# Patient Record
Sex: MALE | Race: BLACK OR AFRICAN AMERICAN | NOT HISPANIC OR LATINO | URBAN - METROPOLITAN AREA
[De-identification: names, ages, dates, MRNs, and addresses within clinical notes are randomized per-mention and may not be internally consistent; named-entity substitution may affect disease eponyms.]

---

## 2017-11-02 ENCOUNTER — OUTPATIENT (OUTPATIENT)
Dept: OUTPATIENT SERVICES | Facility: HOSPITAL | Age: 32
LOS: 1 days | End: 2017-11-02
Payer: MEDICARE

## 2017-11-02 DIAGNOSIS — G47.33 OBSTRUCTIVE SLEEP APNEA (ADULT) (PEDIATRIC): ICD-10-CM

## 2017-11-02 PROCEDURE — 95811 POLYSOM 6/>YRS CPAP 4/> PARM: CPT | Mod: 26

## 2017-11-02 PROCEDURE — 95811 POLYSOM 6/>YRS CPAP 4/> PARM: CPT

## 2017-11-03 ENCOUNTER — APPOINTMENT (OUTPATIENT)
Dept: NEUROLOGY | Facility: CLINIC | Age: 32
End: 2017-11-03
Payer: MEDICARE

## 2017-11-03 VITALS
WEIGHT: 270 LBS | BODY MASS INDEX: 44.98 KG/M2 | DIASTOLIC BLOOD PRESSURE: 85 MMHG | SYSTOLIC BLOOD PRESSURE: 140 MMHG | HEIGHT: 65 IN

## 2017-11-03 DIAGNOSIS — Z78.9 OTHER SPECIFIED HEALTH STATUS: ICD-10-CM

## 2017-11-03 DIAGNOSIS — Z82.3 FAMILY HISTORY OF STROKE: ICD-10-CM

## 2017-11-03 DIAGNOSIS — Z87.891 PERSONAL HISTORY OF NICOTINE DEPENDENCE: ICD-10-CM

## 2017-11-03 PROCEDURE — 99204 OFFICE O/P NEW MOD 45 MIN: CPT

## 2017-12-27 ENCOUNTER — EMERGENCY (EMERGENCY)
Facility: HOSPITAL | Age: 32
LOS: 1 days | End: 2017-12-27
Attending: EMERGENCY MEDICINE
Payer: MEDICARE

## 2017-12-27 ENCOUNTER — TRANSCRIPTION ENCOUNTER (OUTPATIENT)
Age: 32
End: 2017-12-27

## 2017-12-27 VITALS
RESPIRATION RATE: 20 BRPM | DIASTOLIC BLOOD PRESSURE: 126 MMHG | WEIGHT: 268.96 LBS | TEMPERATURE: 98 F | SYSTOLIC BLOOD PRESSURE: 212 MMHG | HEART RATE: 66 BPM | OXYGEN SATURATION: 99 % | HEIGHT: 65 IN

## 2017-12-27 LAB
ALBUMIN SERPL ELPH-MCNC: 3.7 G/DL — SIGNIFICANT CHANGE UP (ref 3.3–5.2)
ALP SERPL-CCNC: 95 U/L — SIGNIFICANT CHANGE UP (ref 40–120)
ALT FLD-CCNC: 36 U/L — SIGNIFICANT CHANGE UP
AMPHET UR-MCNC: NEGATIVE — SIGNIFICANT CHANGE UP
ANION GAP SERPL CALC-SCNC: 15 MMOL/L — SIGNIFICANT CHANGE UP (ref 5–17)
ANISOCYTOSIS BLD QL: SLIGHT — SIGNIFICANT CHANGE UP
APPEARANCE UR: CLEAR — SIGNIFICANT CHANGE UP
APTT BLD: 30.4 SEC — SIGNIFICANT CHANGE UP (ref 27.5–37.4)
AST SERPL-CCNC: 39 U/L — SIGNIFICANT CHANGE UP
BARBITURATES UR SCN-MCNC: NEGATIVE — SIGNIFICANT CHANGE UP
BASOPHILS # BLD AUTO: 0 K/UL — SIGNIFICANT CHANGE UP (ref 0–0.2)
BASOPHILS NFR BLD AUTO: 0.3 % — SIGNIFICANT CHANGE UP (ref 0–2)
BENZODIAZ UR-MCNC: NEGATIVE — SIGNIFICANT CHANGE UP
BILIRUB SERPL-MCNC: 0.2 MG/DL — LOW (ref 0.4–2)
BILIRUB UR-MCNC: NEGATIVE — SIGNIFICANT CHANGE UP
BUN SERPL-MCNC: 32 MG/DL — HIGH (ref 8–20)
CALCIUM SERPL-MCNC: 9.5 MG/DL — SIGNIFICANT CHANGE UP (ref 8.6–10.2)
CHLORIDE SERPL-SCNC: 100 MMOL/L — SIGNIFICANT CHANGE UP (ref 98–107)
CO2 SERPL-SCNC: 26 MMOL/L — SIGNIFICANT CHANGE UP (ref 22–29)
COCAINE METAB.OTHER UR-MCNC: NEGATIVE — SIGNIFICANT CHANGE UP
COLOR SPEC: YELLOW — SIGNIFICANT CHANGE UP
CREAT SERPL-MCNC: 2.55 MG/DL — HIGH (ref 0.5–1.3)
DIFF PNL FLD: ABNORMAL
EOSINOPHIL # BLD AUTO: 0.5 K/UL — SIGNIFICANT CHANGE UP (ref 0–0.5)
EOSINOPHIL NFR BLD AUTO: 3.2 % — SIGNIFICANT CHANGE UP (ref 0–5)
EPI CELLS # UR: SIGNIFICANT CHANGE UP
GLUCOSE SERPL-MCNC: 80 MG/DL — SIGNIFICANT CHANGE UP (ref 70–115)
GLUCOSE UR QL: NEGATIVE MG/DL — SIGNIFICANT CHANGE UP
HCT VFR BLD CALC: 34.8 % — LOW (ref 42–52)
HGB BLD-MCNC: 11.7 G/DL — LOW (ref 14–18)
INR BLD: 1.11 RATIO — SIGNIFICANT CHANGE UP (ref 0.88–1.16)
KETONES UR-MCNC: NEGATIVE — SIGNIFICANT CHANGE UP
LEUKOCYTE ESTERASE UR-ACNC: NEGATIVE — SIGNIFICANT CHANGE UP
LYMPHOCYTES # BLD AUTO: 23.6 % — SIGNIFICANT CHANGE UP (ref 20–55)
LYMPHOCYTES # BLD AUTO: 3.7 K/UL — SIGNIFICANT CHANGE UP (ref 1–4.8)
MACROCYTES BLD QL: SLIGHT — SIGNIFICANT CHANGE UP
MAGNESIUM SERPL-MCNC: 2.2 MG/DL — SIGNIFICANT CHANGE UP (ref 1.6–2.6)
MCHC RBC-ENTMCNC: 23.8 PG — LOW (ref 27–31)
MCHC RBC-ENTMCNC: 33.6 G/DL — SIGNIFICANT CHANGE UP (ref 32–36)
MCV RBC AUTO: 70.9 FL — LOW (ref 80–94)
METHADONE UR-MCNC: NEGATIVE — SIGNIFICANT CHANGE UP
MICROCYTES BLD QL: SLIGHT — SIGNIFICANT CHANGE UP
MONOCYTES # BLD AUTO: 1.1 K/UL — HIGH (ref 0–0.8)
MONOCYTES NFR BLD AUTO: 6.9 % — SIGNIFICANT CHANGE UP (ref 3–10)
NEUTROPHILS # BLD AUTO: 10.4 K/UL — HIGH (ref 1.8–8)
NEUTROPHILS NFR BLD AUTO: 65.8 % — SIGNIFICANT CHANGE UP (ref 37–73)
NITRITE UR-MCNC: NEGATIVE — SIGNIFICANT CHANGE UP
NT-PROBNP SERPL-SCNC: 1097 PG/ML — HIGH (ref 0–300)
OPIATES UR-MCNC: NEGATIVE — SIGNIFICANT CHANGE UP
OVALOCYTES BLD QL SMEAR: SLIGHT — SIGNIFICANT CHANGE UP
PCP SPEC-MCNC: SIGNIFICANT CHANGE UP
PCP UR-MCNC: NEGATIVE — SIGNIFICANT CHANGE UP
PH UR: 6 — SIGNIFICANT CHANGE UP (ref 5–8)
PLAT MORPH BLD: NORMAL — SIGNIFICANT CHANGE UP
PLATELET # BLD AUTO: 308 K/UL — SIGNIFICANT CHANGE UP (ref 150–400)
POIKILOCYTOSIS BLD QL AUTO: SLIGHT — SIGNIFICANT CHANGE UP
POTASSIUM SERPL-MCNC: 3.5 MMOL/L — SIGNIFICANT CHANGE UP (ref 3.5–5.3)
POTASSIUM SERPL-SCNC: 3.5 MMOL/L — SIGNIFICANT CHANGE UP (ref 3.5–5.3)
PROT SERPL-MCNC: 7.4 G/DL — SIGNIFICANT CHANGE UP (ref 6.6–8.7)
PROT UR-MCNC: 100 MG/DL
PROTHROM AB SERPL-ACNC: 12.2 SEC — SIGNIFICANT CHANGE UP (ref 9.8–12.7)
RBC # BLD: 4.91 M/UL — SIGNIFICANT CHANGE UP (ref 4.6–6.2)
RBC # FLD: 18 % — HIGH (ref 11–15.6)
RBC BLD AUTO: ABNORMAL
RBC CASTS # UR COMP ASSIST: SIGNIFICANT CHANGE UP /HPF (ref 0–4)
SODIUM SERPL-SCNC: 141 MMOL/L — SIGNIFICANT CHANGE UP (ref 135–145)
SP GR SPEC: 1.01 — SIGNIFICANT CHANGE UP (ref 1.01–1.02)
TARGETS BLD QL SMEAR: SLIGHT — SIGNIFICANT CHANGE UP
THC UR QL: POSITIVE
TROPONIN T SERPL-MCNC: 0.01 NG/ML — SIGNIFICANT CHANGE UP (ref 0–0.06)
UROBILINOGEN FLD QL: NEGATIVE MG/DL — SIGNIFICANT CHANGE UP
WBC # BLD: 15.7 K/UL — HIGH (ref 4.8–10.8)
WBC # FLD AUTO: 15.7 K/UL — HIGH (ref 4.8–10.8)
WBC UR QL: NEGATIVE — SIGNIFICANT CHANGE UP

## 2017-12-27 PROCEDURE — 99285 EMERGENCY DEPT VISIT HI MDM: CPT

## 2017-12-27 PROCEDURE — 71020: CPT | Mod: 26

## 2017-12-27 PROCEDURE — 93010 ELECTROCARDIOGRAM REPORT: CPT

## 2017-12-27 NOTE — ED PROVIDER NOTE - OBJECTIVE STATEMENT
33 y/o male with PMHx sleep apnea, DM, and HTN presents to the ED with c/o ear pain, onset 1 week. Upon triage, pt was noted to have elevated blood pressure. Pt states he was evaluated at Urgent Care for ear pain, pt was sent home. Pt is noncompliant with medication. Pt reports generalized chest pain, pt describes pain as tightness. Pt denies SOB, n/v, LE swelling, and any other acute symptoms and complaints at this time.

## 2017-12-27 NOTE — ED PROVIDER NOTE - MEDICAL DECISION MAKING DETAILS
31 y/o male with chronic HTN, noncompliant with medication, presents for atypical cp and elevated BP, plan to do CT angio, labs, and reevaluate.

## 2017-12-27 NOTE — ED PROVIDER NOTE - PHYSICAL EXAMINATION
Constitutional : Appears comfortably, talking in full sentences  Head :NC AT , no swelling  Eyes : exophthalmos, eomi, no swelling. Pupils reactive 4mm bilaterally.  Mouth :mm moist,  Neck : supple, trachea in midline  Chest : decrease breath sounds. no distress.   Heart :S1 S2 distant. no LE edema  Abdomen :abd soft, obese, non tender  Musc/Skel :ext no swelling, no deformity, no spine tenderness, distal pulses present  Neuro  :AAO 3 no focal deficits

## 2017-12-27 NOTE — ED ADULT TRIAGE NOTE - CHIEF COMPLAINT QUOTE
both my ear feel pain and swelling                            I went to urgent care today  told to go to ear dr    I am also having problems with my b/p

## 2017-12-27 NOTE — ED PROVIDER NOTE - NS ED ROS FT
no weight change, no fever or chills  +ear pain   no rash, no bruises  no visual changes no eye discharge  no cough cold or congestion,   no sob, + chest pain  no orthopnea, no pnd  no abd pain, no n/v/d  no hematuria, no change in urinary habits  no joint pain, no deformity  no headache, no paresthesia

## 2017-12-27 NOTE — ED PROVIDER NOTE - CARE PLAN
Principal Discharge DX:	Hypertension  Secondary Diagnosis:	Non compliance w medication regimen  Secondary Diagnosis:	Atypical chest pain

## 2017-12-28 VITALS
SYSTOLIC BLOOD PRESSURE: 181 MMHG | TEMPERATURE: 98 F | RESPIRATION RATE: 18 BRPM | DIASTOLIC BLOOD PRESSURE: 90 MMHG | HEART RATE: 58 BPM | OXYGEN SATURATION: 99 %

## 2017-12-28 PROCEDURE — 71250 CT THORAX DX C-: CPT

## 2017-12-28 PROCEDURE — 99284 EMERGENCY DEPT VISIT MOD MDM: CPT | Mod: 25

## 2017-12-28 PROCEDURE — 93005 ELECTROCARDIOGRAM TRACING: CPT

## 2017-12-28 PROCEDURE — 85730 THROMBOPLASTIN TIME PARTIAL: CPT

## 2017-12-28 PROCEDURE — 80307 DRUG TEST PRSMV CHEM ANLYZR: CPT

## 2017-12-28 PROCEDURE — 71250 CT THORAX DX C-: CPT | Mod: 26

## 2017-12-28 PROCEDURE — 85610 PROTHROMBIN TIME: CPT

## 2017-12-28 PROCEDURE — 83880 ASSAY OF NATRIURETIC PEPTIDE: CPT

## 2017-12-28 PROCEDURE — 85027 COMPLETE CBC AUTOMATED: CPT

## 2017-12-28 PROCEDURE — 80053 COMPREHEN METABOLIC PANEL: CPT

## 2017-12-28 PROCEDURE — 96374 THER/PROPH/DIAG INJ IV PUSH: CPT | Mod: XU

## 2017-12-28 PROCEDURE — 36415 COLL VENOUS BLD VENIPUNCTURE: CPT

## 2017-12-28 PROCEDURE — 81001 URINALYSIS AUTO W/SCOPE: CPT

## 2017-12-28 PROCEDURE — 83735 ASSAY OF MAGNESIUM: CPT

## 2017-12-28 PROCEDURE — 84484 ASSAY OF TROPONIN QUANT: CPT

## 2017-12-28 PROCEDURE — 71046 X-RAY EXAM CHEST 2 VIEWS: CPT

## 2017-12-28 RX ORDER — CIPROFLOXACIN AND DEXAMETHASONE 3; 1 MG/ML; MG/ML
4 SUSPENSION/ DROPS AURICULAR (OTIC) ONCE
Qty: 0 | Refills: 0 | Status: COMPLETED | OUTPATIENT
Start: 2017-12-28 | End: 2017-12-28

## 2017-12-28 RX ORDER — FUROSEMIDE 40 MG
40 TABLET ORAL ONCE
Qty: 0 | Refills: 0 | Status: COMPLETED | OUTPATIENT
Start: 2017-12-28 | End: 2017-12-28

## 2017-12-28 RX ORDER — CIPROFLOXACIN 6 MG/ML
4 SUSPENSION INTRATYMPANIC
Qty: 1 | Refills: 0 | OUTPATIENT
Start: 2017-12-28 | End: 2018-01-03

## 2017-12-28 RX ADMIN — CIPROFLOXACIN AND DEXAMETHASONE 4 DROP(S): 3; 1 SUSPENSION/ DROPS AURICULAR (OTIC) at 03:09

## 2017-12-28 RX ADMIN — Medication 40 MILLIGRAM(S): at 03:09

## 2017-12-28 NOTE — ED ADULT NURSE REASSESSMENT NOTE - NS ED NURSE REASSESS COMMENT FT1
PT noted with HTN @0118 and asymptomatic. PT did take evening medications @ about 2230 and informed MD Alfred. Md. notified about still elevated B/P, orders pending

## 2018-03-01 ENCOUNTER — OUTPATIENT (OUTPATIENT)
Dept: OUTPATIENT SERVICES | Facility: HOSPITAL | Age: 33
LOS: 1 days | End: 2018-03-01
Payer: MEDICAID

## 2018-03-01 PROCEDURE — G9001: CPT

## 2018-03-19 DIAGNOSIS — R69 ILLNESS, UNSPECIFIED: ICD-10-CM

## 2018-04-01 ENCOUNTER — OUTPATIENT (OUTPATIENT)
Dept: OUTPATIENT SERVICES | Facility: HOSPITAL | Age: 33
LOS: 1 days | End: 2018-04-01
Payer: MEDICAID

## 2018-04-01 PROCEDURE — G9001: CPT

## 2018-04-26 ENCOUNTER — INPATIENT (INPATIENT)
Facility: HOSPITAL | Age: 33
LOS: 2 days | Discharge: LEFT BEFORE DISCHARGE | DRG: 305 | End: 2018-04-29
Attending: INTERNAL MEDICINE | Admitting: HOSPITALIST
Payer: MEDICARE

## 2018-04-26 VITALS
SYSTOLIC BLOOD PRESSURE: 214 MMHG | DIASTOLIC BLOOD PRESSURE: 151 MMHG | WEIGHT: 250 LBS | OXYGEN SATURATION: 96 % | HEART RATE: 97 BPM | TEMPERATURE: 100 F | RESPIRATION RATE: 19 BRPM | HEIGHT: 65 IN

## 2018-04-26 LAB
ALBUMIN SERPL ELPH-MCNC: 4.1 G/DL — SIGNIFICANT CHANGE UP (ref 3.3–5.2)
ALP SERPL-CCNC: 85 U/L — SIGNIFICANT CHANGE UP (ref 40–120)
ALT FLD-CCNC: 206 U/L — HIGH
AMPHET UR-MCNC: NEGATIVE — SIGNIFICANT CHANGE UP
ANION GAP SERPL CALC-SCNC: 13 MMOL/L — SIGNIFICANT CHANGE UP (ref 5–17)
APPEARANCE UR: CLEAR — SIGNIFICANT CHANGE UP
AST SERPL-CCNC: 158 U/L — HIGH
BACTERIA # UR AUTO: ABNORMAL
BARBITURATES UR SCN-MCNC: NEGATIVE — SIGNIFICANT CHANGE UP
BASOPHILS # BLD AUTO: 0 K/UL — SIGNIFICANT CHANGE UP (ref 0–0.2)
BASOPHILS NFR BLD AUTO: 0.3 % — SIGNIFICANT CHANGE UP (ref 0–2)
BENZODIAZ UR-MCNC: NEGATIVE — SIGNIFICANT CHANGE UP
BILIRUB SERPL-MCNC: <0.2 MG/DL — LOW (ref 0.4–2)
BILIRUB UR-MCNC: NEGATIVE — SIGNIFICANT CHANGE UP
BUN SERPL-MCNC: 28 MG/DL — HIGH (ref 8–20)
CALCIUM SERPL-MCNC: 9.6 MG/DL — SIGNIFICANT CHANGE UP (ref 8.6–10.2)
CHLORIDE SERPL-SCNC: 92 MMOL/L — LOW (ref 98–107)
CO2 SERPL-SCNC: 30 MMOL/L — HIGH (ref 22–29)
COCAINE METAB.OTHER UR-MCNC: NEGATIVE — SIGNIFICANT CHANGE UP
COLOR SPEC: YELLOW — SIGNIFICANT CHANGE UP
COMMENT - URINE: SIGNIFICANT CHANGE UP
CREAT SERPL-MCNC: 2.83 MG/DL — HIGH (ref 0.5–1.3)
DIFF PNL FLD: ABNORMAL
EOSINOPHIL # BLD AUTO: 0.3 K/UL — SIGNIFICANT CHANGE UP (ref 0–0.5)
EOSINOPHIL NFR BLD AUTO: 2.4 % — SIGNIFICANT CHANGE UP (ref 0–5)
EPI CELLS # UR: SIGNIFICANT CHANGE UP
GLUCOSE SERPL-MCNC: 155 MG/DL — HIGH (ref 70–115)
GLUCOSE UR QL: NEGATIVE MG/DL — SIGNIFICANT CHANGE UP
HCT VFR BLD CALC: 35.7 % — LOW (ref 42–52)
HGB BLD-MCNC: 11.8 G/DL — LOW (ref 14–18)
KETONES UR-MCNC: NEGATIVE — SIGNIFICANT CHANGE UP
LEUKOCYTE ESTERASE UR-ACNC: NEGATIVE — SIGNIFICANT CHANGE UP
LYMPHOCYTES # BLD AUTO: 26.1 % — SIGNIFICANT CHANGE UP (ref 20–55)
LYMPHOCYTES # BLD AUTO: 3.7 K/UL — SIGNIFICANT CHANGE UP (ref 1–4.8)
MCHC RBC-ENTMCNC: 25.2 PG — LOW (ref 27–31)
MCHC RBC-ENTMCNC: 33.1 G/DL — SIGNIFICANT CHANGE UP (ref 32–36)
MCV RBC AUTO: 76.1 FL — LOW (ref 80–94)
METHADONE UR-MCNC: NEGATIVE — SIGNIFICANT CHANGE UP
MONOCYTES # BLD AUTO: 1.1 K/UL — HIGH (ref 0–0.8)
MONOCYTES NFR BLD AUTO: 7.5 % — SIGNIFICANT CHANGE UP (ref 3–10)
NEUTROPHILS # BLD AUTO: 9 K/UL — HIGH (ref 1.8–8)
NEUTROPHILS NFR BLD AUTO: 63.6 % — SIGNIFICANT CHANGE UP (ref 37–73)
NITRITE UR-MCNC: NEGATIVE — SIGNIFICANT CHANGE UP
OPIATES UR-MCNC: NEGATIVE — SIGNIFICANT CHANGE UP
PCP SPEC-MCNC: SIGNIFICANT CHANGE UP
PCP UR-MCNC: NEGATIVE — SIGNIFICANT CHANGE UP
PH UR: 6 — SIGNIFICANT CHANGE UP (ref 5–8)
PLATELET # BLD AUTO: 365 K/UL — SIGNIFICANT CHANGE UP (ref 150–400)
POTASSIUM SERPL-MCNC: 3.3 MMOL/L — LOW (ref 3.5–5.3)
POTASSIUM SERPL-SCNC: 3.3 MMOL/L — LOW (ref 3.5–5.3)
PROT SERPL-MCNC: 7.6 G/DL — SIGNIFICANT CHANGE UP (ref 6.6–8.7)
PROT UR-MCNC: 500 MG/DL
RBC # BLD: 4.69 M/UL — SIGNIFICANT CHANGE UP (ref 4.6–6.2)
RBC # FLD: 17.3 % — HIGH (ref 11–15.6)
RBC CASTS # UR COMP ASSIST: SIGNIFICANT CHANGE UP /HPF (ref 0–4)
SODIUM SERPL-SCNC: 135 MMOL/L — SIGNIFICANT CHANGE UP (ref 135–145)
SP GR SPEC: 1.01 — SIGNIFICANT CHANGE UP (ref 1.01–1.02)
THC UR QL: POSITIVE
UROBILINOGEN FLD QL: NEGATIVE MG/DL — SIGNIFICANT CHANGE UP
WBC # BLD: 14.2 K/UL — HIGH (ref 4.8–10.8)
WBC # FLD AUTO: 14.2 K/UL — HIGH (ref 4.8–10.8)
WBC UR QL: SIGNIFICANT CHANGE UP

## 2018-04-26 PROCEDURE — 71045 X-RAY EXAM CHEST 1 VIEW: CPT | Mod: 26

## 2018-04-26 PROCEDURE — 93010 ELECTROCARDIOGRAM REPORT: CPT

## 2018-04-26 PROCEDURE — 99285 EMERGENCY DEPT VISIT HI MDM: CPT

## 2018-04-26 PROCEDURE — 70450 CT HEAD/BRAIN W/O DYE: CPT | Mod: 26

## 2018-04-26 RX ORDER — LABETALOL HCL 100 MG
10 TABLET ORAL ONCE
Qty: 0 | Refills: 0 | Status: COMPLETED | OUTPATIENT
Start: 2018-04-26 | End: 2018-04-26

## 2018-04-26 RX ORDER — LABETALOL HCL 100 MG
10 TABLET ORAL ONCE
Qty: 0 | Refills: 0 | Status: DISCONTINUED | OUTPATIENT
Start: 2018-04-26 | End: 2018-04-27

## 2018-04-26 RX ORDER — ASPIRIN/CALCIUM CARB/MAGNESIUM 324 MG
162 TABLET ORAL DAILY
Qty: 0 | Refills: 0 | Status: DISCONTINUED | OUTPATIENT
Start: 2018-04-26 | End: 2018-04-29

## 2018-04-26 RX ADMIN — Medication 1 MILLIGRAM(S): at 20:32

## 2018-04-26 RX ADMIN — Medication 10 MILLIGRAM(S): at 20:32

## 2018-04-26 NOTE — ED ADULT NURSE REASSESSMENT NOTE - NS ED NURSE REASSESS COMMENT FT1
Pt speaking with MD edwards, pt will be moved to main ER for further evaluation of hypertension and depression. Pt placed in yellow gown, denies SI/HI. cooperative with staff

## 2018-04-26 NOTE — ED PROVIDER NOTE - MEDICAL DECISION MAKING DETAILS
pt with depression and anxiety. Pt also hypertensive. Will check labs, head CT for medical clearance and then psych evaluation

## 2018-04-26 NOTE — ED ADULT NURSE NOTE - OBJECTIVE STATEMENT
Pt. received in B5-L in yellow gown. Pt. states he has been feeling anxious and depressive, worsening today. Pt. answering questions but limited in response, denies SI/HI but states he does not wish to go into detail about condition with RN, wishes to talk with psych/BH instead. environment safe. pt. not at risk to self. medicated for HTN. all other vital signs stable

## 2018-04-26 NOTE — ED STATDOCS - MEDICAL DECISION MAKING DETAILS
I, Dorys Gilman, performed the initial face to face bedside interview with this patient regarding history of present illness and determined that the patient should be seen in the main ED.

## 2018-04-26 NOTE — ED PROVIDER NOTE - OBJECTIVE STATEMENT
34 y/o M pt with hx of HTN (takes medication), DM, bipolar disorder presents to ED c/o severe depression and anxiety. pt states he is under a lot of stress. Denies current SI, HI. he has had past psychiatric admission. Pt took blood pressure medication today, metoprolol, Hydrochlorthiazide but noncompliant with medication. denies chest pain, SOB, abdominal pain. Pt is poor historian.

## 2018-04-26 NOTE — ED PROVIDER NOTE - PROGRESS NOTE DETAILS
Pt. will be admitted for uncontrolled hypertension. Pt. also noted with worsening renal functions. Case discussed with senior resident for admission. Pt. will be need to have psych evaluation.

## 2018-04-26 NOTE — ED STATDOCS - PROGRESS NOTE DETAILS
33yoM with h/o HTN, CKD, depression/ anxiety, here for evaluation. Pt notes high stress/ anxiety levels due to still living with his 89 y o grandmother. Has not seen PMD in a long time.  Questionable compliance with BP meds ( hydralazine 100mg / toprol  100mg). States meds were changed at [facility] and cannot remember if he took them today.  Pt very tangential on exam.  Notes some chest pressure. Denies HI/ SI.  Will send to [facility] for telemetry/ further workup.    Psychiatrist Dr. JONES: . 33yoM with h/o HTN, CKD, depression/ anxiety, here for evaluation. Pt notes high stress/ anxiety levels due to still living with his 100 y o grandmother. Has not seen PMD in a long time.  Questionable compliance with BP meds ( hydralazine 100mg / toprol  100mg). States meds were changed at [facility] and cannot remember if he took them today.  Pt very tangential on exam.  Notes some chest pressure but staets he has pressure "all over" and unclear if he means emotional pressure or physical pressure. Denies HI/ SI.  Will send to [facility] for telemetry/ further workup.    Psychiatrist Dr. JONES: .

## 2018-04-26 NOTE — ED PROVIDER NOTE - CONSTITUTIONAL, MLM
normal... Well appearing, well nourished, awake, alert, oriented to person, place, time and in no apparent distress.

## 2018-04-27 DIAGNOSIS — I10 ESSENTIAL (PRIMARY) HYPERTENSION: ICD-10-CM

## 2018-04-27 DIAGNOSIS — F05 DELIRIUM DUE TO KNOWN PHYSIOLOGICAL CONDITION: ICD-10-CM

## 2018-04-27 DIAGNOSIS — E11.628 TYPE 2 DIABETES MELLITUS WITH OTHER SKIN COMPLICATIONS: ICD-10-CM

## 2018-04-27 DIAGNOSIS — I16.0 HYPERTENSIVE URGENCY: ICD-10-CM

## 2018-04-27 LAB
ETHANOL SERPL-MCNC: <10 MG/DL — SIGNIFICANT CHANGE UP
FERRITIN SERPL-MCNC: 129 NG/ML — SIGNIFICANT CHANGE UP (ref 30–400)
GLUCOSE BLDC GLUCOMTR-MCNC: 116 MG/DL — HIGH (ref 70–99)
GLUCOSE BLDC GLUCOMTR-MCNC: 126 MG/DL — HIGH (ref 70–99)
GLUCOSE BLDC GLUCOMTR-MCNC: 161 MG/DL — HIGH (ref 70–99)
RAPID RVP RESULT: SIGNIFICANT CHANGE UP
TROPONIN T SERPL-MCNC: 0.08 NG/ML — HIGH (ref 0–0.06)

## 2018-04-27 PROCEDURE — 12345: CPT | Mod: GC,NC

## 2018-04-27 PROCEDURE — 76700 US EXAM ABDOM COMPLETE: CPT | Mod: 26

## 2018-04-27 PROCEDURE — 99223 1ST HOSP IP/OBS HIGH 75: CPT

## 2018-04-27 PROCEDURE — 93010 ELECTROCARDIOGRAM REPORT: CPT

## 2018-04-27 PROCEDURE — 99222 1ST HOSP IP/OBS MODERATE 55: CPT

## 2018-04-27 PROCEDURE — 93306 TTE W/DOPPLER COMPLETE: CPT | Mod: 26

## 2018-04-27 PROCEDURE — 99223 1ST HOSP IP/OBS HIGH 75: CPT | Mod: GC

## 2018-04-27 RX ORDER — LAMOTRIGINE 25 MG/1
50 TABLET, ORALLY DISINTEGRATING ORAL
Qty: 0 | Refills: 0 | Status: DISCONTINUED | OUTPATIENT
Start: 2018-04-27 | End: 2018-04-29

## 2018-04-27 RX ORDER — INSULIN LISPRO 100/ML
VIAL (ML) SUBCUTANEOUS
Qty: 0 | Refills: 0 | Status: DISCONTINUED | OUTPATIENT
Start: 2018-04-27 | End: 2018-04-29

## 2018-04-27 RX ORDER — DEXTROSE 50 % IN WATER 50 %
25 SYRINGE (ML) INTRAVENOUS ONCE
Qty: 0 | Refills: 0 | Status: DISCONTINUED | OUTPATIENT
Start: 2018-04-27 | End: 2018-04-29

## 2018-04-27 RX ORDER — DEXTROSE 50 % IN WATER 50 %
1 SYRINGE (ML) INTRAVENOUS ONCE
Qty: 0 | Refills: 0 | Status: DISCONTINUED | OUTPATIENT
Start: 2018-04-27 | End: 2018-04-29

## 2018-04-27 RX ORDER — HYDRALAZINE HCL 50 MG
1 TABLET ORAL
Qty: 0 | Refills: 0 | COMMUNITY

## 2018-04-27 RX ORDER — BENZTROPINE MESYLATE 1 MG
1 TABLET ORAL
Qty: 0 | Refills: 0 | COMMUNITY

## 2018-04-27 RX ORDER — DOCUSATE SODIUM 100 MG
100 CAPSULE ORAL
Qty: 0 | Refills: 0 | Status: DISCONTINUED | OUTPATIENT
Start: 2018-04-27 | End: 2018-04-27

## 2018-04-27 RX ORDER — HYDRALAZINE HCL 50 MG
50 TABLET ORAL EVERY 12 HOURS
Qty: 0 | Refills: 0 | Status: DISCONTINUED | OUTPATIENT
Start: 2018-04-27 | End: 2018-04-27

## 2018-04-27 RX ORDER — LAMOTRIGINE 25 MG/1
1 TABLET, ORALLY DISINTEGRATING ORAL
Qty: 0 | Refills: 0 | COMMUNITY

## 2018-04-27 RX ORDER — TRAZODONE HCL 50 MG
100 TABLET ORAL AT BEDTIME
Qty: 0 | Refills: 0 | Status: DISCONTINUED | OUTPATIENT
Start: 2018-04-27 | End: 2018-04-27

## 2018-04-27 RX ORDER — GABAPENTIN 400 MG/1
1 CAPSULE ORAL
Qty: 0 | Refills: 0 | COMMUNITY

## 2018-04-27 RX ORDER — METOPROLOL TARTRATE 50 MG
100 TABLET ORAL
Qty: 0 | Refills: 0 | Status: DISCONTINUED | OUTPATIENT
Start: 2018-04-27 | End: 2018-04-27

## 2018-04-27 RX ORDER — POTASSIUM CHLORIDE 20 MEQ
40 PACKET (EA) ORAL ONCE
Qty: 0 | Refills: 0 | Status: COMPLETED | OUTPATIENT
Start: 2018-04-27 | End: 2018-04-27

## 2018-04-27 RX ORDER — PALIPERIDONE 1.5 MG/1
9 TABLET, EXTENDED RELEASE ORAL DAILY
Qty: 0 | Refills: 0 | Status: DISCONTINUED | OUTPATIENT
Start: 2018-04-27 | End: 2018-04-29

## 2018-04-27 RX ORDER — TRAZODONE HCL 50 MG
100 TABLET ORAL AT BEDTIME
Qty: 0 | Refills: 0 | Status: DISCONTINUED | OUTPATIENT
Start: 2018-04-27 | End: 2018-04-29

## 2018-04-27 RX ORDER — LAMOTRIGINE 25 MG/1
25 TABLET, ORALLY DISINTEGRATING ORAL DAILY
Qty: 0 | Refills: 0 | Status: DISCONTINUED | OUTPATIENT
Start: 2018-04-27 | End: 2018-04-27

## 2018-04-27 RX ORDER — DEXTROSE 50 % IN WATER 50 %
12.5 SYRINGE (ML) INTRAVENOUS ONCE
Qty: 0 | Refills: 0 | Status: DISCONTINUED | OUTPATIENT
Start: 2018-04-27 | End: 2018-04-29

## 2018-04-27 RX ORDER — AMLODIPINE BESYLATE 2.5 MG/1
10 TABLET ORAL DAILY
Qty: 0 | Refills: 0 | Status: DISCONTINUED | OUTPATIENT
Start: 2018-04-27 | End: 2018-04-29

## 2018-04-27 RX ORDER — METOPROLOL TARTRATE 50 MG
100 TABLET ORAL EVERY 12 HOURS
Qty: 0 | Refills: 0 | Status: DISCONTINUED | OUTPATIENT
Start: 2018-04-27 | End: 2018-04-28

## 2018-04-27 RX ORDER — HYDRALAZINE HCL 50 MG
10 TABLET ORAL ONCE
Qty: 0 | Refills: 0 | Status: COMPLETED | OUTPATIENT
Start: 2018-04-27 | End: 2018-04-27

## 2018-04-27 RX ORDER — HYDRALAZINE HCL 50 MG
10 TABLET ORAL EVERY 6 HOURS
Qty: 0 | Refills: 0 | Status: DISCONTINUED | OUTPATIENT
Start: 2018-04-27 | End: 2018-04-29

## 2018-04-27 RX ORDER — METOPROLOL TARTRATE 50 MG
50 TABLET ORAL
Qty: 0 | Refills: 0 | Status: DISCONTINUED | OUTPATIENT
Start: 2018-04-27 | End: 2018-04-27

## 2018-04-27 RX ORDER — CLONAZEPAM 1 MG
0.5 TABLET ORAL
Qty: 0 | Refills: 0 | Status: DISCONTINUED | OUTPATIENT
Start: 2018-04-27 | End: 2018-04-29

## 2018-04-27 RX ORDER — INSULIN LISPRO 100/ML
VIAL (ML) SUBCUTANEOUS
Qty: 0 | Refills: 0 | Status: DISCONTINUED | OUTPATIENT
Start: 2018-04-27 | End: 2018-04-27

## 2018-04-27 RX ORDER — HYDRALAZINE HCL 50 MG
10 TABLET ORAL EVERY 6 HOURS
Qty: 0 | Refills: 0 | Status: DISCONTINUED | OUTPATIENT
Start: 2018-04-27 | End: 2018-04-27

## 2018-04-27 RX ORDER — CLONAZEPAM 1 MG
1 TABLET ORAL
Qty: 0 | Refills: 0 | COMMUNITY

## 2018-04-27 RX ORDER — PALIPERIDONE 1.5 MG/1
1 TABLET, EXTENDED RELEASE ORAL
Qty: 0 | Refills: 0 | COMMUNITY

## 2018-04-27 RX ORDER — HYDRALAZINE HCL 50 MG
100 TABLET ORAL EVERY 8 HOURS
Qty: 0 | Refills: 0 | Status: DISCONTINUED | OUTPATIENT
Start: 2018-04-27 | End: 2018-04-29

## 2018-04-27 RX ORDER — BENZTROPINE MESYLATE 1 MG
0.5 TABLET ORAL AT BEDTIME
Qty: 0 | Refills: 0 | Status: DISCONTINUED | OUTPATIENT
Start: 2018-04-27 | End: 2018-04-29

## 2018-04-27 RX ORDER — GLUCAGON INJECTION, SOLUTION 0.5 MG/.1ML
1 INJECTION, SOLUTION SUBCUTANEOUS ONCE
Qty: 0 | Refills: 0 | Status: DISCONTINUED | OUTPATIENT
Start: 2018-04-27 | End: 2018-04-29

## 2018-04-27 RX ORDER — SODIUM CHLORIDE 9 MG/ML
1000 INJECTION, SOLUTION INTRAVENOUS
Qty: 0 | Refills: 0 | Status: DISCONTINUED | OUTPATIENT
Start: 2018-04-27 | End: 2018-04-29

## 2018-04-27 RX ORDER — LITHIUM CARBONATE 300 MG/1
600 TABLET, EXTENDED RELEASE ORAL
Qty: 0 | Refills: 0 | COMMUNITY

## 2018-04-27 RX ORDER — AMLODIPINE BESYLATE 2.5 MG/1
1 TABLET ORAL
Qty: 0 | Refills: 0 | COMMUNITY

## 2018-04-27 RX ORDER — GABAPENTIN 400 MG/1
300 CAPSULE ORAL
Qty: 0 | Refills: 0 | Status: DISCONTINUED | OUTPATIENT
Start: 2018-04-27 | End: 2018-04-27

## 2018-04-27 RX ADMIN — Medication 162 MILLIGRAM(S): at 10:14

## 2018-04-27 RX ADMIN — PALIPERIDONE 9 MILLIGRAM(S): 1.5 TABLET, EXTENDED RELEASE ORAL at 12:47

## 2018-04-27 RX ADMIN — LAMOTRIGINE 50 MILLIGRAM(S): 25 TABLET, ORALLY DISINTEGRATING ORAL at 17:14

## 2018-04-27 RX ADMIN — Medication 0.2 MILLIGRAM(S): at 06:28

## 2018-04-27 RX ADMIN — GABAPENTIN 300 MILLIGRAM(S): 400 CAPSULE ORAL at 06:28

## 2018-04-27 RX ADMIN — Medication 100 MILLIGRAM(S): at 21:31

## 2018-04-27 RX ADMIN — Medication 162 MILLIGRAM(S): at 01:14

## 2018-04-27 RX ADMIN — Medication 0.5 MILLIGRAM(S): at 17:13

## 2018-04-27 RX ADMIN — Medication 0.5 MILLIGRAM(S): at 06:28

## 2018-04-27 RX ADMIN — Medication 1: at 10:18

## 2018-04-27 RX ADMIN — Medication 40 MILLIEQUIVALENT(S): at 01:14

## 2018-04-27 RX ADMIN — Medication 100 MILLIGRAM(S): at 12:47

## 2018-04-27 RX ADMIN — LAMOTRIGINE 25 MILLIGRAM(S): 25 TABLET, ORALLY DISINTEGRATING ORAL at 10:14

## 2018-04-27 RX ADMIN — AMLODIPINE BESYLATE 10 MILLIGRAM(S): 2.5 TABLET ORAL at 06:28

## 2018-04-27 RX ADMIN — Medication 10 MILLIGRAM(S): at 08:41

## 2018-04-27 RX ADMIN — Medication 0.5 MILLIGRAM(S): at 21:31

## 2018-04-27 RX ADMIN — Medication 0.2 MILLIGRAM(S): at 01:54

## 2018-04-27 RX ADMIN — Medication 100 MILLIGRAM(S): at 17:18

## 2018-04-27 NOTE — H&P ADULT - NSHPLABSRESULTS_GEN_ALL_CORE
Labs  CBC Full  -  ( 2018 20:26 )  WBC Count : 14.2 K/uL  Hemoglobin : 11.8 g/dL  Hematocrit : 35.7 %  Platelet Count - Automated : 365 K/uL  Mean Cell Volume : 76.1 fl  Mean Cell Hemoglobin : 25.2 pg  Mean Cell Hemoglobin Concentration : 33.1 g/dL  Auto Neutrophil # : 9.0 K/uL  Auto Lymphocyte # : 3.7 K/uL  Auto Monocyte # : 1.1 K/uL  Auto Eosinophil # : 0.3 K/uL  Auto Basophil # : 0.0 K/uL  Auto Neutrophil % : 63.6 %  Auto Lymphocyte % : 26.1 %  Auto Monocyte % : 7.5 %  Auto Eosinophil % : 2.4 %  Auto Basophil % : 0.3 %        135  |  92<L>  |  28.0<H>  ----------------------------<  155<H>  3.3<L>   |  30.0<H>  |  2.83<H>    Ca    9.6      2018 20:26    TPro  7.6  /  Alb  4.1  /  TBili  <0.2<L>  /  DBili  x   /  AST  158<H>  /  ALT  206<H>  /  AlkPhos  85      Urinalysis Basic - ( 2018 22:39 )    Color: Yellow / Appearance: Clear / S.015 / pH: x  Gluc: x / Ketone: Negative  / Bili: Negative / Urobili: Negative mg/dL   Blood: x / Protein: 500 mg/dL / Nitrite: Negative   Leuk Esterase: Negative / RBC: 0-2 /HPF / WBC 0-2   Sq Epi: x / Non Sq Epi: Few / Bacteria: Occasional    Toxicology: positive for THC  Blood alcohol level negative.    Imaging  < from: CT Head No Cont (18 @ 22:12) >    COMPARISON: CT head 9/3/2004    TECHNIQUE: Axial noncontrast CT images from the skull base to the vertex   were obtained and submitted for interpretation. Coronal and sagittal   reformatted images were performed. Bone and soft tissue windows were   evaluated.    FINDINGS:         There is no acute intracranial mass-effect, hemorrhage, midline shift, or   abnormal extra-axialfluid collection.     Ventricles, sulci, and cisterns are normal in size for the patient's age   without hydrocephalus. Basal cisterns are patent.     Paranasal sinuses and mastoid air cells are clear. Calvarium is intact.     IMPRESSION:     No acute intracranial bleeding, mass effect, or shift.

## 2018-04-27 NOTE — CHART NOTE - NSCHARTNOTEFT_GEN_A_CORE
Spoke with patient's ACT Team director (Sim Ricardo. [phone] ext [phone]). As per d/w director, patient has lately been exhibiting manic symptoms and they were planning on starting him on IM medications. His last episode of haile with uncontrolled htn was 1-2 months ago at [facility], and patient was confused, illogical and disoriented during that hospital admission as well.

## 2018-04-27 NOTE — BEHAVIORAL HEALTH ASSESSMENT NOTE - HPI (INCLUDE ILLNESS QUALITY, SEVERITY, DURATION, TIMING, CONTEXT, MODIFYING FACTORS, ASSOCIATED SIGNS AND SYMPTOMS)
presented to ED in hypertensive crisis Admitted to medical team Grandmother reports over last week confusion not making sense He is visited by [facility] team [phone] Grandmother unaware of his specific medications as such cannot comment on compliance issues She denies he drinks, smokes or uses drugs Drug screen positive for THC  Patient illogical mumbles appears disoriented

## 2018-04-27 NOTE — H&P ADULT - NSHPSOCIALHISTORY_GEN_ALL_CORE
Patient smokes tobacco and marijuana, unsure of the amount and frequency. He denies use of alcohol.  He is currently unemployed.

## 2018-04-27 NOTE — CONSULT NOTE ADULT - SUBJECTIVE AND OBJECTIVE BOX
[facility] DIVISION OF KIDNEY DISEASES AND HYPERTENSION -- INITIAL CONSULT NOTE  --------------------------------------------------------------------------------  HPI:  33 year old male with HTN, DM2, bipolar, schizophrenia, schizoaffective disorder, presenting to ER with anxiety, depression, found to have SBP > 200; pt is poor historian; mumbling when speaking. Pt has not been taking his BP medication; difficult to ascertain his history based on interview; CT head negative. He denies any fever, chills, vision changes, tinnitus H/A, dizziness, CP, SOB, Abd pain, N/V/D/C, dysuria or calf pain.    PAST HISTORY  --------------------------------------------------------------------------------  PAST MEDICAL & SURGICAL HISTORY:  Sleep apnea  Diabetes  Bipolar 1 disorder  Hypertension  No significant past surgical history    FAMILY HISTORY:  No pertinent family history in first degree relatives    PAST SOCIAL HISTORY:    ALLERGIES & MEDICATIONS  --------------------------------------------------------------------------------  Allergies    No Known Allergies    Intolerances      Standing Inpatient Medications  amLODIPine   Tablet 10 milliGRAM(s) Oral daily  aspirin  chewable 162 milliGRAM(s) Oral daily  benztropine 0.5 milliGRAM(s) Oral at bedtime  clonazePAM Tablet 0.5 milliGRAM(s) Oral two times a day  dextrose 5%. 1000 milliLiter(s) IV Continuous <Continuous>  dextrose 50% Injectable 12.5 Gram(s) IV Push once  dextrose 50% Injectable 25 Gram(s) IV Push once  dextrose 50% Injectable 25 Gram(s) IV Push once  hydrALAZINE 100 milliGRAM(s) Oral every 8 hours  insulin lispro (HumaLOG) corrective regimen sliding scale   SubCutaneous three times a day before meals  lamoTRIgine 50 milliGRAM(s) Oral two times a day  metoprolol tartrate 100 milliGRAM(s) Oral every 12 hours  paliperidone ER. 9 milliGRAM(s) Oral daily    PRN Inpatient Medications  dextrose Gel 1 Dose(s) Oral once PRN  glucagon  Injectable 1 milliGRAM(s) IntraMuscular once PRN  LORazepam   Injectable 1 milliGRAM(s) IV Push once PRN  traZODone 100 milliGRAM(s) Oral at bedtime PRN      REVIEW OF SYSTEMS  --------------------------------------------------------------------------------  Gen: No weight changes, fatigue, fevers/chills, weakness  Skin: No rashes  Head/Eyes/Ears/Mouth: No headache; Normal hearing; Normal vision w/o blurriness; No sinus pain/discomfort, sore throat  Respiratory: No dyspnea, cough, wheezing, hemoptysis  CV: No chest pain, PND, orthopnea  GI: No abdominal pain, diarrhea, constipation, nausea, vomiting, melena, hematochezia  : No increased frequency, dysuria, hematuria, nocturia  MSK: No joint pain/swelling; no back pain; no edema  Neuro: No dizziness/lightheadedness, weakness, seizures, numbness, tingling  Heme: No easy bruising or bleeding  Endo: No heat/cold intolerance  Psych: No significant nervousness, anxiety, stress, depression    All other systems were reviewed and are negative, except as noted.    VITALS/PHYSICAL EXAM  --------------------------------------------------------------------------------  T(C): 36.4 (04-27-18 @ 10:59), Max: 37.6 (04-26-18 @ 19:21)  HR: 71 (04-27-18 @ 10:59) (52 - 97)  BP: 160/92 (04-27-18 @ 10:59) (154/83 - 214/151)  RR: 22 (04-27-18 @ 10:59) (16 - 22)  SpO2: 100% (04-27-18 @ 10:59) (96% - 100%)  Wt(kg): --  Height (cm): 165.1 (04-26-18 @ 19:21)  Weight (kg): 113.4 (04-26-18 @ 19:21)  BMI (kg/m2): 41.6 (04-26-18 @ 19:21)  BSA (m2): 2.17 (04-26-18 @ 19:21)      Physical Exam:  	Gen: NAD, well-appearing  	HEENT: PERRL, supple neck, clear oropharynx  	Pulm: CTA B/L  	CV: RRR, S1S2; no rub  	Back: No spinal or CVA tenderness; no sacral edema  	Abd: +BS, soft, nontender/nondistended  	: No suprapubic tenderness  	UE: Warm, FROM, no clubbing, intact strength; no edema; no asterixis  	LE: Warm, FROM, no clubbing, intact strength; no edema  	Neuro: No focal deficits, intact gait  	Psych: Normal affect and mood  	Skin: Warm, without rashes  	Vascular access:    LABS/STUDIES  --------------------------------------------------------------------------------              11.8   14.2  >-----------<  365      [04-26-18 @ 20:26]              35.7     135  |  92  |  28.0  ----------------------------<  155      [04-26-18 @ 20:26]  3.3   |  30.0  |  2.83        Ca     9.6     [04-26-18 @ 20:26]    TPro  7.6  /  Alb  4.1  /  TBili  <0.2  /  DBili  x   /  AST  158  /  ALT  206  /  AlkPhos  85  [04-26-18 @ 20:26]        Troponin 0.08      [04-27-18 @ 07:19]    Creatinine Trend:  SCr 2.83 [04-26 @ 20:26]    Urinalysis - [04-26-18 @ 22:39]      Color Yellow / Appearance Clear / SG 1.015 / pH 6.0      Gluc Negative / Ketone Negative  / Bili Negative / Urobili Negative       Blood Large / Protein 500 / Leuk Est Negative / Nitrite Negative      RBC 0-2 / WBC 0-2 / Hyaline  / Gran  / Sq Epi  / Non Sq Epi Few / Bacteria Occasional      Ferritin 129      [04-27-18 @ 07:19]  HbA1c 8.6      [08-11-15 @ 20:13]

## 2018-04-27 NOTE — BEHAVIORAL HEALTH ASSESSMENT NOTE - NSBHCHARTREVIEWIMAGING_PSY_A_CORE FT
< from: CT Head No Cont (04.26.18 @ 22:12) >    No acute intracranial bleeding, mass effect, or shift.     < end of copied text >

## 2018-04-27 NOTE — PROGRESS NOTE ADULT - SUBJECTIVE AND OBJECTIVE BOX
Nephrology Consult Note    Pt with HTN emergency, MELISSA  Restarted on home meds; with labile BP ;   Check UA, Ruben, UCl, UOsm  Check renal sonogram with arterial dopplers r/o LOUIS  Check renin/alexandria levels  Check urinary metanephrines, VMA  Full consult to follow

## 2018-04-27 NOTE — CONSULT NOTE ADULT - SUBJECTIVE AND OBJECTIVE BOX
[place] [facility]                                                        Office: [address] [address]                                                       Telephone: [phone]. Fax:[phone]                                                              CARDIOLOGY CONSULTATION NOTE                                                                                                  Chief complaint:  severe depression and anxiety and high blood pressure      HPI: 32 yo Male poor historian with PMHx of noncompliance, HTN possibly resistant, DMT2 not on long term insulin, bipolar disorder vs. schizophrenia/schizoaffective disorder presented to the ED due to subjective severe anxiety and depression and was found to incidentally have elevated BP in the urgency range.  Patient was very sleepy at time of interview.  Patient is poor historian and mumbles a lot when he speaks.  He states he was doing something for his grandma when he developed the severe anxiety and decided to come to the hospital to get checked out and get his head straight. He is not happy with his situation at his home, he lives with his family and says he does not have enough space and is always being judged.  Patient is unsure of the medications he took today but states he tries to stay compliant.  He is also unable to recite which medications he takes regularly.  He denies any fever, chills, vision changes, tinnitus H/A, dizziness, CP, SOB, Abd pain, N/V/D/C, dysuria or calf pain.  Labs & chart reviewed extensively.  Patient had a prior admission in December during which he also had SBP of 212/126. (2018 01:07)        REVIEW OF SYMPTOMS: Cardiovascular:  See HPI. No chest pain,  No dyspnea,  No syncope,  No palpitations, No dizziness, No Orthopnea,      No Paroxsymal nocturnal dyspnea;  Respiratory:  No Dyspnea, No cough,     Genitourinary:  No dysuria, no hematuria; Gastrointestinal:  No nausea, no vomiting. No diarrhea.  No abdominal pain. No dark color stool, no melena ; Neurological: No headache, no dizziness, no slurred speech;  Psychiatric: No agitation, no anxiety.  ALL OTHER REVIEW OF SYSTEMS ARE NEGATIVE.    ALLERGIES: No Known Allergies    CURRENT MEDICATIONS:  amLODIPine   Tablet 10 milliGRAM(s) Oral daily  cloNIDine 0.2 milliGRAM(s) Oral three times a day  hydrALAZINE 50 milliGRAM(s) Oral every 12 hours  metoprolol tartrate 100 milliGRAM(s) Oral two times a day  clonazePAM Tablet  traZODone  aspirin  chewable    PAST MEDICAL HISTORY  Sleep apnea  Diabetes  Bipolar 1 disorder  Hypertension    PAST SURGICAL HISTORY  No significant past surgical history    FAMILY HISTORY: No pertinent family history in first degree relatives    SOCIAL HISTORY:  Denies smoking/alcohol/drugs    Vital Signs Last 24 Hrs  T(C): 37.6 (2018 19:21), Max: 37.6 (2018 19:21)  T(F): 99.7 (2018 19:21), Max: 99.7 (2018 19:21)  HR: 67 (2018 01:45) (67 - 97)  BP: 206/120 (2018 01:45) (155/90 - 214/151)  BP(mean): 117 (2018 00:47) (117 - 117)  RR: 18 (2018 23:19) (18 - 19)  SpO2: 96% (2018 23:19) (96% - 96%)      PHYSICAL EXAM:  Constitutional: Comfortable . No acute distress.   HEENT: Atraumatic and normcephalic , neck is supple . no JVD. No carotid bruit. PEERL   CNS: A&Ox3. No focal deficits. EOMI. Cranial nerves II-IX are intact.   Lymph Nodes: Cervical : Not palpable.  Respiratory: CTAB  Cardiovascular: S1S2 RRR. No murmur/rubs or gallop.  Gastrointestinal: Soft non-tender and non distended . +Bowel sounds. negative Bueno's sign.  Extremities: No edema.   Psychiatric: Calm . no agitation.  Skin: No skin rash/ulcers visualized to face, hands or feet.    Intake and output:     LABS:                        11.8   14.2  )-----------( 365      ( 2018 20:26 )             35.7     135  |  92<L>  |  28.0<H>  ----------------------------<  155<H>  3.3<L>   |  30.0<H>  |  2.83<H>    Ca    9.6      2018 20:26    TPro  7.6  /  Alb  4.1  /  TBili  <0.2<L>  /  DBili  x   /  AST  158<H>  /  ALT  206<H>  /  AlkPhos  85  04-26    CARDIAC MARKERS ( 2018 00:11 ) x     / 0.06 ng/mL /    Urinalysis Basic - ( 2018 22:39 )    Color: Yellow / Appearance: Clear / S.015 / pH: x  Gluc: x / Ketone: Negative  / Bili: Negative / Urobili: Negative mg/dL   Blood: x / Protein: 500 mg/dL / Nitrite: Negative   Leuk Esterase: Negative / RBC: 0-2 /HPF / WBC 0-2   Sq Epi: x / Non Sq Epi: Few / Bacteria: Occasional    ECG:      X-ray:  reviewed by me.   CT scan:   MRI:   ECHO FINDINGS: Date:                : LVEF=          ; RV function:       ; Valvular abnormalities: No significant valvular abnormality.  Mitral valve:           ;  Aortic valve:              ;Tricuspid valve:         ; Pulmonary pressures:        mm Hg. Pericardium:      STRESS  FINDINGS: Date:            ;      CATHETERIZATION FINDINGS:  Date:              :  LAD:                       ;  LCx:                        ; RCA:                ; [place] [facility]                                                        Office: [address] [address]                                                       Telephone: [phone]. Fax:[phone]                                                              CARDIOLOGY CONSULTATION NOTE                                                                                                  Chief complaint:  severe depression and anxiety and high blood pressure      HPI: 32 yo Male in bed noncompliance with his HTN meds, presented to [facility] ED due to severe anxiety, depression and found to have elevated BPs up to 206/110 in the ER.  Patient resting and somnolent during the interview and poor historian.  States that he was at his grandmother when he developed anxiety and decided to come in the hospital to get checked out.  Lives with family and says he does not have enough space and is always being judged.  Patient is unsure of the medications he took today but states he tries to stay compliant as much as he can.  Unable to recall his medications that he's on regularly.  Just returned from renal  but denies fever, chills, HA, vision changes, dizziness, CP, SOB, palpitations or abd pain.  Recent admission in December with similar Hypertensive urgency episode (SBP of 212/126).        REVIEW OF SYMPTOMS:   General: ++ sleepiness, + anxiety    Cardiovascular:  No chest pain, dyspnea, syncope, palpitations, dizziness, Orthopnea, Paroxsymal nocturnal dyspnea  Respiratory:  No Dyspnea, No cough,     Genitourinary:  No dysuria, hematuria  Gastrointestinal:  No nausea, vomiting, diarrhea, abdominal pain, dark color stool, melena  Neurological: No headache, dizziness, slurred speech;    Psychiatric: No agitation, ++ anxiety.  ALL OTHER REVIEW OF SYSTEMS ARE NEGATIVE.    ALLERGIES: No Known Allergies    CURRENT MEDICATIONS:  amLODIPine   Tablet 10 milliGRAM(s) Oral daily  cloNIDine 0.2 milliGRAM(s) Oral three times a day  hydrALAZINE 50 milliGRAM(s) Oral every 12 hours  metoprolol tartrate 100 milliGRAM(s) Oral two times a day  clonazePAM Tablet  traZODone  aspirin  chewable    PAST MEDICAL HISTORY  Sleep apnea  Diabetes  Bipolar 1 disorder  Hypertension    PAST SURGICAL HISTORY  No significant past surgical history    FAMILY HISTORY: No pertinent family history in first degree relatives    SOCIAL HISTORY:  Denies smoking/alcohol/drugs    Vital Signs Last 24 Hrs  T(C): 37.6 (2018 19:21), Max: 37.6 (2018 19:21)  T(F): 99.7 (2018 19:21), Max: 99.7 (2018 19:21)  HR: 67 (2018 01:45) (67 - 97)  BP: 206/120 (2018 01:45) (155/90 - 214/151)  BP(mean): 117 (2018 00:47) (117 - 117)  RR: 18 (2018 23:19) (18 - 19)  SpO2: 96% (2018 23:19) (96% - 96%)      PHYSICAL EXAM:  Constitutional: Comfortable and somnolent during interview    HEENT: Atraumatic and normocephalic, neck is supple, no JVD   CNS: A&Ox3, No focal deficits. EOMI. Cranial nerves II-IX grossly intact   Respiratory: CTA b/l no wheezing  Cardiovascular: S1S2 RRR. No murmur/rubs or gallop.  Gastrointestinal: Soft non-tender and non distended . +Bowel sounds  Extremities: No edema.   Psychiatric: Calm, cooperative no agitation  Skin: No skin rash/ulcers visualized to face, hands or feet.    LABS:                        11.8   14.2  )-----------( 365      ( 2018 20:26 )             35.7     135  |  92<L>  |  28.0<H>  ----------------------------<  155<H>  3.3<L>   |  30.0<H>  |  2.83<H>    Ca    9.6      2018 20:26    TPro  7.6  /  Alb  4.1  /  TBili  <0.2<L>  /  DBili  x   /  AST  158<H>  /  ALT  206<H>  /  AlkPhos  85  04-26    CARDIAC MARKERS ( 2018 00:11 ) x     / 0.06 ng/mL /    Urinalysis Basic - ( 2018 22:39 )    Color: Yellow / Appearance: Clear / S.015 / pH: x  Gluc: x / Ketone: Negative  / Bili: Negative / Urobili: Negative mg/dL   Blood: x / Protein: 500 mg/dL / Nitrite: Negative   Leuk Esterase: Negative / RBC: 0-2 /HPF / WBC 0-2   Sq Epi: x / Non Sq Epi: Few / Bacteria: Occasional    ECG: NSR @ 80 bpm with no acute T or ST changes        X-ray: Pen  CT scan of Head: No acute intracranial bleed      ECHO FINDINGS: Date:                : LVEF=          ; RV function:       ; Valvular abnormalities: No significant valvular abnormality.  Mitral valve:           ;  Aortic valve:              ;Tricuspid valve:         ; Pulmonary pressures:        mm Hg. Pericardium:      STRESS  FINDINGS: Date:            ;      CATHETERIZATION FINDINGS:  Date:              :  LAD:                       ;  LCx:                        ; RCA:                ;

## 2018-04-27 NOTE — ED ADULT NURSE REASSESSMENT NOTE - NS ED NURSE REASSESS COMMENT FT1
Pt. noted to become bradycardic in 50s, denies any chest pain. Md Beltre at bedside to assess. second EKG ordered

## 2018-04-27 NOTE — CONSULT NOTE ADULT - PROBLEM SELECTOR RECOMMENDATION 2
Insulin Sliding Scale, FS every 6 hours, Low Carb diet  Increased BUN/Cr and reduced GFR likely due to uncontrolled HTN vs. DM T2,  Check A1C in AM

## 2018-04-27 NOTE — H&P ADULT - ASSESSMENT
32 yo Male with PMH of HTN, DM, bipolar disorder presented to the ED due to severe anxiety and depression and was found to be in hypertensive emergency.    Hypertensive Emergency  Initial BP: 214/151 (MAP: 172)   Labetalol 10mg IV, BP lowered but labile  Suspicion for medication noncompliance  Positive Trop x1, q6h x2  EKG: possible anterior infarct with L anterior fascicular block  Hepatic injury- Elevated transaminases  Abdominal/Hepatic sono pending  MELISSA?- Elevated BUN/Cr, reduced GFR, similar to values in Dec 2017  Urine metanephrines pending  Renal sono pending  Continue home meds (Clonidine 0.2mg TID, Metoprolol 100mg BID)  Verify remaining home meds in AM  Renal consult  Cardio consult    Anxiety  Severe anxiety depression  s/p Ativan 1mg x1  Continue home medications  Psychiatry consult    Preventative Measure  VCD Boots 32 yo Male with PMH of HTN, DM, bipolar disorder presented to the ED due to severe anxiety and depression and was found to be in hypertensive emergency.    Hypertensive Emergency w/ abnormal ekg, elevated troponin, and MELISSA  Initial BP: 214/151 (MAP: 172)   Labetalol 10mg IV, BP lowered but labile  Suspicion for medication noncompliance  Positive Trop x1, q6h x2  EKG: possible anterior infarct with L anterior fascicular block, Qw noted in lateral leads but present in prior EKG on comparison  Hepatic injury- Elevated transaminases, ALT>AST  Abdominal/Hepatic sono pending + hep panel in AM  MELISSA?- Elevated BUN/Cr, reduced GFR, similar to values in Dec 2017, but unknown etiology - U Na/Osm and US Renal  Consider Urine metanephrines  Continue home meds (Clonidine 0.2mg TID, Metoprolol 100mg BID)  Verify remaining home meds in AM w/ pharmacy  Renal consult  Cardio consult    Anxiety  Severe anxiety depression  s/p Ativan 1mg x1  Continue home medications  Psychiatry consult    Preventative Measure  VCD Boots 32 yo Male with PMH of HTN, DM, bipolar disorder presented to the ED due to severe anxiety and depression and was found to be in hypertensive emergency.    Hypertensive Emergency w/ abnormal ekg, elevated troponin, and MELISSA  Initial BP: 214/151 (MAP: 172)   Labetalol 10mg IV, BP lowered but labile  Suspicion for medication noncompliance  Positive Trop x1, q6h x2  EKG: possible anterior infarct with L anterior fascicular block, Qw noted in lateral leads but present in prior EKG on comparison  Hepatic injury- Elevated transaminases, ALT>AST  Abdominal/Hepatic sono pending + hep panel in AM  MELISSA?- Elevated BUN/Cr, reduced GFR, similar to values in Dec 2017, but unknown etiology - U Na/Osm and US Renal  Consider Urine metanephrines  Continue home meds (Clonidine 0.2mg TID, Metoprolol 100mg BID)  Verify remaining home meds in AM w/ pharmacy  Renal consult  Cardio consult    Anxiety  Severe anxiety depression  s/p Ativan 1mg x1  Continue home medications  Psychiatry consult    Major Depression Disorder  Continue home meds (Trazodone 100mg QHS)  Verify meds in AM    Diabetes Mellitus  Hold home Metformin  Accuchecks premeal + QHS  ISS    Bipolar disorder  Continue home med (Lamotrigine 25mg QD)  Verify meds in AM    Preventative Measure  VCD Boots 34 yo Male with PMH of HTN, DM, bipolar disorder presented to the ED due to severe anxiety and depression and was found to be in hypertensive emergency.    Hypertensive Emergency w/ abnormal ekg, elevated troponin, and MELISSA  Initial BP: 214/151 (MAP: 172)   Labetalol 10mg IV, BP lowered but labile  Suspicion for medication noncompliance  Positive Trop x1, q6h x2  EKG: possible anterior infarct with L anterior fascicular block, Qw noted in lateral leads but present in prior EKG on comparison  Hepatic injury- Elevated transaminases, ALT>AST  Abdominal/Hepatic sono pending + hep panel in AM  MELISSA?- Elevated BUN/Cr, reduced GFR, similar to values in Dec 2017, but unknown etiology - U Na/Osm and US Renal pending - BUN/Cr ratio suggestive of pre renal so will encourage PO hydration  Consider Urine metanephrines  Continue home meds (Clonidine 0.2mg TID, Metoprolol 100mg BID)  Verify remaining home meds in AM w/ pharmacy  Renal consult  Cardio consult    Hypokalemia - unknown etiology  Possibly med AE vs decreased intake - replenish as needed  EKG unremarkable    Anxiety  Severe anxiety depression  s/p Ativan 1mg x1  Continue home medications judiciously and prn benzos for anxiety  Psychiatry consult for AM    Major Depression Disorder  Continue home meds (Trazodone 100mg QHS)  Verify meds in AM  No e/o suicidal/homicidal ideation and no prior or family hx of such    Bipolar disorder/Schizoprenia/Schizoaffective disorder  Continue Lamotrigine 25mg QD for now and Verify meds w/ pharmacy in AM  Concern for polypharmacy, so Psych consult for AM    Leukocytosis -  low grade w/ no left shift, possibly reactive  CXR unremarkable, RVP & UA pending  If WBC trends up will consider cultures    Microcytic Anemia - elevated RDW  FOBT, iron panel previous noted  Will consider supplementation    Diabetes Mellitus  Hold home Metformin  Accuchecks premeal + QHS  ISS    Preventative Measure  VCD Boots

## 2018-04-27 NOTE — CHART NOTE - NSCHARTNOTEFT_GEN_A_CORE
Post midnight admission brief note. Patient was seen and examined by overnight hospitalist this am. HPI reviewed. Labs, vitals noted. I have personally seen and examined this patient today     Vs noted   Exam:   Gen: middle aged appearing, unkempt confused male in nad   HEENT: eomi, perrla, MM dry  CVS: S1S2nl, no m/r/g RRR   lungs: clear   Abd: obese, soft, nt, bs +  Ext; no c/c/e     Labs noted    A/P  34 yo Male with PMH of HTN, DM, bipolar disorder presented to the ED due to severe anxiety and depression and was found to be in hypertensive emergency. He was admitted for htn emergency with improved bp slowly throughout the day. Seen by both nephro and cardio. Seen by psych and multiple conversations had with family and psych league OP - apparently non compliant with meds and was planned to get IM medications. This is behavior is known to him when he does not take his medications.   Plan for echo, resume psych meds     HPI reviewed  WIll follow

## 2018-04-27 NOTE — CONSULT NOTE ADULT - PROBLEM SELECTOR RECOMMENDATION 9
Monitor on Tele, CBC, BMP, TFTs, ECG, s/p Labetalol 10mg IV,   BP lowered to 160s but labile HTN likely due to medication non-compliance,   Trend Trops, resume all home HTN meds,   Elevated transaminases, ALT>AST, might be THC vs ETOH related  Abdominal/Hepatic US pending and Hepatitis panel in AM  Check ECHO for structural status   Cardiology to follow in AM   Case d/w Dr. Yoder

## 2018-04-27 NOTE — BEHAVIORAL HEALTH ASSESSMENT NOTE - NSBHMEDSOTHERFT_PSY_A_CORE
pharmacist called [facility] in [place] [phone] and [place] [phone] to reconcile medications

## 2018-04-27 NOTE — BEHAVIORAL HEALTH ASSESSMENT NOTE - NSBHCHARTREVIEWVS_PSY_A_CORE FT
Vital Signs Last 24 Hrs  T(C): 36.2 (27 Apr 2018 07:05), Max: 37.6 (26 Apr 2018 19:21)  T(F): 97.2 (27 Apr 2018 07:05), Max: 99.7 (26 Apr 2018 19:21)  HR: 66 (27 Apr 2018 09:29) (52 - 97)  BP: 154/83 (27 Apr 2018 09:29) (154/83 - 214/151)  BP(mean): 117 (27 Apr 2018 00:47) (117 - 117)  RR: 20 (27 Apr 2018 07:05) (16 - 20)  SpO2: 98% (27 Apr 2018 07:05) (96% - 98%)

## 2018-04-27 NOTE — BEHAVIORAL HEALTH ASSESSMENT NOTE - OTHER PAST PSYCHIATRIC HISTORY (INCLUDE DETAILS REGARDING ONSET, COURSE OF ILLNESS, INPATIENT/OUTPATIENT TREATMENT)
various diagnoses in chart including Bipolar depression schizophrenia psychosis  Was a client of [facility] in [facility] [phone] until 2015  Now seen by ACT Team from [facility] [phone] Message left for therapist Leobardo Burden @ [phone]

## 2018-04-27 NOTE — CHART NOTE - NSCHARTNOTEFT_GEN_A_CORE
--Spoke with pharmacist at [facility] ([place], [phone]). The following medications were picked up by patient:    Metoprolol tartrate 100mg BID (Jan 2018)  Glipizide 10mg qd (Jan 2018)  Hydralazine 100mg q8h (Feb 25, 2018)  Norvasc 10mg qd (Feb 25, 2018)    Lopressor and Glipizide were last prescribed on April 13, but patient has not filled that script yet.    --Spoke with nurse Benoit at [facility] ([phone]). The patient is currently on the following psych med regimen:    Lamictal 25mg 2 tabs bid  Invega 9mg qd (in the morning)  Trazadone 100mg qhs  Cogentin 0.5mg bid  Klonopin 0.5mg bid, 1 tab qhs prn anxiety --Spoke with pharmacist at [facility] ([place], [phone]). The following medications were picked up by patient:    Metoprolol tartrate 100mg BID (Jan 2018)  Glipizide 10mg qd (Jan 2018)  Hydralazine 100mg q8h (Feb 25, 2018)  Norvasc 10mg qd (Feb 25, 2018)    Lopressor and Glipizide were last prescribed on April 13, but patient has not filled that script yet.    --Spoke with nurse Benoit at patient's [facility] ACT team ([phone]). The patient is currently on the following psych med regimen:    Lamictal 25mg 2 tabs bid  Invega 9mg qd (in the morning)  Trazadone 100mg qhs  Cogentin 0.5mg bid  Klonopin 0.5mg bid, 1 tab qhs prn anxiety

## 2018-04-27 NOTE — H&P ADULT - NSHPPHYSICALEXAM_GEN_ALL_CORE
Vitals  T(C): 37.6 (04-26-18 @ 19:21), Max: 37.6 (04-26-18 @ 19:21)  HR: 67 (04-27-18 @ 01:45) (67 - 97)  BP: 206/120 (04-27-18 @ 01:45) (155/90 - 214/151)  RR: 18 (04-26-18 @ 23:19) (18 - 19)  SpO2: 96% (04-26-18 @ 23:19) (96% - 96%)    Physical Exam:   GENERAL: agitated, well-groomed, well-developed  HEAD:  Atraumatic, Normocephalic  EYES: Exophthalmos noted, EOMI, PERRLA, conjunctiva and sclera clear  ENMT: No tonsillar erythema, exudates, or enlargement; Moist mucous membranes, No lesions  NECK: Supple, No JVD, Normal thyroid  NERVOUS SYSTEM:  Alert & Oriented X3, Good concentration; Motor Strength 5/5 B/L upper and lower extremities;   RESP: Clear to auscultation bilaterally; No rales, rhonchi, wheezing, or rubs  CVS: Regular rate and rhythm; No murmurs, rubs, or gallops  GI: Soft, Nontender, Nondistended; Bowel sounds present  MSK:  2+ Peripheral Pulses, No clubbing, cyanosis, or edema  INTEGUMENT: No rashes or lesions

## 2018-04-27 NOTE — BEHAVIORAL HEALTH ASSESSMENT NOTE - NSBHCHARTREVIEWLAB_PSY_A_CORE FT
11.8   14.2  )-----------( 365      ( 2018 20:26 )             35.7         135  |  92<L>  |  28.0<H>  ----------------------------<  155<H>  3.3<L>   |  30.0<H>  |  2.83<H>    Ca    9.6      2018 20:26    TPro  7.6  /  Alb  4.1  /  TBili  <0.2<L>  /  DBili  x   /  AST  158<H>  /  ALT  206<H>  /  AlkPhos  85      LIVER FUNCTIONS - ( 2018 20:26 )  Alb: 4.1 g/dL / Pro: 7.6 g/dL / ALK PHOS: 85 U/L / ALT: 206 U/L / AST: 158 U/L / GGT: x             Urinalysis Basic - ( 2018 22:39 )    Color: Yellow / Appearance: Clear / S.015 / pH: x  Gluc: x / Ketone: Negative  / Bili: Negative / Urobili: Negative mg/dL   Blood: x / Protein: 500 mg/dL / Nitrite: Negative   Leuk Esterase: Negative / RBC: 0-2 /HPF / WBC 0-2   Sq Epi: x / Non Sq Epi: Few / Bacteria: Occasional  THC, Urine Qualitative: Positive (18 @ 20:49)

## 2018-04-27 NOTE — H&P ADULT - HISTORY OF PRESENT ILLNESS
32 yo Male with PMH of HTN, DM, bipolar disorder presented to the ED due to severe anxiety and depression and was found to have elevated BP. Patient was very sleepy at time of interview. Patient is poor historian and uncooperative. He states he was doing something for his grandma when he developed the severe anxiety and decided to come to the hospital. Patient states he is noncompliant with his medications at home. 34 yo Male with PMH of HTN, DM, bipolar disorder presented to the ED due to severe anxiety and depression and was found to have elevated BP. Patient was very sleepy at time of interview. Patient is poor historian and mumbles a lot when he speaks. He states he was doing something for his grandma when he developed the severe anxiety and decided to come to the hospital to get checked out and get his head straight. He is not happy with his situation at his home, he lives with his family and says he does not have enough space and is always being judged. Patient is unsure of the medications he took today but states he tries to stay compliant. He is also unable to recite which medications he takes regularly.   He denies any fever, chills, vision changes, tinnitus H/A, dizziness, CP, SOB, Abd pain, N/V/D/C, dysuria or calf pain.  Patient had a prior admission in December during which he also had SBP of 212/126. 32 yo Male poor historian with PMHx of noncompliance, HTN possibly resistant, DM2 not on long term insulin, bipolar disorder vs. schizophrenia/schizoaffective disorder presented to the ED due to subjective severe anxiety and depression and was found to incidentally have elevated BP in the urgency range.  Patient was very sleepy at time of interview.  Patient is poor historian and mumbles a lot when he speaks.  He states he was doing something for his grandma when he developed the severe anxiety and decided to come to the hospital to get checked out and get his head straight. He is not happy with his situation at his home, he lives with his family and says he does not have enough space and is always being judged.  Patient is unsure of the medications he took today but states he tries to stay compliant.  He is also unable to recite which medications he takes regularly.  He denies any fever, chills, vision changes, tinnitus H/A, dizziness, CP, SOB, Abd pain, N/V/D/C, dysuria or calf pain.  Labs & chart reviewed extensively.  Patient had a prior admission in December during which he also had SBP of 212/126.

## 2018-04-28 DIAGNOSIS — F31.9 BIPOLAR DISORDER, UNSPECIFIED: ICD-10-CM

## 2018-04-28 LAB
ANION GAP SERPL CALC-SCNC: 14 MMOL/L — SIGNIFICANT CHANGE UP (ref 5–17)
BASOPHILS # BLD AUTO: 0 K/UL — SIGNIFICANT CHANGE UP (ref 0–0.2)
BASOPHILS NFR BLD AUTO: 0.3 % — SIGNIFICANT CHANGE UP (ref 0–2)
BUN SERPL-MCNC: 29 MG/DL — HIGH (ref 8–20)
CALCIUM SERPL-MCNC: 9.5 MG/DL — SIGNIFICANT CHANGE UP (ref 8.6–10.2)
CHLORIDE SERPL-SCNC: 97 MMOL/L — LOW (ref 98–107)
CO2 SERPL-SCNC: 28 MMOL/L — SIGNIFICANT CHANGE UP (ref 22–29)
CREAT SERPL-MCNC: 2.56 MG/DL — HIGH (ref 0.5–1.3)
EOSINOPHIL # BLD AUTO: 0.4 K/UL — SIGNIFICANT CHANGE UP (ref 0–0.5)
EOSINOPHIL NFR BLD AUTO: 3.2 % — SIGNIFICANT CHANGE UP (ref 0–5)
GLUCOSE BLDC GLUCOMTR-MCNC: 101 MG/DL — HIGH (ref 70–99)
GLUCOSE BLDC GLUCOMTR-MCNC: 117 MG/DL — HIGH (ref 70–99)
GLUCOSE BLDC GLUCOMTR-MCNC: 130 MG/DL — HIGH (ref 70–99)
GLUCOSE BLDC GLUCOMTR-MCNC: 144 MG/DL — HIGH (ref 70–99)
GLUCOSE BLDC GLUCOMTR-MCNC: 98 MG/DL — SIGNIFICANT CHANGE UP (ref 70–99)
GLUCOSE SERPL-MCNC: 124 MG/DL — HIGH (ref 70–115)
HAV IGM SER-ACNC: SIGNIFICANT CHANGE UP
HBA1C BLD-MCNC: 5.3 % — SIGNIFICANT CHANGE UP (ref 4–5.6)
HBV CORE IGM SER-ACNC: SIGNIFICANT CHANGE UP
HBV SURFACE AG SER-ACNC: SIGNIFICANT CHANGE UP
HCT VFR BLD CALC: 36.3 % — LOW (ref 42–52)
HCV AB S/CO SERPL IA: 0.16 S/CO — SIGNIFICANT CHANGE UP
HCV AB SERPL-IMP: SIGNIFICANT CHANGE UP
HGB BLD-MCNC: 12.2 G/DL — LOW (ref 14–18)
LYMPHOCYTES # BLD AUTO: 2.7 K/UL — SIGNIFICANT CHANGE UP (ref 1–4.8)
LYMPHOCYTES # BLD AUTO: 24.5 % — SIGNIFICANT CHANGE UP (ref 20–55)
MCHC RBC-ENTMCNC: 25.4 PG — LOW (ref 27–31)
MCHC RBC-ENTMCNC: 33.6 G/DL — SIGNIFICANT CHANGE UP (ref 32–36)
MCV RBC AUTO: 75.5 FL — LOW (ref 80–94)
MONOCYTES # BLD AUTO: 0.6 K/UL — SIGNIFICANT CHANGE UP (ref 0–0.8)
MONOCYTES NFR BLD AUTO: 5.1 % — SIGNIFICANT CHANGE UP (ref 3–10)
NEUTROPHILS # BLD AUTO: 7.4 K/UL — SIGNIFICANT CHANGE UP (ref 1.8–8)
NEUTROPHILS NFR BLD AUTO: 66.8 % — SIGNIFICANT CHANGE UP (ref 37–73)
PLATELET # BLD AUTO: 385 K/UL — SIGNIFICANT CHANGE UP (ref 150–400)
POTASSIUM SERPL-MCNC: 3.3 MMOL/L — LOW (ref 3.5–5.3)
POTASSIUM SERPL-SCNC: 3.3 MMOL/L — LOW (ref 3.5–5.3)
RBC # BLD: 4.81 M/UL — SIGNIFICANT CHANGE UP (ref 4.6–6.2)
RBC # FLD: 17.3 % — HIGH (ref 11–15.6)
SODIUM SERPL-SCNC: 139 MMOL/L — SIGNIFICANT CHANGE UP (ref 135–145)
TROPONIN T SERPL-MCNC: 0.08 NG/ML — HIGH (ref 0–0.06)
WBC # BLD: 11.1 K/UL — HIGH (ref 4.8–10.8)
WBC # FLD AUTO: 11.1 K/UL — HIGH (ref 4.8–10.8)

## 2018-04-28 PROCEDURE — 99233 SBSQ HOSP IP/OBS HIGH 50: CPT

## 2018-04-28 PROCEDURE — 99233 SBSQ HOSP IP/OBS HIGH 50: CPT | Mod: GC

## 2018-04-28 RX ORDER — LABETALOL HCL 100 MG
200 TABLET ORAL EVERY 12 HOURS
Qty: 0 | Refills: 0 | Status: DISCONTINUED | OUTPATIENT
Start: 2018-04-28 | End: 2018-04-29

## 2018-04-28 RX ORDER — LABETALOL HCL 100 MG
20 TABLET ORAL ONCE
Qty: 0 | Refills: 0 | Status: COMPLETED | OUTPATIENT
Start: 2018-04-28 | End: 2018-04-28

## 2018-04-28 RX ORDER — POTASSIUM CHLORIDE 20 MEQ
40 PACKET (EA) ORAL ONCE
Qty: 0 | Refills: 0 | Status: COMPLETED | OUTPATIENT
Start: 2018-04-28 | End: 2018-04-28

## 2018-04-28 RX ORDER — LABETALOL HCL 100 MG
20 TABLET ORAL ONCE
Qty: 0 | Refills: 0 | Status: DISCONTINUED | OUTPATIENT
Start: 2018-04-28 | End: 2018-04-28

## 2018-04-28 RX ADMIN — Medication 10 MILLIGRAM(S): at 23:48

## 2018-04-28 RX ADMIN — PALIPERIDONE 9 MILLIGRAM(S): 1.5 TABLET, EXTENDED RELEASE ORAL at 12:21

## 2018-04-28 RX ADMIN — Medication 0.5 MILLIGRAM(S): at 21:42

## 2018-04-28 RX ADMIN — Medication 10 MILLIGRAM(S): at 06:23

## 2018-04-28 RX ADMIN — LAMOTRIGINE 50 MILLIGRAM(S): 25 TABLET, ORALLY DISINTEGRATING ORAL at 17:18

## 2018-04-28 RX ADMIN — Medication 40 MILLIEQUIVALENT(S): at 17:18

## 2018-04-28 RX ADMIN — Medication 100 MILLIGRAM(S): at 06:11

## 2018-04-28 RX ADMIN — AMLODIPINE BESYLATE 10 MILLIGRAM(S): 2.5 TABLET ORAL at 06:11

## 2018-04-28 RX ADMIN — Medication 100 MILLIGRAM(S): at 21:42

## 2018-04-28 RX ADMIN — Medication 200 MILLIGRAM(S): at 17:18

## 2018-04-28 RX ADMIN — LAMOTRIGINE 50 MILLIGRAM(S): 25 TABLET, ORALLY DISINTEGRATING ORAL at 06:12

## 2018-04-28 RX ADMIN — Medication 10 MILLIGRAM(S): at 17:19

## 2018-04-28 RX ADMIN — Medication 0.5 MILLIGRAM(S): at 06:11

## 2018-04-28 RX ADMIN — Medication 20 MILLIGRAM(S): at 12:50

## 2018-04-28 RX ADMIN — Medication 0.5 MILLIGRAM(S): at 17:18

## 2018-04-28 RX ADMIN — Medication 100 MILLIGRAM(S): at 23:48

## 2018-04-28 RX ADMIN — Medication 162 MILLIGRAM(S): at 12:21

## 2018-04-28 RX ADMIN — Medication 100 MILLIGRAM(S): at 12:22

## 2018-04-28 NOTE — PROGRESS NOTE ADULT - ASSESSMENT
32 yo Male with bipolar disorder presented to [facility] with severe anxiety, depression and found to have hypertensive emergency with /110 now better controlled with IV medications    # hypertensive urgency  # acute renal insufficiency  # hypokalemia    - echo without structural heart abnormalities  - tighter BP control - d/c metoprolol, start labetalol, continue amlodipine  - needs workup for hyperaldosteronism - PRA levels, does not need to be done as an inpatient  - avoid nephrotoxic agents for now    Thank you for allowing me to participate in care of your patient.   Please call with questions  D/W Dr. Nunes

## 2018-04-28 NOTE — DISCHARGE NOTE ADULT - PATIENT PORTAL LINK FT
You can access the [facility] Patient Portal, offered by [facility], by registering with the following website: [email or web address]

## 2018-04-28 NOTE — PROGRESS NOTE ADULT - SUBJECTIVE AND OBJECTIVE BOX
[facility]                                                        Office: [address] [address]                                                       Telephone: [phone]. Fax:[phone]      CARDIOLOGY PROGRESS NOTE   ([facility])    Subjective: Patient seen and examined at bedside.  Chart reviewed. Denies chest pain or shortness of breath.      CURRENT MEDICATIONS  amLODIPine   Tablet 10 milliGRAM(s) Oral daily  hydrALAZINE 100 milliGRAM(s) Oral every 8 hours  hydrALAZINE Injectable 10 milliGRAM(s) IV Push every 6 hours PRN  labetalol 200 milliGRAM(s) Oral every 12 hours  labetalol Injectable 20 milliGRAM(s) IV Push once  benztropine 0.5 milliGRAM(s) Oral at bedtime  clonazePAM Tablet 0.5 milliGRAM(s) Oral two times a day  lamoTRIgine 50 milliGRAM(s) Oral two times a day  LORazepam   Injectable 1 milliGRAM(s) IV Push once PRN  paliperidone ER. 9 milliGRAM(s) Oral daily  traZODone 100 milliGRAM(s) Oral at bedtime PRN  dextrose 50% Injectable 12.5 Gram(s) IV Push once  dextrose 50% Injectable 25 Gram(s) IV Push once  dextrose 50% Injectable 25 Gram(s) IV Push once  dextrose Gel 1 Dose(s) Oral once PRN  glucagon  Injectable 1 milliGRAM(s) IntraMuscular once PRN  insulin lispro (HumaLOG) corrective regimen sliding scale   SubCutaneous three times a day before meals  aspirin  chewable 162 milliGRAM(s) Oral daily  dextrose 5%. 1000 milliLiter(s) IV Continuous <Continuous>    	  TELEMETRY:   Vitals:  T(C): 37.3 (04-28-18 @ 07:31), Max: 37.3 (04-28-18 @ 07:31)  HR: 96 (04-28-18 @ 07:31) (65 - 96)  BP: 161/81 (04-28-18 @ 07:31) (131/69 - 177/112)  RR: 20 (04-27-18 @ 19:17) (18 - 22)  SpO2: 97% (04-28-18 @ 07:31) (97% - 100%)    PHYSICAL EXAM:  Appearance: NAD, comfortable	  HEENT:   Normal oral mucosa, PERRL, EOMI	  Lymphatic: No lymphadenopathy  Cardiovascular: Normal S1 S2, No JVD, No murmurs, No edema  Respiratory: Lungs clear to auscultation	  Psychiatry: calm, non-verbal  Gastrointestinal:  Soft, Non-tender, + BS	  Skin: No rashes, No ecchymoses, No cyanosis  Neurologic: Non-focal  Extremities: Normal range of motion, No clubbing, cyanosis or edema  Vascular: Peripheral pulses palpable 2+ bilaterally, warm    DIAGNOSTIC TESTING:  Echocardiogram (images reviewed by me): 4/27/18 TTE  Summary:   1. Technically difficult study.   2. Normal wall motion. Left ventricular ejection fraction, by visual   estimation, is 70 to 75%. Grade I diastolic dysfunction.   3. Normal right ventricular size and function.   4. No significant valvular abnormality.   5. There is no evidence of pericardial effusion.                        12.2   11.1  )-----------( 385      ( 28 Apr 2018 08:04 )             36.3     04-28    139  |  97<L>  |  29.0<H>  ----------------------------<  124<H>  3.3<L>   |  28.0  |  2.56<H>    Ca    9.5      28 Apr 2018 08:01    TPro  7.6  /  Alb  4.1  /  TBili  <0.2<L>  /  DBili  x   /  AST  158<H>  /  ALT  206<H>  /  AlkPhos  85  04-26    HgA1c: Hemoglobin A1C, Whole Blood: 5.3 % (04-28 @ 08:03)

## 2018-04-28 NOTE — PROGRESS NOTE ADULT - ASSESSMENT
32 yo Male with PMH of HTN, DM, bipolar disorder presented to the ED for severe anxiety and depression due to situation at home, per pt family is not supportive and he is unhappy at home. Incidental finding of elevated SBP >200 in the ED. Admitted for hypertensive emergency with acute kidney injury noted, likely due to medication non-compliance     Hypertensive Emergency w/ abnormal ekg, elevated troponin, and MELISSA  -likely due to medication non-compliance  -Initial /151 (MAP: 172) at presentation  -overnight sbp was in the 150s, but elevated this morning due to pt not taking his oral meds  -EKG with no acute st changes; troponin trended up from 0.06 to 0.08 but now stable  -echo without structural abnormalities  -renal sono to eval for LOUIS pending  -urine metanephrines, renin, alexandria studies pending  -cardio Dixon consulted  -nephro consulted  -continue home meds: norvasc 10mg, hydralazine 100mg q8h, lopressor switched to labetalol 200mg bid    Acute kidney injury, stage 3b  -unsure if baseline chronic kidney disease  -Cr 2.83, gfr 33 at presentation, similar to values in Dec 2017, but unknown etiology  -Cr now slightly improved  -nephro consulted  -Renal sono pending  -continue to monitor    Hypokalemia - unknown etiology  -Possibly med adverse effect vs decreased intake - replenish as needed  -continue to monitor  -EKG unremarkable    Bipolar disorder/Schizoprenia/Schizoaffective disorder  -Continue home meds  -Psychiatry consulted - need better optimization of meds    Anxiety, Major Depression Disorder  -s/p Ativan 1mg x1 in the ED  -Continue home medications  -Psychiatry consulted - need better optimization of meds    Transaminitis  -unclear etiology, likely secondary to diet/obesity  -mild hepatomegaly and hepatic steatosis on abd sono  -hep panel negative    Leukocytosis   - low grade w/ no left shift, possibly reactive  - CXR unremarkable, RVP & UA pending  - no evidence of infxn  - wbc trended down today    Microcytic Anemia   -h/h stable, no evidence of acute bleeding  -no need to trend  -pt will likely need iron studies done as outpt    Diabetes Mellitus  -stable  -Hold home Metformin  -Accuchecks, insulin sliding scale  -A1c 5.3    Preventative Measure  -VCD Boots 32 yo Male with PMH of HTN, DM, bipolar disorder presented to the ED for severe anxiety and depression due to situation at home, per pt family is not supportive and he is unhappy at home. Incidental finding of elevated SBP >200 in the ED. Admitted for hypertensive emergency with acute kidney injury noted, likely due to medication non-compliance     Hypertensive Emergency w/ abnormal ekg, elevated troponin, and MELISSA  -likely due to medication non-compliance  -Initial /151 at presentation  -CT head negative for acute findings  -will not perform MRI at this time as pt's intermittent unresponsive states are secondary to his psych conditions, and patient is currently alert and oriented with normal neuro exam  -will f/u psych rec's - pt needs further optimization of his psych conditions as they are contributing to his non-compliance with medications and intermittently causing significantly elevated bps  -EKG with no acute st changes; troponin trended up from 0.06 to 0.08 but now stable  -echo without structural abnormalities  -renal sono to eval for LOUIS pending  -urine metanephrines, renin, alexandria studies pending  -cardio Helm consulted  -nephro consulted  -continue home meds: norvasc 10mg, hydralazine 100mg q8h, lopressor switched to labetalol 200mg bid    Acute kidney injury, stage 3b  -unsure if baseline chronic kidney disease  -Cr 2.83, gfr 33 at presentation, similar to values in Dec 2017, but unknown etiology  -Cr now slightly improved  -nephro consulted  -Renal sono pending  -continue to monitor    Hypokalemia - unknown etiology  -Possibly med adverse effect vs decreased intake - replenish as needed  -continue to monitor  -EKG unremarkable    Bipolar disorder/Schizoprenia/Schizoaffective disorder  -Continue home meds  -Psychiatry consulted - need better optimization of meds    Anxiety, Major Depression Disorder  -s/p Ativan 1mg x1 in the ED  -Continue home medications  -Psychiatry consulted - need better optimization of meds    Transaminitis  -unclear etiology, likely secondary to diet/obesity  -mild hepatomegaly and hepatic steatosis on abd sono  -hep panel negative    Leukocytosis   - low grade w/ no left shift, possibly reactive  - CXR unremarkable, RVP & UA pending  - no evidence of infxn  - wbc trended down today    Microcytic Anemia   -h/h stable, no evidence of acute bleeding  -no need to trend  -pt will likely need iron studies done as outpt    Diabetes Mellitus  -stable  -Hold home Metformin  -Accuchecks, insulin sliding scale  -A1c 5.3    Preventative Measure  -VCD Boots

## 2018-04-28 NOTE — PROGRESS NOTE BEHAVIORAL HEALTH - NSBHCHARTREVIEWVS_PSY_A_CORE FT
Vital Signs Last 24 Hrs  T(C): 37.3 (28 Apr 2018 07:31), Max: 37.3 (28 Apr 2018 07:31)  T(F): 99.1 (28 Apr 2018 07:31), Max: 99.1 (28 Apr 2018 07:31)  HR: 96 (28 Apr 2018 07:31) (65 - 96)  BP: 161/81 (28 Apr 2018 07:31) (131/69 - 177/112)  RR: 20 (27 Apr 2018 19:17) (18 - 22)  SpO2: 97% (28 Apr 2018 07:31) (97% - 100%)

## 2018-04-28 NOTE — PROGRESS NOTE BEHAVIORAL HEALTH - NSBHFUPINTERVALHXFT_PSY_A_CORE
Generally more verbal and interactive However sensorium has fluctuated Thoughts are illogical and speech garbled He admits to smoking what he thought was "weed"  He also reports compliance with his psych meds  compliant with meds here

## 2018-04-28 NOTE — PROGRESS NOTE BEHAVIORAL HEALTH - NSBHCHARTREVIEWINVESTIGATE_PSY_A_CORE FT
< from: 12 Lead ECG (04.27.18 @ 00:54) >    Diagnosis Line Sinus bradycardia with sinus arrhythmia  Left anterior fascicular block    < end of copied text >

## 2018-04-28 NOTE — DISCHARGE NOTE ADULT - HOSPITAL COURSE
34 yo Male with PMH of HTN, DM, bipolar disorder presented to the ED due to severe anxiety and depression and was found to be in hypertensive emergency. He was admitted for htn emergency with improved bp slowly throughout the day. Seen by both nephro and cardio. Seen by psych and multiple conversations had with family and psych league OP - apparently non compliant with meds and was planned to get IM medications. This is behavior is known to him when he does not take his medications.

## 2018-04-28 NOTE — PROGRESS NOTE ADULT - SUBJECTIVE AND OBJECTIVE BOX
Patient is a 33y old  Male who presents with a chief complaint of Severe depression and anxiety (27 Apr 2018 01:07)    Patient seen and examined at bedside. No acute overnight events. Patient was walking, talking, and interacting with the nurse overnight. However, this morning patient is alert but unresponsive, only making grunting noises intermittently. Per nurse, patient was given his oral medications this morning around 6am, but patient did not swallow his meds and spat them out 20mins later. Blood pressure was elevated due to missing oral meds, so patient was given IV hydralazine 10mg and labetalol 20mg.     ROS: unable to obtain    Vital Signs Last 24 Hrs  T(C): 36.8 (28 Apr 2018 10:58), Max: 37.3 (28 Apr 2018 07:31)  T(F): 98.3 (28 Apr 2018 10:58), Max: 99.1 (28 Apr 2018 07:31)  HR: 94 (28 Apr 2018 14:21) (65 - 104)  BP: 155/72 (28 Apr 2018 14:21) (131/69 - 195/105)  RR: 22 (28 Apr 2018 10:58) (18 - 22)  SpO2: 97% (28 Apr 2018 10:58) (97% - 100%)    Physical Exam:  General: NAD, obese male, resting comfortably in bed  HEENT: NC/AT, PERRLA 2-3mm b/l, EOMI  Lungs: CTA bilaterally, no rhonchi, no wheezes  Cardio: S1S2+, RRR, no murmurs  Abdominal: soft, obese, BS+ normoactive, non-tender, non-distended  Vascular: no edema, no calf tenderness, DP pulses 2+ b/l  Neuro: (limited exam) alert, but unresponsive, making grunting noises intermittently, responds slowly to commands, able to lift all extremities    Labs:                        12.2   11.1  )-----------( 385      ( 28 Apr 2018 08:04 )             36.3   04-28    139  |  97<L>  |  29.0<H>  ----------------------------<  124<H>  3.3<L>   |  28.0  |  2.56<H>    Ca    9.5      28 Apr 2018 08:01    TPro  7.6  /  Alb  4.1  /  TBili  <0.2<L>  /  DBili  x   /  AST  158<H>  /  ALT  206<H>  /  AlkPhos  85  04-26      MEDICATIONS  (STANDING):  amLODIPine   Tablet 10 milliGRAM(s) Oral daily  aspirin  chewable 162 milliGRAM(s) Oral daily  benztropine 0.5 milliGRAM(s) Oral at bedtime  clonazePAM Tablet 0.5 milliGRAM(s) Oral two times a day  dextrose 5%. 1000 milliLiter(s) (50 mL/Hr) IV Continuous <Continuous>  dextrose 50% Injectable 12.5 Gram(s) IV Push once  dextrose 50% Injectable 25 Gram(s) IV Push once  dextrose 50% Injectable 25 Gram(s) IV Push once  hydrALAZINE 100 milliGRAM(s) Oral every 8 hours  insulin lispro (HumaLOG) corrective regimen sliding scale   SubCutaneous three times a day before meals  labetalol 200 milliGRAM(s) Oral every 12 hours  lamoTRIgine 50 milliGRAM(s) Oral two times a day  paliperidone ER. 9 milliGRAM(s) Oral daily    MEDICATIONS  (PRN):  dextrose Gel 1 Dose(s) Oral once PRN Blood Glucose LESS THAN 70 milliGRAM(s)/deciliter  glucagon  Injectable 1 milliGRAM(s) IntraMuscular once PRN Glucose LESS THAN 70 milligrams/deciliter  hydrALAZINE Injectable 10 milliGRAM(s) IV Push every 6 hours PRN sbp >150  LORazepam   Injectable 1 milliGRAM(s) IV Push once PRN Agitation  traZODone 100 milliGRAM(s) Oral at bedtime PRN insomnia Patient is a 33y old  Male who presents with a chief complaint of Severe depression and anxiety (27 Apr 2018 01:07)    Patient seen and examined at bedside. No acute overnight events. Patient was walking, talking, and interacting with the nurse overnight. However, this morning patient is alert but unresponsive, only making grunting noises intermittently. Per nurse, patient was given his oral medications this morning around 6am, but patient did not swallow his meds and spat them out 20mins later. Blood pressure was elevated due to missing oral meds, so patient was given IV hydralazine 10mg and labetalol 20mg, with good response noted. Pt was seen again at bedside later this afternoon and was alert and oriented with normal neuro findings on repeat neuro exam. He does not remember meeting the team during rounds, but states that these episodes are normal for him due to his psychiatric conditions. d/w patient the need to further optimize his psych meds and his blood pressure.     ROS: unable to obtain    Vital Signs Last 24 Hrs  T(C): 36.8 (28 Apr 2018 10:58), Max: 37.3 (28 Apr 2018 07:31)  T(F): 98.3 (28 Apr 2018 10:58), Max: 99.1 (28 Apr 2018 07:31)  HR: 94 (28 Apr 2018 14:21) (65 - 104)  BP: 155/72 (28 Apr 2018 14:21) (131/69 - 195/105)  RR: 22 (28 Apr 2018 10:58) (18 - 22)  SpO2: 97% (28 Apr 2018 10:58) (97% - 100%)    Physical Exam:  General: NAD, obese male, resting comfortably in bed  HEENT: NC/AT, PERRLA 2-3mm b/l, EOMI  Lungs: CTA bilaterally, no rhonchi, no wheezes  Cardio: S1S2+, RRR, no murmurs  Abdominal: soft, obese, BS+ normoactive, non-tender, non-distended  Vascular: no edema, no calf tenderness, DP pulses 2+ b/l  Neuro: (limited exam) alert, but unresponsive, making grunting noises intermittently, responds slowly to commands, able to lift all extremities    Labs:                        12.2   11.1  )-----------( 385      ( 28 Apr 2018 08:04 )             36.3   04-28    139  |  97<L>  |  29.0<H>  ----------------------------<  124<H>  3.3<L>   |  28.0  |  2.56<H>    Ca    9.5      28 Apr 2018 08:01    TPro  7.6  /  Alb  4.1  /  TBili  <0.2<L>  /  DBili  x   /  AST  158<H>  /  ALT  206<H>  /  AlkPhos  85  04-26      MEDICATIONS  (STANDING):  amLODIPine   Tablet 10 milliGRAM(s) Oral daily  aspirin  chewable 162 milliGRAM(s) Oral daily  benztropine 0.5 milliGRAM(s) Oral at bedtime  clonazePAM Tablet 0.5 milliGRAM(s) Oral two times a day  dextrose 5%. 1000 milliLiter(s) (50 mL/Hr) IV Continuous <Continuous>  dextrose 50% Injectable 12.5 Gram(s) IV Push once  dextrose 50% Injectable 25 Gram(s) IV Push once  dextrose 50% Injectable 25 Gram(s) IV Push once  hydrALAZINE 100 milliGRAM(s) Oral every 8 hours  insulin lispro (HumaLOG) corrective regimen sliding scale   SubCutaneous three times a day before meals  labetalol 200 milliGRAM(s) Oral every 12 hours  lamoTRIgine 50 milliGRAM(s) Oral two times a day  paliperidone ER. 9 milliGRAM(s) Oral daily    MEDICATIONS  (PRN):  dextrose Gel 1 Dose(s) Oral once PRN Blood Glucose LESS THAN 70 milliGRAM(s)/deciliter  glucagon  Injectable 1 milliGRAM(s) IntraMuscular once PRN Glucose LESS THAN 70 milligrams/deciliter  hydrALAZINE Injectable 10 milliGRAM(s) IV Push every 6 hours PRN sbp >150  LORazepam   Injectable 1 milliGRAM(s) IV Push once PRN Agitation  traZODone 100 milliGRAM(s) Oral at bedtime PRN insomnia

## 2018-04-28 NOTE — PROGRESS NOTE ADULT - ASSESSMENT
1) HTN emergency  2) MELISSA on ?CKD  3) Hypokalemia  4) Proteinuria    Pt with HTN emergency, MELISSA  Restarted on home meds; with labile BP ; spit out morning BP meds  Check UA, Ruben, UCl, UOsm  Check renal sonogram with arterial dopplers r/o LOUIS  Check renin/alexandria levels  Check urinary metanephrines, VMA    dw resident team This AM

## 2018-04-28 NOTE — PROGRESS NOTE BEHAVIORAL HEALTH - NSBHCHARTREVIEWLAB_PSY_A_CORE FT
11.8   14.2  )-----------( 365      ( 2018 20:26 )             35.7     04-    139  |  97<L>  |  29.0<H>  ----------------------------<  124<H>  3.3<L>   |  28.0  |  2.56<H>    Ca    9.5      2018 08:01    TPro  7.6  /  Alb  4.1  /  TBili  <0.2<L>  /  DBili  x   /  AST  158<H>  /  ALT  206<H>  /  AlkPhos  85      LIVER FUNCTIONS - ( 2018 20:26 )  Alb: 4.1 g/dL / Pro: 7.6 g/dL / ALK PHOS: 85 U/L / ALT: 206 U/L / AST: 158 U/L / GGT: x             Urinalysis Basic - ( 2018 22:39 )    Color: Yellow / Appearance: Clear / S.015 / pH: x  Gluc: x / Ketone: Negative  / Bili: Negative / Urobili: Negative mg/dL   Blood: x / Protein: 500 mg/dL / Nitrite: Negative   Leuk Esterase: Negative / RBC: 0-2 /HPF / WBC 0-2   Sq Epi: x / Non Sq Epi: Few / Bacteria: Occasional

## 2018-04-29 VITALS
RESPIRATION RATE: 17 BRPM | DIASTOLIC BLOOD PRESSURE: 77 MMHG | OXYGEN SATURATION: 96 % | TEMPERATURE: 99 F | SYSTOLIC BLOOD PRESSURE: 163 MMHG | HEART RATE: 88 BPM

## 2018-04-29 LAB
ANION GAP SERPL CALC-SCNC: 16 MMOL/L — SIGNIFICANT CHANGE UP (ref 5–17)
BASOPHILS # BLD AUTO: 0 K/UL — SIGNIFICANT CHANGE UP (ref 0–0.2)
BASOPHILS NFR BLD AUTO: 0.2 % — SIGNIFICANT CHANGE UP (ref 0–2)
BUN SERPL-MCNC: 30 MG/DL — HIGH (ref 8–20)
CALCIUM SERPL-MCNC: 9.2 MG/DL — SIGNIFICANT CHANGE UP (ref 8.6–10.2)
CHLORIDE SERPL-SCNC: 95 MMOL/L — LOW (ref 98–107)
CO2 SERPL-SCNC: 27 MMOL/L — SIGNIFICANT CHANGE UP (ref 22–29)
CREAT ?TM UR-MCNC: 75 MG/DL — SIGNIFICANT CHANGE UP
CREAT SERPL-MCNC: 2.57 MG/DL — HIGH (ref 0.5–1.3)
EOSINOPHIL # BLD AUTO: 0.4 K/UL — SIGNIFICANT CHANGE UP (ref 0–0.5)
EOSINOPHIL NFR BLD AUTO: 3.7 % — SIGNIFICANT CHANGE UP (ref 0–5)
GLUCOSE BLDC GLUCOMTR-MCNC: 104 MG/DL — HIGH (ref 70–99)
GLUCOSE BLDC GLUCOMTR-MCNC: 124 MG/DL — HIGH (ref 70–99)
GLUCOSE SERPL-MCNC: 109 MG/DL — SIGNIFICANT CHANGE UP (ref 70–115)
HCT VFR BLD CALC: 35.9 % — LOW (ref 42–52)
HGB BLD-MCNC: 11.9 G/DL — LOW (ref 14–18)
LYMPHOCYTES # BLD AUTO: 26.1 % — SIGNIFICANT CHANGE UP (ref 20–55)
LYMPHOCYTES # BLD AUTO: 3.2 K/UL — SIGNIFICANT CHANGE UP (ref 1–4.8)
MCHC RBC-ENTMCNC: 25.1 PG — LOW (ref 27–31)
MCHC RBC-ENTMCNC: 33.1 G/DL — SIGNIFICANT CHANGE UP (ref 32–36)
MCV RBC AUTO: 75.6 FL — LOW (ref 80–94)
MONOCYTES # BLD AUTO: 0.6 K/UL — SIGNIFICANT CHANGE UP (ref 0–0.8)
MONOCYTES NFR BLD AUTO: 4.5 % — SIGNIFICANT CHANGE UP (ref 3–10)
NEUTROPHILS # BLD AUTO: 7.9 K/UL — SIGNIFICANT CHANGE UP (ref 1.8–8)
NEUTROPHILS NFR BLD AUTO: 65.4 % — SIGNIFICANT CHANGE UP (ref 37–73)
PLATELET # BLD AUTO: 379 K/UL — SIGNIFICANT CHANGE UP (ref 150–400)
POTASSIUM SERPL-MCNC: 3.4 MMOL/L — LOW (ref 3.5–5.3)
POTASSIUM SERPL-SCNC: 3.4 MMOL/L — LOW (ref 3.5–5.3)
PROT ?TM UR-MCNC: 81 MG/DL — HIGH (ref 0–12)
PROT/CREAT UR-RTO: 1.1 RATIO — HIGH
RBC # BLD: 4.75 M/UL — SIGNIFICANT CHANGE UP (ref 4.6–6.2)
RBC # FLD: 17.1 % — HIGH (ref 11–15.6)
SODIUM SERPL-SCNC: 138 MMOL/L — SIGNIFICANT CHANGE UP (ref 135–145)
WBC # BLD: 12.1 K/UL — HIGH (ref 4.8–10.8)
WBC # FLD AUTO: 12.1 K/UL — HIGH (ref 4.8–10.8)

## 2018-04-29 PROCEDURE — 96374 THER/PROPH/DIAG INJ IV PUSH: CPT

## 2018-04-29 PROCEDURE — 87581 M.PNEUMON DNA AMP PROBE: CPT

## 2018-04-29 PROCEDURE — 70450 CT HEAD/BRAIN W/O DYE: CPT

## 2018-04-29 PROCEDURE — 80048 BASIC METABOLIC PNL TOTAL CA: CPT

## 2018-04-29 PROCEDURE — 81001 URINALYSIS AUTO W/SCOPE: CPT

## 2018-04-29 PROCEDURE — 93975 VASCULAR STUDY: CPT

## 2018-04-29 PROCEDURE — 82962 GLUCOSE BLOOD TEST: CPT

## 2018-04-29 PROCEDURE — 85027 COMPLETE CBC AUTOMATED: CPT

## 2018-04-29 PROCEDURE — 87633 RESP VIRUS 12-25 TARGETS: CPT

## 2018-04-29 PROCEDURE — 36415 COLL VENOUS BLD VENIPUNCTURE: CPT

## 2018-04-29 PROCEDURE — 99284 EMERGENCY DEPT VISIT MOD MDM: CPT | Mod: 25

## 2018-04-29 PROCEDURE — 80053 COMPREHEN METABOLIC PANEL: CPT

## 2018-04-29 PROCEDURE — 84244 ASSAY OF RENIN: CPT

## 2018-04-29 PROCEDURE — 93010 ELECTROCARDIOGRAM REPORT: CPT

## 2018-04-29 PROCEDURE — 87798 DETECT AGENT NOS DNA AMP: CPT

## 2018-04-29 PROCEDURE — 80074 ACUTE HEPATITIS PANEL: CPT

## 2018-04-29 PROCEDURE — 80307 DRUG TEST PRSMV CHEM ANLYZR: CPT

## 2018-04-29 PROCEDURE — 82728 ASSAY OF FERRITIN: CPT

## 2018-04-29 PROCEDURE — 71045 X-RAY EXAM CHEST 1 VIEW: CPT

## 2018-04-29 PROCEDURE — 87486 CHLMYD PNEUM DNA AMP PROBE: CPT

## 2018-04-29 PROCEDURE — 93975 VASCULAR STUDY: CPT | Mod: 26

## 2018-04-29 PROCEDURE — 84156 ASSAY OF PROTEIN URINE: CPT

## 2018-04-29 PROCEDURE — 84484 ASSAY OF TROPONIN QUANT: CPT

## 2018-04-29 PROCEDURE — 82088 ASSAY OF ALDOSTERONE: CPT

## 2018-04-29 PROCEDURE — 93306 TTE W/DOPPLER COMPLETE: CPT

## 2018-04-29 PROCEDURE — 99233 SBSQ HOSP IP/OBS HIGH 50: CPT

## 2018-04-29 PROCEDURE — 93005 ELECTROCARDIOGRAM TRACING: CPT

## 2018-04-29 PROCEDURE — 83036 HEMOGLOBIN GLYCOSYLATED A1C: CPT

## 2018-04-29 PROCEDURE — 96375 TX/PRO/DX INJ NEW DRUG ADDON: CPT

## 2018-04-29 PROCEDURE — 99232 SBSQ HOSP IP/OBS MODERATE 35: CPT | Mod: GC

## 2018-04-29 PROCEDURE — 76700 US EXAM ABDOM COMPLETE: CPT

## 2018-04-29 RX ORDER — HYDRALAZINE HCL 50 MG
10 TABLET ORAL ONCE
Qty: 0 | Refills: 0 | Status: COMPLETED | OUTPATIENT
Start: 2018-04-29 | End: 2018-04-29

## 2018-04-29 RX ADMIN — Medication 200 MILLIGRAM(S): at 05:37

## 2018-04-29 RX ADMIN — Medication 10 MILLIGRAM(S): at 04:19

## 2018-04-29 RX ADMIN — Medication 0.5 MILLIGRAM(S): at 05:37

## 2018-04-29 RX ADMIN — Medication 100 MILLIGRAM(S): at 05:37

## 2018-04-29 RX ADMIN — AMLODIPINE BESYLATE 10 MILLIGRAM(S): 2.5 TABLET ORAL at 05:37

## 2018-04-29 RX ADMIN — LAMOTRIGINE 50 MILLIGRAM(S): 25 TABLET, ORALLY DISINTEGRATING ORAL at 05:37

## 2018-04-29 RX ADMIN — Medication 100 MILLIGRAM(S): at 13:18

## 2018-04-29 RX ADMIN — Medication 162 MILLIGRAM(S): at 13:18

## 2018-04-29 NOTE — PROGRESS NOTE ADULT - ASSESSMENT
32 yo Male with PMH of HTN, DM, bipolar disorder presented to the ED for severe anxiety and depression due to situation at home, per pt family is not supportive and he is unhappy at home. Incidental finding of elevated SBP >200 in the ED. Admitted for hypertensive emergency with acute kidney injury noted, likely due to medication non-compliance     Hypertensive Emergency w/ abnormal ekg, elevated troponin, and MELISSA  -likely due to medication non-compliance  -Initial /151 at presentation  -CT head negative for acute findings  -will not perform MRI at this time as pt's intermittent unresponsive states are secondary to his psych conditions, and patient is currently alert and oriented with normal neuro exam  -will f/u psych rec's - pt needs further optimization of his psych conditions as they are contributing to his non-compliance with medications and intermittently causing significantly elevated bps  -EKG with no acute st changes; troponin trended up from 0.06 to 0.08 but now stable  -echo without structural abnormalities  -renal sono to eval for LOUIS pending  -urine metanephrines, renin, alexandria studies pending  -cardio [place] consulted  -nephro consulted  -continue home meds: norvasc 10mg, hydralazine 100mg q8h, lopressor switched to labetalol 200mg bid    Acute kidney injury, stage 3b  -unsure if baseline chronic kidney disease  -Cr 2.83, gfr 33 at presentation, similar to values in Dec 2017, but unknown etiology  -Cr now slightly improved  -nephro consulted  -Renal sono pending  -continue to monitor    Hypokalemia - unknown etiology  -Possibly med adverse effect vs decreased intake - replenish as needed  -continue to monitor  -EKG unremarkable    Bipolar disorder/Schizoprenia/Schizoaffective disorder  -Continue home meds  -Psychiatry consulted - need better optimization of meds    Anxiety, Major Depression Disorder  -s/p Ativan 1mg x1 in the ED  -Continue home medications  -Psychiatry consulted - need better optimization of meds    Transaminitis  -unclear etiology, likely secondary to diet/obesity  -mild hepatomegaly and hepatic steatosis on abd sono  -hep panel negative    Leukocytosis   - low grade w/ no left shift, possibly reactive  - CXR unremarkable, RVP & UA pending  - no evidence of infxn  - wbc trended down today    Microcytic Anemia   -h/h stable, no evidence of acute bleeding  -no need to trend  -pt will likely need iron studies done as outpt    Diabetes Mellitus  -stable  -Hold home Metformin  -Accuchecks, insulin sliding scale  -A1c 5.3    Preventative Measure  -VCD Boots

## 2018-04-29 NOTE — PROGRESS NOTE ADULT - SUBJECTIVE AND OBJECTIVE BOX
[facility] DIVISION OF KIDNEY DISEASES AND HYPERTENSION -- FOLLOW UP NOTE  --------------------------------------------------------------------------------  Chief Complaint:: HTN    24 hour events/subjective:  Pt seen and examined   No complaints  Better BP control once pt on is home meds    PAST HISTORY  --------------------------------------------------------------------------------  No significant changes to PMH, PSH, FHx, SHx, unless otherwise noted    ALLERGIES & MEDICATIONS  --------------------------------------------------------------------------------  Allergies    No Known Allergies    Intolerances      Standing Inpatient Medications  amLODIPine   Tablet 10 milliGRAM(s) Oral daily  aspirin  chewable 162 milliGRAM(s) Oral daily  benztropine 0.5 milliGRAM(s) Oral at bedtime  clonazePAM Tablet 0.5 milliGRAM(s) Oral two times a day  dextrose 5%. 1000 milliLiter(s) IV Continuous <Continuous>  dextrose 50% Injectable 12.5 Gram(s) IV Push once  dextrose 50% Injectable 25 Gram(s) IV Push once  dextrose 50% Injectable 25 Gram(s) IV Push once  hydrALAZINE 100 milliGRAM(s) Oral every 8 hours  insulin lispro (HumaLOG) corrective regimen sliding scale   SubCutaneous three times a day before meals  labetalol 200 milliGRAM(s) Oral every 12 hours  lamoTRIgine 50 milliGRAM(s) Oral two times a day  paliperidone ER. 9 milliGRAM(s) Oral daily    PRN Inpatient Medications  dextrose Gel 1 Dose(s) Oral once PRN  glucagon  Injectable 1 milliGRAM(s) IntraMuscular once PRN  hydrALAZINE Injectable 10 milliGRAM(s) IV Push every 6 hours PRN  LORazepam   Injectable 1 milliGRAM(s) IV Push once PRN  traZODone 100 milliGRAM(s) Oral at bedtime PRN      REVIEW OF SYSTEMS  --------------------------------------------------------------------------------  Gen: No weight changes, fatigue, fevers/chills, weakness  Skin: No rashes  Head/Eyes/Ears/Mouth: No headache; Normal hearing; Normal vision w/o blurriness; No sinus pain/discomfort, sore throat  Respiratory: No dyspnea, cough, wheezing, hemoptysis  CV: No chest pain, PND, orthopnea  GI: No abdominal pain, diarrhea, constipation, nausea, vomiting, melena, hematochezia  : No increased frequency, dysuria, hematuria, nocturia  MSK: No joint pain/swelling; no back pain; no edema  Neuro: No dizziness/lightheadedness, weakness, seizures, numbness, tingling  Heme: No easy bruising or bleeding  Endo: No heat/cold intolerance  Psych: No significant nervousness, anxiety, stress, depression    All other systems were reviewed and are negative, except as noted.    VITALS/PHYSICAL EXAM  --------------------------------------------------------------------------------  T(C): 37.2 (04-29-18 @ 08:24), Max: 37.2 (04-29-18 @ 08:24)  HR: 86 (04-29-18 @ 08:24) (74 - 120)  BP: 160/79 (04-29-18 @ 13:19) (123/77 - 197/110)  RR: 18 (04-29-18 @ 08:24) (18 - 18)  SpO2: 96% (04-29-18 @ 08:24) (95% - 100%)  Wt(kg): --        04-28-18 @ 07:01  -  04-29-18 @ 07:00  --------------------------------------------------------  IN: 0 mL / OUT: 900 mL / NET: -900 mL      Physical Exam:  	Gen: NAD, well-appearing  	HEENT: PERRL, supple neck, clear oropharynx  	Pulm: CTA B/L  	CV: RRR, S1S2; no rub  	Back: No spinal or CVA tenderness; no sacral edema  	Abd: +BS, soft, nontender/nondistended  	: No suprapubic tenderness  	UE: Warm, FROM, no clubbing, intact strength; no edema; no asterixis  	LE: Warm, FROM, no clubbing, intact strength; no edema  	Neuro: No focal deficits, intact gait  	Psych: Normal affect and mood  	Skin: Warm, without rashes  	Vascular access:    LABS/STUDIES  --------------------------------------------------------------------------------              11.9   12.1  >-----------<  379      [04-29-18 @ 06:12]              35.9     138  |  95  |  30.0  ----------------------------<  109      [04-29-18 @ 06:12]  3.4   |  27.0  |  2.57        Ca     9.2     [04-29-18 @ 06:12]          Troponin 0.08      [04-28-18 @ 08:01]    Creatinine Trend:  SCr 2.57 [04-29 @ 06:12]  SCr 2.56 [04-28 @ 08:01]  SCr 2.83 [04-26 @ 20:26]    Urinalysis - [04-26-18 @ 22:39]      Color Yellow / Appearance Clear / SG 1.015 / pH 6.0      Gluc Negative / Ketone Negative  / Bili Negative / Urobili Negative       Blood Large / Protein 500 / Leuk Est Negative / Nitrite Negative      RBC 0-2 / WBC 0-2 / Hyaline  / Gran  / Sq Epi  / Non Sq Epi Few / Bacteria Occasional    Urine Creatinine 75      [04-29-18 @ 00:25]  Urine Protein 81.0      [04-29-18 @ 00:25]    Ferritin 129      [04-27-18 @ 07:19]  HbA1c 5.3      [04-28-18 @ 08:03]    HBsAg Nonreact      [04-27-18 @ 18:18]  HCV 0.16, Nonreact      [04-27-18 @ 18:18]

## 2018-04-29 NOTE — CHART NOTE - NSCHARTNOTEFT_GEN_A_CORE
I was notified by nursing staff that patient's blood pressure was 170/80 and that HR was 120.   Patient found resting comfortably in bed, denies any chest pain, palpitations, shortness of breath, nausea, vomiting or other symptoms. At the moment his only complain is that he hasn't been able to sleep well.    Vital Signs Last 24 Hrs  T(C): 36.2 (28 Apr 2018 23:33), Max: 37.3 (28 Apr 2018 07:31)  T(F): 97.2 (28 Apr 2018 23:33), Max: 99.1 (28 Apr 2018 07:31)  HR: 120 (29 Apr 2018 03:16) (69 - 120)  BP: 170/80 (29 Apr 2018 03:16) (148/68 - 197/110)  BP(mean): --  RR: 18 (28 Apr 2018 23:33) (18 - 22)  SpO2: 100% (28 Apr 2018 23:33) (95% - 100%)    General: Patient in no acute distress.  Respiratory: Normal respiratory rate and effort, lungs are clear to auscultation bilaterally.  Heart: Regular rate and rhythm, no murmurs, rubs or gallops.  Extremities: no edema.    Patient spitted out his morning blood pressure medications yesterday morning, labile BP likely due to noncompliance.  EKG performed shows no changes from prior EKG and without tachycardia.    Plan:  Will give Hydralazine 10 mg IV push now and reevaluate in 10 minutes. I was notified by nursing staff that patient's blood pressure was 170/80 and that HR was 120.   Patient found resting comfortably in bed, denies any chest pain, palpitations, shortness of breath, nausea, vomiting or other symptoms. At the moment his only complain is that he hasn't been able to sleep well.    Vital Signs Last 24 Hrs  T(C): 36.2 (28 Apr 2018 23:33), Max: 37.3 (28 Apr 2018 07:31)  T(F): 97.2 (28 Apr 2018 23:33), Max: 99.1 (28 Apr 2018 07:31)  HR: 120 (29 Apr 2018 03:16) (69 - 120)  BP: 170/80 (29 Apr 2018 03:16) (148/68 - 197/110)  BP(mean): --  RR: 18 (28 Apr 2018 23:33) (18 - 22)  SpO2: 100% (28 Apr 2018 23:33) (95% - 100%)    General: Patient in no acute distress.  Respiratory: Normal respiratory rate and effort, lungs are clear to auscultation bilaterally.  Heart: Regular rate and rhythm, no murmurs, rubs or gallops.  Extremities: no edema.    Assessment:  33 years old male admitted for hypertensive emergency.  Patient spitted out his morning blood pressure medications yesterday morning, labile BP likely due to noncompliance.  EKG performed shows no changes from prior EKG and without tachycardia.    Plan:  Will give Hydralazine 10 mg IV push now and reevaluate in 10 minutes.

## 2018-04-29 NOTE — CHART NOTE - NSCHARTNOTEFT_GEN_A_CORE
Nurse paged blue team, to inform me that Tahir is considering leaving AMA. I( Dr. Farley) went to see and speak with the patient. He informed me that he wants to leave because he feels very anxious and stressed out being inside the hospital, that he feels its a waste of time.  I informed the patient the risks involved leaving AMA, and he understood. I informed the patient, that he is schedule to be d/c tomorrow. The patient insisted that he has to leave today. The patient states he will wait until psych, evaluates him and than he will decided if he is going to leave.

## 2018-04-29 NOTE — PROGRESS NOTE ADULT - SUBJECTIVE AND OBJECTIVE BOX
Patient is a 33y old  Male who presents with a chief complaint of Severe depression and anxiety (27 Apr 2018 01:07)    Patient seen and examined at bedside. Overnight Pt blood pressure was not well controlled. As per patient states he is not unable to sleep well.       ROS: unable to obtain    Vital Signs Last 24 Hrs  T(C): 36.2 (28 Apr 2018 23:33), Max: 37.3 (28 Apr 2018 07:31)  T(F): 97.2 (28 Apr 2018 23:33), Max: 99.1 (28 Apr 2018 07:31)  HR: 108 (29 Apr 2018 05:25) (74 - 120)  BP: 180/84 (29 Apr 2018 05:25) (148/68 - 197/110)  RR: 18 (28 Apr 2018 23:33) (18 - 22)  SpO2: 100% (28 Apr 2018 23:33) (95% - 100%)      Physical Exam:  General: NAD, obese male, resting comfortably in bed  HEENT: NC/AT, PERRLA 2-3mm b/l, EOMI  Lungs: CTA bilaterally, no rhonchi, no wheezes  Cardio: S1S2+, RRR, no murmurs  Abdominal: soft, obese, BS+ normoactive, non-tender, non-distended  Vascular: no edema, no calf tenderness, DP pulses 2+ b/l  Neuro: (limited exam) alert, but unresponsive, making grunting noises intermittently, responds slowly to commands, able to lift all extremities    Labs: No new labs      MEDICATIONS  (STANDING):  amLODIPine   Tablet 10 milliGRAM(s) Oral daily  aspirin  chewable 162 milliGRAM(s) Oral daily  benztropine 0.5 milliGRAM(s) Oral at bedtime  clonazePAM Tablet 0.5 milliGRAM(s) Oral two times a day  dextrose 5%. 1000 milliLiter(s) (50 mL/Hr) IV Continuous <Continuous>  dextrose 50% Injectable 12.5 Gram(s) IV Push once  dextrose 50% Injectable 25 Gram(s) IV Push once  dextrose 50% Injectable 25 Gram(s) IV Push once  hydrALAZINE 100 milliGRAM(s) Oral every 8 hours  insulin lispro (HumaLOG) corrective regimen sliding scale   SubCutaneous three times a day before meals  labetalol 200 milliGRAM(s) Oral every 12 hours  lamoTRIgine 50 milliGRAM(s) Oral two times a day  paliperidone ER. 9 milliGRAM(s) Oral daily    MEDICATIONS  (PRN):  dextrose Gel 1 Dose(s) Oral once PRN Blood Glucose LESS THAN 70 milliGRAM(s)/deciliter  glucagon  Injectable 1 milliGRAM(s) IntraMuscular once PRN Glucose LESS THAN 70 milligrams/deciliter  hydrALAZINE Injectable 10 milliGRAM(s) IV Push every 6 hours PRN sbp >150  LORazepam   Injectable 1 milliGRAM(s) IV Push once PRN Agitation  traZODone 100 milliGRAM(s) Oral at bedtime PRN insomnia Patient is a 33y old  Male who presents with a chief complaint of Severe depression and anxiety (27 Apr 2018 01:07)    Patient seen and examined at bedside. Overnight Pt blood pressure was not well controlled. As per patient states he is not unable to sleep well.       ROS: unable to obtain    Vital Signs Last 24 Hrs  T(C): 36.2 (28 Apr 2018 23:33), Max: 37.3 (28 Apr 2018 07:31)  T(F): 97.2 (28 Apr 2018 23:33), Max: 99.1 (28 Apr 2018 07:31)  HR: 108 (29 Apr 2018 05:25) (74 - 120)  BP: 180/84 (29 Apr 2018 05:25) (148/68 - 197/110)  RR: 18 (28 Apr 2018 23:33) (18 - 22)  SpO2: 100% (28 Apr 2018 23:33) (95% - 100%)      Physical Exam:  General: NAD, obese male, resting comfortably in bed  HEENT: NC/AT, PERRLA 2-3mm b/l, EOMI  Lungs: CTA bilaterally, no rhonchi, no wheezes  Cardio: S1S2+, RRR, no murmurs  Abdominal: soft, obese, BS+ normoactive, non-tender, non-distended  Vascular: no edema, no calf tenderness, DP pulses 2+ b/l  Neuro: (limited exam) alert, but unresponsive, making grunting noises intermittently, responds slowly to commands, able to lift all extremities                 11.9   12.1   )----------(  379       ( 29 Apr 2018 06:12 )               35.9      138    |  95     |  30.0   ----------------------------<  109        ( 29 Apr 2018 06:12 )  3.4     |  27.0   |  2.57     Ca    9.2        ( 29 Apr 2018 06:12 )            CAPILLARY BLOOD GLUCOSE        Labs: No new labs      MEDICATIONS  (STANDING):  amLODIPine   Tablet 10 milliGRAM(s) Oral daily  aspirin  chewable 162 milliGRAM(s) Oral daily  benztropine 0.5 milliGRAM(s) Oral at bedtime  clonazePAM Tablet 0.5 milliGRAM(s) Oral two times a day  dextrose 5%. 1000 milliLiter(s) (50 mL/Hr) IV Continuous <Continuous>  dextrose 50% Injectable 12.5 Gram(s) IV Push once  dextrose 50% Injectable 25 Gram(s) IV Push once  dextrose 50% Injectable 25 Gram(s) IV Push once  hydrALAZINE 100 milliGRAM(s) Oral every 8 hours  insulin lispro (HumaLOG) corrective regimen sliding scale   SubCutaneous three times a day before meals  labetalol 200 milliGRAM(s) Oral every 12 hours  lamoTRIgine 50 milliGRAM(s) Oral two times a day  paliperidone ER. 9 milliGRAM(s) Oral daily    MEDICATIONS  (PRN):  dextrose Gel 1 Dose(s) Oral once PRN Blood Glucose LESS THAN 70 milliGRAM(s)/deciliter  glucagon  Injectable 1 milliGRAM(s) IntraMuscular once PRN Glucose LESS THAN 70 milligrams/deciliter  hydrALAZINE Injectable 10 milliGRAM(s) IV Push every 6 hours PRN sbp >150  LORazepam   Injectable 1 milliGRAM(s) IV Push once PRN Agitation  traZODone 100 milliGRAM(s) Oral at bedtime PRN insomnia

## 2018-04-29 NOTE — PROGRESS NOTE ADULT - ASSESSMENT
1) HTN emergency  2) MELISSA on ?CKD  3) Hypokalemia  4) Proteinuria    Pt with HTN emergency, MELISSA  Restarted on home meds;   Renal sonogram reviewed; no LOUIS  Workup in progress    dw resident team This AM

## 2018-04-29 NOTE — PROGRESS NOTE ADULT - ATTENDING COMMENTS
I have seen and examined the patient, reviewed the chart, labs and diagnostics and I agree with assessment and plan as above with family medicine resident Miguelito Osullivan    BP's better on Labetalol and Norvasc  Renal functions better  will discuss with Psychiatry and if stable, will plan on discharging in am    spoke with pt's brother and father at bed side
I have seen and examined the patient, reviewed the chart, labs and diagnostics and I agree with assessment and plan as above with family medicine resident  Lucina Silva (MD    BP's still Un controlled, as Pt refused to take his BP medications this am at 6  S/P IV labetalol 20 mg Once  Appreciate cardiology and Nephrology and Psych recommendations   Work up for Hyper aldosteronism in process  Renal USG in am to evaluate for renal artery stenosis    MELISSA on CKD-4, improving. continue to Monitor closely and avoid nephrotoxins

## 2018-04-30 ENCOUNTER — INPATIENT (INPATIENT)
Facility: HOSPITAL | Age: 33
LOS: 0 days | Discharge: LEFT BEFORE DISCHARGE | DRG: 305 | End: 2018-04-30
Attending: HOSPITALIST | Admitting: HOSPITALIST
Payer: MEDICARE

## 2018-04-30 VITALS
DIASTOLIC BLOOD PRESSURE: 114 MMHG | RESPIRATION RATE: 18 BRPM | HEIGHT: 63 IN | OXYGEN SATURATION: 98 % | TEMPERATURE: 99 F | WEIGHT: 179.9 LBS | HEART RATE: 70 BPM | SYSTOLIC BLOOD PRESSURE: 210 MMHG

## 2018-04-30 VITALS
SYSTOLIC BLOOD PRESSURE: 191 MMHG | OXYGEN SATURATION: 100 % | HEART RATE: 72 BPM | DIASTOLIC BLOOD PRESSURE: 118 MMHG | RESPIRATION RATE: 18 BRPM

## 2018-04-30 DIAGNOSIS — R69 ILLNESS, UNSPECIFIED: ICD-10-CM

## 2018-04-30 DIAGNOSIS — F31.9 BIPOLAR DISORDER, UNSPECIFIED: ICD-10-CM

## 2018-04-30 DIAGNOSIS — E11.9 TYPE 2 DIABETES MELLITUS WITHOUT COMPLICATIONS: ICD-10-CM

## 2018-04-30 DIAGNOSIS — I10 ESSENTIAL (PRIMARY) HYPERTENSION: ICD-10-CM

## 2018-04-30 DIAGNOSIS — Z29.9 ENCOUNTER FOR PROPHYLACTIC MEASURES, UNSPECIFIED: ICD-10-CM

## 2018-04-30 DIAGNOSIS — N19 UNSPECIFIED KIDNEY FAILURE: ICD-10-CM

## 2018-04-30 DIAGNOSIS — F12.10 CANNABIS ABUSE, UNCOMPLICATED: ICD-10-CM

## 2018-04-30 DIAGNOSIS — F25.0 SCHIZOAFFECTIVE DISORDER, BIPOLAR TYPE: ICD-10-CM

## 2018-04-30 LAB
ALDOST SERPL-MCNC: 43.7 NG/DL — HIGH
APAP SERPL-MCNC: <7.5 UG/ML — LOW (ref 10–26)
APPEARANCE UR: CLEAR — SIGNIFICANT CHANGE UP
BASOPHILS # BLD AUTO: 0 K/UL — SIGNIFICANT CHANGE UP (ref 0–0.2)
BASOPHILS NFR BLD AUTO: 0.3 % — SIGNIFICANT CHANGE UP (ref 0–2)
BILIRUB UR-MCNC: NEGATIVE — SIGNIFICANT CHANGE UP
COLOR SPEC: YELLOW — SIGNIFICANT CHANGE UP
DIFF PNL FLD: ABNORMAL
EOSINOPHIL # BLD AUTO: 0.4 K/UL — SIGNIFICANT CHANGE UP (ref 0–0.5)
EOSINOPHIL NFR BLD AUTO: 3.4 % — SIGNIFICANT CHANGE UP (ref 0–5)
ETHANOL SERPL-MCNC: <10 MG/DL — SIGNIFICANT CHANGE UP
GLUCOSE UR QL: NEGATIVE MG/DL — SIGNIFICANT CHANGE UP
HCT VFR BLD CALC: 36.4 % — LOW (ref 42–52)
HGB BLD-MCNC: 12 G/DL — LOW (ref 14–18)
KETONES UR-MCNC: NEGATIVE — SIGNIFICANT CHANGE UP
LEUKOCYTE ESTERASE UR-ACNC: NEGATIVE — SIGNIFICANT CHANGE UP
LYMPHOCYTES # BLD AUTO: 2.6 K/UL — SIGNIFICANT CHANGE UP (ref 1–4.8)
LYMPHOCYTES # BLD AUTO: 21.7 % — SIGNIFICANT CHANGE UP (ref 20–55)
MCHC RBC-ENTMCNC: 25.2 PG — LOW (ref 27–31)
MCHC RBC-ENTMCNC: 33 G/DL — SIGNIFICANT CHANGE UP (ref 32–36)
MCV RBC AUTO: 76.3 FL — LOW (ref 80–94)
MONOCYTES # BLD AUTO: 0.6 K/UL — SIGNIFICANT CHANGE UP (ref 0–0.8)
MONOCYTES NFR BLD AUTO: 5.2 % — SIGNIFICANT CHANGE UP (ref 3–10)
NEUTROPHILS # BLD AUTO: 8.4 K/UL — HIGH (ref 1.8–8)
NEUTROPHILS NFR BLD AUTO: 69.2 % — SIGNIFICANT CHANGE UP (ref 37–73)
NITRITE UR-MCNC: NEGATIVE — SIGNIFICANT CHANGE UP
PCP SPEC-MCNC: SIGNIFICANT CHANGE UP
PH UR: 6.5 — SIGNIFICANT CHANGE UP (ref 5–8)
PLATELET # BLD AUTO: 380 K/UL — SIGNIFICANT CHANGE UP (ref 150–400)
PROT UR-MCNC: 100 MG/DL
RBC # BLD: 4.77 M/UL — SIGNIFICANT CHANGE UP (ref 4.6–6.2)
RBC # FLD: 17.1 % — HIGH (ref 11–15.6)
RENIN DIRECT, PLASMA: 3.7 PG/ML — SIGNIFICANT CHANGE UP
SALICYLATES SERPL-MCNC: <0.6 MG/DL — LOW (ref 10–20)
SP GR SPEC: 1.01 — SIGNIFICANT CHANGE UP (ref 1.01–1.02)
UROBILINOGEN FLD QL: NEGATIVE MG/DL — SIGNIFICANT CHANGE UP
WBC # BLD: 12.1 K/UL — HIGH (ref 4.8–10.8)
WBC # FLD AUTO: 12.1 K/UL — HIGH (ref 4.8–10.8)

## 2018-04-30 PROCEDURE — 36415 COLL VENOUS BLD VENIPUNCTURE: CPT

## 2018-04-30 PROCEDURE — 99285 EMERGENCY DEPT VISIT HI MDM: CPT

## 2018-04-30 PROCEDURE — 80307 DRUG TEST PRSMV CHEM ANLYZR: CPT

## 2018-04-30 PROCEDURE — 80053 COMPREHEN METABOLIC PANEL: CPT

## 2018-04-30 PROCEDURE — 85027 COMPLETE CBC AUTOMATED: CPT

## 2018-04-30 PROCEDURE — 99223 1ST HOSP IP/OBS HIGH 75: CPT

## 2018-04-30 PROCEDURE — 81001 URINALYSIS AUTO W/SCOPE: CPT

## 2018-04-30 RX ORDER — AMLODIPINE BESYLATE 2.5 MG/1
10 TABLET ORAL ONCE
Qty: 0 | Refills: 0 | Status: COMPLETED | OUTPATIENT
Start: 2018-04-30 | End: 2018-04-30

## 2018-04-30 RX ORDER — BENZTROPINE MESYLATE 1 MG
0.5 TABLET ORAL AT BEDTIME
Qty: 0 | Refills: 0 | Status: DISCONTINUED | OUTPATIENT
Start: 2018-04-30 | End: 2018-04-30

## 2018-04-30 RX ORDER — TRAZODONE HCL 50 MG
100 TABLET ORAL AT BEDTIME
Qty: 0 | Refills: 0 | Status: DISCONTINUED | OUTPATIENT
Start: 2018-04-30 | End: 2018-04-30

## 2018-04-30 RX ORDER — HYDRALAZINE HCL 50 MG
100 TABLET ORAL EVERY 8 HOURS
Qty: 0 | Refills: 0 | Status: DISCONTINUED | OUTPATIENT
Start: 2018-04-30 | End: 2018-04-30

## 2018-04-30 RX ORDER — HYDRALAZINE HCL 50 MG
100 TABLET ORAL ONCE
Qty: 0 | Refills: 0 | Status: DISCONTINUED | OUTPATIENT
Start: 2018-04-30 | End: 2018-04-30

## 2018-04-30 RX ORDER — METOPROLOL TARTRATE 50 MG
100 TABLET ORAL
Qty: 0 | Refills: 0 | Status: DISCONTINUED | OUTPATIENT
Start: 2018-04-30 | End: 2018-04-30

## 2018-04-30 RX ORDER — LABETALOL HCL 100 MG
200 TABLET ORAL
Qty: 0 | Refills: 0 | Status: DISCONTINUED | OUTPATIENT
Start: 2018-04-30 | End: 2018-04-30

## 2018-04-30 RX ORDER — PALIPERIDONE 1.5 MG/1
9 TABLET, EXTENDED RELEASE ORAL DAILY
Qty: 0 | Refills: 0 | Status: DISCONTINUED | OUTPATIENT
Start: 2018-04-30 | End: 2018-04-30

## 2018-04-30 RX ORDER — CLONAZEPAM 1 MG
0.5 TABLET ORAL EVERY 12 HOURS
Qty: 0 | Refills: 0 | Status: DISCONTINUED | OUTPATIENT
Start: 2018-04-30 | End: 2018-04-30

## 2018-04-30 RX ORDER — LAMOTRIGINE 25 MG/1
25 TABLET, ORALLY DISINTEGRATING ORAL EVERY 12 HOURS
Qty: 0 | Refills: 0 | Status: DISCONTINUED | OUTPATIENT
Start: 2018-04-30 | End: 2018-04-30

## 2018-04-30 RX ORDER — SODIUM CHLORIDE 9 MG/ML
1000 INJECTION INTRAMUSCULAR; INTRAVENOUS; SUBCUTANEOUS ONCE
Qty: 0 | Refills: 0 | Status: COMPLETED | OUTPATIENT
Start: 2018-04-30 | End: 2018-04-30

## 2018-04-30 RX ADMIN — AMLODIPINE BESYLATE 10 MILLIGRAM(S): 2.5 TABLET ORAL at 18:05

## 2018-04-30 RX ADMIN — LAMOTRIGINE 25 MILLIGRAM(S): 25 TABLET, ORALLY DISINTEGRATING ORAL at 16:21

## 2018-04-30 RX ADMIN — PALIPERIDONE 9 MILLIGRAM(S): 1.5 TABLET, EXTENDED RELEASE ORAL at 16:21

## 2018-04-30 RX ADMIN — Medication 0.5 MILLIGRAM(S): at 16:22

## 2018-04-30 RX ADMIN — Medication 200 MILLIGRAM(S): at 18:19

## 2018-04-30 RX ADMIN — Medication 100 MILLIGRAM(S): at 21:14

## 2018-04-30 RX ADMIN — Medication 0.5 MILLIGRAM(S): at 16:20

## 2018-04-30 RX ADMIN — SODIUM CHLORIDE 1000 MILLILITER(S): 9 INJECTION INTRAMUSCULAR; INTRAVENOUS; SUBCUTANEOUS at 18:13

## 2018-04-30 NOTE — ED ADULT NURSE NOTE - OBJECTIVE STATEMENT
Assumed pt care at 1545.  Pt refusing to speak, will not give any information, pt will only speak with Psych Md.  Pt stating "im fine", no other information is attainable at this time.  No acute s/s of respiratory distress noted or reported, will continue to monitor

## 2018-04-30 NOTE — ED ADULT NURSE REASSESSMENT NOTE - NS ED NURSE REASSESS COMMENT FT1
Dr. Olguin at bedside obtained AMA form discharge after admission.  Pt gave phone number [phone] to have his aunt Gail come pick him up.  Aunt to pick him up in 15 minutes.

## 2018-04-30 NOTE — ED ADULT TRIAGE NOTE - CHIEF COMPLAINT QUOTE
Patient arrived via EMS, awake alert, non-verbal, not answering questions. breathing unlabored.  Patient was sent from [facility] due to HTN, and psych clearance.  Patient was d/c from [facility] ED yesterday.  patient was given metoprolol at 10:15 by [place].  Non compliant with medication.  History of HTN Patient arrived via EMS, awake alert, non-verbal, not answering questions. breathing unlabored.  Patient was sent from [facility] due to HTN, and psych clearance.  Patient was d/c from [facility] ED yesterday.  patient was given metoprolol at 10:15 by [place].  Non compliant with medication.  History of HTN.  Patient undressed, placed in yellow gown and belongings placed in bag.  cooperative at this time

## 2018-04-30 NOTE — H&P ADULT - HISTORY OF PRESENT ILLNESS
34 yo M with h/o HTN, DM, bipolar d/o represented to [facility] ED for worsening blood pressure. Pt had signed out AMA and walked to [facility]. On prior admission he was seen by cardiology and nephrology with optimization of his blood pressure. In the ED, he was found to have hypertensive crisis that improved after hydralazine.

## 2018-04-30 NOTE — ED BEHAVIORAL HEALTH ASSESSMENT NOTE - OTHER PAST PSYCHIATRIC HISTORY (INCLUDE DETAILS REGARDING ONSET, COURSE OF ILLNESS, INPATIENT/OUTPATIENT TREATMENT)
various diagnoses in chart including Bipolar depression schizophrenia psychosis  Was a client of [email or web address] in [place] [phone] until 2015  Now seen by ACT Team from [facility] [phone] Message left for therapist Leobardo Burden @ [phone]

## 2018-04-30 NOTE — ED BEHAVIORAL HEALTH ASSESSMENT NOTE - CURRENT MEDICATION
Amlodipine 10mg once a day, Clonazepam 0.5mg BID with one PRN, Cogentin 0.5mg once a day, Glipizide 10mg once a day, Hydralazine 100mg TID, Lamotrigine 50mg BID, metoprolol tartrate 100mg BID, Haloperidol 9mg once a day , Trazadone 100mg  once a day

## 2018-04-30 NOTE — ED ADULT NURSE NOTE - CHIEF COMPLAINT QUOTE
Patient arrived via EMS, awake alert, non-verbal, not answering questions. breathing unlabored.  Patient was sent from [facility] due to HTN, and psych clearance.  Patient was d/c from [facility] ED yesterday.  patient was given metoprolol at 10:15 by [place].  Non compliant with medication.  History of HTN.  Patient undressed, placed in yellow gown and belongings placed in bag.  cooperative at this time

## 2018-04-30 NOTE — ED BEHAVIORAL HEALTH ASSESSMENT NOTE - DESCRIPTION
Returns today for hypertensive crisis.  Vital Signs Last 24 Hrs  T(C): 37.1 (30 Apr 2018 12:40), Max: 37.1 (30 Apr 2018 12:40)  T(F): 98.7 (30 Apr 2018 12:40), Max: 98.7 (30 Apr 2018 12:40)  HR: 70 (30 Apr 2018 12:40) (70 - 70)  RR: 18 (30 Apr 2018 12:40) (18 - 18)  SpO2: 98% (30 Apr 2018 12:40) (98% - 98%)  BP= 210/114 obese, HTN, Diabetes Mellitus, Sleep apnea past use of BIPAP, Hyperlipidemia, Lives at home with grandmother, father and brother, on disability

## 2018-04-30 NOTE — CHART NOTE - NSCHARTNOTEFT_GEN_A_CORE
Called by patient Nurse regarding his wish to leave the Hospital. Went to patients bedside and explained that he is not medically stable due to uncontrolled BP. Patients BP check and was 190/110. Patient still insisting he can leave and f/u with his medical doctor. He was questioned and on my exam has Capacity to make his own medical decisions. Patient has insight to his condition but has poor judgement. He was advised to return if he has any signs of end organ damage ie HA, CP, SOB, abdominal pain. His Nurse witnessed conversation and patient signed AMA form and his family was called to pick him up. All questions/concerns addressed. No barriers to communication.

## 2018-04-30 NOTE — ED PROVIDER NOTE - MEDICAL DECISION MAKING DETAILS
Patient with AMS hx of schizoaffective disorder.  HTN recently discharged from [facility] yesterday.  Will re-check BP and give medications to control BP if necessary, check labs, and consult psych.

## 2018-04-30 NOTE — ED BEHAVIORAL HEALTH ASSESSMENT NOTE - HPI (INCLUDE ILLNESS QUALITY, SEVERITY, DURATION, TIMING, CONTEXT, MODIFYING FACTORS, ASSOCIATED SIGNS AND SYMPTOMS)
Patient left AMA yesterday. Returns today in hypertensive crisis. Reports that he is complaint with his medication and denies any other drug use. Wants to be admitted for psychiatric treatment. He feels he is mentally unstable but cannot elaborate why.

## 2018-04-30 NOTE — ED PROVIDER NOTE - OBJECTIVE STATEMENT
This patient is a 33 year old man with hx of HTN and DM recently discharged from [facility] yesterday for HTN, sent from [facility] for medical and psychiatric evaluation.  As per documentation for [facility].  Patient has hx of schizoaffective disorder and walked into the facility from home without an appointment.  He is not currently followed at [facility].  He was with his father on arrival to the facility, was minimally verbal which father reported to staff as being different from baseline.  He was found to be hypertensive 184/109.  He was given metoprolol 50 mg of 10:50 prior to arrival. This patient is a 33 year old man with hx of HTN and DM recently discharged from [facility] yesterday for HTN, sent from [facility] for medical and psychiatric evaluation.  As per documentation for [facility].  Patient has hx of schizoaffective disorder and walked into the facility from home without an appointment.  He is not currently followed at [facility].  He was with his father on arrival to the facility, was minimally verbal which father reported to staff as being different from baseline.  He was found to be hypertensive 184/109.  He was given metoprolol 50 mg of 10:50 prior to arrival.  CT head done 4 days ago showed no acute pathology.

## 2018-05-01 LAB — RENIN PLAS-CCNC: 0.41 NG/ML/HR — SIGNIFICANT CHANGE UP (ref 0.17–5.38)

## 2018-05-10 NOTE — BEHAVIORAL HEALTH ASSESSMENT NOTE - NS ED BHA MED ROS MUSCULOSKELETAL
Modesto Farrar), Vascular Surgery  [address]  [address]  [place] [address]  Phone: [phone]  Fax: [phone]    Omid Adams), Family Medicine  [address] [place] [address]  [address]  [place] [address]  Phone: [phone]  Fax: [phone] Unable to assess

## 2018-06-07 ENCOUNTER — EMERGENCY (EMERGENCY)
Facility: HOSPITAL | Age: 33
LOS: 1 days | Discharge: HOME | End: 2018-06-07
Attending: EMERGENCY MEDICINE | Admitting: EMERGENCY MEDICINE
Payer: MEDICARE

## 2018-06-07 VITALS
DIASTOLIC BLOOD PRESSURE: 130 MMHG | SYSTOLIC BLOOD PRESSURE: 180 MMHG | RESPIRATION RATE: 16 BRPM | OXYGEN SATURATION: 100 % | TEMPERATURE: 98 F | HEART RATE: 80 BPM

## 2018-06-07 DIAGNOSIS — F32.89 OTHER SPECIFIED DEPRESSIVE EPISODES: ICD-10-CM

## 2018-06-07 LAB
ALBUMIN SERPL ELPH-MCNC: 3.5 G/DL — SIGNIFICANT CHANGE UP (ref 3.3–5)
ALP SERPL-CCNC: 105 U/L — SIGNIFICANT CHANGE UP (ref 40–120)
ALT FLD-CCNC: 16 U/L — SIGNIFICANT CHANGE UP (ref 4–41)
APAP SERPL-MCNC: < 15 UG/ML — LOW (ref 15–25)
AST SERPL-CCNC: 20 U/L — SIGNIFICANT CHANGE UP (ref 4–40)
BASOPHILS # BLD AUTO: 0.06 K/UL — SIGNIFICANT CHANGE UP (ref 0–0.2)
BASOPHILS NFR BLD AUTO: 0.3 % — SIGNIFICANT CHANGE UP (ref 0–2)
BILIRUB SERPL-MCNC: 0.4 MG/DL — SIGNIFICANT CHANGE UP (ref 0.2–1.2)
BUN SERPL-MCNC: 20 MG/DL — SIGNIFICANT CHANGE UP (ref 7–23)
BUN SERPL-MCNC: 24 MG/DL — HIGH (ref 7–23)
CALCIUM SERPL-MCNC: 8.8 MG/DL — SIGNIFICANT CHANGE UP (ref 8.4–10.5)
CALCIUM SERPL-MCNC: 9.1 MG/DL — SIGNIFICANT CHANGE UP (ref 8.4–10.5)
CHLORIDE SERPL-SCNC: 96 MMOL/L — LOW (ref 98–107)
CHLORIDE SERPL-SCNC: 99 MMOL/L — SIGNIFICANT CHANGE UP (ref 98–107)
CO2 SERPL-SCNC: 27 MMOL/L — SIGNIFICANT CHANGE UP (ref 22–31)
CO2 SERPL-SCNC: 29 MMOL/L — SIGNIFICANT CHANGE UP (ref 22–31)
CREAT SERPL-MCNC: 2.1 MG/DL — HIGH (ref 0.5–1.3)
CREAT SERPL-MCNC: 2.39 MG/DL — HIGH (ref 0.5–1.3)
EOSINOPHIL # BLD AUTO: 0.05 K/UL — SIGNIFICANT CHANGE UP (ref 0–0.5)
EOSINOPHIL NFR BLD AUTO: 0.3 % — SIGNIFICANT CHANGE UP (ref 0–6)
ETHANOL BLD-MCNC: < 10 MG/DL — SIGNIFICANT CHANGE UP
GLUCOSE SERPL-MCNC: 106 MG/DL — HIGH (ref 70–99)
GLUCOSE SERPL-MCNC: 87 MG/DL — SIGNIFICANT CHANGE UP (ref 70–99)
HCT VFR BLD CALC: 36.6 % — LOW (ref 39–50)
HGB BLD-MCNC: 12.1 G/DL — LOW (ref 13–17)
IMM GRANULOCYTES # BLD AUTO: 0.09 # — SIGNIFICANT CHANGE UP
IMM GRANULOCYTES NFR BLD AUTO: 0.5 % — SIGNIFICANT CHANGE UP (ref 0–1.5)
LYMPHOCYTES # BLD AUTO: 15.7 % — SIGNIFICANT CHANGE UP (ref 13–44)
LYMPHOCYTES # BLD AUTO: 2.9 K/UL — SIGNIFICANT CHANGE UP (ref 1–3.3)
MCHC RBC-ENTMCNC: 24.8 PG — LOW (ref 27–34)
MCHC RBC-ENTMCNC: 33.1 % — SIGNIFICANT CHANGE UP (ref 32–36)
MCV RBC AUTO: 75 FL — LOW (ref 80–100)
MONOCYTES # BLD AUTO: 1.11 K/UL — HIGH (ref 0–0.9)
MONOCYTES NFR BLD AUTO: 6 % — SIGNIFICANT CHANGE UP (ref 2–14)
NEUTROPHILS # BLD AUTO: 14.21 K/UL — HIGH (ref 1.8–7.4)
NEUTROPHILS NFR BLD AUTO: 77.2 % — HIGH (ref 43–77)
NRBC # FLD: 0 — SIGNIFICANT CHANGE UP
PLATELET # BLD AUTO: 367 K/UL — SIGNIFICANT CHANGE UP (ref 150–400)
PMV BLD: 8.7 FL — SIGNIFICANT CHANGE UP (ref 7–13)
POTASSIUM SERPL-MCNC: 3 MMOL/L — LOW (ref 3.5–5.3)
POTASSIUM SERPL-MCNC: 3.4 MMOL/L — LOW (ref 3.5–5.3)
POTASSIUM SERPL-SCNC: 3 MMOL/L — LOW (ref 3.5–5.3)
POTASSIUM SERPL-SCNC: 3.4 MMOL/L — LOW (ref 3.5–5.3)
PROT SERPL-MCNC: 7.5 G/DL — SIGNIFICANT CHANGE UP (ref 6–8.3)
RBC # BLD: 4.88 M/UL — SIGNIFICANT CHANGE UP (ref 4.2–5.8)
RBC # FLD: 16.9 % — HIGH (ref 10.3–14.5)
SALICYLATES SERPL-MCNC: < 5 MG/DL — LOW (ref 15–30)
SODIUM SERPL-SCNC: 139 MMOL/L — SIGNIFICANT CHANGE UP (ref 135–145)
SODIUM SERPL-SCNC: 139 MMOL/L — SIGNIFICANT CHANGE UP (ref 135–145)
TROPONIN T SERPL-MCNC: < 0.06 NG/ML — SIGNIFICANT CHANGE UP (ref 0–0.06)
WBC # BLD: 18.42 K/UL — HIGH (ref 3.8–10.5)
WBC # FLD AUTO: 18.42 K/UL — HIGH (ref 3.8–10.5)

## 2018-06-07 PROCEDURE — 71046 X-RAY EXAM CHEST 2 VIEWS: CPT | Mod: 26

## 2018-06-07 PROCEDURE — 90792 PSYCH DIAG EVAL W/MED SRVCS: CPT

## 2018-06-07 PROCEDURE — 99285 EMERGENCY DEPT VISIT HI MDM: CPT | Mod: 25,GC

## 2018-06-07 PROCEDURE — 93010 ELECTROCARDIOGRAM REPORT: CPT

## 2018-06-07 RX ORDER — POTASSIUM CHLORIDE 20 MEQ
40 PACKET (EA) ORAL ONCE
Qty: 0 | Refills: 0 | Status: COMPLETED | OUTPATIENT
Start: 2018-06-07 | End: 2018-06-07

## 2018-06-07 RX ORDER — HYDRALAZINE HCL 50 MG
50 TABLET ORAL ONCE
Qty: 0 | Refills: 0 | Status: COMPLETED | OUTPATIENT
Start: 2018-06-07 | End: 2018-06-07

## 2018-06-07 RX ORDER — HALOPERIDOL DECANOATE 100 MG/ML
5 INJECTION INTRAMUSCULAR ONCE
Qty: 0 | Refills: 0 | Status: COMPLETED | OUTPATIENT
Start: 2018-06-07 | End: 2018-06-07

## 2018-06-07 RX ORDER — METOPROLOL TARTRATE 50 MG
100 TABLET ORAL ONCE
Qty: 0 | Refills: 0 | Status: COMPLETED | OUTPATIENT
Start: 2018-06-07 | End: 2018-06-07

## 2018-06-07 RX ADMIN — Medication 40 MILLIEQUIVALENT(S): at 19:51

## 2018-06-07 RX ADMIN — Medication 50 MILLIGRAM(S): at 19:51

## 2018-06-07 RX ADMIN — HALOPERIDOL DECANOATE 5 MILLIGRAM(S): 100 INJECTION INTRAMUSCULAR at 17:22

## 2018-06-07 RX ADMIN — Medication 100 MILLIGRAM(S): at 19:51

## 2018-06-07 RX ADMIN — Medication 2 MILLIGRAM(S): at 17:22

## 2018-06-07 RX ADMIN — Medication 50 MILLIGRAM(S): at 16:01

## 2018-06-07 NOTE — ED PROVIDER NOTE - OBJECTIVE STATEMENT
Gina Mittal, DO: 33 Gina Mittal, DO: 33M with hx of HTN sent from [facility] for chest tightness. Patient presented at [facility] for a psych evaluation. Hx limited because he is being evasive, doesn't want to answer questions and just wants to see a Psychiatrist. Patient says he has CP since he got to [facility] and questioning makes him upset. Despite triage note, he is AAO x 3. Hx of Marijuana use but denies recreational drug use. Lives at home with parents and says he doesn't' want to talk to either ED doctor about his "situation", just wants to see a Psychiatrist.

## 2018-06-07 NOTE — ED BEHAVIORAL HEALTH ASSESSMENT NOTE - SUMMARY
HTN and psychiatry was consulted. Upon approach, Pt is extremely guarded and does not want to answer most of the questions. He keeps repeating "I need help," but when the writer asks some questions he says "I don't like being asked questions." When the writer told him "I would like to get to know you a little so I can help you," he mumbled and said "do what I say."    Pt was then approached by SW, followed by NP, but did not answer most of my questions and kept saying "I need help." Pt became increasingly irritable and interview was terminated. Pt was given Haldol 5mg and Ativan 2mg PO x1.     Pt was reapproached after Haldol and Ativan were given. He was less guarded and was able to engage in the basic assessment. Pt stated he wants help with "depression and anger." He specifically and adamantly denies suicidal and homicidal ideations, intent or plan at this time. He also denies auditory and visual hallucinations. He is being followed by "[facility] [place]" and saw them yesterday. He normally sees ACT Team member several times a week. He takes "injections" but was unable to specify the name. He lives with his grandmother, brother and sister, but wants help with getting his own place. I called [facility] Team at [phone] but was unable to reach anyone.

## 2018-06-07 NOTE — ED ADULT NURSE NOTE - OBJECTIVE STATEMENT
Break coverage RN received pt to spot 19 A&Ox4 however uncooperative with staff repeatedly stating "I'm not answering any more questions I just want to see psych". Denies any medical complaints, denies SI/HI. Reports given to  HERBIE Rea

## 2018-06-07 NOTE — ED PROVIDER NOTE - MEDICAL DECISION MAKING DETAILS
Gina Mittal, DO: 33M who is evasive on questioning as to reasons he requires psychiatric eval. Denies SI/HI/AH/VH but given evasiveness, poor eye contact and withdrawn behavior & refusal to answer questions with regards to his "situation", concern for harm requiring psych evaluation. Will medically clear with regard to chest pain. BP better without intervention, will not treat at this time. Will medically clear, dispo pending psych eval.

## 2018-06-07 NOTE — ED PROVIDER NOTE - ATTENDING CONTRIBUTION TO CARE
Dr. Krishnamurthy:  I have personally performed a face to face bedside history and physical examination of this patient. I have discussed the history, examination, review of systems, assessment and plan of management with the resident. I have reviewed the electronic medical record and amended it to reflect my history, review of systems, physical exam, assessment and plan.    33M h/o HTN sent from [facility] for medical evaluation.  Pt presented requesting to speak to [employer] but refusing to answer why.  Noted to be hypertensive at [facility], non-compliant with meds, denies fever/chills, cp, sob, n/v/d.  Will not answer if SI/HI or having hallucinations.    Exam:  - nad  - perrl  - rrr  - ctab   -abd soft ntnd    A/P  - asymptomatic htn  - ekg with signs of LVH but no acute ischemia  - basic labs, trop, tox screen  - psych eval

## 2018-06-07 NOTE — ED PROVIDER NOTE - PROGRESS NOTE DETAILS
Gina Mittal DO: Old labs reviewed through HIE tab, Cr improved from prior. Received call from  psychiatrist. Pt is refusing to speak to her as well, suspects possible psychosis. She recommends giving pt haldol and ativan and she will reattempt to interview pt a little later.   -Dr. Giraldo Ever PGY1: Pt signed out to me, psych cleared, pending repeat BMP and home BP medications, social work for d/c home if labs normal

## 2018-06-07 NOTE — ED PROVIDER NOTE - PHYSICAL EXAMINATION
Gina Mittal, DO:   Gen: Well appearing, NAD, obese  Head: NCAT  HEENT: PERRL 3 mm b/l, MMM, normal conjunctiva, anicteric, neck supple  Lung: CTAB, no adventitious sounds  CV: RRR, no murmurs, rubs or gallops  Abd: soft, NTND, no rebound or guarding, no CVAT  MSK: No edema, no visible deformities  Neuro: AAO x 3   Skin: Warm and dry, no evidence of rash  Psych: withdrawn & poor eye contact

## 2018-06-07 NOTE — ED BEHAVIORAL HEALTH NOTE - BEHAVIORAL HEALTH NOTE
Worker provided metro card [phone] upon discharge. Worker met with patient to discuss potential discharge and provided referrals to walk in crisis center at [facility], [facility], [facility], and Clinics in [facility]. Worker encouraged patient to follow up with one of these options for continued treatment and support in the community.

## 2018-06-07 NOTE — ED BEHAVIORAL HEALTH ASSESSMENT NOTE - HPI (INCLUDE ILLNESS QUALITY, SEVERITY, DURATION, TIMING, CONTEXT, MODIFYING FACTORS, ASSOCIATED SIGNS AND SYMPTOMS)
Pt is a 33-year-old male with unknown past psychiatric and medical history who had initially presented to [facility] earlier for psychiatric evaluation but was then sent to our ER for management of HTN and psychiatry was consulted. Upon approach, Pt is extremely guarded and does not want to answer most of the questions. He keeps repeating "I need help," but when the writer asks some questions he says "I don't like being asked questions." When the writer told him "I would like to get to know you a little so I can help you," he mumbled and said "do what I say." Pt is a 33-year-old male with unknown past psychiatric and medical history who had initially presented to [facility] earlier for psychiatric evaluation but was then sent to our ER for management of HTN and psychiatry was consulted. Upon approach, Pt is extremely guarded and does not want to answer most of the questions. He keeps repeating "I need help," but when the writer asks some questions he says "I don't like being asked questions." When the writer told him "I would like to get to know you a little so I can help you," he mumbled and said "do what I say."    Pt was then approached by SW, followed by NP, but did not answer most of my questions and kept saying "I need help." Pt became increasingly irritable and interview was terminated. Pt was given Haldol 5mg and Ativan 2mg PO x1.     Pt was reapproached after Haldol and Ativan were given. He was less guarded and was able to engage in the basic assessment. Pt stated he wants help with "depression and anger." He specifically and adamantly denies suicidal and homicidal ideations, intent or plan at this time. He also denies auditory and visual hallucinations. He is being followed by "[facility] [place]" and saw them yesterday. He normally sees ACT Team member several times a week. He takes "injections" but was unable to specify the name. He lives with his grandmother, brother and sister, but wants help with getting his own place. I called [facility] Team at [phone] but was unable to reach anyone.

## 2018-06-07 NOTE — ED BEHAVIORAL HEALTH ASSESSMENT NOTE - RISK ASSESSMENT
Pt denies suicidal or homicidal ideations, intent or plan. He is not a danger to self or others and does not require acute inpatient psychiatric admission.

## 2018-06-07 NOTE — ED ADULT NURSE NOTE - CHIEF COMPLAINT QUOTE
pt miriam from [facility], pt walked in to facility for psych eval, pt is A&Ox1, looks away when asked questions or does not respond. pt known to the clinic, as per ems and clinic this is pts baseline and is part of his psychiatric condition, pt found to be hypertensive and was c/o chest pain. pmhx: htn, fs in field 104, pt hypertensive in triage 180/130

## 2018-06-07 NOTE — ED BEHAVIORAL HEALTH NOTE - BEHAVIORAL HEALTH NOTE
Worker spoke to patient on the unit. Patient states repeatedly that he needs help. Worker encouraged patient to speak about what brought him into the emergency room. Patient presented guarded and did not respond to questions asked. Worker asked about contacting collateral for him and patient refused to answer. Patient kept replying that he needs help and that he lives with his grandmother. Worker also asked about shelter and patient stated he needed help with that. Worker told patient she can help him and provide shelter referral to him. Patient was unable to answer questions about SI/HI/AH/VH. Patient stated he came from another hospital (unable to provide name). Patient told worker that he was getting upset by the questioning and he needed her to leave the room.

## 2018-06-07 NOTE — ED BEHAVIORAL HEALTH ASSESSMENT NOTE - DESCRIPTION
Pt is extremely guarded and does not provide any meaningful information. Haldol 5mg and Ativan 2mg PO given.    Vital Signs Last 24 Hrs  T(C): 36.7 (07 Jun 2018 13:59), Max: 36.7 (07 Jun 2018 13:59)  T(F): 98.1 (07 Jun 2018 13:59), Max: 98.1 (07 Jun 2018 13:59)  HR: 80 (07 Jun 2018 13:59) (80 - 80)  BP: 180/130 (07 Jun 2018 13:59) (180/130 - 180/130)  BP(mean): --  RR: 16 (07 Jun 2018 13:59) (16 - 16)  SpO2: 100% (07 Jun 2018 13:59) (100% - 100%) lives with grandmother. HTN. single, domiciled, and unemployed.

## 2018-06-07 NOTE — ED ADULT TRIAGE NOTE - CHIEF COMPLAINT QUOTE
pt miriam from [facility], pt walked in to facility for psych eval, pt is A&Ox1, looks away when asked questions or does not respond. pt known to the clinic, as per ems and clinic this is pts baseline and is part of his psychiatric condition, pt found to be hypertensive and was c/o chest pain. pmhx: htn, fs in field 104, pt hypertensive in triage 180/130

## 2018-06-08 VITALS — DIASTOLIC BLOOD PRESSURE: 98 MMHG | SYSTOLIC BLOOD PRESSURE: 145 MMHG | HEART RATE: 77 BPM | RESPIRATION RATE: 16 BRPM

## 2018-07-01 ENCOUNTER — OUTPATIENT (OUTPATIENT)
Dept: OUTPATIENT SERVICES | Facility: HOSPITAL | Age: 33
LOS: 1 days | End: 2018-07-01

## 2018-07-25 PROBLEM — Z78.9 ALCOHOL USE: Status: INACTIVE | Noted: 2017-11-03

## 2018-07-27 ENCOUNTER — INPATIENT (INPATIENT)
Facility: HOSPITAL | Age: 33
LOS: 1 days | Discharge: LEFT BEFORE DISCHARGE | DRG: 78 | End: 2018-07-29
Attending: HOSPITALIST | Admitting: INTERNAL MEDICINE
Payer: MEDICARE

## 2018-07-27 VITALS
HEART RATE: 90 BPM | TEMPERATURE: 98 F | RESPIRATION RATE: 18 BRPM | SYSTOLIC BLOOD PRESSURE: 204 MMHG | DIASTOLIC BLOOD PRESSURE: 149 MMHG | OXYGEN SATURATION: 98 %

## 2018-07-27 DIAGNOSIS — I67.4 HYPERTENSIVE ENCEPHALOPATHY: ICD-10-CM

## 2018-07-27 DIAGNOSIS — R47.01 APHASIA: ICD-10-CM

## 2018-07-27 DIAGNOSIS — I16.1 HYPERTENSIVE EMERGENCY: ICD-10-CM

## 2018-07-27 DIAGNOSIS — G45.9 TRANSIENT CEREBRAL ISCHEMIC ATTACK, UNSPECIFIED: ICD-10-CM

## 2018-07-27 PROBLEM — I10 ESSENTIAL (PRIMARY) HYPERTENSION: Chronic | Status: ACTIVE | Noted: 2018-06-07

## 2018-07-27 LAB
ALBUMIN SERPL ELPH-MCNC: 3.8 G/DL — SIGNIFICANT CHANGE UP (ref 3.3–5.2)
ALP SERPL-CCNC: 107 U/L — SIGNIFICANT CHANGE UP (ref 40–120)
ALT FLD-CCNC: 18 U/L — SIGNIFICANT CHANGE UP
ANION GAP SERPL CALC-SCNC: 13 MMOL/L — SIGNIFICANT CHANGE UP (ref 5–17)
ANISOCYTOSIS BLD QL: SLIGHT — SIGNIFICANT CHANGE UP
APTT BLD: 28.9 SEC — SIGNIFICANT CHANGE UP (ref 27.5–37.4)
AST SERPL-CCNC: 20 U/L — SIGNIFICANT CHANGE UP
BASOPHILS # BLD AUTO: 0 K/UL — SIGNIFICANT CHANGE UP (ref 0–0.2)
BASOPHILS NFR BLD AUTO: 0.2 % — SIGNIFICANT CHANGE UP (ref 0–2)
BILIRUB SERPL-MCNC: 0.5 MG/DL — SIGNIFICANT CHANGE UP (ref 0.4–2)
BUN SERPL-MCNC: 21 MG/DL — HIGH (ref 8–20)
CALCIUM SERPL-MCNC: 9.6 MG/DL — SIGNIFICANT CHANGE UP (ref 8.6–10.2)
CHLORIDE SERPL-SCNC: 99 MMOL/L — SIGNIFICANT CHANGE UP (ref 98–107)
CO2 SERPL-SCNC: 26 MMOL/L — SIGNIFICANT CHANGE UP (ref 22–29)
CREAT SERPL-MCNC: 2.41 MG/DL — HIGH (ref 0.5–1.3)
EOSINOPHIL # BLD AUTO: 0.3 K/UL — SIGNIFICANT CHANGE UP (ref 0–0.5)
EOSINOPHIL NFR BLD AUTO: 2.3 % — SIGNIFICANT CHANGE UP (ref 0–5)
GLUCOSE SERPL-MCNC: 66 MG/DL — LOW (ref 70–115)
HCT VFR BLD CALC: 40.8 % — LOW (ref 42–52)
HGB BLD-MCNC: 13.7 G/DL — LOW (ref 14–18)
INR BLD: 1.11 RATIO — SIGNIFICANT CHANGE UP (ref 0.88–1.16)
LYMPHOCYTES # BLD AUTO: 3.9 K/UL — SIGNIFICANT CHANGE UP (ref 1–4.8)
LYMPHOCYTES # BLD AUTO: 30.9 % — SIGNIFICANT CHANGE UP (ref 20–55)
MAGNESIUM SERPL-MCNC: 2.2 MG/DL — SIGNIFICANT CHANGE UP (ref 1.6–2.6)
MCHC RBC-ENTMCNC: 25.1 PG — LOW (ref 27–31)
MCHC RBC-ENTMCNC: 33.6 G/DL — SIGNIFICANT CHANGE UP (ref 32–36)
MCV RBC AUTO: 74.7 FL — LOW (ref 80–94)
MICROCYTES BLD QL: SLIGHT — SIGNIFICANT CHANGE UP
MONOCYTES # BLD AUTO: 1.1 K/UL — HIGH (ref 0–0.8)
MONOCYTES NFR BLD AUTO: 8.8 % — SIGNIFICANT CHANGE UP (ref 3–10)
NEUTROPHILS # BLD AUTO: 7.4 K/UL — SIGNIFICANT CHANGE UP (ref 1.8–8)
NEUTROPHILS NFR BLD AUTO: 57.6 % — SIGNIFICANT CHANGE UP (ref 37–73)
PLAT MORPH BLD: NORMAL — SIGNIFICANT CHANGE UP
PLATELET # BLD AUTO: 357 K/UL — SIGNIFICANT CHANGE UP (ref 150–400)
POTASSIUM SERPL-MCNC: 3.2 MMOL/L — LOW (ref 3.5–5.3)
POTASSIUM SERPL-SCNC: 3.2 MMOL/L — LOW (ref 3.5–5.3)
PROT SERPL-MCNC: 8 G/DL — SIGNIFICANT CHANGE UP (ref 6.6–8.7)
PROTHROM AB SERPL-ACNC: 12.2 SEC — SIGNIFICANT CHANGE UP (ref 9.8–12.7)
RBC # BLD: 5.46 M/UL — SIGNIFICANT CHANGE UP (ref 4.6–6.2)
RBC # FLD: 17.7 % — HIGH (ref 11–15.6)
RBC BLD AUTO: PRESENT — SIGNIFICANT CHANGE UP
SODIUM SERPL-SCNC: 138 MMOL/L — SIGNIFICANT CHANGE UP (ref 135–145)
TROPONIN T SERPL-MCNC: 0.02 NG/ML — SIGNIFICANT CHANGE UP (ref 0–0.06)
WBC # BLD: 12.8 K/UL — HIGH (ref 4.8–10.8)
WBC # FLD AUTO: 12.8 K/UL — HIGH (ref 4.8–10.8)

## 2018-07-27 PROCEDURE — 99291 CRITICAL CARE FIRST HOUR: CPT

## 2018-07-27 PROCEDURE — 70450 CT HEAD/BRAIN W/O DYE: CPT | Mod: 26

## 2018-07-27 PROCEDURE — 93010 ELECTROCARDIOGRAM REPORT: CPT

## 2018-07-27 RX ORDER — LABETALOL HCL 100 MG
10 TABLET ORAL ONCE
Qty: 0 | Refills: 0 | Status: COMPLETED | OUTPATIENT
Start: 2018-07-27 | End: 2018-07-27

## 2018-07-27 RX ORDER — PALIPERIDONE 1.5 MG/1
1 TABLET, EXTENDED RELEASE ORAL
Qty: 0 | Refills: 0 | COMMUNITY

## 2018-07-27 RX ORDER — TRAZODONE HCL 50 MG
1 TABLET ORAL
Qty: 0 | Refills: 0 | COMMUNITY

## 2018-07-27 RX ORDER — PANTOPRAZOLE SODIUM 20 MG/1
40 TABLET, DELAYED RELEASE ORAL
Qty: 0 | Refills: 0 | Status: DISCONTINUED | OUTPATIENT
Start: 2018-07-27 | End: 2018-07-29

## 2018-07-27 RX ORDER — ATORVASTATIN CALCIUM 80 MG/1
80 TABLET, FILM COATED ORAL AT BEDTIME
Qty: 0 | Refills: 0 | Status: DISCONTINUED | OUTPATIENT
Start: 2018-07-27 | End: 2018-07-27

## 2018-07-27 RX ORDER — ASPIRIN/CALCIUM CARB/MAGNESIUM 324 MG
81 TABLET ORAL DAILY
Qty: 0 | Refills: 0 | Status: DISCONTINUED | OUTPATIENT
Start: 2018-07-27 | End: 2018-07-27

## 2018-07-27 RX ORDER — ATORVASTATIN CALCIUM 80 MG/1
40 TABLET, FILM COATED ORAL AT BEDTIME
Qty: 0 | Refills: 0 | Status: DISCONTINUED | OUTPATIENT
Start: 2018-07-27 | End: 2018-07-29

## 2018-07-27 RX ORDER — CLONAZEPAM 1 MG
1 TABLET ORAL
Qty: 0 | Refills: 0 | COMMUNITY

## 2018-07-27 RX ORDER — HYDRALAZINE HCL 50 MG
1 TABLET ORAL
Qty: 0 | Refills: 0 | COMMUNITY

## 2018-07-27 RX ORDER — HYDRALAZINE HCL 50 MG
100 TABLET ORAL ONCE
Qty: 0 | Refills: 0 | Status: COMPLETED | OUTPATIENT
Start: 2018-07-27 | End: 2018-07-27

## 2018-07-27 RX ORDER — LABETALOL HCL 100 MG
1 TABLET ORAL
Qty: 0 | Refills: 0 | COMMUNITY

## 2018-07-27 RX ORDER — BENZTROPINE MESYLATE 1 MG
1 TABLET ORAL
Qty: 0 | Refills: 0 | COMMUNITY

## 2018-07-27 RX ORDER — ASPIRIN/CALCIUM CARB/MAGNESIUM 324 MG
325 TABLET ORAL DAILY
Qty: 0 | Refills: 0 | Status: DISCONTINUED | OUTPATIENT
Start: 2018-07-27 | End: 2018-07-29

## 2018-07-27 RX ORDER — POTASSIUM CHLORIDE 20 MEQ
20 PACKET (EA) ORAL ONCE
Qty: 0 | Refills: 0 | Status: COMPLETED | OUTPATIENT
Start: 2018-07-27 | End: 2018-07-27

## 2018-07-27 RX ORDER — LABETALOL HCL 100 MG
20 TABLET ORAL ONCE
Qty: 0 | Refills: 0 | Status: COMPLETED | OUTPATIENT
Start: 2018-07-27 | End: 2018-07-27

## 2018-07-27 RX ORDER — METOPROLOL TARTRATE 50 MG
100 TABLET ORAL ONCE
Qty: 0 | Refills: 0 | Status: COMPLETED | OUTPATIENT
Start: 2018-07-27 | End: 2018-07-27

## 2018-07-27 RX ORDER — HYDRALAZINE HCL 50 MG
10 TABLET ORAL ONCE
Qty: 0 | Refills: 0 | Status: COMPLETED | OUTPATIENT
Start: 2018-07-27 | End: 2018-07-27

## 2018-07-27 RX ADMIN — Medication 100 MILLIGRAM(S): at 21:14

## 2018-07-27 RX ADMIN — Medication 10 MILLIGRAM(S): at 16:30

## 2018-07-27 RX ADMIN — Medication 10 MILLIGRAM(S): at 16:07

## 2018-07-27 RX ADMIN — ATORVASTATIN CALCIUM 80 MILLIGRAM(S): 80 TABLET, FILM COATED ORAL at 19:39

## 2018-07-27 RX ADMIN — Medication 10 MILLIGRAM(S): at 18:32

## 2018-07-27 RX ADMIN — Medication 20 MILLIEQUIVALENT(S): at 19:39

## 2018-07-27 RX ADMIN — Medication 20 MILLIGRAM(S): at 20:56

## 2018-07-27 RX ADMIN — Medication 81 MILLIGRAM(S): at 19:41

## 2018-07-27 NOTE — ED PROVIDER NOTE - CARE PLAN
Principal Discharge DX:	Aphasia  Secondary Diagnosis:	Hypertension Principal Discharge DX:	TIA (transient ischemic attack)  Secondary Diagnosis:	Hypertension  Secondary Diagnosis:	Aphasia Principal Discharge DX:	Hypertensive encephalopathy  Secondary Diagnosis:	Aphasia  Secondary Diagnosis:	TIA (transient ischemic attack)

## 2018-07-27 NOTE — H&P ADULT - HISTORY OF PRESENT ILLNESS
34 y/o male with h/o HTN, CRI, Bipolar disorder, DM II, was brought in to the ER by ambulance for a new onset aphasia and AMS. patient was found to have sever HTN. CT brain is negative for hemorrhagic stroke. CT angiogram of the neck showed no significant stenosis. patient was seen in the ER, unable to explain himself or makes clear statement. no family members or others around the patient. no further history could be otained 32 y/o male with h/o HTN, CRI, Bipolar disorder, DM II, was brought in to the ER by ambulance for a new onset aphasia and AMS. patient was found to have sever HTN. CT brain is negative for hemorrhagic stroke. CT angiogram of the neck showed no significant stenosis. patient was seen in the ER, unable to explain himself or makes clear statement. no family members or others around the patient. no further history could be obtained

## 2018-07-27 NOTE — CONSULT NOTE ADULT - SUBJECTIVE AND OBJECTIVE BOX
Patient is a 33y old  Male who presents with a chief complaint of     BRIEF HOSPITAL COURSE: 32 yo male, PMHx bipolar, DM, HTN, CKD, BIBA from home unable to speak. Last known normal unknown. CODE STROKE was initiated, initial CT head was negative for acute hemorrhage or infarct. Patient found to have /149, given Labetalol 10mg IVP x 2 doses with improvement in aphasia. BP later worsened, and was given Hydralazine 10mg IVP again with improvement in aphasia as BP improved.    ICU consult called for hypertensive encephalopathy. On evaluation, patient was lethargic, arousable to verbal stimuli and attempting to verbally communicate. Patient unable to verbalize when the last time was that he took his antihypertensive medications. Denies headache, appears to indicate through gestures that he has blurred vision. Given Labetalol 20mg IVP x 1. Patient states he takes Hydralazine 100mg PO and Lopressor 100mg PO for BP, which were given as well. Patient re-assessed on 3 occasions BP improved gradually to 162/99, and mental status improved. Patient was sitting up in bed awake and alert, able to communicate verbally though with some delay in speech initiation, watching TV.         PAST MEDICAL & SURGICAL HISTORY:  HTN (hypertension)  Sleep apnea  Diabetes  Bipolar 1 disorder  Hypertension  No significant past surgical history      SOCIAL HISTORY: denies smoking, EtOH, or illicit drug use      Review of Systems:  CONSTITUTIONAL: No fever, chills, or fatigue  EYES: +blurred vision  NECK: No pain or stiffness  RESPIRATORY: No cough, wheezing, chills or hemoptysis; No shortness of breath  CARDIOVASCULAR: No chest pain, palpitations, dizziness, or leg swelling  GASTROINTESTINAL: No abdominal or epigastric pain. No nausea, vomiting, or hematemesis; No diarrhea or constipation. No melena or hematochezia.  NEUROLOGICAL: No headaches, memory loss, loss of strength, numbness, or tremors      Due to altered mental status, a full review of subjective information was not able to be obtained from the patient. History was obtained, to the extent possible, from review of the chart and collateral sources of information.      Medications:  aspirin  chewable 81 milliGRAM(s) Oral daily  atorvastatin 80 milliGRAM(s) Oral at bedtime      ICU Vital Signs Last 24 Hrs  T(C): 36.7 (27 Jul 2018 14:16), Max: 36.7 (27 Jul 2018 14:16)  T(F): 98.1 (27 Jul 2018 14:16), Max: 98.1 (27 Jul 2018 14:16)  HR: 81 (27 Jul 2018 22:14) (69 - 90)  BP: 162/99 (27 Jul 2018 22:14) (162/99 - 211/123)  BP(mean): --  ABP: --  ABP(mean): --  RR: 18 (27 Jul 2018 22:14) (18 - 28)  SpO2: 97% (27 Jul 2018 22:14) (97% - 100%)      I&O's Detail      LABS:                        13.7   12.8  )-----------( 357      ( 27 Jul 2018 15:46 )             40.8     07-27    138  |  99  |  21.0<H>  ----------------------------<  66<L>  3.2<L>   |  26.0  |  2.41<H>    Ca    9.6      27 Jul 2018 15:46  Mg     2.2     07-27    TPro  8.0  /  Alb  3.8  /  TBili  0.5  /  DBili  x   /  AST  20  /  ALT  18  /  AlkPhos  107  07-27      CARDIAC MARKERS ( 27 Jul 2018 15:46 )  x     / 0.02 ng/mL / x     / x     / x          CAPILLARY BLOOD GLUCOSE  94 (27 Jul 2018 14:30)      POCT Blood Glucose.: 89 mg/dL (27 Jul 2018 14:31)    PT/INR - ( 27 Jul 2018 15:46 )   PT: 12.2 sec;   INR: 1.11 ratio         PTT - ( 27 Jul 2018 15:46 )  PTT:28.9 sec    CULTURES:      Physical Examination:    General: No acute distress.      HEENT: Pupils equal, reactive to light.  Symmetric. EOMI.     PULM: Clear to auscultation bilaterally, no significant sputum production    CVS: Regular rate and rhythm, no murmurs, rubs, or gallops    ABD: Soft, nondistended, nontender, normoactive bowel sounds, no masses    EXT: No edema, nontender. 2+ radial / DP pulses bilaterally.    SKIN: Warm and well perfused, no rashes noted.    NEURO: Alert, oriented, delayed speech initiation        RADIOLOGY: < from: CT Head No Cont (07.27.18 @ 14:25) >     EXAM:  CT BRAIN                          PROCEDURE DATE:  07/27/2018      INTERPRETATION:  Head CT without contrast   COMPARISON: 4/26/2018.  CLINICAL INFORMATION: Aphasia altered mental status. CVA  TECHNIQUE: Contiguous axial 2.5 mm slice thickness images of the head   were obtained without the use of intravenous contrast media.  FINDINGS:    The ventricles and sulci are normal for the patient's age.    The posterior fossa structures and basal cisterns appear normal.    There is no intracranial hemorrhage.     There is no intracranial mass or midline shift.    There is no sulcal effacement to suggest acute stroke.    The basal ganglia and thalami appear normal in morphology and attenuation.    The visualized portions of the optic globes and paranasal sinuses are   normal.    There are no skull fractures.    IMPRESSION:    No acute intracranial findings.      If acute stroke is of clinical concern, MRI with diffusion-weighted   images would be helpful for further characterization.    Critical value  discussed with , on 7/27/2018 at 2:25 PM with   read back.  Hospital policies for critical values including read back   policy were followed.  The verbal communication of the critical value   supplements this written report.     IAM LNIDSAY M.D., ATTENDING RADIOLOGIST  This document has been electronically signed. Jul 27 2018  2:31PM        CRITICAL CARE TIME SPENT: 50 minutes assessing presenting problems of acute illness, which pose high probability of life threatening deterioration or end organ damage/dysfunction, as well as medical decision making including initiating plan of care, reviewing data, reviewing radiologic exams, discussing with multidisciplinary team, non-inclusive of procedures performed.

## 2018-07-27 NOTE — ED PROVIDER NOTE - PHYSICAL EXAMINATION
VITALS: reviewed  GEN: Nonverbal  HEAD/EYES: EOMI  ENT: mucus membranes moist  RESP: lungs CTA with equal breath sounds bilaterally, chest wall nontender and atraumatic  CV: heart with reg rhythm S1, S2, no murmur; distal pulses intact and symmetric bilaterally.  ABDOMEN: normoactive bowel sounds, soft, nondistended, nontender, no palpable masses  SKIN: warm, dry, no rash, no bruising, no cyanosis. color appropriate for ethnicity  NEURO: intermittently follows commands, aphasic, with drift in left leg  PSYCH: Unresponsive VITALS: reviewed  GEN: Nonverbal  HEAD/EYES: EOMI  ENT: mucus membranes moist  RESP: lungs CTA with equal breath sounds bilaterally, chest wall nontender and atraumatic  CV: heart with reg rhythm S1, S2, no murmur; distal pulses intact and symmetric bilaterally.  ABDOMEN: normoactive bowel sounds, soft, nondistended, nontender, no palpable masses  SKIN: warm, dry, no rash, no bruising, no cyanosis. color appropriate for ethnicity  NEURO: intermittently follows commands, aphasic, with drift in left leg  PSYCH: aphasic VITALS: reviewed  GEN: Nonverbal  HEAD/EYES: EOMI  ENT: mucus membranes moist  RESP: lungs CTA with equal breath sounds bilaterally, chest wall nontender and atraumatic  CV: heart with reg rhythm S1, S2, no murmur; distal pulses intact and symmetric bilaterally.  ABDOMEN: normoactive bowel sounds, soft, nondistended, nontender, no palpable masses  SKIN: warm, dry, no rash, no bruising, no cyanosis. color appropriate for ethnicity  NEURO: intermittently follows commands, aphasic, with drift in left leg, see code stroke sheet for code stroke scale  PSYCH: aphasic

## 2018-07-27 NOTE — ED PROVIDER NOTE - PR
Medicare Wellness Visit, Male    The best way to live healthy is to have a lifestyle where you eat a well-balanced diet, exercise regularly, limit alcohol use, and quit all forms of tobacco/nicotine, if applicable. Regular preventive services are another way to keep healthy. Preventive services (vaccines, screening tests, monitoring & exams) can help personalize your care plan, which helps you manage your own care. Screening tests can find health problems at the earliest stages, when they are easiest to treat. [facility] follows the current, evidence-based guidelines published by the [facility] ([facility]) when recommending preventive services for our patients. Because we follow these guidelines, sometimes recommendations change over time as research supports it. (For example, a prostate screening blood test is no longer routinely recommended for men with no symptoms.)    Of course, you and your provider may decide to screen more often for some diseases, based on your risk and co-morbidities (chronic disease you are already diagnosed with). Preventive services for you include:    - Medicare offers their members a free annual wellness visit, which is time for you and your primary care provider to discuss and plan for your preventive service needs. Take advantage of this benefit every year!    -All people over age 72 should receive the recommended pneumonia vaccines. Current USPSTF guidelines recommend a series of two vaccines for the best pneumonia protection.     -All adults should have a yearly flu vaccine and a tetanus vaccine every 10 years.  All adults age 61 years should receive a shingles vaccine once in their lifetime.      -All adults age 38-68 years who are overweight should have a diabetes screening test once every three years.     -Other screening tests & preventive services for persons with diabetes include: an eye exam to screen for diabetic retinopathy, a kidney function test, a foot exam, and stricter control over your cholesterol.     -Cardiovascular screening for adults with routine risk involves an electrocardiogram (ECG) at intervals determined by the provider.     -Colorectal cancer screenings should be done for adults age 54-65 years with normal risk. There are a number of acceptable methods of screening for this type of cancer. Each test has its own benefits and drawbacks. Discuss with your provider what is most appropriate for you during your annual wellness visit. The different tests include: colonoscopy (considered the best screening method), a fecal occult blood test, a fecal DNA test, and sigmoidoscopy.    -All adults born between [facility] should be screened once for Hepatitis C.    -An Abdominal Aortic Aneurysm (AAA) Screening is recommended for men age 73-68 who has ever smoked in their lifetime.      Here is a list of your current Health Maintenance items (your personalized list of preventive services) with a due date:  Health Maintenance Due   Topic Date Due    Glaucoma Screening   05/20/2017 164 nonspecific depressions simlar to tia

## 2018-07-27 NOTE — CONSULT NOTE ADULT - ASSESSMENT
34 yo male, PMHx bipolar, DM, HTN, CKD, BIBA with aphasia likely hypertensive encephalopathy and hypertensive emergency likely related to medication non-compliance.    - CT head negative, neuro exam improving with treatment of BP   - Goal to lower BP slowly <25%, allowing for permissive hypertension during the first 24 hours.  - Continue home antihypertensive regimen, can add back Norvasc as tolerated as well without overcorrecting BP as above  - Serial neuro checks. STAT CT head for new acute changes in neuro exam  - Patient does not require ICU level of care at this time. Please re-consult should clinical condition change. Case was discussed with eICU Attending Dr. Esparza.

## 2018-07-27 NOTE — ED PROVIDER NOTE - UNABLE TO OBTAIN
HPI is limited secondary to aphasia. Severe Illness/Injury Unresponsive ROS unable to be obtained secondary to nonverbal status and aphasia.

## 2018-07-27 NOTE — ED ADULT TRIAGE NOTE - CHIEF COMPLAINT QUOTE
pt BIBA with unable to speak, as per ems, SW called 911 for pt, pt usually alert and awake x4, able to speak, states BP is high and aphasic, last known normal unknown, Dr Romero at bedside at 1415, code stroke called at 1416.

## 2018-07-27 NOTE — STROKE CODE NOTE - ABSOLUTE EXCLUSION OTHER CRITERIA
D/w Neurology with unclear last known normal TPA contraindicated D/w Stroke Neurology with unclear last known normal TPA contraindicated. Plan as of 1445 was to get CTA and transfer to [place] if intervenable lesion on CTA, otherwise remain here no TPA

## 2018-07-27 NOTE — ED PROVIDER NOTE - MEDICAL DECISION MAKING DETAILS
Pt presents hypertensive and aphasia. DDx includes stroke, hypertensive bleed, hypertensive encephalopathy. unclear last normal. Code stroke called. CTA initially deferred 2/2 CKD. Will discuss with telestroke whether to get empiric CTA for high grade stroke scale vs. transfer to [place].

## 2018-07-27 NOTE — CHART NOTE - NSCHARTNOTEFT_GEN_A_CORE
Informed by ER MD pt being evaluated for MICU.   If pt requires evaluation and admission by hospitalist service, please called [phone].   Will not actively see patient. Discussed with Dr. Romero.

## 2018-07-27 NOTE — H&P ADULT - PROBLEM SELECTOR PLAN 2
metoprolol and amlodipine. gradual control of blood pressure. serum cotisol. ASO , urinalysis. metanephrine. VMA

## 2018-07-27 NOTE — ED PROVIDER NOTE - PROGRESS NOTE DETAILS
14:45 tele neuro consulted;  authorized CTA prior to transfer despite patient's underlying kidney disease. 14:45 tele neuro consulted;  recommending CTA STAT despite patient's underlying kidney disease and not before t ama. They recommend that if he has no deficits.    This CTA is emergently necessary and should not wait for labs as they will not change disposition. ATTENDING MD Heather: Communicated to radiology importance of stat read for CTA head to determine disposition. Pt on way to CT NO read yet. Discussed with Dr. Weaver who will read CT now. Per nursing pt's aphasia improving though not resolved. Pt now says RLE weakness is chronic and baseline. Still no read but pt now at baseline mental status no longer an endovascular candidate, code stroke cancelled. Spoke with Dr. Tovar who recommended admission to tele. Call from [facility] who care for him as outpatient recommended psych evaluation as inpatient. Pt does not appear to have psychiatric emergency but will speak to psychiatry. Will admit to tele. Aphasia wrsend but htem improved after hydralazine. Suspect hypertensive enecphalopathy. ICU consult placed for BP control. Hospitalist asking to remove admission until ICU evaluated. Seen by ICU, PA currently staffing. Seeing if getting home antihypertensives will help us stabilize for floor, otherwise will go to ICU. PA will d/w hospitalist. Improvedi with oral + IV meds. accepted by hospitalist team.

## 2018-07-27 NOTE — ED ADULT NURSE NOTE - OBJECTIVE STATEMENT
32 y/o BIBA from outpatient services presenting with aphasia. Pt. is non verbal at this time and has intermittent ability to follow commands. Pt. is alert and responds to verbal stimuli. Pt. has no non verbal indicators of pain. Difficult to obtain NIH as pt. unable to follow all commands at times. EICU called and pt. is not a candidate for TPA. Awaiting CTA results for possible vascular surgery.

## 2018-07-27 NOTE — ED PROVIDER NOTE - OBJECTIVE STATEMENT
34 y/o M pt with hx of bi-polar DM and HTN BIBA c/o unable to speak. Last known well is unclear. Per EMS made his way to outpatient and was aphasic on arrival. He denies HA. Code stroke initiated. HPI is limited secondary to patient's aphasia and unresponsive to questions. 32 y/o M pt with hx of bi-polar disorder, DM and HTN BIBA c/o unable to speak. Last known well is unclear; Per EMS, the patient made his way to outpatient services earlier today and was aphasic on arrival. He denies HA. Code stroke initiated. HPI is limited secondary to patient's aphasia and unresponsive to questions. 32 y/o M pt with hx of bi-polar disorder, DM and HTN BIBA c/o unable to speak. Last known well is unclear; Per EMS, the patient made his way to outpatient services earlier today and was aphasic on arrival. He denies HA. Code stroke initiated. HPI is limited secondary to patient's aphasia and unresponsive to questions. Pt indicates that he was able to speak when he woke up this AM but cannot say what time. Says he felt normal yesterday. 32 y/o M pt with hx of bi-polar disorder, DM and HTN BIBA c/o unable to speak. Last known well is unclear; Per EMS, the patient made his way to outpatient services earlier today and was aphasic on arrival. He denies HA. Code stroke initiated. HPI is limited secondary to patient's aphasia and unresponsive to questions. Pt indicates that he was able to speak when he woke up this AM but cannot say what time. Inidcates he felt normal yesterday.

## 2018-07-28 VITALS — HEART RATE: 52 BPM | SYSTOLIC BLOOD PRESSURE: 162 MMHG | DIASTOLIC BLOOD PRESSURE: 107 MMHG

## 2018-07-28 DIAGNOSIS — F31.9 BIPOLAR DISORDER, UNSPECIFIED: ICD-10-CM

## 2018-07-28 DIAGNOSIS — E11.8 TYPE 2 DIABETES MELLITUS WITH UNSPECIFIED COMPLICATIONS: ICD-10-CM

## 2018-07-28 DIAGNOSIS — N18.3 CHRONIC KIDNEY DISEASE, STAGE 3 (MODERATE): ICD-10-CM

## 2018-07-28 DIAGNOSIS — G45.9 TRANSIENT CEREBRAL ISCHEMIC ATTACK, UNSPECIFIED: ICD-10-CM

## 2018-07-28 LAB
APPEARANCE UR: CLEAR — SIGNIFICANT CHANGE UP
ASO AB SER QL: 206 IU/ML — HIGH (ref 0–199)
BILIRUB UR-MCNC: NEGATIVE — SIGNIFICANT CHANGE UP
CHOLEST SERPL-MCNC: 204 MG/DL — HIGH (ref 110–199)
COLOR SPEC: YELLOW — SIGNIFICANT CHANGE UP
DIFF PNL FLD: NEGATIVE — SIGNIFICANT CHANGE UP
ERYTHROCYTE [SEDIMENTATION RATE] IN BLOOD: 30 MM/HR — HIGH (ref 0–20)
GLUCOSE BLDC GLUCOMTR-MCNC: 137 MG/DL — HIGH (ref 70–99)
GLUCOSE BLDC GLUCOMTR-MCNC: 98 MG/DL — SIGNIFICANT CHANGE UP (ref 70–99)
GLUCOSE UR QL: NEGATIVE MG/DL — SIGNIFICANT CHANGE UP
HBA1C BLD-MCNC: 5.7 % — HIGH (ref 4–5.6)
HDLC SERPL-MCNC: 53 MG/DL — LOW
KETONES UR-MCNC: NEGATIVE — SIGNIFICANT CHANGE UP
LEUKOCYTE ESTERASE UR-ACNC: NEGATIVE — SIGNIFICANT CHANGE UP
LIPID PNL WITH DIRECT LDL SERPL: 133 MG/DL — SIGNIFICANT CHANGE UP
NITRITE UR-MCNC: NEGATIVE — SIGNIFICANT CHANGE UP
PH UR: 7 — SIGNIFICANT CHANGE UP (ref 5–8)
PROT UR-MCNC: 100 MG/DL
SP GR SPEC: 1.01 — SIGNIFICANT CHANGE UP (ref 1.01–1.02)
TOTAL CHOLESTEROL/HDL RATIO MEASUREMENT: 4 RATIO — SIGNIFICANT CHANGE UP (ref 3.4–9.6)
TRIGL SERPL-MCNC: 91 MG/DL — SIGNIFICANT CHANGE UP (ref 10–200)
URATE SERPL-MCNC: 7.7 MG/DL — HIGH (ref 3.4–7)
UROBILINOGEN FLD QL: NEGATIVE MG/DL — SIGNIFICANT CHANGE UP
WBC UR QL: SIGNIFICANT CHANGE UP

## 2018-07-28 PROCEDURE — 99233 SBSQ HOSP IP/OBS HIGH 50: CPT

## 2018-07-28 RX ORDER — DEXTROSE 50 % IN WATER 50 %
25 SYRINGE (ML) INTRAVENOUS ONCE
Qty: 0 | Refills: 0 | Status: DISCONTINUED | OUTPATIENT
Start: 2018-07-28 | End: 2018-07-29

## 2018-07-28 RX ORDER — GLUCAGON INJECTION, SOLUTION 0.5 MG/.1ML
1 INJECTION, SOLUTION SUBCUTANEOUS ONCE
Qty: 0 | Refills: 0 | Status: DISCONTINUED | OUTPATIENT
Start: 2018-07-28 | End: 2018-07-29

## 2018-07-28 RX ORDER — DEXTROSE 50 % IN WATER 50 %
15 SYRINGE (ML) INTRAVENOUS ONCE
Qty: 0 | Refills: 0 | Status: DISCONTINUED | OUTPATIENT
Start: 2018-07-28 | End: 2018-07-29

## 2018-07-28 RX ORDER — QUETIAPINE FUMARATE 200 MG/1
100 TABLET, FILM COATED ORAL AT BEDTIME
Qty: 0 | Refills: 0 | Status: DISCONTINUED | OUTPATIENT
Start: 2018-07-28 | End: 2018-07-29

## 2018-07-28 RX ORDER — BENZTROPINE MESYLATE 1 MG
0.5 TABLET ORAL
Qty: 0 | Refills: 0 | Status: DISCONTINUED | OUTPATIENT
Start: 2018-07-28 | End: 2018-07-29

## 2018-07-28 RX ORDER — METOPROLOL TARTRATE 50 MG
100 TABLET ORAL
Qty: 0 | Refills: 0 | Status: DISCONTINUED | OUTPATIENT
Start: 2018-07-28 | End: 2018-07-29

## 2018-07-28 RX ORDER — LAMOTRIGINE 25 MG/1
25 TABLET, ORALLY DISINTEGRATING ORAL
Qty: 0 | Refills: 0 | Status: DISCONTINUED | OUTPATIENT
Start: 2018-07-28 | End: 2018-07-29

## 2018-07-28 RX ORDER — SODIUM CHLORIDE 9 MG/ML
1000 INJECTION, SOLUTION INTRAVENOUS
Qty: 0 | Refills: 0 | Status: DISCONTINUED | OUTPATIENT
Start: 2018-07-28 | End: 2018-07-29

## 2018-07-28 RX ORDER — DEXTROSE 50 % IN WATER 50 %
12.5 SYRINGE (ML) INTRAVENOUS ONCE
Qty: 0 | Refills: 0 | Status: DISCONTINUED | OUTPATIENT
Start: 2018-07-28 | End: 2018-07-29

## 2018-07-28 RX ORDER — AMLODIPINE BESYLATE 2.5 MG/1
10 TABLET ORAL DAILY
Qty: 0 | Refills: 0 | Status: DISCONTINUED | OUTPATIENT
Start: 2018-07-28 | End: 2018-07-29

## 2018-07-28 RX ORDER — INSULIN LISPRO 100/ML
VIAL (ML) SUBCUTANEOUS
Qty: 0 | Refills: 0 | Status: DISCONTINUED | OUTPATIENT
Start: 2018-07-28 | End: 2018-07-29

## 2018-07-28 RX ADMIN — Medication 0.2 MILLIGRAM(S): at 22:17

## 2018-07-28 RX ADMIN — Medication 0.5 MILLIGRAM(S): at 05:57

## 2018-07-28 RX ADMIN — Medication 0.5 MILLIGRAM(S): at 18:58

## 2018-07-28 RX ADMIN — QUETIAPINE FUMARATE 100 MILLIGRAM(S): 200 TABLET, FILM COATED ORAL at 22:18

## 2018-07-28 RX ADMIN — LAMOTRIGINE 25 MILLIGRAM(S): 25 TABLET, ORALLY DISINTEGRATING ORAL at 18:58

## 2018-07-28 RX ADMIN — ATORVASTATIN CALCIUM 40 MILLIGRAM(S): 80 TABLET, FILM COATED ORAL at 22:18

## 2018-07-28 RX ADMIN — Medication 100 MILLIGRAM(S): at 05:57

## 2018-07-28 RX ADMIN — Medication 100 MILLIGRAM(S): at 18:58

## 2018-07-28 RX ADMIN — LAMOTRIGINE 25 MILLIGRAM(S): 25 TABLET, ORALLY DISINTEGRATING ORAL at 05:57

## 2018-07-28 RX ADMIN — Medication 325 MILLIGRAM(S): at 18:59

## 2018-07-28 RX ADMIN — AMLODIPINE BESYLATE 10 MILLIGRAM(S): 2.5 TABLET ORAL at 05:57

## 2018-07-28 NOTE — PROGRESS NOTE ADULT - SUBJECTIVE AND OBJECTIVE BOX
CC: TIA, aphasia resolved.   HPI:  34 y/o male with h/o HTN, CRI, Bipolar disorder, DM II, was brought in to the ER by ambulance for a new onset aphasia and AMS. patient was found to have sever HTN. CT brain is negative for hemorrhagic stroke. CT angiogram of the neck showed no significant stenosis. patient was seen in the ER, unable to explain himself or makes clear statement. no family members or others around the patient. no further history could be obtained (2018 23:59)    REVIEW OF SYSTEMS:    Patient denied fever, chills, abdominal pain, nausea, vomiting, cough, shortness of breath, chest pain or palpitations    Vital Signs Last 24 Hrs  T(C): 37 (2018 19:02), Max: 37.2 (2018 05:55)  T(F): 98.6 (2018 19:02), Max: 99 (2018 05:55)  HR: 52 (2018 22:42) (52 - 79)  BP: 162/107 (2018 22:42) (153/80 - 188/147)  BP(mean): --  RR: 18 (2018 19:02) (18 - 18)  SpO2: 96% (2018 19:02) (95% - 100%)I&O's Summary    PHYSICAL EXAM:  GENERAL: NAD,   HEENT: PERRL, +EOMI, anicteric, no [place]  NECK: Supple, No JVD   CHEST/LUNG: CTA bilaterally; Normal effort  HEART: S1S2 Normal intensity, no murmurs, gallops or rubs noted  ABDOMEN: Soft, BS Normoactive, NT, ND, no HSM noted  EXTREMITIES:  2+ radial and DP pulses noted, no clubbing, cyanosis, or edema noted, FROM x 4  SKIN: No rashes or lesions noted  NEURO: A&Ox3, no focal deficits noted, CN II-XII intact  PSYCH: normal mood and affect; insight/judgement appropriate  LABS:                        13.7   12.8  )-----------( 357      ( 2018 15:46 )             40.8     07-    138  |  99  |  21.0<H>  ----------------------------<  66<L>  3.2<L>   |  26.0  |  2.41<H>    Ca    9.6      2018 15:46  Mg     2.2         TPro  8.0  /  Alb  3.8  /  TBili  0.5  /  DBili  x   /  AST  20  /  ALT  18  /  AlkPhos  107      PT/INR - ( 2018 15:46 )   PT: 12.2 sec;   INR: 1.11 ratio         PTT - ( 2018 15:46 )  PTT:28.9 sec  Urinalysis Basic - ( 2018 14:59 )    Color: Yellow / Appearance: Clear / S.010 / pH: x  Gluc: x / Ketone: Negative  / Bili: Negative / Urobili: Negative mg/dL   Blood: x / Protein: 100 mg/dL / Nitrite: Negative   Leuk Esterase: Negative / RBC: x / WBC 0-2   Sq Epi: x / Non Sq Epi: x / Bacteria: x      RADIOLOGY & ADDITIONAL TESTS:    MEDICATIONS:  MEDICATIONS  (STANDING):  amLODIPine   Tablet 10 milliGRAM(s) Oral daily  aspirin enteric coated 325 milliGRAM(s) Oral daily  atorvastatin 40 milliGRAM(s) Oral at bedtime  benztropine 0.5 milliGRAM(s) Oral two times a day  dextrose 5%. 1000 milliLiter(s) (50 mL/Hr) IV Continuous <Continuous>  dextrose 50% Injectable 12.5 Gram(s) IV Push once  dextrose 50% Injectable 25 Gram(s) IV Push once  dextrose 50% Injectable 25 Gram(s) IV Push once  insulin lispro (HumaLOG) corrective regimen sliding scale   SubCutaneous three times a day before meals  lamoTRIgine 25 milliGRAM(s) Oral two times a day  metoprolol tartrate 100 milliGRAM(s) Oral two times a day  pantoprazole    Tablet 40 milliGRAM(s) Oral before breakfast  QUEtiapine 100 milliGRAM(s) Oral at bedtime    MEDICATIONS  (PRN):  dextrose 40% Gel 15 Gram(s) Oral once PRN Blood Glucose LESS THAN 70 milliGRAM(s)/deciliter  glucagon  Injectable 1 milliGRAM(s) IntraMuscular once PRN Glucose LESS THAN 70 milligrams/deciliter

## 2018-07-28 NOTE — CONSULT NOTE ADULT - PROBLEM SELECTOR RECOMMENDATION 9
BP control  would check risk factor control (lipid profile, HgbA1c, homocysteine)  Tele- Echo  if echo neg then SAHIL  MRI brain - MRA brain/carotid  ASA  Mobilize.

## 2018-07-28 NOTE — ED ADULT NURSE REASSESSMENT NOTE - NS ED NURSE REASSESS COMMENT FT1
pt status unchanged, refer to flowsheet and chart, pt safety maintained, pt hemodynamically stable
Assuming care from previous RN, pt nonverbal at this time, able to follow commands, no s/s of SOB, resp even and unlabored, skin warm and dry, color good, nonverbal indicators of pain/n/v not present, patent 20G IV in left AC, showing NSR on monitor, pt remains hypertensive, will medicate as per MD orders, pt aware of plan of care, will continue to monitor.
ICU PA at bedside for evaluation.
Pt communicating well at this time, slight stuttering noticed, tolerated PO meds and fluids well, will continue to monitor.
pt status unchanged, refer to flowsheet and chart, pt safety maintained, pt hemodynamically stable
pt status unchanged, refer to flowsheet and chart, pt safety maintained, pt hemodynamically stable
Pt. shows improvement in neurological symptoms and has deficits due to chronic right knee pain. MD Romero aware. Informed of elevated BP @ 211/123, pt. to be given additional 10mg Labetalol 10mg IVP if BP does not lower and HR is within normal limits.

## 2018-07-28 NOTE — CONSULT NOTE ADULT - ASSESSMENT
Transient Ischemic attack in the face of multiple risk factors. Aphasia without hemiparesis suggests embolic event - TIA from ? cardiac embolus - possibly due to HTN crisis as well.    would check risk factor control.   Tele- Echo  if echo neg then SAHIL  MRI brain - MRA brain/carotid  ASA

## 2018-07-28 NOTE — PROGRESS NOTE ADULT - ASSESSMENT
32 y/o male with possible CVA, hypertensive emergency, DM II, Bipolar disorder, CRI       Problem/Plan - 1:  ·  Problem: R/O Cerebrovascular accident (CVA), unspecified mechanism.  Plan: Aspirin. statin, stroke protocol. MRI brain. Echo, carotid dopplers , TSH AIc lipid profile. ASA, PT     Problem/Plan - 2:  ·  Problem: Hypertensive emergency.  Plan: metoprolol and amlodipine. gradual control of blood pressure. serum cotisol. ASO , urinalysis. metanephrine. VMA.      Problem/Plan - 3:  ·  Problem: Type 2 diabetes mellitus with complication, without long-term current use of insulin.  Plan: insulin therapy protocol.      Problem/Plan - 4:  ·  Problem: Bipolar affective disorder, remission status unspecified.  Plan: Seroquel and Lamotrigine.      Problem/Plan - 5:  ·  Problem: CRI (chronic renal insufficiency), stage 3 (moderate).  Plan: patient had renal ultrasound and renal artery doppler.        Discharge planning.

## 2018-07-28 NOTE — CHART NOTE - NSCHARTNOTEFT_GEN_A_CORE
Pt admitted with R/O CVA vs TIA. MRI pending. HTN emergency. B/P now 180/120. asymptomatic.  Discussed with Dr Hunter. Clonidine 0.2mg po now X 1

## 2018-07-28 NOTE — CONSULT NOTE ADULT - SUBJECTIVE AND OBJECTIVE BOX
HPI:  32 y/o male with h/o HTN, CRI, Bipolar disorder, DM II, was brought in to the ER by ambulance for a new onset aphasia and AMS. patient was found to have sever HTN. CT brain is negative for hemorrhagic stroke. CT angiogram of the neck showed no significant stenosis. patient was seen in the ER, unable to explain himself or makes clear statement. no family members or others around the patient. no further history could be obtained (27 Jul 2018 23:59) - symptoms improved.     PAST MEDICAL & SURGICAL HISTORY:  HTN (hypertension)  Sleep apnea  Diabetes  Bipolar 1 disorder  Hypertension  No significant past surgical history      REVIEW OF SYSTEMS:  CONSTITUTIONAL: No fever  EYES: No eye pain,   ENMT:  No sinus or throat pain  NECK: No pain or stiffness  RESPIRATORY: No cough, No hemoptysis; No shortness of breath  CARDIOVASCULAR: No acute chest pain, palpitations,  or leg swelling  GASTROINTESTINAL: No abdominal pain. No nausea, vomiting, or hematemesis;  No melena or hematochezia.  GENITOURINARY: No  hematuria, or incontinence  NEUROLOGICAL: No headaches, memory loss,   SKIN: No itching, rashes, or lesions   LYMPH NODES: No enlarged glands  ENDOCRINE: No heat or cold intolerance;   MUSCULOSKELETAL: No joint swelling; No extremity pain  PSYCHIATRIC: No depression, anxiety, mood swings, or difficulty sleeping  HEME/LYMPH: No easy bruising, or bleeding gums    MEDICATIONS  (STANDING):  amLODIPine   Tablet 10 milliGRAM(s) Oral daily  aspirin enteric coated 325 milliGRAM(s) Oral daily  atorvastatin 40 milliGRAM(s) Oral at bedtime  benztropine 0.5 milliGRAM(s) Oral two times a day  dextrose 5%. 1000 milliLiter(s) (50 mL/Hr) IV Continuous <Continuous>  dextrose 50% Injectable 12.5 Gram(s) IV Push once  dextrose 50% Injectable 25 Gram(s) IV Push once  dextrose 50% Injectable 25 Gram(s) IV Push once  insulin lispro (HumaLOG) corrective regimen sliding scale   SubCutaneous three times a day before meals  lamoTRIgine 25 milliGRAM(s) Oral two times a day  metoprolol tartrate 100 milliGRAM(s) Oral two times a day  pantoprazole    Tablet 40 milliGRAM(s) Oral before breakfast  QUEtiapine 100 milliGRAM(s) Oral at bedtime    MEDICATIONS  (PRN):  dextrose 40% Gel 15 Gram(s) Oral once PRN Blood Glucose LESS THAN 70 milliGRAM(s)/deciliter  glucagon  Injectable 1 milliGRAM(s) IntraMuscular once PRN Glucose LESS THAN 70 milligrams/deciliter      Allergies    No Known Allergies    Intolerances      SOCIAL HISTORY:    FAMILY HISTORY:  No pertinent family history in first degree relatives      PHYSICAL EXAM:  Vital Signs Last 24 Hrs  T(F): 98.1 (07-28-18 @ 08:01)  HR: 70 (07-28-18 @ 08:01)  BP: 162/90 (07-28-18 @ 08:01)  RR: 18 (07-28-18 @ 08:01)    GENERAL: NAD, well-groomed, well-developed  HEAD:  Atraumatic, Normocephalic  EYES: EOMI, PERRLA, conjunctiva and sclera clear  NECK: Supple, No JVD, thyroid non-palpable  NERVOUS SYSTEM:   awaken alert interactive. Oriented X3, speech and language normal, no aphasia, pupils equal and reactive. visual fields full to movement. fundi no papilledema. EOMI. no nystagmus. facial strength symmetric. upper airway size small. no meningismus.  no pronator drift. no finger to nose ataxia. rapid hand movements symmetric. no rest tremor.  Motor Strength 5/5 bilateral upper and lower extremities; DTRs Knee jerks trace symmetric, plantar responses flexor bilaterally, motor tone normal.  sensory exam normal to light touch, HEART: Regular rate and rhythm; No murmurs audible  Vascular - no carotid bruit  Peripheral - no edema in the legs.        LABS:                        13.7   12.8  )-----------( 357      ( 27 Jul 2018 15:46 )             40.8     07-27    138  |  99  |  21.0<H>  ----------------------------<  66<L>  3.2<L>   |  26.0  |  2.41<H>    Ca    9.6      27 Jul 2018 15:46  Mg     2.2     07-27  TPro  8.0  /  Alb  3.8  /  TBili  0.5  /  DBili  x   /  AST  20  /  ALT  18  /  AlkPhos  107  07-27    PT/INR - ( 27 Jul 2018 15:46 )   PT: 12.2 sec;   INR: 1.11 ratio    PTT - ( 27 Jul 2018 15:46 )  PTT:28.9 sec    RADIOLOGY & ADDITIONAL STUDIES:  CT brain - reported negative  CTA - reported negative

## 2018-07-29 LAB — CORTIS AM PEAK SERPL-MCNC: 16.1 UG/DL — SIGNIFICANT CHANGE UP (ref 6–18.4)

## 2018-07-30 DIAGNOSIS — Z71.89 OTHER SPECIFIED COUNSELING: ICD-10-CM

## 2018-07-30 PROCEDURE — 96375 TX/PRO/DX INJ NEW DRUG ADDON: CPT | Mod: XU

## 2018-07-30 PROCEDURE — 81001 URINALYSIS AUTO W/SCOPE: CPT

## 2018-07-30 PROCEDURE — 96374 THER/PROPH/DIAG INJ IV PUSH: CPT | Mod: XU

## 2018-07-30 PROCEDURE — 36415 COLL VENOUS BLD VENIPUNCTURE: CPT

## 2018-07-30 PROCEDURE — 70498 CT ANGIOGRAPHY NECK: CPT

## 2018-07-30 PROCEDURE — 85652 RBC SED RATE AUTOMATED: CPT

## 2018-07-30 PROCEDURE — 85730 THROMBOPLASTIN TIME PARTIAL: CPT

## 2018-07-30 PROCEDURE — 80053 COMPREHEN METABOLIC PANEL: CPT

## 2018-07-30 PROCEDURE — 99291 CRITICAL CARE FIRST HOUR: CPT | Mod: 25

## 2018-07-30 PROCEDURE — 85610 PROTHROMBIN TIME: CPT

## 2018-07-30 PROCEDURE — 80061 LIPID PANEL: CPT

## 2018-07-30 PROCEDURE — 82962 GLUCOSE BLOOD TEST: CPT

## 2018-07-30 PROCEDURE — 84585 ASSAY OF URINE VMA: CPT

## 2018-07-30 PROCEDURE — 84550 ASSAY OF BLOOD/URIC ACID: CPT

## 2018-07-30 PROCEDURE — 83835 ASSAY OF METANEPHRINES: CPT

## 2018-07-30 PROCEDURE — 83036 HEMOGLOBIN GLYCOSYLATED A1C: CPT

## 2018-07-30 PROCEDURE — 70496 CT ANGIOGRAPHY HEAD: CPT

## 2018-07-30 PROCEDURE — 84484 ASSAY OF TROPONIN QUANT: CPT

## 2018-07-30 PROCEDURE — 93005 ELECTROCARDIOGRAM TRACING: CPT

## 2018-07-30 PROCEDURE — 85027 COMPLETE CBC AUTOMATED: CPT

## 2018-07-30 PROCEDURE — 70450 CT HEAD/BRAIN W/O DYE: CPT

## 2018-07-30 PROCEDURE — 86060 ANTISTREPTOLYSIN O TITER: CPT

## 2018-07-30 PROCEDURE — 82533 TOTAL CORTISOL: CPT

## 2018-07-30 PROCEDURE — 83735 ASSAY OF MAGNESIUM: CPT

## 2018-08-02 ENCOUNTER — INPATIENT (INPATIENT)
Facility: HOSPITAL | Age: 33
LOS: 4 days | Discharge: HOME HEALTH | DRG: 77 | End: 2018-08-07
Attending: INTERNAL MEDICINE | Admitting: FAMILY MEDICINE
Payer: MEDICARE

## 2018-08-02 VITALS
RESPIRATION RATE: 18 BRPM | TEMPERATURE: 99 F | SYSTOLIC BLOOD PRESSURE: 192 MMHG | OXYGEN SATURATION: 100 % | HEART RATE: 80 BPM | DIASTOLIC BLOOD PRESSURE: 126 MMHG

## 2018-08-02 DIAGNOSIS — I16.1 HYPERTENSIVE EMERGENCY: ICD-10-CM

## 2018-08-02 DIAGNOSIS — I16.0 HYPERTENSIVE URGENCY: ICD-10-CM

## 2018-08-02 DIAGNOSIS — E11.9 TYPE 2 DIABETES MELLITUS WITHOUT COMPLICATIONS: ICD-10-CM

## 2018-08-02 DIAGNOSIS — E87.6 HYPOKALEMIA: ICD-10-CM

## 2018-08-02 DIAGNOSIS — R41.0 DISORIENTATION, UNSPECIFIED: ICD-10-CM

## 2018-08-02 DIAGNOSIS — N28.9 DISORDER OF KIDNEY AND URETER, UNSPECIFIED: ICD-10-CM

## 2018-08-02 LAB
ALBUMIN SERPL ELPH-MCNC: 3.2 G/DL — LOW (ref 3.3–5.2)
ALP SERPL-CCNC: 104 U/L — SIGNIFICANT CHANGE UP (ref 40–120)
ALT FLD-CCNC: 20 U/L — SIGNIFICANT CHANGE UP
AMPHET UR-MCNC: NEGATIVE — SIGNIFICANT CHANGE UP
ANION GAP SERPL CALC-SCNC: 13 MMOL/L — SIGNIFICANT CHANGE UP (ref 5–17)
ANISOCYTOSIS BLD QL: SLIGHT — SIGNIFICANT CHANGE UP
APAP SERPL-MCNC: <7.5 UG/ML — LOW (ref 10–26)
APPEARANCE UR: CLEAR — SIGNIFICANT CHANGE UP
AST SERPL-CCNC: 25 U/L — SIGNIFICANT CHANGE UP
BACTERIA # UR AUTO: ABNORMAL
BARBITURATES UR SCN-MCNC: NEGATIVE — SIGNIFICANT CHANGE UP
BASOPHILS NFR BLD AUTO: 1 % — SIGNIFICANT CHANGE UP (ref 0–2)
BENZODIAZ UR-MCNC: NEGATIVE — SIGNIFICANT CHANGE UP
BILIRUB SERPL-MCNC: 0.4 MG/DL — SIGNIFICANT CHANGE UP (ref 0.4–2)
BILIRUB UR-MCNC: NEGATIVE — SIGNIFICANT CHANGE UP
BLOOD GAS SOURCE: SIGNIFICANT CHANGE UP
BUN SERPL-MCNC: 26 MG/DL — HIGH (ref 8–20)
CALCIUM SERPL-MCNC: 8.8 MG/DL — SIGNIFICANT CHANGE UP (ref 8.6–10.2)
CHLORIDE SERPL-SCNC: 96 MMOL/L — LOW (ref 98–107)
CK MB CFR SERPL CALC: 7.3 NG/ML — HIGH (ref 0–6.7)
CK SERPL-CCNC: 570 U/L — HIGH (ref 30–200)
CO2 SERPL-SCNC: 29 MMOL/L — SIGNIFICANT CHANGE UP (ref 22–29)
COCAINE METAB.OTHER UR-MCNC: NEGATIVE — SIGNIFICANT CHANGE UP
COHGB MFR BLDV: 3.1 % — HIGH (ref 0–2)
COLOR SPEC: YELLOW — SIGNIFICANT CHANGE UP
CREAT ?TM UR-MCNC: 64.3 MG/DL — SIGNIFICANT CHANGE UP (ref 20–370)
CREAT SERPL-MCNC: 2.54 MG/DL — HIGH (ref 0.5–1.3)
DIFF PNL FLD: ABNORMAL
EOSINOPHIL NFR BLD AUTO: 2 % — SIGNIFICANT CHANGE UP (ref 0–6)
EPI CELLS # UR: SIGNIFICANT CHANGE UP
GLUCOSE SERPL-MCNC: 90 MG/DL — SIGNIFICANT CHANGE UP (ref 70–115)
GLUCOSE UR QL: NEGATIVE MG/DL — SIGNIFICANT CHANGE UP
HCT VFR BLD CALC: 36.6 % — LOW (ref 42–52)
HGB BLD CALC-MCNC: 13 G/DL — SIGNIFICANT CHANGE UP (ref 13–17)
HGB BLD-MCNC: 12.3 G/DL — LOW (ref 14–18)
HYPOCHROMIA BLD QL: SLIGHT — SIGNIFICANT CHANGE UP
KETONES UR-MCNC: NEGATIVE — SIGNIFICANT CHANGE UP
LEUKOCYTE ESTERASE UR-ACNC: NEGATIVE — SIGNIFICANT CHANGE UP
LYMPHOCYTES # BLD AUTO: 17 % — LOW (ref 20–55)
MACROCYTES BLD QL: SLIGHT — SIGNIFICANT CHANGE UP
MCHC RBC-ENTMCNC: 25.2 PG — LOW (ref 27–31)
MCHC RBC-ENTMCNC: 33.6 G/DL — SIGNIFICANT CHANGE UP (ref 32–36)
MCV RBC AUTO: 75 FL — LOW (ref 80–94)
METANEPH UR-MCNC: SIGNIFICANT CHANGE UP
METHADONE UR-MCNC: NEGATIVE — SIGNIFICANT CHANGE UP
MICROCYTES BLD QL: SLIGHT — SIGNIFICANT CHANGE UP
MONOCYTES NFR BLD AUTO: 2 % — LOW (ref 3–10)
NEUTROPHILS NFR BLD AUTO: 75 % — HIGH (ref 37–73)
NITRITE UR-MCNC: NEGATIVE — SIGNIFICANT CHANGE UP
NT-PROBNP SERPL-SCNC: 1603 PG/ML — HIGH (ref 0–300)
OPIATES UR-MCNC: NEGATIVE — SIGNIFICANT CHANGE UP
OVALOCYTES BLD QL SMEAR: SLIGHT — SIGNIFICANT CHANGE UP
PCP SPEC-MCNC: SIGNIFICANT CHANGE UP
PCP UR-MCNC: NEGATIVE — SIGNIFICANT CHANGE UP
PH UR: 6.5 — SIGNIFICANT CHANGE UP (ref 5–8)
PLAT MORPH BLD: NORMAL — SIGNIFICANT CHANGE UP
PLATELET # BLD AUTO: 291 K/UL — SIGNIFICANT CHANGE UP (ref 150–400)
POIKILOCYTOSIS BLD QL AUTO: SLIGHT — SIGNIFICANT CHANGE UP
POTASSIUM SERPL-MCNC: 2.9 MMOL/L — CRITICAL LOW (ref 3.5–5.3)
POTASSIUM SERPL-SCNC: 2.9 MMOL/L — CRITICAL LOW (ref 3.5–5.3)
PROT SERPL-MCNC: 6.7 G/DL — SIGNIFICANT CHANGE UP (ref 6.6–8.7)
PROT UR-MCNC: 100 MG/DL
RBC # BLD: 4.88 M/UL — SIGNIFICANT CHANGE UP (ref 4.6–6.2)
RBC # FLD: 17.5 % — HIGH (ref 11–15.6)
RBC BLD AUTO: ABNORMAL
RBC CASTS # UR COMP ASSIST: SIGNIFICANT CHANGE UP /HPF (ref 0–4)
SALICYLATES SERPL-MCNC: <0.6 MG/DL — LOW (ref 10–20)
SODIUM SERPL-SCNC: 138 MMOL/L — SIGNIFICANT CHANGE UP (ref 135–145)
SP GR SPEC: 1 — LOW (ref 1.01–1.02)
THC UR QL: NEGATIVE — SIGNIFICANT CHANGE UP
TROPONIN T SERPL-MCNC: 0.02 NG/ML — SIGNIFICANT CHANGE UP (ref 0–0.06)
TSH SERPL-MCNC: 0.76 UIU/ML — SIGNIFICANT CHANGE UP (ref 0.27–4.2)
UROBILINOGEN FLD QL: NEGATIVE MG/DL — SIGNIFICANT CHANGE UP
VARIANT LYMPHS # BLD: 3 % — SIGNIFICANT CHANGE UP (ref 0–6)
VMA /GCREATININE: 1.6 MG/G CREAT — SIGNIFICANT CHANGE UP (ref 1.1–4.1)
WBC # BLD: 14.8 K/UL — HIGH (ref 4.8–10.8)
WBC # FLD AUTO: 14.8 K/UL — HIGH (ref 4.8–10.8)
WBC UR QL: SIGNIFICANT CHANGE UP

## 2018-08-02 PROCEDURE — 99291 CRITICAL CARE FIRST HOUR: CPT

## 2018-08-02 PROCEDURE — 71045 X-RAY EXAM CHEST 1 VIEW: CPT | Mod: 26

## 2018-08-02 PROCEDURE — 70450 CT HEAD/BRAIN W/O DYE: CPT | Mod: 26

## 2018-08-02 PROCEDURE — 93306 TTE W/DOPPLER COMPLETE: CPT | Mod: 26

## 2018-08-02 PROCEDURE — 93010 ELECTROCARDIOGRAM REPORT: CPT

## 2018-08-02 PROCEDURE — 70551 MRI BRAIN STEM W/O DYE: CPT | Mod: 26

## 2018-08-02 RX ORDER — LABETALOL HCL 100 MG
10 TABLET ORAL ONCE
Qty: 0 | Refills: 0 | Status: COMPLETED | OUTPATIENT
Start: 2018-08-02 | End: 2018-08-02

## 2018-08-02 RX ORDER — AMLODIPINE BESYLATE 2.5 MG/1
10 TABLET ORAL DAILY
Qty: 0 | Refills: 0 | Status: DISCONTINUED | OUTPATIENT
Start: 2018-08-03 | End: 2018-08-07

## 2018-08-02 RX ORDER — POTASSIUM CHLORIDE 20 MEQ
20 PACKET (EA) ORAL ONCE
Qty: 0 | Refills: 0 | Status: COMPLETED | OUTPATIENT
Start: 2018-08-02 | End: 2018-08-02

## 2018-08-02 RX ORDER — GLUCAGON INJECTION, SOLUTION 0.5 MG/.1ML
1 INJECTION, SOLUTION SUBCUTANEOUS ONCE
Qty: 0 | Refills: 0 | Status: DISCONTINUED | OUTPATIENT
Start: 2018-08-02 | End: 2018-08-07

## 2018-08-02 RX ORDER — SODIUM CHLORIDE 9 MG/ML
1000 INJECTION INTRAMUSCULAR; INTRAVENOUS; SUBCUTANEOUS ONCE
Qty: 0 | Refills: 0 | Status: COMPLETED | OUTPATIENT
Start: 2018-08-02 | End: 2018-08-02

## 2018-08-02 RX ORDER — HYDRALAZINE HCL 50 MG
10 TABLET ORAL ONCE
Qty: 0 | Refills: 0 | Status: COMPLETED | OUTPATIENT
Start: 2018-08-02 | End: 2018-08-02

## 2018-08-02 RX ORDER — DEXTROSE 50 % IN WATER 50 %
15 SYRINGE (ML) INTRAVENOUS ONCE
Qty: 0 | Refills: 0 | Status: DISCONTINUED | OUTPATIENT
Start: 2018-08-02 | End: 2018-08-07

## 2018-08-02 RX ORDER — POTASSIUM CHLORIDE 20 MEQ
10 PACKET (EA) ORAL
Qty: 0 | Refills: 0 | Status: COMPLETED | OUTPATIENT
Start: 2018-08-02 | End: 2018-08-02

## 2018-08-02 RX ORDER — INSULIN LISPRO 100/ML
VIAL (ML) SUBCUTANEOUS
Qty: 0 | Refills: 0 | Status: DISCONTINUED | OUTPATIENT
Start: 2018-08-02 | End: 2018-08-07

## 2018-08-02 RX ORDER — SODIUM CHLORIDE 9 MG/ML
3 INJECTION INTRAMUSCULAR; INTRAVENOUS; SUBCUTANEOUS ONCE
Qty: 0 | Refills: 0 | Status: COMPLETED | OUTPATIENT
Start: 2018-08-02 | End: 2018-08-02

## 2018-08-02 RX ORDER — ASPIRIN/CALCIUM CARB/MAGNESIUM 324 MG
81 TABLET ORAL DAILY
Qty: 0 | Refills: 0 | Status: DISCONTINUED | OUTPATIENT
Start: 2018-08-02 | End: 2018-08-07

## 2018-08-02 RX ORDER — DEXTROSE 50 % IN WATER 50 %
25 SYRINGE (ML) INTRAVENOUS ONCE
Qty: 0 | Refills: 0 | Status: DISCONTINUED | OUTPATIENT
Start: 2018-08-02 | End: 2018-08-07

## 2018-08-02 RX ORDER — LABETALOL HCL 100 MG
300 TABLET ORAL EVERY 12 HOURS
Qty: 0 | Refills: 0 | Status: DISCONTINUED | OUTPATIENT
Start: 2018-08-02 | End: 2018-08-07

## 2018-08-02 RX ORDER — AMLODIPINE BESYLATE 2.5 MG/1
5 TABLET ORAL ONCE
Qty: 0 | Refills: 0 | Status: COMPLETED | OUTPATIENT
Start: 2018-08-02 | End: 2018-08-02

## 2018-08-02 RX ORDER — INSULIN LISPRO 100/ML
VIAL (ML) SUBCUTANEOUS AT BEDTIME
Qty: 0 | Refills: 0 | Status: DISCONTINUED | OUTPATIENT
Start: 2018-08-02 | End: 2018-08-07

## 2018-08-02 RX ORDER — SODIUM CHLORIDE 9 MG/ML
1000 INJECTION, SOLUTION INTRAVENOUS
Qty: 0 | Refills: 0 | Status: DISCONTINUED | OUTPATIENT
Start: 2018-08-02 | End: 2018-08-07

## 2018-08-02 RX ORDER — HEPARIN SODIUM 5000 [USP'U]/ML
5000 INJECTION INTRAVENOUS; SUBCUTANEOUS EVERY 12 HOURS
Qty: 0 | Refills: 0 | Status: DISCONTINUED | OUTPATIENT
Start: 2018-08-02 | End: 2018-08-07

## 2018-08-02 RX ORDER — DEXTROSE 50 % IN WATER 50 %
12.5 SYRINGE (ML) INTRAVENOUS ONCE
Qty: 0 | Refills: 0 | Status: DISCONTINUED | OUTPATIENT
Start: 2018-08-02 | End: 2018-08-07

## 2018-08-02 RX ADMIN — Medication 0.2 MILLIGRAM(S): at 14:08

## 2018-08-02 RX ADMIN — Medication 100 MILLIEQUIVALENT(S): at 19:47

## 2018-08-02 RX ADMIN — Medication 10 MILLIGRAM(S): at 14:07

## 2018-08-02 RX ADMIN — SODIUM CHLORIDE 2000 MILLILITER(S): 9 INJECTION INTRAMUSCULAR; INTRAVENOUS; SUBCUTANEOUS at 14:08

## 2018-08-02 RX ADMIN — Medication 100 MILLIEQUIVALENT(S): at 23:38

## 2018-08-02 RX ADMIN — Medication 10 MILLIGRAM(S): at 18:38

## 2018-08-02 RX ADMIN — SODIUM CHLORIDE 3 MILLILITER(S): 9 INJECTION INTRAMUSCULAR; INTRAVENOUS; SUBCUTANEOUS at 22:04

## 2018-08-02 RX ADMIN — Medication 20 MILLIEQUIVALENT(S): at 23:38

## 2018-08-02 NOTE — ED ADULT TRIAGE NOTE - CHIEF COMPLAINT QUOTE
Patient BIBA, as per EMS patient found at home by visiting nurse, altered mental status, patient very slow to respond however oriented x3, generalized weakness throughout all 4 extremities, Dr Paredes at [facility] requesting priority CT

## 2018-08-02 NOTE — CONSULT NOTE ADULT - SUBJECTIVE AND OBJECTIVE BOX
[facility]                                                        Office: [address] [address]                                                       Telephone: [phone]. Fax:[phone]                                                              CARDIOLOGY CONSULTATION NOTE                                                                                             Consult requested by:  Maddie Angel (ER physician)    Reason for Consultation: altered mental status and Hypertension    History obtained by: Patient and medical record; Poor historian. NO history could be relied upon. Patient confused and slow to speak,.      obtained: No    Chief complaint:    Patient is a 33y old  Male who presents with a chief complaint of " high blood pressre"     HPI:   34 y/o M with past hx of HTN, chronic renal disease, and DM found by visiting nurse in his non air-conditioned attic apartment which was hot. Pt has AMS, is aphasic, and cannot participate in hx. Last seen well yesterday by visiting nurse.    	Review of old chart shows pt was seen and admitted to this ED on 07/27 as a code stroke for new onset aphasia and AMS. Pt was admitted to tele-ICU, was consulted for elevated bp. His aphasia improved as bp went down. Neuro recommend an MRI carotid dopplers; however, pt left AMA the following day and never went through with the study. It is unclear if pt is complaint with his meds.    above history reviewed and agreed.   33 M with altered mental status. he could not tell me why is he here. He appears aconfused. word finding diffculty.       REVIEW OF SYMPTOMS:  LIMITED history. Unreliable. Denies all symptoms.  Cardiovascular:  See HPI. No chest pain,  No dyspnea,  No syncope,  No palpitations, No dizziness, No Orthopnea,      No Paroxsymal nocturnal dyspnea;  Respiratory:  No Dyspnea, No cough,     Genitourinary:  No dysuria, no hematuria; Gastrointestinal:  No nausea, no vomiting. No diarrhea.  No abdominal pain. No dark color stool, no melena ; Neurological: No headache, no dizziness, no slurred speech;  Psychiatric: No agitation, no anxiety.  ALL OTHER REVIEW OF SYSTEMS ARE NEGATIVE.    ALLERGIES: Allergies    No Known Allergies    Intolerances          CURRENT MEDICATIONS:     potassium chloride  10 mEq/100 mL IVPB      HOME MEDICATIONS:    PAST MEDICAL HISTORY  HTN (hypertension)  Sleep apnea  Diabetes  Bipolar 1 disorder  Hypertension      PAST SURGICAL HISTORY  No significant past surgical history      FAMILY HISTORY:  No pertinent family history in first degree relatives      SOCIAL HISTORY:  Denies smoking/alcohol/drugs      Vital Signs Last 24 Hrs  T(C): 37 (02 Aug 2018 12:19), Max: 37 (02 Aug 2018 12:19)  T(F): 98.6 (02 Aug 2018 12:19), Max: 98.6 (02 Aug 2018 12:19)  HR: 63 (02 Aug 2018 18:40) (63 - 80)  BP: 210/99 (02 Aug 2018 18:40) (177/108 - 210/99)  BP(mean): --  RR: 18 (02 Aug 2018 18:40) (16 - 18)  SpO2: 96% (02 Aug 2018 18:40) (96% - 100%)      PHYSICAL EXAM:  Constitutional: Comfortable . No acute distress.   HEENT: Atraumatic and normcephalic , neck is supple . no JVD. No carotid bruit. PEERL   CNS: A&Ox3. No focal deficits.   Lymph Nodes: Cervical : Not palpable.  Respiratory: CTAB  Cardiovascular: S1S2 RRR. No murmur/rubs or gallop.  Gastrointestinal: Soft non-tender and non distended . +Bowel sounds. negative Bueno's sign.  Extremities: No edema.   Psychiatric: Calm . no agitation. flat affect. Slow to speak. word finding difficulties.   Skin: No skin rash/ulcers visualized to face, hands or feet.    Intake and output:     LABS:                        12.3   14.8  )-----------( 291      ( 02 Aug 2018 14:07 )             36.6     08-02    138  |  96<L>  |  26.0<H>  ----------------------------<  90  2.9<LL>   |  29.0  |  2.54<H>    Ca    8.8      02 Aug 2018 14:07    TPro  6.7  /  Alb  3.2<L>  /  TBili  0.4  /  DBili  x   /  AST  25  /  ALT  20  /  AlkPhos  104  08-02    CARDIAC MARKERS ( 02 Aug 2018 14:07 )  x     / 0.02 ng/mL / 570 U/L / x     / 7.3 ng/mL    ;p-BNP=Serum Pro-Brain Natriuretic Peptide: 1603 pg/mL (08-02 @ 14:07)          INTERPRETATION OF TELEMETRY: Reviewed by me.   ECG: Reviewed by me. Sinus rhyhtm. LVH. Non-specific ST- T changes     RADIOLOGY & ADDITIONAL STUDIES:    X-ray:  reviewed by me. unremarkable     MRI: Head: unremarkable   ECHO FINDINGS: Date:   PENDING RESULT.

## 2018-08-02 NOTE — ED PROVIDER NOTE - PHYSICAL EXAMINATION
Const: A&Ox0, follows some commands  HEENT: NC/AT. Moist mucous membranes.  Eyes: Pupils 3 mm constricted to 2 mm b/l. EOMI.  Neck:. Soft and supple. Full ROM without pain.  Cardiac: Regular rate and regular rhythm. +S1/S2. No murmurs. Peripheral pulses 2+ and symmetric. No LE edema.  Resp: No evidence of respiratory distress. No wheezes, rales or rhonchi.  Abd: Soft, non-tender, non-distended. Normal bowel sounds in all 4 quadrants. No guarding or rebound.  Back: Spine midline and non-tender. No CVAT.  Skin: No rashes, abrasions or lacerations. No signs of trauma  Lymph: No cervical lymphadenopathy.  Neuro: A&Ox0. 1/5 strength of b/l LE. 2/5 strength of b/l UE. Reflexes intact.

## 2018-08-02 NOTE — ED PROVIDER NOTE - OBJECTIVE STATEMENT
34 y/o M with past hx of HTN, chronic renal disease, and DM found by visiting nurse in his non air-conditioned attic apartment which was hot. Pt has AMS, is aphasic, and cannot participate in hx. Last seen well yesterday by visiting nurse.    Review of old chart shows pt was seen and admitted to this ED on 07/27 as a code stroke for new onset aphasia and AMS. Pt was admitted to tele-ICU, was consulted for elevated bp. His aphasia improved as bp went down. Neuro recommend an MRI carotid dopplers; however, pt left AMA the following day and never went through with the study. It is unclear if pt is complaint with his meds.

## 2018-08-02 NOTE — ED ADULT NURSE REASSESSMENT NOTE - NSIMPLEMENTINTERV_GEN_ALL_ED
Implemented All Fall Risk Interventions:  [place] to call system. Call bell, personal items and telephone within reach. Instruct patient to call for assistance. Room bathroom lighting operational. Non-slip footwear when patient is off stretcher. Physically safe environment: no spills, clutter or unnecessary equipment. Stretcher in lowest position, wheels locked, appropriate side rails in place. Provide visual cue, wrist band, yellow gown, etc. Monitor gait and stability. Monitor for mental status changes and reorient to person, place, and time. Review medications for side effects contributing to fall risk. Reinforce activity limits and safety measures with patient and family.
Implemented All Fall with Harm Risk Interventions:  [place] to call system. Call bell, personal items and telephone within reach. Instruct patient to call for assistance. Room bathroom lighting operational. Non-slip footwear when patient is off stretcher. Physically safe environment: no spills, clutter or unnecessary equipment. Stretcher in lowest position, wheels locked, appropriate side rails in place. Provide visual cue, wrist band, yellow gown, etc. Monitor gait and stability. Monitor for mental status changes and reorient to person, place, and time. Review medications for side effects contributing to fall risk. Reinforce activity limits and safety measures with patient and family. Provide visual clues: red socks.

## 2018-08-02 NOTE — H&P ADULT - PROBLEM SELECTOR PLAN 1
pt's elevated bp might be related to his non compliance, at the time of admission bp is better controlled and pt. has no sympt. pt. needs a good out-pt follow with his pcp. will request for SW consult.   will reconsult neurolog. neurologist has recommended SAHIL if echo negative. will request day team to co-ordinate that. will get carotid doppler as well.    will keep him on clonidine 0.2 mg po q 12 hrs. labetalol 300 mg po q 12 hrs. norvac 10 mg po daily.

## 2018-08-02 NOTE — ED ADULT NURSE NOTE - CHIEF COMPLAINT QUOTE
Patient BIBA, as per EMS patient found at home by visiting nurse, altered mental status, patient very slow to respond however oriented x3, generalized weakness throughout all 4 extremities, Dr Paredes at [facility] requesting priority CT

## 2018-08-02 NOTE — H&P ADULT - HISTORY OF PRESENT ILLNESS
34 y/o male was seen by his visiting nurse and as per recored pt. was sitting in his non AC room and it was hot and humid. Pt's bp was elevated and he appeared confused. Pt. left AMA few days ago from [facility] after he was admitted for TIA r/o cva. Neurologist recommended SAHIL if echo is negative. Pt. did not stay for full work up. Today pt's ct head and MRI is negative. pt. appears to be somewhat slow in answering questions but is AAOX3 and has no focal deficits. Pt. reports feeling ok and does not report to be feeling different. follows commands. As per record pt. has h/o bipolar disorder ? pt. stated that he has not used his psych meds for long time. Mild MR ? Baseline mental status and speech pattern is not known. no family in house. pt. reports no cp. no HA. no sob. no abd. pain. no n/v. pt's compliance to medicines s questionable. 34 y/o male was seen by his visiting nurse and as per recored pt. was sitting in his non AC room and it was hot and humid. Pt's bp was elevated and he appeared confused. Pt. left AMA few days ago from [facility] after he was admitted for TIA r/o cva. Neurologist recommended SAHIL if echo is negative. Pt. did not stay for full work up. Today pt's ct head and MRI is negative. pt. appears to be somewhat slow in answering questions but is AAOX3 and has no focal deficits. Pt. reports feeling ok and does not report to be feeling different. follows commands. As per record pt. has h/o bipolar disorder ? pt. stated that he has not used his psych meds for long time. Mild MR ? Baseline mental status and speech pattern is not known. no family in house. pt. reports no cp. no HA. no sob. no abd. pain. no n/v/d. last BM yesterday as per pt. pt's compliance to medicines is questionable. pt. also reports not using any breathing machines. 34 y/o male was seen by his visiting nurse and as per recored pt. was sitting in his non AC room and it was hot and humid. Pt's bp was elevated and he appeared confused. Pt. left AMA few days ago from [facility] after he was admitted for TIA r/o cva. Neurologist recommended SAHIL if echo is negative. Pt. did not stay for full work up. Today pt's ct head and MRI is negative. pt. appears to be somewhat slow in answering questions but is AAOX3 and has no focal deficits. Pt. reports feeling ok and does not report to be feeling different. follows commands. As per record pt. has h/o bipolar disorder ? pt. stated that he has not used his psych meds for long time. Mild MR ? pt. does not appear to be a good historian.  Baseline mental status and speech pattern is not known. no family in house. pt. reports no cp. no HA. no sob. no abd. pain. no n/v/d. last BM yesterday as per pt. pt's compliance to medicines is questionable. pt. also reports not using any breathing machines.

## 2018-08-02 NOTE — ED PROVIDER NOTE - NS ED ROS FT
Const: Denies fever, chills  HEENT: Denies blurry vision, sore throat  Neck: Denies neck pain/stiffness  Resp: Denies coughing, SOB  Cardiovascular: Denies CP, palpitations, LE edema  GI: Denies nausea, vomiting, abdominal pain, diarrhea, constipation, blood in stool  : Denies urinary frequency/urgency/dysuria, hematuria  MSK: Denies back pain  Neuro: Denies HA, dizziness, numbness, weakness. +AMS, +aphasia  Skin: Denies rashes.

## 2018-08-02 NOTE — H&P ADULT - PROBLEM SELECTOR PLAN 2
pt's confusion might be related to elevated bp, pt. has no focal neurologic deficits and is AAOX3. baseline ? pt. has flat affect and is not on his psych meds which might be contributing to his slow response to questions. will request for psych consult. As per record pt. was confused . pt's confusion might be related to elevated bp, pt. has no focal neurologic deficits and is AAOX3 at the time of admission. baseline ? pt. has flat affect and is not on his psych meds which might be contributing to his slow response to questions. will request for psych consult. As per record pt. was confused . pt's confusion might be related to elevated bp, pt. has no focal neurologic deficits and is AAOX3 at the time of admission. baseline ?   co 3.1 , pt. reports no smoking. pt. not a good historian , may be smokes ? will give oxygen over night and repeat co level in am. pt. has flat affect and is not on his psych meds which might be contributing to his slow response to questions. will request for psych consult.

## 2018-08-02 NOTE — H&P ADULT - PROBLEM SELECTOR PLAN 4
pt's k is replaced, will repeat and check mag level as well. wbc 14.8, has no fever, non toxic , no bandemia possible reactive.  follow repeat cbc.

## 2018-08-02 NOTE — ED PROVIDER NOTE - CRITICAL CARE PROVIDED
direct patient care (not related to procedure)/documentation/interpretation of diagnostic studies/additional history taking/consultation with other physicians

## 2018-08-02 NOTE — H&P ADULT - PROBLEM/PLAN-3
Cholesterol is bit high, ask her to work on regular exercise and proper diet. Please inform patient.  We will see her on June 14th no problem about the delayed visit.  Thanks   DISPLAY PLAN FREE TEXT

## 2018-08-02 NOTE — ED ADULT NURSE REASSESSMENT NOTE - COMFORT CARE
side rails up/plan of care explained/repositioned/warm blanket provided/wait time explained/darkened lights
repositioned/warm blanket provided/assisted to bedpan/side rails up/wait time explained/plan of care explained/darkened lights

## 2018-08-02 NOTE — ED ADULT NURSE REASSESSMENT NOTE - NS ED NURSE REASSESS COMMENT FT1
pt is poor historian unable to ascertain the full reason for his visit. pt states he was a little shaky today. pt remains hypertensive and hypokalemic. first unit of potassium initiated.
Report received from day RN EW, charting as noted. Pt A&Ox3 offers no complaint at this time. Pt resting comfortably, VSS, no signs of distress at this time, CM in place, awaiting admission, safety maintained, call bell in reach.
Pt A&Ox4 offers no complaint at this time. Pt resting comfortably, VSS, no signs of distress at this time, CM in place, awaiting bed, report given to HR RN SV, moved to CDU 12R, safety maintained, call bell in reach.

## 2018-08-02 NOTE — ED PROVIDER NOTE - MEDICAL DECISION MAKING DETAILS
34 y/o M with uncontrolled HTN presents with aphasia and AMS - recent admission for similar symptoms. Pt never had an MRI or cardiac workup and is severely hypertensive at this time. He is not a code stroke as pt's last well was over 24 hours ago. Will give Labetalol, Clonidine for blood pressure, priority CT, obtain MRI, echo, and discuss importance of admission as well as medication compliance.

## 2018-08-02 NOTE — H&P ADULT - PROBLEM SELECTOR PLAN 3
Appears chronic. pt.'s old blood work from june 2018 also shows RI, Cr trending up will get nephrology dr. Ware group consult. possible related to his htn and h/o DM. Appears chronic. pt.'s old blood work from june 2018 also shows RI, Cr trending up will get nephrology dr. Ware group consult. possible related to his htn and h/o DM. will get renal sonogram.

## 2018-08-02 NOTE — CONSULT NOTE ADULT - PROBLEM SELECTOR RECOMMENDATION 9
uncontrolled Hypertension with altered mental status. MRI negative.  Findings may be due to his uncontrolled bipolar disorder.  Psych consult.   2D echo results pending.   patient need  and need helop at home to give him meds  Will adjust oral meds  clonidine 0.2 mg Q12. labetolol 300 mg Q12. Amlodipine 10 mg daily.

## 2018-08-02 NOTE — ED ADULT NURSE NOTE - OBJECTIVE STATEMENT
Pt A&Ox3 presents to ED with AMS and HTN at this time. Pt resting comfortably, VSS, no signs of distress at this time, CM in place, safety maintained, call bell in reach.

## 2018-08-02 NOTE — H&P ADULT - NSHPPHYSICALEXAM_GEN_ALL_CORE
General: Well developed AA male lying in bed not in distress.   HEENT: AT, NC. PERRL. intact EOM. no throat erythema or exudate.   Neck: supple. no JVD.  Chest: CTA bilaterally  Heart: normal S1,S2. RRR. no heart murmur.  Abdomen: soft. non-tender. obese. + BS.   Ext: no C/C/E. no calf tenderness. ROM of joints intact.  Vascular : + DP b/L.   Neuro: AAO x3. no focal weakness. somewhat slow in responding to questions but follows commands, co-operative.  motor /sensory intact.   Skin: no rash noted. no diaphoresis. no warmth.  Psychiatric : pt. has somewhat flat affect, no acute distress. somewhat slow in responding to questions. no SI/HI. General: Well developed AA male lying in bed not in distress.   HEENT: AT, NC. PERRL. intact EOM. no throat erythema or exudate.   Neck: supple. no JVD.  Chest: CTA bilaterally  Heart: normal S1,S2. RRR. no heart murmur.  Abdomen: soft. non-tender. obese. + BS.   Ext: no C/C/E. no calf tenderness. ROM of joints intact.  Vascular : + DP b/L.   Neuro: AAO x3. no focal weakness. somewhat slow in responding to questions but follows commands, co-operative.  motor /sensory intact.   Skin: no rash noted. no diaphoresis. no warmth.  Psychiatric : pt. has flat affect, no acute distress. somewhat slow in responding to questions. no SI/HI.

## 2018-08-03 DIAGNOSIS — F31.9 BIPOLAR DISORDER, UNSPECIFIED: ICD-10-CM

## 2018-08-03 DIAGNOSIS — F05 DELIRIUM DUE TO KNOWN PHYSIOLOGICAL CONDITION: ICD-10-CM

## 2018-08-03 LAB
ALBUMIN SERPL ELPH-MCNC: 3.3 G/DL — SIGNIFICANT CHANGE UP (ref 3.3–5.2)
ALP SERPL-CCNC: 94 U/L — SIGNIFICANT CHANGE UP (ref 40–120)
ALT FLD-CCNC: 18 U/L — SIGNIFICANT CHANGE UP
ANION GAP SERPL CALC-SCNC: 13 MMOL/L — SIGNIFICANT CHANGE UP (ref 5–17)
ANION GAP SERPL CALC-SCNC: 13 MMOL/L — SIGNIFICANT CHANGE UP (ref 5–17)
APTT BLD: 26.2 SEC — LOW (ref 27.5–37.4)
AST SERPL-CCNC: 16 U/L — SIGNIFICANT CHANGE UP
BASOPHILS # BLD AUTO: 0 K/UL — SIGNIFICANT CHANGE UP (ref 0–0.2)
BASOPHILS NFR BLD AUTO: 0.3 % — SIGNIFICANT CHANGE UP (ref 0–2)
BILIRUB SERPL-MCNC: 0.5 MG/DL — SIGNIFICANT CHANGE UP (ref 0.4–2)
BUN SERPL-MCNC: 22 MG/DL — HIGH (ref 8–20)
BUN SERPL-MCNC: 24 MG/DL — HIGH (ref 8–20)
CALCIUM SERPL-MCNC: 8.9 MG/DL — SIGNIFICANT CHANGE UP (ref 8.6–10.2)
CALCIUM SERPL-MCNC: 9.2 MG/DL — SIGNIFICANT CHANGE UP (ref 8.6–10.2)
CHLORIDE SERPL-SCNC: 97 MMOL/L — LOW (ref 98–107)
CHLORIDE SERPL-SCNC: 98 MMOL/L — SIGNIFICANT CHANGE UP (ref 98–107)
CO2 SERPL-SCNC: 29 MMOL/L — SIGNIFICANT CHANGE UP (ref 22–29)
CO2 SERPL-SCNC: 30 MMOL/L — HIGH (ref 22–29)
COHGB MFR BLDV: 2.4 % — HIGH (ref 0–2)
CREAT SERPL-MCNC: 2.18 MG/DL — HIGH (ref 0.5–1.3)
CREAT SERPL-MCNC: 2.19 MG/DL — HIGH (ref 0.5–1.3)
EOSINOPHIL # BLD AUTO: 0.4 K/UL — SIGNIFICANT CHANGE UP (ref 0–0.5)
EOSINOPHIL NFR BLD AUTO: 3.8 % — SIGNIFICANT CHANGE UP (ref 0–5)
GLUCOSE BLDC GLUCOMTR-MCNC: 144 MG/DL — HIGH (ref 70–99)
GLUCOSE BLDC GLUCOMTR-MCNC: 158 MG/DL — HIGH (ref 70–99)
GLUCOSE BLDC GLUCOMTR-MCNC: 190 MG/DL — HIGH (ref 70–99)
GLUCOSE SERPL-MCNC: 163 MG/DL — HIGH (ref 70–115)
GLUCOSE SERPL-MCNC: 185 MG/DL — HIGH (ref 70–115)
HCT VFR BLD CALC: 34.8 % — LOW (ref 42–52)
HGB BLD CALC-MCNC: 12.1 G/DL — LOW (ref 13–17)
HGB BLD-MCNC: 11.4 G/DL — LOW (ref 14–18)
INR BLD: 1.13 RATIO — SIGNIFICANT CHANGE UP (ref 0.88–1.16)
LYMPHOCYTES # BLD AUTO: 2.3 K/UL — SIGNIFICANT CHANGE UP (ref 1–4.8)
LYMPHOCYTES # BLD AUTO: 22.9 % — SIGNIFICANT CHANGE UP (ref 20–55)
MAGNESIUM SERPL-MCNC: 2 MG/DL — SIGNIFICANT CHANGE UP (ref 1.6–2.6)
MCHC RBC-ENTMCNC: 24.8 PG — LOW (ref 27–31)
MCHC RBC-ENTMCNC: 32.8 G/DL — SIGNIFICANT CHANGE UP (ref 32–36)
MCV RBC AUTO: 75.7 FL — LOW (ref 80–94)
MONOCYTES # BLD AUTO: 0.6 K/UL — SIGNIFICANT CHANGE UP (ref 0–0.8)
MONOCYTES NFR BLD AUTO: 5.6 % — SIGNIFICANT CHANGE UP (ref 3–10)
NEUTROPHILS # BLD AUTO: 6.8 K/UL — SIGNIFICANT CHANGE UP (ref 1.8–8)
NEUTROPHILS NFR BLD AUTO: 67.2 % — SIGNIFICANT CHANGE UP (ref 37–73)
PLATELET # BLD AUTO: 272 K/UL — SIGNIFICANT CHANGE UP (ref 150–400)
POTASSIUM SERPL-MCNC: 2.9 MMOL/L — CRITICAL LOW (ref 3.5–5.3)
POTASSIUM SERPL-MCNC: 3.2 MMOL/L — LOW (ref 3.5–5.3)
POTASSIUM SERPL-SCNC: 2.9 MMOL/L — CRITICAL LOW (ref 3.5–5.3)
POTASSIUM SERPL-SCNC: 3.2 MMOL/L — LOW (ref 3.5–5.3)
PROT SERPL-MCNC: 6.6 G/DL — SIGNIFICANT CHANGE UP (ref 6.6–8.7)
PROTHROM AB SERPL-ACNC: 12.5 SEC — SIGNIFICANT CHANGE UP (ref 9.8–12.7)
RBC # BLD: 4.6 M/UL — SIGNIFICANT CHANGE UP (ref 4.6–6.2)
RBC # FLD: 17.6 % — HIGH (ref 11–15.6)
SODIUM SERPL-SCNC: 139 MMOL/L — SIGNIFICANT CHANGE UP (ref 135–145)
SODIUM SERPL-SCNC: 141 MMOL/L — SIGNIFICANT CHANGE UP (ref 135–145)
TSH SERPL-MCNC: 0.96 UIU/ML — SIGNIFICANT CHANGE UP (ref 0.27–4.2)
VIT B12 SERPL-MCNC: 798 PG/ML — SIGNIFICANT CHANGE UP (ref 232–1245)
WBC # BLD: 9.9 K/UL — SIGNIFICANT CHANGE UP (ref 4.8–10.8)
WBC # FLD AUTO: 9.9 K/UL — SIGNIFICANT CHANGE UP (ref 4.8–10.8)

## 2018-08-03 PROCEDURE — 95819 EEG AWAKE AND ASLEEP: CPT | Mod: 26

## 2018-08-03 PROCEDURE — 99233 SBSQ HOSP IP/OBS HIGH 50: CPT

## 2018-08-03 PROCEDURE — 99222 1ST HOSP IP/OBS MODERATE 55: CPT

## 2018-08-03 PROCEDURE — 76775 US EXAM ABDO BACK WALL LIM: CPT | Mod: 26

## 2018-08-03 PROCEDURE — 93880 EXTRACRANIAL BILAT STUDY: CPT | Mod: 26

## 2018-08-03 RX ORDER — ACETAMINOPHEN 500 MG
650 TABLET ORAL EVERY 6 HOURS
Qty: 0 | Refills: 0 | Status: DISCONTINUED | OUTPATIENT
Start: 2018-08-03 | End: 2018-08-07

## 2018-08-03 RX ORDER — DEXTROSE MONOHYDRATE, SODIUM CHLORIDE, AND POTASSIUM CHLORIDE 50; .745; 4.5 G/1000ML; G/1000ML; G/1000ML
1000 INJECTION, SOLUTION INTRAVENOUS
Qty: 0 | Refills: 0 | Status: DISCONTINUED | OUTPATIENT
Start: 2018-08-03 | End: 2018-08-04

## 2018-08-03 RX ORDER — HYDRALAZINE HCL 50 MG
10 TABLET ORAL ONCE
Qty: 0 | Refills: 0 | Status: COMPLETED | OUTPATIENT
Start: 2018-08-03 | End: 2018-08-03

## 2018-08-03 RX ORDER — POTASSIUM CHLORIDE 20 MEQ
10 PACKET (EA) ORAL
Qty: 0 | Refills: 0 | Status: COMPLETED | OUTPATIENT
Start: 2018-08-03 | End: 2018-08-03

## 2018-08-03 RX ADMIN — Medication 0.2 MILLIGRAM(S): at 05:16

## 2018-08-03 RX ADMIN — Medication 81 MILLIGRAM(S): at 15:37

## 2018-08-03 RX ADMIN — Medication 650 MILLIGRAM(S): at 11:11

## 2018-08-03 RX ADMIN — HEPARIN SODIUM 5000 UNIT(S): 5000 INJECTION INTRAVENOUS; SUBCUTANEOUS at 05:16

## 2018-08-03 RX ADMIN — HEPARIN SODIUM 5000 UNIT(S): 5000 INJECTION INTRAVENOUS; SUBCUTANEOUS at 18:25

## 2018-08-03 RX ADMIN — Medication 100 MILLIEQUIVALENT(S): at 16:21

## 2018-08-03 RX ADMIN — DEXTROSE MONOHYDRATE, SODIUM CHLORIDE, AND POTASSIUM CHLORIDE 100 MILLILITER(S): 50; .745; 4.5 INJECTION, SOLUTION INTRAVENOUS at 18:25

## 2018-08-03 RX ADMIN — Medication 100 MILLIEQUIVALENT(S): at 15:26

## 2018-08-03 RX ADMIN — Medication 650 MILLIGRAM(S): at 09:22

## 2018-08-03 RX ADMIN — Medication 300 MILLIGRAM(S): at 05:16

## 2018-08-03 RX ADMIN — Medication 0.2 MILLIGRAM(S): at 20:26

## 2018-08-03 RX ADMIN — AMLODIPINE BESYLATE 10 MILLIGRAM(S): 2.5 TABLET ORAL at 05:16

## 2018-08-03 RX ADMIN — DEXTROSE MONOHYDRATE, SODIUM CHLORIDE, AND POTASSIUM CHLORIDE 100 MILLILITER(S): 50; .745; 4.5 INJECTION, SOLUTION INTRAVENOUS at 20:26

## 2018-08-03 RX ADMIN — Medication 300 MILLIGRAM(S): at 20:25

## 2018-08-03 NOTE — PROGRESS NOTE ADULT - ASSESSMENT
The patient is a 32 y/o male was seen by his visiting nurse and was found sitting in his non AC room and it was hot and humid. In the ED, noted to have hypertensive urgency with confusion. CT head and MRI of the brain was negative. Patient left AMA  af ew days prior after presenting with AMS. Work up was negative and neurology recommended SAHIL at the time but he left prior to the test.      Assesment/Plan:    1. Hypertensive urgency: secondary to non compliance? Improved now. Continue clonidine, labetalol and norvasc.     2. AMS likley secondary to hypertensive urgency vs underlying psychiatric disorder: Psychiatry consulted for follow up  CT head and MRI of the brain were negative.   EEG ordered  Neurology following     3. MELISSA with underlying CKD: Renal ultrasound ordered. Renal consulted for recommendations  Creatinine in 12/17- 2.55     4. Hypokalemia: Replace KCL>MOnitor lytes     VTE_ heparin subcut    Social work consulted. Left message for the [employer] for a list of home medications for patient. Awaiting call back. The patient is a 32 y/o male was seen by his visiting nurse and was found sitting in his non AC room and it was hot and humid. In the ED, noted to have hypertensive urgency with confusion. CT head and MRI of the brain was negative. Patient left AMA  af ew days prior after presenting with AMS. Work up was negative and neurology recommended SAHIL at the time but he left prior to the test.      Assesment/Plan:    1. Hypertensive urgency: secondary to non compliance? Improved now. Continue clonidine, labetalol and norvasc.     2. AMS likley secondary to hypertensive urgency vs underlying psychiatric disorder: Psychiatry consulted for follow up  CT head and MRI of the brain were negative.   EEG ordered  Neurology following     I spoke with patient's physician and worker at [facility]/ act- patient was previously on Neurontin and lithium. He was well controlled and able to care for himself and appropriately conversive. His medications were discontinued due to worsening renal failure a few months ago. Since then patient was started on Invega but they have been unable to appropriately control his mood. Suspect patient is not caring for himself at home and that he has not been taking his medications. He lives at home with his father who is an alcoholic, elderly grandmother and 2 sisters who are mentally disabled as well.     3. MELISSA with underlying CKD: Renal ultrasound ordered. Renal consulted for recommendations  Creatinine in 12/17- 2.55     4. Hypokalemia: Replace KCL>MOnitor lytes     VTE_ heparin subcut    Social work consulted. Left message for the [employer] for a list of home medications for patient. Awaiting call back.

## 2018-08-03 NOTE — BEHAVIORAL HEALTH ASSESSMENT NOTE - SUMMARY
Pt is a 32 y/o male with PMHx of HTN, DM, Sleep Apnea, Bipolar disorder admitted to [facility] for Hypertensive Urgency, hypokalemia, renal insuffiencey. being consulted for confusion and hx of bipolar disorder. Doubtful pt has been compliant with medications. SW spoke with ACT team, sent over medication list. Patient refusing to participant during encounter.  recovering from hypoactive delirious state unable to make acute psychiatric diagnoses for reason for confusion. Hypertensive urgency vs bipolar exacerbation

## 2018-08-03 NOTE — CONSULT NOTE ADULT - ASSESSMENT
MELISSA on CKD suspect 2/2 hypoperfusion from dehydration  was found sitting in non AC room - confused  HypoKalemia supplementation ongoing  Will adjust IVF add K+  HTN BP on high side resume home meds  doubtful that pt was compliant w meds    Will follow

## 2018-08-03 NOTE — BEHAVIORAL HEALTH ASSESSMENT NOTE - OTHER PAST PSYCHIATRIC HISTORY (INCLUDE DETAILS REGARDING ONSET, COURSE OF ILLNESS, INPATIENT/OUTPATIENT TREATMENT)
was a client of [facility] in [place] until 2015. Now seen by ACT team from [facility]. unable to obtain any other information

## 2018-08-03 NOTE — BEHAVIORAL HEALTH ASSESSMENT NOTE - PROBLEM SELECTOR PLAN 1
recovering from hypoactive delirious state unable to make acute psychiatric diagnoses for reason for confusion. continue care per primary team. no further recommendations. restart medication.

## 2018-08-03 NOTE — BEHAVIORAL HEALTH ASSESSMENT NOTE - NSBHCHARTREVIEWIMAGING_PSY_A_CORE FT
< from: MR Head No Cont (08.02.18 @ 15:42) >    IMPRESSION: Unremarkable MRI of the brain.    < end of copied text >    < from: CT Head No Cont (08.02.18 @ 12:30) >        < end of copied text >

## 2018-08-03 NOTE — BEHAVIORAL HEALTH ASSESSMENT NOTE - NSBHMEDSOTHERFT_PSY_A_CORE
Amlodipine 10mg QD, benztropine 0.5mg BID, hydralazine 100mg four times a day, Invega 6mg extended release QD, Invega sustenna 234mg/1.5ml IM [id number] last dose 6/5/18, Lamictal 100mg take 1/2 tab PO BID metoprolol tartrate 100mg-hydrochlorothiazide 50 mg tab PO BID, Seroquel 100mg PO QD

## 2018-08-03 NOTE — BEHAVIORAL HEALTH ASSESSMENT NOTE - NSBHCHARTREVIEWLAB_PSY_A_CORE FT
11.4   9.9   )-----------( 272      ( 03 Aug 2018 10:36 )             34.8     08-03    139  |  97<L>  |  22.0<H>  ----------------------------<  185<H>  2.9<LL>   |  29.0  |  2.19<H>    Ca    9.2      03 Aug 2018 10:36  Mg     2.0     08-03    TPro  6.6  /  Alb  3.3  /  TBili  0.5  /  DBili  x   /  AST  16  /  ALT  18  /  AlkPhos  94  08-03    LIVER FUNCTIONS - ( 03 Aug 2018 10:36 )  Alb: 3.3 g/dL / Pro: 6.6 g/dL / ALK PHOS: 94 U/L / ALT: 18 U/L / AST: 16 U/L / GGT: x             Urinalysis Basic - ( 02 Aug 2018 21:19 )    Color: Yellow / Appearance: Clear / S.005 / pH: x  Gluc: x / Ketone: Negative  / Bili: Negative / Urobili: Negative mg/dL   Blood: x / Protein: 100 mg/dL / Nitrite: Negative   Leuk Esterase: Negative / RBC: 0-2 /HPF / WBC 0-2   Sq Epi: x / Non Sq Epi: Occasional / Bacteria: Occasional

## 2018-08-03 NOTE — CONSULT NOTE ADULT - SUBJECTIVE AND OBJECTIVE BOX
HPI:  34 y/o male was seen by his visiting nurse and as per recored pt. was sitting in his non AC room and it was hot and humid. Pt's bp was elevated and he appeared confused. Pt. left AMA few days ago from [facility] after he was admitted for TIA r/o cva. Neurologist recommended SAHIL if echo is negative. Pt. did not stay for full work up. Today pt's ct head and MRI is negative. pt. appears to be somewhat slow in answering questions but is AAOX3 and has no focal deficits. Pt. reports feeling ok and does not report to be feeling different. follows commands. As per record pt. has h/o bipolar disorder ? pt. stated that he has not used his psych meds for long time. Mild MR ? pt. does not appear to be a good historian.  Baseline mental status and speech pattern is not known. no family in house. pt. reports no cp. no HA. no sob. no abd. pain. no n/v/d. last BM yesterday as per pt. pt's compliance to medicines is questionable. found to have cr 2.1 and K 2.9 cr 2.5 yest.   ROS per HPI      PAST MEDICAL & SURGICAL HISTORY:  HTN (hypertension)  Sleep apnea  Diabetes  Bipolar 1 disorder  Hypertension    FAMILY HISTORY:  Family history of diabetes mellitus (Grandparent)  [place]    Social History:Non smoker    MEDICATIONS  (STANDING):  amLODIPine   Tablet 10 milliGRAM(s) Oral daily  aspirin enteric coated 81 milliGRAM(s) Oral daily  cloNIDine 0.2 milliGRAM(s) Oral every 12 hours  dextrose 5% + sodium chloride 0.45% with potassium chloride 20 mEq/L 1000 milliLiter(s) (100 mL/Hr) IV Continuous <Continuous>  dextrose 5%. 1000 milliLiter(s) (50 mL/Hr) IV Continuous <Continuous>  dextrose 50% Injectable 12.5 Gram(s) IV Push once  dextrose 50% Injectable 25 Gram(s) IV Push once  dextrose 50% Injectable 25 Gram(s) IV Push once  heparin  Injectable 5000 Unit(s) SubCutaneous every 12 hours  insulin lispro (HumaLOG) corrective regimen sliding scale   SubCutaneous three times a day before meals  insulin lispro (HumaLOG) corrective regimen sliding scale   SubCutaneous at bedtime  labetalol 300 milliGRAM(s) Oral every 12 hours  potassium chloride  10 mEq/100 mL IVPB 10 milliEquivalent(s) IV Intermittent every 1 hour    MEDICATIONS  (PRN):  acetaminophen   Tablet. 650 milliGRAM(s) Oral every 6 hours PRN Moderate Pain (4 - 6)  dextrose 40% Gel 15 Gram(s) Oral once PRN Blood Glucose LESS THAN 70 milliGRAM(s)/deciliter  glucagon  Injectable 1 milliGRAM(s) IntraMuscular once PRN Glucose LESS THAN 70 milligrams/deciliter   Meds reviewed    Vital Signs Last 24 Hrs  T(C): 36.8 (03 Aug 2018 07:52), Max: 37.1 (02 Aug 2018 19:26)  T(F): 98.2 (03 Aug 2018 07:52), Max: 98.8 (03 Aug 2018 00:24)  HR: 71 (03 Aug 2018 07:52) (63 - 78)  BP: 154/105 (03 Aug 2018 07:52) (154/105 - 210/99)  BP(mean): --  RR: 19 (03 Aug 2018 07:52) (16 - 22)  SpO2: 99% (03 Aug 2018 07:52) (96% - 100%)  Daily         PHYSICAL EXAM:    GENERAL: appears chronically ill  HEAD:  Atraumatic, Normocephalic  EYES: EOMI  NECK: Supple, neck  veins full  NERVOUS SYSTEM:  Alert & Oriented X1, confused  CHEST/LUNG: Clear to percussion bilaterally; No rales  HEART: Regular rate and rhythm; No murmurs  ABDOMEN: Soft, Nontender, Nondistended; Bowel sounds present  EXTREMITIES:  No edema      LABS:                        11.4   9.9   )-----------( 272      ( 03 Aug 2018 10:36 )             34.8     08-03    139  |  97<L>  |  22.0<H>  ----------------------------<  185<H>  2.9<LL>   |  29.0  |  2.19<H>    Ca    9.2      03 Aug 2018 10:36  Mg     2.0     08-03    TPro  6.6  /  Alb  3.3  /  TBili  0.5  /  DBili  x   /  AST  16  /  ALT  18  /  AlkPhos  94  08-03      Urinalysis Basic - ( 02 Aug 2018 21:19 )    Color: Yellow / Appearance: Clear / S.005 / pH: x  Gluc: x / Ketone: Negative  / Bili: Negative / Urobili: Negative mg/dL   Blood: x / Protein: 100 mg/dL / Nitrite: Negative   Leuk Esterase: Negative / RBC: 0-2 /HPF / WBC 0-2   Sq Epi: x / Non Sq Epi: Occasional / Bacteria: Occasional      Magnesium, Serum: 2.0 mg/dL (08-03 @ 10:36)          RADIOLOGY & ADDITIONAL TESTS:

## 2018-08-03 NOTE — BEHAVIORAL HEALTH ASSESSMENT NOTE - HPI (INCLUDE ILLNESS QUALITY, SEVERITY, DURATION, TIMING, CONTEXT, MODIFYING FACTORS, ASSOCIATED SIGNS AND SYMPTOMS)
pt is 34 y/o male with PMHx HTN, DM, Sleep apnea and bipolar disorder, presenting to ED after visiting nurse found patient sitting in non-AC room, confused with elevated BP. Admitted to hospital for hypertensive urgency, hypokalemia, renal insuffiencey and confusion, consulted for confusion and hx of bipolar disorder. Multiple psychiatric admissions in past, multiple ED visits. Chart reviewed and patient seen laying in bed. Pt unable to explain details of why he is here, but oriented to place, person, and time. When questioning patient regarding background information, PMHx, psychiatric history, patient states " I don't know people keep asking me all these questions, why does all this matter." When questioned patient on cigarettes use, alcohol use, drug use patient states " I don't understand what this has to do with what's going on." patient then refused to answer further questions.  Pt denies SI, HI, AH, VH, delusions, vegetative symptoms of depression. Doubtful patient is being compliant with medication, hx of non-compliance. SW contact ACT team for medication list.

## 2018-08-03 NOTE — BEHAVIORAL HEALTH ASSESSMENT NOTE - NSBHCHARTREVIEWVS_PSY_A_CORE FT
T(C): 36.7 (03 Aug 2018 14:32), Max: 37.1 (02 Aug 2018 19:26)  T(F): 98 (03 Aug 2018 14:32), Max: 98.8 (03 Aug 2018 00:24)  HR: 57 (03 Aug 2018 14:32) (57 - 78)  BP: 128/83 (03 Aug 2018 14:32) (128/83 - 210/99)  RR: 20 (03 Aug 2018 14:32) (17 - 22)  SpO2: 99% (03 Aug 2018 14:32) (96% - 99%)

## 2018-08-04 DIAGNOSIS — G21.11 NEUROLEPTIC INDUCED PARKINSONISM: ICD-10-CM

## 2018-08-04 LAB
ANION GAP SERPL CALC-SCNC: 11 MMOL/L — SIGNIFICANT CHANGE UP (ref 5–17)
ANION GAP SERPL CALC-SCNC: 13 MMOL/L — SIGNIFICANT CHANGE UP (ref 5–17)
BUN SERPL-MCNC: 23 MG/DL — HIGH (ref 8–20)
BUN SERPL-MCNC: 23 MG/DL — HIGH (ref 8–20)
CALCIUM SERPL-MCNC: 8.9 MG/DL — SIGNIFICANT CHANGE UP (ref 8.6–10.2)
CALCIUM SERPL-MCNC: 9.1 MG/DL — SIGNIFICANT CHANGE UP (ref 8.6–10.2)
CHLORIDE SERPL-SCNC: 100 MMOL/L — SIGNIFICANT CHANGE UP (ref 98–107)
CHLORIDE SERPL-SCNC: 101 MMOL/L — SIGNIFICANT CHANGE UP (ref 98–107)
CK SERPL-CCNC: 125 U/L — SIGNIFICANT CHANGE UP (ref 30–200)
CO2 SERPL-SCNC: 27 MMOL/L — SIGNIFICANT CHANGE UP (ref 22–29)
CO2 SERPL-SCNC: 29 MMOL/L — SIGNIFICANT CHANGE UP (ref 22–29)
CREAT SERPL-MCNC: 2.04 MG/DL — HIGH (ref 0.5–1.3)
CREAT SERPL-MCNC: 2.22 MG/DL — HIGH (ref 0.5–1.3)
GLUCOSE BLDC GLUCOMTR-MCNC: 121 MG/DL — HIGH (ref 70–99)
GLUCOSE BLDC GLUCOMTR-MCNC: 122 MG/DL — HIGH (ref 70–99)
GLUCOSE BLDC GLUCOMTR-MCNC: 133 MG/DL — HIGH (ref 70–99)
GLUCOSE BLDC GLUCOMTR-MCNC: 136 MG/DL — HIGH (ref 70–99)
GLUCOSE SERPL-MCNC: 127 MG/DL — HIGH (ref 70–115)
GLUCOSE SERPL-MCNC: 137 MG/DL — HIGH (ref 70–115)
HCT VFR BLD CALC: 34.5 % — LOW (ref 42–52)
HGB BLD-MCNC: 11.7 G/DL — LOW (ref 14–18)
MAGNESIUM SERPL-MCNC: 2.1 MG/DL — SIGNIFICANT CHANGE UP (ref 1.6–2.6)
MCHC RBC-ENTMCNC: 25.3 PG — LOW (ref 27–31)
MCHC RBC-ENTMCNC: 33.9 G/DL — SIGNIFICANT CHANGE UP (ref 32–36)
MCV RBC AUTO: 74.7 FL — LOW (ref 80–94)
PLATELET # BLD AUTO: 288 K/UL — SIGNIFICANT CHANGE UP (ref 150–400)
POTASSIUM SERPL-MCNC: 3 MMOL/L — LOW (ref 3.5–5.3)
POTASSIUM SERPL-MCNC: 3.3 MMOL/L — LOW (ref 3.5–5.3)
POTASSIUM SERPL-SCNC: 3 MMOL/L — LOW (ref 3.5–5.3)
POTASSIUM SERPL-SCNC: 3.3 MMOL/L — LOW (ref 3.5–5.3)
RBC # BLD: 4.62 M/UL — SIGNIFICANT CHANGE UP (ref 4.6–6.2)
RBC # FLD: 17.7 % — HIGH (ref 11–15.6)
SODIUM SERPL-SCNC: 140 MMOL/L — SIGNIFICANT CHANGE UP (ref 135–145)
SODIUM SERPL-SCNC: 141 MMOL/L — SIGNIFICANT CHANGE UP (ref 135–145)
WBC # BLD: 11.7 K/UL — HIGH (ref 4.8–10.8)
WBC # FLD AUTO: 11.7 K/UL — HIGH (ref 4.8–10.8)

## 2018-08-04 PROCEDURE — 99233 SBSQ HOSP IP/OBS HIGH 50: CPT

## 2018-08-04 RX ORDER — BENZTROPINE MESYLATE 1 MG
0.5 TABLET ORAL
Qty: 0 | Refills: 0 | Status: DISCONTINUED | OUTPATIENT
Start: 2018-08-04 | End: 2018-08-07

## 2018-08-04 RX ORDER — POTASSIUM CHLORIDE 20 MEQ
40 PACKET (EA) ORAL EVERY 4 HOURS
Qty: 0 | Refills: 0 | Status: DISCONTINUED | OUTPATIENT
Start: 2018-08-04 | End: 2018-08-04

## 2018-08-04 RX ORDER — DEXTROSE MONOHYDRATE, SODIUM CHLORIDE, AND POTASSIUM CHLORIDE 50; .745; 4.5 G/1000ML; G/1000ML; G/1000ML
1000 INJECTION, SOLUTION INTRAVENOUS
Qty: 0 | Refills: 0 | Status: DISCONTINUED | OUTPATIENT
Start: 2018-08-04 | End: 2018-08-05

## 2018-08-04 RX ORDER — POTASSIUM CHLORIDE 20 MEQ
10 PACKET (EA) ORAL
Qty: 0 | Refills: 0 | Status: COMPLETED | OUTPATIENT
Start: 2018-08-04 | End: 2018-08-04

## 2018-08-04 RX ORDER — POTASSIUM CHLORIDE 20 MEQ
40 PACKET (EA) ORAL DAILY
Qty: 0 | Refills: 0 | Status: DISCONTINUED | OUTPATIENT
Start: 2018-08-04 | End: 2018-08-07

## 2018-08-04 RX ORDER — HYDRALAZINE HCL 50 MG
50 TABLET ORAL EVERY 8 HOURS
Qty: 0 | Refills: 0 | Status: DISCONTINUED | OUTPATIENT
Start: 2018-08-04 | End: 2018-08-06

## 2018-08-04 RX ORDER — LAMOTRIGINE 25 MG/1
50 TABLET, ORALLY DISINTEGRATING ORAL
Qty: 0 | Refills: 0 | Status: DISCONTINUED | OUTPATIENT
Start: 2018-08-04 | End: 2018-08-07

## 2018-08-04 RX ADMIN — Medication 50 MILLIGRAM(S): at 15:08

## 2018-08-04 RX ADMIN — Medication 0.2 MILLIGRAM(S): at 06:06

## 2018-08-04 RX ADMIN — Medication 40 MILLIEQUIVALENT(S): at 06:36

## 2018-08-04 RX ADMIN — DEXTROSE MONOHYDRATE, SODIUM CHLORIDE, AND POTASSIUM CHLORIDE 75 MILLILITER(S): 50; .745; 4.5 INJECTION, SOLUTION INTRAVENOUS at 15:08

## 2018-08-04 RX ADMIN — HEPARIN SODIUM 5000 UNIT(S): 5000 INJECTION INTRAVENOUS; SUBCUTANEOUS at 06:06

## 2018-08-04 RX ADMIN — Medication 0.2 MILLIGRAM(S): at 17:53

## 2018-08-04 RX ADMIN — AMLODIPINE BESYLATE 10 MILLIGRAM(S): 2.5 TABLET ORAL at 06:06

## 2018-08-04 RX ADMIN — Medication 300 MILLIGRAM(S): at 06:07

## 2018-08-04 RX ADMIN — HEPARIN SODIUM 5000 UNIT(S): 5000 INJECTION INTRAVENOUS; SUBCUTANEOUS at 17:54

## 2018-08-04 RX ADMIN — Medication 100 MILLIEQUIVALENT(S): at 08:00

## 2018-08-04 RX ADMIN — Medication 300 MILLIGRAM(S): at 17:53

## 2018-08-04 RX ADMIN — Medication 0.5 MILLIGRAM(S): at 17:53

## 2018-08-04 RX ADMIN — LAMOTRIGINE 50 MILLIGRAM(S): 25 TABLET, ORALLY DISINTEGRATING ORAL at 17:54

## 2018-08-04 RX ADMIN — Medication 100 MILLIEQUIVALENT(S): at 10:08

## 2018-08-04 RX ADMIN — Medication 10 MILLIGRAM(S): at 00:02

## 2018-08-04 RX ADMIN — Medication 100 MILLIEQUIVALENT(S): at 06:52

## 2018-08-04 RX ADMIN — Medication 50 MILLIGRAM(S): at 21:41

## 2018-08-04 RX ADMIN — Medication 81 MILLIGRAM(S): at 12:21

## 2018-08-04 NOTE — PROGRESS NOTE ADULT - ASSESSMENT
The patient is a 32 y/o male was seen by his visiting nurse and was found sitting in his non AC room and it was hot and humid. In the ED, noted to have hypertensive urgency with confusion. CT head and MRI of the brain was negative. Patient left AMA  af ew days prior after presenting with AMS. Work up was negative and neurology recommended SAHIL at the time but he left prior to the test.      Assesment/Plan:    1. Hypertensive urgency: secondary to non compliance? Improved now. Continue clonidine, labetalol and norvasc. Add hydralazine. Monitor bp     2. AMS likley secondary to hypertensive urgency vs underlying psychiatric disorder: Psychiatry consulted for follow up. Spoke with Dr esposito, likely secondary to non compliance with medications.  Mild neuroleptic malignant syndrome? with elevated ck on admission. Seroquel and invega were held and CK since normalized.   CT head and MRI of the brain were negative.   EEG negative.   Neurology following     I spoke with patient's physician and worker at [facility]/ [facility]- patient was previously on Neurontin and lithium. He was well controlled and able to care for himself and appropriately conversive. His medications were discontinued due to worsening renal failure a few months ago. Since then patient was started on Invega but they have been unable to appropriately control his mood. Suspect patient is not caring for himself at home and that he has not been taking his medications. He lives at home with his father who is an alcoholic, elderly grandmother and 2 sisters who are mentally disabled as well.     3. MELISSA with underlying CKD: Renal ultrasound reviewed.   Continue iv fluids with potassiu,  Creatinine in 12/17- 2.55     4. Hypokalemia: Replace KCL>MOnitor lytes     5. NSVT: Continue labetalol, telemetry monitoring. Replace lytes     VTE_ heparin subcut

## 2018-08-04 NOTE — PROGRESS NOTE ADULT - ASSESSMENT
MELISSA on CKD suspect 2/2 hypoperfusion from dehydration  also has mild rhabdo  was found sitting in non AC room - confused  HypoKalemia supplementation ongoing  Will add daily po KCL  cont IVF w added K+  HTN BP on high side watch on home meds  doubtful that pt was compliant w meds    Will follow

## 2018-08-04 NOTE — PROGRESS NOTE ADULT - PROBLEM SELECTOR PLAN 3
please repeat CPK, and monitor blood work aldolase and urine for myoglobin  BP control  no need to consider dantrolene or parlodel at present  monitor and mobilize  consider Psych consult re: medications

## 2018-08-05 LAB
ANION GAP SERPL CALC-SCNC: 11 MMOL/L — SIGNIFICANT CHANGE UP (ref 5–17)
BUN SERPL-MCNC: 27 MG/DL — HIGH (ref 8–20)
CALCIUM SERPL-MCNC: 9 MG/DL — SIGNIFICANT CHANGE UP (ref 8.6–10.2)
CHLORIDE SERPL-SCNC: 106 MMOL/L — SIGNIFICANT CHANGE UP (ref 98–107)
CO2 SERPL-SCNC: 27 MMOL/L — SIGNIFICANT CHANGE UP (ref 22–29)
CREAT SERPL-MCNC: 2.42 MG/DL — HIGH (ref 0.5–1.3)
GLUCOSE BLDC GLUCOMTR-MCNC: 103 MG/DL — HIGH (ref 70–99)
GLUCOSE BLDC GLUCOMTR-MCNC: 130 MG/DL — HIGH (ref 70–99)
GLUCOSE BLDC GLUCOMTR-MCNC: 131 MG/DL — HIGH (ref 70–99)
GLUCOSE BLDC GLUCOMTR-MCNC: 178 MG/DL — HIGH (ref 70–99)
GLUCOSE SERPL-MCNC: 112 MG/DL — SIGNIFICANT CHANGE UP (ref 70–115)
HCT VFR BLD CALC: 34.7 % — LOW (ref 42–52)
HGB BLD-MCNC: 11.5 G/DL — LOW (ref 14–18)
MCHC RBC-ENTMCNC: 25 PG — LOW (ref 27–31)
MCHC RBC-ENTMCNC: 33.1 G/DL — SIGNIFICANT CHANGE UP (ref 32–36)
MCV RBC AUTO: 75.4 FL — LOW (ref 80–94)
PLATELET # BLD AUTO: 316 K/UL — SIGNIFICANT CHANGE UP (ref 150–400)
POTASSIUM SERPL-MCNC: 3.6 MMOL/L — SIGNIFICANT CHANGE UP (ref 3.5–5.3)
POTASSIUM SERPL-SCNC: 3.6 MMOL/L — SIGNIFICANT CHANGE UP (ref 3.5–5.3)
RBC # BLD: 4.6 M/UL — SIGNIFICANT CHANGE UP (ref 4.6–6.2)
RBC # FLD: 18 % — HIGH (ref 11–15.6)
SODIUM SERPL-SCNC: 144 MMOL/L — SIGNIFICANT CHANGE UP (ref 135–145)
WBC # BLD: 11.5 K/UL — HIGH (ref 4.8–10.8)
WBC # FLD AUTO: 11.5 K/UL — HIGH (ref 4.8–10.8)

## 2018-08-05 PROCEDURE — 99233 SBSQ HOSP IP/OBS HIGH 50: CPT

## 2018-08-05 RX ORDER — DEXTROSE MONOHYDRATE, SODIUM CHLORIDE, AND POTASSIUM CHLORIDE 50; .745; 4.5 G/1000ML; G/1000ML; G/1000ML
1000 INJECTION, SOLUTION INTRAVENOUS
Qty: 0 | Refills: 0 | Status: DISCONTINUED | OUTPATIENT
Start: 2018-08-05 | End: 2018-08-07

## 2018-08-05 RX ORDER — QUETIAPINE FUMARATE 200 MG/1
100 TABLET, FILM COATED ORAL AT BEDTIME
Qty: 0 | Refills: 0 | Status: DISCONTINUED | OUTPATIENT
Start: 2018-08-05 | End: 2018-08-07

## 2018-08-05 RX ADMIN — LAMOTRIGINE 50 MILLIGRAM(S): 25 TABLET, ORALLY DISINTEGRATING ORAL at 05:21

## 2018-08-05 RX ADMIN — Medication 50 MILLIGRAM(S): at 13:10

## 2018-08-05 RX ADMIN — DEXTROSE MONOHYDRATE, SODIUM CHLORIDE, AND POTASSIUM CHLORIDE 100 MILLILITER(S): 50; .745; 4.5 INJECTION, SOLUTION INTRAVENOUS at 17:48

## 2018-08-05 RX ADMIN — AMLODIPINE BESYLATE 10 MILLIGRAM(S): 2.5 TABLET ORAL at 05:21

## 2018-08-05 RX ADMIN — QUETIAPINE FUMARATE 100 MILLIGRAM(S): 200 TABLET, FILM COATED ORAL at 21:05

## 2018-08-05 RX ADMIN — Medication 50 MILLIGRAM(S): at 05:20

## 2018-08-05 RX ADMIN — Medication 300 MILLIGRAM(S): at 17:39

## 2018-08-05 RX ADMIN — HEPARIN SODIUM 5000 UNIT(S): 5000 INJECTION INTRAVENOUS; SUBCUTANEOUS at 21:06

## 2018-08-05 RX ADMIN — Medication 50 MILLIGRAM(S): at 21:05

## 2018-08-05 RX ADMIN — Medication 0.5 MILLIGRAM(S): at 17:39

## 2018-08-05 RX ADMIN — Medication 0.2 MILLIGRAM(S): at 05:20

## 2018-08-05 RX ADMIN — Medication 0.2 MILLIGRAM(S): at 17:39

## 2018-08-05 RX ADMIN — Medication 300 MILLIGRAM(S): at 05:21

## 2018-08-05 RX ADMIN — LAMOTRIGINE 50 MILLIGRAM(S): 25 TABLET, ORALLY DISINTEGRATING ORAL at 17:39

## 2018-08-05 RX ADMIN — HEPARIN SODIUM 5000 UNIT(S): 5000 INJECTION INTRAVENOUS; SUBCUTANEOUS at 11:23

## 2018-08-05 RX ADMIN — Medication 40 MILLIEQUIVALENT(S): at 11:23

## 2018-08-05 RX ADMIN — Medication 0.5 MILLIGRAM(S): at 05:21

## 2018-08-05 RX ADMIN — Medication 81 MILLIGRAM(S): at 11:23

## 2018-08-05 NOTE — PROGRESS NOTE ADULT - ASSESSMENT
MELISSA on CKD suspect 2/2 hypoperfusion from dehydration poor po intake  also has mild rhabdo  was found sitting in non AC room - confused recent changes to [facility] meds to to rise in cr  HypoKalemia supplementation ongoing   cont IVF w added K+  HTN BP better watch on home meds   doubtful that pt was compliant w meds    Will follow

## 2018-08-05 NOTE — PROGRESS NOTE ADULT - ASSESSMENT
The patient is a 34 y/o male was seen by his visiting nurse and was found sitting in his non AC room and it was hot and humid. In the ED, noted to have hypertensive urgency with confusion. CT head and MRI of the brain was negative. Patient left AMA  af ew days prior after presenting with AMS. Work up was negative and neurology recommended SAHIL at the time but he left prior to the test.      Assesment/Plan:    1. Hypertensive urgency: secondary to non complianced. Improved now. Continue clonidine, labetalol and norvasc. Add hydralazine. Monitor bp     2. AMS likley secondary to hypertensive urgency vs underlying psychiatric disorder: Psychiatry consulted for follow up. Spoke with Dr esposito, likely secondary to non compliance with medications.  Mild neuroleptic malignant syndrome? with elevated ck on admission. CK now normalized. Resume seroquel and invega as outpatient.   CT head and MRI of the brain were negative.   EEG negative.   Neurology following     I spoke with patient's physician and worker at [facility]/ [facility]- patient was previously on Neurontin and lithium. He was well controlled and able to care for himself and appropriately conversive. His medications were discontinued due to worsening renal failure a few months ago. Since then patient was started on Invega but they have been unable to appropriately control his mood. Suspect patient is not caring for himself at home and that he has not been taking his medications. He lives at home with his father who is an alcoholic, elderly grandmother and 2 sisters who are mentally disabled as well.     3. MELISSA with underlying CKD: Renal ultrasound reviewed.   hold iv fluids today.   Creatinine in 12/17- 2.55     4. Hypokalemia: repleted. continue daily supplementation. monitor bmp     5. NSVT: Continue labetalol, telemetry monitoring.    VTE_ heparin subcut

## 2018-08-06 ENCOUNTER — TRANSCRIPTION ENCOUNTER (OUTPATIENT)
Age: 33
End: 2018-08-06

## 2018-08-06 LAB
ALDOLASE SERPL-CCNC: 5.7 U/L — SIGNIFICANT CHANGE UP (ref 3.3–10.3)
ANION GAP SERPL CALC-SCNC: 12 MMOL/L — SIGNIFICANT CHANGE UP (ref 5–17)
BUN SERPL-MCNC: 30 MG/DL — HIGH (ref 8–20)
CALCIUM SERPL-MCNC: 9.3 MG/DL — SIGNIFICANT CHANGE UP (ref 8.6–10.2)
CHLORIDE SERPL-SCNC: 107 MMOL/L — SIGNIFICANT CHANGE UP (ref 98–107)
CO2 SERPL-SCNC: 26 MMOL/L — SIGNIFICANT CHANGE UP (ref 22–29)
CREAT SERPL-MCNC: 2.06 MG/DL — HIGH (ref 0.5–1.3)
GLUCOSE BLDC GLUCOMTR-MCNC: 100 MG/DL — HIGH (ref 70–99)
GLUCOSE BLDC GLUCOMTR-MCNC: 109 MG/DL — HIGH (ref 70–99)
GLUCOSE BLDC GLUCOMTR-MCNC: 113 MG/DL — HIGH (ref 70–99)
GLUCOSE BLDC GLUCOMTR-MCNC: 115 MG/DL — HIGH (ref 70–99)
GLUCOSE SERPL-MCNC: 115 MG/DL — SIGNIFICANT CHANGE UP (ref 70–115)
POTASSIUM SERPL-MCNC: 3.9 MMOL/L — SIGNIFICANT CHANGE UP (ref 3.5–5.3)
POTASSIUM SERPL-SCNC: 3.9 MMOL/L — SIGNIFICANT CHANGE UP (ref 3.5–5.3)
SODIUM SERPL-SCNC: 145 MMOL/L — SIGNIFICANT CHANGE UP (ref 135–145)

## 2018-08-06 PROCEDURE — 99233 SBSQ HOSP IP/OBS HIGH 50: CPT

## 2018-08-06 RX ORDER — HYDRALAZINE HCL 50 MG
75 TABLET ORAL EVERY 8 HOURS
Qty: 0 | Refills: 0 | Status: DISCONTINUED | OUTPATIENT
Start: 2018-08-06 | End: 2018-08-07

## 2018-08-06 RX ADMIN — Medication 0.5 MILLIGRAM(S): at 05:53

## 2018-08-06 RX ADMIN — Medication 40 MILLIEQUIVALENT(S): at 12:49

## 2018-08-06 RX ADMIN — Medication 0.2 MILLIGRAM(S): at 05:53

## 2018-08-06 RX ADMIN — HEPARIN SODIUM 5000 UNIT(S): 5000 INJECTION INTRAVENOUS; SUBCUTANEOUS at 22:01

## 2018-08-06 RX ADMIN — HEPARIN SODIUM 5000 UNIT(S): 5000 INJECTION INTRAVENOUS; SUBCUTANEOUS at 12:49

## 2018-08-06 RX ADMIN — Medication 0.2 MILLIGRAM(S): at 18:05

## 2018-08-06 RX ADMIN — Medication 0.5 MILLIGRAM(S): at 18:04

## 2018-08-06 RX ADMIN — Medication 300 MILLIGRAM(S): at 05:53

## 2018-08-06 RX ADMIN — Medication 75 MILLIGRAM(S): at 22:02

## 2018-08-06 RX ADMIN — Medication 81 MILLIGRAM(S): at 12:49

## 2018-08-06 RX ADMIN — AMLODIPINE BESYLATE 10 MILLIGRAM(S): 2.5 TABLET ORAL at 05:53

## 2018-08-06 RX ADMIN — Medication 75 MILLIGRAM(S): at 15:56

## 2018-08-06 RX ADMIN — QUETIAPINE FUMARATE 100 MILLIGRAM(S): 200 TABLET, FILM COATED ORAL at 22:03

## 2018-08-06 RX ADMIN — LAMOTRIGINE 50 MILLIGRAM(S): 25 TABLET, ORALLY DISINTEGRATING ORAL at 05:53

## 2018-08-06 RX ADMIN — LAMOTRIGINE 50 MILLIGRAM(S): 25 TABLET, ORALLY DISINTEGRATING ORAL at 18:05

## 2018-08-06 RX ADMIN — Medication 50 MILLIGRAM(S): at 05:53

## 2018-08-06 RX ADMIN — Medication 300 MILLIGRAM(S): at 18:05

## 2018-08-06 NOTE — DISCHARGE NOTE ADULT - PLAN OF CARE
Monitor for recurrent symptoms Secondary to hypertensive urgency  now resolved Improved  Continue clonidine, norvasc, labetalol and hydralazine as prescribed.   It is very  important to take your blood pressure medications daily as prescribed COntinue outpatient medications  Counselled on compliance Monitor renal function  Follow up with a kidney doctor in 2 weeks for repeat lab work resolved

## 2018-08-06 NOTE — DISCHARGE NOTE ADULT - MEDICATION SUMMARY - MEDICATIONS TO CHANGE
I will SWITCH the dose or number of times a day I take the medications listed below when I get home from the hospital:    hydrALAZINE 100 mg oral tablet  -- 1 tab(s) by mouth 4 times a day

## 2018-08-06 NOTE — DISCHARGE NOTE ADULT - SECONDARY DIAGNOSIS.
Hypertensive urgency Bipolar 1 disorder CKD (chronic kidney disease) stage 3, GFR 30-59 ml/min MELISSA (acute kidney injury) Hypokalemia

## 2018-08-06 NOTE — DISCHARGE NOTE ADULT - CARE PLAN
Principal Discharge DX:	Delirium due to another medical condition, acute, hypoactive  Secondary Diagnosis:	Hypertensive urgency  Secondary Diagnosis:	Bipolar 1 disorder  Secondary Diagnosis:	CKD (chronic kidney disease) stage 3, GFR 30-59 ml/min  Secondary Diagnosis:	MELISSA (acute kidney injury)  Secondary Diagnosis:	Hypokalemia Principal Discharge DX:	Delirium due to another medical condition, acute, hypoactive  Goal:	Monitor for recurrent symptoms  Assessment and plan of treatment:	Secondary to hypertensive urgency  now resolved  Secondary Diagnosis:	Hypertensive urgency  Assessment and plan of treatment:	Improved  Continue clonidine, norvasc, labetalol and hydralazine as prescribed.   It is very  important to take your blood pressure medications daily as prescribed  Secondary Diagnosis:	Bipolar 1 disorder  Assessment and plan of treatment:	COntinue outpatient medications  Counselled on compliance  Secondary Diagnosis:	CKD (chronic kidney disease) stage 3, GFR 30-59 ml/min  Assessment and plan of treatment:	Monitor renal function  Follow up with a kidney doctor in 2 weeks for repeat lab work  Secondary Diagnosis:	MELISSA (acute kidney injury)  Assessment and plan of treatment:	resolved  Secondary Diagnosis:	Hypokalemia  Assessment and plan of treatment:	resolved

## 2018-08-06 NOTE — PHYSICAL THERAPY INITIAL EVALUATION ADULT - CRITERIA FOR SKILLED THERAPEUTIC INTERVENTIONS
predicted duration of therapy intervention/risk reduction/prevention/therapy frequency/impairments found/anticipated discharge recommendation/anticipated equipment needs at discharge/rehab potential/functional limitations in following categories

## 2018-08-06 NOTE — DISCHARGE NOTE ADULT - MEDICATION SUMMARY - MEDICATIONS TO STOP TAKING
I will STOP taking the medications listed below when I get home from the hospital:    metoprolol tartrate 100 mg oral tablet  -- 1 tab(s) by mouth 2 times a day

## 2018-08-06 NOTE — PROGRESS NOTE ADULT - ASSESSMENT
CKD - Creat better  Severe Htn ; recent htn emergency; K was low; VMA Metaneph OK  Check Renin/Amilcar  Check 24 H urines  Dm - watch BS

## 2018-08-06 NOTE — PHYSICAL THERAPY INITIAL EVALUATION ADULT - ADDITIONAL COMMENTS
Pt. guarded and unwilling to answer some questions re: social and functional hx. pt. lives in a house with his family with stairs, however, pt. refusing to elaborate.

## 2018-08-06 NOTE — DISCHARGE NOTE ADULT - HOSPITAL COURSE
The patient is a 32 y/o male was seen by his visiting nurse and was found sitting in his non AC room and it was hot and humid. In the ED, noted to have hypertensive urgency with confusion. CT head and MRI of the brain was negative. Patient left AMA  af ew days prior after presenting with AMS. Work up was negative and neurology recommended SAHIL at the time but he left prior to the test. Blood pressure improved after medications were re-started. Evaluated by psychiatry and neurology. Mild NMS with elevated CK on admission resolved. EEG was negative. Spoke with psychiatry, no further recommendations at this time. Patient has not been taking his medications at home.   I spoke with patient's physician and worker at [facility]/ [facility]- patient was previously on Neurontin and lithium. He was well controlled and able to care for himself and appropriately conversive. His medications were discontinued due to worsening renal failure a few months ago. Since then patient was started on Invega but they have been unable to appropriately control his mood. Suspect patient is not caring for himself at home and that he has not been taking his medications. He lives at home with his father who is an alcoholic, elderly grandmother and 2 sisters who are mentally disabled as well. No further psych recommendations. To be followed by the ACT team as an outaptient. Noted to have MELISSA with CKD stage 3 and hypokalemia improved with iv fluids. The patient is a 34 y/o male was seen by his visiting nurse and was found sitting in his non AC room and it was hot and humid. In the ED, noted to have hypertensive urgency with confusion. CT head and MRI of the brain was negative. Patient left AMA  af ew days prior after presenting with AMS. Work up was negative and neurology recommended SAHIL at the time but he left prior to the test. Blood pressure improved after medications were re-started. Evaluated by psychiatry and neurology. Mild NMS with elevated CK on admission resolved. EEG was negative. Spoke with psychiatry, no further recommendations at this time. Patient has not been taking his medications at home.   I spoke with patient's physician and worker at [facility]/ [facility]- patient was previously on Neurontin and lithium. He was well controlled and able to care for himself and appropriately conversive. His medications were discontinued due to worsening renal failure a few months ago. Since then patient was started on Invega but they have been unable to appropriately control his mood. Suspect patient is not caring for himself at home and that he has not been taking his medications. He lives at home with his father who is an alcoholic, elderly grandmother and 2 sisters who are mentally disabled as well. No further psych recommendations. To be followed by the ACT team as an outaptient. Noted to have MELISSA with CKD stage 3 and hypokalemia improved with iv fluids. BP improved with resumption of medications. TO be discharged home in stable condition.     50 minutes spent coordinating care and discharge

## 2018-08-06 NOTE — DISCHARGE NOTE ADULT - MEDICATION SUMMARY - MEDICATIONS TO TAKE
I will START or STAY ON the medications listed below when I get home from the hospital:    cloNIDine 0.2 mg oral tablet  -- 1 tab(s) by mouth every 12 hours  -- Indication: For Hypertensive emergency    lamoTRIgine 25 mg oral tablet  -- 2 tab(s) by mouth 2 times a day  -- Indication: For Bipolar 1 disorder    benztropine 0.5 mg oral tablet  -- 1 tab(s) by mouth 2 times a day  -- Indication: For Bipolar 1 disorder    Invega Sustenna 156 mg/mL intramuscular suspension, extended release  -- 156 milligram(s) intramuscular every 4 weeks (LAST DOSE 07/05/2018)  -- Indication: For Bipolar 1 disorder    QUEtiapine 100 mg oral tablet  -- 1 tab(s) by mouth once a day (at bedtime)  -- Indication: For Bipolar 1 disorder    paliperidone 6 mg oral tablet, extended release  -- 1 tab(s) by mouth once a day (in the morning)  -- Indication: For Bipolar 1 disorder    labetalol 300 mg oral tablet  -- 1 tab(s) by mouth every 12 hours  -- Indication: For Hypertensive emergency    amLODIPine 10 mg oral tablet  -- 1 tab(s) by mouth once a day  -- Indication: For Hypertensive emergency    hydrALAZINE 100 mg oral tablet  -- 1 tab(s) by mouth every 8 hours  -- Indication: For Hypertensive emergency I will START or STAY ON the medications listed below when I get home from the hospital:    cloNIDine 0.2 mg oral tablet  -- 1 tab(s) by mouth every 12 hours  -- Indication: For Hypertensive emergency    lamoTRIgine 25 mg oral tablet  -- 2 tab(s) by mouth 2 times a day  -- Indication: For Bipolar 1 disorder    benztropine 0.5 mg oral tablet  -- 1 tab(s) by mouth 2 times a day  -- Indication: For Bipolar 1 disorder    QUEtiapine 100 mg oral tablet  -- 1 tab(s) by mouth once a day (at bedtime)  -- Indication: For Bipolar 1 disorder    Invega Sustenna 156 mg/mL intramuscular suspension, extended release  -- 156 milligram(s) intramuscular every 4 weeks (LAST DOSE 07/05/2018)  -- Indication: For Bipolar 1 disorder    paliperidone 6 mg oral tablet, extended release  -- 1 tab(s) by mouth once a day (in the morning)  -- Indication: For Bipolar 1 disorder    labetalol 300 mg oral tablet  -- 1 tab(s) by mouth every 12 hours  -- Indication: For Hypertensive emergency    amLODIPine 10 mg oral tablet  -- 1 tab(s) by mouth once a day  -- Indication: For Hypertensive emergency    hydrALAZINE 100 mg oral tablet  -- 1 tab(s) by mouth every 8 hours  -- Indication: For Hypertensive emergency

## 2018-08-06 NOTE — DISCHARGE NOTE ADULT - CARE PROVIDER_API CALL
Ama Kirkpatrick), Cardiology; Internal Medicine  [address] [id number]  Phone: [phone]  Fax: [phone]

## 2018-08-06 NOTE — DISCHARGE NOTE ADULT - PATIENT PORTAL LINK FT
You can access the [facility] Patient Portal, offered by [facility], by registering with the following website: [email or web address]

## 2018-08-07 VITALS
RESPIRATION RATE: 15 BRPM | SYSTOLIC BLOOD PRESSURE: 118 MMHG | OXYGEN SATURATION: 98 % | TEMPERATURE: 100 F | DIASTOLIC BLOOD PRESSURE: 80 MMHG | HEART RATE: 68 BPM

## 2018-08-07 LAB
ANION GAP SERPL CALC-SCNC: 15 MMOL/L — SIGNIFICANT CHANGE UP (ref 5–17)
BUN SERPL-MCNC: 35 MG/DL — HIGH (ref 8–20)
CALCIUM SERPL-MCNC: 9.5 MG/DL — SIGNIFICANT CHANGE UP (ref 8.6–10.2)
CHLORIDE SERPL-SCNC: 104 MMOL/L — SIGNIFICANT CHANGE UP (ref 98–107)
CO2 SERPL-SCNC: 24 MMOL/L — SIGNIFICANT CHANGE UP (ref 22–29)
CREAT SERPL-MCNC: 2.21 MG/DL — HIGH (ref 0.5–1.3)
GLUCOSE BLDC GLUCOMTR-MCNC: 106 MG/DL — HIGH (ref 70–99)
GLUCOSE BLDC GLUCOMTR-MCNC: 94 MG/DL — SIGNIFICANT CHANGE UP (ref 70–99)
GLUCOSE SERPL-MCNC: 88 MG/DL — SIGNIFICANT CHANGE UP (ref 70–115)
HCT VFR BLD CALC: 36.2 % — LOW (ref 42–52)
HGB BLD-MCNC: 12 G/DL — LOW (ref 14–18)
MCHC RBC-ENTMCNC: 24.9 PG — LOW (ref 27–31)
MCHC RBC-ENTMCNC: 33.1 G/DL — SIGNIFICANT CHANGE UP (ref 32–36)
MCV RBC AUTO: 75.3 FL — LOW (ref 80–94)
MYOGLOBIN UR-MCNC: 137 MCG/L — HIGH
PLATELET # BLD AUTO: 301 K/UL — SIGNIFICANT CHANGE UP (ref 150–400)
POTASSIUM SERPL-MCNC: 4.1 MMOL/L — SIGNIFICANT CHANGE UP (ref 3.5–5.3)
POTASSIUM SERPL-SCNC: 4.1 MMOL/L — SIGNIFICANT CHANGE UP (ref 3.5–5.3)
RBC # BLD: 4.81 M/UL — SIGNIFICANT CHANGE UP (ref 4.6–6.2)
RBC # FLD: 18.5 % — HIGH (ref 11–15.6)
SODIUM SERPL-SCNC: 143 MMOL/L — SIGNIFICANT CHANGE UP (ref 135–145)
WBC # BLD: 12.1 K/UL — HIGH (ref 4.8–10.8)
WBC # FLD AUTO: 12.1 K/UL — HIGH (ref 4.8–10.8)

## 2018-08-07 PROCEDURE — 84443 ASSAY THYROID STIM HORMONE: CPT

## 2018-08-07 PROCEDURE — 93306 TTE W/DOPPLER COMPLETE: CPT

## 2018-08-07 PROCEDURE — 85730 THROMBOPLASTIN TIME PARTIAL: CPT

## 2018-08-07 PROCEDURE — 85027 COMPLETE CBC AUTOMATED: CPT

## 2018-08-07 PROCEDURE — 99291 CRITICAL CARE FIRST HOUR: CPT | Mod: 25

## 2018-08-07 PROCEDURE — 95819 EEG AWAKE AND ASLEEP: CPT

## 2018-08-07 PROCEDURE — 81001 URINALYSIS AUTO W/SCOPE: CPT

## 2018-08-07 PROCEDURE — 83874 ASSAY OF MYOGLOBIN: CPT

## 2018-08-07 PROCEDURE — 76775 US EXAM ABDO BACK WALL LIM: CPT

## 2018-08-07 PROCEDURE — 82085 ASSAY OF ALDOLASE: CPT

## 2018-08-07 PROCEDURE — 85610 PROTHROMBIN TIME: CPT

## 2018-08-07 PROCEDURE — 82962 GLUCOSE BLOOD TEST: CPT

## 2018-08-07 PROCEDURE — 82375 ASSAY CARBOXYHB QUANT: CPT

## 2018-08-07 PROCEDURE — 82088 ASSAY OF ALDOSTERONE: CPT

## 2018-08-07 PROCEDURE — 84484 ASSAY OF TROPONIN QUANT: CPT

## 2018-08-07 PROCEDURE — 80048 BASIC METABOLIC PNL TOTAL CA: CPT

## 2018-08-07 PROCEDURE — 97163 PT EVAL HIGH COMPLEX 45 MIN: CPT

## 2018-08-07 PROCEDURE — 70450 CT HEAD/BRAIN W/O DYE: CPT

## 2018-08-07 PROCEDURE — 93880 EXTRACRANIAL BILAT STUDY: CPT

## 2018-08-07 PROCEDURE — 97116 GAIT TRAINING THERAPY: CPT

## 2018-08-07 PROCEDURE — 96374 THER/PROPH/DIAG INJ IV PUSH: CPT

## 2018-08-07 PROCEDURE — 80307 DRUG TEST PRSMV CHEM ANLYZR: CPT

## 2018-08-07 PROCEDURE — 36415 COLL VENOUS BLD VENIPUNCTURE: CPT

## 2018-08-07 PROCEDURE — 93005 ELECTROCARDIOGRAM TRACING: CPT

## 2018-08-07 PROCEDURE — 83880 ASSAY OF NATRIURETIC PEPTIDE: CPT

## 2018-08-07 PROCEDURE — 80053 COMPREHEN METABOLIC PANEL: CPT

## 2018-08-07 PROCEDURE — 82550 ASSAY OF CK (CPK): CPT

## 2018-08-07 PROCEDURE — 96375 TX/PRO/DX INJ NEW DRUG ADDON: CPT

## 2018-08-07 PROCEDURE — 84244 ASSAY OF RENIN: CPT

## 2018-08-07 PROCEDURE — 83735 ASSAY OF MAGNESIUM: CPT

## 2018-08-07 PROCEDURE — 82607 VITAMIN B-12: CPT

## 2018-08-07 PROCEDURE — 82553 CREATINE MB FRACTION: CPT

## 2018-08-07 PROCEDURE — 71045 X-RAY EXAM CHEST 1 VIEW: CPT

## 2018-08-07 PROCEDURE — 99239 HOSP IP/OBS DSCHRG MGMT >30: CPT

## 2018-08-07 PROCEDURE — 70551 MRI BRAIN STEM W/O DYE: CPT

## 2018-08-07 RX ORDER — BENZTROPINE MESYLATE 1 MG
1 TABLET ORAL
Qty: 30 | Refills: 0
Start: 2018-08-07 | End: 2018-08-21

## 2018-08-07 RX ORDER — LAMOTRIGINE 25 MG/1
2 TABLET, ORALLY DISINTEGRATING ORAL
Qty: 0 | Refills: 0 | COMMUNITY

## 2018-08-07 RX ORDER — LAMOTRIGINE 25 MG/1
2 TABLET, ORALLY DISINTEGRATING ORAL
Qty: 60 | Refills: 0
Start: 2018-08-07 | End: 2018-08-21

## 2018-08-07 RX ORDER — HYDRALAZINE HCL 50 MG
1 TABLET ORAL
Qty: 90 | Refills: 0 | OUTPATIENT
Start: 2018-08-07 | End: 2018-09-05

## 2018-08-07 RX ORDER — BENZTROPINE MESYLATE 1 MG
1 TABLET ORAL
Qty: 0 | Refills: 0 | COMMUNITY

## 2018-08-07 RX ORDER — QUETIAPINE FUMARATE 200 MG/1
1 TABLET, FILM COATED ORAL
Qty: 15 | Refills: 0
Start: 2018-08-07 | End: 2018-08-21

## 2018-08-07 RX ORDER — METOPROLOL TARTRATE 50 MG
1 TABLET ORAL
Qty: 0 | Refills: 0 | COMMUNITY

## 2018-08-07 RX ORDER — QUETIAPINE FUMARATE 200 MG/1
1 TABLET, FILM COATED ORAL
Qty: 0 | Refills: 0 | COMMUNITY

## 2018-08-07 RX ORDER — LABETALOL HCL 100 MG
1 TABLET ORAL
Qty: 60 | Refills: 0 | OUTPATIENT
Start: 2018-08-07 | End: 2018-09-05

## 2018-08-07 RX ORDER — AMLODIPINE BESYLATE 2.5 MG/1
1 TABLET ORAL
Qty: 30 | Refills: 0 | OUTPATIENT
Start: 2018-08-07 | End: 2018-09-05

## 2018-08-07 RX ORDER — HYDRALAZINE HCL 50 MG
100 TABLET ORAL EVERY 8 HOURS
Qty: 0 | Refills: 0 | Status: DISCONTINUED | OUTPATIENT
Start: 2018-08-07 | End: 2018-08-07

## 2018-08-07 RX ORDER — HYDRALAZINE HCL 50 MG
1 TABLET ORAL
Qty: 0 | Refills: 0 | COMMUNITY

## 2018-08-07 RX ADMIN — AMLODIPINE BESYLATE 10 MILLIGRAM(S): 2.5 TABLET ORAL at 05:25

## 2018-08-07 RX ADMIN — Medication 75 MILLIGRAM(S): at 05:25

## 2018-08-07 RX ADMIN — Medication 40 MILLIEQUIVALENT(S): at 11:58

## 2018-08-07 RX ADMIN — Medication 100 MILLIGRAM(S): at 14:56

## 2018-08-07 RX ADMIN — Medication 0.2 MILLIGRAM(S): at 05:25

## 2018-08-07 RX ADMIN — HEPARIN SODIUM 5000 UNIT(S): 5000 INJECTION INTRAVENOUS; SUBCUTANEOUS at 10:57

## 2018-08-07 RX ADMIN — LAMOTRIGINE 50 MILLIGRAM(S): 25 TABLET, ORALLY DISINTEGRATING ORAL at 05:25

## 2018-08-07 RX ADMIN — Medication 300 MILLIGRAM(S): at 05:25

## 2018-08-07 RX ADMIN — Medication 0.5 MILLIGRAM(S): at 05:25

## 2018-08-07 RX ADMIN — Medication 81 MILLIGRAM(S): at 11:58

## 2018-08-07 NOTE — PROGRESS NOTE ADULT - SUBJECTIVE AND OBJECTIVE BOX
CC: Confusion    INTERVAL HPI/OVERNIGHT EVENTS:  Patient seen and examined, knows where he is but does not respond when asked questions. Episodes of NSVT last night.     Vital Signs Last 24 Hrs  T(C): 36.9 (04 Aug 2018 06:04), Max: 36.9 (03 Aug 2018 20:00)  T(F): 98.5 (04 Aug 2018 06:04), Max: 98.5 (04 Aug 2018 06:04)  HR: 76 (04 Aug 2018 08:49) (57 - 76)  BP: 157/93 (04 Aug 2018 08:49) (128/83 - 178/93)  BP(mean): --  RR: 18 (04 Aug 2018 08:49) (17 - 20)  SpO2: 100% (04 Aug 2018 08:49) (97% - 100%)    PHYSICAL EXAM:    GENERAL: NAD,AOX2  HEAD:  Atraumatic, Normocephalic  EYES: EOMI, PERRLA, conjunctiva and sclera clear  ENMT: Moist mucous membranes  NECK: Supple, No JVD  NERVOUS SYSTEM:  Alert & Oriented X2 Muscle strength 5/5 bilateral upper and lower extremities with some rigidity  CHEST/LUNG: Clear to auscultation bilaterally; No rales, rhonchi, wheezing, or rubs  HEART: Regular rate and rhythm; No murmurs, rubs, or gallops  ABDOMEN: Soft, Nontender, Nondistended; Bowel sounds present  EXTREMITIES:  2+ Peripheral Pulses, No clubbing, cyanosis, or edema        MEDICATIONS  (STANDING):  amLODIPine   Tablet 10 milliGRAM(s) Oral daily  aspirin enteric coated 81 milliGRAM(s) Oral daily  benztropine 0.5 milliGRAM(s) Oral two times a day  cloNIDine 0.2 milliGRAM(s) Oral every 12 hours  dextrose 5% + sodium chloride 0.45% with potassium chloride 20 mEq/L 1000 milliLiter(s) (100 mL/Hr) IV Continuous <Continuous>  dextrose 5%. 1000 milliLiter(s) (50 mL/Hr) IV Continuous <Continuous>  dextrose 50% Injectable 12.5 Gram(s) IV Push once  dextrose 50% Injectable 25 Gram(s) IV Push once  dextrose 50% Injectable 25 Gram(s) IV Push once  heparin  Injectable 5000 Unit(s) SubCutaneous every 12 hours  hydrALAZINE 50 milliGRAM(s) Oral every 8 hours  insulin lispro (HumaLOG) corrective regimen sliding scale   SubCutaneous three times a day before meals  insulin lispro (HumaLOG) corrective regimen sliding scale   SubCutaneous at bedtime  labetalol 300 milliGRAM(s) Oral every 12 hours  lamoTRIgine 50 milliGRAM(s) Oral two times a day  potassium chloride   Powder 40 milliEquivalent(s) Oral daily    MEDICATIONS  (PRN):  acetaminophen   Tablet. 650 milliGRAM(s) Oral every 6 hours PRN Moderate Pain (4 - 6)  dextrose 40% Gel 15 Gram(s) Oral once PRN Blood Glucose LESS THAN 70 milliGRAM(s)/deciliter  glucagon  Injectable 1 milliGRAM(s) IntraMuscular once PRN Glucose LESS THAN 70 milligrams/deciliter      Allergies    No Known Allergies    Intolerances          LABS:                          11.7   11.7  )-----------( 288      ( 04 Aug 2018 05:33 )             34.5     08-04    141  |  101  |  23.0<H>  ----------------------------<  127<H>  3.0<L>   |  29.0  |  2.04<H>    Ca    8.9      04 Aug 2018 05:33  Mg     2.1     08-04    TPro  6.6  /  Alb  3.3  /  TBili  0.5  /  DBili  x   /  AST  16  /  ALT  18  /  AlkPhos  94  08-03    PT/INR - ( 03 Aug 2018 17:07 )   PT: 12.5 sec;   INR: 1.13 ratio         PTT - ( 03 Aug 2018 17:07 )  PTT:26.2 sec  Urinalysis Basic - ( 02 Aug 2018 21:19 )    Color: Yellow / Appearance: Clear / S.005 / pH: x  Gluc: x / Ketone: Negative  / Bili: Negative / Urobili: Negative mg/dL   Blood: x / Protein: 100 mg/dL / Nitrite: Negative   Leuk Esterase: Negative / RBC: 0-2 /HPF / WBC 0-2   Sq Epi: x / Non Sq Epi: Occasional / Bacteria: Occasional        RADIOLOGY & ADDITIONAL TESTS:
CC: Confusion    INTERVAL HPI/OVERNIGHT EVENTS: Patient seen and examined, is more awake today. Says he just does not feel like talking much. He admits that he knows he needs to take his medications but sometimes just feels stressed and doesnt. He did not want to elaborate further. Denies headache, chest pain sob or palpitations. denies urinary or bowel complaints.       Vital Signs Last 24 Hrs  T(C): 36.7 (05 Aug 2018 11:20), Max: 37.1 (04 Aug 2018 12:16)  T(F): 98.1 (05 Aug 2018 11:20), Max: 98.8 (05 Aug 2018 05:20)  HR: 72 (05 Aug 2018 11:20) (62 - 72)  BP: 124/72 (05 Aug 2018 11:20) (124/72 - 158/86)  BP(mean): --  RR: 18 (05 Aug 2018 11:20) (18 - 18)  SpO2: 96% (05 Aug 2018 11:20) (96% - 97%)    PHYSICAL EXAM:    GENERAL: NAD, AOX3; flat affect  HEAD:  Atraumatic, Normocephalic  EYES: EOMI, PERRLA, conjunctiva and sclera clear  ENMT: Moist mucous membranes  CHEST/LUNG: Clear to auscultation bilaterally; No rales, rhonchi, wheezing, or rubs  HEART: Regular rate and rhythm; No murmurs, rubs, or gallops  ABDOMEN: Soft, Nontender, Nondistended; Bowel sounds present  EXTREMITIES:  2+ Peripheral Pulses, No clubbing, cyanosis, or edema        MEDICATIONS  (STANDING):  amLODIPine   Tablet 10 milliGRAM(s) Oral daily  aspirin enteric coated 81 milliGRAM(s) Oral daily  benztropine 0.5 milliGRAM(s) Oral two times a day  cloNIDine 0.2 milliGRAM(s) Oral every 12 hours  dextrose 5%. 1000 milliLiter(s) (50 mL/Hr) IV Continuous <Continuous>  dextrose 50% Injectable 12.5 Gram(s) IV Push once  dextrose 50% Injectable 25 Gram(s) IV Push once  dextrose 50% Injectable 25 Gram(s) IV Push once  heparin  Injectable 5000 Unit(s) SubCutaneous every 12 hours  hydrALAZINE 50 milliGRAM(s) Oral every 8 hours  insulin lispro (HumaLOG) corrective regimen sliding scale   SubCutaneous three times a day before meals  insulin lispro (HumaLOG) corrective regimen sliding scale   SubCutaneous at bedtime  labetalol 300 milliGRAM(s) Oral every 12 hours  lamoTRIgine 50 milliGRAM(s) Oral two times a day  potassium chloride   Powder 40 milliEquivalent(s) Oral daily    MEDICATIONS  (PRN):  acetaminophen   Tablet. 650 milliGRAM(s) Oral every 6 hours PRN Moderate Pain (4 - 6)  dextrose 40% Gel 15 Gram(s) Oral once PRN Blood Glucose LESS THAN 70 milliGRAM(s)/deciliter  glucagon  Injectable 1 milliGRAM(s) IntraMuscular once PRN Glucose LESS THAN 70 milligrams/deciliter      Allergies    No Known Allergies    Intolerances          LABS:                          11.5   11.5  )-----------( 316      ( 05 Aug 2018 05:51 )             34.7     08-05    144  |  106  |  27.0<H>  ----------------------------<  112  3.6   |  27.0  |  2.42<H>    Ca    9.0      05 Aug 2018 05:51  Mg     2.1     08-04      PT/INR - ( 03 Aug 2018 17:07 )   PT: 12.5 sec;   INR: 1.13 ratio         PTT - ( 03 Aug 2018 17:07 )  PTT:26.2 sec      RADIOLOGY & ADDITIONAL TESTS:
CC: Hypertensive urgency    INTERVAL HPI/OVERNIGHT EVENTS: Patient seen and examined, bp still elevated this morning. Withdrawn and quiet denies any compaints. Does not want to engage in conversation.       Vital Signs Last 24 Hrs  T(C): 37.1 (07 Aug 2018 05:33), Max: 37.2 (06 Aug 2018 11:50)  T(F): 98.8 (07 Aug 2018 05:33), Max: 99 (06 Aug 2018 11:50)  HR: 65 (07 Aug 2018 05:33) (57 - 72)  BP: 160/98 (07 Aug 2018 05:33) (132/78 - 164/100)  BP(mean): --  RR: 18 (07 Aug 2018 05:33) (16 - 18)  SpO2: 100% (07 Aug 2018 05:33) (95% - 100%)    PHYSICAL EXAM:    GENERAL: NAD, AOX3  HEAD:  Atraumatic, Normocephalic  EYES: EOMI, PERRLA, conjunctiva and sclera clear  ENMT: Moist mucous membranes  CHEST/LUNG: Clear to auscultation bilaterally; No rales, rhonchi, wheezing, or rubs  HEART: Regular rate and rhythm; No murmurs, rubs, or gallops  ABDOMEN: Soft, Nontender, Nondistended; Bowel sounds present  EXTREMITIES:  2+ Peripheral Pulses, No clubbing, cyanosis, or edema        MEDICATIONS  (STANDING):  amLODIPine   Tablet 10 milliGRAM(s) Oral daily  aspirin enteric coated 81 milliGRAM(s) Oral daily  benztropine 0.5 milliGRAM(s) Oral two times a day  cloNIDine 0.2 milliGRAM(s) Oral every 12 hours  dextrose 5%. 1000 milliLiter(s) (50 mL/Hr) IV Continuous <Continuous>  dextrose 50% Injectable 12.5 Gram(s) IV Push once  dextrose 50% Injectable 25 Gram(s) IV Push once  dextrose 50% Injectable 25 Gram(s) IV Push once  heparin  Injectable 5000 Unit(s) SubCutaneous every 12 hours  hydrALAZINE 100 milliGRAM(s) Oral every 8 hours  insulin lispro (HumaLOG) corrective regimen sliding scale   SubCutaneous three times a day before meals  insulin lispro (HumaLOG) corrective regimen sliding scale   SubCutaneous at bedtime  labetalol 300 milliGRAM(s) Oral every 12 hours  lamoTRIgine 50 milliGRAM(s) Oral two times a day  potassium chloride   Powder 40 milliEquivalent(s) Oral daily  QUEtiapine 100 milliGRAM(s) Oral at bedtime  sodium chloride 0.45% with potassium chloride 20 mEq/L 1000 milliLiter(s) (100 mL/Hr) IV Continuous <Continuous>    MEDICATIONS  (PRN):  acetaminophen   Tablet. 650 milliGRAM(s) Oral every 6 hours PRN Moderate Pain (4 - 6)  dextrose 40% Gel 15 Gram(s) Oral once PRN Blood Glucose LESS THAN 70 milliGRAM(s)/deciliter  glucagon  Injectable 1 milliGRAM(s) IntraMuscular once PRN Glucose LESS THAN 70 milligrams/deciliter      Allergies    No Known Allergies    Intolerances          LABS:                          12.0   12.1  )-----------( 301      ( 07 Aug 2018 06:18 )             36.2     08-07    143  |  104  |  35.0<H>  ----------------------------<  88  4.1   |  24.0  |  2.21<H>    Ca    9.5      07 Aug 2018 06:18            RADIOLOGY & ADDITIONAL TESTS:
CC: Weakness    INTERVAL HPI/OVERNIGHT EVENTS:  Patient seen and examined, says he feels weak. Is unable to tell me the names of his medications. He had a headache this morning which has resolved. Denies chest pain, sob or palpitations. Does not know his medical history.     Vital Signs Last 24 Hrs  T(C): 36.7 (03 Aug 2018 14:32), Max: 37.1 (02 Aug 2018 19:26)  T(F): 98 (03 Aug 2018 14:32), Max: 98.8 (03 Aug 2018 00:24)  HR: 57 (03 Aug 2018 14:32) (57 - 78)  BP: 128/83 (03 Aug 2018 14:32) (128/83 - 210/99)  BP(mean): --  RR: 20 (03 Aug 2018 14:32) (17 - 22)  SpO2: 99% (03 Aug 2018 14:32) (96% - 99%)    PHYSICAL EXAM:    GENERAL: NAD, AOX3, Withdrawn   HEAD:  Atraumatic, Normocephalic  EYES: EOMI, PERRLA, conjunctiva and sclera clear  ENMT: Moist mucous membranes  CHEST/LUNG: Clear to auscultation bilaterally; No rales, rhonchi, wheezing, or rubs  HEART: Regular rate and rhythm; No murmurs, rubs, or gallops  ABDOMEN: Soft, Nontender, Nondistended; Bowel sounds present  EXTREMITIES:  2+ Peripheral Pulses, No clubbing, cyanosis, or edema        MEDICATIONS  (STANDING):  amLODIPine   Tablet 10 milliGRAM(s) Oral daily  aspirin enteric coated 81 milliGRAM(s) Oral daily  cloNIDine 0.2 milliGRAM(s) Oral every 12 hours  dextrose 5% + sodium chloride 0.45% with potassium chloride 20 mEq/L 1000 milliLiter(s) (100 mL/Hr) IV Continuous <Continuous>  dextrose 5%. 1000 milliLiter(s) (50 mL/Hr) IV Continuous <Continuous>  dextrose 50% Injectable 12.5 Gram(s) IV Push once  dextrose 50% Injectable 25 Gram(s) IV Push once  dextrose 50% Injectable 25 Gram(s) IV Push once  heparin  Injectable 5000 Unit(s) SubCutaneous every 12 hours  insulin lispro (HumaLOG) corrective regimen sliding scale   SubCutaneous three times a day before meals  insulin lispro (HumaLOG) corrective regimen sliding scale   SubCutaneous at bedtime  labetalol 300 milliGRAM(s) Oral every 12 hours  potassium chloride  10 mEq/100 mL IVPB 10 milliEquivalent(s) IV Intermittent every 1 hour  potassium chloride  10 mEq/100 mL IVPB 10 milliEquivalent(s) IV Intermittent every 1 hour    MEDICATIONS  (PRN):  acetaminophen   Tablet. 650 milliGRAM(s) Oral every 6 hours PRN Moderate Pain (4 - 6)  dextrose 40% Gel 15 Gram(s) Oral once PRN Blood Glucose LESS THAN 70 milliGRAM(s)/deciliter  glucagon  Injectable 1 milliGRAM(s) IntraMuscular once PRN Glucose LESS THAN 70 milligrams/deciliter      Allergies    No Known Allergies    Intolerances          LABS:                          11.4   9.9   )-----------( 272      ( 03 Aug 2018 10:36 )             34.8     08-03    139  |  97<L>  |  22.0<H>  ----------------------------<  185<H>  2.9<LL>   |  29.0  |  2.19<H>    Ca    9.2      03 Aug 2018 10:36  Mg     2.0     08-03    TPro  6.6  /  Alb  3.3  /  TBili  0.5  /  DBili  x   /  AST  16  /  ALT  18  /  AlkPhos  94  08-03      Urinalysis Basic - ( 02 Aug 2018 21:19 )    Color: Yellow / Appearance: Clear / S.005 / pH: x  Gluc: x / Ketone: Negative  / Bili: Negative / Urobili: Negative mg/dL   Blood: x / Protein: 100 mg/dL / Nitrite: Negative   Leuk Esterase: Negative / RBC: 0-2 /HPF / WBC 0-2   Sq Epi: x / Non Sq Epi: Occasional / Bacteria: Occasional        RADIOLOGY & ADDITIONAL TESTS:
CC: confusion    INTERVAL HPI/OVERNIGHT EVENTS: Patient seen and examined, BP improved. Is aware of where he is and why he was brought to the hospital. Very withdrawn and does not want to answer questions.       Vital Signs Last 24 Hrs  T(C): 36.8 (06 Aug 2018 05:51), Max: 36.8 (05 Aug 2018 21:04)  T(F): 98.2 (06 Aug 2018 05:51), Max: 98.3 (05 Aug 2018 21:04)  HR: 72 (06 Aug 2018 09:59) (54 - 72)  BP: 132/78 (06 Aug 2018 09:59) (124/72 - 168/80)  BP(mean): --  RR: 18 (06 Aug 2018 09:59) (16 - 18)  SpO2: 97% (06 Aug 2018 09:59) (95% - 97%)    PHYSICAL EXAM:    GENERAL: NAD, AOx3  HEAD:  Atraumatic, Normocephalic  EYES: EOMI, PERRLA, conjunctiva and sclera clear  ENMT: Moist mucous membranes  CHEST/LUNG: Clear to auscultation bilaterally; No rales, rhonchi, wheezing, or rubs  HEART: Regular rate and rhythm; No murmurs, rubs, or gallops  ABDOMEN: Soft, Nontender, Nondistended; Bowel sounds present  EXTREMITIES:  2+ Peripheral Pulses, No clubbing, cyanosis, or edema        MEDICATIONS  (STANDING):  amLODIPine   Tablet 10 milliGRAM(s) Oral daily  aspirin enteric coated 81 milliGRAM(s) Oral daily  benztropine 0.5 milliGRAM(s) Oral two times a day  cloNIDine 0.2 milliGRAM(s) Oral every 12 hours  dextrose 5%. 1000 milliLiter(s) (50 mL/Hr) IV Continuous <Continuous>  dextrose 50% Injectable 12.5 Gram(s) IV Push once  dextrose 50% Injectable 25 Gram(s) IV Push once  dextrose 50% Injectable 25 Gram(s) IV Push once  heparin  Injectable 5000 Unit(s) SubCutaneous every 12 hours  hydrALAZINE 50 milliGRAM(s) Oral every 8 hours  insulin lispro (HumaLOG) corrective regimen sliding scale   SubCutaneous three times a day before meals  insulin lispro (HumaLOG) corrective regimen sliding scale   SubCutaneous at bedtime  labetalol 300 milliGRAM(s) Oral every 12 hours  lamoTRIgine 50 milliGRAM(s) Oral two times a day  potassium chloride   Powder 40 milliEquivalent(s) Oral daily  QUEtiapine 100 milliGRAM(s) Oral at bedtime  sodium chloride 0.45% with potassium chloride 20 mEq/L 1000 milliLiter(s) (100 mL/Hr) IV Continuous <Continuous>    MEDICATIONS  (PRN):  acetaminophen   Tablet. 650 milliGRAM(s) Oral every 6 hours PRN Moderate Pain (4 - 6)  dextrose 40% Gel 15 Gram(s) Oral once PRN Blood Glucose LESS THAN 70 milliGRAM(s)/deciliter  glucagon  Injectable 1 milliGRAM(s) IntraMuscular once PRN Glucose LESS THAN 70 milligrams/deciliter      Allergies    No Known Allergies    Intolerances          LABS:                          11.5   11.5  )-----------( 316      ( 05 Aug 2018 05:51 )             34.7     08-06    145  |  107  |  30.0<H>  ----------------------------<  115  3.9   |  26.0  |  2.06<H>    Ca    9.3      06 Aug 2018 07:22            RADIOLOGY & ADDITIONAL TESTS:
INTERVAL HISTORY:  improved      VITAL SIGNS:  Vital Signs Last 24 Hrs  T(C): 36.8 (06 Aug 2018 05:51), Max: 36.8 (05 Aug 2018 21:04)  T(F): 98.2 (06 Aug 2018 05:51), Max: 98.3 (05 Aug 2018 21:04)  HR: 71 (06 Aug 2018 05:51) (54 - 72)  BP: 168/80 (06 Aug 2018 05:51) (124/72 - 168/80)  BP(mean): --  RR: 18 (06 Aug 2018 05:51) (16 - 18)  SpO2: 95% (06 Aug 2018 05:51) (95% - 97%)    PHYSICAL EXAMINATION:    Mentation:  awake and cooperaitve  Language/Speech: nl  CN: nl  Visual Fields:  Motor: nl  Sensory:  DTR:  Babinski:      MEDS:  MEDICATIONS  (STANDING):  amLODIPine   Tablet 10 milliGRAM(s) Oral daily  aspirin enteric coated 81 milliGRAM(s) Oral daily  benztropine 0.5 milliGRAM(s) Oral two times a day  cloNIDine 0.2 milliGRAM(s) Oral every 12 hours  dextrose 5%. 1000 milliLiter(s) (50 mL/Hr) IV Continuous <Continuous>  dextrose 50% Injectable 12.5 Gram(s) IV Push once  dextrose 50% Injectable 25 Gram(s) IV Push once  dextrose 50% Injectable 25 Gram(s) IV Push once  heparin  Injectable 5000 Unit(s) SubCutaneous every 12 hours  hydrALAZINE 50 milliGRAM(s) Oral every 8 hours  insulin lispro (HumaLOG) corrective regimen sliding scale   SubCutaneous three times a day before meals  insulin lispro (HumaLOG) corrective regimen sliding scale   SubCutaneous at bedtime  labetalol 300 milliGRAM(s) Oral every 12 hours  lamoTRIgine 50 milliGRAM(s) Oral two times a day  potassium chloride   Powder 40 milliEquivalent(s) Oral daily  QUEtiapine 100 milliGRAM(s) Oral at bedtime  sodium chloride 0.45% with potassium chloride 20 mEq/L 1000 milliLiter(s) (100 mL/Hr) IV Continuous <Continuous>    MEDICATIONS  (PRN):  acetaminophen   Tablet. 650 milliGRAM(s) Oral every 6 hours PRN Moderate Pain (4 - 6)  dextrose 40% Gel 15 Gram(s) Oral once PRN Blood Glucose LESS THAN 70 milliGRAM(s)/deciliter  glucagon  Injectable 1 milliGRAM(s) IntraMuscular once PRN Glucose LESS THAN 70 milligrams/deciliter      LABS:                          11.5   11.5  )-----------( 316      ( 05 Aug 2018 05:51 )             34.7     08-06    145  |  107  |  30.0<H>  ----------------------------<  115  3.9   |  26.0  |  2.06<H>    Ca    9.3      06 Aug 2018 07:22            RADIOLOGY & ADDITIONAL STUDIES:    EEG Nl  CPK, TSH, B12- nl    IMPRESSION & PLAN:    NMS resolved  Hypertensive urgency resolved  Psych disorder - Further management as per psychiatry    Will not actively follow.   Neurologically cleared for discharge/disposition.  Please recontact as needed.
MILENA PUGA    [id number]    33y      Male    INTERVAL HPI/OVERNIGHT EVENTS:   no new symptoms reported by patient - although information obtained directly from patient is of uncertain reliability.    Vital Signs Last 24 Hrs  T(C): 36.9 (04 Aug 2018 06:04), Max: 36.9 (03 Aug 2018 20:00)  T(F): 98.5 (04 Aug 2018 06:04), Max: 98.5 (04 Aug 2018 06:04)  HR: 68 (04 Aug 2018 06:04) (57 - 68)  BP: 155/95 (04 Aug 2018 06:04) (128/83 - 178/93)  BP(mean): --  RR: 18 (04 Aug 2018 06:04) (17 - 20)  SpO2: 99% (04 Aug 2018 06:04) (97% - 99%)    PHYSICAL EXAM:    GENERAL: no acute distress. dressed appropriate for hospitalization.  HEENT: normocephalic  NECK: no meningismus   NEURO: awake, alert, cooperative, speech slow with low volume.  follows simple commands with psychomotor delay. PRRL. EOMI. VF full to movement. Facial strength normal. No pronator drift. DAMON very slow, mild increased motor tone. Motor strength symmetric - varied effort 5/5. plantar response flexor bilateral  PSYCH: spontaneity subdued, cooperative.       LABS:                        11.7   11.7  )-----------( 288      ( 04 Aug 2018 05:33 )             34.5     08-04    141  |  101  |  23.0<H>  ----------------------------<  127<H>  3.0<L>   |  29.0  |  2.04<H>    Ca    8.9      04 Aug 2018 05:33  Mg     2.0     08-03  TPro  6.6  /  Alb  3.3  /  TBili  0.5  /  DBili  x   /  AST  16  /  ALT  18  /  AlkPhos  94  08-03    PT/INR - ( 03 Aug 2018 17:07 )   PT: 12.5 sec;   INR: 1.13 ratio    PTT - ( 03 Aug 2018 17:07 )  PTT:26.2 sec    Urinalysis Basic - ( 02 Aug 2018 21:19 )  Color: Yellow / Appearance: Clear / S.005 / pH: x  Gluc: x / Ketone: Negative  / Bili: Negative / Urobili: Negative mg/dL   Blood: x / Protein: 100 mg/dL / Nitrite: Negative   Leuk Esterase: Negative / RBC: 0-2 /HPF / WBC 0-2   Sq Epi: x / Non Sq Epi: Occasional / Bacteria: Occasional      MEDICATIONS  (STANDING):  amLODIPine   Tablet 10 milliGRAM(s) Oral daily  aspirin enteric coated 81 milliGRAM(s) Oral daily  cloNIDine 0.2 milliGRAM(s) Oral every 12 hours  dextrose 5% + sodium chloride 0.45% with potassium chloride 20 mEq/L 1000 milliLiter(s) (100 mL/Hr) IV Continuous <Continuous>  dextrose 5%. 1000 milliLiter(s) (50 mL/Hr) IV Continuous <Continuous>  dextrose 50% Injectable 12.5 Gram(s) IV Push once  dextrose 50% Injectable 25 Gram(s) IV Push once  dextrose 50% Injectable 25 Gram(s) IV Push once  heparin  Injectable 5000 Unit(s) SubCutaneous every 12 hours  insulin lispro (HumaLOG) corrective regimen sliding scale   SubCutaneous three times a day before meals  insulin lispro (HumaLOG) corrective regimen sliding scale   SubCutaneous at bedtime  labetalol 300 milliGRAM(s) Oral every 12 hours  potassium chloride    Tablet ER 40 milliEquivalent(s) Oral every 4 hours  potassium chloride  10 mEq/100 mL IVPB 10 milliEquivalent(s) IV Intermittent every 1 hour    MEDICATIONS  (PRN):  acetaminophen   Tablet. 650 milliGRAM(s) Oral every 6 hours PRN Moderate Pain (4 - 6)  dextrose 40% Gel 15 Gram(s) Oral once PRN Blood Glucose LESS THAN 70 milliGRAM(s)/deciliter  glucagon  Injectable 1 milliGRAM(s) IntraMuscular once PRN Glucose LESS THAN 70 milligrams/deciliter      RADIOLOGY & ADDITIONAL TESTS:  MRI Brain - normal
NEPHROLOGY INTERVAL HPI/OVERNIGHT EVENTS:    Examined earlier    MEDICATIONS  (STANDING):  amLODIPine   Tablet 10 milliGRAM(s) Oral daily  aspirin enteric coated 81 milliGRAM(s) Oral daily  benztropine 0.5 milliGRAM(s) Oral two times a day  cloNIDine 0.2 milliGRAM(s) Oral every 12 hours  dextrose 5% + sodium chloride 0.45% with potassium chloride 20 mEq/L 1000 milliLiter(s) (100 mL/Hr) IV Continuous <Continuous>  dextrose 5%. 1000 milliLiter(s) (50 mL/Hr) IV Continuous <Continuous>  dextrose 50% Injectable 12.5 Gram(s) IV Push once  dextrose 50% Injectable 25 Gram(s) IV Push once  dextrose 50% Injectable 25 Gram(s) IV Push once  heparin  Injectable 5000 Unit(s) SubCutaneous every 12 hours  hydrALAZINE 50 milliGRAM(s) Oral every 8 hours  insulin lispro (HumaLOG) corrective regimen sliding scale   SubCutaneous three times a day before meals  insulin lispro (HumaLOG) corrective regimen sliding scale   SubCutaneous at bedtime  labetalol 300 milliGRAM(s) Oral every 12 hours  lamoTRIgine 50 milliGRAM(s) Oral two times a day  potassium chloride   Powder 40 milliEquivalent(s) Oral daily    MEDICATIONS  (PRN):  acetaminophen   Tablet. 650 milliGRAM(s) Oral every 6 hours PRN Moderate Pain (4 - 6)  dextrose 40% Gel 15 Gram(s) Oral once PRN Blood Glucose LESS THAN 70 milliGRAM(s)/deciliter  glucagon  Injectable 1 milliGRAM(s) IntraMuscular once PRN Glucose LESS THAN 70 milligrams/deciliter      Allergies    No Known Allergies    Intolerances        Vital Signs Last 24 Hrs  T(C): 36.9 (04 Aug 2018 06:04), Max: 36.9 (03 Aug 2018 20:00)  T(F): 98.5 (04 Aug 2018 06:04), Max: 98.5 (04 Aug 2018 06:04)  HR: 76 (04 Aug 2018 08:49) (57 - 76)  BP: 157/93 (04 Aug 2018 08:49) (128/83 - 178/93)  BP(mean): --  RR: 18 (04 Aug 2018 08:49) (17 - 20)  SpO2: 100% (04 Aug 2018 08:49) (97% - 100%)  Daily     Daily Weight in k (04 Aug 2018 05:15)    PHYSICAL EXAM:  GENERAL: appears chronically ill  HEAD:  Atraumatic, Normocephalic  EYES: EOMI  NECK: Supple, neck  veins full  NERVOUS SYSTEM:  Alert & Oriented X1, confused  CHEST/LUNG: Clear to percussion bilaterally; No rales  HEART: Regular rate and rhythm; No murmurs  ABDOMEN: Soft, Nontender, Nondistended; Bowel sounds present  EXTREMITIES:  No edema    LABS:                        11.7   11.7  )-----------( 288      ( 04 Aug 2018 05:33 )             34.5     08-04    141  |  101  |  23.0<H>  ----------------------------<  127<H>  3.0<L>   |  29.0  |  2.04<H>    Ca    8.9      04 Aug 2018 05:33  Mg     2.1     08-04    TPro  6.6  /  Alb  3.3  /  TBili  0.5  /  DBili  x   /  AST  16  /  ALT  18  /  AlkPhos  94  08-03    PT/INR - ( 03 Aug 2018 17:07 )   PT: 12.5 sec;   INR: 1.13 ratio         PTT - ( 03 Aug 2018 17:07 )  PTT:26.2 sec  Urinalysis Basic - ( 02 Aug 2018 21:19 )    Color: Yellow / Appearance: Clear / S.005 / pH: x  Gluc: x / Ketone: Negative  / Bili: Negative / Urobili: Negative mg/dL   Blood: x / Protein: 100 mg/dL / Nitrite: Negative   Leuk Esterase: Negative / RBC: 0-2 /HPF / WBC 0-2   Sq Epi: x / Non Sq Epi: Occasional / Bacteria: Occasional      Magnesium, Serum: 2.1 mg/dL ( @ 06:10)          RADIOLOGY & ADDITIONAL TESTS:
Patient seen and examined    I&O's Summary    05 Aug 2018 07:01  -  06 Aug 2018 07:00  --------------------------------------------------------  IN: 2340 mL / OUT: 5300 mL / NET: -2960 mL    06 Aug 2018 07:01  -  06 Aug 2018 18:58  --------------------------------------------------------  IN: 0 mL / OUT: 800 mL / NET: -800 mL    Comfortable  Lying quietly    REVIEW OF SYSTEMS:    CONSTITUTIONAL: No F/C  RESPIRATORY: No cough,  No SOB  CARDIOVASCULAR: No CP/palpitations,    GASTROINTESTINAL: No abdominal pain  or NVD   GENITOURINARY: No UTI sx  NEUROLOGICAL: No headaches, NO wk/numbness  MUSCULOSKELETAL: no aches/pains  EXTREMITIES : no swelling,    Vital Signs Last 24 Hrs  T(C): 37.2 (06 Aug 2018 18:03), Max: 37.2 (06 Aug 2018 11:50)  T(F): 98.9 (06 Aug 2018 18:03), Max: 99 (06 Aug 2018 11:50)  HR: 71 (06 Aug 2018 18:03) (60 - 72)  BP: 132/82 (06 Aug 2018 18:03) (132/78 - 168/80)  BP(mean): --  RR: 16 (06 Aug 2018 18:03) (16 - 18)  SpO2: 95% (06 Aug 2018 18:03) (95% - 98%)    PHYSICAL EXAM:    GENERAL: NAD,  Obese; pleasant  EYES:  conjunctiva and sclera clear  NECK: Supple, No JVD/Bruit  NERVOUS SYSTEM:  A/O x3,   CHEST:  No rales or rhonchi  HEART:  RRR, No murmur  ABDOMEN: Soft, NT/ND BS+  EXTREMITIES:  + Edema;  SKIN: No rashes    LABS:                          11.5   11.5  )-----------( 316      ( 05 Aug 2018 05:51 )             34.7     08-06    145  |  107  |  30.0<H>  ----------------------------<  115  3.9   |  26.0  |  2.06<H>    Ca    9.3      06 Aug 2018 07:22        MEDICATIONS  (STANDING):  acetaminophen   Tablet. PRN  amLODIPine   Tablet  aspirin enteric coated  benztropine  cloNIDine  dextrose 40% Gel PRN  dextrose 5%.  dextrose 50% Injectable  dextrose 50% Injectable  dextrose 50% Injectable  glucagon  Injectable PRN  heparin  Injectable  hydrALAZINE  insulin lispro (HumaLOG) corrective regimen sliding scale  insulin lispro (HumaLOG) corrective regimen sliding scale  labetalol  lamoTRIgine  potassium chloride   Powder  QUEtiapine  sodium chloride 0.45% with potassium chloride 20 mEq/L
NEPHROLOGY INTERVAL HPI/OVERNIGHT EVENTS:    Examined earlier    MEDICATIONS  (STANDING):  amLODIPine   Tablet 10 milliGRAM(s) Oral daily  aspirin enteric coated 81 milliGRAM(s) Oral daily  benztropine 0.5 milliGRAM(s) Oral two times a day  cloNIDine 0.2 milliGRAM(s) Oral every 12 hours  dextrose 5%. 1000 milliLiter(s) (50 mL/Hr) IV Continuous <Continuous>  dextrose 50% Injectable 12.5 Gram(s) IV Push once  dextrose 50% Injectable 25 Gram(s) IV Push once  dextrose 50% Injectable 25 Gram(s) IV Push once  heparin  Injectable 5000 Unit(s) SubCutaneous every 12 hours  hydrALAZINE 50 milliGRAM(s) Oral every 8 hours  insulin lispro (HumaLOG) corrective regimen sliding scale   SubCutaneous three times a day before meals  insulin lispro (HumaLOG) corrective regimen sliding scale   SubCutaneous at bedtime  labetalol 300 milliGRAM(s) Oral every 12 hours  lamoTRIgine 50 milliGRAM(s) Oral two times a day  potassium chloride   Powder 40 milliEquivalent(s) Oral daily  QUEtiapine 100 milliGRAM(s) Oral at bedtime  sodium chloride 0.45% with potassium chloride 20 mEq/L 1000 milliLiter(s) (100 mL/Hr) IV Continuous <Continuous>    MEDICATIONS  (PRN):  acetaminophen   Tablet. 650 milliGRAM(s) Oral every 6 hours PRN Moderate Pain (4 - 6)  dextrose 40% Gel 15 Gram(s) Oral once PRN Blood Glucose LESS THAN 70 milliGRAM(s)/deciliter  glucagon  Injectable 1 milliGRAM(s) IntraMuscular once PRN Glucose LESS THAN 70 milligrams/deciliter      Allergies    No Known Allergies    Intolerances        Vital Signs Last 24 Hrs  T(C): 36.7 (05 Aug 2018 11:20), Max: 37.1 (04 Aug 2018 21:36)  T(F): 98.1 (05 Aug 2018 11:20), Max: 98.8 (05 Aug 2018 05:20)  HR: 71 (05 Aug 2018 13:08) (62 - 72)  BP: 132/78 (05 Aug 2018 13:08) (124/72 - 158/86)  BP(mean): --  RR: 16 (05 Aug 2018 13:08) (16 - 18)  SpO2: 97% (05 Aug 2018 13:08) (96% - 97%)  Daily     Daily Weight in k.1 (05 Aug 2018 00:39)    PHYSICAL EXAM:  GENERAL: appears chronically ill  HEAD:  Atraumatic, Normocephalic  EYES: EOMI  NECK: Supple, neck  veins full  NERVOUS SYSTEM:  Alert & Oriented X1, confused  CHEST/LUNG: Clear to percussion bilaterally; No rales  HEART: Regular rate and rhythm; No murmurs  ABDOMEN: Soft, Nontender, Nondistended; Bowel sounds present  EXTREMITIES:  No edema    LABS:                        11.5   11.5  )-----------( 316      ( 05 Aug 2018 05:51 )             34.7     08-05    144  |  106  |  27.0<H>  ----------------------------<  112  3.6   |  27.0  |  2.42<H>    Ca    9.0      05 Aug 2018 05:51  Mg     2.1     08-04      PT/INR - ( 03 Aug 2018 17:07 )   PT: 12.5 sec;   INR: 1.13 ratio         PTT - ( 03 Aug 2018 17:07 )  PTT:26.2 sec            RADIOLOGY & ADDITIONAL TESTS:
[facility]                                                        Office: [address] [address]                                                       Telephone: [phone]. Fax:[phone]                                                                             PROGRESS NOTE   Reason for follow up: Hypertension                            Overnight: No new events.   Update: no new updates.     Subjective: " i am ok_"   Complains of:  no new symptoms.   Review of symptoms: Cardiac:  No chest pain. No dyspnea. No palpitations.  Respiratory:no cough. No dyspnea  Gastrointestinal: No diarrhea. No abdominal pain. No bleeding.     Past medical history: No updates.   Chronic conditions:  Hypertension: controlled.  	  Vitals:  T(C): 36.4 (08-03-18 @ 15:48), Max: 37.1 (08-03-18 @ 00:24)  HR: 59 (08-03-18 @ 17:33) (57 - 78)  BP: 159/95 (08-03-18 @ 17:33) (128/83 - 189/96)  RR: 17 (08-03-18 @ 15:48) (17 - 22)  SpO2: 99% (08-03-18 @ 15:48) (98% - 99%)  Wt(kg): --  I&O's Summary    03 Aug 2018 07:01  -  03 Aug 2018 19:47  --------------------------------------------------------  IN: 0 mL / OUT: 700 mL / NET: -700 mL      Weight (kg): 106.6 (08-02 @ 12:22)    PHYSICAL EXAM:  Appearance: Comfortable. No acute distress  HEENT:  Head and neck: Atraumatic. Normocephalic.  Normal oral mucosa, PERRL, Neck is supple. No JVD, No carotid bruit.   Neurologic: A & O x 3,  expressive aphasia.   Lymphatic: No cervical lymphadenopathy  Cardiovascular: Normal S1 S2, No murmur, rubs/gallops. No JVD, No edema  Respiratory: Lungs clear to auscultation  Gastrointestinal:  Soft, Non-tender, + BS  Lower Extremities: No edema  Psychiatry: Patient is calm. No agitation. Mood & affect appropriate  Skin: No rashes/ ecchymoses/cyanosis/ulcers visualized on the face, hands or feet.    CURRENT MEDICATIONS:  amLODIPine   Tablet 10 milliGRAM(s) Oral daily  cloNIDine 0.2 milliGRAM(s) Oral every 12 hours  labetalol 300 milliGRAM(s) Oral every 12 hours    dextrose 50% Injectable  dextrose 50% Injectable  dextrose 50% Injectable  insulin lispro (HumaLOG) corrective regimen sliding scale  insulin lispro (HumaLOG) corrective regimen sliding scale  aspirin enteric coated  dextrose 5% + sodium chloride 0.45% with potassium chloride 20 mEq/L  dextrose 5%.  heparin  Injectable  potassium chloride  10 mEq/100 mL IVPB      LABS:	 	  CARDIAC MARKERS ( 02 Aug 2018 14:07 )  x     / 0.02 ng/mL / 570 U/L / x     / 7.3 ng/mL  p-BNP 02 Aug 2018 14:07: 1603 pg/mL                            11.4   9.9   )-----------( 272      ( 03 Aug 2018 10:36 )             34.8     08-03    141  |  98  |  24.0<H>  ----------------------------<  163<H>  3.2<L>   |  30.0<H>  |  2.18<H>    Ca    8.9      03 Aug 2018 19:11  Mg     2.0     08-03    TPro  6.6  /  Alb  3.3  /  TBili  0.5  /  DBili  x   /  AST  16  /  ALT  18  /  AlkPhos  94  08-03    proBNP: Serum Pro-Brain Natriuretic Peptide: 1603 pg/mL (08-02 @ 14:07)    Lipid Profile: Date: 07-28 @ 10:30  Total cholesterol 204; Direct LDL: 133; HDL: 53; Triglycerides:91    HgA1c: Hemoglobin A1C, Whole Blood: 5.7 %  TSH: Thyroid Stimulating Hormone, Serum: 0.96 uIU/mL  Thyroid Stimulating Hormone, Serum: 0.76 uIU/mL      TELEMETRY: Reviewed    ECG:  Reviewed by me. 	Sinus rhyht, ANteroloateral infarct and inferior infarct. Prong QTc.     DIAGNOSTIC TESTING:  [ ] Echocardiogram: Normal LV and RV function. No significant valvular abnormality. . no effusion.

## 2018-08-07 NOTE — PROGRESS NOTE ADULT - ASSESSMENT
The patient is a 34 y/o male was seen by his visiting nurse and was found sitting in his non AC room and it was hot and humid. In the ED, noted to have hypertensive urgency with confusion. CT head and MRI of the brain was negative. Patient left AMA  af ew days prior after presenting with AMS. Work up was negative and neurology recommended SAHIL at the time but he left prior to the test. Blood pressure improved after medications were re-started. Evaluated by psychiatry and neurology. Mild NMS with elevated CK on admission resolved. EEG was negative. Spoke with psychiatry, no further recommendations at this time. Patient has not been taking his medications at home.   I spoke with patient's physician and worker at [facility]- patient was previously on Neurontin and lithium. He was well controlled and able to care for himself and appropriately conversive. His medications were discontinued due to worsening renal failure a few months ago. Since then patient was started on Invega but they have been unable to appropriately control his mood. Suspect patient is not caring for himself at home and that he has not been taking his medications. He lives at home with his father who is an alcoholic, elderly grandmother and 2 sisters who are mentally disabled as well. No further psych recommendations. To be followed by the ACT team as an outaptient. Noted to have MELISSA with CKD stage 3 and hypokalemia improved with iv fluids.     Assesment/Plan:    1. Hypertensive urgency: secondary to non complianced. Bp still elevated, increase hydralazine to 100mg Q8 hours.  Continue clonidine, labetalol and norvasc.onitor bp     2. AMS likley secondary to hypertensive urgency vs underlying psychiatric disorder: likely secondary to non compliance with medications. Now resolved  Now at baseline  Mild neuroleptic malignant syndrome? with elevated ck on admission. CK now normalized. Resume seroquel and invega as outpatient.   CT head and MRI of the brain were negative.   EEG negative.     3. MELISSA with underlying CKD: Renal ultrasound reviewed.   Improved with iv fluids.   Creatinine in 12/17- 2.55   24 hour urine studies ordered    4. Hypokalemia: repleted. continue daily supplementation. monitor bmp     5. Leukocytosis: No s/s to suggest infection, Hemoconcentration? Check chest x ray      VTE_ heparin subcut  PT- Home    D.c telemetry. transfer to any bed. Discharge planning. Social work on consult.

## 2018-08-08 LAB — ALDOST SERPL-MCNC: 34.3 NG/DL — HIGH

## 2018-08-11 LAB — RENIN PLAS-CCNC: 0.21 NG/ML/HR — SIGNIFICANT CHANGE UP (ref 0.17–5.38)

## 2018-12-30 ENCOUNTER — INPATIENT (INPATIENT)
Facility: HOSPITAL | Age: 33
LOS: 5 days | Discharge: HOME | DRG: 100 | End: 2019-01-05
Attending: HOSPITALIST | Admitting: INTERNAL MEDICINE
Payer: MEDICARE

## 2018-12-30 VITALS
DIASTOLIC BLOOD PRESSURE: 157 MMHG | OXYGEN SATURATION: 96 % | SYSTOLIC BLOOD PRESSURE: 184 MMHG | HEART RATE: 140 BPM | WEIGHT: 315 LBS | RESPIRATION RATE: 20 BRPM | TEMPERATURE: 99 F

## 2018-12-30 LAB
GAS PNL BLDV: SIGNIFICANT CHANGE UP
HCO3 BLDV-SCNC: 26 MMOL/L — SIGNIFICANT CHANGE UP (ref 21–29)
PCO2 BLDV: 50 MMHG — SIGNIFICANT CHANGE UP (ref 35–50)
PH BLDV: 7.35 — SIGNIFICANT CHANGE UP (ref 7.32–7.43)
PO2 BLDV: 95 MMHG — HIGH (ref 25–45)
SAO2 % BLDV: 97 % — SIGNIFICANT CHANGE UP

## 2018-12-30 PROCEDURE — 99053 MED SERV 10PM-8AM 24 HR FAC: CPT

## 2018-12-30 PROCEDURE — 72125 CT NECK SPINE W/O DYE: CPT | Mod: 26

## 2018-12-30 PROCEDURE — 99291 CRITICAL CARE FIRST HOUR: CPT

## 2018-12-30 PROCEDURE — 93010 ELECTROCARDIOGRAM REPORT: CPT

## 2018-12-30 PROCEDURE — 70450 CT HEAD/BRAIN W/O DYE: CPT | Mod: 26

## 2018-12-30 RX ORDER — SODIUM CHLORIDE 9 MG/ML
1000 INJECTION INTRAMUSCULAR; INTRAVENOUS; SUBCUTANEOUS ONCE
Qty: 0 | Refills: 0 | Status: COMPLETED | OUTPATIENT
Start: 2018-12-30 | End: 2018-12-30

## 2018-12-30 RX ADMIN — SODIUM CHLORIDE 2000 MILLILITER(S): 9 INJECTION INTRAMUSCULAR; INTRAVENOUS; SUBCUTANEOUS at 23:06

## 2018-12-30 RX ADMIN — Medication 1 MILLIGRAM(S): at 23:06

## 2018-12-30 NOTE — ED ADULT TRIAGE NOTE - ARRIVAL INFO ADDITIONAL COMMENTS
pt arrives lethargic ao dry blood by mouth lt side of tongue pt bite pt arrives lethargic ao dry blood by mouth lt side of tongue pt bite. robles equal strenght no deficit noted dr lee called for alissa wade ct called pt transported to ct

## 2018-12-30 NOTE — ED PROVIDER NOTE - NEUROLOGICAL, MLM
Alert and oriented, no focal deficits, no motor or sensory deficits. Post-ictal, confused, no lateralized deficits

## 2018-12-30 NOTE — ED PROVIDER NOTE - OBJECTIVE STATEMENT
34 y/o M pt BIBA with hx of bipolar disorder, DM and HTN presents to ED s/p seizure that occurred today. Per EMS, pt was watching TV when he flopped onto the floor shaking. Pt was noted to be hypertensive and hyperglycemic in the field. Pt states he is not in any pain at this time.

## 2018-12-30 NOTE — ED PROVIDER NOTE - PROGRESS NOTE DETAILS
Trying to review labs over last hour, labs walked down per RN, however labs have still not resulted. spoke with icu pa coming to [place]la[place] called ICU - spoke with ti blakely concerning dispo plan - he says will call back in 10 min aware awaiting dispo plan per ICU PA - not taking pt - will admit medically neuro consult - recalled again from overnight page- keppra ordered, nephrology called as well for consult

## 2018-12-30 NOTE — ED PROVIDER NOTE - CARE PLAN
Principal Discharge DX:	Hypertensive emergency  Secondary Diagnosis:	NSTEMI (non-ST elevated myocardial infarction)  Secondary Diagnosis:	Rhabdomyolysis  Secondary Diagnosis:	Seizure

## 2018-12-30 NOTE — ED PROVIDER NOTE - UNABLE TO OBTAIN
HPI limited due to patient's condition Severe Illness/Injury ROS unable to be obtained due to patient's condition

## 2018-12-30 NOTE — ED ADULT TRIAGE NOTE - CHIEF COMPLAINT QUOTE
as per ems pt was watching tv family reports seizure ems reports post itical. no hx of seizures as per ems pt was watching tv family reports seizure ems reports post itical. no hx of seizures  hx dm htn

## 2018-12-30 NOTE — ED ADULT NURSE NOTE - OBJECTIVE STATEMENT
Patient A&Ox4 complaining of sore throat. Reported that patient had witnessed seizure at home. Patient in negative distress.

## 2018-12-30 NOTE — ED PROVIDER NOTE - MEDICAL DECISION MAKING DETAILS
icuc onsult placeed end organ damage seizure and nstemi will grossly elevated bp , will obtain iuc recs regarding cardene gtt , conrtniuing to closely follow

## 2018-12-30 NOTE — ED ADULT NURSE NOTE - CHIEF COMPLAINT QUOTE
as per ems pt was watching tv family reports seizure ems reports post itical. no hx of seizures  hx dm htn

## 2018-12-31 DIAGNOSIS — R56.9 UNSPECIFIED CONVULSIONS: ICD-10-CM

## 2018-12-31 DIAGNOSIS — I16.1 HYPERTENSIVE EMERGENCY: ICD-10-CM

## 2018-12-31 DIAGNOSIS — G93.40 ENCEPHALOPATHY, UNSPECIFIED: ICD-10-CM

## 2018-12-31 LAB
ACETONE SERPL-MCNC: NEGATIVE — SIGNIFICANT CHANGE UP
ALBUMIN SERPL ELPH-MCNC: 3 G/DL — LOW (ref 3.3–5.2)
ALP SERPL-CCNC: 148 U/L — HIGH (ref 40–120)
ALT FLD-CCNC: 21 U/L — SIGNIFICANT CHANGE UP
ANION GAP SERPL CALC-SCNC: 12 MMOL/L — SIGNIFICANT CHANGE UP (ref 5–17)
ANION GAP SERPL CALC-SCNC: 13 MMOL/L — SIGNIFICANT CHANGE UP (ref 5–17)
ANION GAP SERPL CALC-SCNC: 18 MMOL/L — HIGH (ref 5–17)
ANISOCYTOSIS BLD QL: SLIGHT — SIGNIFICANT CHANGE UP
APAP SERPL-MCNC: <7.5 UG/ML — LOW (ref 10–26)
APPEARANCE UR: CLEAR — SIGNIFICANT CHANGE UP
APTT BLD: 24.9 SEC — LOW (ref 27.5–36.3)
AST SERPL-CCNC: 18 U/L — SIGNIFICANT CHANGE UP
BILIRUB SERPL-MCNC: <0.2 MG/DL — LOW (ref 0.4–2)
BILIRUB UR-MCNC: NEGATIVE — SIGNIFICANT CHANGE UP
BUN SERPL-MCNC: 31 MG/DL — HIGH (ref 8–20)
BUN SERPL-MCNC: 36 MG/DL — HIGH (ref 8–20)
BUN SERPL-MCNC: 37 MG/DL — HIGH (ref 8–20)
CALCIUM SERPL-MCNC: 8.5 MG/DL — LOW (ref 8.6–10.2)
CALCIUM SERPL-MCNC: 8.9 MG/DL — SIGNIFICANT CHANGE UP (ref 8.6–10.2)
CALCIUM SERPL-MCNC: 9.2 MG/DL — SIGNIFICANT CHANGE UP (ref 8.6–10.2)
CHLORIDE SERPL-SCNC: 100 MMOL/L — SIGNIFICANT CHANGE UP (ref 98–107)
CHLORIDE SERPL-SCNC: 84 MMOL/L — LOW (ref 98–107)
CHLORIDE SERPL-SCNC: 97 MMOL/L — LOW (ref 98–107)
CK MB CFR SERPL CALC: 10.5 NG/ML — HIGH (ref 0–6.7)
CK SERPL-CCNC: 905 U/L — HIGH (ref 30–200)
CO2 SERPL-SCNC: 26 MMOL/L — SIGNIFICANT CHANGE UP (ref 22–29)
CO2 SERPL-SCNC: 27 MMOL/L — SIGNIFICANT CHANGE UP (ref 22–29)
CO2 SERPL-SCNC: 29 MMOL/L — SIGNIFICANT CHANGE UP (ref 22–29)
COLOR SPEC: YELLOW — SIGNIFICANT CHANGE UP
CREAT SERPL-MCNC: 2.9 MG/DL — HIGH (ref 0.5–1.3)
CREAT SERPL-MCNC: 3.07 MG/DL — HIGH (ref 0.5–1.3)
CREAT SERPL-MCNC: 3.18 MG/DL — HIGH (ref 0.5–1.3)
DIFF PNL FLD: ABNORMAL
EOSINOPHIL NFR BLD AUTO: 2 % — SIGNIFICANT CHANGE UP (ref 0–6)
EPI CELLS # UR: SIGNIFICANT CHANGE UP
ETHANOL SERPL-MCNC: <10 MG/DL — SIGNIFICANT CHANGE UP
GLUCOSE BLDC GLUCOMTR-MCNC: 266 MG/DL — HIGH (ref 70–99)
GLUCOSE BLDC GLUCOMTR-MCNC: 336 MG/DL — HIGH (ref 70–99)
GLUCOSE BLDC GLUCOMTR-MCNC: 366 MG/DL — HIGH (ref 70–99)
GLUCOSE SERPL-MCNC: 333 MG/DL — HIGH (ref 70–115)
GLUCOSE SERPL-MCNC: 366 MG/DL — HIGH (ref 70–115)
GLUCOSE SERPL-MCNC: 785 MG/DL — CRITICAL HIGH (ref 70–115)
GLUCOSE UR QL: 1000 MG/DL
HCT VFR BLD CALC: 37 % — LOW (ref 42–52)
HGB BLD-MCNC: 12.7 G/DL — LOW (ref 14–18)
INR BLD: 0.94 RATIO — SIGNIFICANT CHANGE UP (ref 0.88–1.16)
KETONES UR-MCNC: NEGATIVE — SIGNIFICANT CHANGE UP
LEUKOCYTE ESTERASE UR-ACNC: NEGATIVE — SIGNIFICANT CHANGE UP
LIDOCAIN IGE QN: 101 U/L — HIGH (ref 22–51)
LYMPHOCYTES # BLD AUTO: 21 % — SIGNIFICANT CHANGE UP (ref 20–55)
MAGNESIUM SERPL-MCNC: 2.6 MG/DL — SIGNIFICANT CHANGE UP (ref 1.6–2.6)
MAGNESIUM SERPL-MCNC: 2.8 MG/DL — HIGH (ref 1.6–2.6)
MCHC RBC-ENTMCNC: 25.8 PG — LOW (ref 27–31)
MCHC RBC-ENTMCNC: 34.3 G/DL — SIGNIFICANT CHANGE UP (ref 32–36)
MCV RBC AUTO: 75.2 FL — LOW (ref 80–94)
MONOCYTES NFR BLD AUTO: 4 % — SIGNIFICANT CHANGE UP (ref 3–10)
NEUTROPHILS NFR BLD AUTO: 73 % — SIGNIFICANT CHANGE UP (ref 37–73)
NITRITE UR-MCNC: NEGATIVE — SIGNIFICANT CHANGE UP
PH UR: 6 — SIGNIFICANT CHANGE UP (ref 5–8)
PLAT MORPH BLD: NORMAL — SIGNIFICANT CHANGE UP
PLATELET # BLD AUTO: 364 K/UL — SIGNIFICANT CHANGE UP (ref 150–400)
POTASSIUM SERPL-MCNC: 2.8 MMOL/L — CRITICAL LOW (ref 3.5–5.3)
POTASSIUM SERPL-MCNC: 3 MMOL/L — LOW (ref 3.5–5.3)
POTASSIUM SERPL-MCNC: 3.1 MMOL/L — LOW (ref 3.5–5.3)
POTASSIUM SERPL-SCNC: 2.8 MMOL/L — CRITICAL LOW (ref 3.5–5.3)
POTASSIUM SERPL-SCNC: 3 MMOL/L — LOW (ref 3.5–5.3)
POTASSIUM SERPL-SCNC: 3.1 MMOL/L — LOW (ref 3.5–5.3)
PROT SERPL-MCNC: 7.1 G/DL — SIGNIFICANT CHANGE UP (ref 6.6–8.7)
PROT UR-MCNC: 100 MG/DL
PROTHROM AB SERPL-ACNC: 10.8 SEC — SIGNIFICANT CHANGE UP (ref 10–12.9)
RBC # BLD: 4.92 M/UL — SIGNIFICANT CHANGE UP (ref 4.6–6.2)
RBC # FLD: 16.8 % — HIGH (ref 11–15.6)
RBC BLD AUTO: ABNORMAL
RBC CASTS # UR COMP ASSIST: SIGNIFICANT CHANGE UP /HPF (ref 0–4)
SALICYLATES SERPL-MCNC: <0.6 MG/DL — LOW (ref 10–20)
SODIUM SERPL-SCNC: 128 MMOL/L — LOW (ref 135–145)
SODIUM SERPL-SCNC: 137 MMOL/L — SIGNIFICANT CHANGE UP (ref 135–145)
SODIUM SERPL-SCNC: 141 MMOL/L — SIGNIFICANT CHANGE UP (ref 135–145)
SP GR SPEC: 1.01 — SIGNIFICANT CHANGE UP (ref 1.01–1.02)
TROPONIN T SERPL-MCNC: 0.09 NG/ML — HIGH (ref 0–0.06)
TROPONIN T SERPL-MCNC: 0.09 NG/ML — HIGH (ref 0–0.06)
TSH SERPL-MCNC: 0.7 UIU/ML — SIGNIFICANT CHANGE UP (ref 0.27–4.2)
UROBILINOGEN FLD QL: NEGATIVE MG/DL — SIGNIFICANT CHANGE UP
WBC # BLD: 20.6 K/UL — HIGH (ref 4.8–10.8)
WBC # FLD AUTO: 20.6 K/UL — HIGH (ref 4.8–10.8)
WBC UR QL: NEGATIVE — SIGNIFICANT CHANGE UP

## 2018-12-31 PROCEDURE — 71045 X-RAY EXAM CHEST 1 VIEW: CPT | Mod: 26

## 2018-12-31 PROCEDURE — 99223 1ST HOSP IP/OBS HIGH 75: CPT

## 2018-12-31 RX ORDER — HYDRALAZINE HCL 50 MG
100 TABLET ORAL EVERY 8 HOURS
Qty: 0 | Refills: 0 | Status: DISCONTINUED | OUTPATIENT
Start: 2018-12-31 | End: 2019-01-05

## 2018-12-31 RX ORDER — LABETALOL HCL 100 MG
10 TABLET ORAL EVERY 6 HOURS
Qty: 0 | Refills: 0 | Status: DISCONTINUED | OUTPATIENT
Start: 2018-12-31 | End: 2019-01-05

## 2018-12-31 RX ORDER — BENZTROPINE MESYLATE 1 MG
0.5 TABLET ORAL
Qty: 0 | Refills: 0 | Status: DISCONTINUED | OUTPATIENT
Start: 2018-12-31 | End: 2019-01-01

## 2018-12-31 RX ORDER — AMLODIPINE BESYLATE 2.5 MG/1
10 TABLET ORAL DAILY
Qty: 0 | Refills: 0 | Status: DISCONTINUED | OUTPATIENT
Start: 2018-12-31 | End: 2019-01-05

## 2018-12-31 RX ORDER — DEXTROSE 50 % IN WATER 50 %
12.5 SYRINGE (ML) INTRAVENOUS ONCE
Qty: 0 | Refills: 0 | Status: DISCONTINUED | OUTPATIENT
Start: 2018-12-31 | End: 2019-01-05

## 2018-12-31 RX ORDER — DEXTROSE 50 % IN WATER 50 %
25 SYRINGE (ML) INTRAVENOUS ONCE
Qty: 0 | Refills: 0 | Status: DISCONTINUED | OUTPATIENT
Start: 2018-12-31 | End: 2019-01-05

## 2018-12-31 RX ORDER — LABETALOL HCL 100 MG
20 TABLET ORAL ONCE
Qty: 0 | Refills: 0 | Status: COMPLETED | OUTPATIENT
Start: 2018-12-31 | End: 2018-12-31

## 2018-12-31 RX ORDER — HEPARIN SODIUM 5000 [USP'U]/ML
5000 INJECTION INTRAVENOUS; SUBCUTANEOUS EVERY 12 HOURS
Qty: 0 | Refills: 0 | Status: DISCONTINUED | OUTPATIENT
Start: 2018-12-31 | End: 2019-01-05

## 2018-12-31 RX ORDER — POTASSIUM CHLORIDE 20 MEQ
10 PACKET (EA) ORAL ONCE
Qty: 0 | Refills: 0 | Status: COMPLETED | OUTPATIENT
Start: 2018-12-31 | End: 2018-12-31

## 2018-12-31 RX ORDER — SODIUM CHLORIDE 9 MG/ML
1000 INJECTION INTRAMUSCULAR; INTRAVENOUS; SUBCUTANEOUS ONCE
Qty: 0 | Refills: 0 | Status: COMPLETED | OUTPATIENT
Start: 2018-12-31 | End: 2018-12-31

## 2018-12-31 RX ORDER — LABETALOL HCL 100 MG
40 TABLET ORAL ONCE
Qty: 0 | Refills: 0 | Status: COMPLETED | OUTPATIENT
Start: 2018-12-31 | End: 2018-12-31

## 2018-12-31 RX ORDER — AMLODIPINE BESYLATE 2.5 MG/1
10 TABLET ORAL ONCE
Qty: 0 | Refills: 0 | Status: COMPLETED | OUTPATIENT
Start: 2018-12-31 | End: 2018-12-31

## 2018-12-31 RX ORDER — POTASSIUM CHLORIDE 20 MEQ
10 PACKET (EA) ORAL
Qty: 0 | Refills: 0 | Status: COMPLETED | OUTPATIENT
Start: 2018-12-31 | End: 2018-12-31

## 2018-12-31 RX ORDER — LEVETIRACETAM 250 MG/1
500 TABLET, FILM COATED ORAL EVERY 12 HOURS
Qty: 0 | Refills: 0 | Status: DISCONTINUED | OUTPATIENT
Start: 2018-12-31 | End: 2019-01-05

## 2018-12-31 RX ORDER — INSULIN LISPRO 100/ML
VIAL (ML) SUBCUTANEOUS AT BEDTIME
Qty: 0 | Refills: 0 | Status: DISCONTINUED | OUTPATIENT
Start: 2018-12-31 | End: 2019-01-05

## 2018-12-31 RX ORDER — DEXTROSE MONOHYDRATE, SODIUM CHLORIDE, AND POTASSIUM CHLORIDE 50; .745; 4.5 G/1000ML; G/1000ML; G/1000ML
1000 INJECTION, SOLUTION INTRAVENOUS
Qty: 0 | Refills: 0 | Status: DISCONTINUED | OUTPATIENT
Start: 2018-12-31 | End: 2019-01-04

## 2018-12-31 RX ORDER — ONDANSETRON 8 MG/1
4 TABLET, FILM COATED ORAL EVERY 6 HOURS
Qty: 0 | Refills: 0 | Status: DISCONTINUED | OUTPATIENT
Start: 2018-12-31 | End: 2019-01-05

## 2018-12-31 RX ORDER — SODIUM CHLORIDE 9 MG/ML
1000 INJECTION, SOLUTION INTRAVENOUS
Qty: 0 | Refills: 0 | Status: DISCONTINUED | OUTPATIENT
Start: 2018-12-31 | End: 2019-01-05

## 2018-12-31 RX ORDER — INSULIN LISPRO 100/ML
30 VIAL (ML) SUBCUTANEOUS ONCE
Qty: 0 | Refills: 0 | Status: COMPLETED | OUTPATIENT
Start: 2018-12-31 | End: 2018-12-31

## 2018-12-31 RX ORDER — DEXTROSE 50 % IN WATER 50 %
15 SYRINGE (ML) INTRAVENOUS ONCE
Qty: 0 | Refills: 0 | Status: DISCONTINUED | OUTPATIENT
Start: 2018-12-31 | End: 2019-01-05

## 2018-12-31 RX ORDER — LEVETIRACETAM 250 MG/1
1000 TABLET, FILM COATED ORAL EVERY 12 HOURS
Qty: 0 | Refills: 0 | Status: DISCONTINUED | OUTPATIENT
Start: 2018-12-31 | End: 2018-12-31

## 2018-12-31 RX ORDER — ASPIRIN/CALCIUM CARB/MAGNESIUM 324 MG
325 TABLET ORAL ONCE
Qty: 0 | Refills: 0 | Status: COMPLETED | OUTPATIENT
Start: 2018-12-31 | End: 2018-12-31

## 2018-12-31 RX ORDER — INSULIN GLARGINE 100 [IU]/ML
10 INJECTION, SOLUTION SUBCUTANEOUS AT BEDTIME
Qty: 0 | Refills: 0 | Status: DISCONTINUED | OUTPATIENT
Start: 2018-12-31 | End: 2019-01-01

## 2018-12-31 RX ORDER — INSULIN LISPRO 100/ML
VIAL (ML) SUBCUTANEOUS
Qty: 0 | Refills: 0 | Status: DISCONTINUED | OUTPATIENT
Start: 2018-12-31 | End: 2019-01-05

## 2018-12-31 RX ORDER — HYDRALAZINE HCL 50 MG
100 TABLET ORAL ONCE
Qty: 0 | Refills: 0 | Status: COMPLETED | OUTPATIENT
Start: 2018-12-31 | End: 2018-12-31

## 2018-12-31 RX ORDER — LAMOTRIGINE 25 MG/1
50 TABLET, ORALLY DISINTEGRATING ORAL
Qty: 0 | Refills: 0 | Status: DISCONTINUED | OUTPATIENT
Start: 2018-12-31 | End: 2019-01-05

## 2018-12-31 RX ORDER — GLUCAGON INJECTION, SOLUTION 0.5 MG/.1ML
1 INJECTION, SOLUTION SUBCUTANEOUS ONCE
Qty: 0 | Refills: 0 | Status: DISCONTINUED | OUTPATIENT
Start: 2018-12-31 | End: 2019-01-05

## 2018-12-31 RX ORDER — INSULIN HUMAN 100 [IU]/ML
10 INJECTION, SOLUTION SUBCUTANEOUS ONCE
Qty: 0 | Refills: 0 | Status: COMPLETED | OUTPATIENT
Start: 2018-12-31 | End: 2018-12-31

## 2018-12-31 RX ADMIN — SODIUM CHLORIDE 1000 MILLILITER(S): 9 INJECTION INTRAMUSCULAR; INTRAVENOUS; SUBCUTANEOUS at 02:25

## 2018-12-31 RX ADMIN — Medication 100 MILLIEQUIVALENT(S): at 04:02

## 2018-12-31 RX ADMIN — LEVETIRACETAM 400 MILLIGRAM(S): 250 TABLET, FILM COATED ORAL at 11:16

## 2018-12-31 RX ADMIN — Medication 30 UNIT(S): at 02:20

## 2018-12-31 RX ADMIN — DEXTROSE MONOHYDRATE, SODIUM CHLORIDE, AND POTASSIUM CHLORIDE 80 MILLILITER(S): 50; .745; 4.5 INJECTION, SOLUTION INTRAVENOUS at 16:00

## 2018-12-31 RX ADMIN — Medication 100 MILLIGRAM(S): at 05:23

## 2018-12-31 RX ADMIN — Medication 10: at 17:04

## 2018-12-31 RX ADMIN — Medication 100 MILLIEQUIVALENT(S): at 02:16

## 2018-12-31 RX ADMIN — INSULIN HUMAN 10 UNIT(S): 100 INJECTION, SOLUTION SUBCUTANEOUS at 02:16

## 2018-12-31 RX ADMIN — AMLODIPINE BESYLATE 10 MILLIGRAM(S): 2.5 TABLET ORAL at 04:40

## 2018-12-31 RX ADMIN — Medication 100 MILLIEQUIVALENT(S): at 11:42

## 2018-12-31 RX ADMIN — AMLODIPINE BESYLATE 10 MILLIGRAM(S): 2.5 TABLET ORAL at 19:45

## 2018-12-31 RX ADMIN — Medication 10 MILLIEQUIVALENT(S): at 03:16

## 2018-12-31 RX ADMIN — Medication 100 MILLIEQUIVALENT(S): at 12:47

## 2018-12-31 RX ADMIN — SODIUM CHLORIDE 2000 MILLILITER(S): 9 INJECTION INTRAMUSCULAR; INTRAVENOUS; SUBCUTANEOUS at 02:16

## 2018-12-31 RX ADMIN — Medication 100 MILLIEQUIVALENT(S): at 10:31

## 2018-12-31 RX ADMIN — Medication 20 MILLIGRAM(S): at 01:29

## 2018-12-31 RX ADMIN — Medication 325 MILLIGRAM(S): at 02:22

## 2018-12-31 RX ADMIN — Medication 40 MILLIGRAM(S): at 04:02

## 2018-12-31 RX ADMIN — Medication 0.2 MILLIGRAM(S): at 04:40

## 2018-12-31 NOTE — H&P ADULT - NSHPPHYSICALEXAM_GEN_ALL_CORE
ICU Vital Signs Last 24 Hrs  T(C): 36.9 (31 Dec 2018 16:15), Max: 37.3 (30 Dec 2018 22:44)  T(F): 98.4 (31 Dec 2018 16:15), Max: 99.2 (30 Dec 2018 22:44)  HR: 90 (31 Dec 2018 16:15) (80 - 140)  BP: 180/84 (31 Dec 2018 16:15) (150/65 - 208/117)  BP(mean): --  ABP: --  ABP(mean): --  RR: 16 (31 Dec 2018 16:15) (16 - 20)  SpO2: 100% (31 Dec 2018 16:15) (95% - 100%)

## 2018-12-31 NOTE — PROGRESS NOTE ADULT - SUBJECTIVE AND OBJECTIVE BOX
Vital Signs Last 24 Hrs  T(C): 36.6 (31 Dec 2018 03:58), Max: 37.3 (30 Dec 2018 22:44)  T(F): 97.9 (31 Dec 2018 03:58), Max: 99.2 (30 Dec 2018 22:44)  HR: 80 (31 Dec 2018 07:50) (80 - 140)  BP: 159/93 (31 Dec 2018 07:50) (150/65 - 208/117)  BP(mean): --  RR: 18 (31 Dec 2018 07:50) (18 - 20)  SpO2: 100% (31 Dec 2018 07:50) (95% - 100%)    137    |  97<L>  |  36.0<H>  ----------------------------<  366<H>  Ca:8.5<L> (31 Dec 2018 08:17)  2.8<LL>   |  27.0   |  3.07<H>    eGFR if Non : 26 <L>  eGFR if : 30 <L>    TPro  7.1    /  Alb  3.0<L>  /  TBili  <0.2<L>  /  DBili  x      /  AST  18     /  ALT  21     /  AlkPhos  148<H>  31 Dec 2018 00:25                            12.7<L>  20.6<H> )-----------( 364      ( 31 Dec 2018 00:25 )                37.0<L>    Phos:-- M.8 mg/dL<H> PTH:-- Uric acid:-- Serum Osm:--  Ferritin:-- Iron:-- TIBC:-- Tsat:--  B12:0.70 uIU/mL TSH:-- ( @ 00:25)    Urinalysis Basic - ( 31 Dec 2018 02:33 )  Color: Yellow / Appearance: Clear / S.010 / pH: x  Gluc: x / Ketone: Negative  / Bili: Negative / Urobili: Negative mg/dL   Blood: x / Protein: 100 mg/dL<!> / Nitrite: Negative   Leuk Esterase: Negative / RBC: 0-2 /HPF / WBC Negative   Sq Epi: x / Non Sq Epi: Occasional / Bacteria: x      Patient seen & examined,    Full note to follow,

## 2018-12-31 NOTE — ED ADULT NURSE REASSESSMENT NOTE - NS ED NURSE REASSESS COMMENT FT1
Assumed pt care at this time. Pt sleeping, easy to wake,  pt waiting for repeat BMP then disposition. Pt aware of plan of care.

## 2018-12-31 NOTE — ED ADULT NURSE REASSESSMENT NOTE - NS ED NURSE REASSESS COMMENT FT1
Pt remains awake and alert with bi-pap in place.  Pt Nitro drip increased per admission orders.  Will continue to monitor. Pt repeat BMP sent

## 2018-12-31 NOTE — ED ADULT NURSE REASSESSMENT NOTE - NS ED NURSE REASSESS COMMENT FT1
Patient A&Ox4, lethargic, denies any pain or discomfort. Cardiac monitor in place. Respirations even & unlabored, denies any numbness or tingling. Denies any chest pain, shortness of breath, nausea or dizziness. Saline lock in place, patent, negative s/s phlebitis or infiltration. Medications administered as ordered, will continue to monitor.

## 2018-12-31 NOTE — H&P ADULT - HISTORY OF PRESENT ILLNESS
This is 32YO man with PMH of bipolar disorder, DM, HTN, CKD, sleep apnea brought to ED for Seizure. Pt is lethargic, postictal, unable to provide history. Discussed with his father - he lives with father no history of seziure, noted to have seizure at home and brought to ED. Per father he is non compliant with medication and diet. On arrival he is noted to have , /117, received labetalol and ativan. At the time of exam he is still lethargic, respond to verbal stimuli, oriented to person and place, report generalized pain.

## 2018-12-31 NOTE — H&P ADULT - ASSESSMENT
This is 32YO man with PMH of bipolar disorder, DM, HTN, CKD, sleep apnea brought to ED for Seizure. Pt is lethargic, postictal, unable to provide history. Discussed with his father - he lives with father no history of seziure, noted to have seizure at home and brought to ED. Per father he is non compliant with medication and diet. On arrival he is noted to have , /117, received labetalol and ativan. At the time of exam he is still lethargic, respond to verbal stimuli, oriented to person and place, report generalized pain.     A/P    >New onset seizure in the setting of uncontrolled BP and high FS  admit to step down   Neurology consult appreciated - start Keppra   MRI, EEG  CT head - no acute finding  seizure precaution  ativan prn     >HTN emergency - ?compliance   c/w Labetalol prn - awaiting dysphagia screen - once okay to swallow will resume home medication  Monitor BP  TTE    >DM - uncontrolled with complication - CKD  FS and sliding scale   pt is NPO will give Lantus 10 units qhs - once start PO  will adjust   A1c in am     >Acute on CKD stage 3 - baseline Cr around 2.3  renal consult appreciated - c/w IVF, f/u BMP  renal u/s    >Bipolar disorder - resume home medication once okay to swallow     >Hypokalemia - IV potassium supplemented - repeat BMP pending   normal mag     >Leucocytosis - likely reactive   f/u cbc     >elevated troponin - 0.09 - CKD  serial trroponin  TTE    >DVT PPX - Heparin This is 34YO man with PMH of bipolar disorder, DM, HTN, CKD, sleep apnea brought to ED for Seizure. Pt is lethargic, postictal, unable to provide history. Discussed with his father - he lives with father no history of seziure, noted to have seizure at home and brought to ED. Per father he is non compliant with medication and diet. On arrival he is noted to have , /117, received labetalol and ativan. At the time of exam he is still lethargic, respond to verbal stimuli, oriented to person and place, report generalized pain.     A/P    >New onset seizure in the setting of uncontrolled BP and high FS  admit to step down   Pt was seen by ICU - not accepted  Neurology consult appreciated - start Keppra   MRI, EEG  CT head - no acute finding  seizure precaution  ativan prn     >HTN emergency - ?compliance   c/w Labetalol prn - awaiting dysphagia screen - once okay to swallow will resume home medication  Monitor BP  TTE    >DM - uncontrolled with complication - CKD  FS and sliding scale   pt is NPO will give Lantus 10 units qhs - once start PO  will adjust   A1c in am     >Acute on CKD stage 3 - baseline Cr around 2.3  renal consult appreciated - c/w IVF, f/u BMP  renal u/s    >Bipolar disorder - resume home medication once okay to swallow     >Hypokalemia - IV potassium supplemented - repeat BMP pending   normal mag     >Leucocytosis - likely reactive   f/u cbc     >elevated troponin - 0.09 - CKD  serial trroponin  TTE    >DVT PPX - Heparin

## 2018-12-31 NOTE — ED ADULT NURSE REASSESSMENT NOTE - NS ED NURSE REASSESS COMMENT FT1
Pt sleeping in stretcher, respirations even & unlabored, pt easy to rouse when spoken to.  Pt waiting for admit orders and room assignment.

## 2018-12-31 NOTE — CONSULT NOTE ADULT - ASSESSMENT
Impression:  Encephalopathy metabolic.  Concerns for seizure event in setting of HTN and hyperglycemia.    Plan:    MRI Brain without lucrecia Seizure protocol.  EEG to assess for seizure.  Medical management  Pt has already been loaded on Keppra and to continue keppra 500mg q12 as maintenance for now.  Maintain seizure precautions.  D/w Dr Elliott in detail.  Recommend ID/ENT eval for mastoiditis.  Recommend Nephro eval.  DVT prophylaxis recommended.  Will follow.

## 2018-12-31 NOTE — CONSULT NOTE ADULT - SUBJECTIVE AND OBJECTIVE BOX
HPI: 33yMale RH BIBA with h/o Bipolar dx, DM, HTN after seizure related activity.  Pt was noted to have GTC activity and in ER started on Keppra.  Also found to have hyperglycemia and elevated BP.  No further information provided.  Pt was also seen here by Dr Soni 08/2018 for htn encephalopathy/NMS.  Now called for neurological evaluation.    PAST MEDICAL & SURGICAL HISTORY:  HTN (hypertension)  Sleep apnea  Diabetes  Bipolar 1 disorder  Hypertension  No significant past surgical history    MEDICATIONS  (STANDING):  levETIRAcetam  IVPB 1000 milliGRAM(s) IV Intermittent every 12 hours  potassium chloride  10 mEq/100 mL IVPB 10 milliEquivalent(s) IV Intermittent every 1 hour    MEDICATIONS  (PRN):    Allergies    No Known Allergies    Intolerances        FAMILY HISTORY:  Family history of diabetes mellitus (Grandparent)          SOCIAL HISTORY:  Denies toxic habits;     REVIEW OF SYSTEMS:    As noted in the HPI.    VITAL SIGNS:  Vital Signs Last 24 Hrs  T(C): 36.6 (31 Dec 2018 03:58), Max: 37.3 (30 Dec 2018 22:44)  T(F): 97.9 (31 Dec 2018 03:58), Max: 99.2 (30 Dec 2018 22:44)  HR: 80 (31 Dec 2018 07:50) (80 - 140)  BP: 159/93 (31 Dec 2018 07:50) (150/65 - 208/117)  BP(mean): --  RR: 18 (31 Dec 2018 07:50) (18 - 20)  SpO2: 100% (31 Dec 2018 07:50) (95% - 100%)    PHYSICAL EXAMINATION:  General: Well-developed, well nourished, in no acute distress.  Eyes: Conjunctiva and sclera clear. Fundoscopic examination was deferred.  Neck: Supple.  Cardiac: +S1 & S2; Regular.  Chest: CTA b/l.    Musculoskeletal: No tenderness on palpation of spine.  No Brudzinski/Kernig's sign.    Neurologic:  - Mental Status:  Arousable; lethargic, oriented to person, place; Speech is fair; affect anxious  Cranial Nerves II-XII:    II:  Visual acuity is normal for age ; Visual fields are full to confrontation; Pupils are equal, round, and reactive to light.  III, IV, VI:  Extraocular movements are intact without nystagmus.  V:  Facial sensation is intact in the V1-V3 distribution bilaterally.  VII:  Face is symmetric with normal eye closure and smile  VIII:  Hearing is intact and symmetric for age  IX, X:  Uvula is midline and soft palate rises symmetrically  XI:  Head turning and shoulder shrug are intact.  XII:  Tongue protrudes in the midline.  - Motor:  Strength is 5/5 throughout.  There is no pronator drift.  Normal muscle bulk and tone throughout.  - Reflexes:  2+ and symmetric throughout.  Plantars downwards b/l.  - Sensory:  Intact and symmetric to light touch, and joint-position sense.  - Coordination:  Pt not cooperative.  - Gait: Deferred.      LABS:                          12.7   20.6  )-----------( 364      ( 31 Dec 2018 00:25 )             37.0     31 Dec 2018 08:17    137    |  97     |  36.0   ----------------------------<  366    2.8     |  27.0   |  3.07     Ca    8.5        31 Dec 2018 08:17  Mg     2.8       31 Dec 2018 00:25    TPro  7.1    /  Alb  3.0    /  TBili  <0.2   /  DBili  x      /  AST  18     /  ALT  21     /  AlkPhos  148    31 Dec 2018 00:25    LIVER FUNCTIONS - ( 31 Dec 2018 00:25 )  Alb: 3.0 g/dL / Pro: 7.1 g/dL / ALK PHOS: 148 U/L / ALT: 21 U/L / AST: 18 U/L / GGT: x           PT/INR - ( 31 Dec 2018 00:25 )   PT: 10.8 sec;   INR: 0.94 ratio         PTT - ( 31 Dec 2018 00:25 )  PTT:24.9 sec      RADIOLOGY & ADDITIONAL STUDIES:      CT Head/Cervical Spine No Cont (12.30.18 @ 23:03) >  Head CT: No acute intracranial hemorrhage or displaced skull fracture. If   clinically indicated, short-term follow-up or MRI may be obtained for   further evaluation.  Nonspecific opacification of the left mastoid air cells and middle ear   cavity, new since 8/2/2018. Recommend clinical correlation to assess   acute mastoiditis.    Cervical spine CT: No obvious fracture or traumatic malalignment in the   cervical spine. If there is clinical suspicion for acute fracture or   ligamentous/cord injury, MRI may be obtained for further evaluation.  Multilevel degenerative changes of the cervical spine.    EEG Awake and Asleep (08.03.18 @ 13:29) >  impression: This is a normal record.There was no seizure activity during   this recording.    MR Head No Cont (08.02.18 @ 15:42) >  Unremarkable MRI of the brain.

## 2018-12-31 NOTE — ED ADULT NURSE REASSESSMENT NOTE - NS ED NURSE REASSESS COMMENT FT1
Nitro drip continues to infuse Pt tolerating well, MAP still remains elevated at 108 Nitro drip increased to 225mcg/kg Will continue to monitor Pt waiting for repeat BMP results respirations even & unlabored.

## 2019-01-01 LAB
24R-OH-CALCIDIOL SERPL-MCNC: 8.7 NG/ML — LOW (ref 30–80)
ANION GAP SERPL CALC-SCNC: 10 MMOL/L — SIGNIFICANT CHANGE UP (ref 5–17)
ANION GAP SERPL CALC-SCNC: 12 MMOL/L — SIGNIFICANT CHANGE UP (ref 5–17)
BUN SERPL-MCNC: 25 MG/DL — HIGH (ref 8–20)
BUN SERPL-MCNC: 26 MG/DL — HIGH (ref 8–20)
CALCIUM SERPL-MCNC: 8.5 MG/DL — LOW (ref 8.6–10.2)
CALCIUM SERPL-MCNC: 8.6 MG/DL — SIGNIFICANT CHANGE UP (ref 8.6–10.2)
CHLORIDE SERPL-SCNC: 102 MMOL/L — SIGNIFICANT CHANGE UP (ref 98–107)
CHLORIDE SERPL-SCNC: 99 MMOL/L — SIGNIFICANT CHANGE UP (ref 98–107)
CO2 SERPL-SCNC: 28 MMOL/L — SIGNIFICANT CHANGE UP (ref 22–29)
CO2 SERPL-SCNC: 29 MMOL/L — SIGNIFICANT CHANGE UP (ref 22–29)
CREAT SERPL-MCNC: 2.68 MG/DL — HIGH (ref 0.5–1.3)
CREAT SERPL-MCNC: 2.84 MG/DL — HIGH (ref 0.5–1.3)
CULTURE RESULTS: SIGNIFICANT CHANGE UP
FERRITIN SERPL-MCNC: 164 NG/ML — SIGNIFICANT CHANGE UP (ref 30–400)
FOLATE SERPL-MCNC: 14.5 NG/ML — SIGNIFICANT CHANGE UP
GLUCOSE BLDC GLUCOMTR-MCNC: 214 MG/DL — HIGH (ref 70–99)
GLUCOSE BLDC GLUCOMTR-MCNC: 232 MG/DL — HIGH (ref 70–99)
GLUCOSE BLDC GLUCOMTR-MCNC: 262 MG/DL — HIGH (ref 70–99)
GLUCOSE BLDC GLUCOMTR-MCNC: 369 MG/DL — HIGH (ref 70–99)
GLUCOSE SERPL-MCNC: 240 MG/DL — HIGH (ref 70–115)
GLUCOSE SERPL-MCNC: 246 MG/DL — HIGH (ref 70–115)
HBA1C BLD-MCNC: 10.3 % — HIGH (ref 4–5.6)
HCT VFR BLD CALC: 34.5 % — LOW (ref 42–52)
HGB BLD-MCNC: 11.6 G/DL — LOW (ref 14–18)
IRON SATN MFR SERPL: 10 % — LOW (ref 16–55)
IRON SATN MFR SERPL: 23 UG/DL — LOW (ref 59–158)
MCHC RBC-ENTMCNC: 25.2 PG — LOW (ref 27–31)
MCHC RBC-ENTMCNC: 33.6 G/DL — SIGNIFICANT CHANGE UP (ref 32–36)
MCV RBC AUTO: 74.8 FL — LOW (ref 80–94)
PLATELET # BLD AUTO: 305 K/UL — SIGNIFICANT CHANGE UP (ref 150–400)
POTASSIUM SERPL-MCNC: 2.8 MMOL/L — CRITICAL LOW (ref 3.5–5.3)
POTASSIUM SERPL-MCNC: 3.3 MMOL/L — LOW (ref 3.5–5.3)
POTASSIUM SERPL-SCNC: 2.8 MMOL/L — CRITICAL LOW (ref 3.5–5.3)
POTASSIUM SERPL-SCNC: 3.3 MMOL/L — LOW (ref 3.5–5.3)
PTH-INTACT FLD-MCNC: 154 PG/ML — HIGH (ref 15–65)
RBC # BLD: 4.61 M/UL — SIGNIFICANT CHANGE UP (ref 4.6–6.2)
RBC # FLD: 17.4 % — HIGH (ref 11–15.6)
SODIUM SERPL-SCNC: 140 MMOL/L — SIGNIFICANT CHANGE UP (ref 135–145)
SODIUM SERPL-SCNC: 140 MMOL/L — SIGNIFICANT CHANGE UP (ref 135–145)
SPECIMEN SOURCE: SIGNIFICANT CHANGE UP
TIBC SERPL-MCNC: 237 UG/DL — SIGNIFICANT CHANGE UP (ref 220–430)
TRANSFERRIN SERPL-MCNC: 166 MG/DL — LOW (ref 180–329)
TROPONIN T SERPL-MCNC: 0.1 NG/ML — HIGH (ref 0–0.06)
VIT B12 SERPL-MCNC: 760 PG/ML — SIGNIFICANT CHANGE UP (ref 232–1245)
WBC # BLD: 16.8 K/UL — HIGH (ref 4.8–10.8)
WBC # FLD AUTO: 16.8 K/UL — HIGH (ref 4.8–10.8)

## 2019-01-01 PROCEDURE — 76775 US EXAM ABDO BACK WALL LIM: CPT | Mod: 26

## 2019-01-01 PROCEDURE — 99233 SBSQ HOSP IP/OBS HIGH 50: CPT

## 2019-01-01 RX ORDER — POTASSIUM CHLORIDE 20 MEQ
10 PACKET (EA) ORAL
Qty: 0 | Refills: 0 | Status: COMPLETED | OUTPATIENT
Start: 2019-01-01 | End: 2019-01-01

## 2019-01-01 RX ORDER — INSULIN GLARGINE 100 [IU]/ML
15 INJECTION, SOLUTION SUBCUTANEOUS AT BEDTIME
Qty: 0 | Refills: 0 | Status: DISCONTINUED | OUTPATIENT
Start: 2019-01-01 | End: 2019-01-03

## 2019-01-01 RX ORDER — POTASSIUM CHLORIDE 20 MEQ
40 PACKET (EA) ORAL ONCE
Qty: 0 | Refills: 0 | Status: COMPLETED | OUTPATIENT
Start: 2019-01-01 | End: 2019-01-01

## 2019-01-01 RX ADMIN — Medication 0.5 MILLIGRAM(S): at 05:47

## 2019-01-01 RX ADMIN — Medication 0.2 MILLIGRAM(S): at 14:56

## 2019-01-01 RX ADMIN — Medication 4: at 18:39

## 2019-01-01 RX ADMIN — LAMOTRIGINE 50 MILLIGRAM(S): 25 TABLET, ORALLY DISINTEGRATING ORAL at 05:48

## 2019-01-01 RX ADMIN — Medication 10: at 12:32

## 2019-01-01 RX ADMIN — DEXTROSE MONOHYDRATE, SODIUM CHLORIDE, AND POTASSIUM CHLORIDE 80 MILLILITER(S): 50; .745; 4.5 INJECTION, SOLUTION INTRAVENOUS at 20:48

## 2019-01-01 RX ADMIN — Medication 0.2 MILLIGRAM(S): at 22:29

## 2019-01-01 RX ADMIN — INSULIN GLARGINE 15 UNIT(S): 100 INJECTION, SOLUTION SUBCUTANEOUS at 22:29

## 2019-01-01 RX ADMIN — LEVETIRACETAM 420 MILLIGRAM(S): 250 TABLET, FILM COATED ORAL at 14:49

## 2019-01-01 RX ADMIN — HEPARIN SODIUM 5000 UNIT(S): 5000 INJECTION INTRAVENOUS; SUBCUTANEOUS at 05:48

## 2019-01-01 RX ADMIN — Medication 100 MILLIGRAM(S): at 00:19

## 2019-01-01 RX ADMIN — Medication 100 MILLIEQUIVALENT(S): at 12:50

## 2019-01-01 RX ADMIN — LAMOTRIGINE 50 MILLIGRAM(S): 25 TABLET, ORALLY DISINTEGRATING ORAL at 18:40

## 2019-01-01 RX ADMIN — INSULIN GLARGINE 10 UNIT(S): 100 INJECTION, SOLUTION SUBCUTANEOUS at 00:20

## 2019-01-01 RX ADMIN — Medication 100 MILLIEQUIVALENT(S): at 14:41

## 2019-01-01 RX ADMIN — Medication 100 MILLIGRAM(S): at 22:30

## 2019-01-01 RX ADMIN — LEVETIRACETAM 420 MILLIGRAM(S): 250 TABLET, FILM COATED ORAL at 00:19

## 2019-01-01 RX ADMIN — Medication 40 MILLIEQUIVALENT(S): at 12:32

## 2019-01-01 RX ADMIN — DEXTROSE MONOHYDRATE, SODIUM CHLORIDE, AND POTASSIUM CHLORIDE 80 MILLILITER(S): 50; .745; 4.5 INJECTION, SOLUTION INTRAVENOUS at 05:47

## 2019-01-01 RX ADMIN — Medication 100 MILLIGRAM(S): at 05:48

## 2019-01-01 RX ADMIN — Medication 2: at 00:20

## 2019-01-01 RX ADMIN — Medication 0.2 MILLIGRAM(S): at 05:47

## 2019-01-01 RX ADMIN — Medication 100 MILLIGRAM(S): at 14:56

## 2019-01-01 NOTE — CONSULT NOTE ADULT - ASSESSMENT
CT Head No Cont:                           EXAM:  CT BRAIN                          PROCEDURE DATE:  12/30/2018          INTERPRETATION:  Clinical indication: Headache, trauma.    Technique: CT axial images of the head and cervical spine were obtained   without intravenous contrast. Computer-reconstructed coronal and sagittal   images were obtained.    Comparison:  Brain MR and head CT 8/2/2018. CTA head and neck 7/27/2018.    Findings:     Head CT: There is no acute intracranial hemorrhage, large cortical   infarct, mass effect or midline shift. There is mild prominence of the   cortical sulci and ventricles.    There is no depressed skull fracture. There is minimal mucosal thickening   of the sinuses. The right tympanomastoid region is clear. There is   nonspecific opacification of the left mastoid air cells and middle ear   cavity, new since 8/2/2018.     Cervical spine CT: Artifact degrades images. There is a reversal of the   cervical lordosis, which may be related to patient positioning and/or   muscle spasm. There is obvious no fracture or traumatic malalignment in   the cervical spine, given the extent of artifact. The vertebral body   heights are preserved in the cervical spine without a compression   deformity. The craniocervical junction, predentate and atlantodental   space are intact.    There are multilevel degenerative changes characterized by uncovertebral   degenerative change, disc osteophyte complex and facet arthropathy in the   cervical spine.     The prevertebral soft tissue is within normal limits. There is no   hematoma in the visualized superficial soft tissue. Retropharyngeal   course of bilateral common carotid and internal carotid arteries.    Visualized lung apices: No pneumothorax.    Impression:     Head CT: No acute intracranial hemorrhage or displaced skull fracture. If   clinically indicated, short-term follow-up or MRI may be obtained for   further evaluation.    Nonspecific opacification of the left mastoid air cells and middle ear   cavity, new since 8/2/2018. Recommend clinical correlation to assess   acute mastoiditis.    Cervical spine CT: No obvious fracture or traumatic malalignment in the   cervical spine. If there is clinical suspicion for acute fracture or   ligamentous/cord injury, MRI may be obtained for further evaluation.    Multilevel degenerative changes of the cervical spine.     	    30-Dec-2018 23:03, CT Cervical Spine No Cont	          INTERPRETATION:  Clinical indication: Headache, trauma.    Technique: CT axial images of the head and cervical spine were obtained   without intravenous contrast. Computer-reconstructed coronal and sagittal   images were obtained.    Comparison:  Brain MR and head CT 8/2/2018. CTA head and neck 7/27/2018.    Findings:     Head CT: There is no acute intracranial hemorrhage, large cortical   infarct, mass effect or midline shift. There is mild prominence of the   cortical sulci and ventricles.    There is no depressed skull fracture. There is minimal mucosal thickening   of the sinuses. The right tympanomastoid region is clear. There is   nonspecific opacification of the left mastoid air cells and middle ear   cavity, new since 8/2/2018.     Cervical spine CT: Artifact degrades images. There is a reversal of the   cervical lordosis, which may be related to patient positioning and/or   muscle spasm. There is obvious no fracture or traumatic malalignment in   the cervical spine, given the extent of artifact. The vertebral body   heights are preserved in the cervical spine without a compression   deformity. The craniocervical junction, pre dentate and atlantodental   space are intact.    There are multilevel degenerative changes characterized by uncovertebral   degenerative change, disc osteophyte complex and facet arthropathy in the   cervical spine.     Thepre vertebral soft tissue is within normal limits. There is no   hematoma in the visualized superficial soft tissue. Retropharyngeal   course of bilateral common carotid and internal carotid arteries.    Visualized lung apices: No pneumothorax.   Impression:     Head CT: No acute intracranial hemorrhage or displaced skull fracture. If   clinically indicated, short-term follow-up or MRI may be obtained for   further evaluation.    Nonspecific opacification of the left mastoid air cells and middle ear   cavity, new since 8/2/2018. Recommend clinical correlation to assess   acute mastoiditis.    Cervical spine CT: No obvious fracture or traumatic malalignment in the   cervical spine. If there is clinical suspicion for acute fracture or   ligamentous/cord injury, MRI may be obtained for further evaluation.    Multilevel degenerative changes of the cervical spine.                	                        This is 32YO man with PMH of bipolar disorder, DM, HTN, CKD, sleep apnea brought to ED for Seizure. Pt is lethargic, postictal, unable to provide history. Discussed with his father - he lives with father no history of seizure , noted to have seizure at home and brought to ED. Per father he is non compliant with medication and diet. On arrival he is noted to have , /117, received labetalol and ativan. At the time of exam he is still lethargic, respond to verbal stimuli, oriented to person and place, report generalized pain.     A/P    >New onset seizure in the setting of uncontrolled BP and high FS  On K[facility]   MRI, EEG-P;  CT head - no acute finding  seizure precaution     >HTN emergency - ?compliance   c/w Labetalol prn -   Monitor BP  TTE    >DM - uncontrolled with complication - CKD, DMN,   FS and sliding scale   pt is NPO will give Lantus 10 units qhs - once start PO  will adjust   A1c in am     >Acute on CKD stage 3 - baseline SCr., ~  2.3 mg.,  Renal u/s,    >Hypokalemia - IV potassium supplemented - repeat BMP pending   normal mg++,

## 2019-01-01 NOTE — PROGRESS NOTE ADULT - ASSESSMENT
This is 34YO man with PMH of bipolar disorder, DM, HTN, CKD, sleep apnea brought to ED for Seizure. Pt is lethargic, postictal, unable to provide history. Discussed with his father - he lives with father no history of seziure, noted to have seizure at home and brought to ED. Per father he is non compliant with medication and diet. On arrival he is noted to have , /117, received labetalol and ativan. At the time of exam he is still lethargic, respond to verbal stimuli, oriented to person and place, report generalized pain.     A/P    >New onset seizure in the setting of uncontrolled BP and high FS  Neurology consult appreciated - c/w Keppra   MRI, EEG pending   CT head - no acute finding  seizure precaution  ativan prn   U tox    >HTN emergency -  non compliance   c/w Hydralazine 100mg q8h, amlodipine 10, increase Clonidine 0.2 q8h  c/w Labetalol prn -  Monitor BP  TTE    >DM - uncontrolled with complication - CKD - non compliant  A1c 10.3  c/w Lantus 15 units plus sliding scale    >Acute on CKD stage 3 - baseline Cr around 2.3  renal consult appreciated - c/w IVF, f/u BMP  renal u/s    >Bipolar disorder - resume home medication    >Hypokalemia - IV and PO potassium supplemented - repeat BMP pending   normal mag     >Leucocytosis - likely reactive - improving  f/u cbc     >elevated troponin - flat - CKD  TTE    >Obesity - low fat diet    >DVT PPX - Heparin

## 2019-01-01 NOTE — PROGRESS NOTE ADULT - SUBJECTIVE AND OBJECTIVE BOX
Patient seen and examined    REVIEW OF SYSTEMS:    CONSTITUTIONAL: No F/C  RESPIRATORY: No cough or SOB  CARDIOVASCULAR: No CP/palpitations,    GASTROINTESTINAL: No abdominal pain , NVD   GENITOURINARY: No UTI sx  NEUROLOGICAL: No headaches/wk/numbness  MUSCULOSKELETAL:  No joint pain/swelling; No LBP  EXTREMITIES : no swelling,    Vital Signs Last 24 Hrs  T(C): 36.8 (01 Jan 2019 11:08), Max: 37.1 (01 Jan 2019 02:55)  T(F): 98.2 (01 Jan 2019 11:08), Max: 98.8 (01 Jan 2019 02:55)  HR: 90 (01 Jan 2019 11:08) (75 - 94)  BP: 188/89 (01 Jan 2019 11:08) (142/71 - 188/89)  BP(mean): --  RR: 22 (01 Jan 2019 11:08) (16 - 22)  SpO2: 96% (01 Jan 2019 11:08) (96% - 100%)    PHYSICAL EXAM:    GENERAL: NAD, In Chair, Pale, Obese,  EYES:  conjunctiva and sclera clear  NECK: Supple, No JVD/Bruit  NERVOUS SYSTEM:  A/O x 3,   CHEST:  CTA ,No rales or rhonchi  HEART:  RRR, No murmurs  ABDOMEN: Soft, NT/ND BS+  EXTREMITIES:  + Edema;  SKIN: No rashes    LABS:                        11.6   16.8  )-----------( 305      ( 01 Jan 2019 09:01 )             34.5     01-01    140  |  99  |  26.0<H>  ----------------------------<  246<H>  2.8<LL>   |  29.0  |  2.84<H>    Ca    8.6      01 Jan 2019 07:59  Mg     2.6     12-31    TPro  7.1  /  Alb  3.0<L>  /  TBili  <0.2<L>  /  DBili  x   /  AST  18  /  ALT  21  /  AlkPhos  148<H>  12-31    Ventricular Rate 136 BPM    Atrial Rate 136 BPM    P-R Interval 124 ms    QRS Duration 98 ms    Q-T Interval 310 ms    QTC Calculation(Bezet) 466 ms    P Axis 80 degrees    R Axis -57 degrees    T Axis 44 degrees    Diagnosis Line Sinus tachycardia  Left anterior fascicular block  Inferior infarct , age undetermined  Anterior infarct , age undetermined  Abnormal ECG    Confirmed by KIM SIU ([id number]) on 12/31/2018 11:40:06 AM    EXAM:  US KIDNEY(S)                          PROCEDURE DATE:  08/03/2018      INTERPRETATION:  CLINICAL INFORMATION: Renal insufficiency    COMPARISON: None available.    TECHNIQUE: Sonography of the kidneys and bladder.     FINDINGS: Increased echogenicity in both kidneys.    Right kidney:  10.8 cm. No renal mass, hydronephrosis or calculi.    Left kidney:  11.3 cm. No renal mass, hydronephrosis or calculi.    Urinary bladder: Urinary bladder is sonolucent and unremarkable in wall   thickness and contour.    IMPRESSION: Increased echogenicity in both kidneys compatible with   medical renal disease;   no obstructive uropathy    CEASAR DOMIINQUE M.D., ATTENDING RADIOLOGIST  This document has been electronically signed. Aug 3 2018  2:57PM    MEDICATIONS  (STANDING):  amLODIPine   Tablet  cloNIDine  heparin  Injectable  hydrALAZINE  insulin glargine Injectable (LANTUS)  insulin lispro (HumaLOG) corrective regimen sliding scale  insulin lispro (HumaLOG) corrective regimen sliding scale  labetalol Injectable PRN  lamoTRIgine  levETIRAcetam  IVPB  LORazepam   Injectable PRN  ondansetron Injectable PRN  potassium chloride    Tablet ER  potassium chloride  10 mEq/100 mL IVPB  potassium chloride  10 mEq/100 mL IVPB  sodium chloride 0.45% with potassium chloride 20 mEq/L

## 2019-01-01 NOTE — PROGRESS NOTE ADULT - ASSESSMENT
s/p seizure epiosde possibly related to severe hyperglycemia, was also having sodium of 128, ok to c/w keppra 500 mg bid, also fu on mri an deeg when done, continue medical management.

## 2019-01-01 NOTE — PROGRESS NOTE ADULT - ASSESSMENT
CKD - 3 , Poorly controlled HTN , DM,    Fe - Deficiency Anemia ( Needs Further W.U )    Hypokalemia      Continue current management,    D/W Dr. Elliott,

## 2019-01-01 NOTE — CONSULT NOTE ADULT - SUBJECTIVE AND OBJECTIVE BOX
Patient is a 33y old  Male who presents with a chief complaint of Seizure (31 Dec 2018 17:02)      HPI:  This is 34YO man with PMH of bipolar disorder, DM, HTN, CKD, sleep apnea brought to ED for Seizure. Pt is lethargic, postictal, unable to provide history. Discussed with his father - he lives with father no history of seziure, noted to have seizure at home and brought to ED. Per father he is non compliant with medication and diet. On arrival he is noted to have , /117, received labetalol and ativan. At the time of exam he is still lethargic, respond to verbal stimuli, oriented to person and place, report generalized pain. (31 Dec 2018 17:02)      PAST MEDICAL & SURGICAL HISTORY:  HTN (hypertension)  Sleep apnea  Diabetes  Bipolar 1 disorder  Hypertension,    No significant past surgical history    FAMILY HISTORY:  Family history of diabetes mellitus (Grandparent)    Social History: NA,    MEDICATIONS  (STANDING):  amLODIPine   Tablet 10 milliGRAM(s) Oral daily  benztropine 0.5 milliGRAM(s) Oral two times a day  cloNIDine 0.2 milliGRAM(s) Oral every 12 hours  dextrose 5%. 1000 milliLiter(s) (50 mL/Hr) IV Continuous <Continuous>  dextrose 50% Injectable 12.5 Gram(s) IV Push once  dextrose 50% Injectable 25 Gram(s) IV Push once  dextrose 50% Injectable 25 Gram(s) IV Push once  heparin  Injectable 5000 Unit(s) SubCutaneous every 12 hours  hydrALAZINE 100 milliGRAM(s) Oral every 8 hours  insulin glargine Injectable (LANTUS) 10 Unit(s) SubCutaneous at bedtime  insulin lispro (HumaLOG) corrective regimen sliding scale   SubCutaneous three times a day before meals  insulin lispro (HumaLOG) corrective regimen sliding scale   SubCutaneous at bedtime  lamoTRIgine 50 milliGRAM(s) Oral two times a day  levETIRAcetam  IVPB 500 milliGRAM(s) IV Intermittent every 12 hours  sodium chloride 0.45% with potassium chloride 20 mEq/L 1000 milliLiter(s) (80 mL/Hr) IV Continuous <Continuous>    MEDICATIONS  (PRN):  dextrose 40% Gel 15 Gram(s) Oral once PRN Blood Glucose LESS THAN 70 milliGRAM(s)/deciliter  glucagon  Injectable 1 milliGRAM(s) IntraMuscular once PRN Glucose LESS THAN 70 milligrams/deciliter  labetalol Injectable 10 milliGRAM(s) IV Push every 6 hours PRN Systolic blood pressure >180  LORazepam   Injectable 2 milliGRAM(s) IV Push every 4 hours PRN seizure  ondansetron Injectable 4 milliGRAM(s) IV Push every 6 hours PRN Nausea and/or Vomiting    Allergies    No Known Allergies    REVIEW OF SYSTEMS: ( Per Family ) Patient is Post ictal & Lethargic,    CONSTITUTIONAL: No fever, weight loss, + fatigue  EYES: No eye pain, visual disturbances, or discharge  NECK: No pain or stiffness  RESPIRATORY: No cough, wheezing, chills or hemoptysis; No shortness of breath  CARDIOVASCULAR: No chest pain, palpitations, dizziness, or leg swelling  GASTROINTESTINAL: No abdominal or epigastric pain. No nausea, vomiting, or hematemesis; No diarrhea or constipation. No melena or hematochezia.  GENITOURINARY: No dysuria, frequency, hematuria, or incontinence  NEUROLOGICAL: Sz.,  SKIN: No itching, burning, rashes, or lesions   LYMPH NODES: No enlarged glands  ENDOCRINE: No heat or cold intolerance; No hair loss  MUSCULOSKELETAL: No joint pain or swelling; No muscle, back, or extremity pain  PSYCHIATRIC: Bi Polar Disorder,    Vital Signs Last 24 Hrs  T(C): 37.1 (2019 02:55), Max: 37.1 (2019 02:55)  T(F): 98.8 (2019 02:55), Max: 98.8 (2019 02:55)  HR: 94 (2019 02:55) (75 - 94)  BP: 174/94 (2019 02:55) (142/71 - 180/84)  BP(mean): --  RR: 17 (2019 02:55) (16 - 18)  SpO2: 98% (2019 02:55) (98% - 100%)    PHYSICAL EXAM:    GENERAL: NAD, Lethargic, well-developed  HEAD:  Atraumatic, Normocephalic  EYES: EOMI, PERRLA, conjunctiva and sclera clear  ENMT: Dry mucous membranes,   NECK: Supple, No JVD,  NERVOUS SYSTEM:  Lethargic, post Ictal,  CHEST/LUNG: Clear to percussion bilaterally;   HEART: Regular rate and rhythm; No murmurs, rubs, or gallops  ABDOMEN: Soft, Nontender, Nondistended; Bowel sounds present  EXTREMITIES:  2+ Peripheral Pulses, No clubbing, cyanosis, or edema  LYMPH: No lymphadenopathy noted  SKIN: No rashes or lesions      LABS:                        12.7   20.6  )-----------( 364      ( 31 Dec 2018 00:25 )             37.0         141  |  100  |  31.0<H>  ----------------------------<  333<H>  3.0<L>   |  29.0  |  2.90<H>    Ca    8.9      31 Dec 2018 18:05  Mg     2.6         TPro  7.1  /  Alb  3.0<L>  /  TBili  <0.2<L>  /  DBili  x   /  AST  18  /  ALT  21  /  AlkPhos  148<H>      PT/INR - ( 31 Dec 2018 00:25 )   PT: 10.8 sec;   INR: 0.94 ratio         PTT - ( 31 Dec 2018 00:25 )  PTT:24.9 sec  Urinalysis Basic - ( 31 Dec 2018 02:33 )    Color: Yellow / Appearance: Clear / S.010 / pH: x  Gluc: x / Ketone: Negative  / Bili: Negative / Urobili: Negative mg/dL   Blood: x / Protein: 100 mg/dL / Nitrite: Negative   Leuk Esterase: Negative / RBC: 0-2 /HPF / WBC Negative   Sq Epi: x / Non Sq Epi: Occasional / Bacteria: x    Magnesium, Serum: 2.6 mg/dL ( @ 14:06)    RADIOLOGY & ADDITIONAL TESTS: See Below,

## 2019-01-01 NOTE — ED ADULT NURSE REASSESSMENT NOTE - NS ED NURSE REASSESS COMMENT FT1
pt remains sitting up in chair awake and alert o2 in use denies pain states that he feels better when he sits up. pt iv fluids infusing well no redness or swelling present. pt voids well in urinal yellow urine. Dr Hurst at the bedside speaking with patient about plan of care.

## 2019-01-01 NOTE — PROGRESS NOTE ADULT - SUBJECTIVE AND OBJECTIVE BOX
Interval History:  patient stable. no seizure recurrence  MEDICATIONS  (STANDING):  amLODIPine   Tablet 10 milliGRAM(s) Oral daily  cloNIDine 0.2 milliGRAM(s) Oral every 12 hours  dextrose 5%. 1000 milliLiter(s) (50 mL/Hr) IV Continuous <Continuous>  dextrose 50% Injectable 12.5 Gram(s) IV Push once  dextrose 50% Injectable 25 Gram(s) IV Push once  dextrose 50% Injectable 25 Gram(s) IV Push once  heparin  Injectable 5000 Unit(s) SubCutaneous every 12 hours  hydrALAZINE 100 milliGRAM(s) Oral every 8 hours  insulin glargine Injectable (LANTUS) 10 Unit(s) SubCutaneous at bedtime  insulin lispro (HumaLOG) corrective regimen sliding scale   SubCutaneous three times a day before meals  insulin lispro (HumaLOG) corrective regimen sliding scale   SubCutaneous at bedtime  lamoTRIgine 50 milliGRAM(s) Oral two times a day  levETIRAcetam  IVPB 500 milliGRAM(s) IV Intermittent every 12 hours  potassium chloride  10 mEq/100 mL IVPB 10 milliEquivalent(s) IV Intermittent every 1 hour  sodium chloride 0.45% with potassium chloride 20 mEq/L 1000 milliLiter(s) (80 mL/Hr) IV Continuous <Continuous>    MEDICATIONS  (PRN):  dextrose 40% Gel 15 Gram(s) Oral once PRN Blood Glucose LESS THAN 70 milliGRAM(s)/deciliter  glucagon  Injectable 1 milliGRAM(s) IntraMuscular once PRN Glucose LESS THAN 70 milligrams/deciliter  labetalol Injectable 10 milliGRAM(s) IV Push every 6 hours PRN Systolic blood pressure >180  LORazepam   Injectable 2 milliGRAM(s) IV Push every 4 hours PRN seizure  ondansetron Injectable 4 milliGRAM(s) IV Push every 6 hours PRN Nausea and/or Vomiting      Allergies    No Known Allergies    Intolerances        PHYSICAL EXAM:  Vital Signs Last 24 Hrs  T(F): 98.8 (19 @ 02:55)  HR: 84 (19 @ 05:48)  BP: 179/94 (19 @ 05:48)  RR: 20 (19 @ 05:48)    NERVOUS SYSTEM:  Sleeping, woke up eith voice, orieted to month, year, date , place,  speech and language normal, cranial nerves II-XII normal,   Good concentration; Motor Strength 5/5 B/L upper and lower extremities; LABS:                        12.7   20.6  )-----------( 364      ( 31 Dec 2018 00:25 )             37.0         141  |  100  |  31.0<H>  ----------------------------<  333<H>  3.0<L>   |  29.0  |  2.90<H>    Ca    8.9      31 Dec 2018 18:05  Mg     2.6         TPro  7.1  /  Alb  3.0<L>  /  TBili  <0.2<L>  /  DBili  x   /  AST  18  /  ALT  21  /  AlkPhos  148<H>      PT/INR - ( 31 Dec 2018 00:25 )   PT: 10.8 sec;   INR: 0.94 ratio         PTT - ( 31 Dec 2018 00:25 )  PTT:24.9 sec  Urinalysis Basic - ( 31 Dec 2018 02:33 )    Color: Yellow / Appearance: Clear / S.010 / pH: x  Gluc: x / Ketone: Negative  / Bili: Negative / Urobili: Negative mg/dL   Blood: x / Protein: 100 mg/dL / Nitrite: Negative   Leuk Esterase: Negative / RBC: 0-2 /HPF / WBC Negative   Sq Epi: x / Non Sq Epi: Occasional / Bacteria: x        RADIOLOGY & ADDITIONAL STUDIES:

## 2019-01-01 NOTE — ED ADULT NURSE REASSESSMENT NOTE - COMFORT CARE
plan of care explained/wait time explained/assisted to bedside commode/meal provided/po fluids offered/repositioned

## 2019-01-01 NOTE — ED ADULT NURSE REASSESSMENT NOTE - NS ED NURSE REASSESS COMMENT FT1
pt received awake and alert assisted oob to the chair pt did self am care voiding well in urinal lorenzo urine. pt remains on cardiac monitor denies pain. pt explained plan of care iv infusing with kcl as ordered via pump no redness or swelling present. pt remains awaiting bed assignment.

## 2019-01-01 NOTE — PROGRESS NOTE ADULT - SUBJECTIVE AND OBJECTIVE BOX
Internal Medicine Hospitalist - Dr. Lexi PUGA    [id number]    33y      Male    Patient is a 33y old  Male who presents with a chief complaint of Seizure (2019 11:43)    INTERVAL HPI/ OVERNIGHT EVENTS: Patient is seen and examined, more awake today, report mild pain over tongue - bite during seizure, denied fever, chills, chest pain, SOB    REVIEW OF SYSTEMS:    Denied fever, chills, abd. pain, nausea, vomiting, chest pain, SOB, headache, dizziness    PHYSICAL EXAM:    Vital Signs Last 24 Hrs  T(C): 36.8 (2019 11:08), Max: 37.1 (2019 02:55)  T(F): 98.2 (2019 11:08), Max: 98.8 (2019 02:55)  HR: 90 (2019 11:08) (75 - 94)  BP: 188/89 (2019 11:08) (142/71 - 188/89)  BP(mean): --  RR: 22 (2019 11:08) (16 - 22)  SpO2: 96% (2019 11:08) (96% - 100%)    GENERAL: obese  HEENT: EOMI  Neck: supple  CHEST/LUNG: CTA b/l   HEART: S1S2+ audible  ABDOMEN: Soft, Nontender, Nondistended; Bowel sounds present  EXTREMITIES:  no edema  CNS: awake, oriented x 3  Psychiatry: normal mood    LABS:                        11.6   16.8  )-----------( 305      ( 2019 09:01 )             34.5     01-    140  |  99  |  26.0<H>  ----------------------------<  246<H>  2.8<LL>   |  29.0  |  2.84<H>    Ca    8.6      2019 07:59  Mg     2.6     12-31    TPro  7.1  /  Alb  3.0<L>  /  TBili  <0.2<L>  /  DBili  x   /  AST  18  /  ALT  21  /  AlkPhos  148<H>  12-31    PT/INR - ( 31 Dec 2018 00:25 )   PT: 10.8 sec;   INR: 0.94 ratio         PTT - ( 31 Dec 2018 00:25 )  PTT:24.9 sec  Urinalysis Basic - ( 31 Dec 2018 02:33 )    Color: Yellow / Appearance: Clear / S.010 / pH: x  Gluc: x / Ketone: Negative  / Bili: Negative / Urobili: Negative mg/dL   Blood: x / Protein: 100 mg/dL / Nitrite: Negative   Leuk Esterase: Negative / RBC: 0-2 /HPF / WBC Negative   Sq Epi: x / Non Sq Epi: Occasional / Bacteria: x          MEDICATIONS  (STANDING):  amLODIPine   Tablet 10 milliGRAM(s) Oral daily  cloNIDine 0.2 milliGRAM(s) Oral three times a day  dextrose 5%. 1000 milliLiter(s) (50 mL/Hr) IV Continuous <Continuous>  dextrose 50% Injectable 12.5 Gram(s) IV Push once  dextrose 50% Injectable 25 Gram(s) IV Push once  dextrose 50% Injectable 25 Gram(s) IV Push once  heparin  Injectable 5000 Unit(s) SubCutaneous every 12 hours  hydrALAZINE 100 milliGRAM(s) Oral every 8 hours  insulin glargine Injectable (LANTUS) 10 Unit(s) SubCutaneous at bedtime  insulin lispro (HumaLOG) corrective regimen sliding scale   SubCutaneous three times a day before meals  insulin lispro (HumaLOG) corrective regimen sliding scale   SubCutaneous at bedtime  lamoTRIgine 50 milliGRAM(s) Oral two times a day  levETIRAcetam  IVPB 500 milliGRAM(s) IV Intermittent every 12 hours  potassium chloride    Tablet ER 40 milliEquivalent(s) Oral once  potassium chloride  10 mEq/100 mL IVPB 10 milliEquivalent(s) IV Intermittent every 1 hour  potassium chloride  10 mEq/100 mL IVPB 10 milliEquivalent(s) IV Intermittent every 1 hour  sodium chloride 0.45% with potassium chloride 20 mEq/L 1000 milliLiter(s) (80 mL/Hr) IV Continuous <Continuous>    MEDICATIONS  (PRN):  dextrose 40% Gel 15 Gram(s) Oral once PRN Blood Glucose LESS THAN 70 milliGRAM(s)/deciliter  glucagon  Injectable 1 milliGRAM(s) IntraMuscular once PRN Glucose LESS THAN 70 milligrams/deciliter  labetalol Injectable 10 milliGRAM(s) IV Push every 6 hours PRN Systolic blood pressure >180  LORazepam   Injectable 2 milliGRAM(s) IV Push every 4 hours PRN seizure  ondansetron Injectable 4 milliGRAM(s) IV Push every 6 hours PRN Nausea and/or Vomiting      RADIOLOGY & ADDITIONAL TEST

## 2019-01-02 LAB
ALBUMIN SERPL ELPH-MCNC: 2.8 G/DL — LOW (ref 3.3–5.2)
AMPHET UR-MCNC: NEGATIVE — SIGNIFICANT CHANGE UP
ANION GAP SERPL CALC-SCNC: 10 MMOL/L — SIGNIFICANT CHANGE UP (ref 5–17)
ANION GAP SERPL CALC-SCNC: 10 MMOL/L — SIGNIFICANT CHANGE UP (ref 5–17)
APPEARANCE UR: CLEAR — SIGNIFICANT CHANGE UP
BARBITURATES UR SCN-MCNC: NEGATIVE — SIGNIFICANT CHANGE UP
BENZODIAZ UR-MCNC: NEGATIVE — SIGNIFICANT CHANGE UP
BILIRUB UR-MCNC: NEGATIVE — SIGNIFICANT CHANGE UP
BUN SERPL-MCNC: 23 MG/DL — HIGH (ref 8–20)
BUN SERPL-MCNC: 26 MG/DL — HIGH (ref 8–20)
CALCIUM SERPL-MCNC: 8.4 MG/DL — LOW (ref 8.6–10.2)
CALCIUM SERPL-MCNC: 8.5 MG/DL — LOW (ref 8.6–10.2)
CALCIUM SERPL-MCNC: 8.6 MG/DL — SIGNIFICANT CHANGE UP (ref 8.4–10.5)
CHLORIDE SERPL-SCNC: 101 MMOL/L — SIGNIFICANT CHANGE UP (ref 98–107)
CHLORIDE SERPL-SCNC: 103 MMOL/L — SIGNIFICANT CHANGE UP (ref 98–107)
CO2 SERPL-SCNC: 26 MMOL/L — SIGNIFICANT CHANGE UP (ref 22–29)
CO2 SERPL-SCNC: 27 MMOL/L — SIGNIFICANT CHANGE UP (ref 22–29)
COCAINE METAB.OTHER UR-MCNC: NEGATIVE — SIGNIFICANT CHANGE UP
COLOR SPEC: YELLOW — SIGNIFICANT CHANGE UP
CREAT ?TM UR-MCNC: 53 MG/DL — SIGNIFICANT CHANGE UP
CREAT SERPL-MCNC: 2.58 MG/DL — HIGH (ref 0.5–1.3)
CREAT SERPL-MCNC: 2.82 MG/DL — HIGH (ref 0.5–1.3)
DIFF PNL FLD: ABNORMAL
EPI CELLS # UR: SIGNIFICANT CHANGE UP
GLUCOSE BLDC GLUCOMTR-MCNC: 179 MG/DL — HIGH (ref 70–99)
GLUCOSE BLDC GLUCOMTR-MCNC: 237 MG/DL — HIGH (ref 70–99)
GLUCOSE BLDC GLUCOMTR-MCNC: 252 MG/DL — HIGH (ref 70–99)
GLUCOSE BLDC GLUCOMTR-MCNC: 302 MG/DL — HIGH (ref 70–99)
GLUCOSE SERPL-MCNC: 171 MG/DL — HIGH (ref 70–115)
GLUCOSE SERPL-MCNC: 231 MG/DL — HIGH (ref 70–115)
GLUCOSE UR QL: 250 MG/DL
HCT VFR BLD CALC: 32.8 % — LOW (ref 42–52)
HGB BLD-MCNC: 10.7 G/DL — LOW (ref 14–18)
KETONES UR-MCNC: NEGATIVE — SIGNIFICANT CHANGE UP
LEUKOCYTE ESTERASE UR-ACNC: NEGATIVE — SIGNIFICANT CHANGE UP
MAGNESIUM SERPL-MCNC: 2 MG/DL — SIGNIFICANT CHANGE UP (ref 1.8–2.6)
MCHC RBC-ENTMCNC: 25 PG — LOW (ref 27–31)
MCHC RBC-ENTMCNC: 32.6 G/DL — SIGNIFICANT CHANGE UP (ref 32–36)
MCV RBC AUTO: 76.6 FL — LOW (ref 80–94)
METHADONE UR-MCNC: NEGATIVE — SIGNIFICANT CHANGE UP
NITRITE UR-MCNC: NEGATIVE — SIGNIFICANT CHANGE UP
OPIATES UR-MCNC: NEGATIVE — SIGNIFICANT CHANGE UP
OSMOLALITY UR: 255 MOSM/KG — LOW (ref 300–1000)
PCP SPEC-MCNC: SIGNIFICANT CHANGE UP
PCP UR-MCNC: NEGATIVE — SIGNIFICANT CHANGE UP
PH UR: 6.5 — SIGNIFICANT CHANGE UP (ref 5–8)
PHOSPHATE SERPL-MCNC: 2.4 MG/DL — SIGNIFICANT CHANGE UP (ref 2.4–4.7)
PLATELET # BLD AUTO: 306 K/UL — SIGNIFICANT CHANGE UP (ref 150–400)
POTASSIUM SERPL-MCNC: 2.8 MMOL/L — CRITICAL LOW (ref 3.5–5.3)
POTASSIUM SERPL-MCNC: 3.6 MMOL/L — SIGNIFICANT CHANGE UP (ref 3.5–5.3)
POTASSIUM SERPL-MCNC: 3.6 MMOL/L — SIGNIFICANT CHANGE UP (ref 3.5–5.3)
POTASSIUM SERPL-SCNC: 2.8 MMOL/L — CRITICAL LOW (ref 3.5–5.3)
POTASSIUM SERPL-SCNC: 3.6 MMOL/L — SIGNIFICANT CHANGE UP (ref 3.5–5.3)
POTASSIUM SERPL-SCNC: 3.6 MMOL/L — SIGNIFICANT CHANGE UP (ref 3.5–5.3)
POTASSIUM UR-SCNC: 14 MMOL/L — SIGNIFICANT CHANGE UP
PROT ?TM UR-MCNC: 152 MG/DL — HIGH (ref 0–12)
PROT UR-MCNC: 100 MG/DL
PROT/CREAT UR-RTO: 2.9 RATIO — HIGH
RBC # BLD: 4.28 M/UL — LOW (ref 4.6–6.2)
RBC # FLD: 17.6 % — HIGH (ref 11–15.6)
RBC CASTS # UR COMP ASSIST: SIGNIFICANT CHANGE UP /HPF (ref 0–4)
SODIUM SERPL-SCNC: 137 MMOL/L — SIGNIFICANT CHANGE UP (ref 135–145)
SODIUM SERPL-SCNC: 140 MMOL/L — SIGNIFICANT CHANGE UP (ref 135–145)
SODIUM UR-SCNC: 37 MMOL/L — SIGNIFICANT CHANGE UP
SP GR SPEC: 1.01 — SIGNIFICANT CHANGE UP (ref 1.01–1.02)
THC UR QL: NEGATIVE — SIGNIFICANT CHANGE UP
UROBILINOGEN FLD QL: NEGATIVE MG/DL — SIGNIFICANT CHANGE UP
WBC # BLD: 14.4 K/UL — HIGH (ref 4.8–10.8)
WBC # FLD AUTO: 14.4 K/UL — HIGH (ref 4.8–10.8)
WBC UR QL: NEGATIVE — SIGNIFICANT CHANGE UP

## 2019-01-02 PROCEDURE — 99233 SBSQ HOSP IP/OBS HIGH 50: CPT

## 2019-01-02 PROCEDURE — 93306 TTE W/DOPPLER COMPLETE: CPT | Mod: 26

## 2019-01-02 PROCEDURE — 70551 MRI BRAIN STEM W/O DYE: CPT | Mod: 26

## 2019-01-02 PROCEDURE — 95819 EEG AWAKE AND ASLEEP: CPT | Mod: 26

## 2019-01-02 PROCEDURE — 99232 SBSQ HOSP IP/OBS MODERATE 35: CPT

## 2019-01-02 RX ORDER — NYSTATIN 500MM UNIT
500000 POWDER (EA) MISCELLANEOUS
Qty: 0 | Refills: 0 | Status: DISCONTINUED | OUTPATIENT
Start: 2019-01-02 | End: 2019-01-03

## 2019-01-02 RX ORDER — LABETALOL HCL 100 MG
200 TABLET ORAL
Qty: 0 | Refills: 0 | Status: DISCONTINUED | OUTPATIENT
Start: 2019-01-02 | End: 2019-01-03

## 2019-01-02 RX ORDER — CALCITRIOL 0.5 UG/1
0.25 CAPSULE ORAL DAILY
Qty: 0 | Refills: 0 | Status: DISCONTINUED | OUTPATIENT
Start: 2019-01-02 | End: 2019-01-05

## 2019-01-02 RX ORDER — ERGOCALCIFEROL 1.25 MG/1
50000 CAPSULE ORAL
Qty: 0 | Refills: 0 | Status: DISCONTINUED | OUTPATIENT
Start: 2019-01-02 | End: 2019-01-05

## 2019-01-02 RX ORDER — POTASSIUM CHLORIDE 20 MEQ
40 PACKET (EA) ORAL EVERY 4 HOURS
Qty: 0 | Refills: 0 | Status: COMPLETED | OUTPATIENT
Start: 2019-01-02 | End: 2019-01-02

## 2019-01-02 RX ORDER — ACETAMINOPHEN 500 MG
650 TABLET ORAL EVERY 6 HOURS
Qty: 0 | Refills: 0 | Status: DISCONTINUED | OUTPATIENT
Start: 2019-01-02 | End: 2019-01-05

## 2019-01-02 RX ADMIN — DEXTROSE MONOHYDRATE, SODIUM CHLORIDE, AND POTASSIUM CHLORIDE 80 MILLILITER(S): 50; .745; 4.5 INJECTION, SOLUTION INTRAVENOUS at 22:06

## 2019-01-02 RX ADMIN — HEPARIN SODIUM 5000 UNIT(S): 5000 INJECTION INTRAVENOUS; SUBCUTANEOUS at 06:22

## 2019-01-02 RX ADMIN — Medication 2: at 22:06

## 2019-01-02 RX ADMIN — Medication 40 MILLIEQUIVALENT(S): at 12:43

## 2019-01-02 RX ADMIN — CALCITRIOL 0.25 MICROGRAM(S): 0.5 CAPSULE ORAL at 13:05

## 2019-01-02 RX ADMIN — Medication 200 MILLIGRAM(S): at 16:36

## 2019-01-02 RX ADMIN — Medication 40 MILLIEQUIVALENT(S): at 09:10

## 2019-01-02 RX ADMIN — Medication 500000 UNIT(S): at 16:36

## 2019-01-02 RX ADMIN — Medication 0.2 MILLIGRAM(S): at 12:42

## 2019-01-02 RX ADMIN — Medication 0.2 MILLIGRAM(S): at 06:21

## 2019-01-02 RX ADMIN — Medication 100 MILLIGRAM(S): at 22:06

## 2019-01-02 RX ADMIN — Medication 100 MILLIGRAM(S): at 06:22

## 2019-01-02 RX ADMIN — LEVETIRACETAM 420 MILLIGRAM(S): 250 TABLET, FILM COATED ORAL at 02:23

## 2019-01-02 RX ADMIN — Medication 100 MILLIGRAM(S): at 12:43

## 2019-01-02 RX ADMIN — LAMOTRIGINE 50 MILLIGRAM(S): 25 TABLET, ORALLY DISINTEGRATING ORAL at 06:22

## 2019-01-02 RX ADMIN — LEVETIRACETAM 420 MILLIGRAM(S): 250 TABLET, FILM COATED ORAL at 13:58

## 2019-01-02 RX ADMIN — Medication 0.2 MILLIGRAM(S): at 22:06

## 2019-01-02 RX ADMIN — Medication 200 MILLIGRAM(S): at 09:10

## 2019-01-02 RX ADMIN — Medication 2: at 08:34

## 2019-01-02 RX ADMIN — Medication 4: at 16:37

## 2019-01-02 RX ADMIN — ERGOCALCIFEROL 50000 UNIT(S): 1.25 CAPSULE ORAL at 13:05

## 2019-01-02 RX ADMIN — LAMOTRIGINE 50 MILLIGRAM(S): 25 TABLET, ORALLY DISINTEGRATING ORAL at 16:36

## 2019-01-02 RX ADMIN — Medication 8: at 12:42

## 2019-01-02 RX ADMIN — DEXTROSE MONOHYDRATE, SODIUM CHLORIDE, AND POTASSIUM CHLORIDE 80 MILLILITER(S): 50; .745; 4.5 INJECTION, SOLUTION INTRAVENOUS at 08:36

## 2019-01-02 RX ADMIN — AMLODIPINE BESYLATE 10 MILLIGRAM(S): 2.5 TABLET ORAL at 06:21

## 2019-01-02 RX ADMIN — Medication 500000 UNIT(S): at 13:05

## 2019-01-02 RX ADMIN — HEPARIN SODIUM 5000 UNIT(S): 5000 INJECTION INTRAVENOUS; SUBCUTANEOUS at 16:37

## 2019-01-02 RX ADMIN — INSULIN GLARGINE 15 UNIT(S): 100 INJECTION, SOLUTION SUBCUTANEOUS at 22:05

## 2019-01-02 NOTE — PROGRESS NOTE ADULT - ASSESSMENT
Impression:  Encephalopathy metabolic.  Concerns for seizure event in setting of HTN and hyperglycemia.    Plan:    MRI Brain without lucrecia Seizure protocol.  EEG to assess for seizure.  Medical management  Continue keppra 500mg q12 as maintenance for now.  Maintain seizure precautions.  D/w Dr Elliott in detail.  Recommend ID/ENT eval for mastoiditis.  Follow per Nephro eval.  DVT prophylaxis recommended.  DM and metabolic management as per medicine.  Will follow.

## 2019-01-02 NOTE — PROGRESS NOTE ADULT - SUBJECTIVE AND OBJECTIVE BOX
INTERVAL HISTORY:  Seen at bedside and no new changes overnight.    No Known Allergies      VITAL SIGNS:  Vital Signs Last 24 Hrs  T(C): 36.8 (02 Jan 2019 06:19), Max: 36.8 (01 Jan 2019 11:08)  T(F): 98.3 (02 Jan 2019 06:19), Max: 98.3 (01 Jan 2019 14:49)  HR: 88 (02 Jan 2019 09:35) (80 - 90)  BP: 150/98 (02 Jan 2019 09:35) (148/90 - 188/89)  BP(mean): --  RR: 17 (02 Jan 2019 09:35) (17 - 22)  SpO2: 93% (02 Jan 2019 09:35) (93% - 100%)    PHYSICAL EXAMINATION:  General: Well-developed, well nourished, in no acute distress.  Eyes: Conjunctiva and sclera clear.  Neurologic:  - Mental Status:  Asleep and arousable, oriented to person, place; Speech is normal; Affect is normal.  - Cranial Nerves: II-XI intact.  - Motor:  Strength is 5/5 throughout.  There is no pronator drift.  Normal muscle bulk and tone throughout.  - Reflexes:  1+ throughout; Plantars downwards b/l.  - Sensory:  Intact to light touch, pin prick, vibration, and joint-position sense throughout.  - Coordination:  No dysmetria/dysdiadochokinesis.    MEDS:  MEDICATIONS  (STANDING):  amLODIPine   Tablet 10 milliGRAM(s) Oral daily  cloNIDine 0.2 milliGRAM(s) Oral three times a day  dextrose 5%. 1000 milliLiter(s) (50 mL/Hr) IV Continuous <Continuous>  dextrose 50% Injectable 12.5 Gram(s) IV Push once  dextrose 50% Injectable 25 Gram(s) IV Push once  dextrose 50% Injectable 25 Gram(s) IV Push once  heparin  Injectable 5000 Unit(s) SubCutaneous every 12 hours  hydrALAZINE 100 milliGRAM(s) Oral every 8 hours  insulin glargine Injectable (LANTUS) 15 Unit(s) SubCutaneous at bedtime  insulin lispro (HumaLOG) corrective regimen sliding scale   SubCutaneous three times a day before meals  insulin lispro (HumaLOG) corrective regimen sliding scale   SubCutaneous at bedtime  labetalol 200 milliGRAM(s) Oral two times a day  lamoTRIgine 50 milliGRAM(s) Oral two times a day  levETIRAcetam  IVPB 500 milliGRAM(s) IV Intermittent every 12 hours  potassium chloride    Tablet ER 40 milliEquivalent(s) Oral every 4 hours  sodium chloride 0.45% with potassium chloride 20 mEq/L 1000 milliLiter(s) (80 mL/Hr) IV Continuous <Continuous>    MEDICATIONS  (PRN):  acetaminophen   Tablet .. 650 milliGRAM(s) Oral every 6 hours PRN Mild Pain (1 - 3)  dextrose 40% Gel 15 Gram(s) Oral once PRN Blood Glucose LESS THAN 70 milliGRAM(s)/deciliter  glucagon  Injectable 1 milliGRAM(s) IntraMuscular once PRN Glucose LESS THAN 70 milligrams/deciliter  labetalol Injectable 10 milliGRAM(s) IV Push every 6 hours PRN Systolic blood pressure >180  LORazepam   Injectable 2 milliGRAM(s) IV Push every 4 hours PRN seizure  ondansetron Injectable 4 milliGRAM(s) IV Push every 6 hours PRN Nausea and/or Vomiting      LABS:                          10.7   14.4  )-----------( 306      ( 02 Jan 2019 06:43 )             32.8     01-02    140  |  103  |  23.0<H>  ----------------------------<  171<H>  2.8<LL>   |  27.0  |  2.58<H>    Ca    8.4<L>      02 Jan 2019 06:43  Mg     2.0     01-02            RADIOLOGY & ADDITIONAL STUDIES:      CT Head/Cervical Spine No Cont (12.30.18 @ 23:03) >  Head CT: No acute intracranial hemorrhage or displaced skull fracture. If   clinically indicated, short-term follow-up or MRI may be obtained for   further evaluation.  Nonspecific opacification of the left mastoid air cells and middle ear   cavity, new since 8/2/2018. Recommend clinical correlation to assess   acute mastoiditis.    Cervical spine CT: No obvious fracture or traumatic malalignment in the   cervical spine. If there is clinical suspicion for acute fracture or   ligamentous/cord injury, MRI may be obtained for further evaluation.  Multilevel degenerative changes of the cervical spine.    EEG Awake and Asleep (08.03.18 @ 13:29) >  impression: This is a normal record.There was no seizure activity during   this recording.    MR Head No Cont (08.02.18 @ 15:42) >  Unremarkable MRI of the brain.

## 2019-01-02 NOTE — PROGRESS NOTE ADULT - SUBJECTIVE AND OBJECTIVE BOX
[facility] DIVISION OF KIDNEY DISEASES AND HYPERTENSION -- FOLLOW UP NOTE  --------------------------------------------------------------------------------  Chief Complaint:  MELISSA on ? CKD    24 hour events/subjective:  Pt seen today  NAD  Hypokalemic  MELISSA slowly resolving        PAST HISTORY  --------------------------------------------------------------------------------  No significant changes to PMH, PSH, FHx, SHx, unless otherwise noted    ALLERGIES & MEDICATIONS  --------------------------------------------------------------------------------  Allergies    No Known Allergies    Intolerances      Standing Inpatient Medications  amLODIPine   Tablet 10 milliGRAM(s) Oral daily  cloNIDine 0.2 milliGRAM(s) Oral three times a day  dextrose 5%. 1000 milliLiter(s) IV Continuous <Continuous>  dextrose 50% Injectable 12.5 Gram(s) IV Push once  dextrose 50% Injectable 25 Gram(s) IV Push once  dextrose 50% Injectable 25 Gram(s) IV Push once  heparin  Injectable 5000 Unit(s) SubCutaneous every 12 hours  hydrALAZINE 100 milliGRAM(s) Oral every 8 hours  insulin glargine Injectable (LANTUS) 15 Unit(s) SubCutaneous at bedtime  insulin lispro (HumaLOG) corrective regimen sliding scale   SubCutaneous three times a day before meals  insulin lispro (HumaLOG) corrective regimen sliding scale   SubCutaneous at bedtime  labetalol 200 milliGRAM(s) Oral two times a day  lamoTRIgine 50 milliGRAM(s) Oral two times a day  levETIRAcetam  IVPB 500 milliGRAM(s) IV Intermittent every 12 hours  nystatin    Suspension 578007 Unit(s) Oral four times a day  potassium chloride    Tablet ER 40 milliEquivalent(s) Oral every 4 hours  sodium chloride 0.45% with potassium chloride 20 mEq/L 1000 milliLiter(s) IV Continuous <Continuous>    PRN Inpatient Medications  acetaminophen   Tablet .. 650 milliGRAM(s) Oral every 6 hours PRN  dextrose 40% Gel 15 Gram(s) Oral once PRN  glucagon  Injectable 1 milliGRAM(s) IntraMuscular once PRN  labetalol Injectable 10 milliGRAM(s) IV Push every 6 hours PRN  LORazepam   Injectable 2 milliGRAM(s) IV Push every 4 hours PRN  ondansetron Injectable 4 milliGRAM(s) IV Push every 6 hours PRN      REVIEW OF SYSTEMS  --------------------------------------------------------------------------------  Gen: No weight changes, fatigue, fevers/chills, weakness  Skin: No rashes  Head/Eyes/Ears/Mouth: No headache; Normal hearing; Normal vision w/o blurriness; No sinus pain/discomfort, sore throat  Respiratory: No dyspnea, cough, wheezing, hemoptysis  CV: No chest pain, PND, orthopnea  GI: No abdominal pain, diarrhea, constipation, nausea, vomiting, melena, hematochezia  : No increased frequency, dysuria, hematuria, nocturia  MSK: No joint pain/swelling; no back pain; no edema  Neuro: No dizziness/lightheadedness, weakness, seizures, numbness, tingling  Heme: No easy bruising or bleeding  Endo: No heat/cold intolerance  Psych: No significant nervousness, anxiety, stress, depression    All other systems were reviewed and are negative, except as noted.    VITALS/PHYSICAL EXAM  --------------------------------------------------------------------------------  T(C): 36.8 (01-02-19 @ 06:19), Max: 36.8 (01-01-19 @ 14:49)  HR: 90 (01-02-19 @ 10:42) (80 - 90)  BP: 150/96 (01-02-19 @ 10:42) (148/90 - 168/98)  RR: 17 (01-02-19 @ 10:42) (17 - 22)  SpO2: 94% (01-02-19 @ 10:42) (93% - 100%)  Wt(kg): --    Weight (kg): 129 (01-02-19 @ 06:19)      01-01-19 @ 07:01  -  01-02-19 @ 07:00  --------------------------------------------------------  IN: 800 mL / OUT: 2500 mL / NET: -1700 mL    01-02-19 @ 07:01  -  01-02-19 @ 12:31  --------------------------------------------------------  IN: 560 mL / OUT: 850 mL / NET: -290 mL      Physical Exam:    	Gen: NAD, pale, obese  	HEENT: PERRL, supple neck, clear oropharynx  	Pulm: CTA B/L  	CV: RRR, S1S2; no rub  	Back: No spinal or CVA tenderness; no sacral edema  	Abd: +BS, soft, nontender/nondistended  	: No suprapubic tenderness  	UE: Warm, FROM, no clubbing, intact strength; no edema; no asterixis  	LE: Warm, FROM, no clubbing, intact strength; + edema  	Neuro: No focal deficits, intact gait  	Psych: Normal affect and mood  	Skin: Warm, without rashes  	Vascular access: N/A    LABS/STUDIES  --------------------------------------------------------------------------------              10.7   14.4  >-----------<  306      [01-02-19 @ 06:43]              32.8     140  |  103  |  23.0  ----------------------------<  171      [01-02-19 @ 06:43]  2.8   |  27.0  |  2.58        Ca     8.4     [01-02-19 @ 06:43]      Mg     2.0     [01-02-19 @ 06:43]          Troponin 0.10      [01-01-19 @ 07:59]        [12-31-18 @ 14:06]    Creatinine Trend:  SCr 2.58 [01-02 @ 06:43]  SCr 2.68 [01-01 @ 18:09]  SCr 2.84 [01-01 @ 07:59]  SCr 2.90 [12-31 @ 18:05]  SCr 3.07 [12-31 @ 08:17]    Urinalysis - [01-02-19 @ 01:55]      Color Yellow / Appearance Clear / SG 1.015 / pH 6.5      Gluc 250 / Ketone Negative  / Bili Negative / Urobili Negative       Blood Small / Protein 100 / Leuk Est Negative / Nitrite Negative      RBC 0-2 / WBC Negative / Hyaline  / Gran  / Sq Epi  / Non Sq Epi Occasional / Bacteria     Urine Creatinine 53      [01-02-19 @ 01:56]  Urine Protein 152.0      [01-02-19 @ 01:56]  Urine Sodium 37      [01-02-19 @ 01:56]  Urine Potassium 14      [01-02-19 @ 01:56]  Urine Osmolality 255      [01-02-19 @ 01:55]    Iron 23, TIBC 237, %sat 10      [01-01-19 @ 07:59]  Ferritin 164      [01-01-19 @ 07:59]  PTH -- (Ca 8.6)      [01-01-19 @ 14:24]   154  Vitamin D (25OH) 8.7      [01-01-19 @ 14:24]  HbA1c 10.3      [01-01-19 @ 07:59]  TSH 0.70      [12-31-18 @ 00:25]  Lipid: chol 204, TG 91, HDL 53,       [07-28-18 @ 10:30]

## 2019-01-02 NOTE — PROGRESS NOTE ADULT - SUBJECTIVE AND OBJECTIVE BOX
Internal Medicine Hospitalist - Dr. Lexi PUGA    [id number]    33y      Male    Patient is a 33y old  Male who presents with a chief complaint of Seizure (2019 10:02)    INTERVAL HPI/ OVERNIGHT EVENTS: Patient is seen and examined, pt is awake, oriented, report sore throat, denied chest pain, SOB, abd. pain, nausea and vomiting.     REVIEW OF SYSTEMS:    Denied fever, chills, abd. pain, nausea, vomiting, chest pain, SOB, headache, dizziness    PHYSICAL EXAM:    Vital Signs Last 24 Hrs  T(C): 36.8 (2019 06:19), Max: 36.8 (2019 11:08)  T(F): 98.3 (2019 06:19), Max: 98.3 (2019 14:49)  HR: 90 (2019 10:42) (80 - 90)  BP: 150/96 (2019 10:42) (148/90 - 188/89)  BP(mean): --  RR: 17 (2019 10:42) (17 - 22)  SpO2: 94% (2019 10:42) (93% - 100%)    GENERAL: obese  HEENT: EOMI  Neck: supple  CHEST/LUNG: CTA b/l   HEART: S1S2+ audible  ABDOMEN: Soft, Nontender, Nondistended; Bowel sounds present  EXTREMITIES:  positive edema  CNS: AAO X 3, non focal      LABS:                        10.7   14.4  )-----------( 306      ( 2019 06:43 )             32.8     01-02    140  |  103  |  23.0<H>  ----------------------------<  171<H>  2.8<LL>   |  27.0  |  2.58<H>    Ca    8.4<L>      2019 06:43  Mg     2.0     01-        Urinalysis Basic - ( 2019 01:55 )    Color: Yellow / Appearance: Clear / S.015 / pH: x  Gluc: x / Ketone: Negative  / Bili: Negative / Urobili: Negative mg/dL   Blood: x / Protein: 100 mg/dL / Nitrite: Negative   Leuk Esterase: Negative / RBC: 0-2 /HPF / WBC Negative   Sq Epi: x / Non Sq Epi: Occasional / Bacteria: x          MEDICATIONS  (STANDING):  amLODIPine   Tablet 10 milliGRAM(s) Oral daily  cloNIDine 0.2 milliGRAM(s) Oral three times a day  dextrose 5%. 1000 milliLiter(s) (50 mL/Hr) IV Continuous <Continuous>  dextrose 50% Injectable 12.5 Gram(s) IV Push once  dextrose 50% Injectable 25 Gram(s) IV Push once  dextrose 50% Injectable 25 Gram(s) IV Push once  heparin  Injectable 5000 Unit(s) SubCutaneous every 12 hours  hydrALAZINE 100 milliGRAM(s) Oral every 8 hours  insulin glargine Injectable (LANTUS) 15 Unit(s) SubCutaneous at bedtime  insulin lispro (HumaLOG) corrective regimen sliding scale   SubCutaneous three times a day before meals  insulin lispro (HumaLOG) corrective regimen sliding scale   SubCutaneous at bedtime  labetalol 200 milliGRAM(s) Oral two times a day  lamoTRIgine 50 milliGRAM(s) Oral two times a day  levETIRAcetam  IVPB 500 milliGRAM(s) IV Intermittent every 12 hours  nystatin    Suspension 295303 Unit(s) Oral four times a day  potassium chloride    Tablet ER 40 milliEquivalent(s) Oral every 4 hours  sodium chloride 0.45% with potassium chloride 20 mEq/L 1000 milliLiter(s) (80 mL/Hr) IV Continuous <Continuous>    MEDICATIONS  (PRN):  acetaminophen   Tablet .. 650 milliGRAM(s) Oral every 6 hours PRN Mild Pain (1 - 3)  dextrose 40% Gel 15 Gram(s) Oral once PRN Blood Glucose LESS THAN 70 milliGRAM(s)/deciliter  glucagon  Injectable 1 milliGRAM(s) IntraMuscular once PRN Glucose LESS THAN 70 milligrams/deciliter  labetalol Injectable 10 milliGRAM(s) IV Push every 6 hours PRN Systolic blood pressure >180  LORazepam   Injectable 2 milliGRAM(s) IV Push every 4 hours PRN seizure  ondansetron Injectable 4 milliGRAM(s) IV Push every 6 hours PRN Nausea and/or Vomiting      RADIOLOGY & ADDITIONAL TEST

## 2019-01-02 NOTE — SWALLOW BEDSIDE ASSESSMENT ADULT - SWALLOW EVAL: DIAGNOSIS
Oral and pharyngeal stages of swallow WFL with no overt s/s aspiration at bedside. Oral stage midlly - impacted by lingual soreness (pt bit tongue during seizure), resulting in slow, but functional mastication

## 2019-01-02 NOTE — SWALLOW BEDSIDE ASSESSMENT ADULT - SLP PERTINENT HISTORY OF CURRENT PROBLEM
33 year old male, h/o bipolar disorder, DM, HTN, CKD, sleep apnea. Pt admitted to ED for seizure. CT head is negative

## 2019-01-02 NOTE — PROGRESS NOTE ADULT - ASSESSMENT
This is 34YO man with PMH of bipolar disorder, DM, HTN, CKD, sleep apnea brought to ED for Seizure. Pt is lethargic, postictal, unable to provide history. Discussed with his father - he lives with father no history of seziure, noted to have seizure at home and brought to ED. Per father he is non compliant with medication and diet. On arrival he is noted to have , /117, received labetalol and ativan.     A/P    >New onset seizure in the setting of uncontrolled BP and high FS  Neurology consult appreciated - c/w Keppra   MRI, EEG pending   CT head - no acute finding  seizure precaution  ativan prn   U tox negative    >HTN emergency -  non compliance   c/w Hydralazine 100mg q8h, amlodipine 10, increase Clonidine 0.2 q8h  start labetalol 200mg bid  Monitor BP  TTE    >DM - uncontrolled with complication - CKD - non compliant  A1c 10.3  c/w Lantus 15 units plus sliding scale    >Acute on CKD stage 3 - improving  baseline Cr around 2.3  renal consult appreciated - c/w IVF per renal  renal u/s pending   urine protein 152 - elevated     >Bipolar disorder - resume home medication    >Hypokalemia - IV and PO potassium supplemented - repeat BMP later today  normal mag     >Leucocytosis - likely reactive - improving  afebrile,  u/a negative   chest xray negative   f/u cbc    >elevated troponin - flat - CKD  TTE pending    >Obesity - low fat diet    >DVT PPX - Heparin

## 2019-01-02 NOTE — PROGRESS NOTE ADULT - ASSESSMENT
1. MELISSA on CKD - 3   2. Poorly controlled HTN  3. Poorly DM  4. Fe - Deficiency Anemia (Needs Further W.U)    Hypokalemia - Replete Potassium  MELISSA slowly resolving  FSBS w/ISS  Continue current management

## 2019-01-02 NOTE — SWALLOW BEDSIDE ASSESSMENT ADULT - ORAL PREPARATORY PHASE
Within functional limits slow but functional mastication 2 lingual soreness slow but functional mastication 2* lingual soreness

## 2019-01-02 NOTE — SWALLOW BEDSIDE ASSESSMENT ADULT - ASR SWALLOW ASPIRATION MONITOR
gurgly voice/pneumonia/oral hygiene/fever/position upright (90Y)/change of breathing pattern/cough/throat clearing

## 2019-01-03 LAB
ALBUMIN SERPL ELPH-MCNC: 2.4 G/DL — LOW (ref 3.3–5.2)
ANION GAP SERPL CALC-SCNC: 10 MMOL/L — SIGNIFICANT CHANGE UP (ref 5–17)
ANION GAP SERPL CALC-SCNC: 10 MMOL/L — SIGNIFICANT CHANGE UP (ref 5–17)
ANION GAP SERPL CALC-SCNC: 12 MMOL/L — SIGNIFICANT CHANGE UP (ref 5–17)
BUN SERPL-MCNC: 26 MG/DL — HIGH (ref 8–20)
BUN SERPL-MCNC: 28 MG/DL — HIGH (ref 8–20)
BUN SERPL-MCNC: 28 MG/DL — HIGH (ref 8–20)
CALCIUM SERPL-MCNC: 8.6 MG/DL — SIGNIFICANT CHANGE UP (ref 8.6–10.2)
CALCIUM SERPL-MCNC: 8.6 MG/DL — SIGNIFICANT CHANGE UP (ref 8.6–10.2)
CALCIUM SERPL-MCNC: 8.7 MG/DL — SIGNIFICANT CHANGE UP (ref 8.6–10.2)
CHLORIDE SERPL-SCNC: 103 MMOL/L — SIGNIFICANT CHANGE UP (ref 98–107)
CHLORIDE SERPL-SCNC: 103 MMOL/L — SIGNIFICANT CHANGE UP (ref 98–107)
CHLORIDE SERPL-SCNC: 104 MMOL/L — SIGNIFICANT CHANGE UP (ref 98–107)
CO2 SERPL-SCNC: 25 MMOL/L — SIGNIFICANT CHANGE UP (ref 22–29)
CO2 SERPL-SCNC: 26 MMOL/L — SIGNIFICANT CHANGE UP (ref 22–29)
CO2 SERPL-SCNC: 27 MMOL/L — SIGNIFICANT CHANGE UP (ref 22–29)
CREAT SERPL-MCNC: 2.66 MG/DL — HIGH (ref 0.5–1.3)
CREAT SERPL-MCNC: 2.73 MG/DL — HIGH (ref 0.5–1.3)
CREAT SERPL-MCNC: 2.75 MG/DL — HIGH (ref 0.5–1.3)
GLUCOSE BLDC GLUCOMTR-MCNC: 158 MG/DL — HIGH (ref 70–99)
GLUCOSE BLDC GLUCOMTR-MCNC: 169 MG/DL — HIGH (ref 70–99)
GLUCOSE BLDC GLUCOMTR-MCNC: 220 MG/DL — HIGH (ref 70–99)
GLUCOSE BLDC GLUCOMTR-MCNC: 232 MG/DL — HIGH (ref 70–99)
GLUCOSE SERPL-MCNC: 188 MG/DL — HIGH (ref 70–115)
GLUCOSE SERPL-MCNC: 193 MG/DL — HIGH (ref 70–115)
GLUCOSE SERPL-MCNC: 194 MG/DL — HIGH (ref 70–115)
HCT VFR BLD CALC: 33.9 % — LOW (ref 42–52)
HGB BLD-MCNC: 11.4 G/DL — LOW (ref 14–18)
MAGNESIUM SERPL-MCNC: 2 MG/DL — SIGNIFICANT CHANGE UP (ref 1.6–2.6)
MCHC RBC-ENTMCNC: 25.5 PG — LOW (ref 27–31)
MCHC RBC-ENTMCNC: 33.6 G/DL — SIGNIFICANT CHANGE UP (ref 32–36)
MCV RBC AUTO: 75.8 FL — LOW (ref 80–94)
PHOSPHATE SERPL-MCNC: 3.3 MG/DL — SIGNIFICANT CHANGE UP (ref 2.4–4.7)
PLATELET # BLD AUTO: 314 K/UL — SIGNIFICANT CHANGE UP (ref 150–400)
POTASSIUM SERPL-MCNC: 3 MMOL/L — LOW (ref 3.5–5.3)
POTASSIUM SERPL-MCNC: 3.5 MMOL/L — SIGNIFICANT CHANGE UP (ref 3.5–5.3)
POTASSIUM SERPL-MCNC: 3.5 MMOL/L — SIGNIFICANT CHANGE UP (ref 3.5–5.3)
POTASSIUM SERPL-SCNC: 3 MMOL/L — LOW (ref 3.5–5.3)
POTASSIUM SERPL-SCNC: 3.5 MMOL/L — SIGNIFICANT CHANGE UP (ref 3.5–5.3)
POTASSIUM SERPL-SCNC: 3.5 MMOL/L — SIGNIFICANT CHANGE UP (ref 3.5–5.3)
RBC # BLD: 4.47 M/UL — LOW (ref 4.6–6.2)
RBC # FLD: 17.7 % — HIGH (ref 11–15.6)
SODIUM SERPL-SCNC: 139 MMOL/L — SIGNIFICANT CHANGE UP (ref 135–145)
SODIUM SERPL-SCNC: 140 MMOL/L — SIGNIFICANT CHANGE UP (ref 135–145)
SODIUM SERPL-SCNC: 141 MMOL/L — SIGNIFICANT CHANGE UP (ref 135–145)
WBC # BLD: 13.4 K/UL — HIGH (ref 4.8–10.8)
WBC # FLD AUTO: 13.4 K/UL — HIGH (ref 4.8–10.8)

## 2019-01-03 PROCEDURE — 99233 SBSQ HOSP IP/OBS HIGH 50: CPT

## 2019-01-03 PROCEDURE — 99222 1ST HOSP IP/OBS MODERATE 55: CPT

## 2019-01-03 PROCEDURE — 99232 SBSQ HOSP IP/OBS MODERATE 35: CPT

## 2019-01-03 RX ORDER — QUETIAPINE FUMARATE 200 MG/1
100 TABLET, FILM COATED ORAL AT BEDTIME
Qty: 0 | Refills: 0 | Status: DISCONTINUED | OUTPATIENT
Start: 2019-01-03 | End: 2019-01-05

## 2019-01-03 RX ORDER — INSULIN GLARGINE 100 [IU]/ML
20 INJECTION, SOLUTION SUBCUTANEOUS AT BEDTIME
Qty: 0 | Refills: 0 | Status: DISCONTINUED | OUTPATIENT
Start: 2019-01-03 | End: 2019-01-03

## 2019-01-03 RX ORDER — INSULIN LISPRO 100/ML
3 VIAL (ML) SUBCUTANEOUS
Qty: 0 | Refills: 0 | Status: DISCONTINUED | OUTPATIENT
Start: 2019-01-03 | End: 2019-01-05

## 2019-01-03 RX ORDER — POTASSIUM CHLORIDE 20 MEQ
10 PACKET (EA) ORAL
Qty: 0 | Refills: 0 | Status: COMPLETED | OUTPATIENT
Start: 2019-01-03 | End: 2019-01-03

## 2019-01-03 RX ORDER — INSULIN GLARGINE 100 [IU]/ML
16 INJECTION, SOLUTION SUBCUTANEOUS AT BEDTIME
Qty: 0 | Refills: 0 | Status: DISCONTINUED | OUTPATIENT
Start: 2019-01-03 | End: 2019-01-05

## 2019-01-03 RX ORDER — CARVEDILOL PHOSPHATE 80 MG/1
6.25 CAPSULE, EXTENDED RELEASE ORAL EVERY 12 HOURS
Qty: 0 | Refills: 0 | Status: DISCONTINUED | OUTPATIENT
Start: 2019-01-03 | End: 2019-01-04

## 2019-01-03 RX ORDER — NYSTATIN 500MM UNIT
500000 POWDER (EA) MISCELLANEOUS
Qty: 0 | Refills: 0 | Status: DISCONTINUED | OUTPATIENT
Start: 2019-01-03 | End: 2019-01-05

## 2019-01-03 RX ORDER — CEPHALEXIN 500 MG
500 CAPSULE ORAL EVERY 12 HOURS
Qty: 0 | Refills: 0 | Status: DISCONTINUED | OUTPATIENT
Start: 2019-01-03 | End: 2019-01-05

## 2019-01-03 RX ORDER — IRON SUCROSE 20 MG/ML
200 INJECTION, SOLUTION INTRAVENOUS ONCE
Qty: 0 | Refills: 0 | Status: COMPLETED | OUTPATIENT
Start: 2019-01-03 | End: 2019-01-03

## 2019-01-03 RX ADMIN — Medication 4: at 08:16

## 2019-01-03 RX ADMIN — Medication 100 MILLIGRAM(S): at 15:33

## 2019-01-03 RX ADMIN — Medication 100 MILLIEQUIVALENT(S): at 12:35

## 2019-01-03 RX ADMIN — Medication 100 MILLIGRAM(S): at 21:34

## 2019-01-03 RX ADMIN — IRON SUCROSE 110 MILLIGRAM(S): 20 INJECTION, SOLUTION INTRAVENOUS at 17:49

## 2019-01-03 RX ADMIN — Medication 0.2 MILLIGRAM(S): at 21:34

## 2019-01-03 RX ADMIN — Medication 500000 UNIT(S): at 15:31

## 2019-01-03 RX ADMIN — Medication 200 MILLIGRAM(S): at 05:17

## 2019-01-03 RX ADMIN — Medication 3 UNIT(S): at 17:48

## 2019-01-03 RX ADMIN — LAMOTRIGINE 50 MILLIGRAM(S): 25 TABLET, ORALLY DISINTEGRATING ORAL at 05:18

## 2019-01-03 RX ADMIN — AMLODIPINE BESYLATE 10 MILLIGRAM(S): 2.5 TABLET ORAL at 05:18

## 2019-01-03 RX ADMIN — LEVETIRACETAM 420 MILLIGRAM(S): 250 TABLET, FILM COATED ORAL at 03:06

## 2019-01-03 RX ADMIN — Medication 0.2 MILLIGRAM(S): at 05:18

## 2019-01-03 RX ADMIN — Medication 4: at 12:35

## 2019-01-03 RX ADMIN — Medication 0.2 MILLIGRAM(S): at 15:31

## 2019-01-03 RX ADMIN — Medication 500000 UNIT(S): at 00:05

## 2019-01-03 RX ADMIN — Medication 500 MILLIGRAM(S): at 17:48

## 2019-01-03 RX ADMIN — LEVETIRACETAM 420 MILLIGRAM(S): 250 TABLET, FILM COATED ORAL at 05:18

## 2019-01-03 RX ADMIN — Medication 100 MILLIEQUIVALENT(S): at 14:30

## 2019-01-03 RX ADMIN — DEXTROSE MONOHYDRATE, SODIUM CHLORIDE, AND POTASSIUM CHLORIDE 80 MILLILITER(S): 50; .745; 4.5 INJECTION, SOLUTION INTRAVENOUS at 11:03

## 2019-01-03 RX ADMIN — INSULIN GLARGINE 16 UNIT(S): 100 INJECTION, SOLUTION SUBCUTANEOUS at 21:38

## 2019-01-03 RX ADMIN — Medication 500000 UNIT(S): at 17:48

## 2019-01-03 RX ADMIN — Medication 2: at 17:49

## 2019-01-03 RX ADMIN — QUETIAPINE FUMARATE 100 MILLIGRAM(S): 200 TABLET, FILM COATED ORAL at 21:34

## 2019-01-03 RX ADMIN — CALCITRIOL 0.25 MICROGRAM(S): 0.5 CAPSULE ORAL at 11:03

## 2019-01-03 RX ADMIN — HEPARIN SODIUM 5000 UNIT(S): 5000 INJECTION INTRAVENOUS; SUBCUTANEOUS at 21:34

## 2019-01-03 RX ADMIN — Medication 100 MILLIEQUIVALENT(S): at 11:08

## 2019-01-03 RX ADMIN — Medication 500000 UNIT(S): at 05:18

## 2019-01-03 RX ADMIN — Medication 100 MILLIGRAM(S): at 05:18

## 2019-01-03 RX ADMIN — HEPARIN SODIUM 5000 UNIT(S): 5000 INJECTION INTRAVENOUS; SUBCUTANEOUS at 05:17

## 2019-01-03 RX ADMIN — LAMOTRIGINE 50 MILLIGRAM(S): 25 TABLET, ORALLY DISINTEGRATING ORAL at 17:48

## 2019-01-03 RX ADMIN — LEVETIRACETAM 420 MILLIGRAM(S): 250 TABLET, FILM COATED ORAL at 17:47

## 2019-01-03 RX ADMIN — CARVEDILOL PHOSPHATE 6.25 MILLIGRAM(S): 80 CAPSULE, EXTENDED RELEASE ORAL at 17:48

## 2019-01-03 NOTE — CONSULT NOTE ADULT - ASSESSMENT
T2DM uncontrolled now with renal insufficiecny   Pt on LAntus 15 unit an premeal Humalog added forthe pt today   BS now 158- would recommend 16 untis of LAntus rather than 20 as BS much better this afternoon- 20 may cause him to go  low     renal insufficiency - due to HTN emergency  Uncontrolled DM    Cont supportive care T2DM uncontrolled now with renal insufficiecny   Pt on LAntus 15 unit an premeal Humalog added forthe pt today   BS now 158- would recommend 16 untis of LAntus rather than 20 as BS much better this afternoon- 20 may cause him to go  low     renal insufficiency - due to HTN emergency  Uncontrolled DM  ? goiter- check sonogram thyroid  TSH wnl   Cont supportive care

## 2019-01-03 NOTE — PROGRESS NOTE ADULT - SUBJECTIVE AND OBJECTIVE BOX
INTERVAL HISTORY:  Seen in chair this am and feels better.  No new changes overnight.    No Known Allergies      VITAL SIGNS:  Vital Signs Last 24 Hrs  T(C): 36.9 (03 Jan 2019 05:15), Max: 37.1 (02 Jan 2019 21:59)  T(F): 98.5 (03 Jan 2019 05:15), Max: 98.7 (02 Jan 2019 21:59)  HR: 76 (03 Jan 2019 05:15) (76 - 90)  BP: 160/98 (03 Jan 2019 05:15) (148/89 - 162/98)  BP(mean): --  RR: 20 (03 Jan 2019 05:15) (17 - 20)  SpO2: 98% (03 Jan 2019 05:15) (94% - 99%)    PHYSICAL EXAMINATION:  General: Well-developed, well nourished, in no acute distress.  Eyes: Conjunctiva and sclera clear.  Neurologic:  - Mental Status:  Alert, awake, oriented to person, place, and time; Speech is normal; Affect is normal.  - Cranial Nerves: II-XI intact.  - Motor:  Strength is 5/5 throughout.  There is no pronator drift.  Normal muscle bulk and tone throughout.  - Reflexes:  1+ throughout; Plantars downgoing b/l.  - Sensory:  Intact to light touch, pin prick, vibration, and joint-position sense throughout.  - Coordination:  No dysmetria/dysdiadochokinesis.    MEDS:  MEDICATIONS  (STANDING):  amLODIPine   Tablet 10 milliGRAM(s) Oral daily  calcitriol   Capsule 0.25 MICROGram(s) Oral daily  carvedilol 6.25 milliGRAM(s) Oral every 12 hours  cloNIDine 0.2 milliGRAM(s) Oral three times a day  dextrose 5%. 1000 milliLiter(s) (50 mL/Hr) IV Continuous <Continuous>  dextrose 50% Injectable 12.5 Gram(s) IV Push once  dextrose 50% Injectable 25 Gram(s) IV Push once  dextrose 50% Injectable 25 Gram(s) IV Push once  ergocalciferol 78844 Unit(s) Oral every week  heparin  Injectable 5000 Unit(s) SubCutaneous every 12 hours  hydrALAZINE 100 milliGRAM(s) Oral every 8 hours  insulin glargine Injectable (LANTUS) 15 Unit(s) SubCutaneous at bedtime  insulin lispro (HumaLOG) corrective regimen sliding scale   SubCutaneous three times a day before meals  insulin lispro (HumaLOG) corrective regimen sliding scale   SubCutaneous at bedtime  lamoTRIgine 50 milliGRAM(s) Oral two times a day  levETIRAcetam  IVPB 500 milliGRAM(s) IV Intermittent every 12 hours  nystatin    Suspension 228574 Unit(s) Oral four times a day  potassium chloride  10 mEq/100 mL IVPB 10 milliEquivalent(s) IV Intermittent every 1 hour  sodium chloride 0.45% with potassium chloride 20 mEq/L 1000 milliLiter(s) (80 mL/Hr) IV Continuous <Continuous>    MEDICATIONS  (PRN):  acetaminophen   Tablet .. 650 milliGRAM(s) Oral every 6 hours PRN Mild Pain (1 - 3)  dextrose 40% Gel 15 Gram(s) Oral once PRN Blood Glucose LESS THAN 70 milliGRAM(s)/deciliter  glucagon  Injectable 1 milliGRAM(s) IntraMuscular once PRN Glucose LESS THAN 70 milligrams/deciliter  labetalol Injectable 10 milliGRAM(s) IV Push every 6 hours PRN Systolic blood pressure >180  LORazepam   Injectable 2 milliGRAM(s) IV Push every 4 hours PRN seizure  ondansetron Injectable 4 milliGRAM(s) IV Push every 6 hours PRN Nausea and/or Vomiting      LABS:                          11.4   13.4  )-----------( 314      ( 03 Jan 2019 09:11 )             33.9     01-03    141  |  103  |  26.0<H>  ----------------------------<  193<H>  3.0<L>   |  26.0  |  2.75<H>    Ca    8.7      03 Jan 2019 09:11  Phos  2.4     01-02  Mg     2.0     01-03    TPro  x   /  Alb  2.8<L>  /  TBili  x   /  DBili  x   /  AST  x   /  ALT  x   /  AlkPhos  x   01-02    LIVER FUNCTIONS - ( 02 Jan 2019 17:30 )  Alb: 2.8 g/dL / Pro: x     / ALK PHOS: x     / ALT: x     / AST: x     / GGT: x               RADIOLOGY & ADDITIONAL STUDIES:      CT Head/Cervical Spine No Cont (12.30.18 @ 23:03) >  Head CT: No acute intracranial hemorrhage or displaced skull fracture. If   clinically indicated, short-term follow-up or MRI may be obtained for   further evaluation.  Nonspecific opacification of the left mastoid air cells and middle ear   cavity, new since 8/2/2018. Recommend clinical correlation to assess   acute mastoiditis.    Cervical spine CT: No obvious fracture or traumatic malalignment in the   cervical spine. If there is clinical suspicion for acute fracture or   ligamentous/cord injury, MRI may be obtained for further evaluation.  Multilevel degenerative changes of the cervical spine.    EEG Awake and Asleep (08.03.18 @ 13:29) >  impression: This is a normal record.There was no seizure activity during   this recording.    MR Head No Cont (08.02.18 @ 15:42) >  Unremarkable MRI of the brain.    MR Head No Cont (01.02.19 @ 15:02) >  Unremarkable MRI of the brain. No abnormal signal within the   brain parenchyma.  There are scattered mucosal inflammatory changes in   the paranasal sinuses. There is fluid in the bilateral mastoid air cells,   left greater than right.     EEG Awake and Asleep (01.02.19 @ 08:44) >  impression: This is a normal record.  There was little to no wakefulness   observed and there was no epilepiform activity during sleep.

## 2019-01-03 NOTE — PROGRESS NOTE ADULT - SUBJECTIVE AND OBJECTIVE BOX
Internal Medicine Hospitalist - Dr. Lexi PUGA    [id number]    33y      Male    Patient is a 33y old  Male who presents with a chief complaint of Seizure (2019 11:48)    INTERVAL HPI/ OVERNIGHT EVENTS: Patient is seen and examined, feeling better, denied chest pain, SOB, abd. pain, nausea and vomiting. On tele noted to have 3 beats of NSVT.     REVIEW OF SYSTEMS:    Denied fever, chills, abd. pain, nausea, vomiting, chest pain, SOB, headache, dizziness    PHYSICAL EXAM:    Vital Signs Last 24 Hrs  T(C): 37.1 (2019 11:00), Max: 37.1 (2019 21:59)  T(F): 98.7 (2019 11:00), Max: 98.7 (2019 21:59)  HR: 75 (2019 11:00) (75 - 90)  BP: 140/90 (2019 11:00) (140/90 - 160/98)  BP(mean): --  RR: 19 (2019 11:00) (17 - 20)  SpO2: 97% (2019 11:00) (94% - 99%)    GENERAL: NAD  CHEST/LUNG: CTA b/l   HEART: S1S2+ audible  ABDOMEN: Soft, Nontender, Nondistended; Bowel sounds present  EXTREMITIES:  no edema  CNS: AAO X 3      LABS:                        11.4   13.4  )-----------( 314      ( 2019 09:11 )             33.9     01-03    141  |  103  |  26.0<H>  ----------------------------<  193<H>  3.0<L>   |  26.0  |  2.75<H>    Ca    8.7      2019 09:11  Phos  2.4     01-02  Mg     2.0     01-03    TPro  x   /  Alb  2.8<L>  /  TBili  x   /  DBili  x   /  AST  x   /  ALT  x   /  AlkPhos  x   -      Urinalysis Basic - ( 2019 01:55 )    Color: Yellow / Appearance: Clear / S.015 / pH: x  Gluc: x / Ketone: Negative  / Bili: Negative / Urobili: Negative mg/dL   Blood: x / Protein: 100 mg/dL / Nitrite: Negative   Leuk Esterase: Negative / RBC: 0-2 /HPF / WBC Negative   Sq Epi: x / Non Sq Epi: Occasional / Bacteria: x          MEDICATIONS  (STANDING):  amLODIPine   Tablet 10 milliGRAM(s) Oral daily  calcitriol   Capsule 0.25 MICROGram(s) Oral daily  carvedilol 6.25 milliGRAM(s) Oral every 12 hours  cephalexin 500 milliGRAM(s) Oral every 12 hours  cloNIDine 0.2 milliGRAM(s) Oral three times a day  dextrose 5%. 1000 milliLiter(s) (50 mL/Hr) IV Continuous <Continuous>  dextrose 50% Injectable 12.5 Gram(s) IV Push once  dextrose 50% Injectable 25 Gram(s) IV Push once  dextrose 50% Injectable 25 Gram(s) IV Push once  ergocalciferol 01622 Unit(s) Oral every week  heparin  Injectable 5000 Unit(s) SubCutaneous every 12 hours  hydrALAZINE 100 milliGRAM(s) Oral every 8 hours  insulin glargine Injectable (LANTUS) 20 Unit(s) SubCutaneous at bedtime  insulin lispro (HumaLOG) corrective regimen sliding scale   SubCutaneous three times a day before meals  insulin lispro (HumaLOG) corrective regimen sliding scale   SubCutaneous at bedtime  insulin lispro Injectable (HumaLOG) 3 Unit(s) SubCutaneous three times a day before meals  iron sucrose IVPB 200 milliGRAM(s) IV Intermittent once  lamoTRIgine 50 milliGRAM(s) Oral two times a day  levETIRAcetam  IVPB 500 milliGRAM(s) IV Intermittent every 12 hours  nystatin    Suspension 167281 Unit(s) Oral four times a day  potassium chloride  10 mEq/100 mL IVPB 10 milliEquivalent(s) IV Intermittent every 1 hour  sodium chloride 0.45% with potassium chloride 20 mEq/L 1000 milliLiter(s) (80 mL/Hr) IV Continuous <Continuous>    MEDICATIONS  (PRN):  acetaminophen   Tablet .. 650 milliGRAM(s) Oral every 6 hours PRN Mild Pain (1 - 3)  dextrose 40% Gel 15 Gram(s) Oral once PRN Blood Glucose LESS THAN 70 milliGRAM(s)/deciliter  glucagon  Injectable 1 milliGRAM(s) IntraMuscular once PRN Glucose LESS THAN 70 milligrams/deciliter  labetalol Injectable 10 milliGRAM(s) IV Push every 6 hours PRN Systolic blood pressure >180  LORazepam   Injectable 2 milliGRAM(s) IV Push every 4 hours PRN seizure  ondansetron Injectable 4 milliGRAM(s) IV Push every 6 hours PRN Nausea and/or Vomiting      RADIOLOGY & ADDITIONAL TEST    < from: MR Head No Cont (19 @ 15:02) >    IMPRESSION: Unremarkable MRI of the brain. No abnormal signal within the   brain parenchyma.  There are scattered mucosal inflammatory changes in   the paranasal sinuses. There is fluid in the bilateral mastoid air cells,   left greater than right.    < end of copied text >    < from: EEG Awake and Asleep (19 @ 08:44) >  impression: This is a normal record.  There was little to no wakefulness   observed and there was no epilepiform activity during sleep.    < end of copied text >    < from: TTE Echo Complete w/Doppler (19 @ 10:54) >  Summary:   1. Technically difficult study.   2. Mildly enlarged left atrium.   3. There is moderate concentric left ventricular hypertrophy.   4. Normal wall motion. Left ventricular ejection fraction, by visual   estimation, is 60 to 65%. Grade II diastolic dysfunction.   5. Normal right atrial size.   6. Normal right ventricular size and function.   7. No significant valvular abnormality.   8. There is no evidence of pericardial effusion.    < end of copied text >    < from: US Renal (19 @ 21:29) >    Kidneys demonstrate increased echogenicity. No evidence of hydronephrosis.    < end of copied text >

## 2019-01-03 NOTE — CONSULT NOTE ADULT - SUBJECTIVE AND OBJECTIVE BOX
[facility] - [facility]                                   [address] [place] [address]                                                      PHONE: [phone]                                                         FAX: [phone]  [email or web address]  ---------------------------------------------------------------------------------------------------------------------------------    Reason for Consult: HTN     HPI:  MILENA PUGA is an 33y Male with history of obese ? ARVIND CKD HTN DM none complaint active smoker bipolar disorder who presents with AMS seizure lethargic postictal.  The patient has been not complaint with medications at home found to have  and 's.  The patient currently denies any chest pain or dyspnea.      PAST MEDICAL & SURGICAL HISTORY:  HTN (hypertension)  Sleep apnea  Diabetes  Bipolar 1 disorder  Hypertension  No significant past surgical history      No Known Allergies      MEDICATIONS  (STANDING):  amLODIPine   Tablet 10 milliGRAM(s) Oral daily  calcitriol   Capsule 0.25 MICROGram(s) Oral daily  carvedilol 6.25 milliGRAM(s) Oral every 12 hours  cloNIDine 0.2 milliGRAM(s) Oral three times a day  dextrose 5%. 1000 milliLiter(s) (50 mL/Hr) IV Continuous <Continuous>  dextrose 50% Injectable 12.5 Gram(s) IV Push once  dextrose 50% Injectable 25 Gram(s) IV Push once  dextrose 50% Injectable 25 Gram(s) IV Push once  ergocalciferol 81484 Unit(s) Oral every week  heparin  Injectable 5000 Unit(s) SubCutaneous every 12 hours  hydrALAZINE 100 milliGRAM(s) Oral every 8 hours  insulin glargine Injectable (LANTUS) 15 Unit(s) SubCutaneous at bedtime  insulin lispro (HumaLOG) corrective regimen sliding scale   SubCutaneous three times a day before meals  insulin lispro (HumaLOG) corrective regimen sliding scale   SubCutaneous at bedtime  lamoTRIgine 50 milliGRAM(s) Oral two times a day  levETIRAcetam  IVPB 500 milliGRAM(s) IV Intermittent every 12 hours  nystatin    Suspension [id number] Unit(s) Oral four times a day  sodium chloride 0.45% with potassium chloride 20 mEq/L 1000 milliLiter(s) (80 mL/Hr) IV Continuous <Continuous>    MEDICATIONS  (PRN):  acetaminophen   Tablet .. 650 milliGRAM(s) Oral every 6 hours PRN Mild Pain (1 - 3)  dextrose 40% Gel 15 Gram(s) Oral once PRN Blood Glucose LESS THAN 70 milliGRAM(s)/deciliter  glucagon  Injectable 1 milliGRAM(s) IntraMuscular once PRN Glucose LESS THAN 70 milligrams/deciliter  labetalol Injectable 10 milliGRAM(s) IV Push every 6 hours PRN Systolic blood pressure >180  LORazepam   Injectable 2 milliGRAM(s) IV Push every 4 hours PRN seizure  ondansetron Injectable 4 milliGRAM(s) IV Push every 6 hours PRN Nausea and/or Vomiting      Social History:  Cigarettes:     active smoker                Alchohol:    none              Illicit Drug Abuse:  none    ROS: Negative other than as mentioned in HPI.    Vital Signs Last 24 Hrs  T(C): 36.9 (2019 05:15), Max: 37.1 (2019 21:59)  T(F): 98.5 (2019 05:15), Max: 98.7 (2019 21:59)  HR: 76 (2019 05:15) (76 - 90)  BP: 160/98 (2019 05:15) (148/89 - 162/98)  BP(mean): --  RR: 20 (2019 05:15) (17 - 20)  SpO2: 98% (2019 05:15) (93% - 99%)  ICU Vital Signs Last 24 Hrs  MILENA PUGA  I&O's Detail    2019 07:01  -  2019 07:00  --------------------------------------------------------  IN:    Oral Fluid: 960 mL    sodium chloride 0.45% with potassium chloride 20 mEq/L: 1920 mL    Solution: 100 mL  Total IN: 2980 mL    OUT:    Voided: 5550 mL  Total OUT: 5550 mL    Total NET: -2570 mL        I&O's Summary    2019 07:01  -  2019 07:00  --------------------------------------------------------  IN: 2980 mL / OUT: 5550 mL / NET: -2570 mL      Drug Dosing Weight  MILENA PUGA      PHYSICAL EXAM:  General: Appears well developed, well nourished alert and cooperative.  HEENT: Head; normocephalic, atraumatic.  Eyes: Pupils reactive, cornea wnl.  Neck: Supple, no nodes adenopathy, no NVD or carotid bruit or thyromegaly.  CARDIOVASCULAR: Normal S1 and S2, 1/6 murmur, rub, gallop or lift.   LUNGS: No rales, rhonchi or wheeze. Normal breath sounds bilaterally.  ABDOMEN: Soft, nontender without mass or organomegaly. bowel sounds normoactive.  EXTREMITIES: No clubbing, cyanosis or edema. Distal pulses wnl.   SKIN: warm and dry with normal turgor.  NEURO: Alert/oriented x 3/normal motor exam. No pathologic reflexes.    PSYCH: normal affect.        LABS:                        10.7   14.4  )-----------( 306      ( 2019 06:43 )             32.8         137  |  101  |  26.0<H>  ----------------------------<  231<H>  3.6   |  26.0  |  2.82<H>    Ca    8.5<L>      2019 17:30  Phos  2.4       Mg     2.0         TPro  x   /  Alb  2.8<L>  /  TBili  x   /  DBili  x   /  AST  x   /  ALT  x   /  AlkPhos  x       MILENA PUGA        Urinalysis Basic - ( 2019 01:55 )    Color: Yellow / Appearance: Clear / S.015 / pH: x  Gluc: x / Ketone: Negative  / Bili: Negative / Urobili: Negative mg/dL   Blood: x / Protein: 100 mg/dL / Nitrite: Negative   Leuk Esterase: Negative / RBC: 0-2 /HPF / WBC Negative   Sq Epi: x / Non Sq Epi: Occasional / Bacteria: x        RADIOLOGY & ADDITIONAL STUDIES:    INTERPRETATION OF TELEMETRY (personally reviewed):    ECG:  Diagnosis Line Sinus tachycardia  Left anterior fascicular block  Inferior infarct , age undetermined  Anterior infarct , age undetermined  Abnormal ECG      ECHO:  Summary:   1. Technically difficult study.   2. Mildly enlarged left atrium.   3. There is moderate concentric left ventricular hypertrophy.   4. Normal wall motion. Left ventricular ejection fraction, by visual   estimation, is 60 to 65%. Grade II diastolic dysfunction.   5. Normal right atrial size.   6. Normal right ventricular size and function.   7. No significant valvular abnormality.   8. There is no evidence of pericardial effusion.      STRESS TEST: none     CARDIAC CATHETERIZATION: none    Assessment and Plan:  In summary, MILENA PUGA is an 33y Male with history of obese ? ARVIND CKD HTN DM none complaint active smoker bipolar disorder who presents with AMS seizure MELISSA on CKD hyperglycemia HTN urgency BP improving; NSVT asymptomatic in setting of hypokalemia; TTE normal EF moderate LVH DDD without any sign valvular disease     Plan  1.  Out patient ischemic evaluations   2.  Change labetalol to Coreg for better BP control   3.  Continue other optimal cardiovascular therapy   4.  Monitor on TELE; Keep K > 4 and Mag > 2.0   5.  Renal and Neurology  follow up

## 2019-01-03 NOTE — PROGRESS NOTE ADULT - ASSESSMENT
This is 34YO man with PMH of bipolar disorder, DM, HTN, CKD, sleep apnea brought to ED for Seizure. Pt is lethargic, postictal, unable to provide history. Discussed with his father - he lives with father no history of seziure, noted to have seizure at home and brought to ED. Per father he is non compliant with medication and diet. On arrival he is noted to have , /117. Pt was seen by neurology, started on Keppra, MRI brain  unremarkable, mucosal inflammatory changes in paransal sinuses, EEG normal.     A/P    >New onset seizure in the setting of uncontrolled BP and high FS  Neurology consult appreciated - c/w Keppra   MRI brain  unremarkable, EEG normal.  CT head - no acute finding  seizure precaution  ativan prn   U tox negative    >HTN emergency -  non compliance - improving  tele noted to have 3 beats of NSVT - hypokalemia   cardiology consulted - discussed with Dr. yu recommend to switch from Labetalol to coreg 6.25 bid  c/w Hydralazine 100mg q8h, amlodipine 10,  Clonidine 0.2 q8h  TTE There is moderate concentric left ventricular hypertrophy. Normal wall motion. Left ventricular ejection fraction, by visual  estimation, is 60 to 65%. Grade II diastolic dysfunction.    >DM - uncontrolled with complication - CKD - non compliant  A1c 10.3  endo consult requested   increase Lantus 20 units qhs, humalog premeal    >Acute on CKD stage 3 - improving  baseline Cr around 2.3  renal consult appreciated - c/w IVF per renal  renal u/s Kidneys demonstrate increased echogenicity. No evidence of hydronephrosis.  urine protein 152 - elevated     >Bipolar disorder - resume home medication    >Hypokalemia - K 3.0 - renal ordered K riders   repeat BMP 5 pm     >Leucocytosis - review old record noted leucocytosis in past, MRI brain showed sinusiitis - will start Keflex  afebrile,  u/a negative   chest xray negative   f/u cbc    >elevated troponin - flat - CKD  cardiology consulted  TTE There is moderate concentric left ventricular hypertrophy. Normal wall motion. Left ventricular ejection fraction, by visual  estimation, is 60 to 65%. Grade II diastolic dysfunction.    >Obesity - low fat diet    >DVT PPX - Heparin This is 32YO man with PMH of bipolar disorder, DM, HTN, CKD, sleep apnea brought to ED for Seizure. Pt is lethargic, postictal, unable to provide history. Discussed with his father - he lives with father no history of seziure, noted to have seizure at home and brought to ED. Per father he is non compliant with medication and diet. On arrival he is noted to have , /117. Pt was seen by neurology, started on Keppra, MRI brain  unremarkable, mucosal inflammatory changes in paransal sinuses, EEG normal.     A/P    >New onset seizure in the setting of uncontrolled BP and high FS  Neurology consult appreciated - c/w Keppra   MRI brain  unremarkable, EEG normal.  CT head - no acute finding  seizure precaution  ativan prn   U tox negative    >HTN emergency -  non compliance - improving  tele noted to have 3 beats of NSVT - hypokalemia   cardiology consulted - discussed with Dr. yu recommend to switch from Labetalol to coreg 6.25 bid  c/w Hydralazine 100mg q8h, amlodipine 10,  Clonidine 0.2 q8h  TTE There is moderate concentric left ventricular hypertrophy. Normal wall motion. Left ventricular ejection fraction, by visual  estimation, is 60 to 65%. Grade II diastolic dysfunction.    >DM - uncontrolled with complication - CKD - non compliant  A1c 10.3  endo consult requested   increase Lantus 20 units qhs, humalog premeal    >Acute on CKD stage 3 - improving  baseline Cr around 2.3  renal consult appreciated - c/w IVF per renal  renal u/s Kidneys demonstrate increased echogenicity. No evidence of hydronephrosis.  urine protein 152 - elevated     >Bipolar disorder - resume home medication    >Hypokalemia - K 3.0 - renal ordered K riders   repeat BMP 5 pm     >Leucocytosis - review old record noted leucocytosis in past, MRI brain showed sinusiitis - will start Keflex  afebrile,  u/a negative   chest xray negative   f/u cbc    >Oral thrush - c/w Nystatin    >elevated troponin - flat - CKD  cardiology consulted  TTE There is moderate concentric left ventricular hypertrophy. Normal wall motion. Left ventricular ejection fraction, by visual  estimation, is 60 to 65%. Grade II diastolic dysfunction.    >Obesity - low fat diet    >DVT PPX - Heparin

## 2019-01-03 NOTE — PROGRESS NOTE ADULT - ASSESSMENT
In summary, 34yo  AA  Male with history of obesity ,  ? ARVIND CKD- 4 ,  HTN , DM - non complaint ,active smoker ,bipolar disorder who presents with AMS, seizure ,MELISSA on CKD, hyperglycemia, HTN - urgency,    BP improving; NSVT , asymptomatic in  the setting of hypokalemia;     TTE normal EF ,moderate LVH, DD without any sign valvular disease     Plan  1.  Out patient ischemic evaluation- Per Cardiology,    2.  On Coreg for better BP control    3.  Monitor on TELE; Keep K+  > 4 Meq.,  and Mag > 2.0 mg.,  4.  Neurology  follow up Noted,    Continue current Management,    OP F/U , Thank you,

## 2019-01-03 NOTE — PROGRESS NOTE ADULT - SUBJECTIVE AND OBJECTIVE BOX
[facility] DIVISION OF KIDNEY DISEASES AND HYPERTENSION -- FOLLOW UP NOTE  --------------------------------------------------------------------------------  Chief Complaint: None,    24 hour events/subjective:    PAST HISTORY  --------------------------------------------------------------------------------  No significant changes to PMH, PSH, FHx, SHx, unless otherwise noted    ALLERGIES & MEDICATIONS  --------------------------------------------------------------------------------  Allergies    No Known Allergies    Standing Inpatient Medications  amLODIPine   Tablet 10 milliGRAM(s) Oral daily  calcitriol   Capsule 0.25 MICROGram(s) Oral daily  carvedilol 6.25 milliGRAM(s) Oral every 12 hours  cloNIDine 0.2 milliGRAM(s) Oral three times a day  dextrose 5%. 1000 milliLiter(s) IV Continuous <Continuous>  dextrose 50% Injectable 12.5 Gram(s) IV Push once  dextrose 50% Injectable 25 Gram(s) IV Push once  dextrose 50% Injectable 25 Gram(s) IV Push once  ergocalciferol 15267 Unit(s) Oral every week  heparin  Injectable 5000 Unit(s) SubCutaneous every 12 hours  hydrALAZINE 100 milliGRAM(s) Oral every 8 hours  insulin glargine Injectable (LANTUS) 15 Unit(s) SubCutaneous at bedtime  insulin lispro (HumaLOG) corrective regimen sliding scale   SubCutaneous three times a day before meals  insulin lispro (HumaLOG) corrective regimen sliding scale   SubCutaneous at bedtime  lamoTRIgine 50 milliGRAM(s) Oral two times a day  levETIRAcetam  IVPB 500 milliGRAM(s) IV Intermittent every 12 hours  nystatin    Suspension 190561 Unit(s) Oral four times a day  potassium chloride  10 mEq/100 mL IVPB 10 milliEquivalent(s) IV Intermittent every 1 hour  sodium chloride 0.45% with potassium chloride 20 mEq/L 1000 milliLiter(s) IV Continuous <Continuous>    PRN Inpatient Medications  acetaminophen   Tablet .. 650 milliGRAM(s) Oral every 6 hours PRN  dextrose 40% Gel 15 Gram(s) Oral once PRN  glucagon  Injectable 1 milliGRAM(s) IntraMuscular once PRN  labetalol Injectable 10 milliGRAM(s) IV Push every 6 hours PRN  LORazepam   Injectable 2 milliGRAM(s) IV Push every 4 hours PRN  ondansetron Injectable 4 milliGRAM(s) IV Push every 6 hours PRN    REVIEW OF SYSTEMS  --------------------------------------------------------------------------------  Gen: No weight changes, fatigue, fevers/chills, weakness  Skin: No rashes  Head/Eyes/Ears/Mouth: No headache; Normal hearing; Normal vision w/o blurriness; No sinus pain/discomfort, sore throat  Respiratory: No dyspnea, cough, wheezing, hemoptysis  CV: No chest pain, PND, orthopnea  GI: No abdominal pain, diarrhea, constipation, nausea, vomiting, melena, hematochezia  : No increased frequency, dysuria, hematuria, nocturia  MSK: No joint pain/swelling; no back pain; no edema  Neuro: No dizziness/lightheadedness, weakness, seizures, numbness, tingling  Heme: No easy bruising or bleeding  Endo: No heat/cold intolerance  Psych: No significant nervousness, anxiety, stress, depression    All other systems were reviewed and are negative, except as noted.    VITALS/PHYSICAL EXAM  --------------------------------------------------------------------------------  T(C): 37.1 (01-03-19 @ 11:00), Max: 37.1 (01-02-19 @ 21:59)  HR: 75 (01-03-19 @ 11:00) (75 - 90)  BP: 140/90 (01-03-19 @ 11:00) (140/90 - 162/98)  RR: 19 (01-03-19 @ 11:00) (17 - 20)  SpO2: 97% (01-03-19 @ 11:00) (94% - 99%)  Wt(kg): --    Weight (kg): 129 (01-02-19 @ 06:19)      01-02-19 @ 07:01  -  01-03-19 @ 07:00  --------------------------------------------------------  IN: 2980 mL / OUT: 5550 mL / NET: -2570 mL      Physical Exam:  	Gen: NAD, well-appearing, pale,  	HEENT: PERRL, supple neck,   	Pulm: CTA B/L  	CV: RRR, S1S2; no rub, LV Heave,  	Back: No spinal or CVA tenderness; no sacral edema  	Abd: +BS, soft, nontender/nondistended  	: No suprapubic tenderness  	UE: Warm, FROM, no clubbing, intact strength; no edema; no asterixis  	LE: Warm, FROM, no clubbing, intact strength; no edema  	Neuro: No focal deficits,   	Psych: Normal affect and mood  	Skin: Warm, without rashes  	Vascular access:    LABS/STUDIES  --------------------------------------------------------------------------------              11.4   13.4  >-----------<  314      [01-03-19 @ 09:11]              33.9     141  |  103  |  26.0  ----------------------------<  193      [01-03-19 @ 09:11]  3.0   |  26.0  |  2.75        Ca     8.7     [01-03-19 @ 09:11]      Mg     2.0     [01-03-19 @ 09:11]      Phos  2.4     [01-02-19 @ 17:30]    TPro  x   /  Alb  2.8  /  TBili  x   /  DBili  x   /  AST  x   /  ALT  x   /  AlkPhos  x   [01-02-19 @ 17:30]    Creatinine Trend:  SCr 2.75 [01-03 @ 09:11]  SCr 2.82 [01-02 @ 17:30]  SCr 2.58 [01-02 @ 06:43]  SCr 2.68 [01-01 @ 18:09]  SCr 2.84 [01-01 @ 07:59]    Urinalysis - [01-02-19 @ 01:55]      Color Yellow / Appearance Clear / SG 1.015 / pH 6.5      Gluc 250 / Ketone Negative  / Bili Negative / Urobili Negative       Blood Small / Protein 100 / Leuk Est Negative / Nitrite Negative      RBC 0-2 / WBC Negative / Hyaline  / Gran  / Sq Epi  / Non Sq Epi Occasional / Bacteria     Urine Creatinine 53      [01-02-19 @ 01:56]  Urine Protein 152.0      [01-02-19 @ 01:56]  Urine Sodium 37      [01-02-19 @ 01:56]  Urine Potassium 14      [01-02-19 @ 01:56]  Urine Osmolality 255      [01-02-19 @ 01:55]    Iron 23, TIBC 237, %sat 10      [01-01-19 @ 07:59]  Ferritin 164      [01-01-19 @ 07:59]  PTH -- (Ca 8.6)      [01-01-19 @ 14:24]   154  Vitamin D (25OH) 8.7      [01-01-19 @ 14:24]  HbA1c 10.3      [01-01-19 @ 07:59]  TSH 0.70      [12-31-18 @ 00:25]  Lipid: chol 204, TG 91, HDL 53,       [07-28-18 @ 10:30]

## 2019-01-03 NOTE — PROGRESS NOTE ADULT - ASSESSMENT
Impression:  Encephalopathy metabolic.  Concerns for seizure event in setting of HTN and hyperglycemia.  Sleep Apnea (pt aware of it)    Plan:  Medical management  Continue keppra 500mg q12 as maintenance for now.  Maintain seizure precautions.  Recommend ID/ENT eval for sinusitis/mastoiditis.  Follow per Nephro eval.  DVT prophylaxis recommended.  DM and metabolic management as per medicine.  No driving for now.  Pulmonary for sleep apnea.  Neuro stable.  D/w pt in detail.  Outpatient follow up at [facility] at [phone] or [phone].   Reconsult PRN.

## 2019-01-03 NOTE — CONSULT NOTE ADULT - SUBJECTIVE AND OBJECTIVE BOX
HPI:  This is 32YO man with PMH of bipolar disorder, DM, HTN, CKD, sleep apnea brought to ED for Seizure. Pt is lethargic, postictal, unable to provide history. Discussed with his father - he lives with father no history of seziure, noted to have seizure at home and brought to ED. Per father he is non compliant with medication and diet. PT had High BS and HTN emergency on arrival to ER  Pt found to have increased creatinine as well     PAST MEDICAL & SURGICAL HISTORY:  HTN (hypertension)  Sleep apnea  Diabetesx 10 years  was on oral meds-0 never insulin until admitted a few days a go   HAs not checked BS in a long time bc lancet device does not work   Bipolar 1 disorder  Hypertension  No significant past surgical history      FAMILY HISTORY:  Family history of diabetes mellitus (Grandparent) and cousins       SOCIAL HISTORY:not smoking     REVIEW OF SYSTEMS:    Constitutional: No fever, no chills, no change in weight.    Eyes: No eye swelling,no  blurry vision, no redness, no loss of vision.    Neck: No neck pain, no change in voice.    Lungs: No shortness of breath, no wheezing, no cough    CV: No chest pain, no palpitations, no pain with walking.    GI: No nausea, no vomiting, no constipation, no diarrhea, no abdominal pain    : No urinary frequency, no blood in urine, no urinary burning, no difficulty voiding.    Musculoskeletal: No muscle pain, no joint pain, no swelling.    Skin: No rash, no infections.    Neurologic: No headaches, no weakness, no burning or pain in feet, no tremor.    Endocrine: No heat intolerance, no cold intolerance, no increased sweating, no shakiness between meals.    Psych: No depression, no anxiety, no trouble concentrating    MEDICATIONS  (STANDING):  amLODIPine   Tablet 10 milliGRAM(s) Oral daily  calcitriol   Capsule 0.25 MICROGram(s) Oral daily  carvedilol 6.25 milliGRAM(s) Oral every 12 hours  cephalexin 500 milliGRAM(s) Oral every 12 hours  cloNIDine 0.2 milliGRAM(s) Oral three times a day  dextrose 5%. 1000 milliLiter(s) (50 mL/Hr) IV Continuous <Continuous>  dextrose 50% Injectable 12.5 Gram(s) IV Push once  dextrose 50% Injectable 25 Gram(s) IV Push once  dextrose 50% Injectable 25 Gram(s) IV Push once  ergocalciferol 34480 Unit(s) Oral every week  heparin  Injectable 5000 Unit(s) SubCutaneous every 12 hours  hydrALAZINE 100 milliGRAM(s) Oral every 8 hours  insulin glargine Injectable (LANTUS) 20 Unit(s) SubCutaneous at bedtime  insulin lispro (HumaLOG) corrective regimen sliding scale   SubCutaneous three times a day before meals  insulin lispro (HumaLOG) corrective regimen sliding scale   SubCutaneous at bedtime  insulin lispro Injectable (HumaLOG) 3 Unit(s) SubCutaneous three times a day before meals  lamoTRIgine 50 milliGRAM(s) Oral two times a day  levETIRAcetam  IVPB 500 milliGRAM(s) IV Intermittent every 12 hours  nystatin    Suspension [id number] Unit(s) Swish and Swallow four times a day  QUEtiapine 100 milliGRAM(s) Oral at bedtime  sodium chloride 0.45% with potassium chloride 20 mEq/L 1000 milliLiter(s) (80 mL/Hr) IV Continuous <Continuous>    MEDICATIONS  (PRN):  acetaminophen   Tablet .. 650 milliGRAM(s) Oral every 6 hours PRN Mild Pain (1 - 3)  dextrose 40% Gel 15 Gram(s) Oral once PRN Blood Glucose LESS THAN 70 milliGRAM(s)/deciliter  glucagon  Injectable 1 milliGRAM(s) IntraMuscular once PRN Glucose LESS THAN 70 milligrams/deciliter  labetalol Injectable 10 milliGRAM(s) IV Push every 6 hours PRN Systolic blood pressure >180  LORazepam   Injectable 2 milliGRAM(s) IV Push every 4 hours PRN seizure  ondansetron Injectable 4 milliGRAM(s) IV Push every 6 hours PRN Nausea and/or Vomiting      Allergies    No Known Allergies  shellfish   Intolerances           CAPILLARY BLOOD GLUCOSE  CAPILLARY BLOOD GLUCOSE      POCT Blood Glucose.: 158 mg/dL (2019 17:09)  POCT Blood Glucose.: 232 mg/dL (2019 12:20)  POCT Blood Glucose.: 220 mg/dL (2019 07:55)  POCT Blood Glucose.: 252 mg/dL (2019 22:02)      POCT Blood Glucose.: 158 mg/dL (2019 17:09)      PHYSICAL EXAM:    Vital Signs Last 24 Hrs  T(C): 36.8 (2019 16:23), Max: 37.1 (2019 21:59)  T(F): 98.2 (2019 16:23), Max: 98.7 (2019 21:59)  HR: 96 (2019 16:23) (75 - 96)  BP: 146/91 (2019 16:23) (140/90 - 160/98)  BP(mean): --  RR: 18 (2019 16:23) (18 - 20)  SpO2: 96% (2019 16:23) (96% - 98%)    General appearance: Well developed, well nourished.    Eyes: subconj hemorrhage on right eye   EOM full. No exophthalmos.    Neck: Trachea midline. No thyroid enlargement.    Lungs: Normal respiratory excursion. Lungs clear.    CV: Regular cardiac rhythm. No murmur.   Abdomen:Obese  Soft, non tender, no organomegaly or mass.    .    Skin: Warm and moist. No rash. + acanthosis     Neuro: Cranial nerves intact. Normal motor and sensory function. DTR's normal.            LABS:                        11.4   13.4  )-----------( 314      ( 2019 09:11 )             33.9     01-03    141  |  103  |  26.0<H>  ----------------------------<  193<H>  3.0<L>   |  26.0  |  2.75<H>    Ca    8.7      2019 09:11  Phos  2.4     01-02  Mg     2.0     01-03    TPro  x   /  Alb  2.8<L>  /  TBili  x   /  DBili  x   /  AST  x   /  ALT  x   /  AlkPhos  x   -    Urinalysis Basic - ( 2019 01:55 )    Color: Yellow / Appearance: Clear / S.015 / pH: x  Gluc: x / Ketone: Negative  / Bili: Negative / Urobili: Negative mg/dL   Blood: x / Protein: 100 mg/dL / Nitrite: Negative   Leuk Esterase: Negative / RBC: 0-2 /HPF / WBC Negative   Sq Epi: x / Non Sq Epi: Occasional / Bacteria: x      LIVER FUNCTIONS - ( 2019 17:30 )  Alb: 2.8 g/dL / Pro: x     / ALK PHOS: x     / ALT: x     / AST: x     / GGT: x           Hemoglobin A1C, Whole Blood: 10.3 % ( @ 07:59)        CAPILLARY BLOOD GLUCOSE      RADIOLOGY & ADDITIONAL STUDIES: HPI:  This is 34YO man with PMH of bipolar disorder, DM, HTN, CKD, sleep apnea brought to ED for Seizure. Pt is lethargic, postictal, unable to provide history. Discussed with his father - he lives with father no history of seziure, noted to have seizure at home and brought to ED. Per father he is non compliant with medication and diet. PT had High BS and HTN emergency on arrival to ER  Pt found to have increased creatinine as well     PAST MEDICAL & SURGICAL HISTORY:  HTN (hypertension)  Sleep apnea  Diabetesx 10 years  was on oral meds-0 never insulin until admitted a few days a go   HAs not checked BS in a long time bc lancet device does not work   Bipolar 1 disorder  Hypertension  No significant past surgical history      FAMILY HISTORY:  Family history of diabetes mellitus (Grandparent) and cousins       SOCIAL HISTORY:+smoking     REVIEW OF SYSTEMS:    Constitutional: No fever, no chills, no change in weight.    Eyes: No eye swelling,no  blurry vision, no redness, no loss of vision.    Neck: No neck pain, no change in voice.    Lungs: No shortness of breath, no wheezing, no cough    CV: No chest pain, no palpitations, no pain with walking.    GI: No nausea, no vomiting, no constipation, no diarrhea, no abdominal pain    : No urinary frequency, no blood in urine, no urinary burning, no difficulty voiding.    Musculoskeletal: No muscle pain, no joint pain, no swelling.    Skin: No rash, no infections.    Neurologic: No headaches, no weakness, no burning or pain in feet, no tremor.    Endocrine: No heat intolerance, no cold intolerance, no increased sweating, no shakiness between meals.    Psych: No depression, no anxiety, no trouble concentrating    MEDICATIONS  (STANDING):  amLODIPine   Tablet 10 milliGRAM(s) Oral daily  calcitriol   Capsule 0.25 MICROGram(s) Oral daily  carvedilol 6.25 milliGRAM(s) Oral every 12 hours  cephalexin 500 milliGRAM(s) Oral every 12 hours  cloNIDine 0.2 milliGRAM(s) Oral three times a day  dextrose 5%. 1000 milliLiter(s) (50 mL/Hr) IV Continuous <Continuous>  dextrose 50% Injectable 12.5 Gram(s) IV Push once  dextrose 50% Injectable 25 Gram(s) IV Push once  dextrose 50% Injectable 25 Gram(s) IV Push once  ergocalciferol 32939 Unit(s) Oral every week  heparin  Injectable 5000 Unit(s) SubCutaneous every 12 hours  hydrALAZINE 100 milliGRAM(s) Oral every 8 hours  insulin glargine Injectable (LANTUS) 20 Unit(s) SubCutaneous at bedtime  insulin lispro (HumaLOG) corrective regimen sliding scale   SubCutaneous three times a day before meals  insulin lispro (HumaLOG) corrective regimen sliding scale   SubCutaneous at bedtime  insulin lispro Injectable (HumaLOG) 3 Unit(s) SubCutaneous three times a day before meals  lamoTRIgine 50 milliGRAM(s) Oral two times a day  levETIRAcetam  IVPB 500 milliGRAM(s) IV Intermittent every 12 hours  nystatin    Suspension [id number] Unit(s) Swish and Swallow four times a day  QUEtiapine 100 milliGRAM(s) Oral at bedtime  sodium chloride 0.45% with potassium chloride 20 mEq/L 1000 milliLiter(s) (80 mL/Hr) IV Continuous <Continuous>    MEDICATIONS  (PRN):  acetaminophen   Tablet .. 650 milliGRAM(s) Oral every 6 hours PRN Mild Pain (1 - 3)  dextrose 40% Gel 15 Gram(s) Oral once PRN Blood Glucose LESS THAN 70 milliGRAM(s)/deciliter  glucagon  Injectable 1 milliGRAM(s) IntraMuscular once PRN Glucose LESS THAN 70 milligrams/deciliter  labetalol Injectable 10 milliGRAM(s) IV Push every 6 hours PRN Systolic blood pressure >180  LORazepam   Injectable 2 milliGRAM(s) IV Push every 4 hours PRN seizure  ondansetron Injectable 4 milliGRAM(s) IV Push every 6 hours PRN Nausea and/or Vomiting      Allergies    No Known Allergies  shellfish   Intolerances           CAPILLARY BLOOD GLUCOSE  CAPILLARY BLOOD GLUCOSE      POCT Blood Glucose.: 158 mg/dL (2019 17:09)  POCT Blood Glucose.: 232 mg/dL (2019 12:20)  POCT Blood Glucose.: 220 mg/dL (2019 07:55)  POCT Blood Glucose.: 252 mg/dL (2019 22:02)      POCT Blood Glucose.: 158 mg/dL (2019 17:09)      PHYSICAL EXAM:    Vital Signs Last 24 Hrs  T(C): 36.8 (2019 16:23), Max: 37.1 (2019 21:59)  T(F): 98.2 (2019 16:23), Max: 98.7 (2019 21:59)  HR: 96 (2019 16:23) (75 - 96)  BP: 146/91 (2019 16:23) (140/90 - 160/98)  BP(mean): --  RR: 18 (2019 16:23) (18 - 20)  SpO2: 96% (2019 16:23) (96% - 98%)    General appearance: Well developed, well nourished.    Eyes: subconj hemorrhage on right eye   EOM full. No exophthalmos.    Neck: Trachea midline. No thyroid enlargement.    Lungs: Normal respiratory excursion. Lungs clear.    CV: Regular cardiac rhythm. No murmur.   Abdomen:Obese  Soft, non tender, no organomegaly or mass.    .    Skin: Warm and moist. No rash. + acanthosis     Neuro: Cranial nerves intact. Normal motor and sensory function. DTR's normal.            LABS:                        11.4   13.4  )-----------( 314      ( 2019 09:11 )             33.9     01-03    141  |  103  |  26.0<H>  ----------------------------<  193<H>  3.0<L>   |  26.0  |  2.75<H>    Ca    8.7      2019 09:11  Phos  2.4     01-02  Mg     2.0     01-03    TPro  x   /  Alb  2.8<L>  /  TBili  x   /  DBili  x   /  AST  x   /  ALT  x   /  AlkPhos  x   -    Urinalysis Basic - ( 2019 01:55 )    Color: Yellow / Appearance: Clear / S.015 / pH: x  Gluc: x / Ketone: Negative  / Bili: Negative / Urobili: Negative mg/dL   Blood: x / Protein: 100 mg/dL / Nitrite: Negative   Leuk Esterase: Negative / RBC: 0-2 /HPF / WBC Negative   Sq Epi: x / Non Sq Epi: Occasional / Bacteria: x      LIVER FUNCTIONS - ( 2019 17:30 )  Alb: 2.8 g/dL / Pro: x     / ALK PHOS: x     / ALT: x     / AST: x     / GGT: x           Hemoglobin A1C, Whole Blood: 10.3 % ( @ 07:59)        CAPILLARY BLOOD GLUCOSE      RADIOLOGY & ADDITIONAL STUDIES:

## 2019-01-04 ENCOUNTER — TRANSCRIPTION ENCOUNTER (OUTPATIENT)
Age: 34
End: 2019-01-04

## 2019-01-04 LAB
ANION GAP SERPL CALC-SCNC: 12 MMOL/L — SIGNIFICANT CHANGE UP (ref 5–17)
ANION GAP SERPL CALC-SCNC: 13 MMOL/L — SIGNIFICANT CHANGE UP (ref 5–17)
BUN SERPL-MCNC: 25 MG/DL — HIGH (ref 8–20)
BUN SERPL-MCNC: 27 MG/DL — HIGH (ref 8–20)
CALCIUM SERPL-MCNC: 8.6 MG/DL — SIGNIFICANT CHANGE UP (ref 8.6–10.2)
CALCIUM SERPL-MCNC: 8.7 MG/DL — SIGNIFICANT CHANGE UP (ref 8.6–10.2)
CHLORIDE SERPL-SCNC: 102 MMOL/L — SIGNIFICANT CHANGE UP (ref 98–107)
CHLORIDE SERPL-SCNC: 104 MMOL/L — SIGNIFICANT CHANGE UP (ref 98–107)
CO2 SERPL-SCNC: 26 MMOL/L — SIGNIFICANT CHANGE UP (ref 22–29)
CO2 SERPL-SCNC: 26 MMOL/L — SIGNIFICANT CHANGE UP (ref 22–29)
CREAT SERPL-MCNC: 2.51 MG/DL — HIGH (ref 0.5–1.3)
CREAT SERPL-MCNC: 2.54 MG/DL — HIGH (ref 0.5–1.3)
GLUCOSE BLDC GLUCOMTR-MCNC: 137 MG/DL — HIGH (ref 70–99)
GLUCOSE BLDC GLUCOMTR-MCNC: 152 MG/DL — HIGH (ref 70–99)
GLUCOSE BLDC GLUCOMTR-MCNC: 166 MG/DL — HIGH (ref 70–99)
GLUCOSE BLDC GLUCOMTR-MCNC: 213 MG/DL — HIGH (ref 70–99)
GLUCOSE SERPL-MCNC: 110 MG/DL — SIGNIFICANT CHANGE UP (ref 70–115)
GLUCOSE SERPL-MCNC: 150 MG/DL — HIGH (ref 70–115)
HCT VFR BLD CALC: 33.2 % — LOW (ref 42–52)
HGB BLD-MCNC: 11.1 G/DL — LOW (ref 14–18)
MAGNESIUM SERPL-MCNC: 2.1 MG/DL — SIGNIFICANT CHANGE UP (ref 1.6–2.6)
MCHC RBC-ENTMCNC: 25.3 PG — LOW (ref 27–31)
MCHC RBC-ENTMCNC: 33.4 G/DL — SIGNIFICANT CHANGE UP (ref 32–36)
MCV RBC AUTO: 75.8 FL — LOW (ref 80–94)
PLATELET # BLD AUTO: 321 K/UL — SIGNIFICANT CHANGE UP (ref 150–400)
POTASSIUM SERPL-MCNC: 3.2 MMOL/L — LOW (ref 3.5–5.3)
POTASSIUM SERPL-MCNC: 3.5 MMOL/L — SIGNIFICANT CHANGE UP (ref 3.5–5.3)
POTASSIUM SERPL-SCNC: 3.2 MMOL/L — LOW (ref 3.5–5.3)
POTASSIUM SERPL-SCNC: 3.5 MMOL/L — SIGNIFICANT CHANGE UP (ref 3.5–5.3)
RBC # BLD: 4.38 M/UL — LOW (ref 4.6–6.2)
RBC # FLD: 17.7 % — HIGH (ref 11–15.6)
SODIUM SERPL-SCNC: 140 MMOL/L — SIGNIFICANT CHANGE UP (ref 135–145)
SODIUM SERPL-SCNC: 143 MMOL/L — SIGNIFICANT CHANGE UP (ref 135–145)
WBC # BLD: 12.5 K/UL — HIGH (ref 4.8–10.8)
WBC # FLD AUTO: 12.5 K/UL — HIGH (ref 4.8–10.8)

## 2019-01-04 PROCEDURE — 99232 SBSQ HOSP IP/OBS MODERATE 35: CPT

## 2019-01-04 PROCEDURE — 99233 SBSQ HOSP IP/OBS HIGH 50: CPT

## 2019-01-04 PROCEDURE — 93971 EXTREMITY STUDY: CPT | Mod: 26,RT

## 2019-01-04 RX ORDER — LEVETIRACETAM 250 MG/1
1 TABLET, FILM COATED ORAL
Qty: 60 | Refills: 0
Start: 2019-01-04 | End: 2019-02-02

## 2019-01-04 RX ORDER — POTASSIUM CHLORIDE 20 MEQ
40 PACKET (EA) ORAL ONCE
Qty: 0 | Refills: 0 | Status: COMPLETED | OUTPATIENT
Start: 2019-01-04 | End: 2019-01-04

## 2019-01-04 RX ORDER — ASPIRIN/CALCIUM CARB/MAGNESIUM 324 MG
325 TABLET ORAL DAILY
Qty: 0 | Refills: 0 | Status: DISCONTINUED | OUTPATIENT
Start: 2019-01-04 | End: 2019-01-05

## 2019-01-04 RX ORDER — CEPHALEXIN 500 MG
1 CAPSULE ORAL
Qty: 60 | Refills: 0
Start: 2019-01-04 | End: 2019-02-02

## 2019-01-04 RX ORDER — AMLODIPINE BESYLATE 2.5 MG/1
1 TABLET ORAL
Qty: 30 | Refills: 0
Start: 2019-01-04 | End: 2019-02-02

## 2019-01-04 RX ORDER — CARVEDILOL PHOSPHATE 80 MG/1
12.5 CAPSULE, EXTENDED RELEASE ORAL EVERY 12 HOURS
Qty: 0 | Refills: 0 | Status: DISCONTINUED | OUTPATIENT
Start: 2019-01-04 | End: 2019-01-05

## 2019-01-04 RX ORDER — CALCITRIOL 0.5 UG/1
1 CAPSULE ORAL
Qty: 30 | Refills: 0
Start: 2019-01-04 | End: 2019-02-02

## 2019-01-04 RX ORDER — HYDRALAZINE HCL 50 MG
1 TABLET ORAL
Qty: 90 | Refills: 0
Start: 2019-01-04 | End: 2019-02-02

## 2019-01-04 RX ORDER — ASPIRIN/CALCIUM CARB/MAGNESIUM 324 MG
1 TABLET ORAL
Qty: 30 | Refills: 0
Start: 2019-01-04 | End: 2019-02-02

## 2019-01-04 RX ADMIN — LAMOTRIGINE 50 MILLIGRAM(S): 25 TABLET, ORALLY DISINTEGRATING ORAL at 05:09

## 2019-01-04 RX ADMIN — Medication 0.2 MILLIGRAM(S): at 12:03

## 2019-01-04 RX ADMIN — Medication 500000 UNIT(S): at 23:23

## 2019-01-04 RX ADMIN — Medication 100 MILLIGRAM(S): at 05:09

## 2019-01-04 RX ADMIN — Medication 3 UNIT(S): at 12:03

## 2019-01-04 RX ADMIN — INSULIN GLARGINE 16 UNIT(S): 100 INJECTION, SOLUTION SUBCUTANEOUS at 21:33

## 2019-01-04 RX ADMIN — Medication 325 MILLIGRAM(S): at 16:30

## 2019-01-04 RX ADMIN — Medication 500000 UNIT(S): at 05:09

## 2019-01-04 RX ADMIN — LEVETIRACETAM 420 MILLIGRAM(S): 250 TABLET, FILM COATED ORAL at 16:32

## 2019-01-04 RX ADMIN — HEPARIN SODIUM 5000 UNIT(S): 5000 INJECTION INTRAVENOUS; SUBCUTANEOUS at 21:33

## 2019-01-04 RX ADMIN — LAMOTRIGINE 50 MILLIGRAM(S): 25 TABLET, ORALLY DISINTEGRATING ORAL at 16:30

## 2019-01-04 RX ADMIN — Medication 500000 UNIT(S): at 16:30

## 2019-01-04 RX ADMIN — CARVEDILOL PHOSPHATE 6.25 MILLIGRAM(S): 80 CAPSULE, EXTENDED RELEASE ORAL at 05:10

## 2019-01-04 RX ADMIN — LEVETIRACETAM 420 MILLIGRAM(S): 250 TABLET, FILM COATED ORAL at 05:10

## 2019-01-04 RX ADMIN — Medication 40 MILLIEQUIVALENT(S): at 11:07

## 2019-01-04 RX ADMIN — CALCITRIOL 0.25 MICROGRAM(S): 0.5 CAPSULE ORAL at 08:02

## 2019-01-04 RX ADMIN — Medication 0.2 MILLIGRAM(S): at 21:33

## 2019-01-04 RX ADMIN — Medication 500000 UNIT(S): at 00:12

## 2019-01-04 RX ADMIN — Medication 3 UNIT(S): at 08:03

## 2019-01-04 RX ADMIN — Medication 100 MILLIGRAM(S): at 21:33

## 2019-01-04 RX ADMIN — Medication 500000 UNIT(S): at 12:03

## 2019-01-04 RX ADMIN — Medication 4: at 12:04

## 2019-01-04 RX ADMIN — QUETIAPINE FUMARATE 100 MILLIGRAM(S): 200 TABLET, FILM COATED ORAL at 21:33

## 2019-01-04 RX ADMIN — Medication 100 MILLIGRAM(S): at 12:03

## 2019-01-04 RX ADMIN — Medication 0.2 MILLIGRAM(S): at 05:08

## 2019-01-04 RX ADMIN — HEPARIN SODIUM 5000 UNIT(S): 5000 INJECTION INTRAVENOUS; SUBCUTANEOUS at 08:02

## 2019-01-04 RX ADMIN — CARVEDILOL PHOSPHATE 12.5 MILLIGRAM(S): 80 CAPSULE, EXTENDED RELEASE ORAL at 16:30

## 2019-01-04 RX ADMIN — Medication 500 MILLIGRAM(S): at 05:09

## 2019-01-04 RX ADMIN — Medication 2: at 16:31

## 2019-01-04 RX ADMIN — AMLODIPINE BESYLATE 10 MILLIGRAM(S): 2.5 TABLET ORAL at 05:09

## 2019-01-04 RX ADMIN — Medication 3 UNIT(S): at 16:31

## 2019-01-04 RX ADMIN — Medication 500 MILLIGRAM(S): at 16:30

## 2019-01-04 NOTE — DISCHARGE NOTE ADULT - PLAN OF CARE
. c/w Keppra  f/u neurology improved c/w medication as prescribed, monitor BP  medication compliance c/w Lantus and humalog  Monitor FS f/u BMP c/w home medication c/w Keflex to complete 7 days

## 2019-01-04 NOTE — DISCHARGE NOTE ADULT - CARE PROVIDERS DIRECT ADDRESSES
,DirectAddress_Unknown,[email or web address],[email or web address]

## 2019-01-04 NOTE — PROGRESS NOTE ADULT - SUBJECTIVE AND OBJECTIVE BOX
[facility] DIVISION OF KIDNEY DISEASES AND HYPERTENSION -- FOLLOW UP NOTE  --------------------------------------------------------------------------------  Chief Complaint:  MELISSA on ?CKD    24 hour events/subjective:  Pt seen today  NAD  Hypokalemic - 3.2 today  MELISSA slowly resolving        PAST HISTORY  --------------------------------------------------------------------------------  No significant changes to PMH, PSH, FHx, SHx, unless otherwise noted    ALLERGIES & MEDICATIONS  --------------------------------------------------------------------------------  Allergies    No Known Allergies    Intolerances      Standing Inpatient Medications  amLODIPine   Tablet 10 milliGRAM(s) Oral daily  aspirin 325 milliGRAM(s) Oral daily  calcitriol   Capsule 0.25 MICROGram(s) Oral daily  carvedilol 12.5 milliGRAM(s) Oral every 12 hours  cephalexin 500 milliGRAM(s) Oral every 12 hours  cloNIDine 0.2 milliGRAM(s) Oral three times a day  dextrose 5%. 1000 milliLiter(s) IV Continuous <Continuous>  dextrose 50% Injectable 12.5 Gram(s) IV Push once  dextrose 50% Injectable 25 Gram(s) IV Push once  dextrose 50% Injectable 25 Gram(s) IV Push once  ergocalciferol 23860 Unit(s) Oral every week  heparin  Injectable 5000 Unit(s) SubCutaneous every 12 hours  hydrALAZINE 100 milliGRAM(s) Oral every 8 hours  insulin glargine Injectable (LANTUS) 16 Unit(s) SubCutaneous at bedtime  insulin lispro (HumaLOG) corrective regimen sliding scale   SubCutaneous three times a day before meals  insulin lispro (HumaLOG) corrective regimen sliding scale   SubCutaneous at bedtime  insulin lispro Injectable (HumaLOG) 3 Unit(s) SubCutaneous three times a day before meals  lamoTRIgine 50 milliGRAM(s) Oral two times a day  levETIRAcetam  IVPB 500 milliGRAM(s) IV Intermittent every 12 hours  nystatin    Suspension 411671 Unit(s) Swish and Swallow four times a day  QUEtiapine 100 milliGRAM(s) Oral at bedtime    PRN Inpatient Medications  acetaminophen   Tablet .. 650 milliGRAM(s) Oral every 6 hours PRN  dextrose 40% Gel 15 Gram(s) Oral once PRN  glucagon  Injectable 1 milliGRAM(s) IntraMuscular once PRN  labetalol Injectable 10 milliGRAM(s) IV Push every 6 hours PRN  LORazepam   Injectable 2 milliGRAM(s) IV Push every 4 hours PRN  ondansetron Injectable 4 milliGRAM(s) IV Push every 6 hours PRN      REVIEW OF SYSTEMS  --------------------------------------------------------------------------------  Gen: No weight changes, fatigue, fevers/chills, weakness  Skin: No rashes  Head/Eyes/Ears/Mouth: No headache; Normal hearing; Normal vision w/o blurriness; No sinus pain/discomfort, sore throat  Respiratory: No dyspnea, cough, wheezing, hemoptysis  CV: No chest pain, PND, orthopnea  GI: No abdominal pain, diarrhea, constipation, nausea, vomiting, melena, hematochezia  : No increased frequency, dysuria, hematuria, nocturia  MSK: No joint pain/swelling; no back pain; no edema  Neuro: No dizziness/lightheadedness, weakness, seizures, numbness, tingling  Heme: No easy bruising or bleeding  Endo: No heat/cold intolerance  Psych: No significant nervousness, anxiety, stress, depression    All other systems were reviewed and are negative, except as noted.    VITALS/PHYSICAL EXAM  --------------------------------------------------------------------------------  T(C): 36.7 (01-04-19 @ 08:05), Max: 36.8 (01-03-19 @ 16:23)  HR: 82 (01-04-19 @ 12:06) (82 - 96)  BP: 150/92 (01-04-19 @ 12:06) (140/90 - 152/96)  RR: 17 (01-04-19 @ 12:06) (17 - 18)  SpO2: 93% (01-04-19 @ 12:06) (93% - 98%)  Wt(kg): --        01-03-19 @ 07:01  -  01-04-19 @ 07:00  --------------------------------------------------------  IN: 560 mL / OUT: 3025 mL / NET: -2465 mL    01-04-19 @ 07:01  -  01-04-19 @ 15:08  --------------------------------------------------------  IN: 640 mL / OUT: 1400 mL / NET: -760 mL      Physical Exam:  	Gen: NAD, pale, obese  	HEENT: PERRL, supple neck, clear oropharynx  	Pulm: CTA B/L  	CV: RRR, S1S2; no rub  	Back: No spinal or CVA tenderness; no sacral edema  	Abd: +BS, soft, nontender/nondistended  	: No suprapubic tenderness  	UE: Warm, FROM, no clubbing, intact strength; no edema; no asterixis  	LE: Warm, FROM, no clubbing, intact strength; + edema  	Neuro: No focal deficits, intact gait  	Psych: Normal affect and mood  	Skin: Warm, without rashes  	Vascular access: N/A    LABS/STUDIES  --------------------------------------------------------------------------------              11.1   12.5  >-----------<  321      [01-04-19 @ 05:58]              33.2     143  |  104  |  25.0  ----------------------------<  110      [01-04-19 @ 05:58]  3.2   |  26.0  |  2.51        Ca     8.6     [01-04-19 @ 05:58]      Mg     2.1     [01-04-19 @ 05:58]      Phos  3.3     [01-03-19 @ 21:13]    TPro  x   /  Alb  2.4  /  TBili  x   /  DBili  x   /  AST  x   /  ALT  x   /  AlkPhos  x   [01-03-19 @ 21:13]          Creatinine Trend:  SCr 2.51 [01-04 @ 05:58]  SCr 2.66 [01-03 @ 21:13]  SCr 2.75 [01-03 @ 09:11]  SCr 2.82 [01-02 @ 17:30]  SCr 2.58 [01-02 @ 06:43]    Urinalysis - [01-02-19 @ 01:55]      Color Yellow / Appearance Clear / SG 1.015 / pH 6.5      Gluc 250 / Ketone Negative  / Bili Negative / Urobili Negative       Blood Small / Protein 100 / Leuk Est Negative / Nitrite Negative      RBC 0-2 / WBC Negative / Hyaline  / Gran  / Sq Epi  / Non Sq Epi Occasional / Bacteria     Urine Creatinine 53      [01-02-19 @ 01:56]  Urine Protein 152.0      [01-02-19 @ 01:56]  Urine Sodium 37      [01-02-19 @ 01:56]  Urine Potassium 14      [01-02-19 @ 01:56]  Urine Osmolality 255      [01-02-19 @ 01:55]    Iron 23, TIBC 237, %sat 10      [01-01-19 @ 07:59]  Ferritin 164      [01-01-19 @ 07:59]  PTH -- (Ca 8.6)      [01-01-19 @ 14:24]   154  Vitamin D (25OH) 8.7      [01-01-19 @ 14:24]  HbA1c 10.3      [01-01-19 @ 07:59]  TSH 0.70      [12-31-18 @ 00:25]  Lipid: chol 204, TG 91, HDL 53,       [07-28-18 @ 10:30]

## 2019-01-04 NOTE — PROGRESS NOTE ADULT - ASSESSMENT
1. MELISSA on CKD - 3   2. Poorly controlled HTN  3. Poorly DM  4. Fe - Deficiency Anemia (Needs Further W.U)    Hypokalemia - Replete Potassium  MELISSA slowly resolving  FSBS w/ISS  Continue current management  Stable from a renal standpoint - will sign-off, may recall if the need arises  Will need outpatient F/U once D/C.

## 2019-01-04 NOTE — PROGRESS NOTE ADULT - SUBJECTIVE AND OBJECTIVE BOX
Internal Medicine Hospitalist - Dr. Lexi PUGA    [id number]    33y      Male    Patient is a 33y old  Male who presents with a chief complaint of Seizure (04 Jan 2019 15:08)    INTERVAL HPI/ OVERNIGHT EVENTS: Patient is seen and examined, noted RUE swelling today, denied chest pain, SOB, abd. pain, nausea and vomiting.     REVIEW OF SYSTEMS:    Denied fever, chills, abd. pain, nausea, vomiting, chest pain, SOB, headache, dizziness    PHYSICAL EXAM:    Vital Signs Last 24 Hrs  T(C): 36.5 (04 Jan 2019 15:17), Max: 36.7 (04 Jan 2019 08:05)  T(F): 97.7 (04 Jan 2019 15:17), Max: 98 (04 Jan 2019 08:05)  HR: 72 (04 Jan 2019 15:17) (72 - 89)  BP: 155/99 (04 Jan 2019 15:17) (140/90 - 155/99)  BP(mean): --  RR: 19 (04 Jan 2019 15:17) (17 - 19)  SpO2: 95% (04 Jan 2019 15:17) (93% - 98%)    GENERAL: obesity  CHEST/LUNG: CTA b/l   HEART: S1S2+ audible  ABDOMEN: Soft, Nontender, Nondistended; Bowel sounds present  EXTREMITIES:  no edema  CNS: AAO X 3    LABS:                        11.1   12.5  )-----------( 321      ( 04 Jan 2019 05:58 )             33.2     01-04    143  |  104  |  25.0<H>  ----------------------------<  110  3.2<L>   |  26.0  |  2.51<H>    Ca    8.6      04 Jan 2019 05:58  Phos  3.3     01-03  Mg     2.1     01-04    TPro  x   /  Alb  2.4<L>  /  TBili  x   /  DBili  x   /  AST  x   /  ALT  x   /  AlkPhos  x   01-03            MEDICATIONS  (STANDING):  amLODIPine   Tablet 10 milliGRAM(s) Oral daily  aspirin 325 milliGRAM(s) Oral daily  calcitriol   Capsule 0.25 MICROGram(s) Oral daily  carvedilol 12.5 milliGRAM(s) Oral every 12 hours  cephalexin 500 milliGRAM(s) Oral every 12 hours  cloNIDine 0.2 milliGRAM(s) Oral three times a day  dextrose 5%. 1000 milliLiter(s) (50 mL/Hr) IV Continuous <Continuous>  dextrose 50% Injectable 12.5 Gram(s) IV Push once  dextrose 50% Injectable 25 Gram(s) IV Push once  dextrose 50% Injectable 25 Gram(s) IV Push once  ergocalciferol 95674 Unit(s) Oral every week  heparin  Injectable 5000 Unit(s) SubCutaneous every 12 hours  hydrALAZINE 100 milliGRAM(s) Oral every 8 hours  insulin glargine Injectable (LANTUS) 16 Unit(s) SubCutaneous at bedtime  insulin lispro (HumaLOG) corrective regimen sliding scale   SubCutaneous three times a day before meals  insulin lispro (HumaLOG) corrective regimen sliding scale   SubCutaneous at bedtime  insulin lispro Injectable (HumaLOG) 3 Unit(s) SubCutaneous three times a day before meals  lamoTRIgine 50 milliGRAM(s) Oral two times a day  levETIRAcetam  IVPB 500 milliGRAM(s) IV Intermittent every 12 hours  nystatin    Suspension [id number] Unit(s) Swish and Swallow four times a day  QUEtiapine 100 milliGRAM(s) Oral at bedtime    MEDICATIONS  (PRN):  acetaminophen   Tablet .. 650 milliGRAM(s) Oral every 6 hours PRN Mild Pain (1 - 3)  dextrose 40% Gel 15 Gram(s) Oral once PRN Blood Glucose LESS THAN 70 milliGRAM(s)/deciliter  glucagon  Injectable 1 milliGRAM(s) IntraMuscular once PRN Glucose LESS THAN 70 milligrams/deciliter  labetalol Injectable 10 milliGRAM(s) IV Push every 6 hours PRN Systolic blood pressure >180  LORazepam   Injectable 2 milliGRAM(s) IV Push every 4 hours PRN seizure  ondansetron Injectable 4 milliGRAM(s) IV Push every 6 hours PRN Nausea and/or Vomiting      RADIOLOGY & ADDITIONAL TEST    < from: US Duplex Venous Upper Ext Ltd, Right (01.04.19 @ 10:49) >  IMPRESSION:     Partially occlusive thrombosis of the right cephalic vein. The remainder   of the veins of right upper arm are patent.    < end of copied text >

## 2019-01-04 NOTE — DISCHARGE NOTE ADULT - CARE PLAN
Principal Discharge DX:	Seizure  Goal:	.  Assessment and plan of treatment:	c/w Keppra  f/u neurology  Secondary Diagnosis:	Hypertensive emergency  Goal:	improved  Assessment and plan of treatment:	c/w medication as prescribed, monitor BP  medication compliance  Secondary Diagnosis:	Diabetes  Assessment and plan of treatment:	c/w Lantus and humalog  Monitor FS  Secondary Diagnosis:	CKD (chronic kidney disease)  Assessment and plan of treatment:	f/u BMP  Secondary Diagnosis:	Hypokalemia  Secondary Diagnosis:	Bipolar disorder  Assessment and plan of treatment:	c/w home medication  Secondary Diagnosis:	Mastoiditis  Assessment and plan of treatment:	c/w Keflex to complete 7 days

## 2019-01-04 NOTE — DISCHARGE NOTE ADULT - MEDICATION SUMMARY - MEDICATIONS TO TAKE
I will START or STAY ON the medications listed below when I get home from the hospital:    aspirin 325 mg oral tablet  -- 1 tab(s) by mouth once a day  -- Indication: For Cad    cloNIDine 0.2 mg oral tablet  -- 1 tab(s) by mouth 3 times a day hold if SBP is less than 110  -- Indication: For Htn    lamoTRIgine 25 mg oral tablet  -- 2 tab(s) by mouth 2 times a day  -- Indication: For Seizure    Keppra 500 mg oral tablet  -- 1 tab(s) by mouth 2 times a day   -- Check with your doctor before becoming pregnant.  It is very important that you take or use this exactly as directed.  Do not skip doses or discontinue unless directed by your doctor.  May cause drowsiness or dizziness.  Obtain medical advice before taking any non-prescription drugs as some may affect the action of this medication.  Swallow whole.  Do not crush.  This drug may impair the ability to drive or operate machinery.  Use care until you become familiar with its effects.    -- Indication: For Seizure     insulin glargine 100 units/mL subcutaneous solution  -- 16 unit(s) subcutaneous once a day (at bedtime)     -- Do not drink alcoholic beverages when taking this medication.  It is very important that you take or use this exactly as directed.  Do not skip doses or discontinue unless directed by your doctor.  Keep in refrigerator.  Do not freeze.    -- Indication: For DM    benztropine 0.5 mg oral tablet  -- 1 tab(s) by mouth 2 times a day  -- Indication: For psychoisis    QUEtiapine 100 mg oral tablet  -- 1 tab(s) by mouth once a day (at bedtime)  -- Indication: For psychosis    Invega Sustenna 156 mg/mL intramuscular suspension, extended release  -- 156 milligram(s) intramuscular every 4 weeks (LAST DOSE 07/05/2018)  -- Indication: For Bipolar psychosis     paliperidone 6 mg oral tablet, extended release  -- 1 tab(s) by mouth once a day (in the morning)  -- Indication: For Bipolar disorder    carvedilol 12.5 mg oral tablet  -- 1 tab(s) by mouth every 12 hours  -- Indication: For Htn    amLODIPine 10 mg oral tablet  -- 1 tab(s) by mouth once a day  -- Indication: For Htn    cephalexin 500 mg oral capsule  -- 1 cap(s) by mouth every 12 hours x 5 days  -- Indication: For infections    hydrALAZINE 100 mg oral tablet  -- 1 tab(s) by mouth every 8 hours hold if SBP is less than 110  -- Indication: For Htn    calcitriol 0.25 mcg oral capsule  -- 1 cap(s) by mouth once a day  -- Indication: For Supplement

## 2019-01-04 NOTE — CHART NOTE - NSCHARTNOTEFT_GEN_A_CORE
Diabetes Education Note: Aware pt with hgba1c 10.2%.  Pt reports taking Glipizide twice daily and was not monitoring blood sugar as his machine is broken.  Discussed a1c results and importance for achieving euglycemia at this time.  Discussed importance of SMBG daily and medication compliance.  Reviewed cons cho meal plan using the plate method and importance of consuming 3 meals daily with balanced snacks as needed.  Meal/snack examples provided per pt request.  Pt receptive and verbalized understanding.    Unable to complete education at this time as pt is leaving unit for ultrasound.  As per RN- insulin teaching started.  RD CDE to follow up as time permits for further diabetes education.    Recommendations:  Pt will inject all insulin doses while in the hospital using pre filled insulin syringe and RN supervision  Pt will demonstrate correct technique for finger sticks with RN supervision  Continue with diabetes self management education Diabetes Education Note: Aware pt with hgba1c 10.2%.  Pt reports taking Glipizide twice daily and was not monitoring blood sugar as his machine is broken.  Discussed a1c results and importance for achieving euglycemia at this time.  Discussed importance of SMBG daily and medication compliance.  Reviewed cons cho meal plan using the plate method and importance of consuming 3 meals daily with balanced snacks as needed.  Meal/snack examples provided per pt request.  Pt receptive and verbalized understanding.    Unable to complete education at this time as pt is leaving unit for ultrasound.  As per RN- insulin teaching started.  NATALIE SOTOE to follow up as time permits for further diabetes education.    ADDENDUM: Pt back on the unit.  Discussed possible need for insulin at this time- pt agreeable.  Pt states self injecting insulin years ago via pens when a1c was high and was taken off insulin when glycemic control improved.  Discussed 2 types of insulin and action/timing for each.  Reinforced importance of SMBG prior to insulin injections.  Discussed s/s and treatment for hypoglycemia.  Pt verbalized understanding.  Nutrition/hypoglycemia literature provided.  Discussed with RN.  NATALIE SOTOE to remain available.    Recommendations:  Pt will inject all insulin doses while in the hospital using pre filled insulin syringe and RN supervision  Pt will demonstrate correct technique for finger sticks with RN supervision  Continue with diabetes self management education

## 2019-01-04 NOTE — DISCHARGE NOTE ADULT - HOSPITAL COURSE
This is 32YO man with PMH of bipolar disorder, DM, HTN, CKD, sleep apnea brought to ED for Seizure. Pt is lethargic, postictal, unable to provide history. Discussed with his father - he lives with father no history of seziure, noted to have seizure at home and brought to ED. Per father he is non compliant with medication and diet. On arrival he is noted to have , /117. Pt was seen by neurology, started on Keppra, MRI brain  unremarkable, mucosal inflammatory changes in paransal sinuses, EEG normal. Pt is noted to have RUE swelling, doppler showed partially occlusive thrombosis of R cephalic vein, discussed with vascular surgery, its superfacial vein, no need of full AC, warm compress and aspirin. Pt was noted to have uncontrolled BP and FS, adjusted medication, seen by endo and cardiology.     A/P    >New onset seizure in the setting of uncontrolled BP and high FS  Neurology consult appreciated - c/w Keppra   MRI brain  unremarkable, EEG normal.  CT head - no acute finding  seizure precaution  ativan prn   U tox negative    >HTN emergency -  non compliance - improving  cardiology consult appreciated - increase coreg 12.5 bid - monitor BP  c/w Hydralazine 100mg q8h, amlodipine 10,  Clonidine 0.2 q8h  TTE There is moderate concentric left ventricular hypertrophy. Normal wall motion. Left ventricular ejection fraction, by visual  estimation, is 60 to 65%. Grade II diastolic dysfunction.    >DM - uncontrolled with complication - CKD - non compliant  A1c 10.3  endo consult appreciated - recommend 16 untis of LAntus and humalog 3 units AC    >Acute on CKD stage 3 - back to baseline  baseline Cr around 2.3  renal consult appreciated   renal u/s Kidneys demonstrate increased echogenicity. No evidence of hydronephrosis.  urine protein 152 - elevated     >Bipolar disorder - resume home medication    >Hypokalemia - K 3.2 - supplemented  f/u repeat BMP     >Leucocytosis - review old record noted leucocytosis in past, MRI brain showed sinusiitis - will start Keflex  afebrile,  u/a negative   chest xray negative   f/u cbc    >Oral thrush - c/w Nystatin    >elevated troponin - flat - CKD  cardiology consulted  TTE There is moderate concentric left ventricular hypertrophy. Normal wall motion. Left ventricular ejection fraction, by visual  estimation, is 60 to 65%. Grade II diastolic dysfunction. This is 32YO man with PMH of bipolar disorder, DM, HTN, CKD, sleep apnea brought to ED for Seizure. Pt is lethargic, postictal, unable to provide history. Discussed with his father - he lives with father no history of seziure, noted to have seizure at home and brought to ED. Per father he is non compliant with medication and diet. On arrival he is noted to have , /117. Pt was seen by neurology, started on Keppra, MRI brain  unremarkable, mucosal inflammatory changes in paransal sinuses, EEG normal. Pt is noted to have RUE swelling, doppler showed partially occlusive thrombosis of R cephalic vein, discussed with vascular surgery, its superfacial vein, no need of full AC, warm compress and aspirin. Pt was noted to have uncontrolled BP and FS, adjusted medication, seen by endo and cardiology.     A/P    >New onset seizure in the setting of uncontrolled BP and high FS  Neurology consult appreciated - c/w Keppra   MRI brain  unremarkable, EEG normal.  CT head - no acute finding  seizure precaution  ativan prn   U tox negative    >HTN emergency -  non compliance - improving  cardiology consult appreciated - increase coreg 12.5 bid - monitor BP  c/w Hydralazine 100mg q8h, amlodipine 10,  Clonidine 0.2 q8h  TTE There is moderate concentric left ventricular hypertrophy. Normal wall motion. Left ventricular ejection fraction, by visual  estimation, is 60 to 65%. Grade II diastolic dysfunction.    >DM - uncontrolled with complication - CKD - non compliant  A1c 10.3  endo consult appreciated - recommend 16 untis of LAntus and humalog 3 units AC    >Acute on CKD stage 3 - back to baseline  baseline Cr around 2.3  renal consult appreciated   renal u/s Kidneys demonstrate increased echogenicity. No evidence of hydronephrosis.  urine protein 152 - elevated     >Bipolar disorder - resume home medication    >Hypokalemia - K 3.2 - supplemented  f/u repeat BMP     >Leucocytosis - review old record noted leucocytosis in past, MRI brain showed sinusiitis - will start Keflex  afebrile,  u/a negative   chest xray negative   f/u cbc    >Oral thrush - c/w Nystatin    >elevated troponin - flat - CKD  cardiology consulted  TTE There is moderate concentric left ventricular hypertrophy. Normal wall motion. Left ventricular ejection fraction, by visual  estimation, is 60 to 65%. Grade II diastolic dysfunction.      GENERAL: NAD,   EYES: EOMI, PERRL, conjunctiva and sclera clear  ENMT: No tonsillar erythema, exudates, or enlargement; Moist mucous membranes, Good dentition, No lesions  NERVOUS SYSTEM:  Alert & Oriented X3,  Moves upper and lower extremities;   CHEST/LUNG: Clear to percussion bilaterally; No rales, rhonchi, wheezing, or rubs  HEART: Regular rate and rhythm; No murmurs, rubs, or gallops  ABDOMEN: Soft, Nontender, Nondistended; Bowel sounds present  EXTREMITIES:  2+ Peripheral Pulses, No clubbing, cyanosis, or edema

## 2019-01-04 NOTE — PROGRESS NOTE ADULT - SUBJECTIVE AND OBJECTIVE BOX
[facility] - [facility], [facility]                                   [address] [place] [address]                                                      PHONE: [phone]                                                         FAX: [phone]  [email or web address]  ---------------------------------------------------------------------------------------------------------------------------------    Overnight events/patient complaints: patient feels well this morning.       No Known Allergies    MEDICATIONS  (STANDING):  amLODIPine   Tablet 10 milliGRAM(s) Oral daily  calcitriol   Capsule 0.25 MICROGram(s) Oral daily  carvedilol 6.25 milliGRAM(s) Oral every 12 hours  cephalexin 500 milliGRAM(s) Oral every 12 hours  cloNIDine 0.2 milliGRAM(s) Oral three times a day  dextrose 5%. 1000 milliLiter(s) (50 mL/Hr) IV Continuous <Continuous>  dextrose 50% Injectable 12.5 Gram(s) IV Push once  dextrose 50% Injectable 25 Gram(s) IV Push once  dextrose 50% Injectable 25 Gram(s) IV Push once  ergocalciferol 84686 Unit(s) Oral every week  heparin  Injectable 5000 Unit(s) SubCutaneous every 12 hours  hydrALAZINE 100 milliGRAM(s) Oral every 8 hours  insulin glargine Injectable (LANTUS) 16 Unit(s) SubCutaneous at bedtime  insulin lispro (HumaLOG) corrective regimen sliding scale   SubCutaneous three times a day before meals  insulin lispro (HumaLOG) corrective regimen sliding scale   SubCutaneous at bedtime  insulin lispro Injectable (HumaLOG) 3 Unit(s) SubCutaneous three times a day before meals  lamoTRIgine 50 milliGRAM(s) Oral two times a day  levETIRAcetam  IVPB 500 milliGRAM(s) IV Intermittent every 12 hours  nystatin    Suspension 258617 Unit(s) Swish and Swallow four times a day  potassium chloride    Tablet ER 40 milliEquivalent(s) Oral once  QUEtiapine 100 milliGRAM(s) Oral at bedtime    MEDICATIONS  (PRN):  acetaminophen   Tablet .. 650 milliGRAM(s) Oral every 6 hours PRN Mild Pain (1 - 3)  dextrose 40% Gel 15 Gram(s) Oral once PRN Blood Glucose LESS THAN 70 milliGRAM(s)/deciliter  glucagon  Injectable 1 milliGRAM(s) IntraMuscular once PRN Glucose LESS THAN 70 milligrams/deciliter  labetalol Injectable 10 milliGRAM(s) IV Push every 6 hours PRN Systolic blood pressure >180  LORazepam   Injectable 2 milliGRAM(s) IV Push every 4 hours PRN seizure  ondansetron Injectable 4 milliGRAM(s) IV Push every 6 hours PRN Nausea and/or Vomiting      Vital Signs Last 24 Hrs  T(C): 36.7 (04 Jan 2019 08:05), Max: 37.1 (03 Jan 2019 11:00)  T(F): 98 (04 Jan 2019 08:05), Max: 98.7 (03 Jan 2019 11:00)  HR: 88 (04 Jan 2019 08:05) (75 - 96)  BP: 150/98 (04 Jan 2019 08:05) (140/90 - 150/108)  BP(mean): --  RR: 17 (04 Jan 2019 08:05) (17 - 19)  SpO2: 96% (04 Jan 2019 08:05) (96% - 98%)  ICU Vital Signs Last 24 Hrs  MILENA PUGA  I&O's Detail    03 Jan 2019 07:01  -  04 Jan 2019 07:00  --------------------------------------------------------  IN:    Oral Fluid: 480 mL    sodium chloride 0.45% with potassium chloride 20 mEq/L: 80 mL  Total IN: 560 mL    OUT:    Voided: 3025 mL  Total OUT: 3025 mL    Total NET: -2465 mL      04 Jan 2019 07:01  -  04 Jan 2019 09:25  --------------------------------------------------------  IN:    Oral Fluid: 240 mL    sodium chloride 0.45% with potassium chloride 20 mEq/L: 160 mL  Total IN: 400 mL    OUT:    Voided: 500 mL  Total OUT: 500 mL    Total NET: -100 mL        Drug Dosing Weight  MILENA PUGA      PHYSICAL EXAM:  General: Appears well developed, well nourished alert and cooperative.  HEENT: Head; normocephalic, atraumatic.  Eyes: Pupils reactive, cornea wnl.  Neck: Supple, no nodes adenopathy, no NVD or carotid bruit or thyromegaly.  CARDIOVASCULAR: Normal S1 and S2, No murmur, rub, gallop or lift.   LUNGS: No rales, rhonchi or wheeze. Normal breath sounds bilaterally.  ABDOMEN: Soft, nontender without mass or organomegaly. bowel sounds normoactive.  EXTREMITIES: No clubbing, cyanosis or edema. Distal pulses wnl.   SKIN: warm and dry with normal turgor.  NEURO: Alert/oriented x 3/normal motor exam. No pathologic reflexes.    PSYCH: normal affect.        LABS:                        11.1   12.5  )-----------( 321      ( 04 Jan 2019 05:58 )             33.2     01-04    143  |  104  |  25.0<H>  ----------------------------<  110  3.2<L>   |  26.0  |  2.51<H>    Ca    8.6      04 Jan 2019 05:58  Phos  3.3     01-03  Mg     2.1     01-04    TPro  x   /  Alb  2.4<L>  /  TBili  x   /  DBili  x   /  AST  x   /  ALT  x   /  AlkPhos  x   01-03    MILENA PUGA    RADIOLOGY & ADDITIONAL STUDIES:    INTERPRETATION OF TELEMETRY (personally reviewed): sinus rhythm without arrhythmia    ASSESSMENT AND PLAN:  In summary, MILENA PUGA is an 33y Male with history of obese ? ARVIND CKD HTN DM none complaint active smoker bipolar disorder who presents with AMS seizure MELISSA on CKD hyperglycemia HTN urgency BP improving; NSVT asymptomatic in setting of hypokalemia; TTE normal EF moderate LVH DDD without any significant valvular disease.    HTN -- improving now with medication  - INCREASE Coreg to 12.5 mg BID  - continue Amlodipine 10 mg daily  - continue Clonidine 0.2 mg TID  - patient counseled on low sodium DASH diet    Patient stable for discharge today from cardiac perspective. Will arrange for close outpatient follow up.

## 2019-01-04 NOTE — DISCHARGE NOTE ADULT - SECONDARY DIAGNOSIS.
Hypertensive emergency Diabetes CKD (chronic kidney disease) Hypokalemia Bipolar disorder Mastoiditis

## 2019-01-04 NOTE — DISCHARGE NOTE ADULT - CARE PROVIDER_API CALL
Martin Camacho), Neurology  [address] [place] [address]  Phone: [phone]  Fax: [phone]    Kingsley Walker), Internal Medicine; Nephrology  [address] [place] [address]  Phone: [phone]  Fax: [phone]    Dayday Velazquez), Cardiology; Internal Medicine  [address] [place] [address]  Phone: [phone]  Fax: [phone]    Rosanne Washington), EndocrinologyMetabDiabetes; Internal Medicine  [address] [place] [address]  Phone: [phone]  Fax: [phone]

## 2019-01-04 NOTE — DIETITIAN INITIAL EVALUATION ADULT. - OTHER INFO
Pt reports good po intake at meals.  Pt states significant wt gain since the summer as he was under stress while caring for his mother.  Pt was not specific, but states that he got off track with his meal plan and diabetes management.  Discussed cons cho meal plan and importance of wt loss for improved glycemic control.  Refer to diabetes chart note.

## 2019-01-04 NOTE — PROGRESS NOTE ADULT - SUBJECTIVE AND OBJECTIVE BOX
Progress Note Adult-Endocrinology Attending [Charted Location: [facility] [id number]] [Authored: 05-Jan-2019 00:14]- for Visit: [id number], Complete, Revised, Signed in Full, General    Progress Note:   · Provider Specialty	Endocrinology	    Reason for Admission:    Reason for Admission:  · Reason for Admission	Seizure	      · Subjective and Objective: 	  INTERVAL HPI/OVERNIGHT EVENTS:  follow up uncontrolled t2DM CRI on 1/4/19   pt has been practicing insuoin injeciton   MEDICATIONS  (STANDING):  amLODIPine   Tablet 10 milliGRAM(s) Oral daily  aspirin 325 milliGRAM(s) Oral daily  calcitriol   Capsule 0.25 MICROGram(s) Oral daily  carvedilol 12.5 milliGRAM(s) Oral every 12 hours  cephalexin 500 milliGRAM(s) Oral every 12 hours  cloNIDine 0.2 milliGRAM(s) Oral three times a day  dextrose 5%. 1000 milliLiter(s) (50 mL/Hr) IV Continuous <Continuous>  dextrose 50% Injectable 12.5 Gram(s) IV Push once  dextrose 50% Injectable 25 Gram(s) IV Push once  dextrose 50% Injectable 25 Gram(s) IV Push once  ergocalciferol 53540 Unit(s) Oral every week  heparin  Injectable 5000 Unit(s) SubCutaneous every 12 hours  hydrALAZINE 100 milliGRAM(s) Oral every 8 hours  insulin glargine Injectable (LANTUS) 16 Unit(s) SubCutaneous at bedtime  insulin lispro (HumaLOG) corrective regimen sliding scale   SubCutaneous three times a day before meals  insulin lispro (HumaLOG) corrective regimen sliding scale   SubCutaneous at bedtime  insulin lispro Injectable (HumaLOG) 3 Unit(s) SubCutaneous three times a day before meals  lamoTRIgine 50 milliGRAM(s) Oral two times a day  levETIRAcetam  IVPB 500 milliGRAM(s) IV Intermittent every 12 hours  nystatin    Suspension 417559 Unit(s) Swish and Swallow four times a day  QUEtiapine 100 milliGRAM(s) Oral at bedtime    MEDICATIONS  (PRN):  acetaminophen   Tablet .. 650 milliGRAM(s) Oral every 6 hours PRN Mild Pain (1 - 3)  dextrose 40% Gel 15 Gram(s) Oral once PRN Blood Glucose LESS THAN 70 milliGRAM(s)/deciliter  glucagon  Injectable 1 milliGRAM(s) IntraMuscular once PRN Glucose LESS THAN 70 milligrams/deciliter  labetalol Injectable 10 milliGRAM(s) IV Push every 6 hours PRN Systolic blood pressure >180  LORazepam   Injectable 2 milliGRAM(s) IV Push every 4 hours PRN seizure  ondansetron Injectable 4 milliGRAM(s) IV Push every 6 hours PRN Nausea and/or Vomiting      Allergies    No Known Allergies    Intolerances        Review of systems:  feeling a little better- feels fatigued from meds   Vital Signs Last 24 Hrs  T(C): 36.9 (04 Jan 2019 21:27), Max: 36.9 (04 Jan 2019 21:27)  T(F): 98.5 (04 Jan 2019 21:27), Max: 98.5 (04 Jan 2019 21:27)  HR: 76 (04 Jan 2019 21:27) (72 - 88)  BP: 138/83 (04 Jan 2019 21:27) (138/83 - 155/99)  BP(mean): --  RR: 18 (04 Jan 2019 21:27) (17 - 19)  SpO2: 99% (04 Jan 2019 21:27) (93% - 99%)    PHYSICAL EXAM:      Constitutional: NAD, obese male NAD   HEENT: PERRLA, EOMI, no exophalmos  Neck: No LAD, No JVD, trachea midline, no thyroid enlargement  Back: Normal spine flexure, No CVA tenderness  Respiratory: CTAB  Cardiovascular: S1 and S2, RRR, no M/G/R  Gastrointestinal: BS+, soft, no organomeglag or mass  Extremities: No peripheral edema, no pedal lesions  Vascular: 2+ peripheral pulses  Neurological: A/O x 3, no focal deficits  Psychiatric: Normal mood, normal affect          LABS:                        11.1   12.5  )-----------( 321      ( 04 Jan 2019 05:58 )             33.2     01-04    140  |  102  |  27.0<H>  ----------------------------<  150<H>  3.5   |  26.0  |  2.54<H>    Ca    8.7      04 Jan 2019 17:33  Phos  3.3     01-03  Mg     2.1     01-04    TPro  x   /  Alb  2.4<L>  /  TBili  x   /  DBili  x   /  AST  x   /  ALT  x   /  AlkPhos  x   01-03          CAPILLARY BLOOD GLUCOSE    CAPILLARY BLOOD GLUCOSE      POCT Blood Glucose.: 166 mg/dL (04 Jan 2019 21:30)  POCT Blood Glucose.: 152 mg/dL (04 Jan 2019 16:29)  POCT Blood Glucose.: 213 mg/dL (04 Jan 2019 11:58)  POCT Blood Glucose.: 137 mg/dL (04 Jan 2019 08:01)    RADIOLOGY & ADDITIONAL TESTS:      Assessment and Plan:   · Assessment		  T2DM - cont current Rx with insulin and Diabetes/insulin taching    Seizures -on meds   CRI likely to to DM and HTN uncontrolled       Electronic Signatures:  Rosanne Giles)  (Signed 05-Jan-2019 00:40)  	Authored: Progress Note, Reason for Admission, Subjective and Objective, Assessment and Plan      Last Updated: 05-Jan-2019 00:40 by Rosanne Giles)

## 2019-01-04 NOTE — PROGRESS NOTE ADULT - ASSESSMENT
This is 32YO man with PMH of bipolar disorder, DM, HTN, CKD, sleep apnea brought to ED for Seizure. Pt is lethargic, postictal, unable to provide history. Discussed with his father - he lives with father no history of seziure, noted to have seizure at home and brought to ED. Per father he is non compliant with medication and diet. On arrival he is noted to have , /117. Pt was seen by neurology, started on Keppra, MRI brain  unremarkable, mucosal inflammatory changes in paransal sinuses, EEG normal. Pt is noted to have RUE swelling, doppler showed partially occlusive thrombosis of R cephalic vein, discussed with vascular surgery, its superfacial vein, no need of full AC, warm compress and aspirin.     A/P    >New onset seizure in the setting of uncontrolled BP and high FS  Neurology consult appreciated - c/w Keppra   MRI brain  unremarkable, EEG normal.  CT head - no acute finding  seizure precaution  ativan prn   U tox negative    >HTN emergency -  non compliance - improving  cardiology consult appreciated - increase coreg 12.5 bid  c/w Hydralazine 100mg q8h, amlodipine 10,  Clonidine 0.2 q8h  TTE There is moderate concentric left ventricular hypertrophy. Normal wall motion. Left ventricular ejection fraction, by visual  estimation, is 60 to 65%. Grade II diastolic dysfunction.    >DM - uncontrolled with complication - CKD - non compliant  A1c 10.3  endo consult appreciated - recommend 16 untis of LAntus and humalog 3 units AC    >Acute on CKD stage 3 - back to baseline  baseline Cr around 2.3  renal consult appreciated   renal u/s Kidneys demonstrate increased echogenicity. No evidence of hydronephrosis.  urine protein 152 - elevated     >Bipolar disorder - resume home medication    >Hypokalemia - K 3.2 - supplemented  repeat BMP pending    >Leucocytosis - review old record noted leucocytosis in past, MRI brain showed sinusiitis - will start Keflex  afebrile,  u/a negative   chest xray negative   f/u cbc    >Oral thrush - c/w Nystatin    >elevated troponin - flat - CKD  cardiology consulted  TTE There is moderate concentric left ventricular hypertrophy. Normal wall motion. Left ventricular ejection fraction, by visual  estimation, is 60 to 65%. Grade II diastolic dysfunction.    >Obesity - low fat diet    >DVT PPX - Heparin This is 34YO man with PMH of bipolar disorder, DM, HTN, CKD, sleep apnea brought to ED for Seizure. Pt is lethargic, postictal, unable to provide history. Discussed with his father - he lives with father no history of seziure, noted to have seizure at home and brought to ED. Per father he is non compliant with medication and diet. On arrival he is noted to have , /117. Pt was seen by neurology, started on Keppra, MRI brain  unremarkable, mucosal inflammatory changes in paransal sinuses, EEG normal. Pt is noted to have RUE swelling, doppler showed partially occlusive thrombosis of R cephalic vein, discussed with vascular surgery, its superfacial vein, no need of full AC, warm compress and aspirin. Pt was noted to have uncontrolled BP and FS, adjusted medication, seen by endo and cardiology.     A/P    >New onset seizure in the setting of uncontrolled BP and high FS  Neurology consult appreciated - c/w Keppra   MRI brain  unremarkable, EEG normal.  CT head - no acute finding  seizure precaution  ativan prn   U tox negative    >HTN emergency -  non compliance - improving  cardiology consult appreciated - increase coreg 12.5 bid - monitor BP  c/w Hydralazine 100mg q8h, amlodipine 10,  Clonidine 0.2 q8h  TTE There is moderate concentric left ventricular hypertrophy. Normal wall motion. Left ventricular ejection fraction, by visual  estimation, is 60 to 65%. Grade II diastolic dysfunction.    >DM - uncontrolled with complication - CKD - non compliant  A1c 10.3  endo consult appreciated - recommend 16 untis of LAntus and humalog 3 units AC    >Acute on CKD stage 3 - back to baseline  baseline Cr around 2.3  renal consult appreciated   renal u/s Kidneys demonstrate increased echogenicity. No evidence of hydronephrosis.  urine protein 152 - elevated     >Bipolar disorder - resume home medication    >Hypokalemia - K 3.2 - supplemented  f/u repeat BMP     >Leucocytosis - review old record noted leucocytosis in past, MRI brain showed sinusiitis - will start Keflex  afebrile,  u/a negative   chest xray negative   f/u cbc    >Oral thrush - c/w Nystatin    >elevated troponin - flat - CKD  cardiology consulted  TTE There is moderate concentric left ventricular hypertrophy. Normal wall motion. Left ventricular ejection fraction, by visual  estimation, is 60 to 65%. Grade II diastolic dysfunction.    >Obesity - low fat diet    >DVT PPX - Heparin

## 2019-01-04 NOTE — DISCHARGE NOTE ADULT - PATIENT PORTAL LINK FT
You can access the [facility] Patient Portal, offered by [facility], by registering with the following website: [email or web address]

## 2019-01-05 VITALS — RESPIRATION RATE: 18 BRPM | DIASTOLIC BLOOD PRESSURE: 88 MMHG | HEART RATE: 82 BPM | SYSTOLIC BLOOD PRESSURE: 130 MMHG

## 2019-01-05 LAB
AMYLASE P1 CFR SERPL: 77 U/L — SIGNIFICANT CHANGE UP (ref 36–128)
GLUCOSE BLDC GLUCOMTR-MCNC: 114 MG/DL — HIGH (ref 70–99)
LIDOCAIN IGE QN: 58 U/L — HIGH (ref 22–51)
PTH-INTACT FLD-MCNC: 90 PG/ML — HIGH (ref 15–65)

## 2019-01-05 PROCEDURE — 96361 HYDRATE IV INFUSION ADD-ON: CPT

## 2019-01-05 PROCEDURE — 84466 ASSAY OF TRANSFERRIN: CPT

## 2019-01-05 PROCEDURE — 36415 COLL VENOUS BLD VENIPUNCTURE: CPT

## 2019-01-05 PROCEDURE — 83036 HEMOGLOBIN GLYCOSYLATED A1C: CPT

## 2019-01-05 PROCEDURE — 92610 EVALUATE SWALLOWING FUNCTION: CPT

## 2019-01-05 PROCEDURE — 84484 ASSAY OF TROPONIN QUANT: CPT

## 2019-01-05 PROCEDURE — 93005 ELECTROCARDIOGRAM TRACING: CPT

## 2019-01-05 PROCEDURE — 80053 COMPREHEN METABOLIC PANEL: CPT

## 2019-01-05 PROCEDURE — 81001 URINALYSIS AUTO W/SCOPE: CPT

## 2019-01-05 PROCEDURE — 85610 PROTHROMBIN TIME: CPT

## 2019-01-05 PROCEDURE — 80069 RENAL FUNCTION PANEL: CPT

## 2019-01-05 PROCEDURE — 82310 ASSAY OF CALCIUM: CPT

## 2019-01-05 PROCEDURE — 84443 ASSAY THYROID STIM HORMONE: CPT

## 2019-01-05 PROCEDURE — 99291 CRITICAL CARE FIRST HOUR: CPT | Mod: 25

## 2019-01-05 PROCEDURE — 82009 KETONE BODYS QUAL: CPT

## 2019-01-05 PROCEDURE — 96375 TX/PRO/DX INJ NEW DRUG ADDON: CPT

## 2019-01-05 PROCEDURE — 82607 VITAMIN B-12: CPT

## 2019-01-05 PROCEDURE — 93306 TTE W/DOPPLER COMPLETE: CPT

## 2019-01-05 PROCEDURE — 82746 ASSAY OF FOLIC ACID SERUM: CPT

## 2019-01-05 PROCEDURE — 83735 ASSAY OF MAGNESIUM: CPT

## 2019-01-05 PROCEDURE — 84133 ASSAY OF URINE POTASSIUM: CPT

## 2019-01-05 PROCEDURE — 96376 TX/PRO/DX INJ SAME DRUG ADON: CPT

## 2019-01-05 PROCEDURE — 70450 CT HEAD/BRAIN W/O DYE: CPT

## 2019-01-05 PROCEDURE — 96372 THER/PROPH/DIAG INJ SC/IM: CPT | Mod: XU

## 2019-01-05 PROCEDURE — 83550 IRON BINDING TEST: CPT

## 2019-01-05 PROCEDURE — 70551 MRI BRAIN STEM W/O DYE: CPT

## 2019-01-05 PROCEDURE — 80048 BASIC METABOLIC PNL TOTAL CA: CPT

## 2019-01-05 PROCEDURE — 83935 ASSAY OF URINE OSMOLALITY: CPT

## 2019-01-05 PROCEDURE — 99238 HOSP IP/OBS DSCHRG MGMT 30/<: CPT

## 2019-01-05 PROCEDURE — 83540 ASSAY OF IRON: CPT

## 2019-01-05 PROCEDURE — 82150 ASSAY OF AMYLASE: CPT

## 2019-01-05 PROCEDURE — 71045 X-RAY EXAM CHEST 1 VIEW: CPT

## 2019-01-05 PROCEDURE — 85730 THROMBOPLASTIN TIME PARTIAL: CPT

## 2019-01-05 PROCEDURE — 85027 COMPLETE CBC AUTOMATED: CPT

## 2019-01-05 PROCEDURE — 83690 ASSAY OF LIPASE: CPT

## 2019-01-05 PROCEDURE — 82962 GLUCOSE BLOOD TEST: CPT

## 2019-01-05 PROCEDURE — 72125 CT NECK SPINE W/O DYE: CPT

## 2019-01-05 PROCEDURE — 83970 ASSAY OF PARATHORMONE: CPT

## 2019-01-05 PROCEDURE — 96365 THER/PROPH/DIAG IV INF INIT: CPT

## 2019-01-05 PROCEDURE — 80307 DRUG TEST PRSMV CHEM ANLYZR: CPT

## 2019-01-05 PROCEDURE — 76775 US EXAM ABDO BACK WALL LIM: CPT

## 2019-01-05 PROCEDURE — 84132 ASSAY OF SERUM POTASSIUM: CPT

## 2019-01-05 PROCEDURE — 82803 BLOOD GASES ANY COMBINATION: CPT

## 2019-01-05 PROCEDURE — 95819 EEG AWAKE AND ASLEEP: CPT

## 2019-01-05 PROCEDURE — 84300 ASSAY OF URINE SODIUM: CPT

## 2019-01-05 PROCEDURE — 82550 ASSAY OF CK (CPK): CPT

## 2019-01-05 PROCEDURE — 82553 CREATINE MB FRACTION: CPT

## 2019-01-05 PROCEDURE — 82570 ASSAY OF URINE CREATININE: CPT

## 2019-01-05 PROCEDURE — 84156 ASSAY OF PROTEIN URINE: CPT

## 2019-01-05 PROCEDURE — 93971 EXTREMITY STUDY: CPT

## 2019-01-05 PROCEDURE — 82306 VITAMIN D 25 HYDROXY: CPT

## 2019-01-05 PROCEDURE — 87086 URINE CULTURE/COLONY COUNT: CPT

## 2019-01-05 PROCEDURE — 82728 ASSAY OF FERRITIN: CPT

## 2019-01-05 RX ORDER — INSULIN GLARGINE 100 [IU]/ML
16 INJECTION, SOLUTION SUBCUTANEOUS
Qty: 1 | Refills: 0
Start: 2019-01-05 | End: 2019-02-03

## 2019-01-05 RX ORDER — CARVEDILOL PHOSPHATE 80 MG/1
1 CAPSULE, EXTENDED RELEASE ORAL
Qty: 60 | Refills: 0
Start: 2019-01-05 | End: 2019-02-03

## 2019-01-05 RX ADMIN — CALCITRIOL 0.25 MICROGRAM(S): 0.5 CAPSULE ORAL at 10:41

## 2019-01-05 RX ADMIN — HEPARIN SODIUM 5000 UNIT(S): 5000 INJECTION INTRAVENOUS; SUBCUTANEOUS at 10:41

## 2019-01-05 RX ADMIN — LEVETIRACETAM 420 MILLIGRAM(S): 250 TABLET, FILM COATED ORAL at 05:11

## 2019-01-05 RX ADMIN — Medication 500 MILLIGRAM(S): at 05:11

## 2019-01-05 RX ADMIN — CARVEDILOL PHOSPHATE 12.5 MILLIGRAM(S): 80 CAPSULE, EXTENDED RELEASE ORAL at 05:11

## 2019-01-05 RX ADMIN — Medication 500000 UNIT(S): at 05:11

## 2019-01-05 RX ADMIN — Medication 0.2 MILLIGRAM(S): at 05:11

## 2019-01-05 RX ADMIN — Medication 3 UNIT(S): at 08:38

## 2019-01-05 RX ADMIN — LAMOTRIGINE 50 MILLIGRAM(S): 25 TABLET, ORALLY DISINTEGRATING ORAL at 05:11

## 2019-01-05 RX ADMIN — Medication 100 MILLIGRAM(S): at 05:11

## 2019-01-05 RX ADMIN — AMLODIPINE BESYLATE 10 MILLIGRAM(S): 2.5 TABLET ORAL at 05:11

## 2019-01-05 RX ADMIN — Medication 325 MILLIGRAM(S): at 10:41

## 2019-01-05 NOTE — PROGRESS NOTE ADULT - ASSESSMENT
T2DM - cont current Rx with insulin and Diabetes/insulin taching    Seizures -on meds   CRI likely to to DM and HTN uncontrolled

## 2019-01-05 NOTE — PROGRESS NOTE ADULT - ASSESSMENT
32YO man with PMH of bipolar disorder, DM, HTN, CKD, sleep apnea brought to ED for Seizure. Pt is lethargic, postictal, unable to provide history. Discussed with his father - he lives with father no history of seziure, noted to have seizure at home and brought to ED. Per father he is non compliant with medication and diet. On arrival he is noted to have , /117. Pt was seen by neurology, started on Keppra, MRI brain  unremarkable, mucosal inflammatory changes in paransal sinuses, EEG normal. Pt is noted to have RUE swelling, doppler showed partially occlusive thrombosis of R cephalic vein, discussed with vascular surgery, its superfacial vein, no need of full AC, warm compress and aspirin. Pt was noted to have uncontrolled BP and FS, adjusted medication, seen by endo and cardiology.     A/P    >New onset seizure in the setting of uncontrolled BP and high FS  Neurology consult appreciated - c/w Keppra   MRI brain  unremarkable, EEG normal.  CT head - no acute finding  seizure precaution  ativan prn   U tox negative    >HTN emergency -  non compliance - improving  cardiology consult appreciated - increase coreg 12.5 bid - monitor BP  c/w Hydralazine 100mg q8h, amlodipine 10,  Clonidine 0.2 q8h  TTE There is moderate concentric left ventricular hypertrophy. Normal wall motion. Left ventricular ejection fraction, by visual  estimation, is 60 to 65%. Grade II diastolic dysfunction.    DM - uncontrolled with complication - CKD - non compliant  A1c 10.3  discussed with endocrine on phone plan to DC home on lantus  16 untis    >Acute on CKD stage 3 - back to baseline  baseline Cr around 2.3  renal consult appreciated   renal u/s Kidneys demonstrate increased echogenicity. No evidence of hydronephrosis.  urine protein 152 - elevated     Bipolar disorder - resume home medication  Hypokalemia -  supplemented  f/u repeat BMP   Leucocytosis - review old record noted leucocytosis in past, MRI brain showed sinusitis - will start Keflex  afebrile,  u/a negative   chest xray negative   f/u cbc  Oral thrush - c/w Nystatin  elevated troponin -cardio consulted TTE There is moderate concentric left ventricular hypertrophy. Normal wall motion. Left ventricular ejection fraction, by visual  estimation, is 60 to 65%. Grade II diastolic dysfunction.  Obesity - low fat diet  DVT PPX - Heparin

## 2019-01-05 NOTE — PROGRESS NOTE ADULT - REASON FOR ADMISSION
Hyperglycemia
Seizure

## 2019-01-05 NOTE — PROGRESS NOTE ADULT - SUBJECTIVE AND OBJECTIVE BOX
MILENA PUGA Patient is a 33y old  Male who presents with a chief complaint of Seizure (04 Jan 2019 19:00)     HPI:  This is 32YO man with PMH of bipolar disorder, DM, HTN, CKD, sleep apnea brought to ED for Seizure. Pt is lethargic, postictal, unable to provide history. Discussed with his father - he lives with father no history of seziure, noted to have seizure at home and brought to ED. Per father he is non compliant with medication and diet. On arrival he is noted to have , /117, received labetalol and ativan. At the time of exam he is still lethargic, respond to verbal stimuli, oriented to person and place, report generalized pain. (31 Dec 2018 17:02)    The patient was seen and evaluated   The patient is in no acute distress.  Denied any fever chest pain, palpitations, shortness of breath, abdominal pain, fever, dysuria, cough, edema   no complaints has used insulin in the past at home    I&O's Summary    04 Jan 2019 07:01  -  05 Jan 2019 07:00  --------------------------------------------------------  IN: 2280 mL / OUT: 6850 mL / NET: -4570 mL    05 Jan 2019 07:01  -  05 Jan 2019 12:04  --------------------------------------------------------  IN: 240 mL / OUT: 400 mL / NET: -160 mL      Allergies    No Known Allergies    Intolerances      HEALTH ISSUES - PROBLEM Dx:  Encephalopathy: Encephalopathy  Seizure: Seizure        PAST MEDICAL & SURGICAL HISTORY:  HTN (hypertension)  Sleep apnea  Diabetes  Bipolar 1 disorder  Hypertension  No significant past surgical history          Vital Signs Last 24 Hrs  T(C): 36.6 (05 Jan 2019 05:07), Max: 36.9 (04 Jan 2019 21:27)  T(F): 97.9 (05 Jan 2019 05:07), Max: 98.5 (04 Jan 2019 21:27)  HR: 82 (05 Jan 2019 10:49) (72 - 82)  BP: 130/88 (05 Jan 2019 10:49) (130/88 - 155/99)  BP(mean): --  RR: 18 (05 Jan 2019 10:49) (17 - 19)  SpO2: 99% (05 Jan 2019 05:07) (93% - 99%)T(C): 36.6 (01-05-19 @ 05:07), Max: 36.9 (01-04-19 @ 21:27)  HR: 82 (01-05-19 @ 10:49) (72 - 82)  BP: 130/88 (01-05-19 @ 10:49) (130/88 - 155/99)  RR: 18 (01-05-19 @ 10:49) (17 - 19)  SpO2: 99% (01-05-19 @ 05:07) (93% - 99%)  Wt(kg): --    PHYSICAL EXAM:    GENERAL: NAD,opbese  HEAD:  Atraumatic, Normocephalic  EYES: EOMI, PERRL  NERVOUS SYSTEM:  Alert & Oriented X3,  Moves upper and lower extremities; CNS-II-XII  CHEST/LUNG: Clear to auscultation bilaterally; No rales, rhonchi, wheezing,   HEART: Regular rate and rhythm; No murmurs,   ABDOMEN: Soft, Nontender, Nondistended; Bowel sounds present  EXTREMITIES:  Peripheral Pulses, No  cyanosis, or edema  psychiatry- affect approprite,     acetaminophen   Tablet .. 650 milliGRAM(s) Oral every 6 hours PRN  amLODIPine   Tablet 10 milliGRAM(s) Oral daily  aspirin 325 milliGRAM(s) Oral daily  calcitriol   Capsule 0.25 MICROGram(s) Oral daily  carvedilol 12.5 milliGRAM(s) Oral every 12 hours  cephalexin 500 milliGRAM(s) Oral every 12 hours  cloNIDine 0.2 milliGRAM(s) Oral three times a day  dextrose 40% Gel 15 Gram(s) Oral once PRN  dextrose 5%. 1000 milliLiter(s) IV Continuous <Continuous>  dextrose 50% Injectable 12.5 Gram(s) IV Push once  dextrose 50% Injectable 25 Gram(s) IV Push once  dextrose 50% Injectable 25 Gram(s) IV Push once  ergocalciferol 27305 Unit(s) Oral every week  glucagon  Injectable 1 milliGRAM(s) IntraMuscular once PRN  heparin  Injectable 5000 Unit(s) SubCutaneous every 12 hours  hydrALAZINE 100 milliGRAM(s) Oral every 8 hours  insulin glargine Injectable (LANTUS) 16 Unit(s) SubCutaneous at bedtime  insulin lispro (HumaLOG) corrective regimen sliding scale   SubCutaneous three times a day before meals  insulin lispro (HumaLOG) corrective regimen sliding scale   SubCutaneous at bedtime  insulin lispro Injectable (HumaLOG) 3 Unit(s) SubCutaneous three times a day before meals  labetalol Injectable 10 milliGRAM(s) IV Push every 6 hours PRN  lamoTRIgine 50 milliGRAM(s) Oral two times a day  levETIRAcetam  IVPB 500 milliGRAM(s) IV Intermittent every 12 hours  LORazepam   Injectable 2 milliGRAM(s) IV Push every 4 hours PRN  nystatin    Suspension [id number] Unit(s) Swish and Swallow four times a day  ondansetron Injectable 4 milliGRAM(s) IV Push every 6 hours PRN  QUEtiapine 100 milliGRAM(s) Oral at bedtime      LABS:                          11.1   12.5  )-----------( 321      ( 04 Jan 2019 05:58 )             33.2     01-04    140  |  102  |  27.0<H>  ----------------------------<  150<H>  3.5   |  26.0  |  2.54<H>    Ca    8.7      04 Jan 2019 17:33  Phos  3.3     01-03  Mg     2.1     01-04    TPro  x   /  Alb  2.4<L>  /  TBili  x   /  DBili  x   /  AST  x   /  ALT  x   /  AlkPhos  x   01-03    LIVER FUNCTIONS - ( 03 Jan 2019 21:13 )  Alb: 2.4 g/dL / Pro: x     / ALK PHOS: x     / ALT: x     / AST: x     / GGT: x                   CAPILLARY BLOOD GLUCOSE      POCT Blood Glucose.: 114 mg/dL (05 Jan 2019 07:39)  POCT Blood Glucose.: 166 mg/dL (04 Jan 2019 21:30)  POCT Blood Glucose.: 152 mg/dL (04 Jan 2019 16:29)      RADIOLOGY & ADDITIONAL TESTS:      Consultant notes reviewed    Case discussed with consultant/provider/ family /patient

## 2019-01-05 NOTE — PROGRESS NOTE ADULT - SUBJECTIVE AND OBJECTIVE BOX
INTERVAL HPI/OVERNIGHT EVENTS:  follow up uncontrolled t2DM CRI   pt has been practicing insuoin injeciton   MEDICATIONS  (STANDING):  amLODIPine   Tablet 10 milliGRAM(s) Oral daily  aspirin 325 milliGRAM(s) Oral daily  calcitriol   Capsule 0.25 MICROGram(s) Oral daily  carvedilol 12.5 milliGRAM(s) Oral every 12 hours  cephalexin 500 milliGRAM(s) Oral every 12 hours  cloNIDine 0.2 milliGRAM(s) Oral three times a day  dextrose 5%. 1000 milliLiter(s) (50 mL/Hr) IV Continuous <Continuous>  dextrose 50% Injectable 12.5 Gram(s) IV Push once  dextrose 50% Injectable 25 Gram(s) IV Push once  dextrose 50% Injectable 25 Gram(s) IV Push once  ergocalciferol 41486 Unit(s) Oral every week  heparin  Injectable 5000 Unit(s) SubCutaneous every 12 hours  hydrALAZINE 100 milliGRAM(s) Oral every 8 hours  insulin glargine Injectable (LANTUS) 16 Unit(s) SubCutaneous at bedtime  insulin lispro (HumaLOG) corrective regimen sliding scale   SubCutaneous three times a day before meals  insulin lispro (HumaLOG) corrective regimen sliding scale   SubCutaneous at bedtime  insulin lispro Injectable (HumaLOG) 3 Unit(s) SubCutaneous three times a day before meals  lamoTRIgine 50 milliGRAM(s) Oral two times a day  levETIRAcetam  IVPB 500 milliGRAM(s) IV Intermittent every 12 hours  nystatin    Suspension 027463 Unit(s) Swish and Swallow four times a day  QUEtiapine 100 milliGRAM(s) Oral at bedtime    MEDICATIONS  (PRN):  acetaminophen   Tablet .. 650 milliGRAM(s) Oral every 6 hours PRN Mild Pain (1 - 3)  dextrose 40% Gel 15 Gram(s) Oral once PRN Blood Glucose LESS THAN 70 milliGRAM(s)/deciliter  glucagon  Injectable 1 milliGRAM(s) IntraMuscular once PRN Glucose LESS THAN 70 milligrams/deciliter  labetalol Injectable 10 milliGRAM(s) IV Push every 6 hours PRN Systolic blood pressure >180  LORazepam   Injectable 2 milliGRAM(s) IV Push every 4 hours PRN seizure  ondansetron Injectable 4 milliGRAM(s) IV Push every 6 hours PRN Nausea and/or Vomiting      Allergies    No Known Allergies    Intolerances        Review of systems:  feeling a little better- feels fatigued from meds   Vital Signs Last 24 Hrs  T(C): 36.9 (04 Jan 2019 21:27), Max: 36.9 (04 Jan 2019 21:27)  T(F): 98.5 (04 Jan 2019 21:27), Max: 98.5 (04 Jan 2019 21:27)  HR: 76 (04 Jan 2019 21:27) (72 - 88)  BP: 138/83 (04 Jan 2019 21:27) (138/83 - 155/99)  BP(mean): --  RR: 18 (04 Jan 2019 21:27) (17 - 19)  SpO2: 99% (04 Jan 2019 21:27) (93% - 99%)    PHYSICAL EXAM:      Constitutional: NAD, obese male NAD   HEENT: PERRLA, EOMI, no exophalmos  Neck: No LAD, No JVD, trachea midline, no thyroid enlargement  Back: Normal spine flexure, No CVA tenderness  Respiratory: CTAB  Cardiovascular: S1 and S2, RRR, no M/G/R  Gastrointestinal: BS+, soft, no organomeglag or mass  Extremities: No peripheral edema, no pedal lesions  Vascular: 2+ peripheral pulses  Neurological: A/O x 3, no focal deficits  Psychiatric: Normal mood, normal affect          LABS:                        11.1   12.5  )-----------( 321      ( 04 Jan 2019 05:58 )             33.2     01-04    140  |  102  |  27.0<H>  ----------------------------<  150<H>  3.5   |  26.0  |  2.54<H>    Ca    8.7      04 Jan 2019 17:33  Phos  3.3     01-03  Mg     2.1     01-04    TPro  x   /  Alb  2.4<L>  /  TBili  x   /  DBili  x   /  AST  x   /  ALT  x   /  AlkPhos  x   01-03          CAPILLARY BLOOD GLUCOSE    CAPILLARY BLOOD GLUCOSE      POCT Blood Glucose.: 166 mg/dL (04 Jan 2019 21:30)  POCT Blood Glucose.: 152 mg/dL (04 Jan 2019 16:29)  POCT Blood Glucose.: 213 mg/dL (04 Jan 2019 11:58)  POCT Blood Glucose.: 137 mg/dL (04 Jan 2019 08:01)    RADIOLOGY & ADDITIONAL TESTS: INTERVAL HPI/OVERNIGHT EVENTS:  follow up uncontrolled t2DM CRI on 1/4/19   pt has been practicing insuoin injeciton   MEDICATIONS  (STANDING):  amLODIPine   Tablet 10 milliGRAM(s) Oral daily  aspirin 325 milliGRAM(s) Oral daily  calcitriol   Capsule 0.25 MICROGram(s) Oral daily  carvedilol 12.5 milliGRAM(s) Oral every 12 hours  cephalexin 500 milliGRAM(s) Oral every 12 hours  cloNIDine 0.2 milliGRAM(s) Oral three times a day  dextrose 5%. 1000 milliLiter(s) (50 mL/Hr) IV Continuous <Continuous>  dextrose 50% Injectable 12.5 Gram(s) IV Push once  dextrose 50% Injectable 25 Gram(s) IV Push once  dextrose 50% Injectable 25 Gram(s) IV Push once  ergocalciferol 12134 Unit(s) Oral every week  heparin  Injectable 5000 Unit(s) SubCutaneous every 12 hours  hydrALAZINE 100 milliGRAM(s) Oral every 8 hours  insulin glargine Injectable (LANTUS) 16 Unit(s) SubCutaneous at bedtime  insulin lispro (HumaLOG) corrective regimen sliding scale   SubCutaneous three times a day before meals  insulin lispro (HumaLOG) corrective regimen sliding scale   SubCutaneous at bedtime  insulin lispro Injectable (HumaLOG) 3 Unit(s) SubCutaneous three times a day before meals  lamoTRIgine 50 milliGRAM(s) Oral two times a day  levETIRAcetam  IVPB 500 milliGRAM(s) IV Intermittent every 12 hours  nystatin    Suspension [id number] Unit(s) Swish and Swallow four times a day  QUEtiapine 100 milliGRAM(s) Oral at bedtime    MEDICATIONS  (PRN):  acetaminophen   Tablet .. 650 milliGRAM(s) Oral every 6 hours PRN Mild Pain (1 - 3)  dextrose 40% Gel 15 Gram(s) Oral once PRN Blood Glucose LESS THAN 70 milliGRAM(s)/deciliter  glucagon  Injectable 1 milliGRAM(s) IntraMuscular once PRN Glucose LESS THAN 70 milligrams/deciliter  labetalol Injectable 10 milliGRAM(s) IV Push every 6 hours PRN Systolic blood pressure >180  LORazepam   Injectable 2 milliGRAM(s) IV Push every 4 hours PRN seizure  ondansetron Injectable 4 milliGRAM(s) IV Push every 6 hours PRN Nausea and/or Vomiting      Allergies    No Known Allergies    Intolerances        Review of systems:  feeling a little better- feels fatigued from meds   Vital Signs Last 24 Hrs  T(C): 36.9 (04 Jan 2019 21:27), Max: 36.9 (04 Jan 2019 21:27)  T(F): 98.5 (04 Jan 2019 21:27), Max: 98.5 (04 Jan 2019 21:27)  HR: 76 (04 Jan 2019 21:27) (72 - 88)  BP: 138/83 (04 Jan 2019 21:27) (138/83 - 155/99)  BP(mean): --  RR: 18 (04 Jan 2019 21:27) (17 - 19)  SpO2: 99% (04 Jan 2019 21:27) (93% - 99%)    PHYSICAL EXAM:      Constitutional: NAD, obese male NAD   HEENT: PERRLA, EOMI, no exophalmos  Neck: No LAD, No JVD, trachea midline, no thyroid enlargement  Back: Normal spine flexure, No CVA tenderness  Respiratory: CTAB  Cardiovascular: S1 and S2, RRR, no M/G/R  Gastrointestinal: BS+, soft, no organomeglag or mass  Extremities: No peripheral edema, no pedal lesions  Vascular: 2+ peripheral pulses  Neurological: A/O x 3, no focal deficits  Psychiatric: Normal mood, normal affect          LABS:                        11.1   12.5  )-----------( 321      ( 04 Jan 2019 05:58 )             33.2     01-04    140  |  102  |  27.0<H>  ----------------------------<  150<H>  3.5   |  26.0  |  2.54<H>    Ca    8.7      04 Jan 2019 17:33  Phos  3.3     01-03  Mg     2.1     01-04    TPro  x   /  Alb  2.4<L>  /  TBili  x   /  DBili  x   /  AST  x   /  ALT  x   /  AlkPhos  x   01-03          CAPILLARY BLOOD GLUCOSE    CAPILLARY BLOOD GLUCOSE      POCT Blood Glucose.: 166 mg/dL (04 Jan 2019 21:30)  POCT Blood Glucose.: 152 mg/dL (04 Jan 2019 16:29)  POCT Blood Glucose.: 213 mg/dL (04 Jan 2019 11:58)  POCT Blood Glucose.: 137 mg/dL (04 Jan 2019 08:01)    RADIOLOGY & ADDITIONAL TESTS:

## 2019-01-06 LAB
24R-OH-CALCIDIOL SERPL-MCNC: 6.9 NG/ML — LOW (ref 30–80)
CALCIUM SERPL-MCNC: 8.9 MG/DL — SIGNIFICANT CHANGE UP (ref 8.4–10.5)

## 2019-03-01 ENCOUNTER — APPOINTMENT (OUTPATIENT)
Dept: PULMONOLOGY | Facility: CLINIC | Age: 34
End: 2019-03-01
Payer: MEDICARE

## 2019-03-01 VITALS
HEART RATE: 80 BPM | OXYGEN SATURATION: 96 % | BODY MASS INDEX: 50.65 KG/M2 | RESPIRATION RATE: 16 BRPM | WEIGHT: 304 LBS | SYSTOLIC BLOOD PRESSURE: 122 MMHG | DIASTOLIC BLOOD PRESSURE: 82 MMHG | HEIGHT: 65 IN

## 2019-03-01 PROCEDURE — 99204 OFFICE O/P NEW MOD 45 MIN: CPT

## 2019-03-01 RX ORDER — FENOFIBRATE 145 MG/1
TABLET ORAL
Refills: 0 | Status: DISCONTINUED | COMMUNITY
End: 2019-03-01

## 2019-03-01 RX ORDER — HYDRALAZINE HYDROCHLORIDE 100 MG/1
100 TABLET ORAL
Refills: 0 | Status: DISCONTINUED | COMMUNITY
End: 2019-03-01

## 2019-03-01 RX ORDER — METOPROLOL SUCCINATE 100 MG/1
100 TABLET, EXTENDED RELEASE ORAL
Refills: 0 | Status: DISCONTINUED | COMMUNITY
End: 2019-03-01

## 2019-03-01 NOTE — CONSULT LETTER
[Dear  ___] : Dear  [unfilled], [Consult Letter:] : I had the pleasure of evaluating your patient, [unfilled]. [Please see my note below.] : Please see my note below. [Consult Closing:] : Thank you very much for allowing me to participate in the care of this patient.  If you have any questions, please do not hesitate to contact me. [Sincerely,] : Sincerely, [DrConnie  ___] : Dr. FITZPATRICK

## 2019-04-23 NOTE — PATIENT PROFILE ADULT - TOBACCO CESSATION EDUCATION/COUNSELLING(PROVIDED IF TOBACCO USED IN THE PAST 30 DAYS) NON CORE MEASURE SITES
Notification Instructions: Patient will be notified of biopsy results. However, patient instructed to call the office if not contacted within 2 weeks. Offered and provided

## 2019-05-26 NOTE — H&P ADULT - NECK DETAILS
Unclear why patient not on statin therapy     small left posterior cervical lymph nodes/supple/no JVD

## 2019-06-11 ENCOUNTER — APPOINTMENT (OUTPATIENT)
Dept: ENDOCRINOLOGY | Facility: CLINIC | Age: 34
End: 2019-06-11

## 2019-06-28 ENCOUNTER — APPOINTMENT (OUTPATIENT)
Dept: NEUROLOGY | Facility: CLINIC | Age: 34
End: 2019-06-28
Payer: MEDICARE

## 2019-06-28 VITALS
SYSTOLIC BLOOD PRESSURE: 140 MMHG | WEIGHT: 304 LBS | HEIGHT: 65 IN | BODY MASS INDEX: 50.65 KG/M2 | DIASTOLIC BLOOD PRESSURE: 80 MMHG

## 2019-06-28 PROCEDURE — 99214 OFFICE O/P EST MOD 30 MIN: CPT

## 2019-06-28 NOTE — REASON FOR VISIT
[Follow-Up: _____] : a [unfilled] follow-up visit [Other: _____] : [unfilled] [FreeTextEntry1] : New problem- seizure

## 2019-06-28 NOTE — HISTORY OF PRESENT ILLNESS
[FreeTextEntry1] : 11/3/2017\par This is a 32-year-old woman who is here today for evaluation of tremor. He states that he is 100% sure why he is here but thinks it's because he was having a tremor and response to medications. He was on lithium as a mood stabilizer for presumed bipolar disease. He developed tremor and altered mental status, this resolved when he was taken off of lithium and he was placed on Cogentin as well. Today he comes here with no specific neurologic complaints, stating that he had a sleep apnea study last night and that he needs to be put on CPAP. He follows with psychiatry regarding his bipolar disease.\par \par Followup for new problem June 28, 2019:\par This is a 34-year-old man who presents today for seizure. He was seen about a year and a half ago with tremors. Currently he comes in for seizure disorder. He had a seizure at the beginning of the year. He was seen at [facility] by different neurologist. MRI was done which was normal. EEG was done which didn't show seizure activity. He was started on Keppra 500 mg twice a day. He is here today to establish care with a new neurologist. He is accompanied by a nurse or a family service league

## 2019-06-28 NOTE — PHYSICAL EXAM
[Person] : oriented to person [Time] : oriented to time [Place] : oriented to place [Remote Intact] : remote memory intact [Span Intact] : the attention span was normal [Registration Intact] : recent registration memory intact [Naming Objects] : no difficulty naming common objects [Visual Intact] : visual attention was ~T not ~L decreased [Concentration Intact] : normal concentrating ability [Fluency] : fluency intact [Repeating Phrases] : no difficulty repeating a phrase [Comprehension] : comprehension intact [Cranial Nerves Optic (II)] : visual acuity intact bilaterally,  visual fields full to confrontation, pupils equal round and reactive to light [Current Events] : adequate knowledge of current events [Past History] : adequate knowledge of personal past history [Cranial Nerves Trigeminal (V)] : facial sensation intact symmetrically [Cranial Nerves Oculomotor (III)] : extraocular motion intact [Cranial Nerves Facial (VII)] : face symmetrical [Cranial Nerves Glossopharyngeal (IX)] : tongue and palate midline [Cranial Nerves Vestibulocochlear (VIII)] : hearing was intact bilaterally [Cranial Nerves Accessory (XI - Cranial And Spinal)] : head turning and shoulder shrug symmetric [Motor Strength] : muscle strength was normal in all four extremities [Cranial Nerves Hypoglossal (XII)] : there was no tongue deviation with protrusion [Motor Tone] : muscle tone was normal in all four extremities [Involuntary Movements] : no involuntary movements were seen [No Muscle Atrophy] : normal bulk in all four extremities [Motor Handedness Right-Handed] : the patient is right hand dominant [Paresis Pronator Drift Left-Sided] : no pronator drift on the left [Paresis Pronator Drift Right-Sided] : no pronator drift on the right [Sensation Tactile Decrease] : light touch was intact [Sensation Vibration Decrease] : vibration was intact [Sensation Pain / Temperature Decrease] : pain and temperature was intact [Balance] : balance was intact [Proprioception] : proprioception was intact [Tremor] : no tremor present [Abnormal Walk] : normal gait [Coordination - Dysmetria Impaired Finger-to-Nose Bilateral] : not present [2+] : Patella left 2+ [Extraocular Movements] : extraocular movements were intact [Sclera] : the sclera and conjunctiva were normal [PERRL With Normal Accommodation] : pupils were equal in size, round, reactive to light, with normal accommodation [No APD] : no afferent pupillary defect [Full Visual Field] : full visual field [No ALAN] : no internuclear ophthalmoplegia

## 2019-06-28 NOTE — CONSULT LETTER
[Courtesy Letter:] : I had the pleasure of seeing your patient, [unfilled], in my office today. [Please see my note below.] : Please see my note below. [FreeTextEntry2] : no PCP [Sincerely,] : Sincerely, [Consult Closing:] : Thank you very much for allowing me to participate in the care of this patient.  If you have any questions, please do not hesitate to contact me. [FreeTextEntry3] : Jason Weaver M.D., Ph.D. DPN-N\par [facility]\par Neurology at [place]\par Medical Director of Stroke Services\par [facility]\par

## 2019-06-28 NOTE — ASSESSMENT
[FreeTextEntry1] : This is a 34-year-old man with seizure disorder on Keppra 500 mg twice a day. I will continue this for him and absent in the prescription for him. I will see him back in the office in 6 months, sooner should the need arise. He was told to call he should have any breakthrough seizures.

## 2019-06-28 NOTE — PROGRESS NOTE ADULT - PROBLEM SELECTOR PLAN 2
--suspect medication related although possible ITP.  No significant schistocytes on smear.   --CT head w/o contrast negative for acute intracranial abnormality  --Transfuse for platelet less than 50,000 given active bleeding  --cbc q 8 hours   repletion.

## 2019-07-11 ENCOUNTER — APPOINTMENT (OUTPATIENT)
Dept: ENDOCRINOLOGY | Facility: CLINIC | Age: 34
End: 2019-07-11
Payer: MEDICARE

## 2019-07-11 ENCOUNTER — RESULT CHARGE (OUTPATIENT)
Age: 34
End: 2019-07-11

## 2019-07-11 VITALS
SYSTOLIC BLOOD PRESSURE: 136 MMHG | HEART RATE: 76 BPM | HEIGHT: 65 IN | DIASTOLIC BLOOD PRESSURE: 94 MMHG | BODY MASS INDEX: 48.98 KG/M2 | WEIGHT: 294 LBS

## 2019-07-11 DIAGNOSIS — Z82.49 FAMILY HISTORY OF ISCHEMIC HEART DISEASE AND OTHER DISEASES OF THE CIRCULATORY SYSTEM: ICD-10-CM

## 2019-07-11 DIAGNOSIS — Z83.3 FAMILY HISTORY OF DIABETES MELLITUS: ICD-10-CM

## 2019-07-11 DIAGNOSIS — Z83.49 FAMILY HISTORY OF OTHER ENDOCRINE, NUTRITIONAL AND METABOLIC DISEASES: ICD-10-CM

## 2019-07-11 LAB — GLUCOSE BLDC GLUCOMTR-MCNC: 409

## 2019-07-11 PROCEDURE — 82962 GLUCOSE BLOOD TEST: CPT

## 2019-07-11 PROCEDURE — 99204 OFFICE O/P NEW MOD 45 MIN: CPT | Mod: 25

## 2019-07-11 PROCEDURE — 99214 OFFICE O/P EST MOD 30 MIN: CPT | Mod: 25

## 2019-07-11 RX ORDER — GLIPIZIDE 10 MG/1
10 TABLET ORAL
Refills: 0 | Status: DISCONTINUED | COMMUNITY
End: 2019-07-11

## 2019-07-11 RX ORDER — LAMOTRIGINE 100 MG/1
100 TABLET ORAL
Qty: 30 | Refills: 0 | Status: DISCONTINUED | COMMUNITY
Start: 2019-02-19 | End: 2019-07-11

## 2019-07-11 RX ORDER — QUETIAPINE 300 MG/1
300 TABLET, EXTENDED RELEASE ORAL
Refills: 0 | Status: DISCONTINUED | COMMUNITY
End: 2019-07-11

## 2019-07-11 RX ORDER — LEVETIRACETAM 500 MG/1
500 TABLET, FILM COATED ORAL
Qty: 60 | Refills: 5 | Status: DISCONTINUED | COMMUNITY
Start: 2019-06-28 | End: 2019-07-11

## 2019-07-11 RX ORDER — ASPIRIN 325 MG/1
325 TABLET ORAL
Qty: 30 | Refills: 0 | Status: DISCONTINUED | COMMUNITY
Start: 2019-01-25 | End: 2019-07-11

## 2019-07-11 RX ORDER — GLIPIZIDE 10 MG/1
10 TABLET, FILM COATED, EXTENDED RELEASE ORAL
Qty: 30 | Refills: 0 | Status: DISCONTINUED | COMMUNITY
Start: 2018-12-21 | End: 2019-07-11

## 2019-07-11 NOTE — DATA REVIEWED
[FreeTextEntry1] : FS ketone 0.1\par labs 1/2019 A1c 10.1, TSH 2.33\par \par labs 3/2019 Tg 550, HDL 54\par labs 3/2019 Tg 225m HDL 46, , A1c 12.3\par \par labs 6/20/19 Cr 3.40 GFR 22, gluc 336

## 2019-07-11 NOTE — REVIEW OF SYSTEMS
[Fatigue] : fatigue [Recent Weight Gain (___ Lbs)] : recent [unfilled] ~Ulb weight gain [SOB on Exertion] : shortness of breath during exertion [Polyuria] : polyuria [Nocturia] : nocturia [Negative] : Eyes [Polydipsia] : polydipsia [Blurry Vision] : no blurred vision [Chest Pain] : no chest pain [Shortness Of Breath] : no shortness of breath [Palpitations] : no palpitations [Nausea] : no nausea [Vomiting] : no vomiting was observed [Constipation] : no constipation [Diarrhea] : no diarrhea [Abdominal Pain] : no abdominal pain [Pain/Numbness of Digits] : no pain/numbness of digits [FreeTextEntry6] : sleep apnea

## 2019-07-11 NOTE — ASSESSMENT
[Long Term Vascular Complications] : long term vascular complications of diabetes [Action and use of Insulin] : action and use of short and long-acting insulin [Insulin Self-Administration] : insulin self-administration [Self Monitoring of Blood Glucose] : self monitoring of blood glucose [Injection Technique, Storage, Sharps Disposal] : injection technique, storage, and sharps disposal [Retinopathy Screening] : Patient was referred to ophthalmology for retinopathy screening [FreeTextEntry1] : 34 year old morbidly obese male with uncontrolled T2DM complicated by CKD. Uncontrolled glucoses due to diet, psych meds and lack of proper testing supplies\par - stop Januvia\par - increase Lantus 22 units daily\par - start Humalog scale premeals :  4 units, 151-200 5 units, 201-300 6 units, 301+ 7 units\par - pt asked to start testing sugars and record numbers on logs sheets, rx for glucometer sent\par - we discussed hypoglycemia risks of insulin therapy in setting of CKD\par - he will need close follow up\par - eye exam with ophthalmology\par - rec cardio eval for COOPER with uncontrolled diabetes\par - he will need to lose weight with lifestyle changes\par - updated labs needed

## 2019-07-11 NOTE — HISTORY OF PRESENT ILLNESS
[FreeTextEntry1] : Quality: Type 2\par Severity: severe, uncontrolled\par Duration: 2004\par Onset: hospitalized for psychiatric illness and diagnosed with diabetes at that time\par \par ASSOCIATED SYMPTOMS/COMPLICATIONS: no recent eye exam, denies neuropathy. he has CKD and following with nephrology.  he denies history of heart disease.\par \par MODIFYING FACTORS: staying at father house\par Diet: like to eat out ([employer]eli or rice/beans) or cooks at home. Eats morning and light dinner. He does not always eat lunch.\par Exercise: not that much\par Current DM meds:\par Lantus (pens) 18 units in morning\par Januvia 100 mg daily\par Glipizide stopped 2 months ago. Has been on Metformin in past. \par \par SMBG - he does not have a working glucometer. he has not tested for several months\par

## 2019-07-11 NOTE — PHYSICAL EXAM
[Alert] : alert [No Acute Distress] : no acute distress [Well Nourished] : well nourished [Well Developed] : well developed [Normal Sclera/Conjunctiva] : normal sclera/conjunctiva [EOMI] : extra ocular movement intact [Normal Rate and Effort] : normal respiratory rhythm and effort [Clear to Auscultation] : lungs were clear to auscultation bilaterally [Normal Rate] : heart rate was normal  [Normal S1, S2] : normal S1 and S2 [Normal Bowel Sounds] : normal bowel sounds [Not Tender] : non-tender [Soft] : abdomen soft [Cranial Nerves Intact] : cranial nerves 2-12 were intact [Normal Reflexes] : deep tendon reflexes were 2+ and symmetric [No Tremors] : no tremors [Oriented x3] : oriented to person, place, and time [Normal Insight/Judgement] : insight and judgment were intact [Normal Affect] : the affect was normal [Normal Mood] : the mood was normal [de-identified] : obese

## 2019-07-12 ENCOUNTER — MEDICATION RENEWAL (OUTPATIENT)
Age: 34
End: 2019-07-12

## 2019-07-12 RX ORDER — INSULIN LISPRO 100 [IU]/ML
100 INJECTION, SOLUTION INTRAVENOUS; SUBCUTANEOUS
Qty: 2 | Refills: 0 | Status: DISCONTINUED | COMMUNITY
Start: 2019-07-11 | End: 2019-07-12

## 2019-08-29 ENCOUNTER — OTHER (OUTPATIENT)
Age: 34
End: 2019-08-29

## 2019-08-29 ENCOUNTER — APPOINTMENT (OUTPATIENT)
Dept: FAMILY MEDICINE | Facility: CLINIC | Age: 34
End: 2019-08-29
Payer: MEDICARE

## 2019-08-29 VITALS
BODY MASS INDEX: 47.82 KG/M2 | DIASTOLIC BLOOD PRESSURE: 70 MMHG | OXYGEN SATURATION: 97 % | WEIGHT: 287 LBS | HEART RATE: 77 BPM | SYSTOLIC BLOOD PRESSURE: 128 MMHG | HEIGHT: 65 IN

## 2019-08-29 VITALS — DIASTOLIC BLOOD PRESSURE: 70 MMHG | SYSTOLIC BLOOD PRESSURE: 132 MMHG

## 2019-08-29 DIAGNOSIS — F39 UNSPECIFIED MOOD [AFFECTIVE] DISORDER: ICD-10-CM

## 2019-08-29 DIAGNOSIS — Z00.00 ENCOUNTER FOR GENERAL ADULT MEDICAL EXAMINATION W/OUT ABNORMAL FINDINGS: ICD-10-CM

## 2019-08-29 DIAGNOSIS — E11.22 TYPE 2 DIABETES MELLITUS WITH DIABETIC CHRONIC KIDNEY DISEASE: ICD-10-CM

## 2019-08-29 DIAGNOSIS — N18.3 TYPE 2 DIABETES MELLITUS WITH DIABETIC CHRONIC KIDNEY DISEASE: ICD-10-CM

## 2019-08-29 PROCEDURE — 99204 OFFICE O/P NEW MOD 45 MIN: CPT

## 2019-08-29 NOTE — HISTORY OF PRESENT ILLNESS
[Formal Caregiver] : formal caregiver [FreeTextEntry1] : NP, Establishing care pt was sent by lakisha montaño  [de-identified] : 33 yo male presents to office c nurse (VIVIEN)  "Alejandro" for establish care visit.  Pt referred by Endo  Dr. Mccain who is managing his Type 2 DM.\par \par Denies CP/SOB at time of visit,  pt states use ProAir MDI prn for SOB.  no dizziness today no palpitations \par no N/V/D today +BM qd NL no bloody/black stools  denies dysuria

## 2019-08-29 NOTE — PHYSICAL EXAM
[No Acute Distress] : no acute distress [Well Nourished] : well nourished [Well Developed] : well developed [Well-Appearing] : well-appearing [EOMI] : extraocular movements intact [Normal Outer Ear/Nose] : the outer ears and nose were normal in appearance [No JVD] : no jugular venous distention [No Respiratory Distress] : no respiratory distress  [No Accessory Muscle Use] : no accessory muscle use [Clear to Auscultation] : lungs were clear to auscultation bilaterally [Normal Rate] : normal rate  [Regular Rhythm] : with a regular rhythm [Normal S1, S2] : normal S1 and S2 [No Edema] : there was no peripheral edema [Non-distended] : non-distended [No CVA Tenderness] : no CVA  tenderness [Grossly Normal Strength/Tone] : grossly normal strength/tone [No Rash] : no rash [Coordination Grossly Intact] : coordination grossly intact [No Focal Deficits] : no focal deficits [Normal Gait] : normal gait [Normal Affect] : the affect was normal [Normal Mood] : the mood was normal [Normal Insight/Judgement] : insight and judgment were intact

## 2019-08-29 NOTE — ASSESSMENT
[FreeTextEntry1] : Start KCL 10 mEQ qd secondary to K+ =3.1   repeat SMA-7 2 weeks \par Start Crestor 5mg qod secondary to LDL= 119,  goal < 70 secondary to DM2  \par \par \par \par Psychiatrist: Dr. Park  ([facility])  f/u monthly \par Nephrology:  Dr. Fink ([place]) f/u q 3M, next f/u 9/12/19\par Cardiology= Dr. Gutierrez

## 2019-09-05 ENCOUNTER — MEDICATION RENEWAL (OUTPATIENT)
Age: 34
End: 2019-09-05

## 2019-09-05 LAB
ALBUMIN SERPL ELPH-MCNC: 3.4 G/DL
ALP BLD-CCNC: 113 U/L
ALT SERPL-CCNC: 15 U/L
ANION GAP SERPL CALC-SCNC: 15 MMOL/L
AST SERPL-CCNC: 11 U/L
BASOPHILS # BLD AUTO: 0.06 K/UL
BASOPHILS NFR BLD AUTO: 0.4 %
BILIRUB SERPL-MCNC: 0.3 MG/DL
BUN SERPL-MCNC: 25 MG/DL
CALCIUM SERPL-MCNC: 9.1 MG/DL
CHLORIDE SERPL-SCNC: 92 MMOL/L
CHOLEST SERPL-MCNC: 215 MG/DL
CHOLEST/HDLC SERPL: 5.7 RATIO
CO2 SERPL-SCNC: 28 MMOL/L
CREAT SERPL-MCNC: 3.06 MG/DL
CREAT SPEC-SCNC: 103 MG/DL
EOSINOPHIL # BLD AUTO: 0.14 K/UL
EOSINOPHIL NFR BLD AUTO: 1 %
ESTIMATED AVERAGE GLUCOSE: >398 MG/DL
GLUCOSE SERPL-MCNC: 405 MG/DL
HBA1C MFR BLD HPLC: >15.5 %
HCT VFR BLD CALC: 34.8 %
HDLC SERPL-MCNC: 38 MG/DL
HGB BLD-MCNC: 11.1 G/DL
IMM GRANULOCYTES NFR BLD AUTO: 0.4 %
LDLC SERPL CALC-MCNC: 119 MG/DL
LYMPHOCYTES # BLD AUTO: 2.86 K/UL
LYMPHOCYTES NFR BLD AUTO: 21.1 %
MAN DIFF?: NORMAL
MCHC RBC-ENTMCNC: 24.2 PG
MCHC RBC-ENTMCNC: 31.9 GM/DL
MCV RBC AUTO: 76 FL
MICROALBUMIN 24H UR DL<=1MG/L-MCNC: 177.4 MG/DL
MICROALBUMIN/CREAT 24H UR-RTO: 1721 MG/G
MONOCYTES # BLD AUTO: 0.71 K/UL
MONOCYTES NFR BLD AUTO: 5.2 %
NEUTROPHILS # BLD AUTO: 9.71 K/UL
NEUTROPHILS NFR BLD AUTO: 71.9 %
PLATELET # BLD AUTO: 324 K/UL
POTASSIUM SERPL-SCNC: 3.1 MMOL/L
PROT SERPL-MCNC: 6.9 G/DL
RBC # BLD: 4.58 M/UL
RBC # FLD: 15.9 %
SODIUM SERPL-SCNC: 135 MMOL/L
TRIGL SERPL-MCNC: 289 MG/DL
TSH SERPL-ACNC: 0.6 UIU/ML
WBC # FLD AUTO: 13.54 K/UL

## 2019-09-10 ENCOUNTER — LABORATORY RESULT (OUTPATIENT)
Age: 34
End: 2019-09-10

## 2019-09-13 ENCOUNTER — RESULT CHARGE (OUTPATIENT)
Age: 34
End: 2019-09-13

## 2019-09-13 ENCOUNTER — NON-APPOINTMENT (OUTPATIENT)
Age: 34
End: 2019-09-13

## 2019-09-13 ENCOUNTER — APPOINTMENT (OUTPATIENT)
Dept: FAMILY MEDICINE | Facility: CLINIC | Age: 34
End: 2019-09-13
Payer: MEDICARE

## 2019-09-13 VITALS
TEMPERATURE: 98 F | BODY MASS INDEX: 48.98 KG/M2 | OXYGEN SATURATION: 96 % | WEIGHT: 294 LBS | DIASTOLIC BLOOD PRESSURE: 86 MMHG | SYSTOLIC BLOOD PRESSURE: 122 MMHG | HEIGHT: 65 IN | HEART RATE: 76 BPM

## 2019-09-13 VITALS — DIASTOLIC BLOOD PRESSURE: 82 MMHG | SYSTOLIC BLOOD PRESSURE: 118 MMHG

## 2019-09-13 PROCEDURE — 82962 GLUCOSE BLOOD TEST: CPT

## 2019-09-13 PROCEDURE — 99214 OFFICE O/P EST MOD 30 MIN: CPT | Mod: 25

## 2019-09-13 PROCEDURE — 93000 ELECTROCARDIOGRAM COMPLETE: CPT

## 2019-09-13 RX ORDER — CHOLECALCIFEROL (VITAMIN D3) 25 MCG
25 MCG TABLET,CHEWABLE ORAL
Refills: 0 | Status: DISCONTINUED | COMMUNITY
End: 2019-09-13

## 2019-09-13 NOTE — HISTORY OF PRESENT ILLNESS
[de-identified] : 35 yo male  presents to office for f/u on DM2 insulin dependent.  pt fasting glucose today in office = 443.  pt admits to heavy carb dinner last night   (rice, beans, chicken(baked))     7pm  and went to bed at 10 pm.  \par Today pt admits to fatigue x "few days"  no H/A 's no dizziness no sweats no CP no SOB today in office \par \par \par **s/p Nephrology consult c  Dr. Fink yesterday,  Crestor 5mg qod increased to 10mg daily \par \par pt states has NOT been taking Novolog for meal coverage ONLY Lantus 22U daily  [FreeTextEntry1] : f/u on labs \par glucose 443mg/ml fasting

## 2019-09-13 NOTE — PHYSICAL EXAM
[No Acute Distress] : no acute distress [Well Nourished] : well nourished [Well Developed] : well developed [EOMI] : extraocular movements intact [Well-Appearing] : well-appearing [Normal Outer Ear/Nose] : the outer ears and nose were normal in appearance [No JVD] : no jugular venous distention [No Respiratory Distress] : no respiratory distress  [No Accessory Muscle Use] : no accessory muscle use [Normal Rate] : normal rate  [Clear to Auscultation] : lungs were clear to auscultation bilaterally [Regular Rhythm] : with a regular rhythm [No Edema] : there was no peripheral edema [Normal S1, S2] : normal S1 and S2 [No CVA Tenderness] : no CVA  tenderness [Non-distended] : non-distended [Grossly Normal Strength/Tone] : grossly normal strength/tone [No Rash] : no rash [No Focal Deficits] : no focal deficits [Coordination Grossly Intact] : coordination grossly intact [Normal Affect] : the affect was normal [Normal Gait] : normal gait [Normal Mood] : the mood was normal [Normal Insight/Judgement] : insight and judgment were intact

## 2019-09-13 NOTE — ASSESSMENT
[FreeTextEntry1] : pt given 5 units of Humalog in office.    along c increased PO H20  \par **pt must take Novolog as directed for meal coverage**  3U 151-200 4U 201-300 5U  301+ 5U \par \par ** increase Lantus 22U SQ q am to 24U SQ q am \par \par ***EKG performed: NO Acute changes c/w EKG 12/18  \par \par  Pt should only take Crestor 5mg daily NOT 10mg daily secondary to starting statin therapy 2 weeks ago and secondary to hx of mild alk phos elevation.\par repeat lipid profile end of the month.  \par \par cont KCL 10 mEQ daily  \par \par see referral   \par \par **pt d/c'ed with glucose= 374

## 2019-09-16 NOTE — H&P ADULT - NSHPPOADEEPVENOUSTHROMB_GEN_A_CORE
no Solaraze Pregnancy And Lactation Text: This medication is Pregnancy Category B and is considered safe. There is some data to suggest avoiding during the third trimester. It is unknown if this medication is excreted in breast milk.

## 2019-09-23 ENCOUNTER — MOBILE ON CALL (OUTPATIENT)
Age: 34
End: 2019-09-23

## 2019-09-26 ENCOUNTER — RESULT CHARGE (OUTPATIENT)
Age: 34
End: 2019-09-26

## 2019-09-26 ENCOUNTER — APPOINTMENT (OUTPATIENT)
Dept: FAMILY MEDICINE | Facility: CLINIC | Age: 34
End: 2019-09-26
Payer: MEDICARE

## 2019-09-26 VITALS
HEART RATE: 88 BPM | WEIGHT: 294 LBS | DIASTOLIC BLOOD PRESSURE: 88 MMHG | HEIGHT: 65 IN | SYSTOLIC BLOOD PRESSURE: 136 MMHG | OXYGEN SATURATION: 97 % | BODY MASS INDEX: 48.98 KG/M2

## 2019-09-26 LAB
ALBUMIN SERPL ELPH-MCNC: 3.6 G/DL
ALP BLD-CCNC: 121 U/L
ALT SERPL-CCNC: 16 U/L
ANION GAP SERPL CALC-SCNC: 16 MMOL/L
AST SERPL-CCNC: 13 U/L
BILIRUB DIRECT SERPL-MCNC: 0.1 MG/DL
BILIRUB INDIRECT SERPL-MCNC: 0.1 MG/DL
BILIRUB SERPL-MCNC: 0.2 MG/DL
BUN SERPL-MCNC: 31 MG/DL
CALCIUM SERPL-MCNC: 9.1 MG/DL
CHLORIDE SERPL-SCNC: 93 MMOL/L
CHOLEST SERPL-MCNC: 190 MG/DL
CHOLEST/HDLC SERPL: 4.2 RATIO
CK SERPL-CCNC: 267 U/L
CO2 SERPL-SCNC: 26 MMOL/L
CREAT SERPL-MCNC: 2.88 MG/DL
GLUCOSE BLDC GLUCOMTR-MCNC: 65
GLUCOSE SERPL-MCNC: 472 MG/DL
HDLC SERPL-MCNC: 45 MG/DL
LDLC SERPL CALC-MCNC: 104 MG/DL
POTASSIUM SERPL-SCNC: 3.3 MMOL/L
PROT SERPL-MCNC: 7 G/DL
SODIUM SERPL-SCNC: 135 MMOL/L
TRIGL SERPL-MCNC: 207 MG/DL

## 2019-09-26 PROCEDURE — 99214 OFFICE O/P EST MOD 30 MIN: CPT | Mod: 25

## 2019-09-26 PROCEDURE — 82962 GLUCOSE BLOOD TEST: CPT

## 2019-09-26 NOTE — ASSESSMENT
[FreeTextEntry1] : **pt must take Novolog as directed for meal coverage**  3U 151-200 4U 201-300 5U 301+ 5U \par \par ** cont Lantus  24 U SQ  q am \par \par \par cont Crestor 5mg hs  \par \par increase  KCL 10 mEQ daily to 20 mEQ daily \par \par pt instructed to carry glucose tablets on person at ALL times \par

## 2019-09-26 NOTE — HISTORY OF PRESENT ILLNESS
[FreeTextEntry1] : f/u x dm/hld\par \par Glucose : 65 [de-identified] : 33 yo male for f/u on  DM2.   pt reports compliance c rx'ed insulin regimen.  Shaky and lightheaded at time of office visit.  pt admits to "skipping lunch" today    \par \par \par

## 2019-09-26 NOTE — PHYSICAL EXAM
[No Acute Distress] : no acute distress [Well Nourished] : well nourished [Well Developed] : well developed [Well-Appearing] : well-appearing [EOMI] : extraocular movements intact [Normal Outer Ear/Nose] : the outer ears and nose were normal in appearance [No Respiratory Distress] : no respiratory distress  [No JVD] : no jugular venous distention [Clear to Auscultation] : lungs were clear to auscultation bilaterally [No Accessory Muscle Use] : no accessory muscle use [Normal S1, S2] : normal S1 and S2 [Regular Rhythm] : with a regular rhythm [Normal Rate] : normal rate  [No Edema] : there was no peripheral edema [No Carotid Bruits] : no carotid bruits [No CVA Tenderness] : no CVA  tenderness [Grossly Normal Strength/Tone] : grossly normal strength/tone [No Rash] : no rash [Coordination Grossly Intact] : coordination grossly intact [No Focal Deficits] : no focal deficits [Normal Gait] : normal gait [Normal Affect] : the affect was normal [Normal Mood] : the mood was normal [Normal Insight/Judgement] : insight and judgment were intact [de-identified] : obese abdomen

## 2019-10-10 ENCOUNTER — RESULT CHARGE (OUTPATIENT)
Age: 34
End: 2019-10-10

## 2019-10-10 ENCOUNTER — APPOINTMENT (OUTPATIENT)
Dept: ENDOCRINOLOGY | Facility: CLINIC | Age: 34
End: 2019-10-10
Payer: MEDICARE

## 2019-10-10 VITALS
WEIGHT: 285 LBS | BODY MASS INDEX: 47.48 KG/M2 | DIASTOLIC BLOOD PRESSURE: 86 MMHG | HEART RATE: 91 BPM | SYSTOLIC BLOOD PRESSURE: 126 MMHG | HEIGHT: 65 IN

## 2019-10-10 LAB
GLUCOSE BLDC GLUCOMTR-MCNC: 485
GLUCOSE BLDC GLUCOMTR-MCNC: 510

## 2019-10-10 PROCEDURE — 99215 OFFICE O/P EST HI 40 MIN: CPT | Mod: 25

## 2019-10-10 PROCEDURE — 82962 GLUCOSE BLOOD TEST: CPT

## 2019-10-10 NOTE — REVIEW OF SYSTEMS
[Fatigue] : fatigue [SOB on Exertion] : shortness of breath during exertion [Polyuria] : polyuria [Nocturia] : nocturia [Polydipsia] : polydipsia [Recent Weight Loss (___ Lbs)] : recent [unfilled] ~Ulb weight loss [Blurry Vision] : no blurred vision [Chest Pain] : no chest pain [Palpitations] : no palpitations [Shortness Of Breath] : no shortness of breath [Nausea] : no nausea [Vomiting] : no vomiting was observed [Constipation] : no constipation [Diarrhea] : no diarrhea [Abdominal Pain] : no abdominal pain [Pain/Numbness of Digits] : no pain/numbness of digits [FreeTextEntry6] : sleep apnea

## 2019-10-10 NOTE — ASSESSMENT
[Long Term Vascular Complications] : long term vascular complications of diabetes [Self Monitoring of Blood Glucose] : self monitoring of blood glucose [Action and use of Insulin] : action and use of short and long-acting insulin [Injection Technique, Storage, Sharps Disposal] : injection technique, storage, and sharps disposal [Insulin Self-Administration] : insulin self-administration [Retinopathy Screening] : Patient was referred to ophthalmology for retinopathy screening [FreeTextEntry1] : 34 year old morbidly obese male with uncontrolled T2DM complicated by CKD. Uncontrolled glucoses due to diet, psych meds. Severe hyperglycemia today without symptoms and negative ketones\par - pt remained in office until glucoses improved, given several cups water and Humalog 8 units\par - cont Lantus 22 units daily\par - increase Humalog scale premeals :  8 units, 151-200 9 units, 201-300 10 units, 301+ 11 units\par - pt asked to start testing sugars and record numbers on logs sheets, bring logs to visits\par - we discussed hypoglycemia risks of insulin therapy in setting of CKD\par - he will need close follow up - advised CDE f/u\par - eye exam with ophthalmology\par - rec cardio eval for COOPER with uncontrolled diabetes\par - he will need to lose weight with lifestyle changes\par - counseled pt on stopping juice\par \par HLD - improved, cont statin

## 2019-10-10 NOTE — PHYSICAL EXAM
[Alert] : alert [No Acute Distress] : no acute distress [Well Nourished] : well nourished [Well Developed] : well developed [Normal Sclera/Conjunctiva] : normal sclera/conjunctiva [EOMI] : extra ocular movement intact [Normal Rate and Effort] : normal respiratory rhythm and effort [Clear to Auscultation] : lungs were clear to auscultation bilaterally [Normal Rate] : heart rate was normal  [Normal S1, S2] : normal S1 and S2 [Normal Bowel Sounds] : normal bowel sounds [Not Tender] : non-tender [Soft] : abdomen soft [Cranial Nerves Intact] : cranial nerves 2-12 were intact [Normal Reflexes] : deep tendon reflexes were 2+ and symmetric [No Tremors] : no tremors [Oriented x3] : oriented to person, place, and time [Normal Insight/Judgement] : insight and judgment were intact [Normal Affect] : the affect was normal [Normal Mood] : the mood was normal [de-identified] : obese

## 2019-10-10 NOTE — HISTORY OF PRESENT ILLNESS
[FreeTextEntry1] :  today in office, ketone 0.1 - after breakfast sandwich and juice, given cups of water and 8 units Humalog subcutaneous\par accompanied by nurse\par \par Quality: Type 2\par Severity: severe, uncontrolled\par Duration: 2004\par Onset: hospitalized for psychiatric illness and diagnosed with diabetes at that time\par \par ASSOCIATED SYMPTOMS/COMPLICATIONS: no recent eye exam, denies neuropathy. (+) proteinuria - he has CKD and following with nephrology.  he denies history of heart disease.\par \par MODIFYING FACTORS: staying at father house\par Diet: lnonadherent with diet.  Eats morning and light dinner. He does not always eat lunch.\par Exercise: not that much\par Current DM meds:\par Lantus (pens) 24 units in morning\par Humalog scale premeals :  4 units, 151-200 5 units, 201-300 6 units, 301+ 7 units\par \par SMBG - no logs/meter. testing on some days. per pt 's this am. testing 2x daily - per pt -300's\par

## 2019-11-04 ENCOUNTER — RX RENEWAL (OUTPATIENT)
Age: 34
End: 2019-11-04

## 2019-11-05 NOTE — ED PROVIDER NOTE - PSYCHIATRIC, MLM
[facility] Cardiology   Comprehensive Heart Failure Clinic    Heart Failure Follow Up Visit    Date of visit: 11/5/2019  Patient Name: Antolin Emmanuel  Medical Record Number: [id number]  YOB: 1939   Primary Physician: Quincy Simon MD.    CHIEF COMPLAINT:    Chief Complaint   Patient presents with   • Follow-up     LOV 10/1/19; HFREF; CAD-PCI-NSTEMI; ICMP; PAD; PAF, ICD       SUBJECTIVE     HISTORY OF PRESENT ILLNESS:Antolin Emmanuel is a 80 year old male who presents to the clinic with the following CV (cardiovascular) history:    Patient with a history of coronary artery disease with s/p PCI of dominant circumflex in 2001 with restenosis and repeat balloon angioplasty 4 months later as well as drug-eluting stenting of the ostial circumflex in 2008. History positive for ischemic cardiomyopathy with EF 30-35% s/p biventricular ICD in 2014, last echo shows ejection fraction improved to 50%, followed by EP. He has mild global hypokinesis. He also had moderate MR, TR, and LVH. History positive for peripheral artery disease. The patient had critical limb ischemia of his fifth toe, we performed orbital atherectomy and drug-coated balloon angioplasty of the left SFA, no intervention since then. History also positive for paroxysmal atrial fibrillation, followed by EP, CKD.    Patient was hospitalized 9/8/2019 through 9/16/2019 with NSTEMI with diffuse vessel CAD, acute systolic heart failure.     Echocardiogram showed EF of 37%, with akinesis of the apex, mid to distal septum, and mid to distal anteroseptum. He was in acute heart failure on admission and was diuresed. INR was 1.7. He underwent cardiac catheterization on 9/9 that showed multivessel CAD including left main disease.  CABG was recommended and CT surgery consulted. CABG was scheduled then cancelled it was felt he would be too high risk for surgery.  Options were discussed with him, including transfer to [facility] for consideration of high risk CABG  or medical management. Patient chose medical management.     Since last visit:    He is accompanied by his wife    Today, patient reports that he is doing good. He notes some improvement in his symptoms. He continues with HF symptoms. He has occasional lightheadedness with position changes. He has ARROYO and lower extremity edema. He has a cough. He notes that he does have COPD, continues with occasional wheezing. He states that he is doing well in rehab. He is now at home.   He wife reports that she is happy with how he is doing. He has made great improvements.     He is currently a New [place] Heart Association class [] 1 [] 2 [x] 3 [] 4    HEART FAILURE MEDICATIONS   [] ACEi [x] ARB [x] BB (beta blocker) [] CCB (calcium channel blocker)   [] Corlanor   [] DIG (digoxin) [x] Diuretic  [] Entresto [] Hydralazine [] Aldactone [] Nitrate       DEVICES   [x] ICD (implantable cardioverter-defibrillator)  [] BIV (biventricular) - ICD [] PPM (permanent pacemaker) [] Life Vest  [] CardioMEMS  Device Company: [employer]    COMPLIANCE   Description   [x]  YES    []  NO Medication:   [x]  YES    []  NO Diet:    REVIEW OF SYSTEMS:  10 point review of systems was completed and is negative except as stated above.    MEDICATIONS  Outpatient Medications Marked as Taking for the 11/5/19 encounter (Office Visit) with ELIJAH Johnson   Medication Sig Dispense Refill   • insulin detemir (LEVEMIR FLEXTOUCH) 100 UNIT/ML pen-injector Inject 55 units in AM, and  30 units in the PM. Titrate up to 100 units total daily dose per MD, dx E11.51 15 mL 12   • allopurinol (ZYLOPRIM) 100 MG tablet Take 2 tablets by mouth daily. 180 tablet 1   • zoster vaccine recomb adjuvanted (SHINGRIX) 50 MCG/0.5ML injection Inject 0.5 mLs into the muscle 1 time. Repeat dose in 2 to 6 months (unless 1 dose already given), for a total of 2 doses. 1 each 1   • tamsulosin (FLOMAX) 0.4 MG Cap Take 1 capsule by mouth daily after a meal. For prostate. 90 capsule 3    • finasteride (PROSCAR) 5 MG tablet Take 1 tablet by mouth daily. 90 tablet 3   • budesonide-formoterol (SYMBICORT) 160-4.5 MCG/ACT inhaler Inhale 2 puffs into the lungs two times daily. 10.2 g 12   • albuterol-ipratropium 2.5 mg/0.5 mg (DUONEB) 0.5-2.5 (3) MG/3ML nebulizer solution Take 3 mLs by nebulization 4 times daily. 360 mL 2   • torsemide (DEMADEX) 20 MG tablet Take 2 tablets by mouth 2 times daily. 120 tablet 1   • silver sulfADIAZINE (THERMAZENE) 1 % cream Apply topically daily. Apply to a thickness of 1/16 inch (\"nickel thick\"). 85 g 1   • polyethylene glycol (MIRALAX) powder Take 17 g by mouth daily. 255 g 1   • nitroGLYcerin (NITROSTAT) 0.4 MG sublingual tablet Place 1 tablet under the tongue every 5 minutes as needed for Chest pain. For 3 doses. Notify your doctor if pain does not improve. 20 tablet 5   • losartan (COZAAR) 25 MG tablet Take 1 tablet by mouth daily. (Patient taking differently: Take 25 mg by mouth daily. Indications: High Blood Pressure Disorder ) 90 tablet 1   • loratadine (CLARITIN) 10 MG tablet Take 1 tablet by mouth daily (before breakfast). 90 tablet 1   • Insulin Pen Needle (BD PEN NEEDLE LYUBOV U/F) 32G X 4 MM Misc 2 times daily. USE 1 NEEDLE WITH INSULIN PEN DAILY 100 each 1   • guaiFENesin (MUCINEX) 600 MG 12 hr tablet Take 2 tablets by mouth 2 times daily. 360 tablet 1   • gabapentin (NEURONTIN) 300 MG capsule Take 1 capsule by mouth at bedtime. 90 capsule 1   • fluticasone (FLONASE) 50 MCG/ACT nasal spray Spray 2 sprays in each nostril daily. 16 g 0   • clopidogrel (PLAVIX) 75 MG tablet Take 1 tablet by mouth daily. 90 tablet 1   • carvedilol (COREG) 25 MG tablet Take 2 tablets by mouth 2 times daily (with meals). 360 tablet 1   • calcitRIOL (ROCALTROL) 0.5 MCG capsule Take 1 capsule by mouth 3 days a week. Take on Mon, Wed, Fri 30 capsule 1   • blood glucose (ONE TOUCH ULTRA TEST) test strip Test blood sugar 2 times daily as directed. Diagnosis: E11.42. Meter: one touch 100  each 3   • atorvastatin (LIPITOR) 80 MG tablet Take 1 tablet by mouth nightly. 90 tablet 1   • warfarin (COUMADIN) 2 MG tablet Take 5 mg by mouth on 9/18 and 9/19. INR on 9/20/19. 90 tablet 3   • Respiratory Therapy Supplies (NEBULIZER) Device albuterol-ipratropium 2.5 mg/0.5 mg (DUONEB) 0.5-2.5 (3) MG/3ML nebulizer solution, TAKE 3 MLS BY NEBULIZATION 4 TIMES DAILY. FOR DX: 496-[id number] 1 each 0   • acetaminophen (TYLENOL) 500 MG tablet Take 1,000 mg by mouth every 6 hours as needed for Pain.     • albuterol (VENTOLIN HFA) 108 (90 Base) MCG/ACT inhaler Inhale 2 puffs into the lungs every 6 hours as needed for Shortness of Breath or Wheezing. 1 Inhaler 3   • Ascorbic Acid (VITAMIN C) 500 MG tablet Take 500 mg by mouth every other day.      • Multiple Vitamins-Minerals (CENTRUM ULTRA MENS PO) Take 1 tablet by mouth daily.     • aspirin 81 MG tablet Take 1 tablet by mouth daily.         ALLERGIES:   Allergen Reactions   • Adhesive   (Environmental) RASH     Blisters.    • Latex   (Environmental) RASH and PRURITUS     Blister     • Penicillins RASH     Received 3 doses of ceftriaxone 12/25/18 - 12/27/18.       OBJECTIVE  PAST HISTORY:  I have reviewed the past medical history, family history, social history, medications and allergies listed in the medical record as obtained by my nursing staff and support staff and agree with their documentation.    PHYSICAL EXAMINATION:  Vitals:    Visit Vitals  Ht 5' 7\" (1.702 m)   Wt 107.7 kg   BMI 37.18 kg/m²      BMI (body mass index):Body mass index is 37.18 kg/m².  Constitutional: well nourished 80 year old male in no acute distress.  Skin: Warm, dry, intact, no lesions.  HEENT: Normocephalic, atraumatic. Oral mucous membranes moist   Neck: Supple, trachea midline, negative JVP.  Cardiovascular: Normal S1, S2. regular rhythm. Murmur: none.   Respiratory: Anterior/posterior lung sounds diminished to auscultation bilaterally. Rhonchi, anterior/posterior throughout lung  fields  Abdomen: Soft, nontender.  Musculoskeletal/Extremities:  1+ pitting BLE (bilateral lower extremities).  Neurological: No focal neurological deficits and speech normal. Sensation grossly intact.  Psychiatric: Alert and oriented to person, place and time.    LABORATORY:  Lab Results   Component Value Date    POTASSIUM 3.7 10/24/2019    SODIUM 144 10/24/2019    CO2 31 10/24/2019    CHLORIDE 105 10/24/2019    BUN 42 (H) 10/24/2019    CREATININE 1.51 (H) 10/24/2019    GLUCOSE 60 (L) 10/24/2019    CALCIUM 10.2 10/24/2019    WBC 8.5 10/18/2019    RBC 4.13 (L) 10/18/2019    HCT 41.8 10/18/2019    HGB 13.0 10/18/2019     10/18/2019    TSH 3.046 02/08/2016    CHOLESTEROL 135 10/18/2019    HDL 42 10/18/2019    CHOHDL 3.2 10/18/2019    TRIGLYCERIDE 117 10/18/2019    CALCLDL 70 10/18/2019    INR 3.2 10/30/2019    INR result >3.5 to be confirmed by venipuncture 10/30/2019    PSA 2.53 10/24/2019       DIAGNOSTICS:  The following diagnostics were reviewed.    Cardiac     9/9/2019 left heart cath  · Ost LAD to Prox LAD lesion with 95% stenosis.  · Ost Cx to Prox Cx lesion with 60% stenosis.  · Ost LM to Dist LM lesion with 70% stenosis.  · Ost Ramus to Ramus lesion with 70% stenosis.  · Prox LAD to Mid LAD lesion with 50% stenosis.  · Ost 1st Mrg lesion with 50% stenosis.  · Ost 1st Mrg to 1st Mrg lesion with 70% stenosis.  · Mid LAD lesion with 45% stenosis.  · 1st Mrg lesion with 30% stenosis.     79 year old male with CAD, prior PCI, ischemic cardiomyopathy, severe PAD, CKD who presents with UA/NSTEMI, heart failure, respiratory distress. He was medically stabilized overnight. Catheterization shows multivessel CAD including left main disease. Will discuss options with patient/family including CABG.         9/8/2019 Echo  Limited echo.  Moderately decreased left ventricular systolic function with akinesis of the apex, mid to distal septum and mid to distal  anteroseptum.  Previous echo images for comparison are not  available but based on reports, LV function is worse than echo in 2018 but similar to  echo in 2016.    Results for orders placed or performed during the hospital encounter of 09/08/19   Electrocardiogram 12-Lead   Result Value Ref Range    Ventricular Rate EKG/Min (BPM) 78     Atrial Rate (BPM) 76     QRS-Interval (MSEC) 158     QT-Interval (MSEC) 442     QTc 503     R Axis (Degrees) -68     T Axis (Degrees) 164     REPORT TEXT       Electronic ventricular pacemaker  When compared with ECG of  08-SEP-2019 19:24,  No significant change was found  Confirmed by ANA MERCADO MD ([id number]) on 9/10/2019 9:36:54 AM       Non-Cardiac   EPIC    ASSESSMENT:  #1 Recent non-ST elevation MI  #2 previous coronary artery disease as outlined above  #3 ischemic cardiomyopathy: Ejection fraction slightly worse compared to previous report  #4 ICD in place  #5 underlying atrial fibrillation: Taking warfarin  #6 chronic kidney disease  #7 acute decompensated heart failure, NYHA class III     Plan:  Patient and wife note that he is doing quiet well even though he continues with NYHA class III symptoms. He is now being discharged from outpatient PT from the SNF. Will send message to [facility] rehab to get him back into cardiac rehab.     He does have some continued heart failure symptoms, they are improving slowly with the bumex.     Labs as scheduled.     Use incentive spirometer every hour while awake  Use acapella(flutter device) every hour while awake  Elevate feet while sitting as much as possible  Education done on sodium and fluid restrictions.     Teaching: Patient has been advised to record weights on a daily basis.  Patient to call with a weight gain greater than 3 pounds in one day or 5 pounds in one week. Patient advised to follow a 2 gram sodium (2000mg)/2 liter (64 ounces) fluid restricted diet. Patient counseled to avoid the use of NSAID including but not limited to Ibuprofen, Advil and Aleve. Encouraged to check with community  pharmacist with all OTC (over the counter) medications to ensure product does not contain NSAIDs. We have explained that non-adherence to the theses instruction will result progressive heart failure symptoms, frequent hospitalizations and shortened life span.   Antolin Emmanuel verbalizes understanding of instructions.     Follow-up:  3 months    Clinic:MD     Visit coordinated by: Namita Day RN Heart Failure Coordinator.     Patient understands he/she can return sooner if any issues should arise.     ELIJAH Johnson  [facility] Heart Failure [facility]   Alert and oriented to person, place, time/situation. normal mood and affect. no apparent risk to self or others.

## 2019-11-18 ENCOUNTER — APPOINTMENT (OUTPATIENT)
Dept: ENDOCRINOLOGY | Facility: CLINIC | Age: 34
End: 2019-11-18

## 2019-11-19 ENCOUNTER — APPOINTMENT (OUTPATIENT)
Dept: ENDOCRINOLOGY | Facility: CLINIC | Age: 34
End: 2019-11-19

## 2019-11-22 ENCOUNTER — APPOINTMENT (OUTPATIENT)
Dept: FAMILY MEDICINE | Facility: CLINIC | Age: 34
End: 2019-11-22
Payer: MEDICARE

## 2019-11-22 VITALS
WEIGHT: 288 LBS | DIASTOLIC BLOOD PRESSURE: 80 MMHG | HEART RATE: 78 BPM | HEIGHT: 65 IN | OXYGEN SATURATION: 96 % | BODY MASS INDEX: 47.98 KG/M2 | SYSTOLIC BLOOD PRESSURE: 138 MMHG

## 2019-11-22 VITALS — SYSTOLIC BLOOD PRESSURE: 112 MMHG | DIASTOLIC BLOOD PRESSURE: 80 MMHG

## 2019-11-22 LAB — GLUCOSE BLDC GLUCOMTR-MCNC: 208

## 2019-11-22 PROCEDURE — 99214 OFFICE O/P EST MOD 30 MIN: CPT | Mod: 25

## 2019-11-22 PROCEDURE — 36415 COLL VENOUS BLD VENIPUNCTURE: CPT

## 2019-11-22 NOTE — ASSESSMENT
[FreeTextEntry1] : increase KCL from 20mEQ qd to 20mEQ bid \par \par see med orders\par \par see lab orders \par \par CLOSE MONITORING!!!\par \par \par f/u c Nephro Dr. Fink 1/2020\par WT LOSS A MUST!!!!!!!!! for transplant  eligibility

## 2019-11-22 NOTE — HISTORY OF PRESENT ILLNESS
[FreeTextEntry1] : f/u x DM/HLD\par \par GLU: 208 [de-identified] : 33 yo  male for f/u on DM2/HLD/CKD Stage 4\par \par \par POCT Glucose=20

## 2019-11-25 LAB
ALBUMIN SERPL ELPH-MCNC: 3.6 G/DL
ALP BLD-CCNC: 122 U/L
ALT SERPL-CCNC: 10 U/L
ANION GAP SERPL CALC-SCNC: 16 MMOL/L
AST SERPL-CCNC: 9 U/L
BASOPHILS # BLD AUTO: 0.08 K/UL
BASOPHILS NFR BLD AUTO: 0.5 %
BILIRUB SERPL-MCNC: <0.2 MG/DL
BUN SERPL-MCNC: 26 MG/DL
CALCIUM SERPL-MCNC: 9.8 MG/DL
CHLORIDE SERPL-SCNC: 93 MMOL/L
CO2 SERPL-SCNC: 27 MMOL/L
CREAT SERPL-MCNC: 2.85 MG/DL
EOSINOPHIL # BLD AUTO: 0.11 K/UL
EOSINOPHIL NFR BLD AUTO: 0.8 %
GLUCOSE SERPL-MCNC: 174 MG/DL
HCT VFR BLD CALC: 35.3 %
HGB BLD-MCNC: 11.6 G/DL
IMM GRANULOCYTES NFR BLD AUTO: 0.7 %
LYMPHOCYTES # BLD AUTO: 3.96 K/UL
LYMPHOCYTES NFR BLD AUTO: 27 %
MAN DIFF?: NORMAL
MCHC RBC-ENTMCNC: 24.2 PG
MCHC RBC-ENTMCNC: 32.9 GM/DL
MCV RBC AUTO: 73.5 FL
MONOCYTES # BLD AUTO: 0.78 K/UL
MONOCYTES NFR BLD AUTO: 5.3 %
NEUTROPHILS # BLD AUTO: 9.62 K/UL
NEUTROPHILS NFR BLD AUTO: 65.7 %
PLATELET # BLD AUTO: 400 K/UL
POTASSIUM SERPL-SCNC: 2.9 MMOL/L
PROT SERPL-MCNC: 7.2 G/DL
RBC # BLD: 4.8 M/UL
RBC # FLD: 15.4 %
SODIUM SERPL-SCNC: 136 MMOL/L
WBC # FLD AUTO: 14.65 K/UL

## 2019-12-13 ENCOUNTER — MEDICATION RENEWAL (OUTPATIENT)
Age: 34
End: 2019-12-13

## 2020-01-08 ENCOUNTER — MEDICATION RENEWAL (OUTPATIENT)
Age: 35
End: 2020-01-08

## 2020-01-09 ENCOUNTER — LABORATORY RESULT (OUTPATIENT)
Age: 35
End: 2020-01-09

## 2020-01-09 ENCOUNTER — APPOINTMENT (OUTPATIENT)
Dept: ENDOCRINOLOGY | Facility: CLINIC | Age: 35
End: 2020-01-09
Payer: MEDICARE

## 2020-01-09 ENCOUNTER — RESULT CHARGE (OUTPATIENT)
Age: 35
End: 2020-01-09

## 2020-01-09 VITALS
DIASTOLIC BLOOD PRESSURE: 80 MMHG | BODY MASS INDEX: 47.15 KG/M2 | HEART RATE: 101 BPM | WEIGHT: 283 LBS | SYSTOLIC BLOOD PRESSURE: 140 MMHG | HEIGHT: 65 IN

## 2020-01-09 LAB
GLUCOSE BLDC GLUCOMTR-MCNC: 331
GLUCOSE BLDC GLUCOMTR-MCNC: 595

## 2020-01-09 PROCEDURE — 36415 COLL VENOUS BLD VENIPUNCTURE: CPT

## 2020-01-09 PROCEDURE — 82962 GLUCOSE BLOOD TEST: CPT

## 2020-01-09 PROCEDURE — 99215 OFFICE O/P EST HI 40 MIN: CPT | Mod: 25

## 2020-01-09 RX ORDER — ERGOCALCIFEROL (VITAMIN D2) 1250 MCG
50000 CAPSULE ORAL
Refills: 0 | Status: DISCONTINUED | COMMUNITY
End: 2020-01-09

## 2020-01-09 NOTE — ASSESSMENT
[Long Term Vascular Complications] : long term vascular complications of diabetes [Importance of Diet and Exercise] : importance of diet and exercise to improve glycemic control, achieve weight loss and improve cardiovascular health [FreeTextEntry1] : 34 year old morbidly obese male with :\par 1. uncontrolled T2DM complicated by CKD. Uncontrolled glucoses due to diet, psych meds. Severe hyperglycemia today without symptoms and negative ketones\par - pt remained in office until glucoses improved, given several cups water and Humalog 10 units, repeat \par - increase Lantus to 26 units and take at 1 pm to help with adherence\par - increase Humalog scale premeals :  10 units, 151-200 12 units, 201-300 14 units, 301-400 16 units, 401+ 18 units\par - pt asked to start testing sugars 4x daily and record numbers on logs sheets, bring logs to visits\par - we discussed hypoglycemia risks of insulin therapy in setting of CKD\par - he will need close follow up, f/u monthly\par - eye exam with ophthalmology\par - rec cardio eval for COOPER with uncontrolled diabetes\par - labs today: A1c, CMP\par - f/u renal\par - increase water intake\par \par HLD - improved, cont statin, labs today : lipid panel\par Morbid obesity -counseled pt on lifestyle changes with diet and exercise

## 2020-01-09 NOTE — HISTORY OF PRESENT ILLNESS
[FreeTextEntry1] :  today in office, ketone 0.1 - ate poorly today, fast food. took insulin after meal this am. he did not eat lunch\par \par Quality: Type 2\par Severity: severe, uncontrolled\par Duration: 2004\par Onset: hospitalized for psychiatric illness and diagnosed with diabetes at that time\par \par ASSOCIATED SYMPTOMS/COMPLICATIONS: \par no recent eye exam\par denies neuropathy. \par (+) proteinuria - he has CKD and following with nephrology.  \par he denies history of heart disease.\par \par MODIFYING FACTORS: staying at father house\par Diet: Nonadherent with diet.  Eats morning and light dinner. He does not always eat lunch.\par Exercise: not that much\par Current DM meds:\par Lantus (pens) 24 units in morning, sometimes forgets to take this\par Humalog scale premeals : 11 units\par \par SMBG - no logs/meter. lost glucometer x 1 week. new machine today. not testing, reports hypoglycemic sx on occasion\par

## 2020-01-09 NOTE — PHYSICAL EXAM
[No Acute Distress] : no acute distress [Alert] : alert [Well Developed] : well developed [Normal Sclera/Conjunctiva] : normal sclera/conjunctiva [Well Nourished] : well nourished [EOMI] : extra ocular movement intact [Normal Rate and Effort] : normal respiratory rhythm and effort [Clear to Auscultation] : lungs were clear to auscultation bilaterally [Normal Rate] : heart rate was normal  [Normal S1, S2] : normal S1 and S2 [Normal Bowel Sounds] : normal bowel sounds [Not Tender] : non-tender [Normal Reflexes] : deep tendon reflexes were 2+ and symmetric [Cranial Nerves Intact] : cranial nerves 2-12 were intact [Soft] : abdomen soft [Normal Insight/Judgement] : insight and judgment were intact [Oriented x3] : oriented to person, place, and time [No Tremors] : no tremors [Normal Affect] : the affect was normal [Normal Mood] : the mood was normal [Right Foot Was Examined] : right foot ~C was examined [Left Foot Was Examined] : left foot ~C was examined [2+] : 2+ in the dorsalis pedis [Foot Ulcers] : no foot ulcers [Vibration Dec.] : normal vibratory sensation at the level of the toes [Diminished Throughout Both Feet] : normal tactile sensation with monofilament testing throughout both feet [Position Sense Dec.] : normal position sense at the level of the toes [de-identified] : obese

## 2020-01-09 NOTE — REVIEW OF SYSTEMS
[Fatigue] : fatigue [Recent Weight Loss (___ Lbs)] : recent [unfilled] ~Ulb weight loss [SOB on Exertion] : shortness of breath during exertion [Polyuria] : polyuria [Nocturia] : nocturia [Polydipsia] : polydipsia [Blurry Vision] : no blurred vision [Palpitations] : no palpitations [Chest Pain] : no chest pain [Shortness Of Breath] : no shortness of breath [Vomiting] : no vomiting was observed [Constipation] : no constipation [Nausea] : no nausea [Diarrhea] : no diarrhea [Pain/Numbness of Digits] : no pain/numbness of digits [Abdominal Pain] : no abdominal pain [FreeTextEntry6] : sleep apnea, recent URI

## 2020-01-09 NOTE — DATA REVIEWED
[FreeTextEntry1] : FS ketone 0.1\par \par labs 1/2019 A1c 10.1, TSH 2.33\par \par labs 3/2019 Tg 550, HDL 54\par labs 3/2019 Tg 225m HDL 46, , A1c 12.3\par \par labs 6/20/19 Cr 3.40 GFR 22, gluc 336

## 2020-02-03 ENCOUNTER — APPOINTMENT (OUTPATIENT)
Dept: NEUROLOGY | Facility: CLINIC | Age: 35
End: 2020-02-03
Payer: MEDICARE

## 2020-02-03 VITALS
SYSTOLIC BLOOD PRESSURE: 120 MMHG | HEIGHT: 65 IN | WEIGHT: 283 LBS | BODY MASS INDEX: 47.15 KG/M2 | DIASTOLIC BLOOD PRESSURE: 70 MMHG

## 2020-02-03 PROCEDURE — 99213 OFFICE O/P EST LOW 20 MIN: CPT

## 2020-02-03 RX ORDER — LEVETIRACETAM 500 MG/1
500 TABLET, FILM COATED ORAL
Qty: 60 | Refills: 5 | Status: DISCONTINUED | COMMUNITY
Start: 1900-01-01 | End: 2020-02-03

## 2020-02-03 NOTE — HISTORY OF PRESENT ILLNESS
[FreeTextEntry1] : 11/3/2017\par This is a 32-year-old woman who is here today for evaluation of tremor. He states that he is 100% sure why he is here but thinks it's because he was having a tremor and response to medications. He was on lithium as a mood stabilizer for presumed bipolar disease. He developed tremor and altered mental status, this resolved when he was taken off of lithium and he was placed on Cogentin as well. Today he comes here with no specific neurologic complaints, stating that he had a sleep apnea study last night and that he needs to be put on CPAP. He follows with psychiatry regarding his bipolar disease.\par \par Followup for new problem June 28, 2019:\par This is a 34-year-old man who presents today for seizure. He was seen about a year and a half ago with tremors. Currently he comes in for seizure disorder. He had a seizure at the beginning of the year. He was seen at [facility] by different neurologist. MRI was done which was normal. EEG was done which didn't show seizure activity. He was started on Keppra 500 mg twice a day. He is here today to establish care with a new neurologist. He is accompanied by a nurse or a family service league\par \par Followup February 3, 2020:\par This is a 35-year-old man who presents today for followup of seizure. He probably had a seizure in the setting of hyperglycemia. He never had seizures prior to this. He was put on Keppra hasn't had seizures since. He had a negative workup for epilepsy. He states that he is not feeling well and the Keppra that it makes him feel more aggressive. He is here today for neurologic followup.

## 2020-02-03 NOTE — ASSESSMENT
[FreeTextEntry1] : This is a 35-year-old man who had a seizure episode secondary hyperglycemia. At the time his seizure workup was negative including an MRI and an EEG. He never had seizures prior to this and has not had a seizure since. He is reporting side effects of the Keppra. At this point like to DC the Keppra. I suggested that he go down to 500 mg daily for one week and then stop it. See how he never really had a diagnosis of epilepsy will not restart her medication. I did tell him though if he does have any seizures that he needs to call me in new medicine will be started at that time. I will see him for routine follow up in 6 months, sooner should the need arise.

## 2020-02-03 NOTE — PHYSICAL EXAM
[Person] : oriented to person [Place] : oriented to place [Time] : oriented to time [Remote Intact] : remote memory intact [Registration Intact] : recent registration memory intact [Span Intact] : the attention span was normal [Concentration Intact] : normal concentrating ability [Visual Intact] : visual attention was ~T not ~L decreased [Naming Objects] : no difficulty naming common objects [Repeating Phrases] : no difficulty repeating a phrase [Fluency] : fluency intact [Comprehension] : comprehension intact [Current Events] : adequate knowledge of current events [Past History] : adequate knowledge of personal past history [Cranial Nerves Optic (II)] : visual acuity intact bilaterally,  visual fields full to confrontation, pupils equal round and reactive to light [Cranial Nerves Oculomotor (III)] : extraocular motion intact [Cranial Nerves Trigeminal (V)] : facial sensation intact symmetrically [Cranial Nerves Facial (VII)] : face symmetrical [Cranial Nerves Vestibulocochlear (VIII)] : hearing was intact bilaterally [Cranial Nerves Glossopharyngeal (IX)] : tongue and palate midline [Cranial Nerves Accessory (XI - Cranial And Spinal)] : head turning and shoulder shrug symmetric [Cranial Nerves Hypoglossal (XII)] : there was no tongue deviation with protrusion [Motor Tone] : muscle tone was normal in all four extremities [Motor Strength] : muscle strength was normal in all four extremities [Involuntary Movements] : no involuntary movements were seen [No Muscle Atrophy] : normal bulk in all four extremities [Motor Handedness Right-Handed] : the patient is right hand dominant [Paresis Pronator Drift Right-Sided] : no pronator drift on the right [Paresis Pronator Drift Left-Sided] : no pronator drift on the left [Sensation Tactile Decrease] : light touch was intact [Sensation Pain / Temperature Decrease] : pain and temperature was intact [Sensation Vibration Decrease] : vibration was intact [Proprioception] : proprioception was intact [Abnormal Walk] : normal gait [Balance] : balance was intact [Tremor] : no tremor present [Coordination - Dysmetria Impaired Finger-to-Nose Bilateral] : not present [2+] : Patella left 2+ [Sclera] : the sclera and conjunctiva were normal [PERRL With Normal Accommodation] : pupils were equal in size, round, reactive to light, with normal accommodation [Extraocular Movements] : extraocular movements were intact [No APD] : no afferent pupillary defect [No ALAN] : no internuclear ophthalmoplegia [Full Visual Field] : full visual field

## 2020-02-03 NOTE — CONSULT LETTER
[Dear  ___] : Dear  [unfilled], [Courtesy Letter:] : I had the pleasure of seeing your patient, [unfilled], in my office today. [Please see my note below.] : Please see my note below. [Consult Closing:] : Thank you very much for allowing me to participate in the care of this patient.  If you have any questions, please do not hesitate to contact me. [Sincerely,] : Sincerely, [FreeTextEntry3] : Jason Weaver M.D., Ph.D. DPN-N\par [facility]\par [facility] [place]\par Medical Director of Stroke Services\par [facility]\par

## 2020-02-27 ENCOUNTER — LABORATORY RESULT (OUTPATIENT)
Age: 35
End: 2020-02-27

## 2020-02-28 ENCOUNTER — APPOINTMENT (OUTPATIENT)
Dept: ENDOCRINOLOGY | Facility: CLINIC | Age: 35
End: 2020-02-28
Payer: MEDICARE

## 2020-02-28 PROCEDURE — G0108 DIAB MANAGE TRN  PER INDIV: CPT

## 2020-03-01 ENCOUNTER — OUTPATIENT (OUTPATIENT)
Dept: OUTPATIENT SERVICES | Facility: HOSPITAL | Age: 35
LOS: 1 days | End: 2020-03-01
Payer: MEDICARE

## 2020-03-09 NOTE — PHYSICAL EXAM
[Normal Conjunctiva] : the conjunctiva exhibited no abnormalities [Elongated Uvula] : elongated uvula [Enlarged Base of the Tongue] : enlargement of the base of the tongue [III] : III [Neck Appearance] : the appearance of the neck was normal [Jugular Venous Distention Increased] : there was no jugular-venous distention [Thyroid Diffuse Enlargement] : the thyroid was not enlarged [Neck Circumference: ___] : neck circumference is [unfilled] [Heart Sounds] : normal S1 and S2 [Arterial Pulses Normal] : the arterial pulses were normal [Edema] : no peripheral edema present [Respiration, Rhythm And Depth] : normal respiratory rhythm and effort [Auscultation Breath Sounds / Voice Sounds] : lungs were clear to auscultation bilaterally [Lungs Percussion] : the lungs were normal to percussion [Bowel Sounds] : normal bowel sounds [Abdomen Soft] : soft [Abdomen Tenderness] : non-tender [Abnormal Walk] : normal gait [Nail Clubbing] : no clubbing of the fingernails [Cyanosis, Localized] : no localized cyanosis [Skin Turgor] : normal skin turgor [] : no rash [No Focal Deficits] : no focal deficits [Oriented To Time, Place, And Person] : oriented to person, place, and time [Impaired Insight] : insight and judgment were intact [Affect] : the affect was normal [Memory Recent] : recent memory was not impaired [FreeTextEntry1] : obese

## 2020-03-09 NOTE — HISTORY OF PRESENT ILLNESS
[Snoring] : snoring [Witnessed Apneas] : witnessed sleep apnea [Frequent Nocturnal Awakening] : frequent nocturnal awakening [Daytime Somnolence] : daytime somnolence [ESS: ___] : ESS score [unfilled] [Unintentional Sleep While Inactive] : unintentional sleep while inactive [Awakes Unrefreshed] : awakening unrefreshed [FreeTextEntry1] : Severe ARVIND in 2017\par Bipap at 17/13\par Bipap broken (fell)\par Gained 80 lbs\par In for management of ARVIND

## 2020-03-10 ENCOUNTER — APPOINTMENT (OUTPATIENT)
Dept: PULMONOLOGY | Facility: CLINIC | Age: 35
End: 2020-03-10

## 2020-03-10 ENCOUNTER — INPATIENT (INPATIENT)
Facility: HOSPITAL | Age: 35
LOS: 4 days | Discharge: HOME | DRG: 683 | End: 2020-03-15
Attending: FAMILY MEDICINE | Admitting: HOSPITALIST
Payer: COMMERCIAL

## 2020-03-10 VITALS
SYSTOLIC BLOOD PRESSURE: 169 MMHG | WEIGHT: 289.03 LBS | RESPIRATION RATE: 16 BRPM | HEART RATE: 69 BPM | HEIGHT: 65 IN | DIASTOLIC BLOOD PRESSURE: 106 MMHG | TEMPERATURE: 98 F | OXYGEN SATURATION: 100 %

## 2020-03-10 DIAGNOSIS — N17.9 ACUTE KIDNEY FAILURE, UNSPECIFIED: ICD-10-CM

## 2020-03-10 LAB
ACETONE SERPL-MCNC: NEGATIVE — SIGNIFICANT CHANGE UP
ALBUMIN SERPL ELPH-MCNC: 3.5 G/DL — SIGNIFICANT CHANGE UP (ref 3.3–5.2)
ALP SERPL-CCNC: 124 U/L — HIGH (ref 40–120)
ALT FLD-CCNC: 15 U/L — SIGNIFICANT CHANGE UP
ANION GAP SERPL CALC-SCNC: 13 MMOL/L — SIGNIFICANT CHANGE UP (ref 5–17)
APPEARANCE UR: CLEAR — SIGNIFICANT CHANGE UP
AST SERPL-CCNC: 12 U/L — SIGNIFICANT CHANGE UP
BASOPHILS # BLD AUTO: 0.06 K/UL — SIGNIFICANT CHANGE UP (ref 0–0.2)
BASOPHILS NFR BLD AUTO: 0.4 % — SIGNIFICANT CHANGE UP (ref 0–2)
BILIRUB SERPL-MCNC: <0.2 MG/DL — LOW (ref 0.4–2)
BILIRUB UR-MCNC: NEGATIVE — SIGNIFICANT CHANGE UP
BUN SERPL-MCNC: 32 MG/DL — HIGH (ref 8–20)
CALCIUM SERPL-MCNC: 9.3 MG/DL — SIGNIFICANT CHANGE UP (ref 8.6–10.2)
CHLORIDE SERPL-SCNC: 93 MMOL/L — LOW (ref 98–107)
CO2 SERPL-SCNC: 26 MMOL/L — SIGNIFICANT CHANGE UP (ref 22–29)
COLOR SPEC: YELLOW — SIGNIFICANT CHANGE UP
CREAT SERPL-MCNC: 3.71 MG/DL — HIGH (ref 0.5–1.3)
DIFF PNL FLD: ABNORMAL
EOSINOPHIL # BLD AUTO: 0.06 K/UL — SIGNIFICANT CHANGE UP (ref 0–0.5)
EOSINOPHIL NFR BLD AUTO: 0.4 % — SIGNIFICANT CHANGE UP (ref 0–6)
GAS PNL BLDV: SIGNIFICANT CHANGE UP
GLUCOSE SERPL-MCNC: 482 MG/DL — HIGH (ref 70–99)
GLUCOSE UR QL: 1000 MG/DL
HCO3 BLDV-SCNC: 29 MMOL/L — SIGNIFICANT CHANGE UP (ref 21–29)
HCT VFR BLD CALC: 36.9 % — LOW (ref 39–50)
HGB BLD-MCNC: 12.1 G/DL — LOW (ref 13–17)
IMM GRANULOCYTES NFR BLD AUTO: 0.4 % — SIGNIFICANT CHANGE UP (ref 0–1.5)
KETONES UR-MCNC: NEGATIVE — SIGNIFICANT CHANGE UP
LEUKOCYTE ESTERASE UR-ACNC: NEGATIVE — SIGNIFICANT CHANGE UP
LYMPHOCYTES # BLD AUTO: 21.2 % — SIGNIFICANT CHANGE UP (ref 13–44)
LYMPHOCYTES # BLD AUTO: 3.5 K/UL — HIGH (ref 1–3.3)
MCHC RBC-ENTMCNC: 24.6 PG — LOW (ref 27–34)
MCHC RBC-ENTMCNC: 32.8 GM/DL — SIGNIFICANT CHANGE UP (ref 32–36)
MCV RBC AUTO: 75.2 FL — LOW (ref 80–100)
MONOCYTES # BLD AUTO: 0.86 K/UL — SIGNIFICANT CHANGE UP (ref 0–0.9)
MONOCYTES NFR BLD AUTO: 5.2 % — SIGNIFICANT CHANGE UP (ref 2–14)
NEUTROPHILS # BLD AUTO: 11.94 K/UL — HIGH (ref 1.8–7.4)
NEUTROPHILS NFR BLD AUTO: 72.4 % — SIGNIFICANT CHANGE UP (ref 43–77)
NITRITE UR-MCNC: NEGATIVE — SIGNIFICANT CHANGE UP
PCO2 BLDV: 46 MMHG — SIGNIFICANT CHANGE UP (ref 35–50)
PH BLDV: 7.42 — SIGNIFICANT CHANGE UP (ref 7.32–7.43)
PH UR: 6 — SIGNIFICANT CHANGE UP (ref 5–8)
PLATELET # BLD AUTO: 374 K/UL — SIGNIFICANT CHANGE UP (ref 150–400)
PO2 BLDV: 98 MMHG — HIGH (ref 25–45)
POTASSIUM SERPL-MCNC: 3.1 MMOL/L — LOW (ref 3.5–5.3)
POTASSIUM SERPL-SCNC: 3.1 MMOL/L — LOW (ref 3.5–5.3)
PROT SERPL-MCNC: 7.3 G/DL — SIGNIFICANT CHANGE UP (ref 6.6–8.7)
PROT UR-MCNC: 500 MG/DL
RBC # BLD: 4.91 M/UL — SIGNIFICANT CHANGE UP (ref 4.2–5.8)
RBC # FLD: 15.6 % — HIGH (ref 10.3–14.5)
SAO2 % BLDV: 98 % — SIGNIFICANT CHANGE UP
SODIUM SERPL-SCNC: 132 MMOL/L — LOW (ref 135–145)
SP GR SPEC: 1.01 — SIGNIFICANT CHANGE UP (ref 1.01–1.02)
UROBILINOGEN FLD QL: NEGATIVE MG/DL — SIGNIFICANT CHANGE UP
WBC # BLD: 16.48 K/UL — HIGH (ref 3.8–10.5)
WBC # FLD AUTO: 16.48 K/UL — HIGH (ref 3.8–10.5)

## 2020-03-10 PROCEDURE — 99223 1ST HOSP IP/OBS HIGH 75: CPT

## 2020-03-10 PROCEDURE — 99285 EMERGENCY DEPT VISIT HI MDM: CPT

## 2020-03-10 PROCEDURE — 74176 CT ABD & PELVIS W/O CONTRAST: CPT | Mod: 26

## 2020-03-10 RX ORDER — INSULIN LISPRO 100/ML
VIAL (ML) SUBCUTANEOUS
Refills: 0 | Status: DISCONTINUED | OUTPATIENT
Start: 2020-03-10 | End: 2020-03-15

## 2020-03-10 RX ORDER — SODIUM CHLORIDE 9 MG/ML
1000 INJECTION, SOLUTION INTRAVENOUS
Refills: 0 | Status: DISCONTINUED | OUTPATIENT
Start: 2020-03-10 | End: 2020-03-15

## 2020-03-10 RX ORDER — CEFTRIAXONE 500 MG/1
1000 INJECTION, POWDER, FOR SOLUTION INTRAMUSCULAR; INTRAVENOUS ONCE
Refills: 0 | Status: COMPLETED | OUTPATIENT
Start: 2020-03-10 | End: 2020-03-10

## 2020-03-10 RX ORDER — DEXTROSE 50 % IN WATER 50 %
25 SYRINGE (ML) INTRAVENOUS ONCE
Refills: 0 | Status: DISCONTINUED | OUTPATIENT
Start: 2020-03-10 | End: 2020-03-15

## 2020-03-10 RX ORDER — HEPARIN SODIUM 5000 [USP'U]/ML
5000 INJECTION INTRAVENOUS; SUBCUTANEOUS EVERY 8 HOURS
Refills: 0 | Status: DISCONTINUED | OUTPATIENT
Start: 2020-03-10 | End: 2020-03-15

## 2020-03-10 RX ORDER — POTASSIUM CHLORIDE 20 MEQ
40 PACKET (EA) ORAL ONCE
Refills: 0 | Status: COMPLETED | OUTPATIENT
Start: 2020-03-10 | End: 2020-03-10

## 2020-03-10 RX ORDER — QUETIAPINE FUMARATE 200 MG/1
100 TABLET, FILM COATED ORAL AT BEDTIME
Refills: 0 | Status: DISCONTINUED | OUTPATIENT
Start: 2020-03-10 | End: 2020-03-15

## 2020-03-10 RX ORDER — POTASSIUM CHLORIDE 20 MEQ
10 PACKET (EA) ORAL
Refills: 0 | Status: COMPLETED | OUTPATIENT
Start: 2020-03-10 | End: 2020-03-10

## 2020-03-10 RX ORDER — SODIUM CHLORIDE 9 MG/ML
1000 INJECTION INTRAMUSCULAR; INTRAVENOUS; SUBCUTANEOUS ONCE
Refills: 0 | Status: COMPLETED | OUTPATIENT
Start: 2020-03-10 | End: 2020-03-10

## 2020-03-10 RX ORDER — INSULIN GLARGINE 100 [IU]/ML
30 INJECTION, SOLUTION SUBCUTANEOUS EVERY MORNING
Refills: 0 | Status: DISCONTINUED | OUTPATIENT
Start: 2020-03-11 | End: 2020-03-12

## 2020-03-10 RX ORDER — GLUCAGON INJECTION, SOLUTION 0.5 MG/.1ML
1 INJECTION, SOLUTION SUBCUTANEOUS ONCE
Refills: 0 | Status: DISCONTINUED | OUTPATIENT
Start: 2020-03-10 | End: 2020-03-15

## 2020-03-10 RX ORDER — CARVEDILOL PHOSPHATE 80 MG/1
12.5 CAPSULE, EXTENDED RELEASE ORAL EVERY 12 HOURS
Refills: 0 | Status: DISCONTINUED | OUTPATIENT
Start: 2020-03-10 | End: 2020-03-13

## 2020-03-10 RX ORDER — DEXTROSE 50 % IN WATER 50 %
15 SYRINGE (ML) INTRAVENOUS ONCE
Refills: 0 | Status: DISCONTINUED | OUTPATIENT
Start: 2020-03-10 | End: 2020-03-15

## 2020-03-10 RX ORDER — DEXTROSE 50 % IN WATER 50 %
12.5 SYRINGE (ML) INTRAVENOUS ONCE
Refills: 0 | Status: DISCONTINUED | OUTPATIENT
Start: 2020-03-10 | End: 2020-03-15

## 2020-03-10 RX ORDER — INSULIN LISPRO 100/ML
10 VIAL (ML) SUBCUTANEOUS
Refills: 0 | Status: DISCONTINUED | OUTPATIENT
Start: 2020-03-10 | End: 2020-03-12

## 2020-03-10 RX ORDER — INSULIN HUMAN 100 [IU]/ML
7 INJECTION, SOLUTION SUBCUTANEOUS ONCE
Refills: 0 | Status: COMPLETED | OUTPATIENT
Start: 2020-03-10 | End: 2020-03-10

## 2020-03-10 RX ORDER — LIDOCAINE 4 G/100G
1 CREAM TOPICAL ONCE
Refills: 0 | Status: COMPLETED | OUTPATIENT
Start: 2020-03-10 | End: 2020-03-10

## 2020-03-10 RX ORDER — SODIUM CHLORIDE 9 MG/ML
2000 INJECTION INTRAMUSCULAR; INTRAVENOUS; SUBCUTANEOUS ONCE
Refills: 0 | Status: COMPLETED | OUTPATIENT
Start: 2020-03-10 | End: 2020-03-10

## 2020-03-10 RX ORDER — HYDRALAZINE HCL 50 MG
10 TABLET ORAL EVERY 6 HOURS
Refills: 0 | Status: DISCONTINUED | OUTPATIENT
Start: 2020-03-10 | End: 2020-03-15

## 2020-03-10 RX ADMIN — Medication 10 MILLIGRAM(S): at 23:42

## 2020-03-10 RX ADMIN — SODIUM CHLORIDE 2000 MILLILITER(S): 9 INJECTION INTRAMUSCULAR; INTRAVENOUS; SUBCUTANEOUS at 17:59

## 2020-03-10 RX ADMIN — SODIUM CHLORIDE 1000 MILLILITER(S): 9 INJECTION INTRAMUSCULAR; INTRAVENOUS; SUBCUTANEOUS at 19:56

## 2020-03-10 RX ADMIN — Medication 100 MILLIEQUIVALENT(S): at 21:20

## 2020-03-10 RX ADMIN — LIDOCAINE 1 PATCH: 4 CREAM TOPICAL at 19:15

## 2020-03-10 RX ADMIN — SODIUM CHLORIDE 2000 MILLILITER(S): 9 INJECTION INTRAMUSCULAR; INTRAVENOUS; SUBCUTANEOUS at 22:24

## 2020-03-10 RX ADMIN — INSULIN HUMAN 7 UNIT(S): 100 INJECTION, SOLUTION SUBCUTANEOUS at 19:16

## 2020-03-10 RX ADMIN — Medication 10 MILLIEQUIVALENT(S): at 22:24

## 2020-03-10 RX ADMIN — Medication 100 MILLIEQUIVALENT(S): at 22:24

## 2020-03-10 RX ADMIN — CEFTRIAXONE 100 MILLIGRAM(S): 500 INJECTION, POWDER, FOR SOLUTION INTRAMUSCULAR; INTRAVENOUS at 23:13

## 2020-03-10 RX ADMIN — Medication 100 MILLIEQUIVALENT(S): at 19:15

## 2020-03-10 RX ADMIN — Medication 40 MILLIEQUIVALENT(S): at 19:16

## 2020-03-10 RX ADMIN — SODIUM CHLORIDE 1000 MILLILITER(S): 9 INJECTION INTRAMUSCULAR; INTRAVENOUS; SUBCUTANEOUS at 22:24

## 2020-03-10 NOTE — ED PROVIDER NOTE - CARE PLAN
Principal Discharge DX:	Acute kidney injury  Secondary Diagnosis:	HTN (hypertension)  Secondary Diagnosis:	Diabetes  Secondary Diagnosis:	Flank pain

## 2020-03-10 NOTE — H&P ADULT - HISTORY OF PRESENT ILLNESS
35M presented with a two day history of flank pain. The patient described that pain as dull in quality and worse on the right side. He denied any trauma to the area and denied any frequency, hematuria, or dysuria. He denied any fevers or chills at home. He has not had any similar episodes in the past and is unaware of any aggravating or relieving factors. Upon evaluation in the emergency department, the patient was noted to have poorly controlled hypertension and hyperglycemia. He denied any associated chest pain, palpitations, or dyspnea. The patient reported feelings feeling full when eating but denied any weight loss. He denied any change in his bowel habits or associated abdominal pain, nausea, or diarrhea. He did admit to missing doses of insulin at home.

## 2020-03-10 NOTE — ED PROVIDER NOTE - OBJECTIVE STATEMENT
Keyla Leija MD PGY-2 36 yo M with PMH DM (on lantus and humalog, not always compliant), CKD, HTN, ARVIND, asthma p/w 2 days of b/l flank pain R>L, nonradiating. Last BM 1 days ago, no melena or hematochezia. No new chest pain or shortness of breath. No new abdominal pain, nausea or vomiting but endorses new bloating. Intermittent decreased appetite Keyla Leija MD PGY-2 36 yo M with PMH DM (on lantus and humalog, not always compliant), CKD, HTN, ARVIND, asthma p/w 2 days of b/l flank pain R>L, nonradiating. Last BM 1 days ago, no melena or hematochezia. No new chest pain or shortness of breath. No new abdominal pain, nausea or vomiting but endorses new bloating. Intermittent decreased appetite. No fevers, no abd pain, n/v, no recent trauma

## 2020-03-10 NOTE — ED PROVIDER NOTE - ATTENDING CONTRIBUTION TO CARE
The patient seen and examined    Acute Kidney Injury on CKD  Flank pain, possibly acute papillary necrosis  Hypertensive    I, Douglas Coreas, performed the initial face to face bedside interview with this patient regarding history of present illness, review of symptoms and relevant past medical, social and family history.  I completed an independent physical examination.  I was the initial provider who evaluated this patient. I have signed out the follow up of any pending tests (i.e. labs, radiological studies) to the resident  I have communicated the patient’s plan of care and disposition with the resident.

## 2020-03-10 NOTE — H&P ADULT - NSICDXFAMILYHX_GEN_ALL_CORE_FT
FAMILY HISTORY:  Grandparent  Still living? No  Family history of diabetes mellitus, Age at diagnosis: Age Unknown

## 2020-03-10 NOTE — ED ADULT NURSE NOTE - NS ED NURSE RECORD ANOTHER HT AND WT
Yes Topical Retinoid counseling:  Patient advised to apply a pea-sized amount only at bedtime and wait 30 minutes after washing their face before applying.  If too drying, patient may add a non-comedogenic moisturizer. The patient verbalized understanding of the proper use and possible adverse effects of retinoids.  All of the patient's questions and concerns were addressed.

## 2020-03-10 NOTE — ED PROVIDER NOTE - NS ED ROS FT
REVIEW OF SYSTEMS:  General:  no fever, no chills  Cardiac: no new chest pain  Respiratory: no new shortness of breath  Gastrointestinal: no abdominal pain, no nausea, no vomiting, no melena, no hematochezia, +abd bloating   Genitourinary: no hematuria, no dysuria, no urinary frequency, no urinary hesitancy   Back: +b/l flank pain R>L  -Keyla Leija PGY-2

## 2020-03-10 NOTE — ED PROVIDER NOTE - PMH
Bipolar 1 disorder    Diabetes    HTN (hypertension)    Hypertension    Kidney disease    Sleep apnea

## 2020-03-10 NOTE — ED STATDOCS - PROGRESS NOTE DETAILS
34 y/o M with PMHx of HTN and DM presents to the ED with nurse from living facility c/o right flank pain onset yesterday. Associated with dizziness and nausea. Symptoms unrelieved by Motrin. As per nurse, patient's hemoglobin A1c was above 15. Patient is followed by an endocrinologist and a nephrologist for his CKD stage 4. No CVA tenderness observed on exam. Finger stick above 500. Initial orders placed. Will send to main ER for further evaluation.

## 2020-03-10 NOTE — ED PROVIDER NOTE - PHYSICAL EXAMINATION
PHYSICAL EXAM:   General: well-appearing, appears stated age, in no acute distress  HEENT: NC/AT,  airway patent  Cardiovascular: regular rate and rhythm, + S1/S2, no murmurs, rubs, gallops appreciated  Respiratory: clear to auscultation bilaterally, good aeration bilaterally, nonlabored respirations  Abdominal: soft, nontender, obese abdomen, no rebound, guarding or rigidity,  Back: +mild R CVAT  Psychiatric: appropriate mood and affect.   -Keyla Leija PGY-2 PHYSICAL EXAM:   General: well-appearing, appears stated age, in no acute distress  HEENT: NC/AT,  airway patent  Cardiovascular: regular rate and rhythm, + S1/S2, no murmurs, rubs, gallops appreciated  Respiratory: clear to auscultation bilaterally, good aeration bilaterally, nonlabored respirations  Abdominal: soft, nontender, obese abdomen, no rebound, guarding or rigidity,  Back: +mild R CVAT  Psychiatric: appropriate mood and affect.   Extremities: radial pulses equal and strong  -Keyla Leija PGY-2

## 2020-03-10 NOTE — H&P ADULT - ASSESSMENT
35M with a history of chronic kidney disease, diabetes, obstructive sleep apnea, and bipolar disorder who presented with complaints of bilateral back pain and was found to have poorly controlled hypertension and hyperglycemia.    Flank pain - CT of the abdomen was without acute intraabdominal pathology. Noted a punctate nonobstructive left renal stone without hydronephrosis. The bladder was noted to be distended on CT but the patient was able to urinate freely in the emergency department. Unclear etiology for the pain. Urinalysis was not indicative of an underlying infection and the patient was afebrile.    Leukocytosis - Noted to be present on prior evaluations. The patient reported a history of elevated white blood cell counts in the past which were noted by his primary care physician and nephrologist.    Diabetes - Insulin coverage, close monitoring of blood glucose levels. Poorly controlled, most likely due to non-compliance with medications which had been previously documented. No significant anion gap noted.    Chronic kidney disease - Monitoring renal function. No recent laboratory studies for comparison. The most recent BUN/Cr was 27/2.54 from greater than one year ago.    Hypertension - Close blood pressure monitoring. On carvedilol and clonidine with hydralazine as needed.    Hypokalemia - Potassium supplemented.    Hyponatremia - Mild when corrected for hyperglycemia. Repeat laboratory studies ordered to monitor.    Bipolar disorder - On quetiapine. The patient is unable to provide the list of his home medications.    Obstructive sleep apnea - The patient reported that he has not used nocturnal CPAP for the past two years and is awaiting a repeat sleep study.

## 2020-03-10 NOTE — H&P ADULT - NSHPPHYSICALEXAM_GEN_ALL_CORE
Vital Signs Last 24 Hrs  T(C): 36.7 (10 Mar 2020 14:14), Max: 36.7 (10 Mar 2020 14:14)  T(F): 98.1 (10 Mar 2020 14:14), Max: 98.1 (10 Mar 2020 14:14)  HR: 69 (10 Mar 2020 14:14) (69 - 69)  BP: 169/106 (10 Mar 2020 14:14) (169/106 - 169/106)  BP(mean): --  RR: 16 (10 Mar 2020 14:14) (16 - 16)  SpO2: 100% (10 Mar 2020 14:14) (100% - 100%)    General appearance: No acute distress, Awake, Alert  HEENT: Normocephalic, Atraumatic, Conjunctiva clear, EOMI  Neck: Supple, No JVD, No tenderness  Lungs: Breath sound equal bilaterally, No wheezes, No rales  Cardiovascular: S1S2, Regular rhythm  Abdomen: Soft, Nontender, Nondistended, No guarding/rebound, Positive bowel sounds  Extremities: No clubbing, No cyanosis, No edema, No calf tenderness  Neuro: Strength equal bilaterally, No tremors  Psychiatric: Appropriate mood, Normal affect

## 2020-03-10 NOTE — H&P ADULT - NSICDXPASTMEDICALHX_GEN_ALL_CORE_FT
PAST MEDICAL HISTORY:  Bipolar 1 disorder     Diabetes     HTN (hypertension)     Hypertension     Kidney disease     Sleep apnea

## 2020-03-10 NOTE — ED PROVIDER NOTE - CLINICAL SUMMARY MEDICAL DECISION MAKING FREE TEXT BOX
Keyla Leija MD PGY-2 flank pain x 2 days, intermittent dec appeitite, abd bloating, no urinary symptoms (states he has kidney disease and urinates less when he drinks less). FS 500s. Will get labs, CT stone potter, serum acetone (possibly DKA in setting of concomittant underlying pathology/possible stone). Will get labs, treat pain and give IVF, UA, reeval

## 2020-03-11 LAB
ANION GAP SERPL CALC-SCNC: 13 MMOL/L — SIGNIFICANT CHANGE UP (ref 5–17)
BUN SERPL-MCNC: 27 MG/DL — HIGH (ref 8–20)
CALCIUM SERPL-MCNC: 9.2 MG/DL — SIGNIFICANT CHANGE UP (ref 8.6–10.2)
CHLORIDE SERPL-SCNC: 101 MMOL/L — SIGNIFICANT CHANGE UP (ref 98–107)
CO2 SERPL-SCNC: 25 MMOL/L — SIGNIFICANT CHANGE UP (ref 22–29)
CREAT SERPL-MCNC: 3.19 MG/DL — HIGH (ref 0.5–1.3)
GLUCOSE BLDC GLUCOMTR-MCNC: 308 MG/DL — HIGH (ref 70–99)
GLUCOSE BLDC GLUCOMTR-MCNC: 324 MG/DL — HIGH (ref 70–99)
GLUCOSE SERPL-MCNC: 339 MG/DL — HIGH (ref 70–99)
HBA1C BLD-MCNC: >16.9 % — HIGH (ref 4–5.6)
HCT VFR BLD CALC: 33.6 % — LOW (ref 39–50)
HGB BLD-MCNC: 10.9 G/DL — LOW (ref 13–17)
MCHC RBC-ENTMCNC: 24.4 PG — LOW (ref 27–34)
MCHC RBC-ENTMCNC: 32.4 GM/DL — SIGNIFICANT CHANGE UP (ref 32–36)
MCV RBC AUTO: 75.2 FL — LOW (ref 80–100)
PLATELET # BLD AUTO: 350 K/UL — SIGNIFICANT CHANGE UP (ref 150–400)
POTASSIUM SERPL-MCNC: 3.2 MMOL/L — LOW (ref 3.5–5.3)
POTASSIUM SERPL-SCNC: 3.2 MMOL/L — LOW (ref 3.5–5.3)
RBC # BLD: 4.47 M/UL — SIGNIFICANT CHANGE UP (ref 4.2–5.8)
RBC # FLD: 15.7 % — HIGH (ref 10.3–14.5)
SODIUM SERPL-SCNC: 139 MMOL/L — SIGNIFICANT CHANGE UP (ref 135–145)
WBC # BLD: 15.36 K/UL — HIGH (ref 3.8–10.5)
WBC # FLD AUTO: 15.36 K/UL — HIGH (ref 3.8–10.5)

## 2020-03-11 PROCEDURE — 99232 SBSQ HOSP IP/OBS MODERATE 35: CPT

## 2020-03-11 PROCEDURE — 99222 1ST HOSP IP/OBS MODERATE 55: CPT

## 2020-03-11 PROCEDURE — 72148 MRI LUMBAR SPINE W/O DYE: CPT | Mod: 26

## 2020-03-11 RX ORDER — AMLODIPINE BESYLATE 2.5 MG/1
10 TABLET ORAL DAILY
Refills: 0 | Status: DISCONTINUED | OUTPATIENT
Start: 2020-03-11 | End: 2020-03-15

## 2020-03-11 RX ORDER — ACETAMINOPHEN 500 MG
650 TABLET ORAL EVERY 6 HOURS
Refills: 0 | Status: DISCONTINUED | OUTPATIENT
Start: 2020-03-11 | End: 2020-03-15

## 2020-03-11 RX ORDER — POTASSIUM CHLORIDE 20 MEQ
40 PACKET (EA) ORAL ONCE
Refills: 0 | Status: COMPLETED | OUTPATIENT
Start: 2020-03-11 | End: 2020-03-11

## 2020-03-11 RX ADMIN — QUETIAPINE FUMARATE 100 MILLIGRAM(S): 200 TABLET, FILM COATED ORAL at 21:31

## 2020-03-11 RX ADMIN — CARVEDILOL PHOSPHATE 12.5 MILLIGRAM(S): 80 CAPSULE, EXTENDED RELEASE ORAL at 04:49

## 2020-03-11 RX ADMIN — Medication 10 UNIT(S): at 18:01

## 2020-03-11 RX ADMIN — Medication 40 MILLIEQUIVALENT(S): at 11:35

## 2020-03-11 RX ADMIN — Medication 6: at 18:01

## 2020-03-11 RX ADMIN — Medication 8: at 08:04

## 2020-03-11 RX ADMIN — Medication 10 UNIT(S): at 11:16

## 2020-03-11 RX ADMIN — HEPARIN SODIUM 5000 UNIT(S): 5000 INJECTION INTRAVENOUS; SUBCUTANEOUS at 04:50

## 2020-03-11 RX ADMIN — AMLODIPINE BESYLATE 10 MILLIGRAM(S): 2.5 TABLET ORAL at 01:17

## 2020-03-11 RX ADMIN — INSULIN GLARGINE 30 UNIT(S): 100 INJECTION, SOLUTION SUBCUTANEOUS at 09:28

## 2020-03-11 RX ADMIN — HEPARIN SODIUM 5000 UNIT(S): 5000 INJECTION INTRAVENOUS; SUBCUTANEOUS at 21:31

## 2020-03-11 RX ADMIN — Medication 0.2 MILLIGRAM(S): at 17:57

## 2020-03-11 RX ADMIN — Medication 10 UNIT(S): at 08:03

## 2020-03-11 RX ADMIN — HEPARIN SODIUM 5000 UNIT(S): 5000 INJECTION INTRAVENOUS; SUBCUTANEOUS at 17:57

## 2020-03-11 RX ADMIN — Medication 8: at 11:18

## 2020-03-11 RX ADMIN — Medication 0.2 MILLIGRAM(S): at 04:49

## 2020-03-11 RX ADMIN — CARVEDILOL PHOSPHATE 12.5 MILLIGRAM(S): 80 CAPSULE, EXTENDED RELEASE ORAL at 17:56

## 2020-03-11 RX ADMIN — Medication 0.2 MILLIGRAM(S): at 21:31

## 2020-03-11 NOTE — PROGRESS NOTE ADULT - SUBJECTIVE AND OBJECTIVE BOX
MILENA PUGA    [id number]    35y      Male    INTERVAL HPI/OVERNIGHT EVENTS: patient being seen for non obs stone, dm2 and acute on chronic renal failure. patient seen at bedside and states feeling better with less pain.     REVIEW OF SYSTEMS:    CONSTITUTIONAL: No fever, weight loss, or fatigue  RESPIRATORY: No cough, wheezing, hemoptysis; No shortness of breath  CARDIOVASCULAR: No chest pain, palpitations  GASTROINTESTINAL: No abdominal or epigastric pain. No nausea, vomiting  NEUROLOGICAL: No headaches, memory loss, loss of strength.  MISCELLANEOUS:      Vital Signs Last 24 Hrs  T(C): 37 (11 Mar 2020 11:20), Max: 37.4 (11 Mar 2020 04:24)  T(F): 98.6 (11 Mar 2020 11:20), Max: 99.4 (11 Mar 2020 04:24)  HR: 87 (11 Mar 2020 11:20) (69 - 107)  BP: 118/80 (11 Mar 2020 11:20) (118/80 - 173/111)  BP(mean): --  RR: 18 (11 Mar 2020 11:20) (16 - 20)  SpO2: 96% (11 Mar 2020 11:20) (96% - 100%)    PHYSICAL EXAM:    GENERAL: NAD, poor hygiene   HEENT: PERRL, dry MM  NECK: soft, Supple, No JVD,   CHEST/LUNG: Clear to auscultation bilaterally; No wheezing  HEART: S1S2+, Regular rate and rhythm; No murmurs, rubs, or gallops  ABDOMEN: Soft, Nontender, Nondistended; Bowel sounds present  EXTREMITIES:  2+ Peripheral Pulses, No clubbing, cyanosis, or edema  SKIN: No rashes or lesions  NEURO: AAOX3, no focal deficits,       LABS:                        10.9   15.36 )-----------( 350      ( 11 Mar 2020 07:26 )             33.6     03-11    139  |  101  |  27.0<H>  ----------------------------<  339<H>  3.2<L>   |  25.0  |  3.19<H>    Ca    9.2      11 Mar 2020 07:26    TPro  7.3  /  Alb  3.5  /  TBili  <0.2<L>  /  DBili  x   /  AST  12  /  ALT  15  /  AlkPhos  124<H>  03-10      Urinalysis Basic - ( 10 Mar 2020 19:59 )    Color: Yellow / Appearance: Clear / S.015 / pH: x  Gluc: x / Ketone: Negative  / Bili: Negative / Urobili: Negative mg/dL   Blood: x / Protein: 500 mg/dL / Nitrite: Negative   Leuk Esterase: Negative / RBC: 0-2 /HPF / WBC 0-2   Sq Epi: x / Non Sq Epi: Occasional / Bacteria: Occasional          MEDICATIONS  (STANDING):  amLODIPine   Tablet 10 milliGRAM(s) Oral daily  carvedilol 12.5 milliGRAM(s) Oral every 12 hours  cloNIDine 0.2 milliGRAM(s) Oral three times a day  dextrose 5%. 1000 milliLiter(s) (50 mL/Hr) IV Continuous <Continuous>  dextrose 50% Injectable 12.5 Gram(s) IV Push once  dextrose 50% Injectable 25 Gram(s) IV Push once  dextrose 50% Injectable 25 Gram(s) IV Push once  heparin  Injectable 5000 Unit(s) SubCutaneous every 8 hours  insulin glargine Injectable (LANTUS) 30 Unit(s) SubCutaneous every morning  insulin lispro (HumaLOG) corrective regimen sliding scale   SubCutaneous three times a day before meals  insulin lispro Injectable (HumaLOG) 10 Unit(s) SubCutaneous three times a day before meals  potassium chloride    Tablet ER 40 milliEquivalent(s) Oral once  QUEtiapine 100 milliGRAM(s) Oral at bedtime    MEDICATIONS  (PRN):  acetaminophen   Tablet .. 650 milliGRAM(s) Oral every 6 hours PRN Temp greater or equal to 38C (100.4F), Mild Pain (1 - 3)  dextrose 40% Gel 15 Gram(s) Oral once PRN Blood Glucose LESS THAN 70 milliGRAM(s)/deciliter  glucagon  Injectable 1 milliGRAM(s) IntraMuscular once PRN Glucose LESS THAN 70 milligrams/deciliter  hydrALAZINE Injectable 10 milliGRAM(s) IV Push every 6 hours PRN SBP > 160      RADIOLOGY & ADDITIONAL TESTS:

## 2020-03-11 NOTE — CONSULT NOTE ADULT - ASSESSMENT
35M w/ T2DM, obesity, HTN and bipolar disorder presented with a two day history of right flank dull pain. He denied any trauma to the area and denied any frequency, hematuria, or dysuria. He denied any fevers or chills at home. He has not had any similar episodes in the past and is unaware of any aggravating or relieving factors. Upon evaluation in the emergency department, the patient was noted to have poorly controlled hypertension and hyperglycemia. He denied any associated chest pain, palpitations, or dyspnea. The patient reported feelings feeling full when eating but denied any weight loss. He denied any change in his bowel habits or associated abdominal pain, nausea, or diarrhea. He did admit to missing doses of insulin at home.    Uncontrolled T2DM- hyperglycemic  -A1c 16  -check sugars AC and bedtime  -ensure diabetic diet  -agree with lantus 30 units QHS  -agree with lispro 10 units TID  -continue with insulin sliding scale    Hypokalemia- replete    HTN- BP well controlled. continue BB/norvasc/clonidine 35M w/ T2DM cb CKD4, obesity, HTN and bipolar disorder presented with a two day history of right flank dull pain. He denied any trauma to the area and denied any frequency, hematuria, or dysuria. He denied any fevers or chills at home. He has not had any similar episodes in the past and is unaware of any aggravating or relieving factors. Upon evaluation in the emergency department, the patient was noted to have poorly controlled hypertension and hyperglycemia. He denied any associated chest pain, palpitations, or dyspnea. The patient reported feelings feeling full when eating but denied any weight loss. He denied any change in his bowel habits or associated abdominal pain, nausea, or diarrhea. He did admit to missing doses of insulin at home.    Uncontrolled T2DM- hyperglycemic  -A1c 17  -check sugars AC and bedtime  -ensure diabetic diet  -agree with lantus 30 units QHS  -agree with lispro 10 units TID  -continue with insulin sliding scale  -discussed complications including ESRD    Hypokalemia- replete    HTN- BP well controlled. continue BB/norvasc/clonidine

## 2020-03-11 NOTE — CONSULT NOTE ADULT - SUBJECTIVE AND OBJECTIVE BOX
Patient is a 35y old  Male who presents with a chief complaint of dm2 and back pain (11 Mar 2020 11:32)    HPI:  35M w/ T2DM, obesity, HTN and bipolar disorder presented with a two day history of right flank dull pain. He denied any trauma to the area and denied any frequency, hematuria, or dysuria. He denied any fevers or chills at home. He has not had any similar episodes in the past and is unaware of any aggravating or relieving factors. Upon evaluation in the emergency department, the patient was noted to have poorly controlled hypertension and hyperglycemia. He denied any associated chest pain, palpitations, or dyspnea. The patient reported feelings feeling full when eating but denied any weight loss. He denied any change in his bowel habits or associated abdominal pain, nausea, or diarrhea. He did admit to missing doses of insulin at home.      PAST MEDICAL & SURGICAL HISTORY:  Kidney disease  HTN (hypertension)  Sleep apnea  Diabetes  Bipolar 1 disorder  Hypertension  No significant past surgical history      SH: see above    FAMILY HISTORY:  Family history of diabetes mellitus (Grandparent)        Allergies    No Known Allergies    Intolerances        REVIEW OF SYSTEMS:    CONSTITUTIONAL: No fever, weight loss, or fatigue  EYES: No eye pain, visual disturbances, or discharge  ENMT:  No difficulty hearing, tinnitus, vertigo; No sinus or throat pain  NECK: No pain or stiffness  RESPIRATORY: No cough, wheezing, chills or hemoptysis; No shortness of breath  CARDIOVASCULAR: No chest pain, palpitations, dizziness, or leg swelling  GASTROINTESTINAL: No abdominal or epigastric pain. No nausea, vomiting, or hematemesis; No diarrhea or constipation. No melena or hematochezia.  NEUROLOGICAL: No headaches, memory loss, loss of strength, numbness, or tremors  SKIN: No itching, burning, rashes, or lesions   MUSCULOSKELETAL: No joint pain or swelling; No muscle, back, or extremity pain  PSYCHIATRIC: No depression, anxiety, mood swings, or difficulty sleeping        MEDICATIONS  (STANDING):  amLODIPine   Tablet 10 milliGRAM(s) Oral daily  carvedilol 12.5 milliGRAM(s) Oral every 12 hours  cloNIDine 0.2 milliGRAM(s) Oral three times a day  dextrose 5%. 1000 milliLiter(s) (50 mL/Hr) IV Continuous <Continuous>  dextrose 50% Injectable 12.5 Gram(s) IV Push once  dextrose 50% Injectable 25 Gram(s) IV Push once  dextrose 50% Injectable 25 Gram(s) IV Push once  heparin  Injectable 5000 Unit(s) SubCutaneous every 8 hours  insulin glargine Injectable (LANTUS) 30 Unit(s) SubCutaneous every morning  insulin lispro (HumaLOG) corrective regimen sliding scale   SubCutaneous three times a day before meals  insulin lispro Injectable (HumaLOG) 10 Unit(s) SubCutaneous three times a day before meals  QUEtiapine 100 milliGRAM(s) Oral at bedtime    MEDICATIONS  (PRN):  acetaminophen   Tablet .. 650 milliGRAM(s) Oral every 6 hours PRN Temp greater or equal to 38C (100.4F), Mild Pain (1 - 3)  dextrose 40% Gel 15 Gram(s) Oral once PRN Blood Glucose LESS THAN 70 milliGRAM(s)/deciliter  glucagon  Injectable 1 milliGRAM(s) IntraMuscular once PRN Glucose LESS THAN 70 milligrams/deciliter  hydrALAZINE Injectable 10 milliGRAM(s) IV Push every 6 hours PRN SBP > 160      Vital Signs Last 24 Hrs  T(C): 37 (11 Mar 2020 11:20), Max: 37.4 (11 Mar 2020 04:24)  T(F): 98.6 (11 Mar 2020 11:20), Max: 99.4 (11 Mar 2020 04:24)  HR: 87 (11 Mar 2020 11:20) (69 - 107)  BP: 118/80 (11 Mar 2020 11:20) (118/80 - 173/111)  BP(mean): --  RR: 18 (11 Mar 2020 11:20) (16 - 20)  SpO2: 96% (11 Mar 2020 11:20) (96% - 100%)      PHYSICAL EXAM:    Constitutional: NAD, obese  HEENT:  EOMI, no exophalmos  Neck:  trachea midline, no thyroid enlargement  Respiratory: CTAB  Cardiovascular: S1 and S2, RRR, no M/G/R  Gastrointestinal: BS+, soft, ntnd  Extremities: No peripheral edema  Neurological: A/O x 3, no focal deficits  Psychiatric: Normal mood, normal affect  Skin: No rashes, no acanthosis        LABS  03-11    139  |  101  |  27.0<H>  ----------------------------<  339<H>  3.2<L>   |  25.0  |  3.19<H>    Ca    9.2      11 Mar 2020 07:26    TPro  7.3  /  Alb  3.5  /  TBili  <0.2<L>  /  DBili  x   /  AST  12  /  ALT  15  /  AlkPhos  124<H>  03-10                          10.9   15.36 )-----------( 350      ( 11 Mar 2020 07:26 )             33.6           Hemoglobin A1C, Whole Blood: >16.9 % (03-11 @ 07:26)    Ketone - Urine: Negative (03-10 @ 19:59)    Alkaline Phosphatase, Serum: 124 U/L (03-10-20 @ 17:46)  Alanine Aminotransferase (ALT/SGPT): 15 U/L (03-10-20 @ 17:46)  Albumin, Serum: 3.5 g/dL (03-10-20 @ 17:46)  Aspartate Aminotransferase (AST/SGOT): 12 U/L (03-10-20 @ 17:46)      CAPILLARY BLOOD GLUCOSE      POCT Blood Glucose.: 308 mg/dL (11 Mar 2020 11:15)  POCT Blood Glucose.: 324 mg/dL (11 Mar 2020 07:47)  POCT Blood Glucose.: 353 mg/dL (10 Mar 2020 19:54)  POCT Blood Glucose.: 518 mg/dL (10 Mar 2020 15:31) Patient is a 35y old  Male who presents with a chief complaint of dm2 and back pain (11 Mar 2020 11:32)    HPI:  35M w/ T2DM cb CKD4, obesity, HTN and bipolar disorder presented with a two day history of right flank dull pain. He denied any trauma to the area and denied any frequency, hematuria, or dysuria. He denied any fevers or chills at home. He has not had any similar episodes in the past and is unaware of any aggravating or relieving factors. Upon evaluation in the emergency department, the patient was noted to have poorly controlled hypertension and hyperglycemia. He denied any associated chest pain, palpitations, or dyspnea. The patient reported feelings feeling full when eating but denied any weight loss. He denied any change in his bowel habits or associated abdominal pain, nausea, or diarrhea. He did admit to missing doses of insulin at home.  reports not being compliant with insulin  sees Dr. Mccain as an outpatient  uncontrolled diet with deli food and fast food  FH of diabetes in sister, aunts, uncles  home regimen: lantus 26, humalog 11 units TID  no smoking/etoh  not checking FS    PAST MEDICAL & SURGICAL HISTORY:  Kidney disease  HTN (hypertension)  Sleep apnea  Diabetes  Bipolar 1 disorder  Hypertension  No significant past surgical history      SH: see above    FAMILY HISTORY:  Family history of diabetes mellitus (Grandparent)        Allergies    No Known Allergies    Intolerances        REVIEW OF SYSTEMS:    CONSTITUTIONAL: No fever, weight loss, or fatigue  EYES: No eye pain, visual disturbances, or discharge  ENMT:  No difficulty hearing, tinnitus, vertigo; No sinus or throat pain  NECK: No pain or stiffness  RESPIRATORY: No cough, wheezing, chills or hemoptysis; No shortness of breath  CARDIOVASCULAR: No chest pain, palpitations, dizziness, or leg swelling  GASTROINTESTINAL: No abdominal or epigastric pain. No nausea, vomiting, or hematemesis; No diarrhea or constipation. No melena or hematochezia.  NEUROLOGICAL: No headaches, memory loss, loss of strength, numbness, or tremors  SKIN: No itching, burning, rashes, or lesions   MUSCULOSKELETAL: No joint pain or swelling; No muscle, back, or extremity pain  PSYCHIATRIC: No depression, anxiety, mood swings, or difficulty sleeping        MEDICATIONS  (STANDING):  amLODIPine   Tablet 10 milliGRAM(s) Oral daily  carvedilol 12.5 milliGRAM(s) Oral every 12 hours  cloNIDine 0.2 milliGRAM(s) Oral three times a day  dextrose 5%. 1000 milliLiter(s) (50 mL/Hr) IV Continuous <Continuous>  dextrose 50% Injectable 12.5 Gram(s) IV Push once  dextrose 50% Injectable 25 Gram(s) IV Push once  dextrose 50% Injectable 25 Gram(s) IV Push once  heparin  Injectable 5000 Unit(s) SubCutaneous every 8 hours  insulin glargine Injectable (LANTUS) 30 Unit(s) SubCutaneous every morning  insulin lispro (HumaLOG) corrective regimen sliding scale   SubCutaneous three times a day before meals  insulin lispro Injectable (HumaLOG) 10 Unit(s) SubCutaneous three times a day before meals  QUEtiapine 100 milliGRAM(s) Oral at bedtime    MEDICATIONS  (PRN):  acetaminophen   Tablet .. 650 milliGRAM(s) Oral every 6 hours PRN Temp greater or equal to 38C (100.4F), Mild Pain (1 - 3)  dextrose 40% Gel 15 Gram(s) Oral once PRN Blood Glucose LESS THAN 70 milliGRAM(s)/deciliter  glucagon  Injectable 1 milliGRAM(s) IntraMuscular once PRN Glucose LESS THAN 70 milligrams/deciliter  hydrALAZINE Injectable 10 milliGRAM(s) IV Push every 6 hours PRN SBP > 160      Vital Signs Last 24 Hrs  T(C): 37 (11 Mar 2020 11:20), Max: 37.4 (11 Mar 2020 04:24)  T(F): 98.6 (11 Mar 2020 11:20), Max: 99.4 (11 Mar 2020 04:24)  HR: 87 (11 Mar 2020 11:20) (69 - 107)  BP: 118/80 (11 Mar 2020 11:20) (118/80 - 173/111)  BP(mean): --  RR: 18 (11 Mar 2020 11:20) (16 - 20)  SpO2: 96% (11 Mar 2020 11:20) (96% - 100%)      PHYSICAL EXAM:    Constitutional: NAD, obese  HEENT:  EOMI, no exophalmos  Neck:  trachea midline, no thyroid enlargement  Respiratory: CTAB  Cardiovascular: S1 and S2, RRR, no M/G/R  Gastrointestinal: BS+, soft, ntnd  Extremities: No peripheral edema  Neurological: A/O x 3, no focal deficits  Psychiatric: Normal mood, normal affect  Skin: No rashes, no acanthosis        LABS  03-11    139  |  101  |  27.0<H>  ----------------------------<  339<H>  3.2<L>   |  25.0  |  3.19<H>    Ca    9.2      11 Mar 2020 07:26    TPro  7.3  /  Alb  3.5  /  TBili  <0.2<L>  /  DBili  x   /  AST  12  /  ALT  15  /  AlkPhos  124<H>  03-10                          10.9   15.36 )-----------( 350      ( 11 Mar 2020 07:26 )             33.6           Hemoglobin A1C, Whole Blood: >16.9 % (03-11 @ 07:26)    Ketone - Urine: Negative (03-10 @ 19:59)    Alkaline Phosphatase, Serum: 124 U/L (03-10-20 @ 17:46)  Alanine Aminotransferase (ALT/SGPT): 15 U/L (03-10-20 @ 17:46)  Albumin, Serum: 3.5 g/dL (03-10-20 @ 17:46)  Aspartate Aminotransferase (AST/SGOT): 12 U/L (03-10-20 @ 17:46)      CAPILLARY BLOOD GLUCOSE      POCT Blood Glucose.: 308 mg/dL (11 Mar 2020 11:15)  POCT Blood Glucose.: 324 mg/dL (11 Mar 2020 07:47)  POCT Blood Glucose.: 353 mg/dL (10 Mar 2020 19:54)  POCT Blood Glucose.: 518 mg/dL (10 Mar 2020 15:31)

## 2020-03-11 NOTE — PROGRESS NOTE ADULT - ASSESSMENT
35M with a history of chronic kidney disease, diabetes, obstructive sleep apnea, and bipolar disorder who presented with complaints of bilateral back pain and was found to have poorly controlled hypertension and hyperglycemia.    Flank pain - CT of the abdomen was without acute intraabdominal pathology. Noted a punctate nonobstructive left renal stone without hydronephrosis. The bladder was noted to be distended on CT but the patient was able to urinate freely in the emergency department.  --> will order mri spine to rule out fracture    poorly controlled Diabetes - consulted endocrine  --> lantus, premeal and ssi    acute on Chronic kidney disease - Monitoring renal function.   --> improved  --> likely from dm2     Hypertension - Close blood pressure monitoring. On carvedilol and clonidine with hydralazine as needed.    Hyponatremia - stable     Bipolar disorder - On quetiapine.     Obstructive sleep apnea - bipap

## 2020-03-12 LAB
ALBUMIN SERPL ELPH-MCNC: 2.8 G/DL — LOW (ref 3.3–5.2)
ALP SERPL-CCNC: 100 U/L — SIGNIFICANT CHANGE UP (ref 40–120)
ALT FLD-CCNC: 12 U/L — SIGNIFICANT CHANGE UP
ANION GAP SERPL CALC-SCNC: 12 MMOL/L — SIGNIFICANT CHANGE UP (ref 5–17)
APPEARANCE UR: CLEAR — SIGNIFICANT CHANGE UP
AST SERPL-CCNC: 11 U/L — SIGNIFICANT CHANGE UP
BASOPHILS # BLD AUTO: 0.06 K/UL — SIGNIFICANT CHANGE UP (ref 0–0.2)
BASOPHILS NFR BLD AUTO: 0.4 % — SIGNIFICANT CHANGE UP (ref 0–2)
BILIRUB SERPL-MCNC: 0.3 MG/DL — LOW (ref 0.4–2)
BILIRUB UR-MCNC: NEGATIVE — SIGNIFICANT CHANGE UP
BUN SERPL-MCNC: 28 MG/DL — HIGH (ref 8–20)
CALCIUM SERPL-MCNC: 9.2 MG/DL — SIGNIFICANT CHANGE UP (ref 8.6–10.2)
CHLORIDE SERPL-SCNC: 101 MMOL/L — SIGNIFICANT CHANGE UP (ref 98–107)
CHOLEST SERPL-MCNC: 195 MG/DL — SIGNIFICANT CHANGE UP (ref 110–199)
CO2 SERPL-SCNC: 25 MMOL/L — SIGNIFICANT CHANGE UP (ref 22–29)
COLOR SPEC: YELLOW — SIGNIFICANT CHANGE UP
CREAT SERPL-MCNC: 3.38 MG/DL — HIGH (ref 0.5–1.3)
DIFF PNL FLD: ABNORMAL
EOSINOPHIL # BLD AUTO: 0.38 K/UL — SIGNIFICANT CHANGE UP (ref 0–0.5)
EOSINOPHIL NFR BLD AUTO: 2.6 % — SIGNIFICANT CHANGE UP (ref 0–6)
GLUCOSE BLDC GLUCOMTR-MCNC: 140 MG/DL — HIGH (ref 70–99)
GLUCOSE BLDC GLUCOMTR-MCNC: 226 MG/DL — HIGH (ref 70–99)
GLUCOSE BLDC GLUCOMTR-MCNC: 257 MG/DL — HIGH (ref 70–99)
GLUCOSE BLDC GLUCOMTR-MCNC: 264 MG/DL — HIGH (ref 70–99)
GLUCOSE BLDC GLUCOMTR-MCNC: 294 MG/DL — HIGH (ref 70–99)
GLUCOSE BLDC GLUCOMTR-MCNC: 297 MG/DL — HIGH (ref 70–99)
GLUCOSE SERPL-MCNC: 281 MG/DL — HIGH (ref 70–99)
GLUCOSE UR QL: 1000 MG/DL
HCT VFR BLD CALC: 33.7 % — LOW (ref 39–50)
HDLC SERPL-MCNC: 40 MG/DL — SIGNIFICANT CHANGE UP
HGB BLD-MCNC: 10.9 G/DL — LOW (ref 13–17)
IMM GRANULOCYTES NFR BLD AUTO: 0.6 % — SIGNIFICANT CHANGE UP (ref 0–1.5)
KETONES UR-MCNC: NEGATIVE — SIGNIFICANT CHANGE UP
LEUKOCYTE ESTERASE UR-ACNC: NEGATIVE — SIGNIFICANT CHANGE UP
LIPID PNL WITH DIRECT LDL SERPL: 107 MG/DL — SIGNIFICANT CHANGE UP
LYMPHOCYTES # BLD AUTO: 30.6 % — SIGNIFICANT CHANGE UP (ref 13–44)
LYMPHOCYTES # BLD AUTO: 4.43 K/UL — HIGH (ref 1–3.3)
MAGNESIUM SERPL-MCNC: 2.1 MG/DL — SIGNIFICANT CHANGE UP (ref 1.6–2.6)
MCHC RBC-ENTMCNC: 24.4 PG — LOW (ref 27–34)
MCHC RBC-ENTMCNC: 32.3 GM/DL — SIGNIFICANT CHANGE UP (ref 32–36)
MCV RBC AUTO: 75.6 FL — LOW (ref 80–100)
MONOCYTES # BLD AUTO: 0.82 K/UL — SIGNIFICANT CHANGE UP (ref 0–0.9)
MONOCYTES NFR BLD AUTO: 5.7 % — SIGNIFICANT CHANGE UP (ref 2–14)
NEUTROPHILS # BLD AUTO: 8.73 K/UL — HIGH (ref 1.8–7.4)
NEUTROPHILS NFR BLD AUTO: 60.1 % — SIGNIFICANT CHANGE UP (ref 43–77)
NITRITE UR-MCNC: NEGATIVE — SIGNIFICANT CHANGE UP
PH UR: 6 — SIGNIFICANT CHANGE UP (ref 5–8)
PLATELET # BLD AUTO: 335 K/UL — SIGNIFICANT CHANGE UP (ref 150–400)
POTASSIUM SERPL-MCNC: 3 MMOL/L — LOW (ref 3.5–5.3)
POTASSIUM SERPL-SCNC: 3 MMOL/L — LOW (ref 3.5–5.3)
PROCALCITONIN SERPL-MCNC: 0.19 NG/ML — HIGH (ref 0.02–0.1)
PROT SERPL-MCNC: 6.5 G/DL — LOW (ref 6.6–8.7)
PROT UR-MCNC: 500 MG/DL
RBC # BLD: 4.46 M/UL — SIGNIFICANT CHANGE UP (ref 4.2–5.8)
RBC # FLD: 16 % — HIGH (ref 10.3–14.5)
SODIUM SERPL-SCNC: 138 MMOL/L — SIGNIFICANT CHANGE UP (ref 135–145)
SP GR SPEC: 1.01 — SIGNIFICANT CHANGE UP (ref 1.01–1.02)
TOTAL CHOLESTEROL/HDL RATIO MEASUREMENT: 5 RATIO — SIGNIFICANT CHANGE UP (ref 3.4–9.6)
TRIGL SERPL-MCNC: 242 MG/DL — HIGH (ref 10–200)
TSH SERPL-MCNC: 0.97 UIU/ML — SIGNIFICANT CHANGE UP (ref 0.27–4.2)
UROBILINOGEN FLD QL: NEGATIVE MG/DL — SIGNIFICANT CHANGE UP
WBC # BLD: 14.5 K/UL — HIGH (ref 3.8–10.5)
WBC # FLD AUTO: 14.5 K/UL — HIGH (ref 3.8–10.5)

## 2020-03-12 PROCEDURE — 71045 X-RAY EXAM CHEST 1 VIEW: CPT | Mod: 26

## 2020-03-12 PROCEDURE — 99233 SBSQ HOSP IP/OBS HIGH 50: CPT

## 2020-03-12 PROCEDURE — 76775 US EXAM ABDO BACK WALL LIM: CPT | Mod: 26

## 2020-03-12 PROCEDURE — 99232 SBSQ HOSP IP/OBS MODERATE 35: CPT

## 2020-03-12 PROCEDURE — 93010 ELECTROCARDIOGRAM REPORT: CPT

## 2020-03-12 RX ORDER — INSULIN GLARGINE 100 [IU]/ML
40 INJECTION, SOLUTION SUBCUTANEOUS EVERY MORNING
Refills: 0 | Status: DISCONTINUED | OUTPATIENT
Start: 2020-03-12 | End: 2020-03-13

## 2020-03-12 RX ORDER — SODIUM CHLORIDE 9 MG/ML
1000 INJECTION INTRAMUSCULAR; INTRAVENOUS; SUBCUTANEOUS
Refills: 0 | Status: DISCONTINUED | OUTPATIENT
Start: 2020-03-12 | End: 2020-03-15

## 2020-03-12 RX ORDER — INSULIN LISPRO 100/ML
15 VIAL (ML) SUBCUTANEOUS
Refills: 0 | Status: DISCONTINUED | OUTPATIENT
Start: 2020-03-12 | End: 2020-03-13

## 2020-03-12 RX ORDER — POTASSIUM CHLORIDE 20 MEQ
40 PACKET (EA) ORAL EVERY 4 HOURS
Refills: 0 | Status: COMPLETED | OUTPATIENT
Start: 2020-03-12 | End: 2020-03-12

## 2020-03-12 RX ADMIN — Medication 40 MILLIEQUIVALENT(S): at 17:36

## 2020-03-12 RX ADMIN — Medication 6: at 11:52

## 2020-03-12 RX ADMIN — Medication 15 UNIT(S): at 16:47

## 2020-03-12 RX ADMIN — HEPARIN SODIUM 5000 UNIT(S): 5000 INJECTION INTRAVENOUS; SUBCUTANEOUS at 13:59

## 2020-03-12 RX ADMIN — SODIUM CHLORIDE 100 MILLILITER(S): 9 INJECTION INTRAMUSCULAR; INTRAVENOUS; SUBCUTANEOUS at 17:36

## 2020-03-12 RX ADMIN — SODIUM CHLORIDE 100 MILLILITER(S): 9 INJECTION INTRAMUSCULAR; INTRAVENOUS; SUBCUTANEOUS at 14:00

## 2020-03-12 RX ADMIN — Medication 40 MILLIEQUIVALENT(S): at 13:58

## 2020-03-12 RX ADMIN — CARVEDILOL PHOSPHATE 12.5 MILLIGRAM(S): 80 CAPSULE, EXTENDED RELEASE ORAL at 17:32

## 2020-03-12 RX ADMIN — AMLODIPINE BESYLATE 10 MILLIGRAM(S): 2.5 TABLET ORAL at 05:20

## 2020-03-12 RX ADMIN — Medication 10 UNIT(S): at 11:52

## 2020-03-12 RX ADMIN — Medication 0.2 MILLIGRAM(S): at 21:15

## 2020-03-12 RX ADMIN — CARVEDILOL PHOSPHATE 12.5 MILLIGRAM(S): 80 CAPSULE, EXTENDED RELEASE ORAL at 05:20

## 2020-03-12 RX ADMIN — Medication 6: at 16:47

## 2020-03-12 RX ADMIN — HEPARIN SODIUM 5000 UNIT(S): 5000 INJECTION INTRAVENOUS; SUBCUTANEOUS at 21:15

## 2020-03-12 RX ADMIN — HEPARIN SODIUM 5000 UNIT(S): 5000 INJECTION INTRAVENOUS; SUBCUTANEOUS at 05:20

## 2020-03-12 RX ADMIN — Medication 10 MILLIGRAM(S): at 00:20

## 2020-03-12 RX ADMIN — Medication 10 UNIT(S): at 08:34

## 2020-03-12 RX ADMIN — Medication 6: at 08:33

## 2020-03-12 RX ADMIN — QUETIAPINE FUMARATE 100 MILLIGRAM(S): 200 TABLET, FILM COATED ORAL at 21:16

## 2020-03-12 RX ADMIN — Medication 0.2 MILLIGRAM(S): at 05:20

## 2020-03-12 RX ADMIN — INSULIN GLARGINE 30 UNIT(S): 100 INJECTION, SOLUTION SUBCUTANEOUS at 09:17

## 2020-03-12 RX ADMIN — Medication 0.2 MILLIGRAM(S): at 13:58

## 2020-03-12 NOTE — PROGRESS NOTE ADULT - SUBJECTIVE AND OBJECTIVE BOX
Interval Events:  no overnight events  follow up on type 2 diabetes    patient seen and examined at bedside.  FS still hyperglycemic  no complaints    REVIEW OF SYSTEMS:    CONSTITUTIONAL: No fever, weight loss, or fatigue  EYES: No eye pain, visual disturbances, or discharge  ENMT:  No difficulty hearing, tinnitus, vertigo; No sinus or throat pain  NECK: No pain or stiffness  RESPIRATORY: No cough, wheezing, chills or hemoptysis; No shortness of breath  CARDIOVASCULAR: No chest pain, palpitations, dizziness, or leg swelling  GASTROINTESTINAL: No abdominal or epigastric pain. No nausea, vomiting, or hematemesis; No diarrhea or constipation. No melena or hematochezia.  NEUROLOGICAL: No headaches, memory loss, loss of strength, numbness, or tremors  SKIN: No itching, burning, rashes, or lesions   MUSCULOSKELETAL: No joint pain or swelling; No muscle, back, or extremity pain  PSYCHIATRIC: No depression, anxiety, mood swings, or difficulty sleeping        No Known Allergies      MEDICATIONS  (STANDING):  amLODIPine   Tablet 10 milliGRAM(s) Oral daily  carvedilol 12.5 milliGRAM(s) Oral every 12 hours  cloNIDine 0.2 milliGRAM(s) Oral three times a day  dextrose 5%. 1000 milliLiter(s) (50 mL/Hr) IV Continuous <Continuous>  dextrose 50% Injectable 12.5 Gram(s) IV Push once  dextrose 50% Injectable 25 Gram(s) IV Push once  dextrose 50% Injectable 25 Gram(s) IV Push once  heparin  Injectable 5000 Unit(s) SubCutaneous every 8 hours  insulin glargine Injectable (LANTUS) 40 Unit(s) SubCutaneous every morning  insulin lispro (HumaLOG) corrective regimen sliding scale   SubCutaneous three times a day before meals  insulin lispro Injectable (HumaLOG) 15 Unit(s) SubCutaneous three times a day before meals  potassium chloride   Powder 40 milliEquivalent(s) Oral every 4 hours  QUEtiapine 100 milliGRAM(s) Oral at bedtime  sodium chloride 0.9%. 1000 milliLiter(s) (100 mL/Hr) IV Continuous <Continuous>    MEDICATIONS  (PRN):  acetaminophen   Tablet .. 650 milliGRAM(s) Oral every 6 hours PRN Temp greater or equal to 38C (100.4F), Mild Pain (1 - 3)  dextrose 40% Gel 15 Gram(s) Oral once PRN Blood Glucose LESS THAN 70 milliGRAM(s)/deciliter  glucagon  Injectable 1 milliGRAM(s) IntraMuscular once PRN Glucose LESS THAN 70 milligrams/deciliter  hydrALAZINE Injectable 10 milliGRAM(s) IV Push every 6 hours PRN SBP > 160      Vital Signs Last 24 Hrs  T(C): 36.4 (12 Mar 2020 09:12), Max: 37.3 (11 Mar 2020 22:55)  T(F): 97.6 (12 Mar 2020 09:12), Max: 99.1 (11 Mar 2020 22:55)  HR: 83 (12 Mar 2020 09:12) (77 - 90)  BP: 156/92 (12 Mar 2020 09:12) (116/119 - 168/92)  BP(mean): --  RR: 18 (12 Mar 2020 09:12) (16 - 20)  SpO2: 94% (12 Mar 2020 09:12) (93% - 97%)    PHYSICAL EXAM:    Constitutional: NAD, obese  HEENT:  EOMI, no exophalmos  Neck:  trachea midline, no thyroid enlargement  Respiratory: CTAB  Cardiovascular: S1 and S2, RRR, no M/G/R  Gastrointestinal: BS+, soft, ntnd  Extremities: No peripheral edema  Neurological: A/O x 3, no focal deficits  Psychiatric: Normal mood, normal affect  Skin: No rashes, no acanthosis        LABS  03-12    138  |  101  |  28.0<H>  ----------------------------<  281<H>  3.0<L>   |  25.0  |  3.38<H>    Ca    9.2      12 Mar 2020 09:06  Mg     2.1     03-12    TPro  6.5<L>  /  Alb  2.8<L>  /  TBili  0.3<L>  /  DBili  x   /  AST  11  /  ALT  12  /  AlkPhos  100  03-12                          10.9   14.50 )-----------( 335      ( 12 Mar 2020 09:06 )             33.7     Triglycerides, Serum: 242 mg/dL (03-12-20 @ 09:06)  HDL Cholesterol, Serum: 40 mg/dL (03-12-20 @ 09:06)  Cholesterol, Serum: 195 mg/dL (03-12-20 @ 09:06)    Thyroid Stimulating Hormone, Serum: 0.97 uIU/mL (03-12-20 @ 09:06)  Hemoglobin A1C, Whole Blood: >16.9 % (03-11-20 @ 07:26)    Aspartate Aminotransferase (AST/SGOT): 11 U/L (03-12-20 @ 09:06)  Alkaline Phosphatase, Serum: 100 U/L (03-12-20 @ 09:06)  Alanine Aminotransferase (ALT/SGPT): 12 U/L (03-12-20 @ 09:06)  Albumin, Serum: 2.8 g/dL (03-12-20 @ 09:06)  Alkaline Phosphatase, Serum: 124 U/L (03-10-20 @ 17:46)  Alanine Aminotransferase (ALT/SGPT): 15 U/L (03-10-20 @ 17:46)  Albumin, Serum: 3.5 g/dL (03-10-20 @ 17:46)  Aspartate Aminotransferase (AST/SGOT): 12 U/L (03-10-20 @ 17:46)    CAPILLARY BLOOD GLUCOSE      POCT Blood Glucose.: 297 mg/dL (12 Mar 2020 11:29)  POCT Blood Glucose.: 294 mg/dL (12 Mar 2020 08:10)  POCT Blood Glucose.: 226 mg/dL (11 Mar 2020 21:30)  POCT Blood Glucose.: 264 mg/dL (11 Mar 2020 18:00)

## 2020-03-12 NOTE — PROGRESS NOTE ADULT - ASSESSMENT
5M w/ T2DM cb CKD4, obesity, HTN and bipolar disorder presented with a two day history of right flank dull pain. He denied any trauma to the area and denied any frequency, hematuria, or dysuria. He denied any fevers or chills at home. He has not had any similar episodes in the past and is unaware of any aggravating or relieving factors. Upon evaluation in the emergency department, the patient was noted to have poorly controlled hypertension and hyperglycemia. He denied any associated chest pain, palpitations, or dyspnea. The patient reported feelings feeling full when eating but denied any weight loss. He denied any change in his bowel habits or associated abdominal pain, nausea, or diarrhea. He did admit to missing doses of insulin at home.    Uncontrolled T2DM- hyperglycemic  -A1c 17  -check sugars AC and bedtime  -ensure diabetic diet  -increase to lantus 40 units QHS  -increase to lispro 20 units TID  -continue with insulin sliding scale  -discussed complications including ESRD    Hypokalemia- replete    HTN- BP well controlled. continue BB/norvasc/clonidine 35M w/ T2DM cb CKD4, obesity, HTN and bipolar disorder presented with a two day history of right flank dull pain. He denied any trauma to the area and denied any frequency, hematuria, or dysuria. He denied any fevers or chills at home. He has not had any similar episodes in the past and is unaware of any aggravating or relieving factors. Upon evaluation in the emergency department, the patient was noted to have poorly controlled hypertension and hyperglycemia. He denied any associated chest pain, palpitations, or dyspnea. The patient reported feelings feeling full when eating but denied any weight loss. He denied any change in his bowel habits or associated abdominal pain, nausea, or diarrhea. He did admit to missing doses of insulin at home.    Uncontrolled T2DM- hyperglycemic  -A1c 17  -check sugars AC and bedtime  -ensure diabetic diet  -increase to lantus 40 units QHS  -increase to lispro 15 units TID  -continue with insulin sliding scale  -discussed complications including ESRD    Hypokalemia- replete    HTN- BP well controlled. continue BB/norvasc/clonidine

## 2020-03-12 NOTE — PROGRESS NOTE ADULT - SUBJECTIVE AND OBJECTIVE BOX
CHIEF COMPLAINT/INTERVAL HISTORY:    Patient is a 35y old  Male who presents with a chief complaint of dm2 and back pain (11 Mar 2020 11:32)      HPI:  35M presented with a two day history of flank pain. The patient described that pain as dull in quality and worse on the right side. He denied any trauma to the area and denied any frequency, hematuria, or dysuria. He denied any fevers or chills at home. He has not had any similar episodes in the past and is unaware of any aggravating or relieving factors. Upon evaluation in the emergency department, the patient was noted to have poorly controlled hypertension and hyperglycemia. He denied any associated chest pain, palpitations, or dyspnea. The patient reported feelings feeling full when eating but denied any weight loss. He denied any change in his bowel habits or associated abdominal pain, nausea, or diarrhea. He did admit to missing doses of insulin at home. (10 Mar 2020 23:11)      SUBJECTIVE & OBJECTIVE/ ROS: Pt seen and examined at bedside. Afebrile. Voices no complaints. No chest pain, palpitations, sob, light headedness/dizziness, difficulty breathing/cough, fevers/chills, abdominal pain, n/v, diarrhea/constipation, dysuria or increased urinary frequency.     ICU Vital Signs Last 24 Hrs  T(C): 36.8 (12 Mar 2020 16:14), Max: 37.3 (11 Mar 2020 22:55)  T(F): 98.3 (12 Mar 2020 16:14), Max: 99.1 (11 Mar 2020 22:55)  HR: 80 (12 Mar 2020 17:36) (77 - 90)  BP: 148/85 (12 Mar 2020 17:36) (148/85 - 168/92)  BP(mean): --  ABP: --  ABP(mean): --  RR: 18 (12 Mar 2020 16:14) (16 - 19)  SpO2: 96% (12 Mar 2020 16:14) (93% - 96%)        MEDICATIONS  (STANDING):  amLODIPine   Tablet 10 milliGRAM(s) Oral daily  carvedilol 12.5 milliGRAM(s) Oral every 12 hours  cloNIDine 0.2 milliGRAM(s) Oral three times a day  dextrose 5%. 1000 milliLiter(s) (50 mL/Hr) IV Continuous <Continuous>  dextrose 50% Injectable 12.5 Gram(s) IV Push once  dextrose 50% Injectable 25 Gram(s) IV Push once  dextrose 50% Injectable 25 Gram(s) IV Push once  heparin  Injectable 5000 Unit(s) SubCutaneous every 8 hours  insulin glargine Injectable (LANTUS) 40 Unit(s) SubCutaneous every morning  insulin lispro (HumaLOG) corrective regimen sliding scale   SubCutaneous three times a day before meals  insulin lispro Injectable (HumaLOG) 15 Unit(s) SubCutaneous three times a day before meals  QUEtiapine 100 milliGRAM(s) Oral at bedtime  sodium chloride 0.9%. 1000 milliLiter(s) (100 mL/Hr) IV Continuous <Continuous>    MEDICATIONS  (PRN):  acetaminophen   Tablet .. 650 milliGRAM(s) Oral every 6 hours PRN Temp greater or equal to 38C (100.4F), Mild Pain (1 - 3)  dextrose 40% Gel 15 Gram(s) Oral once PRN Blood Glucose LESS THAN 70 milliGRAM(s)/deciliter  glucagon  Injectable 1 milliGRAM(s) IntraMuscular once PRN Glucose LESS THAN 70 milligrams/deciliter  hydrALAZINE Injectable 10 milliGRAM(s) IV Push every 6 hours PRN SBP > 160      LABS:                        10.9   14.50 )-----------( 335      ( 12 Mar 2020 09:06 )             33.7     03-12    138  |  101  |  28.0<H>  ----------------------------<  281<H>  3.0<L>   |  25.0  |  3.38<H>    Ca    9.2      12 Mar 2020 09:06  Mg     2.1     03-12    TPro  6.5<L>  /  Alb  2.8<L>  /  TBili  0.3<L>  /  DBili  x   /  AST  11  /  ALT  12  /  AlkPhos  100  03-12      Urinalysis Basic - ( 12 Mar 2020 08:41 )    Color: Yellow / Appearance: Clear / S.015 / pH: x  Gluc: x / Ketone: Negative  / Bili: Negative / Urobili: Negative mg/dL   Blood: x / Protein: 500 mg/dL / Nitrite: Negative   Leuk Esterase: Negative / RBC: 0-2 /HPF / WBC 3-5   Sq Epi: x / Non Sq Epi: Negative / Bacteria: Negative        CAPILLARY BLOOD GLUCOSE      POCT Blood Glucose.: 257 mg/dL (12 Mar 2020 16:30)  POCT Blood Glucose.: 297 mg/dL (12 Mar 2020 11:29)  POCT Blood Glucose.: 294 mg/dL (12 Mar 2020 08:10)  POCT Blood Glucose.: 226 mg/dL (11 Mar 2020 21:30)  POCT Blood Glucose.: 264 mg/dL (11 Mar 2020 18:00)      RECENT CULTURES:      RADIOLOGY & ADDITIONAL TESTS:      PHYSICAL EXAM:    GENERAL: NAD, +morbidly obese  HEAD:  Atraumatic, Normocephalic  EYES: EOMI, PERRLA, conjunctiva and sclera clear  ENMT: Moist mucous membranes  NECK: Supple, No JVD  NERVOUS SYSTEM:  Alert & Oriented X3, Motor Strength 5/5 B/L upper and lower extremities; DTRs 2+ intact and symmetric  CHEST/LUNG: Clear to auscultation bilaterally; No rales, rhonchi, wheezing, or rubs  HEART: Regular rate and rhythm; No murmurs, rubs, or gallops  ABDOMEN: Soft, Nontender, Nondistended; Bowel sounds present  EXTREMITIES:  2+ Peripheral Pulses, No clubbing, cyanosis, or edema

## 2020-03-12 NOTE — PROGRESS NOTE ADULT - ASSESSMENT
35M with a history of chronic kidney disease, diabetes, obstructive sleep apnea, and bipolar disorder who presented with complaints of bilateral back pain and was found to have poorly controlled hypertension and hyperglycemia.      Flank pain - CT of the abdomen was without acute intraabdominal pathology. Noted a punctate nonobstructive left renal stone without hydronephrosis. The bladder was noted to be distended on CT but the patient was able to urinate freely in the emergency department.  --> mri spine negative for acute pathology      Uncontrolled T2DM- hyperglycemic-  -A1c 17  -check sugars AC and bedtime  -ensure diabetic diet  -increase to lantus 40 units QHS  -increase to lispro 20 units TID  -continue with insulin sliding scale  Endocrine appreciated  Diabetic teaching    acute on Chronic kidney disease - Monitoring renal function.   --> likely from dm2   Renal US: Soft tissue prominence again seen in the interpolar region of the right kidney. MRI recommended  MRI Abd ordered  Not acidotic yet  Renal Dr. Walker called      Leukocytosis likely secondary to DKA-  No signs of infection  Afebrile  CXR -ve  UA-ve  No diarrhea    Hypertension - Close blood pressure monitoring. On carvedilol and clonidine with hydralazine as needed.    Hyponatremia - stable     Hypokalemia- replaced    Bipolar disorder - On quetiapine.     Obstructive sleep apnea - bipap noct 35M with a history of chronic kidney disease, diabetes, obstructive sleep apnea, and bipolar disorder who presented with complaints of bilateral back pain and was found to have poorly controlled hypertension and hyperglycemia.      Flank pain - CT of the abdomen was without acute intraabdominal pathology. Noted a punctate nonobstructive left renal stone without hydronephrosis. The bladder was noted to be distended on CT but the patient was able to urinate freely in the emergency department.  --> mri spine negative for acute pathology      Uncontrolled T2DM- hyperglycemic-  -A1c 17  -check sugars AC and bedtime  -ensure diabetic diet  -increase to lantus 40 units QHS  -increase to lispro 20 units TID  -continue with insulin sliding scale  Endocrine appreciated  Diabetic teaching    acute on Chronic kidney disease - Monitoring renal function.   --> likely from dm2   Renal US: Soft tissue prominence again seen in the interpolar region of the right kidney. MRI recommended  MRI Abd ordered  Not acidotic yet  Renal Dr. Walker called      Leukocytosis likely reactive-  No signs of infection  Afebrile  CXR -ve  UA-ve  No diarrhea  No abx for now    Hypertension - Close blood pressure monitoring. On carvedilol and clonidine with hydralazine as needed.    Hyponatremia - stable     Hypokalemia- replaced    Bipolar disorder - On quetiapine.     Obstructive sleep apnea - bipap noct

## 2020-03-13 LAB
ANION GAP SERPL CALC-SCNC: 13 MMOL/L — SIGNIFICANT CHANGE UP (ref 5–17)
BASOPHILS # BLD AUTO: 0.06 K/UL — SIGNIFICANT CHANGE UP (ref 0–0.2)
BASOPHILS NFR BLD AUTO: 0.4 % — SIGNIFICANT CHANGE UP (ref 0–2)
BUN SERPL-MCNC: 27 MG/DL — HIGH (ref 8–20)
CALCIUM SERPL-MCNC: 8.8 MG/DL — SIGNIFICANT CHANGE UP (ref 8.6–10.2)
CHLORIDE SERPL-SCNC: 100 MMOL/L — SIGNIFICANT CHANGE UP (ref 98–107)
CO2 SERPL-SCNC: 24 MMOL/L — SIGNIFICANT CHANGE UP (ref 22–29)
CREAT SERPL-MCNC: 3.11 MG/DL — HIGH (ref 0.5–1.3)
EOSINOPHIL # BLD AUTO: 0.07 K/UL — SIGNIFICANT CHANGE UP (ref 0–0.5)
EOSINOPHIL NFR BLD AUTO: 0.5 % — SIGNIFICANT CHANGE UP (ref 0–6)
GLUCOSE BLDC GLUCOMTR-MCNC: 104 MG/DL — HIGH (ref 70–99)
GLUCOSE BLDC GLUCOMTR-MCNC: 126 MG/DL — HIGH (ref 70–99)
GLUCOSE BLDC GLUCOMTR-MCNC: 188 MG/DL — HIGH (ref 70–99)
GLUCOSE BLDC GLUCOMTR-MCNC: 235 MG/DL — HIGH (ref 70–99)
GLUCOSE SERPL-MCNC: 204 MG/DL — HIGH (ref 70–99)
HCT VFR BLD CALC: 35.2 % — LOW (ref 39–50)
HGB BLD-MCNC: 11.4 G/DL — LOW (ref 13–17)
IMM GRANULOCYTES NFR BLD AUTO: 0.3 % — SIGNIFICANT CHANGE UP (ref 0–1.5)
LYMPHOCYTES # BLD AUTO: 25.9 % — SIGNIFICANT CHANGE UP (ref 13–44)
LYMPHOCYTES # BLD AUTO: 3.74 K/UL — HIGH (ref 1–3.3)
MCHC RBC-ENTMCNC: 24.6 PG — LOW (ref 27–34)
MCHC RBC-ENTMCNC: 32.4 GM/DL — SIGNIFICANT CHANGE UP (ref 32–36)
MCV RBC AUTO: 75.9 FL — LOW (ref 80–100)
MONOCYTES # BLD AUTO: 0.77 K/UL — SIGNIFICANT CHANGE UP (ref 0–0.9)
MONOCYTES NFR BLD AUTO: 5.3 % — SIGNIFICANT CHANGE UP (ref 2–14)
NEUTROPHILS # BLD AUTO: 9.73 K/UL — HIGH (ref 1.8–7.4)
NEUTROPHILS NFR BLD AUTO: 67.6 % — SIGNIFICANT CHANGE UP (ref 43–77)
PLATELET # BLD AUTO: 327 K/UL — SIGNIFICANT CHANGE UP (ref 150–400)
POTASSIUM SERPL-MCNC: 3.2 MMOL/L — LOW (ref 3.5–5.3)
POTASSIUM SERPL-SCNC: 3.2 MMOL/L — LOW (ref 3.5–5.3)
RBC # BLD: 4.64 M/UL — SIGNIFICANT CHANGE UP (ref 4.2–5.8)
RBC # FLD: 15.9 % — HIGH (ref 10.3–14.5)
SODIUM SERPL-SCNC: 137 MMOL/L — SIGNIFICANT CHANGE UP (ref 135–145)
WBC # BLD: 14.42 K/UL — HIGH (ref 3.8–10.5)
WBC # FLD AUTO: 14.42 K/UL — HIGH (ref 3.8–10.5)

## 2020-03-13 PROCEDURE — 99233 SBSQ HOSP IP/OBS HIGH 50: CPT

## 2020-03-13 PROCEDURE — 99232 SBSQ HOSP IP/OBS MODERATE 35: CPT

## 2020-03-13 RX ORDER — INSULIN GLARGINE 100 [IU]/ML
48 INJECTION, SOLUTION SUBCUTANEOUS EVERY MORNING
Refills: 0 | Status: DISCONTINUED | OUTPATIENT
Start: 2020-03-13 | End: 2020-03-15

## 2020-03-13 RX ORDER — CARVEDILOL PHOSPHATE 80 MG/1
25 CAPSULE, EXTENDED RELEASE ORAL EVERY 12 HOURS
Refills: 0 | Status: DISCONTINUED | OUTPATIENT
Start: 2020-03-13 | End: 2020-03-15

## 2020-03-13 RX ORDER — POTASSIUM CHLORIDE 20 MEQ
40 PACKET (EA) ORAL ONCE
Refills: 0 | Status: COMPLETED | OUTPATIENT
Start: 2020-03-13 | End: 2020-03-13

## 2020-03-13 RX ORDER — POTASSIUM CHLORIDE 20 MEQ
20 PACKET (EA) ORAL ONCE
Refills: 0 | Status: COMPLETED | OUTPATIENT
Start: 2020-03-13 | End: 2020-03-13

## 2020-03-13 RX ORDER — INSULIN LISPRO 100/ML
18 VIAL (ML) SUBCUTANEOUS
Refills: 0 | Status: DISCONTINUED | OUTPATIENT
Start: 2020-03-13 | End: 2020-03-15

## 2020-03-13 RX ADMIN — SODIUM CHLORIDE 100 MILLILITER(S): 9 INJECTION INTRAMUSCULAR; INTRAVENOUS; SUBCUTANEOUS at 21:44

## 2020-03-13 RX ADMIN — Medication 0.2 MILLIGRAM(S): at 05:06

## 2020-03-13 RX ADMIN — INSULIN GLARGINE 40 UNIT(S): 100 INJECTION, SOLUTION SUBCUTANEOUS at 08:28

## 2020-03-13 RX ADMIN — Medication 18 UNIT(S): at 11:33

## 2020-03-13 RX ADMIN — AMLODIPINE BESYLATE 10 MILLIGRAM(S): 2.5 TABLET ORAL at 05:06

## 2020-03-13 RX ADMIN — Medication 2: at 11:33

## 2020-03-13 RX ADMIN — Medication 4: at 07:40

## 2020-03-13 RX ADMIN — Medication 40 MILLIEQUIVALENT(S): at 17:11

## 2020-03-13 RX ADMIN — Medication 15 UNIT(S): at 07:40

## 2020-03-13 RX ADMIN — HEPARIN SODIUM 5000 UNIT(S): 5000 INJECTION INTRAVENOUS; SUBCUTANEOUS at 13:22

## 2020-03-13 RX ADMIN — Medication 18 UNIT(S): at 16:29

## 2020-03-13 RX ADMIN — CARVEDILOL PHOSPHATE 25 MILLIGRAM(S): 80 CAPSULE, EXTENDED RELEASE ORAL at 16:29

## 2020-03-13 RX ADMIN — SODIUM CHLORIDE 100 MILLILITER(S): 9 INJECTION INTRAMUSCULAR; INTRAVENOUS; SUBCUTANEOUS at 07:40

## 2020-03-13 RX ADMIN — HEPARIN SODIUM 5000 UNIT(S): 5000 INJECTION INTRAVENOUS; SUBCUTANEOUS at 05:05

## 2020-03-13 RX ADMIN — Medication 0.2 MILLIGRAM(S): at 13:22

## 2020-03-13 RX ADMIN — Medication 20 MILLIEQUIVALENT(S): at 21:57

## 2020-03-13 RX ADMIN — CARVEDILOL PHOSPHATE 12.5 MILLIGRAM(S): 80 CAPSULE, EXTENDED RELEASE ORAL at 05:06

## 2020-03-13 RX ADMIN — QUETIAPINE FUMARATE 100 MILLIGRAM(S): 200 TABLET, FILM COATED ORAL at 21:42

## 2020-03-13 RX ADMIN — Medication 0.2 MILLIGRAM(S): at 21:42

## 2020-03-13 RX ADMIN — HEPARIN SODIUM 5000 UNIT(S): 5000 INJECTION INTRAVENOUS; SUBCUTANEOUS at 21:43

## 2020-03-13 RX ADMIN — Medication 10 MILLIGRAM(S): at 16:28

## 2020-03-13 NOTE — CONSULT NOTE ADULT - ASSESSMENT
MELISSA on CKD (CKD 4 - baseline creatinine 2.8)    Renal ultrasound 3/12/20- no evidence of hydronephrosis  Right kidney 8.6 cm, Left kidney 11.9 - Soft tissue prominence again seen in the interpolar region of the right kidney    CT 3/10/20- Punctate non-obstructing left renal calculus. Distended urinary bladder    Creatinine Trend:  SCr 3.11 [03-13 @ 10:33]  SCr 3.38 [03-12 @ 09:06]  SCr 3.19 [03-11 @ 07:26]  SCr 3.71 [03-10 @ 17:46]    serum Creatinine improving  Continue normal saline 100cc/hr  Trend serum creatinine, Is and Os    HTN  continue antihypertensives    MBD  Calcitriol daily    Anemia- no need for KEV MELISSA on CKD (CKD 4 - baseline creatinine 2.8)    Renal ultrasound 3/12/20- no evidence of hydronephrosis  Right kidney 8.6 cm, Left kidney 11.9 - Soft tissue prominence again seen in the interpolar region of the right kidney    CT 3/10/20- Punctate non-obstructing left renal calculus. Distended urinary bladder    Creatinine Trend:  SCr 3.11 [03-13 @ 10:33]  SCr 3.38 [03-12 @ 09:06]  SCr 3.19 [03-11 @ 07:26]  SCr 3.71 [03-10 @ 17:46]    Serum Creatinine improving  Continue normal saline 100cc/hr  Trend serum creatinine, Is and Os    HTN  continue antihypertensives    CKD- MBD  On calcitriol 0.25 mcg daily at home  Obtain iPTH and 25-OH vitamin D  Monitor Ca+ and phos     Anemia- no need for KEV MELISSA on CKD (CKD 4 - baseline creatinine 2.8)  Patient sees Nephrologist in [place]    Patient states that he took NSAID (ibuprofen) for back pain prior to hospitalization -did not know the medication could be harmful to his kidneys    Renal ultrasound 3/12/20- no evidence of hydronephrosis  Right kidney 8.6 cm, Left kidney 11.9 - Soft tissue prominence again seen in the interpolar region of the right kidney    CT 3/10/20- Punctate non-obstructing left renal calculus. Distended urinary bladder    Creatinine Trend:  SCr 3.11 [03-13 @ 10:33]  SCr 3.38 [03-12 @ 09:06]  SCr 3.19 [03-11 @ 07:26]  SCr 3.71 [03-10 @ 17:46]    Serum Creatinine improving  Continue normal saline 100cc/hr  Trend serum creatinine, Is and Os    HTN  continue antihypertensives    Avoid NSAIDS    KDIGO Care Bundle:  Avoidance of nephrotoxins/nephrotoxin medication adjustment  Medication dosage adjustment for kidney function  Continuous IVF administration (guided by CVP and testing of fluid responsiveness for 6 hours in combination with nephrology consultation)  Discontinuation of ACE/ARBs for first 48 postoperative hours  Close monitoring of serum Creatinine and UO  Acid-base, electrolyte and albumin status management  Avoidance of hyperglycemia for first 72 postoperative hours  Consider alternatives to radiocontrast administration  Optimizing hemodynamics      CKD- MBD  On calcitriol 0.25 mcg daily at home  Obtain iPTH and 25-OH vitamin D  Monitor Ca+ and phos     Anemia- no need for KEV MELISSA on CKD (CKD 4 - baseline creatinine 2.8)  Patient sees Nephrologist in [place]    Patient states that he took NSAID (ibuprofen) for back pain prior to hospitalization -did not know the medication could be harmful to his kidneys    Renal ultrasound 3/12/20- no evidence of hydronephrosis  Right kidney 8.6 cm, Left kidney 11.9 - Soft tissue prominence again seen in the interpolar region of the right kidney    CT 3/10/20- Punctate non-obstructing left renal calculus. Distended urinary bladder    Creatinine Trend:  SCr 3.11 [03-13 @ 10:33]  SCr 3.38 [03-12 @ 09:06]  SCr 3.19 [03-11 @ 07:26]  SCr 3.71 [03-10 @ 17:46]    Serum Creatinine improving  Continue normal saline 100cc/hr  Trend serum creatinine, Is and Os    Avoid NSAIDS    KDIGO Care Bundle:  Avoidance of nephrotoxins/nephrotoxin medication adjustment  Medication dosage adjustment for kidney function  Continuous IVF administration (guided by CVP and testing of fluid responsiveness for 6 hours in combination with nephrology consultation)  Discontinuation of ACE/ARBs for first 48 postoperative hours  Close monitoring of serum Creatinine and UO  Acid-base, electrolyte and albumin status management  Avoidance of hyperglycemia for first 72 postoperative hours  Consider alternatives to radiocontrast administration  Optimizing hemodynamics    HTN  continue antihypertensives    CKD- MBD  On calcitriol 0.25 mcg daily at home  Obtain iPTH and 25-OH vitamin D  Monitor Ca+ and phos     Anemia- no need for KEV MELISSA on CKD (CKD 4 - baseline creatinine 2.8)  Patient sees Nephrologist in [place]    Patient states that he took NSAID (ibuprofen) for back pain prior to hospitalization -did not know the medication could be harmful to his kidneys    Renal ultrasound 3/12/20- no evidence of hydronephrosis  Right kidney 8.6 cm, Left kidney 11.9 - Soft tissue prominence again seen in the interpolar region of the right kidney  MRI abdomen pending    CT 3/10/20- Punctate non-obstructing left renal calculus. Distended urinary bladder  Pt reports urinating without difficulty  Confirm with bladder scan    Creatinine Trend:  SCr 3.11 [03-13 @ 10:33]  SCr 3.38 [03-12 @ 09:06]  SCr 3.19 [03-11 @ 07:26]  SCr 3.71 [03-10 @ 17:46]    Serum Creatinine improving  Continue  IV hydration  Trend serum creatinine, Is and Os    Avoid NSAIDS    KDIGO Care Bundle:  Avoidance of nephrotoxins/nephrotoxin medication adjustment  Medication dosage adjustment for kidney function  Discontinuation of ACE/ARBs   Close monitoring of serum Creatinine and UO  Acid-base, electrolyte and albumin status management  Avoidance of hyperglycemia  Consider alternatives to radiocontrast administration  Optimizing hemodynamics    HTN  continue antihypertensives    CKD- MBD  On calcitriol 0.25 mcg daily at home  Obtain iPTH and 25-OH vitamin D  Monitor Ca+ and phos     Anemia- Hb at goal  no need for KEV  Monitor H/H    UA with large protein  Quantify TP/Cr ratio MELISSA on CKD (CKD 4 - baseline creatinine 2.8) likely in setting of uncontrolled DM, HTN  Patient sees Nephrologist in [place]    Renal ultrasound 3/12/20- no evidence of hydronephrosis  Right kidney 8.6 cm, Left kidney 11.9 - Soft tissue prominence again seen in the interpolar region of the right kidney  MRI abdomen pending    CT 3/10/20- Punctate non-obstructing left renal calculus. Distended urinary bladder  Pt reports urinating without difficulty  Confirm with bladder scan    Creatinine Trend:  SCr 3.11 [03-13 @ 10:33]  SCr 3.38 [03-12 @ 09:06]  SCr 3.19 [03-11 @ 07:26]  SCr 3.71 [03-10 @ 17:46]    Serum Creatinine improving  Continue  IV hydration  Trend serum creatinine, Is and Os  Avoid NSAIDS      HTN  continue current  antihypertensive regimen    Hypokalemia  Agree with renin/ [place] levels      CKD- MBD  On calcitriol 0.25 mcg daily at home  Obtain iPTH and 25-OH vitamin D  Monitor Ca+ and phos     Anemia- Hb at goal  no need for KEV  Monitor H/H    UA with large protein  Quantify TP/Cr ratio

## 2020-03-13 NOTE — PROGRESS NOTE ADULT - SUBJECTIVE AND OBJECTIVE BOX
Acute renal failure    HPI:  35M presented with a two day history of flank pain. The patient described that pain as dull in quality and worse on the right side. He denied any trauma to the area and denied any frequency, hematuria, or dysuria. He denied any fevers or chills at home. He has not had any similar episodes in the past and is unaware of any aggravating or relieving factors. Upon evaluation in the emergency department, the patient was noted to have poorly controlled hypertension and hyperglycemia. He denied any associated chest pain, palpitations, or dyspnea. The patient reported feelings feeling full when eating but denied any weight loss. He denied any change in his bowel habits or associated abdominal pain, nausea, or diarrhea. He did admit to missing doses of insulin at home. (10 Mar 2020 23:11)    Interval History:  Patient was seen and examined at bedside. Feeling better.  Denies chest pain, palpitations, shortness of breath, cough, headache, dizziness, visual symptoms, nausea, vomiting, abdominal pain, diarrhea or urinary symptoms.    ROS:  As per interval history otherwise unremarkable.    PHYSICAL EXAM:  Vital Signs   T(C): 36.7 (13 Mar 2020 09:24), Max: 37 (13 Mar 2020 00:16)  T(F): 98.1 (13 Mar 2020 09:24), Max: 98.6 (13 Mar 2020 00:16)  HR: 81 (13 Mar 2020 09:24) (78 - 81)  BP: 140/80 (13 Mar 2020 09:59) (130/82 - 159/95)  RR: 19 (13 Mar 2020 09:24) (18 - 19)  SpO2: 97% (13 Mar 2020 09:24) (95% - 97%)  General: Morbidly obese male sitting in bed comfortably. No acute distress  HEENT: EOMI. Clear conjunctivae. Moist mucus membrane  Neck: Supple.   Chest: CTA bilaterally - no wheezing, rales or rhonchi.   Heart: Normal S1 & S2. RRR.   Abdomen: Obese. Soft. Non-tender. + BS  Ext: No pedal edema. No calf tenderness   Neuro: AAO x 3. No focal deficit. No speech disorder  Skin: Warm and Dry  Psychiatry: Normal mood and affect    I&O's Summary    12 Mar 2020 07:01  -  13 Mar 2020 07:00  --------------------------------------------------------  IN: 1700 mL / OUT: 2150 mL / NET: -450 mL    LABS:  CAPILLARY BLOOD GLUCOSE  POCT Blood Glucose.: 235 mg/dL (13 Mar 2020 07:20)  POCT Blood Glucose.: 140 mg/dL (12 Mar 2020 21:14)  POCT Blood Glucose.: 257 mg/dL (12 Mar 2020 16:30)  POCT Blood Glucose.: 297 mg/dL (12 Mar 2020 11:29)                        10.9   14.50 )-----------( 335      ( 12 Mar 2020 09:06 )             33.7     03-12    138  |  101  |  28.0<H>  ----------------------------<  281<H>  3.0<L>   |  25.0  |  3.38<H>    Ca    9.2      12 Mar 2020 09:06  Mg     2.1     03-12    TPro  6.5<L>  /  Alb  2.8<L>  /  TBili  0.3<L>  /  DBili  x   /  AST  11  /  ALT  12  /  AlkPhos  100  03-12      Urinalysis Basic - ( 12 Mar 2020 08:41 )    Color: Yellow / Appearance: Clear / S.015 / pH: x  Gluc: x / Ketone: Negative  / Bili: Negative / Urobili: Negative mg/dL   Blood: x / Protein: 500 mg/dL / Nitrite: Negative   Leuk Esterase: Negative / RBC: 0-2 /HPF / WBC 3-5   Sq Epi: x / Non Sq Epi: Negative / Bacteria: Negative    RADIOLOGY & ADDITIONAL STUDIES:  Reviewed     MEDICATIONS  (STANDING):  amLODIPine   Tablet 10 milliGRAM(s) Oral daily  carvedilol 12.5 milliGRAM(s) Oral every 12 hours  cloNIDine 0.2 milliGRAM(s) Oral three times a day  dextrose 5%. 1000 milliLiter(s) (50 mL/Hr) IV Continuous <Continuous>  dextrose 50% Injectable 12.5 Gram(s) IV Push once  dextrose 50% Injectable 25 Gram(s) IV Push once  dextrose 50% Injectable 25 Gram(s) IV Push once  heparin  Injectable 5000 Unit(s) SubCutaneous every 8 hours  insulin glargine Injectable (LANTUS) 40 Unit(s) SubCutaneous every morning  insulin lispro (HumaLOG) corrective regimen sliding scale   SubCutaneous three times a day before meals  insulin lispro Injectable (HumaLOG) 15 Unit(s) SubCutaneous three times a day before meals  QUEtiapine 100 milliGRAM(s) Oral at bedtime  sodium chloride 0.9%. 1000 milliLiter(s) (100 mL/Hr) IV Continuous <Continuous>    MEDICATIONS  (PRN):  acetaminophen   Tablet .. 650 milliGRAM(s) Oral every 6 hours PRN Temp greater or equal to 38C (100.4F), Mild Pain (1 - 3)  dextrose 40% Gel 15 Gram(s) Oral once PRN Blood Glucose LESS THAN 70 milliGRAM(s)/deciliter  glucagon  Injectable 1 milliGRAM(s) IntraMuscular once PRN Glucose LESS THAN 70 milligrams/deciliter  hydrALAZINE Injectable 10 milliGRAM(s) IV Push every 6 hours PRN SBP > 160

## 2020-03-13 NOTE — PROGRESS NOTE ADULT - SUBJECTIVE AND OBJECTIVE BOX
Interval Events:  no overnight events  follow up on type 2 diabetes    patient seen and examined at bedside.  still hypokalemic  FS still hyperglycemic  no complaints    REVIEW OF SYSTEMS:    CONSTITUTIONAL: No fever, weight loss, or fatigue  EYES: No eye pain, visual disturbances, or discharge  ENMT:  No difficulty hearing, tinnitus, vertigo; No sinus or throat pain  NECK: No pain or stiffness  RESPIRATORY: No cough, wheezing, chills or hemoptysis; No shortness of breath  CARDIOVASCULAR: No chest pain, palpitations, dizziness, or leg swelling  GASTROINTESTINAL: No abdominal or epigastric pain. No nausea, vomiting, or hematemesis; No diarrhea or constipation. No melena or hematochezia.  NEUROLOGICAL: No headaches, memory loss, loss of strength, numbness, or tremors  SKIN: No itching, burning, rashes, or lesions   MUSCULOSKELETAL: No joint pain or swelling; No muscle, back, or extremity pain  PSYCHIATRIC: No depression, anxiety, mood swings, or difficulty sleeping        No Known Allergies      MEDICATIONS  (STANDING):  amLODIPine   Tablet 10 milliGRAM(s) Oral daily  carvedilol 25 milliGRAM(s) Oral every 12 hours  cloNIDine 0.2 milliGRAM(s) Oral three times a day  dextrose 5%. 1000 milliLiter(s) (50 mL/Hr) IV Continuous <Continuous>  dextrose 50% Injectable 12.5 Gram(s) IV Push once  dextrose 50% Injectable 25 Gram(s) IV Push once  dextrose 50% Injectable 25 Gram(s) IV Push once  heparin  Injectable 5000 Unit(s) SubCutaneous every 8 hours  insulin glargine Injectable (LANTUS) 48 Unit(s) SubCutaneous every morning  insulin lispro (HumaLOG) corrective regimen sliding scale   SubCutaneous three times a day before meals  insulin lispro Injectable (HumaLOG) 18 Unit(s) SubCutaneous three times a day before meals  QUEtiapine 100 milliGRAM(s) Oral at bedtime  sodium chloride 0.9%. 1000 milliLiter(s) (100 mL/Hr) IV Continuous <Continuous>    MEDICATIONS  (PRN):  acetaminophen   Tablet .. 650 milliGRAM(s) Oral every 6 hours PRN Temp greater or equal to 38C (100.4F), Mild Pain (1 - 3)  dextrose 40% Gel 15 Gram(s) Oral once PRN Blood Glucose LESS THAN 70 milliGRAM(s)/deciliter  glucagon  Injectable 1 milliGRAM(s) IntraMuscular once PRN Glucose LESS THAN 70 milligrams/deciliter  hydrALAZINE Injectable 10 milliGRAM(s) IV Push every 6 hours PRN SBP > 160      Vital Signs Last 24 Hrs  T(C): 36.7 (13 Mar 2020 09:24), Max: 37 (13 Mar 2020 00:16)  T(F): 98.1 (13 Mar 2020 09:24), Max: 98.6 (13 Mar 2020 00:16)  HR: 81 (13 Mar 2020 09:24) (78 - 81)  BP: 140/80 (13 Mar 2020 09:59) (130/82 - 159/95)  BP(mean): --  RR: 19 (13 Mar 2020 09:24) (18 - 19)  SpO2: 97% (13 Mar 2020 09:24) (95% - 97%)    PHYSICAL EXAM:    Constitutional: NAD, obese  HEENT:  EOMI, no exophalmos  Neck:  trachea midline, no thyroid enlargement  Respiratory: CTAB  Cardiovascular: S1 and S2, RRR, no M/G/R  Gastrointestinal: BS+, soft, ntnd  Extremities: No peripheral edema  Neurological: A/O x 3, no focal deficits  Psychiatric: Normal mood, normal affect  Skin: No rashes, no acanthosis        LABS  03-13    137  |  100  |  27.0<H>  ----------------------------<  204<H>  3.2<L>   |  24.0  |  3.11<H>    Ca    8.8      13 Mar 2020 10:33  Mg     2.1     03-12    TPro  6.5<L>  /  Alb  2.8<L>  /  TBili  0.3<L>  /  DBili  x   /  AST  11  /  ALT  12  /  AlkPhos  100  03-12                          11.4   14.42 )-----------( 327      ( 13 Mar 2020 10:33 )             35.2     Triglycerides, Serum: 242 mg/dL (03-12-20 @ 09:06)  HDL Cholesterol, Serum: 40 mg/dL (03-12-20 @ 09:06)  Cholesterol, Serum: 195 mg/dL (03-12-20 @ 09:06)    Thyroid Stimulating Hormone, Serum: 0.97 uIU/mL (03-12-20 @ 09:06)  Hemoglobin A1C, Whole Blood: >16.9 % (03-11-20 @ 07:26)    Aspartate Aminotransferase (AST/SGOT): 11 U/L (03-12-20 @ 09:06)  Alkaline Phosphatase, Serum: 100 U/L (03-12-20 @ 09:06)  Alanine Aminotransferase (ALT/SGPT): 12 U/L (03-12-20 @ 09:06)  Albumin, Serum: 2.8 g/dL (03-12-20 @ 09:06)    CAPILLARY BLOOD GLUCOSE      POCT Blood Glucose.: 235 mg/dL (13 Mar 2020 07:20)  POCT Blood Glucose.: 140 mg/dL (12 Mar 2020 21:14)  POCT Blood Glucose.: 257 mg/dL (12 Mar 2020 16:30)  POCT Blood Glucose.: 297 mg/dL (12 Mar 2020 11:29)

## 2020-03-13 NOTE — PROGRESS NOTE ADULT - ASSESSMENT
INCOMPLETE 35M with a history of chronic kidney disease, diabetes, obstructive sleep apnea, and bipolar disorder who presented with complaints of bilateral back pain and was found to have poorly controlled hypertension and hyperglycemia.    1) MELISSA on CKD 3  - Improving  - Continue gentle hydration  - Avoid nephrotoxic medications  - Renal consult appreciated    2) Uncontrolled HTN  - Continue Amlodipine and Clonidine  - Coreg was increased to 25 mg BID  - Hydralazine PRN    3) Uncontrolled DM 2  - HbA1c 16.9  - Accu checks and ISS  - Continue Lantus 48 units  - Continue Humalog 18 units before meals  - Endo following     4) Leukocytosis  - Likely reactive  - No signs of infection  - Procalcitonin 0.19 in setting of CKD  - UA and CXR negative  - RVP pending     5) Hyponatremia  - Resolved    6) Hypokalemia  - Renin / Aldosterone Level as per Endo  - KCl 60 mEq     7) Bipolar Disorder  - Continue Seroquel     8) Flank Pain  - Likely musculoskeletal  - No obvious pathology on scans    DVT Prophylaxis -- Heparin SQ    Dispo: Home in 2-3 days 35M with a history of chronic kidney disease, diabetes, obstructive sleep apnea, and bipolar disorder who presented with complaints of bilateral back pain and was found to have poorly controlled hypertension and hyperglycemia.    1) MELISSA on CKD 3  - Improving  - Continue gentle hydration  - Avoid nephrotoxic medications  - Renal consult appreciated    2) Uncontrolled HTN  - Continue Amlodipine and Clonidine  - Coreg was increased to 25 mg BID  - Hydralazine PRN    3) Uncontrolled DM 2  - HbA1c 16.9  - Accu checks and ISS  - Continue Lantus 48 units  - Continue Humalog 18 units before meals  - Endo following     4) Leukocytosis  - Likely reactive  - No signs of infection  - Procalcitonin 0.19 in setting of CKD  - UA and CXR negative  - RVP pending     5) Hyponatremia  - Resolved    6) Hypokalemia  - Renin / Aldosterone Level as per Endo  - KCl 60 mEq     7) Bipolar Disorder  - Continue Seroquel     8) Flank Pain  - Likely musculoskeletal    DVT Prophylaxis -- Heparin SQ    Dispo: Home in 2-3 days

## 2020-03-13 NOTE — CONSULT NOTE ADULT - SUBJECTIVE AND OBJECTIVE BOX
[facility] DIVISION OF KIDNEY DISEASES AND HYPERTENSION -- INITIAL CONSULT NOTE  --------------------------------------------------------------------------------  HPI:  35M with a history of chronic kidney disease, diabetes, obstructive sleep apnea, and bipolar disorder who presented with complaints of bilateral back pain and was found to have poorly controlled hypertension and hyperglycemia. Nephrology consulted for history of CKD.      PAST HISTORY  --------------------------------------------------------------------------------  PAST MEDICAL & SURGICAL HISTORY:  Kidney disease  HTN (hypertension)  Sleep apnea  Diabetes  Bipolar 1 disorder  Hypertension  No significant past surgical history    FAMILY HISTORY:  Family history of diabetes mellitus (Grandparent)    PAST SOCIAL HISTORY:    ALLERGIES & MEDICATIONS  --------------------------------------------------------------------------------  Allergies    No Known Allergies    Intolerances      Standing Inpatient Medications  amLODIPine   Tablet 10 milliGRAM(s) Oral daily  carvedilol 12.5 milliGRAM(s) Oral every 12 hours  cloNIDine 0.2 milliGRAM(s) Oral three times a day  dextrose 5%. 1000 milliLiter(s) IV Continuous <Continuous>  dextrose 50% Injectable 12.5 Gram(s) IV Push once  dextrose 50% Injectable 25 Gram(s) IV Push once  dextrose 50% Injectable 25 Gram(s) IV Push once  heparin  Injectable 5000 Unit(s) SubCutaneous every 8 hours  insulin glargine Injectable (LANTUS) 40 Unit(s) SubCutaneous every morning  insulin lispro (HumaLOG) corrective regimen sliding scale   SubCutaneous three times a day before meals  insulin lispro Injectable (HumaLOG) 15 Unit(s) SubCutaneous three times a day before meals  QUEtiapine 100 milliGRAM(s) Oral at bedtime  sodium chloride 0.9%. 1000 milliLiter(s) IV Continuous <Continuous>    PRN Inpatient Medications  acetaminophen   Tablet .. 650 milliGRAM(s) Oral every 6 hours PRN  dextrose 40% Gel 15 Gram(s) Oral once PRN  glucagon  Injectable 1 milliGRAM(s) IntraMuscular once PRN  hydrALAZINE Injectable 10 milliGRAM(s) IV Push every 6 hours PRN      REVIEW OF SYSTEMS  --------------------------------------------------------------------------------  Gen: No weight changes, fatigue, fevers/chills, weakness  Skin: No rashes  Head/Eyes/Ears/Mouth: No headache; Normal hearing; Normal vision w/o blurriness; No sinus pain/discomfort, sore throat  Respiratory: No dyspnea, cough, wheezing, hemoptysis  CV: No chest pain, PND, orthopnea  GI: No abdominal pain, diarrhea, constipation, nausea, vomiting, melena, hematochezia  : No increased frequency, dysuria, hematuria, nocturia  MSK: No joint pain/swelling; no back pain; no edema  Neuro: No dizziness/lightheadedness, weakness, seizures, numbness, tingling  Heme: No easy bruising or bleeding  Endo: No heat/cold intolerance  Psych: No significant nervousness, anxiety, stress, depression    All other systems were reviewed and are negative, except as noted.    VITALS/PHYSICAL EXAM  --------------------------------------------------------------------------------  T(C): 37 (03-13-20 @ 05:10), Max: 37 (03-13-20 @ 00:16)  HR: 78 (03-13-20 @ 00:16) (78 - 83)  BP: 130/82 (03-13-20 @ 05:10) (130/82 - 159/95)  RR: 18 (03-13-20 @ 00:16) (18 - 18)  SpO2: 95% (03-13-20 @ 00:16) (94% - 96%)  Wt(kg): --        03-12-20 @ 07:01  -  03-13-20 @ 07:00  --------------------------------------------------------  IN: 1700 mL / OUT: 2150 mL / NET: -450 mL      Physical Exam:  	Gen: NAD, well-appearing  	HEENT: PERRL, supple neck, clear oropharynx  	Pulm: CTA B/L  	CV: RRR, S1S2; no rub  	Back: No spinal or CVA tenderness; no sacral edema  	Abd: +BS, soft, nontender/nondistended  	: No suprapubic tenderness  	UE: Warm, FROM, no clubbing, intact strength; no edema; no asterixis  	LE: Warm, FROM, no clubbing, intact strength; no edema  	Neuro: No focal deficits, intact gait  	Psych: Normal affect and mood  	Skin: Warm, without rashes  	Vascular access:    LABS/STUDIES  --------------------------------------------------------------------------------              10.9   14.50 >-----------<  335      [03-12-20 @ 09:06]              33.7     138  |  101  |  28.0  ----------------------------<  281      [03-12-20 @ 09:06]  3.0   |  25.0  |  3.38        Ca     9.2     [03-12-20 @ 09:06]      Mg     2.1     [03-12-20 @ 09:06]    TPro  6.5  /  Alb  2.8  /  TBili  0.3  /  DBili  x   /  AST  11  /  ALT  12  /  AlkPhos  100  [03-12-20 @ 09:06]          Creatinine Trend:  SCr 3.38 [03-12 @ 09:06]  SCr 3.19 [03-11 @ 07:26]  SCr 3.71 [03-10 @ 17:46]    Urinalysis - [03-12-20 @ 08:41]      Color Yellow / Appearance Clear / SG 1.015 / pH 6.0      Gluc 1000 / Ketone Negative  / Bili Negative / Urobili Negative       Blood Trace / Protein 500 / Leuk Est Negative / Nitrite Negative      RBC 0-2 / WBC 3-5 / Hyaline  / Gran  / Sq Epi  / Non Sq Epi Negative / Bacteria Negative      HbA1c >16.9      [03-11-20 @ 07:26]  TSH 0.97      [03-12-20 @ 09:06]  Lipid: chol 195, , HDL 40,       [03-12-20 @ 09:06]    HBsAg Nonreact      [04-27-18 @ 18:18]  HCV 0.16, Nonreact      [04-27-18 @ 18:18]    ASLO 206      [07-28-18 @ 19:56]

## 2020-03-13 NOTE — PROGRESS NOTE ADULT - ASSESSMENT
35M w/ T2DM cb CKD4, obesity, HTN and bipolar disorder presented with a two day history of right flank dull pain. He denied any trauma to the area and denied any frequency, hematuria, or dysuria. He denied any fevers or chills at home. He has not had any similar episodes in the past and is unaware of any aggravating or relieving factors. Upon evaluation in the emergency department, the patient was noted to have poorly controlled hypertension and hyperglycemia. He denied any associated chest pain, palpitations, or dyspnea. The patient reported feelings feeling full when eating but denied any weight loss. He denied any change in his bowel habits or associated abdominal pain, nausea, or diarrhea. He did admit to missing doses of insulin at home.    Uncontrolled T2DM- hyperglycemic  -A1c 17  -check sugars AC and bedtime  -ensure diabetic diet  -increase to lantus 48 units QHS  -increase to lispro 18 units TID  -continue with insulin sliding scale  -discussed complications including ESRD    Hypokalemia- persistent. check [place]/renin though no adrenal nodule on scans that are present. replete    HTN- BP well controlled. continue BB/norvasc/clonidine 35M w/ T2DM cb CKD4, obesity, HTN and bipolar disorder presented with a two day history of right flank dull pain. He denied any trauma to the area and denied any frequency, hematuria, or dysuria. He denied any fevers or chills at home. He has not had any similar episodes in the past and is unaware of any aggravating or relieving factors. Upon evaluation in the emergency department, the patient was noted to have poorly controlled hypertension and hyperglycemia. He denied any associated chest pain, palpitations, or dyspnea. The patient reported feelings feeling full when eating but denied any weight loss. He denied any change in his bowel habits or associated abdominal pain, nausea, or diarrhea. He did admit to missing doses of insulin at home.    Uncontrolled T2DM- hyperglycemic  -A1c 17  -check sugars AC and bedtime  -ensure diabetic diet  -increase to lantus 48 units QHS  -increase to lispro 18 units TID  -continue with insulin sliding scale  -discussed complications including ESRD  -needs to walk more, discussed with the patient    Hypokalemia- persistent. check [place]/renin though no adrenal nodule on scans that are present. replete    HTN- BP well controlled. continue BB/norvasc/clonidine

## 2020-03-13 NOTE — CONSULT NOTE ADULT - SUBJECTIVE AND OBJECTIVE BOX
[facility] DIVISION OF KIDNEY DISEASES AND HYPERTENSION -- INITIAL CONSULT NOTE  --------------------------------------------------------------------------------  HPI: 35M presented with a two day history of flank pain. The patient described that pain as dull in quality and worse on the right side. He denied any trauma to the area and denied any frequency, hematuria, or dysuria. He denied any fevers or chills at home. He has not had any similar episodes in the past and is unaware of any aggravating or relieving factors. Upon evaluation in the emergency department, the patient was noted to have poorly controlled hypertension and hyperglycemia. He denied any associated chest pain, palpitations, or dyspnea. The patient reported feelings feeling full when eating but denied any weight loss. He denied any change in his bowel habits or associated abdominal pain, nausea, or diarrhea. He did admit to missing doses of insulin at home.    PAST HISTORY  --------------------------------------------------------------------------------  PAST MEDICAL & SURGICAL HISTORY:  Kidney disease  HTN (hypertension)  Sleep apnea  Diabetes  Bipolar 1 disorder  Hypertension  No significant past surgical history    FAMILY HISTORY:  Family history of diabetes mellitus (Grandparent)    PAST SOCIAL HISTORY:  Denied tobacco, alcohol, or illicit drug use  Lives at home with his parents    ALLERGIES & MEDICATIONS  --------------------------------------------------------------------------------  No Known Allergies    Standing Inpatient Medications  amLODIPine   Tablet 10 milliGRAM(s) Oral daily  carvedilol 25 milliGRAM(s) Oral every 12 hours  cloNIDine 0.2 milliGRAM(s) Oral three times a day  dextrose 5%. 1000 milliLiter(s) IV Continuous <Continuous>  dextrose 50% Injectable 12.5 Gram(s) IV Push once  dextrose 50% Injectable 25 Gram(s) IV Push once  dextrose 50% Injectable 25 Gram(s) IV Push once  heparin  Injectable 5000 Unit(s) SubCutaneous every 8 hours  insulin glargine Injectable (LANTUS) 48 Unit(s) SubCutaneous every morning  insulin lispro (HumaLOG) corrective regimen sliding scale   SubCutaneous three times a day before meals  insulin lispro Injectable (HumaLOG) 18 Unit(s) SubCutaneous three times a day before meals  QUEtiapine 100 milliGRAM(s) Oral at bedtime  sodium chloride 0.9%. 1000 milliLiter(s) IV Continuous <Continuous>    PRN Inpatient Medications  acetaminophen   Tablet .. 650 milliGRAM(s) Oral every 6 hours PRN  dextrose 40% Gel 15 Gram(s) Oral once PRN  glucagon  Injectable 1 milliGRAM(s) IntraMuscular once PRN  hydrALAZINE Injectable 10 milliGRAM(s) IV Push every 6 hours PRN      REVIEW OF SYSTEMS  --------------------------------------------------------------------------------  Gen: No weight changes, fatigue, fevers/chills, weakness  Skin: No rashes  Head/Eyes/Ears/Mouth: No headache; Normal hearing; Normal vision w/o blurriness  Respiratory: No dyspnea, cough, wheezing, hemoptysis  CV: No chest pain, PND, orthopnea  GI: No abdominal pain, diarrhea, constipation, nausea, vomiting, melena, hematochezia  : No increased frequency, dysuria, hematuria, nocturia  MSK: No joint pain/swelling; no back pain; no edema  Neuro: No dizziness/lightheadedness, weakness, seizures, numbness, tingling  Heme: No easy bruising or bleeding  Endo: No heat/cold intolerance  Psych: No significant nervousness, anxiety, stress, depression    All other systems were reviewed and are negative, except as noted.    VITALS/PHYSICAL EXAM  --------------------------------------------------------------------------------  T(C): 36.7 (03-13-20 @ 09:24), Max: 37 (03-13-20 @ 00:16)  HR: 81 (03-13-20 @ 09:24) (78 - 81)  BP: 140/80 (03-13-20 @ 09:59) (130/82 - 159/95)  RR: 19 (03-13-20 @ 09:24) (18 - 19)  SpO2: 97% (03-13-20 @ 09:24) (95% - 97%)    03-12-20 @ 07:01  -  03-13-20 @ 07:00  --------------------------------------------------------  IN: 1700 mL / OUT: 2150 mL / NET: -450 mL      Physical Exam:  	Gen: NAD  	HEENT: supple neck  	Pulm: CTA B/L  	CV: RRR, S1S2; no rub  	Back: No spinal or CVA tenderness; no sacral edema  	Abd: +BS, soft, nontender/nondistended  	: No suprapubic tenderness  	UE: Warm, no edema; no asterixis  	LE: Warm, no edema  	Neuro: No focal deficits  	Psych: Normal affect and mood  	Skin: Warm, without rashes    LABS/STUDIES  --------------------------------------------------------------------------------              11.4   14.42 >-----------<  327      [03-13-20 @ 10:33]              35.2     137  |  100  |  27.0  ----------------------------<  204      [03-13-20 @ 10:33]  3.2   |  24.0  |  3.11        Ca     8.8     [03-13-20 @ 10:33]      Mg     2.1     [03-12-20 @ 09:06]    TPro  6.5  /  Alb  2.8  /  TBili  0.3  /  DBili  x   /  AST  11  /  ALT  12  /  AlkPhos  100  [03-12-20 @ 09:06]          Creatinine Trend:  SCr 3.11 [03-13 @ 10:33]  SCr 3.38 [03-12 @ 09:06]  SCr 3.19 [03-11 @ 07:26]  SCr 3.71 [03-10 @ 17:46]    Urinalysis - [03-12-20 @ 08:41]      Color Yellow / Appearance Clear / SG 1.015 / pH 6.0      Gluc 1000 / Ketone Negative  / Bili Negative / Urobili Negative       Blood Trace / Protein 500 / Leuk Est Negative / Nitrite Negative      RBC 0-2 / WBC 3-5 / Hyaline  / Gran  / Sq Epi  / Non Sq Epi Negative / Bacteria Negative      HbA1c >16.9      [03-11-20 @ 07:26]  TSH 0.97      [03-12-20 @ 09:06]  Lipid: chol 195, , HDL 40,       [03-12-20 @ 09:06]    HBsAg Nonreact      [04-27-18 @ 18:18]  HCV 0.16, Nonreact      [04-27-18 @ 18:18]    ASLO 206      [07-28-18 @ 19:56] [facility] DIVISION OF KIDNEY DISEASES AND HYPERTENSION -- INITIAL CONSULT NOTE  --------------------------------------------------------------------------------  HPI: 35M presented with a two day history of flank pain. The patient described that pain as dull in quality and worse on the right side. He denied any trauma to the area and denied any frequency, hematuria, or dysuria. He denied any fevers or chills at home. He has not had any similar episodes in the past and is unaware of any aggravating or relieving factors.  Upon evaluation in the emergency department, the patient was noted to have poorly controlled hypertension and hyperglycemia. He denied any associated chest pain, palpitations, or dyspnea. The patient reported feelings feeling full when eating but denied any weight loss. He denied any change in his bowel habits or associated abdominal pain, nausea, or diarrhea. He did admit to missing doses of insulin at home.    PAST HISTORY  --------------------------------------------------------------------------------  PAST MEDICAL & SURGICAL HISTORY:  Kidney disease  HTN (hypertension)  Sleep apnea  Diabetes  Bipolar 1 disorder  Hypertension  No significant past surgical history    FAMILY HISTORY:  Family history of diabetes mellitus (Grandparent)    PAST SOCIAL HISTORY:  Denied tobacco, alcohol, or illicit drug use  Lives at home with his parents    ALLERGIES & MEDICATIONS  --------------------------------------------------------------------------------  No Known Allergies    Standing Inpatient Medications  amLODIPine   Tablet 10 milliGRAM(s) Oral daily  carvedilol 25 milliGRAM(s) Oral every 12 hours  cloNIDine 0.2 milliGRAM(s) Oral three times a day  dextrose 5%. 1000 milliLiter(s) IV Continuous <Continuous>  dextrose 50% Injectable 12.5 Gram(s) IV Push once  dextrose 50% Injectable 25 Gram(s) IV Push once  dextrose 50% Injectable 25 Gram(s) IV Push once  heparin  Injectable 5000 Unit(s) SubCutaneous every 8 hours  insulin glargine Injectable (LANTUS) 48 Unit(s) SubCutaneous every morning  insulin lispro (HumaLOG) corrective regimen sliding scale   SubCutaneous three times a day before meals  insulin lispro Injectable (HumaLOG) 18 Unit(s) SubCutaneous three times a day before meals  QUEtiapine 100 milliGRAM(s) Oral at bedtime  sodium chloride 0.9%. 1000 milliLiter(s) IV Continuous <Continuous>    PRN Inpatient Medications  acetaminophen   Tablet .. 650 milliGRAM(s) Oral every 6 hours PRN  dextrose 40% Gel 15 Gram(s) Oral once PRN  glucagon  Injectable 1 milliGRAM(s) IntraMuscular once PRN  hydrALAZINE Injectable 10 milliGRAM(s) IV Push every 6 hours PRN      REVIEW OF SYSTEMS  --------------------------------------------------------------------------------  Gen: No weight changes, fatigue, fevers/chills, weakness  Skin: No rashes  Head/Eyes/Ears/Mouth: No headache; Normal hearing; Normal vision w/o blurriness  Respiratory: No dyspnea, cough, wheezing, hemoptysis  CV: No chest pain, PND, orthopnea  GI: No abdominal pain, diarrhea, constipation, nausea, vomiting, melena, hematochezia  : No increased frequency, dysuria, hematuria, nocturia  MSK: No joint pain/swelling; no back pain; no edema  Neuro: No dizziness/lightheadedness, weakness, seizures, numbness, tingling  Heme: No easy bruising or bleeding  Endo: No heat/cold intolerance  Psych: No significant nervousness, anxiety, stress, depression    All other systems were reviewed and are negative, except as noted.    VITALS/PHYSICAL EXAM  --------------------------------------------------------------------------------  T(C): 36.7 (03-13-20 @ 09:24), Max: 37 (03-13-20 @ 00:16)  HR: 81 (03-13-20 @ 09:24) (78 - 81)  BP: 140/80 (03-13-20 @ 09:59) (130/82 - 159/95)  RR: 19 (03-13-20 @ 09:24) (18 - 19)  SpO2: 97% (03-13-20 @ 09:24) (95% - 97%)    03-12-20 @ 07:01  -  03-13-20 @ 07:00  --------------------------------------------------------  IN: 1700 mL / OUT: 2150 mL / NET: -450 mL      Physical Exam:  	Gen: NAD  	HEENT: supple neck  	Pulm: CTA B/L  	CV: RRR, S1S2; no rub  	Back: No spinal or CVA tenderness; no sacral edema  	Abd: +BS, soft, nontender/nondistended  	: No suprapubic tenderness  	UE: Warm, no edema; no asterixis  	LE: Warm, no edema  	Neuro: No focal deficits  	Psych: Normal affect and mood  	Skin: Warm, without rashes    LABS/STUDIES  --------------------------------------------------------------------------------              11.4   14.42 >-----------<  327      [03-13-20 @ 10:33]              35.2     137  |  100  |  27.0  ----------------------------<  204      [03-13-20 @ 10:33]  3.2   |  24.0  |  3.11        Ca     8.8     [03-13-20 @ 10:33]      Mg     2.1     [03-12-20 @ 09:06]    TPro  6.5  /  Alb  2.8  /  TBili  0.3  /  DBili  x   /  AST  11  /  ALT  12  /  AlkPhos  100  [03-12-20 @ 09:06]      Creatinine Trend:  SCr 3.11 [03-13 @ 10:33]  SCr 3.38 [03-12 @ 09:06]  SCr 3.19 [03-11 @ 07:26]  SCr 3.71 [03-10 @ 17:46]    Urinalysis - [03-12-20 @ 08:41]      Color Yellow / Appearance Clear / SG 1.015 / pH 6.0      Gluc 1000 / Ketone Negative  / Bili Negative / Urobili Negative       Blood Trace / Protein 500 / Leuk Est Negative / Nitrite Negative      RBC 0-2 / WBC 3-5 / Hyaline  / Gran  / Sq Epi  / Non Sq Epi Negative / Bacteria Negative      HbA1c >16.9      [03-11-20 @ 07:26]  TSH 0.97      [03-12-20 @ 09:06]  Lipid: chol 195, , HDL 40,       [03-12-20 @ 09:06]    HBsAg Nonreact      [04-27-18 @ 18:18]  HCV 0.16, Nonreact      [04-27-18 @ 18:18]    ASLO 206      [07-28-18 @ 19:56]        < from: US Renal (03.12.20 @ 12:44) >     EXAM:  US KIDNEY(S)                          PROCEDURE DATE:  03/12/2020        INTERPRETATION:  CLINICAL INFORMATION: Acute on chronic renal failure.    COMPARISON: CT abdomen/pelvis 3/10/2020    TECHNIQUE: Sonography of the kidneys. The examination was technically limited secondary to overlying bowel gas.    FINDINGS:    Right kidney:  8.6 cm. No hydronephrosis. Soft tissue prominence in the interpolar region of the right kidney again seen.    Left kidney:  11.9 cm. No hydronephrosis.      IMPRESSION:     Examination limited secondary to overlying bowel gas.    No hydronephrosis.    Soft tissue prominence again seen in the interpolar region of the right kidney; a CT or MRI of the abdomen with intravenous contrast is recommended for further evaluation (a noncontrast MRI could be obtained if intravenous contrast is contraindicated due to renal function).        < from: CT Renal Stone Hunt (03.10.20 @ 17:22) >   EXAM:  CT RENAL STONE HUNT                          PROCEDURE DATE:  03/10/2020        INTERPRETATION:  CLINICAL INFORMATION: Flank pain    COMPARISON: Abdominal ultrasound dated 04/27/2018.    PROCEDURE:   CT of the Abdomen and Pelvis was performed without intravenous contrast.   Intravenous contrast: None.  Oral contrast: None.  Sagittal and coronal reformats were performed.    FINDINGS:    The study is slightly limited by motion.    LOWER CHEST: Within normal limits.    LIVER: Fatty infiltration of the liver.  BILE DUCTS: Normal caliber.  GALLBLADDER: Within normal limits.  SPLEEN: Within normal limits.  PANCREAS: Within normal limits.  ADRENALS: Within normal limits.  KIDNEYS/URETERS: Soft tissue prominence in the interpolar region of the right kidney, although evaluation is limited by motion. Punctate nonobstructing left renal stone. No hydronephrosis.    BLADDER: Markedly distended.  REPRODUCTIVE ORGANS: Prostate within normal limits.    BOWEL: No bowel obstruction. Appendix isnormal.  PERITONEUM: No ascites.  VESSELS: Within normal limits.  RETROPERITONEUM/LYMPH NODES: No lymphadenopathy.    ABDOMINAL WALL: Within normal limits.  BONES: Chronic deformity of the right hip.    IMPRESSION:     Motion limited study.    Soft tissue prominence in the interpolar region of the right kidney, similar in appearance to prior ultrasound. Follow-up renal protocol CT or MRI is recommended to exclude underlying mass.    Punctate nonobstructing left renal calculus.    Distended urinary bladder.    Fatty infiltration of the liver. [facility] DIVISION OF KIDNEY DISEASES AND HYPERTENSION -- INITIAL CONSULT NOTE  --------------------------------------------------------------------------------  HPI: 35M presented with a two day history of flank pain. The patient described that pain as dull in quality and worse on the right side. He denied any trauma to the area and denied any frequency, hematuria, or dysuria. He denied any fevers or chills at home. He has not had any similar episodes in the past and is unaware of any aggravating or relieving factors.  Upon evaluation in the emergency department, the patient was noted to have poorly controlled hypertension and hyperglycemia. He denied any associated chest pain, palpitations, or dyspnea. The patient reported feelings feeling full when eating but denied any weight loss. He denied any change in his bowel habits or associated abdominal pain, nausea, or diarrhea. He did admit to missing doses of insulin at home.    PAST HISTORY  --------------------------------------------------------------------------------  PAST MEDICAL & SURGICAL HISTORY:  Kidney disease  HTN (hypertension)  Sleep apnea  Diabetes  Bipolar 1 disorder  Hypertension  No significant past surgical history    FAMILY HISTORY:  Family history of diabetes mellitus (Grandparent)    PAST SOCIAL HISTORY:  Denied tobacco, alcohol, or illicit drug use  Lives at home with his parents    ALLERGIES & MEDICATIONS  --------------------------------------------------------------------------------  No Known Allergies    Standing Inpatient Medications  amLODIPine   Tablet 10 milliGRAM(s) Oral daily  carvedilol 25 milliGRAM(s) Oral every 12 hours  cloNIDine 0.2 milliGRAM(s) Oral three times a day  dextrose 5%. 1000 milliLiter(s) IV Continuous <Continuous>  dextrose 50% Injectable 12.5 Gram(s) IV Push once  dextrose 50% Injectable 25 Gram(s) IV Push once  dextrose 50% Injectable 25 Gram(s) IV Push once  heparin  Injectable 5000 Unit(s) SubCutaneous every 8 hours  insulin glargine Injectable (LANTUS) 48 Unit(s) SubCutaneous every morning  insulin lispro (HumaLOG) corrective regimen sliding scale   SubCutaneous three times a day before meals  insulin lispro Injectable (HumaLOG) 18 Unit(s) SubCutaneous three times a day before meals  QUEtiapine 100 milliGRAM(s) Oral at bedtime  sodium chloride 0.9%. 1000 milliLiter(s) IV Continuous <Continuous>    PRN Inpatient Medications  acetaminophen   Tablet .. 650 milliGRAM(s) Oral every 6 hours PRN  dextrose 40% Gel 15 Gram(s) Oral once PRN  glucagon  Injectable 1 milliGRAM(s) IntraMuscular once PRN  hydrALAZINE Injectable 10 milliGRAM(s) IV Push every 6 hours PRN      REVIEW OF SYSTEMS  --------------------------------------------------------------------------------  Gen: No weight changes, fatigue, fevers/chills, weakness  Skin: No rashes  Head/Eyes/Ears/Mouth: No headache; Normal hearing; Normal vision w/o blurriness  Respiratory: No dyspnea, cough, wheezing, hemoptysis  CV: No chest pain, PND, orthopnea  GI: No abdominal pain, diarrhea, constipation, nausea, vomiting, melena, hematochezia  : No increased frequency, dysuria, hematuria, nocturia  MSK: No joint pain/swelling; no back pain; no edema  Neuro: No dizziness/lightheadedness, weakness, seizures, numbness, tingling  Heme: No easy bruising or bleeding  Endo: No heat/cold intolerance  Psych: No significant nervousness, anxiety, stress, depression    All other systems were reviewed and are negative, except as noted.    VITALS/PHYSICAL EXAM  --------------------------------------------------------------------------------  T(C): 36.7 (03-13-20 @ 09:24), Max: 37 (03-13-20 @ 00:16)  HR: 81 (03-13-20 @ 09:24) (78 - 81)  BP: 140/80 (03-13-20 @ 09:59) (130/82 - 159/95)  RR: 19 (03-13-20 @ 09:24) (18 - 19)  SpO2: 97% (03-13-20 @ 09:24) (95% - 97%)    03-12-20 @ 07:01  -  03-13-20 @ 07:00  --------------------------------------------------------  IN: 1700 mL / OUT: 2150 mL / NET: -450 mL      Physical Exam:  	Gen: NAD  	HEENT: supple neck  	Pulm: diminished airflow bilateral lung bases  	CV: RRR, S1S2; no rub  	Back: No spinal or CVA tenderness; no sacral edema  	Abd: +BS, soft, nontender/nondistended, obese  	: No suprapubic tenderness  	UE: Warm, no edema; no asterixis  	LE: Warm, no edema  	Neuro: No focal deficits  	Psych: Normal affect and mood  	Skin: Warm, without rashes    LABS/STUDIES  --------------------------------------------------------------------------------              11.4   14.42 >-----------<  327      [03-13-20 @ 10:33]              35.2     137  |  100  |  27.0  ----------------------------<  204      [03-13-20 @ 10:33]  3.2   |  24.0  |  3.11        Ca     8.8     [03-13-20 @ 10:33]      Mg     2.1     [03-12-20 @ 09:06]    TPro  6.5  /  Alb  2.8  /  TBili  0.3  /  DBili  x   /  AST  11  /  ALT  12  /  AlkPhos  100  [03-12-20 @ 09:06]    Creatinine Trend:  SCr 3.11 [03-13 @ 10:33]  SCr 3.38 [03-12 @ 09:06]  SCr 3.19 [03-11 @ 07:26]  SCr 3.71 [03-10 @ 17:46]    Urinalysis - [03-12-20 @ 08:41]      Color Yellow / Appearance Clear / SG 1.015 / pH 6.0      Gluc 1000 / Ketone Negative  / Bili Negative / Urobili Negative       Blood Trace / Protein 500 / Leuk Est Negative / Nitrite Negative      RBC 0-2 / WBC 3-5 / Hyaline  / Gran  / Sq Epi  / Non Sq Epi Negative / Bacteria Negative    HbA1c >16.9      [03-11-20 @ 07:26]  TSH 0.97      [03-12-20 @ 09:06]  Lipid: chol 195, , HDL 40,       [03-12-20 @ 09:06]    HBsAg Nonreact      [04-27-18 @ 18:18]  HCV 0.16, Nonreact      [04-27-18 @ 18:18]    ASLO 206      [07-28-18 @ 19:56]        < from: US Renal (03.12.20 @ 12:44) >     EXAM:  US KIDNEY(S)                          PROCEDURE DATE:  03/12/2020        INTERPRETATION:  CLINICAL INFORMATION: Acute on chronic renal failure.    COMPARISON: CT abdomen/pelvis 3/10/2020    TECHNIQUE: Sonography of the kidneys. The examination was technically limited secondary to overlying bowel gas.    FINDINGS:    Right kidney:  8.6 cm. No hydronephrosis. Soft tissue prominence in the interpolar region of the right kidney again seen.    Left kidney:  11.9 cm. No hydronephrosis.      IMPRESSION:     Examination limited secondary to overlying bowel gas.    No hydronephrosis.    Soft tissue prominence again seen in the interpolar region of the right kidney; a CT or MRI of the abdomen with intravenous contrast is recommended for further evaluation (a noncontrast MRI could be obtained if intravenous contrast is contraindicated due to renal function).        < from: CT Renal Stone Hunt (03.10.20 @ 17:22) >   EXAM:  CT RENAL STONE HUNT                          PROCEDURE DATE:  03/10/2020        INTERPRETATION:  CLINICAL INFORMATION: Flank pain    COMPARISON: Abdominal ultrasound dated 04/27/2018.    PROCEDURE:   CT of the Abdomen and Pelvis was performed without intravenous contrast.   Intravenous contrast: None.  Oral contrast: None.  Sagittal and coronal reformats were performed.    FINDINGS:    The study is slightly limited by motion.    LOWER CHEST: Within normal limits.    LIVER: Fatty infiltration of the liver.  BILE DUCTS: Normal caliber.  GALLBLADDER: Within normal limits.  SPLEEN: Within normal limits.  PANCREAS: Within normal limits.  ADRENALS: Within normal limits.  KIDNEYS/URETERS: Soft tissue prominence in the interpolar region of the right kidney, although evaluation is limited by motion. Punctate nonobstructing left renal stone. No hydronephrosis.    BLADDER: Markedly distended.  REPRODUCTIVE ORGANS: Prostate within normal limits.    BOWEL: No bowel obstruction. Appendix isnormal.  PERITONEUM: No ascites.  VESSELS: Within normal limits.  RETROPERITONEUM/LYMPH NODES: No lymphadenopathy.    ABDOMINAL WALL: Within normal limits.  BONES: Chronic deformity of the right hip.    IMPRESSION:     Motion limited study.    Soft tissue prominence in the interpolar region of the right kidney, similar in appearance to prior ultrasound. Follow-up renal protocol CT or MRI is recommended to exclude underlying mass.    Punctate nonobstructing left renal calculus.    Distended urinary bladder.    Fatty infiltration of the liver. [facility] DIVISION OF KIDNEY DISEASES AND HYPERTENSION -- INITIAL CONSULT NOTE  --------------------------------------------------------------------------------  HPI: 35M presented with a two day history of flank pain. The patient described that pain as dull in quality and worse on the right side. He denied any trauma to the area and denied any frequency, hematuria, or dysuria. He denied any fevers or chills at home. He has not had any similar episodes in the past and is unaware of any aggravating or relieving factors.  Upon evaluation in the emergency department, the patient was noted to have poorly controlled hypertension and hyperglycemia. He denied any associated chest pain, palpitations, or dyspnea. The patient reported feelings feeling full when eating but denied any weight loss. He denied any change in his bowel habits or associated abdominal pain, nausea, or diarrhea. He did admit to missing doses of insulin at home.    PAST HISTORY  --------------------------------------------------------------------------------  PAST MEDICAL & SURGICAL HISTORY:  Kidney disease  HTN (hypertension)  Sleep apnea  Diabetes  Bipolar 1 disorder  Hypertension  No significant past surgical history    FAMILY HISTORY:  Family history of diabetes mellitus (Grandparent)    PAST SOCIAL HISTORY:  Denied tobacco, alcohol, or illicit drug use  Lives at home with his parents    ALLERGIES & MEDICATIONS  --------------------------------------------------------------------------------  No Known Allergies    Standing Inpatient Medications  amLODIPine   Tablet 10 milliGRAM(s) Oral daily  carvedilol 25 milliGRAM(s) Oral every 12 hours  cloNIDine 0.2 milliGRAM(s) Oral three times a day  dextrose 5%. 1000 milliLiter(s) IV Continuous <Continuous>  dextrose 50% Injectable 12.5 Gram(s) IV Push once  dextrose 50% Injectable 25 Gram(s) IV Push once  dextrose 50% Injectable 25 Gram(s) IV Push once  heparin  Injectable 5000 Unit(s) SubCutaneous every 8 hours  insulin glargine Injectable (LANTUS) 48 Unit(s) SubCutaneous every morning  insulin lispro (HumaLOG) corrective regimen sliding scale   SubCutaneous three times a day before meals  insulin lispro Injectable (HumaLOG) 18 Unit(s) SubCutaneous three times a day before meals  QUEtiapine 100 milliGRAM(s) Oral at bedtime  sodium chloride 0.9%. 1000 milliLiter(s) IV Continuous <Continuous>    PRN Inpatient Medications  acetaminophen   Tablet .. 650 milliGRAM(s) Oral every 6 hours PRN  dextrose 40% Gel 15 Gram(s) Oral once PRN  glucagon  Injectable 1 milliGRAM(s) IntraMuscular once PRN  hydrALAZINE Injectable 10 milliGRAM(s) IV Push every 6 hours PRN      REVIEW OF SYSTEMS  --------------------------------------------------------------------------------  Gen: No weight changes, fatigue, fevers/chills, weakness  Skin: No rashes  Head/Eyes/Ears/Mouth: No headache; Normal hearing; Normal vision w/o blurriness  Respiratory: No dyspnea, cough, wheezing, hemoptysis  CV: No chest pain, PND, orthopnea  GI: No abdominal pain, diarrhea, constipation, nausea, vomiting, melena, hematochezia  : No increased frequency, dysuria, hematuria, nocturia  MSK: No joint pain/swelling; no back pain; no edema  Neuro: No dizziness/lightheadedness, weakness, seizures, numbness, tingling  Heme: No easy bruising or bleeding  Endo: No heat/cold intolerance  Psych: No significant nervousness, anxiety, stress, depression    All other systems were reviewed and are negative, except as noted.    VITALS/PHYSICAL EXAM  --------------------------------------------------------------------------------  T(C): 36.7 (03-13-20 @ 09:24), Max: 37 (03-13-20 @ 00:16)  HR: 81 (03-13-20 @ 09:24) (78 - 81)  BP: 140/80 (03-13-20 @ 09:59) (130/82 - 159/95)  RR: 19 (03-13-20 @ 09:24) (18 - 19)  SpO2: 97% (03-13-20 @ 09:24) (95% - 97%)    03-12-20 @ 07:01  -  03-13-20 @ 07:00  --------------------------------------------------------  IN: 1700 mL / OUT: 2150 mL / NET: -450 mL      Physical Exam:  	Gen: NAD, sitting up in chair  	HEENT: supple neck  	Pulm: diminished airflow bilateral lung bases  	CV: RRR, S1S2; no rub  	Back: No spinal or CVA tenderness; no sacral edema  	Abd: +BS, soft, nontender/nondistended, obese  	: No suprapubic tenderness  	UE: Warm, no edema; no asterixis  	LE: Warm, no edema  	Neuro: No focal deficits  	Psych: Normal affect and mood  	Skin: Warm, without rashes    LABS/STUDIES  --------------------------------------------------------------------------------              11.4   14.42 >-----------<  327      [03-13-20 @ 10:33]              35.2     137  |  100  |  27.0  ----------------------------<  204      [03-13-20 @ 10:33]  3.2   |  24.0  |  3.11        Ca     8.8     [03-13-20 @ 10:33]      Mg     2.1     [03-12-20 @ 09:06]    TPro  6.5  /  Alb  2.8  /  TBili  0.3  /  DBili  x   /  AST  11  /  ALT  12  /  AlkPhos  100  [03-12-20 @ 09:06]    Creatinine Trend:  SCr 3.11 [03-13 @ 10:33]  SCr 3.38 [03-12 @ 09:06]  SCr 3.19 [03-11 @ 07:26]  SCr 3.71 [03-10 @ 17:46]    Urinalysis - [03-12-20 @ 08:41]      Color Yellow / Appearance Clear / SG 1.015 / pH 6.0      Gluc 1000 / Ketone Negative  / Bili Negative / Urobili Negative       Blood Trace / Protein 500 / Leuk Est Negative / Nitrite Negative      RBC 0-2 / WBC 3-5 / Hyaline  / Gran  / Sq Epi  / Non Sq Epi Negative / Bacteria Negative    HbA1c >16.9      [03-11-20 @ 07:26]  TSH 0.97      [03-12-20 @ 09:06]  Lipid: chol 195, , HDL 40,       [03-12-20 @ 09:06]    HBsAg Nonreact      [04-27-18 @ 18:18]  HCV 0.16, Nonreact      [04-27-18 @ 18:18]    ASLO 206      [07-28-18 @ 19:56]        < from: US Renal (03.12.20 @ 12:44) >     EXAM:  US KIDNEY(S)                          PROCEDURE DATE:  03/12/2020        INTERPRETATION:  CLINICAL INFORMATION: Acute on chronic renal failure.    COMPARISON: CT abdomen/pelvis 3/10/2020    TECHNIQUE: Sonography of the kidneys. The examination was technically limited secondary to overlying bowel gas.    FINDINGS:    Right kidney:  8.6 cm. No hydronephrosis. Soft tissue prominence in the interpolar region of the right kidney again seen.    Left kidney:  11.9 cm. No hydronephrosis.      IMPRESSION:     Examination limited secondary to overlying bowel gas.    No hydronephrosis.    Soft tissue prominence again seen in the interpolar region of the right kidney; a CT or MRI of the abdomen with intravenous contrast is recommended for further evaluation (a noncontrast MRI could be obtained if intravenous contrast is contraindicated due to renal function).        < from: CT Renal Stone Hunt (03.10.20 @ 17:22) >   EXAM:  CT RENAL STONE HUNT                          PROCEDURE DATE:  03/10/2020        INTERPRETATION:  CLINICAL INFORMATION: Flank pain    COMPARISON: Abdominal ultrasound dated 04/27/2018.    PROCEDURE:   CT of the Abdomen and Pelvis was performed without intravenous contrast.   Intravenous contrast: None.  Oral contrast: None.  Sagittal and coronal reformats were performed.    FINDINGS:    The study is slightly limited by motion.    LOWER CHEST: Within normal limits.    LIVER: Fatty infiltration of the liver.  BILE DUCTS: Normal caliber.  GALLBLADDER: Within normal limits.  SPLEEN: Within normal limits.  PANCREAS: Within normal limits.  ADRENALS: Within normal limits.  KIDNEYS/URETERS: Soft tissue prominence in the interpolar region of the right kidney, although evaluation is limited by motion. Punctate nonobstructing left renal stone. No hydronephrosis.    BLADDER: Markedly distended.  REPRODUCTIVE ORGANS: Prostate within normal limits.    BOWEL: No bowel obstruction. Appendix isnormal.  PERITONEUM: No ascites.  VESSELS: Within normal limits.  RETROPERITONEUM/LYMPH NODES: No lymphadenopathy.    ABDOMINAL WALL: Within normal limits.  BONES: Chronic deformity of the right hip.    IMPRESSION:     Motion limited study.    Soft tissue prominence in the interpolar region of the right kidney, similar in appearance to prior ultrasound. Follow-up renal protocol CT or MRI is recommended to exclude underlying mass.    Punctate nonobstructing left renal calculus.    Distended urinary bladder.    Fatty infiltration of the liver. [facility] DIVISION OF KIDNEY DISEASES AND HYPERTENSION -- INITIAL CONSULT NOTE  --------------------------------------------------------------------------------  HPI: 35M presented with a two day history of flank pain. The patient described that pain as dull in quality and worse on the right side. He denied any trauma to the area and denied any frequency, hematuria, or dysuria. He denied any fevers or chills at home. He has not had any similar episodes in the past and is unaware of any aggravating or relieving factors.  Upon evaluation in the emergency department, the patient was noted to have poorly controlled hypertension and hyperglycemia. He denied any associated chest pain, palpitations, or dyspnea. The patient reported feelings feeling full when eating but denied any weight loss. He denied any change in his bowel habits or associated abdominal pain, nausea, or diarrhea. He did admit to missing doses of insulin at home.(10 Mar 2020 23:11)    PAST HISTORY  --------------------------------------------------------------------------------  PAST MEDICAL & SURGICAL HISTORY:  Kidney disease  HTN (hypertension)  Sleep apnea  Diabetes  Bipolar 1 disorder  Hypertension  No significant past surgical history    FAMILY HISTORY:  Family history of diabetes mellitus (Grandparent)    PAST SOCIAL HISTORY:  Denied tobacco, alcohol, or illicit drug use  Lives at home with his parents    ALLERGIES & MEDICATIONS  --------------------------------------------------------------------------------  No Known Allergies    Standing Inpatient Medications  amLODIPine   Tablet 10 milliGRAM(s) Oral daily  carvedilol 25 milliGRAM(s) Oral every 12 hours  cloNIDine 0.2 milliGRAM(s) Oral three times a day  dextrose 5%. 1000 milliLiter(s) IV Continuous <Continuous>  dextrose 50% Injectable 12.5 Gram(s) IV Push once  dextrose 50% Injectable 25 Gram(s) IV Push once  dextrose 50% Injectable 25 Gram(s) IV Push once  heparin  Injectable 5000 Unit(s) SubCutaneous every 8 hours  insulin glargine Injectable (LANTUS) 48 Unit(s) SubCutaneous every morning  insulin lispro (HumaLOG) corrective regimen sliding scale   SubCutaneous three times a day before meals  insulin lispro Injectable (HumaLOG) 18 Unit(s) SubCutaneous three times a day before meals  QUEtiapine 100 milliGRAM(s) Oral at bedtime  sodium chloride 0.9%. 1000 milliLiter(s) IV Continuous <Continuous>    PRN Inpatient Medications  acetaminophen   Tablet .. 650 milliGRAM(s) Oral every 6 hours PRN  dextrose 40% Gel 15 Gram(s) Oral once PRN  glucagon  Injectable 1 milliGRAM(s) IntraMuscular once PRN  hydrALAZINE Injectable 10 milliGRAM(s) IV Push every 6 hours PRN      REVIEW OF SYSTEMS  --------------------------------------------------------------------------------  Gen: No weight changes, fatigue, fevers/chills, weakness  Skin: No rashes  Head/Eyes/Ears/Mouth: No headache; Normal hearing; Normal vision w/o blurriness  Respiratory: No dyspnea, cough, wheezing, hemoptysis  CV: No chest pain, PND, orthopnea  GI: No abdominal pain, diarrhea, constipation, nausea, vomiting, melena, hematochezia  : No increased frequency, dysuria, hematuria, nocturia  MSK: No joint pain/swelling; no back pain; no edema  Neuro: No dizziness/lightheadedness, weakness, seizures, numbness, tingling  Heme: No easy bruising or bleeding  Endo: No heat/cold intolerance  Psych: No significant nervousness, anxiety, stress, depression    All other systems were reviewed and are negative, except as noted.    VITALS/PHYSICAL EXAM  --------------------------------------------------------------------------------  T(C): 36.7 (03-13-20 @ 09:24), Max: 37 (03-13-20 @ 00:16)  HR: 81 (03-13-20 @ 09:24) (78 - 81)  BP: 140/80 (03-13-20 @ 09:59) (130/82 - 159/95)  RR: 19 (03-13-20 @ 09:24) (18 - 19)  SpO2: 97% (03-13-20 @ 09:24) (95% - 97%)    03-12-20 @ 07:01  -  03-13-20 @ 07:00  --------------------------------------------------------  IN: 1700 mL / OUT: 2150 mL / NET: -450 mL      Physical Exam:  	Gen: NAD, sitting up in chair  	HEENT: supple neck  	Pulm: diminished airflow bilateral lung bases  	CV: RRR, S1S2; no rub  	Back: No spinal or CVA tenderness; no sacral edema  	Abd: +BS, soft, nontender/nondistended, obese  	: No suprapubic tenderness  	UE: Warm, no edema; no asterixis  	LE: Warm, no edema  	Neuro: No focal deficits  	Psych: Normal affect and mood  	Skin: Warm, without rashes    LABS/STUDIES  --------------------------------------------------------------------------------              11.4   14.42 >-----------<  327      [03-13-20 @ 10:33]              35.2     137  |  100  |  27.0  ----------------------------<  204      [03-13-20 @ 10:33]  3.2   |  24.0  |  3.11        Ca     8.8     [03-13-20 @ 10:33]      Mg     2.1     [03-12-20 @ 09:06]    TPro  6.5  /  Alb  2.8  /  TBili  0.3  /  DBili  x   /  AST  11  /  ALT  12  /  AlkPhos  100  [03-12-20 @ 09:06]    Creatinine Trend:  SCr 3.11 [03-13 @ 10:33]  SCr 3.38 [03-12 @ 09:06]  SCr 3.19 [03-11 @ 07:26]  SCr 3.71 [03-10 @ 17:46]    Urinalysis - [03-12-20 @ 08:41]      Color Yellow / Appearance Clear / SG 1.015 / pH 6.0      Gluc 1000 / Ketone Negative  / Bili Negative / Urobili Negative       Blood Trace / Protein 500 / Leuk Est Negative / Nitrite Negative      RBC 0-2 / WBC 3-5 / Hyaline  / Gran  / Sq Epi  / Non Sq Epi Negative / Bacteria Negative    HbA1c >16.9      [03-11-20 @ 07:26]  TSH 0.97      [03-12-20 @ 09:06]  Lipid: chol 195, , HDL 40,       [03-12-20 @ 09:06]    HBsAg Nonreact      [04-27-18 @ 18:18]  HCV 0.16, Nonreact      [04-27-18 @ 18:18]    ASLO 206      [07-28-18 @ 19:56]        < from: US Renal (03.12.20 @ 12:44) >     EXAM:  US KIDNEY(S)                          PROCEDURE DATE:  03/12/2020        INTERPRETATION:  CLINICAL INFORMATION: Acute on chronic renal failure.    COMPARISON: CT abdomen/pelvis 3/10/2020    TECHNIQUE: Sonography of the kidneys. The examination was technically limited secondary to overlying bowel gas.    FINDINGS:    Right kidney:  8.6 cm. No hydronephrosis. Soft tissue prominence in the interpolar region of the right kidney again seen.    Left kidney:  11.9 cm. No hydronephrosis.      IMPRESSION:     Examination limited secondary to overlying bowel gas.    No hydronephrosis.    Soft tissue prominence again seen in the interpolar region of the right kidney; a CT or MRI of the abdomen with intravenous contrast is recommended for further evaluation (a noncontrast MRI could be obtained if intravenous contrast is contraindicated due to renal function).        < from: CT Renal Stone Hunt (03.10.20 @ 17:22) >   EXAM:  CT RENAL STONE HUNT                          PROCEDURE DATE:  03/10/2020        INTERPRETATION:  CLINICAL INFORMATION: Flank pain    COMPARISON: Abdominal ultrasound dated 04/27/2018.    PROCEDURE:   CT of the Abdomen and Pelvis was performed without intravenous contrast.   Intravenous contrast: None.  Oral contrast: None.  Sagittal and coronal reformats were performed.    FINDINGS:    The study is slightly limited by motion.    LOWER CHEST: Within normal limits.    LIVER: Fatty infiltration of the liver.  BILE DUCTS: Normal caliber.  GALLBLADDER: Within normal limits.  SPLEEN: Within normal limits.  PANCREAS: Within normal limits.  ADRENALS: Within normal limits.  KIDNEYS/URETERS: Soft tissue prominence in the interpolar region of the right kidney, although evaluation is limited by motion. Punctate nonobstructing left renal stone. No hydronephrosis.    BLADDER: Markedly distended.  REPRODUCTIVE ORGANS: Prostate within normal limits.    BOWEL: No bowel obstruction. Appendix isnormal.  PERITONEUM: No ascites.  VESSELS: Within normal limits.  RETROPERITONEUM/LYMPH NODES: No lymphadenopathy.    ABDOMINAL WALL: Within normal limits.  BONES: Chronic deformity of the right hip.    IMPRESSION:     Motion limited study.    Soft tissue prominence in the interpolar region of the right kidney, similar in appearance to prior ultrasound. Follow-up renal protocol CT or MRI is recommended to exclude underlying mass.    Punctate nonobstructing left renal calculus.    Distended urinary bladder.    Fatty infiltration of the liver.

## 2020-03-14 LAB
ANION GAP SERPL CALC-SCNC: 12 MMOL/L — SIGNIFICANT CHANGE UP (ref 5–17)
BUN SERPL-MCNC: 27 MG/DL — HIGH (ref 8–20)
CALCIUM SERPL-MCNC: 8.8 MG/DL — SIGNIFICANT CHANGE UP (ref 8.6–10.2)
CHLORIDE SERPL-SCNC: 104 MMOL/L — SIGNIFICANT CHANGE UP (ref 98–107)
CO2 SERPL-SCNC: 23 MMOL/L — SIGNIFICANT CHANGE UP (ref 22–29)
CREAT SERPL-MCNC: 3.32 MG/DL — HIGH (ref 0.5–1.3)
GLUCOSE BLDC GLUCOMTR-MCNC: 126 MG/DL — HIGH (ref 70–99)
GLUCOSE BLDC GLUCOMTR-MCNC: 146 MG/DL — HIGH (ref 70–99)
GLUCOSE BLDC GLUCOMTR-MCNC: 178 MG/DL — HIGH (ref 70–99)
GLUCOSE BLDC GLUCOMTR-MCNC: 185 MG/DL — HIGH (ref 70–99)
GLUCOSE SERPL-MCNC: 168 MG/DL — HIGH (ref 70–99)
POTASSIUM SERPL-MCNC: 3.8 MMOL/L — SIGNIFICANT CHANGE UP (ref 3.5–5.3)
POTASSIUM SERPL-SCNC: 3.8 MMOL/L — SIGNIFICANT CHANGE UP (ref 3.5–5.3)
RAPID RVP RESULT: SIGNIFICANT CHANGE UP
SODIUM SERPL-SCNC: 139 MMOL/L — SIGNIFICANT CHANGE UP (ref 135–145)

## 2020-03-14 PROCEDURE — 99232 SBSQ HOSP IP/OBS MODERATE 35: CPT

## 2020-03-14 RX ORDER — HYDRALAZINE HCL 50 MG
25 TABLET ORAL
Refills: 0 | Status: DISCONTINUED | OUTPATIENT
Start: 2020-03-14 | End: 2020-03-15

## 2020-03-14 RX ADMIN — Medication 2: at 11:33

## 2020-03-14 RX ADMIN — Medication 18 UNIT(S): at 11:34

## 2020-03-14 RX ADMIN — Medication 18 UNIT(S): at 07:57

## 2020-03-14 RX ADMIN — Medication 2: at 16:36

## 2020-03-14 RX ADMIN — Medication 0.2 MILLIGRAM(S): at 22:10

## 2020-03-14 RX ADMIN — QUETIAPINE FUMARATE 100 MILLIGRAM(S): 200 TABLET, FILM COATED ORAL at 22:10

## 2020-03-14 RX ADMIN — Medication 18 UNIT(S): at 16:36

## 2020-03-14 RX ADMIN — HEPARIN SODIUM 5000 UNIT(S): 5000 INJECTION INTRAVENOUS; SUBCUTANEOUS at 14:28

## 2020-03-14 RX ADMIN — Medication 25 MILLIGRAM(S): at 16:37

## 2020-03-14 RX ADMIN — HEPARIN SODIUM 5000 UNIT(S): 5000 INJECTION INTRAVENOUS; SUBCUTANEOUS at 05:44

## 2020-03-14 RX ADMIN — SODIUM CHLORIDE 100 MILLILITER(S): 9 INJECTION INTRAMUSCULAR; INTRAVENOUS; SUBCUTANEOUS at 22:10

## 2020-03-14 RX ADMIN — CARVEDILOL PHOSPHATE 25 MILLIGRAM(S): 80 CAPSULE, EXTENDED RELEASE ORAL at 05:44

## 2020-03-14 RX ADMIN — HEPARIN SODIUM 5000 UNIT(S): 5000 INJECTION INTRAVENOUS; SUBCUTANEOUS at 22:10

## 2020-03-14 RX ADMIN — INSULIN GLARGINE 48 UNIT(S): 100 INJECTION, SOLUTION SUBCUTANEOUS at 07:57

## 2020-03-14 RX ADMIN — SODIUM CHLORIDE 100 MILLILITER(S): 9 INJECTION INTRAMUSCULAR; INTRAVENOUS; SUBCUTANEOUS at 07:58

## 2020-03-14 RX ADMIN — Medication 25 MILLIGRAM(S): at 10:28

## 2020-03-14 RX ADMIN — Medication 0.2 MILLIGRAM(S): at 05:44

## 2020-03-14 RX ADMIN — AMLODIPINE BESYLATE 10 MILLIGRAM(S): 2.5 TABLET ORAL at 05:44

## 2020-03-14 RX ADMIN — Medication 0.2 MILLIGRAM(S): at 14:28

## 2020-03-14 RX ADMIN — CARVEDILOL PHOSPHATE 25 MILLIGRAM(S): 80 CAPSULE, EXTENDED RELEASE ORAL at 16:36

## 2020-03-14 NOTE — PROGRESS NOTE ADULT - SUBJECTIVE AND OBJECTIVE BOX
NEPHROLOGY INTERVAL HPI/OVERNIGHT EVENTS:    Examined  sitting up in beds no distress  feeling better    MEDICATIONS  (STANDING):  amLODIPine   Tablet 10 milliGRAM(s) Oral daily  carvedilol 25 milliGRAM(s) Oral every 12 hours  cloNIDine 0.2 milliGRAM(s) Oral three times a day  dextrose 5%. 1000 milliLiter(s) (50 mL/Hr) IV Continuous <Continuous>  dextrose 50% Injectable 12.5 Gram(s) IV Push once  dextrose 50% Injectable 25 Gram(s) IV Push once  dextrose 50% Injectable 25 Gram(s) IV Push once  heparin  Injectable 5000 Unit(s) SubCutaneous every 8 hours  hydrALAZINE 25 milliGRAM(s) Oral two times a day  insulin glargine Injectable (LANTUS) 48 Unit(s) SubCutaneous every morning  insulin lispro (HumaLOG) corrective regimen sliding scale   SubCutaneous three times a day before meals  insulin lispro Injectable (HumaLOG) 18 Unit(s) SubCutaneous three times a day before meals  QUEtiapine 100 milliGRAM(s) Oral at bedtime  sodium chloride 0.9%. 1000 milliLiter(s) (100 mL/Hr) IV Continuous <Continuous>    MEDICATIONS  (PRN):  acetaminophen   Tablet .. 650 milliGRAM(s) Oral every 6 hours PRN Temp greater or equal to 38C (100.4F), Mild Pain (1 - 3)  dextrose 40% Gel 15 Gram(s) Oral once PRN Blood Glucose LESS THAN 70 milliGRAM(s)/deciliter  glucagon  Injectable 1 milliGRAM(s) IntraMuscular once PRN Glucose LESS THAN 70 milligrams/deciliter  hydrALAZINE Injectable 10 milliGRAM(s) IV Push every 6 hours PRN SBP > 160      Allergies    No Known Allergies    Intolerances        Vital Signs Last 24 Hrs  T(C): 36.8 (13 Mar 2020 23:35), Max: 36.8 (13 Mar 2020 16:01)  T(F): 98.2 (13 Mar 2020 23:35), Max: 98.3 (13 Mar 2020 16:01)  HR: 84 (14 Mar 2020 05:44) (73 - 85)  BP: 156/94 (14 Mar 2020 05:44) (140/80 - 164/80)  BP(mean): --  RR: 18 (13 Mar 2020 23:35) (18 - 19)  SpO2: 93% (13 Mar 2020 23:35) (93% - 97%)  Daily     Daily     PHYSICAL EXAM:  Gen: NAD  	HEENT: supple neck  	Pulm: CTA B/L  	CV: RRR, S1S2; no rub  	Abd: +BS, soft, nontender/nondistended, obese  	: No suprapubic tenderness  	UE: Warm, no edema; no asterixis  	LE: Warm, no edema  	Neuro: No focal deficits      LABS:                        11.4   14.42 )-----------( 327      ( 13 Mar 2020 10:33 )             35.2     03-13    137  |  100  |  27.0<H>  ----------------------------<  204<H>  3.2<L>   |  24.0  |  3.11<H>    Ca    8.8      13 Mar 2020 10:33                  RADIOLOGY & ADDITIONAL TESTS:

## 2020-03-14 NOTE — PROGRESS NOTE ADULT - SUBJECTIVE AND OBJECTIVE BOX
Acute renal failure    HPI:  35M presented with a two day history of flank pain. The patient described that pain as dull in quality and worse on the right side. He denied any trauma to the area and denied any frequency, hematuria, or dysuria. He denied any fevers or chills at home. He has not had any similar episodes in the past and is unaware of any aggravating or relieving factors. Upon evaluation in the emergency department, the patient was noted to have poorly controlled hypertension and hyperglycemia. He denied any associated chest pain, palpitations, or dyspnea. The patient reported feelings feeling full when eating but denied any weight loss. He denied any change in his bowel habits or associated abdominal pain, nausea, or diarrhea. He did admit to missing doses of insulin at home. (10 Mar 2020 23:11)    Interval History:  Patient was seen and examined at bedside. Abdominal pain has improved.   Denies nausea, vomiting, constipation, urinary symptoms, chest pain, palpitations, shortness of breath, cough, headache, dizziness or visual symptoms.    ROS:  As per interval history otherwise unremarkable.    PHYSICAL EXAM:  Vital Signs   T(C): 36.8 (14 Mar 2020 08:30), Max: 36.8 (13 Mar 2020 16:01)  T(F): 98.2 (14 Mar 2020 08:30), Max: 98.3 (13 Mar 2020 16:01)  HR: 71 (14 Mar 2020 08:30) (71 - 85)  BP: 142/92 (14 Mar 2020 08:30) (142/92 - 164/80)  RR: 18 (14 Mar 2020 08:30) (18 - 19)  SpO2: 94% (14 Mar 2020 08:30) (93% - 94%)  General: Morbidly obese male sitting in bed comfortably. No acute distress  HEENT: EOMI. Clear conjunctivae. Moist mucus membrane  Neck: Supple.   Chest: CTA bilaterally - no wheezing, rales or rhonchi.   Heart: Normal S1 & S2. RRR.   Abdomen: Obese. Soft. Non-tender. + BS  Ext: No pedal edema. No calf tenderness   Neuro: AAO x 3. No focal deficit. No speech disorder  Skin: Warm and Dry  Psychiatry: Normal mood and affect    LABS:  CAPILLARY BLOOD GLUCOSE  POCT Blood Glucose.: 178 mg/dL (14 Mar 2020 11:03)  POCT Blood Glucose.: 146 mg/dL (14 Mar 2020 07:20)  POCT Blood Glucose.: 104 mg/dL (13 Mar 2020 21:43)  POCT Blood Glucose.: 126 mg/dL (13 Mar 2020 16:16)                        11.4   14.42 )-----------( 327      ( 13 Mar 2020 10:33 )             35.2     03-14    139  |  104  |  27.0<H>  ----------------------------<  168<H>  3.8   |  23.0  |  3.32<H>    Ca    8.8      14 Mar 2020 08:48    RADIOLOGY & ADDITIONAL STUDIES:  Reviewed     MEDICATIONS  (STANDING):  amLODIPine   Tablet 10 milliGRAM(s) Oral daily  carvedilol 25 milliGRAM(s) Oral every 12 hours  cloNIDine 0.2 milliGRAM(s) Oral three times a day  dextrose 5%. 1000 milliLiter(s) (50 mL/Hr) IV Continuous <Continuous>  dextrose 50% Injectable 12.5 Gram(s) IV Push once  dextrose 50% Injectable 25 Gram(s) IV Push once  dextrose 50% Injectable 25 Gram(s) IV Push once  heparin  Injectable 5000 Unit(s) SubCutaneous every 8 hours  hydrALAZINE 25 milliGRAM(s) Oral two times a day  insulin glargine Injectable (LANTUS) 48 Unit(s) SubCutaneous every morning  insulin lispro (HumaLOG) corrective regimen sliding scale   SubCutaneous three times a day before meals  insulin lispro Injectable (HumaLOG) 18 Unit(s) SubCutaneous three times a day before meals  QUEtiapine 100 milliGRAM(s) Oral at bedtime  sodium chloride 0.9%. 1000 milliLiter(s) (100 mL/Hr) IV Continuous <Continuous>    MEDICATIONS  (PRN):  acetaminophen   Tablet .. 650 milliGRAM(s) Oral every 6 hours PRN Temp greater or equal to 38C (100.4F), Mild Pain (1 - 3)  dextrose 40% Gel 15 Gram(s) Oral once PRN Blood Glucose LESS THAN 70 milliGRAM(s)/deciliter  glucagon  Injectable 1 milliGRAM(s) IntraMuscular once PRN Glucose LESS THAN 70 milligrams/deciliter  hydrALAZINE Injectable 10 milliGRAM(s) IV Push every 6 hours PRN SBP > 160

## 2020-03-14 NOTE — PROGRESS NOTE ADULT - ASSESSMENT
MELISSA on CKD (CKD 4 - baseline creatinine 2.8) likely in setting of uncontrolled DM, HTN  cr better  Patient sees Nephrologist in [place]    Renal ultrasound 3/12/20- no evidence of hydronephrosis  Right kidney 8.6 cm, Left kidney 11.9 - Soft tissue prominence again seen in the interpolar region of the R kidney  MRI abdomen pending    CT 3/10/20- Punctate non-obstructing left renal calculus. Distended urinary bladder  Pt reports urinating without difficulty  Confirm with bladder scan- pending?    HTN  continue current  antihypertensive regimen    Hypokalemia  Agree with renin/ [place] levels- pending    CKD- MBD  On calcitriol 0.25 mcg daily at home  Obtain iPTH and 25-OH vitamin D  Monitor Ca+ and phos     Anemia- Hb at goal  no need for KEV    Will follow

## 2020-03-14 NOTE — PROGRESS NOTE ADULT - ASSESSMENT
35M with a history of chronic kidney disease, diabetes, obstructive sleep apnea, and bipolar disorder who presented with complaints of bilateral back pain and was found to have poorly controlled hypertension and hyperglycemia.    1) MELISSA on CKD 3  - Improving  - Continue gentle hydration  - Avoid nephrotoxic medications  - Renal follow up noted    2) Uncontrolled HTN  - Continue Amlodipine, Clonidine and Coreg  - Added Hydralazine 25 mg BID   - Aldosterone and Renin levels pending     3) Uncontrolled DM 2  - HbA1c 16.9  - Accu checks and ISS  - Continue Lantus 48 units  - Continue Humalog 18 units before meals  - Endo following     4) Leukocytosis  - Likely reactive  - No signs of infection  - Procalcitonin 0.19 in setting of CKD  - UA and CXR negative  - RVP negative     5) Hyponatremia  - Resolved    6) Hypokalemia  - Renin / Aldosterone Level as per Endo  - KCl 60 mEq     7) Bipolar Disorder  - Continue Seroquel     8) Flank Pain  - Likely musculoskeletal  - No obvious pathology on scans    DVT Prophylaxis -- Heparin SQ    Dispo: Home in 2-3 days 35M with a history of chronic kidney disease, diabetes, obstructive sleep apnea, and bipolar disorder who presented with complaints of bilateral back pain and was found to have poorly controlled hypertension and hyperglycemia.    1) MELISSA on CKD 3  - Continue gentle hydration  - Avoid nephrotoxic medications  - Renal follow up noted    2) Uncontrolled HTN  - Continue Amlodipine, Clonidine and Coreg  - Added Hydralazine 25 mg BID   - Aldosterone and Renin levels pending     3) Uncontrolled DM 2  - HbA1c 16.9  - Accu checks and ISS  - Continue Lantus 48 units  - Continue Humalog 18 units before meals  - Endo following     4) Leukocytosis  - Likely reactive  - No signs of infection  - Procalcitonin 0.19 in setting of CKD  - UA and CXR negative  - RVP negative     5) Hyponatremia  - Resolved    6) Hypokalemia  - Renin / Aldosterone Level as per Endo  - KCl 60 mEq     7) Bipolar Disorder  - Continue Seroquel     8) Flank Pain  - Soft tissue prominence in the interpolar region of the right kidney on CT  - MRI ordered     DVT Prophylaxis -- Heparin SQ    Dispo: Home in 1-2 days

## 2020-03-15 ENCOUNTER — TRANSCRIPTION ENCOUNTER (OUTPATIENT)
Age: 35
End: 2020-03-15

## 2020-03-15 VITALS
SYSTOLIC BLOOD PRESSURE: 150 MMHG | TEMPERATURE: 98 F | DIASTOLIC BLOOD PRESSURE: 98 MMHG | RESPIRATION RATE: 18 BRPM | HEART RATE: 76 BPM

## 2020-03-15 LAB
ANION GAP SERPL CALC-SCNC: 13 MMOL/L — SIGNIFICANT CHANGE UP (ref 5–17)
BUN SERPL-MCNC: 27 MG/DL — HIGH (ref 8–20)
CALCIUM SERPL-MCNC: 8.8 MG/DL — SIGNIFICANT CHANGE UP (ref 8.6–10.2)
CHLORIDE SERPL-SCNC: 104 MMOL/L — SIGNIFICANT CHANGE UP (ref 98–107)
CO2 SERPL-SCNC: 25 MMOL/L — SIGNIFICANT CHANGE UP (ref 22–29)
CREAT SERPL-MCNC: 3.15 MG/DL — HIGH (ref 0.5–1.3)
GLUCOSE BLDC GLUCOMTR-MCNC: 141 MG/DL — HIGH (ref 70–99)
GLUCOSE BLDC GLUCOMTR-MCNC: 228 MG/DL — HIGH (ref 70–99)
GLUCOSE BLDC GLUCOMTR-MCNC: 70 MG/DL — SIGNIFICANT CHANGE UP (ref 70–99)
GLUCOSE SERPL-MCNC: 190 MG/DL — HIGH (ref 70–99)
POTASSIUM SERPL-MCNC: 3.1 MMOL/L — LOW (ref 3.5–5.3)
POTASSIUM SERPL-SCNC: 3.1 MMOL/L — LOW (ref 3.5–5.3)
SODIUM SERPL-SCNC: 142 MMOL/L — SIGNIFICANT CHANGE UP (ref 135–145)

## 2020-03-15 PROCEDURE — 81001 URINALYSIS AUTO W/SCOPE: CPT

## 2020-03-15 PROCEDURE — 82962 GLUCOSE BLOOD TEST: CPT

## 2020-03-15 PROCEDURE — 87581 M.PNEUMON DNA AMP PROBE: CPT

## 2020-03-15 PROCEDURE — 93005 ELECTROCARDIOGRAM TRACING: CPT

## 2020-03-15 PROCEDURE — 84244 ASSAY OF RENIN: CPT

## 2020-03-15 PROCEDURE — 99285 EMERGENCY DEPT VISIT HI MDM: CPT | Mod: 25

## 2020-03-15 PROCEDURE — 74176 CT ABD & PELVIS W/O CONTRAST: CPT

## 2020-03-15 PROCEDURE — 80053 COMPREHEN METABOLIC PANEL: CPT

## 2020-03-15 PROCEDURE — 87633 RESP VIRUS 12-25 TARGETS: CPT

## 2020-03-15 PROCEDURE — 72148 MRI LUMBAR SPINE W/O DYE: CPT

## 2020-03-15 PROCEDURE — 71045 X-RAY EXAM CHEST 1 VIEW: CPT

## 2020-03-15 PROCEDURE — 96374 THER/PROPH/DIAG INJ IV PUSH: CPT

## 2020-03-15 PROCEDURE — 85027 COMPLETE CBC AUTOMATED: CPT

## 2020-03-15 PROCEDURE — 83735 ASSAY OF MAGNESIUM: CPT

## 2020-03-15 PROCEDURE — 82009 KETONE BODYS QUAL: CPT

## 2020-03-15 PROCEDURE — 84145 PROCALCITONIN (PCT): CPT

## 2020-03-15 PROCEDURE — 80048 BASIC METABOLIC PNL TOTAL CA: CPT

## 2020-03-15 PROCEDURE — 99239 HOSP IP/OBS DSCHRG MGMT >30: CPT

## 2020-03-15 PROCEDURE — 87798 DETECT AGENT NOS DNA AMP: CPT

## 2020-03-15 PROCEDURE — 80061 LIPID PANEL: CPT

## 2020-03-15 PROCEDURE — 36415 COLL VENOUS BLD VENIPUNCTURE: CPT

## 2020-03-15 PROCEDURE — 84443 ASSAY THYROID STIM HORMONE: CPT

## 2020-03-15 PROCEDURE — 87486 CHLMYD PNEUM DNA AMP PROBE: CPT

## 2020-03-15 PROCEDURE — 96376 TX/PRO/DX INJ SAME DRUG ADON: CPT

## 2020-03-15 PROCEDURE — 82088 ASSAY OF ALDOSTERONE: CPT

## 2020-03-15 PROCEDURE — 82803 BLOOD GASES ANY COMBINATION: CPT

## 2020-03-15 PROCEDURE — 76775 US EXAM ABDO BACK WALL LIM: CPT

## 2020-03-15 PROCEDURE — 83036 HEMOGLOBIN GLYCOSYLATED A1C: CPT

## 2020-03-15 RX ORDER — ROSUVASTATIN CALCIUM 5 MG/1
1 TABLET ORAL
Qty: 30 | Refills: 0
Start: 2020-03-15 | End: 2020-04-13

## 2020-03-15 RX ORDER — ASPIRIN/CALCIUM CARB/MAGNESIUM 324 MG
81 TABLET ORAL DAILY
Refills: 0 | Status: DISCONTINUED | OUTPATIENT
Start: 2020-03-15 | End: 2020-03-15

## 2020-03-15 RX ORDER — ENOXAPARIN SODIUM 100 MG/ML
24 INJECTION SUBCUTANEOUS
Qty: 0 | Refills: 0 | DISCHARGE

## 2020-03-15 RX ORDER — CALCITRIOL 0.5 UG/1
0.25 CAPSULE ORAL DAILY
Refills: 0 | Status: DISCONTINUED | OUTPATIENT
Start: 2020-03-15 | End: 2020-03-15

## 2020-03-15 RX ORDER — AMLODIPINE BESYLATE 2.5 MG/1
1 TABLET ORAL
Qty: 30 | Refills: 0
Start: 2020-03-15 | End: 2020-04-13

## 2020-03-15 RX ORDER — INSULIN LISPRO 100/ML
0 VIAL (ML) SUBCUTANEOUS
Qty: 0 | Refills: 0 | DISCHARGE

## 2020-03-15 RX ORDER — BENZTROPINE MESYLATE 1 MG
0.5 TABLET ORAL
Refills: 0 | Status: DISCONTINUED | OUTPATIENT
Start: 2020-03-15 | End: 2020-03-15

## 2020-03-15 RX ORDER — INSULIN LISPRO 100/ML
18 VIAL (ML) SUBCUTANEOUS
Qty: 10 | Refills: 0
Start: 2020-03-15 | End: 2020-04-13

## 2020-03-15 RX ORDER — ROSUVASTATIN CALCIUM 5 MG/1
1 TABLET ORAL
Qty: 0 | Refills: 0 | DISCHARGE

## 2020-03-15 RX ORDER — ATORVASTATIN CALCIUM 80 MG/1
20 TABLET, FILM COATED ORAL AT BEDTIME
Refills: 0 | Status: DISCONTINUED | OUTPATIENT
Start: 2020-03-15 | End: 2020-03-15

## 2020-03-15 RX ORDER — LAMOTRIGINE 25 MG/1
50 TABLET, ORALLY DISINTEGRATING ORAL
Refills: 0 | Status: DISCONTINUED | OUTPATIENT
Start: 2020-03-15 | End: 2020-03-15

## 2020-03-15 RX ORDER — HYDRALAZINE HCL 50 MG
100 TABLET ORAL THREE TIMES A DAY
Refills: 0 | Status: DISCONTINUED | OUTPATIENT
Start: 2020-03-15 | End: 2020-03-15

## 2020-03-15 RX ORDER — POTASSIUM CHLORIDE 20 MEQ
40 PACKET (EA) ORAL EVERY 4 HOURS
Refills: 0 | Status: COMPLETED | OUTPATIENT
Start: 2020-03-15 | End: 2020-03-15

## 2020-03-15 RX ORDER — CARVEDILOL PHOSPHATE 80 MG/1
1 CAPSULE, EXTENDED RELEASE ORAL
Qty: 60 | Refills: 0
Start: 2020-03-15 | End: 2020-04-13

## 2020-03-15 RX ORDER — ENOXAPARIN SODIUM 100 MG/ML
48 INJECTION SUBCUTANEOUS
Qty: 1 | Refills: 0
Start: 2020-03-15 | End: 2020-04-13

## 2020-03-15 RX ORDER — HYDRALAZINE HCL 50 MG
1 TABLET ORAL
Qty: 90 | Refills: 0
Start: 2020-03-15 | End: 2020-04-13

## 2020-03-15 RX ORDER — PALIPERIDONE 1.5 MG/1
156 TABLET, EXTENDED RELEASE ORAL
Qty: 0 | Refills: 0 | DISCHARGE

## 2020-03-15 RX ADMIN — CARVEDILOL PHOSPHATE 25 MILLIGRAM(S): 80 CAPSULE, EXTENDED RELEASE ORAL at 05:44

## 2020-03-15 RX ADMIN — INSULIN GLARGINE 48 UNIT(S): 100 INJECTION, SOLUTION SUBCUTANEOUS at 08:21

## 2020-03-15 RX ADMIN — Medication 18 UNIT(S): at 08:21

## 2020-03-15 RX ADMIN — Medication 25 MILLIGRAM(S): at 05:45

## 2020-03-15 RX ADMIN — Medication 0.5 MILLIGRAM(S): at 17:09

## 2020-03-15 RX ADMIN — AMLODIPINE BESYLATE 10 MILLIGRAM(S): 2.5 TABLET ORAL at 05:44

## 2020-03-15 RX ADMIN — Medication 4: at 12:11

## 2020-03-15 RX ADMIN — Medication 81 MILLIGRAM(S): at 13:16

## 2020-03-15 RX ADMIN — Medication 18 UNIT(S): at 12:12

## 2020-03-15 RX ADMIN — Medication 100 MILLIGRAM(S): at 13:21

## 2020-03-15 RX ADMIN — Medication 40 MILLIEQUIVALENT(S): at 13:16

## 2020-03-15 RX ADMIN — CARVEDILOL PHOSPHATE 25 MILLIGRAM(S): 80 CAPSULE, EXTENDED RELEASE ORAL at 17:09

## 2020-03-15 RX ADMIN — Medication 40 MILLIEQUIVALENT(S): at 17:08

## 2020-03-15 RX ADMIN — LAMOTRIGINE 50 MILLIGRAM(S): 25 TABLET, ORALLY DISINTEGRATING ORAL at 17:09

## 2020-03-15 RX ADMIN — Medication 0.2 MILLIGRAM(S): at 13:16

## 2020-03-15 RX ADMIN — HEPARIN SODIUM 5000 UNIT(S): 5000 INJECTION INTRAVENOUS; SUBCUTANEOUS at 05:44

## 2020-03-15 RX ADMIN — Medication 0.2 MILLIGRAM(S): at 05:43

## 2020-03-15 RX ADMIN — CALCITRIOL 0.25 MICROGRAM(S): 0.5 CAPSULE ORAL at 13:16

## 2020-03-15 NOTE — PROGRESS NOTE ADULT - SUBJECTIVE AND OBJECTIVE BOX
Acute renal failure    HPI:  35M presented with a two day history of flank pain. The patient described that pain as dull in quality and worse on the right side. He denied any trauma to the area and denied any frequency, hematuria, or dysuria. He denied any fevers or chills at home. He has not had any similar episodes in the past and is unaware of any aggravating or relieving factors. Upon evaluation in the emergency department, the patient was noted to have poorly controlled hypertension and hyperglycemia. He denied any associated chest pain, palpitations, or dyspnea. The patient reported feelings feeling full when eating but denied any weight loss. He denied any change in his bowel habits or associated abdominal pain, nausea, or diarrhea. He did admit to missing doses of insulin at home. (10 Mar 2020 23:11)    Interval History:  Patient was seen and examined at bedside. No active complaints.   Abdominal pain has improved.   Denies nausea, vomiting, constipation, urinary symptoms, chest pain, palpitations, shortness of breath, cough, headache, dizziness or visual symptoms.    ROS:  As per interval history otherwise unremarkable.    PHYSICAL EXAM:  Vital Signs   T(C): 36.7 (15 Mar 2020 09:06), Max: 36.8 (14 Mar 2020 23:37)  T(F): 98 (15 Mar 2020 09:06), Max: 98.3 (14 Mar 2020 23:37)  HR: 72 (15 Mar 2020 09:06) (72 - 76)  BP: 121/77 (15 Mar 2020 09:06) (121/77 - 156/91)  RR: 18 (15 Mar 2020 09:06) (18 - 18)  SpO2: 93% (15 Mar 2020 09:06) (93% - 93%)  General: Morbidly obese male sitting in bed comfortably. No acute distress  HEENT: EOMI. Clear conjunctivae. Moist mucus membrane  Neck: Supple.   Chest: CTA bilaterally - no wheezing, rales or rhonchi.   Heart: Normal S1 & S2. RRR.   Abdomen: Obese. Soft. Non-tender. + BS  Ext: No pedal edema. No calf tenderness   Neuro: AAO x 3. No focal deficit. No speech disorder  Skin: Warm and Dry  Psychiatry: Normal mood and affect    LABS:  CAPILLARY BLOOD GLUCOSE  POCT Blood Glucose.: 141 mg/dL (15 Mar 2020 07:48)  POCT Blood Glucose.: 126 mg/dL (14 Mar 2020 22:14)  POCT Blood Glucose.: 185 mg/dL (14 Mar 2020 16:34)    03-15    142  |  104  |  27.0<H>  ----------------------------<  190<H>  3.1<L>   |  25.0  |  3.15<H>    Ca    8.8      15 Mar 2020 09:47      RADIOLOGY & ADDITIONAL STUDIES:  Reviewed     MEDICATIONS  (STANDING):  amLODIPine   Tablet 10 milliGRAM(s) Oral daily  aspirin  chewable 81 milliGRAM(s) Oral daily  atorvastatin 20 milliGRAM(s) Oral at bedtime  benztropine 0.5 milliGRAM(s) Oral two times a day  calcitriol   Capsule 0.25 MICROGram(s) Oral daily  carvedilol 25 milliGRAM(s) Oral every 12 hours  cloNIDine 0.2 milliGRAM(s) Oral three times a day  dextrose 5%. 1000 milliLiter(s) (50 mL/Hr) IV Continuous <Continuous>  dextrose 50% Injectable 12.5 Gram(s) IV Push once  dextrose 50% Injectable 25 Gram(s) IV Push once  dextrose 50% Injectable 25 Gram(s) IV Push once  heparin  Injectable 5000 Unit(s) SubCutaneous every 8 hours  hydrALAZINE 100 milliGRAM(s) Oral three times a day  insulin glargine Injectable (LANTUS) 48 Unit(s) SubCutaneous every morning  insulin lispro (HumaLOG) corrective regimen sliding scale   SubCutaneous three times a day before meals  insulin lispro Injectable (HumaLOG) 18 Unit(s) SubCutaneous three times a day before meals  lamoTRIgine 50 milliGRAM(s) Oral two times a day  potassium chloride    Tablet ER 40 milliEquivalent(s) Oral every 4 hours  QUEtiapine 100 milliGRAM(s) Oral at bedtime  sodium chloride 0.9%. 1000 milliLiter(s) (75 mL/Hr) IV Continuous <Continuous>    MEDICATIONS  (PRN):  acetaminophen   Tablet .. 650 milliGRAM(s) Oral every 6 hours PRN Temp greater or equal to 38C (100.4F), Mild Pain (1 - 3)  dextrose 40% Gel 15 Gram(s) Oral once PRN Blood Glucose LESS THAN 70 milliGRAM(s)/deciliter  glucagon  Injectable 1 milliGRAM(s) IntraMuscular once PRN Glucose LESS THAN 70 milligrams/deciliter  hydrALAZINE Injectable 10 milliGRAM(s) IV Push every 6 hours PRN SBP > 160

## 2020-03-15 NOTE — DISCHARGE NOTE PROVIDER - NSDCCPCAREPLAN_GEN_ALL_CORE_FT
PRINCIPAL DISCHARGE DIAGNOSIS  Diagnosis: Acute-on-chronic kidney injury  Assessment and Plan of Treatment: Improving  Avoid NSAIDs.  Follow up with Renal in 3-5 days.      SECONDARY DISCHARGE DIAGNOSES  Diagnosis: Uncontrolled hypertension  Assessment and Plan of Treatment: Continue medications as prescribed.  Follow up with PMD in 3-5 days.    Diagnosis: Diabetes type 2, uncontrolled  Assessment and Plan of Treatment: Continue medications as prescribed.  Follow up with PMD and Endo in 3-5 days.    Diagnosis: Flank pain  Assessment and Plan of Treatment: Resolved.  MRI right kidney as an outpatient.   Follow up with PMD in 3-5 days.

## 2020-03-15 NOTE — DISCHARGE NOTE PROVIDER - NSDCFUSCHEDAPPT_GEN_ALL_CORE_FT
MILENA PUGA ; 03/24/2020 ; [facility] [address]  MILENA PUGA ; 04/02/2020 ; [facility] [address]  MILENA PUGA ; 04/29/2020 ; [facility] [address]  MILENA PUGA ; 04/30/2020 ; [facility] [address]

## 2020-03-15 NOTE — DISCHARGE NOTE PROVIDER - CARE PROVIDERS DIRECT ADDRESSES
[email or web address]

## 2020-03-15 NOTE — DISCHARGE NOTE PROVIDER - HOSPITAL COURSE
Patient was admitted with Flank Pain and MELISSA on CKD 3. CT showed soft tissue prominence in the interpolar region of the right kidney. MRI Lumbar spine ruled out acute pathology. Pain resolved on it's own. For MELISSA, he was started on IVF and was seen by Nephrology. His renal function is improving. He was found to have Uncontrolled HTN and DM 2. His home medications were adjusted and he was seen by Endo. He was counselled extensively regarding medication compliance. He is feeling much better and is stable for discharge. He is advised to follow up with his PMD, Renal, Endo and Psychiatrist in 3-5 days. He is also advised to get MRI of Right Kidney for further characterization of soft tissue prominence.          Time spent: 40 minutes

## 2020-03-15 NOTE — DISCHARGE NOTE NURSING/CASE MANAGEMENT/SOCIAL WORK - PATIENT PORTAL LINK FT
You can access the FollowMyHealth Patient Portal offered by [facility] by registering at the following website: [email or web address]. By joining [employer]’s FollowMyHealth portal, you will also be able to view your health information using other applications (apps) compatible with our system.

## 2020-03-15 NOTE — DISCHARGE NOTE PROVIDER - CARE PROVIDER_API CALL
Nicci Ballesteros)  Internal Medicine  [address] [place] [address]  Phone: [phone]  Fax: [phone]  Follow Up Time:     [facility],   [address] [place] [id number]  Phone: [phone]  Fax: (   )    -  Follow Up Time:

## 2020-03-15 NOTE — DIETITIAN INITIAL EVALUATION ADULT. - PERTINENT LABORATORY DATA
03-15 Na142 mmol/L Glu 190 mg/dL<H> K+ 3.1 mmol/L<L> Cr  3.15 mg/dL<H> BUN 27.0 mg/dL<H> Phos n/a   Alb n/a   PAB n/a

## 2020-03-15 NOTE — DIETITIAN INITIAL EVALUATION ADULT. - +GENDER
Statement Selected 56 y/o male c/o left scalp lesion x few months. patient s/p laceration repair 3 months ago. Pateint denies cp/sob, no n/v/d, no loc, no fever    CONSTITUTIONAL: Well-developed; well-nourished; in no acute distress. Sitting up and providing appropriate history and physical examination  SKIN: skin exam is warm and dry, no acute rash.  HEAD: + pustule over the back of the ead, 1 cm, popped by staff, feels better, Normocephalic; atraumatic.  NEURO: CN 2-12 intact, normal finger to nose, normal romberg, stable gait, no sensory or motor deficits, Alert, oriented, grossly unremarkable. No Focal deficits. GCS 15. NIH 0  PSYCH: Cooperative, appropriate.

## 2020-03-15 NOTE — DISCHARGE NOTE PROVIDER - NSDCFUADDAPPT_GEN_ALL_CORE_FT
Follow up with PMD / Renal / Endo / Psychiatrist in 3-5 days.  MRI Right Kidney for further characterization of soft tissue prominence.

## 2020-03-15 NOTE — PROGRESS NOTE ADULT - ASSESSMENT
35M with a history of chronic kidney disease, diabetes, obstructive sleep apnea, and bipolar disorder who presented with complaints of bilateral back pain and was found to have poorly controlled hypertension and hyperglycemia.    1) MELISSA on CKD 3  - Improving   - Continue gentle hydration  - Avoid nephrotoxic medications  - Renal follow up noted    2) Flank Pain  - Soft tissue prominence in the interpolar region of the right kidney on CT  - MRI ordered     3) Uncontrolled HTN  - Continue Amlodipine, Clonidine and Coreg  - Increased Hydralazine to 100 mg TID (takes at home)  - Aldosterone and Renin levels pending     4) Uncontrolled DM 2  - HbA1c 16.9  - Accu checks and ISS  - Continue Lantus 48 units  - Continue Humalog 18 units before meals  - Endo following     5) HLD  - Continue Lipitor     6) Leukocytosis  - Likely reactive  - No signs of infection  - Procalcitonin 0.19 in setting of CKD  - UA and CXR negative  - RVP negative     7) Hyponatremia  - Resolved    8) Hypokalemia  - Renin / Aldosterone Level as per Endo  - KCl 40 mEq x 2     9) Bipolar Disorder  - Continue Seroquel and Lamictal     DVT Prophylaxis -- Heparin SQ    Dispo: Home in 24 hours pending MRI.

## 2020-03-15 NOTE — DIETITIAN INITIAL EVALUATION ADULT. - OTHER INFO
35M with a history of chronic kidney disease, diabetes, obstructive sleep apnea, and bipolar disorder who presented with complaints of bilateral back pain and was found to have poorly controlled hypertension and hyperglycemia. Pt reported good po intake PTA and currently. Pt reported continuous wt gain but unaware of how much or time frame. Pt provided with written and verbal diabetes nutrition education. Pt stated he received brief education PTA. Pt encouraged to consume HBV protein. Pt had no current nutrition related questions or concerns. HgbA1c 16.9% noted.

## 2020-03-15 NOTE — DISCHARGE NOTE PROVIDER - PROVIDER TOKENS
PROVIDER:[TOKEN:[[id number]]],FREE:[LAST:[[facility]],PHONE:[phone]],FAX:[(   )    -],ADDRESS:[[address] [place] [address]]]

## 2020-03-15 NOTE — DISCHARGE NOTE PROVIDER - NSDCMRMEDTOKEN_GEN_ALL_CORE_FT
amLODIPine 10 mg oral tablet: 1 tab(s) orally once a day  aspirin 81 mg oral tablet: 1 tab(s) orally once a day  benztropine 0.5 mg oral tablet: 1 tab(s) orally 2 times a day  calcitriol 0.25 mcg oral capsule: 1 cap(s) orally once a day  carvedilol 12.5 mg oral tablet: 1 tab(s) orally every 12 hours  cloNIDine 0.2 mg oral tablet: 1 tab(s) orally 3 times a day hold if SBP is less than 110  Crestor 5 mg oral tablet: 1 tab(s) orally once a day  HumaLOG 100 units/mL subcutaneous solution: 18  subcutaneous 3 times a day (before meals)    +  sliding scale: 150-200 2 units; 201-250 4 units; 251-300 6 units; 301-350 8 units; 351-400 10 units; &gt; 400 call MD   hydrALAZINE 100 mg oral tablet: 1 tab(s) orally every 8 hours hold if SBP is less than 110  lamoTRIgine: 50 milligram(s) orally 2 times a day  Lantus Solostar Pen 100 units/mL subcutaneous solution: 48 unit(s) subcutaneous once a day (at bedtime)   paliperidone 6 mg oral tablet, extended release: 1 tab(s) orally once a day (in the morning)  potassium chloride 20 mEq oral tablet, extended release: 1 tab(s) orally 2 times a day  QUEtiapine 100 mg oral tablet: 1 tab(s) orally once a day (at bedtime)

## 2020-03-16 LAB — ALDOST SERPL-MCNC: 45.1 NG/DL — HIGH

## 2020-03-18 LAB — RENIN PLAS-CCNC: 0.21 NG/ML/HR — SIGNIFICANT CHANGE UP (ref 0.17–5.38)

## 2020-03-21 NOTE — ED ADULT TRIAGE NOTE - SOURCE OF INFORMATION
Please drink plenty of fluids.  Please get plenty of rest.  Please return here or go to the Emergency Department for any concerns or worsening of condition.  If you were given wait & see antibiotics, please wait 3-5 days before taking them, and only take them if your symptoms have worsened or not improved.  If you do begin taking the antibiotics, please take them to completion.  If you were prescribed antibiotics, please take them to completion.    If you were prescribed a narcotic medication, do not drive or operate heavy equipment or machinery while taking these medications.    You were given a decongestant (RESCON or POLY VENT Dm).  If your insurance does not cover it or you cannot afford it, it is ok to use the over the counter products listed below.  If you do not have Hypertension or any history of palpitations, it is ok to take over the counter Sudafed or Mucinex D or Allegra-D or Claritin-D or Zyrtec-D.  If you do take one of the above, it is ok to combine that with plain over the counter Mucinex or Allegra or Claritin or Zyrtec.  If for example you are taking Zyrtec -D, you can combine that with Mucinex, but not Mucinex-D.  If you are taking Mucinex-D, you can combine that with plain Allegra or Claritin or Zyrtec.   If you do have Hypertension or palpitations, it is safe to take Coricidin HBP for relief of sinus symptoms.    We recommend you take over the counter Flonase (Fluticasone) or another nasally inhaled steroid unless you are already taking one.      Nasal irrigation with a saline spray or Netti Pot like device per their directions is also recommended.  If not allergic, please take over the counter Tylenol (Acetaminophen)  as directed for control of pain and/or fever.  You may take Sudafed every 6 hours as directed for congestion.    You were given an injection of steroid.  This will relieve swelling and inflammation and improve respiratory symptoms.  It can raise your blood sugar if you are  diabetic.    Please follow up with your primary care doctor or specialist as needed.    Jose Love MD  [phone]    If you  smoke, please stop smoking.       You must understand that you have received treatment at an Urgent Care facility only, and that you may be  released before all of your medical problems are known or treated. Urgent Care facilities are not equipped to  handle life threatening emergencies. It is recommended that you seek care at an Emergency Department for  further evaluation of worsening or concerning symptoms, or possibly life threatening conditions as  discussed.    Pharyngitis: Strep (Presumed)    You have pharyngitis (sore throat). The cause is thought to be the streptococcus, or strep, bacterium. Strep throat infection can cause throat pain that is worse when swallowing, aching all over, headache, and fever. The infection may be spread by coughing, kissing, or touching others after touching your mouth or nose. Antibiotic medications are given to treat the infection.  Home care  · Rest at home. Drink plenty of fluids to avoid dehydration.  · No work or school for the first 2 days of taking the antibiotics. After this time, you will not be contagious. You can then return to work or school if you are feeling better.   · The antibiotic medication must be taken for the full 10 days, even if you feel better. This is very important to ensure the infection is treated. It is also important to prevent drug-resistant organisms from developing. If you were given an antibiotic shot, no more antibiotics are needed.  · You may use acetaminophen or ibuprofen to control pain or fever, unless another medicine was prescribed for this. If you have chronic liver or kidney disease or ever had a stomach ulcer or GI bleeding, talk with your doctor before using these medicines.  · Throat lozenges or a throat-numbing sprays can help reduce throat pain. Gargling with warm salt water can also help. Dissolve  1/2 teaspoon of salt in 1 8 ounce glass of warm water.   · Avoid salty or spicy foods, which can irritate the throat.  Follow-up care  Follow up with your healthcare provider or our staff if you are not improving over the next week.  When to seek medical advice  Call your healthcare provider right away if any of these occur:  · Fever as directed by your doctor.   · New or worsening ear pain, sinus pain, or headache  · Painful lumps in the back of neck  · Stiff neck  · Lymph nodes are getting larger  · Inability to swallow liquids, excessive drooling, or inability to open mouth wide due to throat pain  · Signs of dehydration (very dark urine or no urine, sunken eyes, dizziness)  · Trouble breathing or noisy breathing  · Muffled voice  · New rash  Date Last Reviewed: 4/13/2015  © 6509-1579 [email or web address]. [address] [place] [address]. All rights reserved. This information is not intended as a substitute for professional medical care. Always follow your healthcare professional's instructions.        COPD Flare    You have had a flare-up of your COPD.  COPD, or chronic obstructive pulmonary disease, is a common lung disease. It causes your airways to become irritated and narrower. This makes it harder for you to breathe. Emphysema and chronic bronchitis are both types of COPD. This is a chronic condition, which means you always have it. Sometimes it gets worse. When this happens, it is called a flare-up.  Symptoms of COPD  People with COPD may have symptoms most of the time. In a flare-up, your symptoms get worse. These symptoms may mean you are having a flare-up:  · Shortness of breath, shallow or rapid breathing, or wheezing that gets worse  · Lung infection  · Cough that gets worse  · More mucus, thicker mucus or mucus of a different color  · Tiredness, decreased energy, or trouble doing your usual activities  · Fever  · Chest tightness  · Your symptoms dont get better even when you use your  usual medicines, inhalers, and nebulizer  · Trouble talking  · You feel confused  Causes of flare-ups  Unfortunately, a flare-up can happen even though you did everything right, and you followed your doctors instructions. Some causes of flare-ups are:  · Smoking or secondhand smoke  · Colds, the flu, or respiratory infections  · Air pollution  · Sudden change in the weather  · Dust, irritating chemicals, or strong fumes  · Not taking your medicines as prescribed  Home care  Here are some things you can do at home to treat a flare-up:  · Try not to panic. This makes it harder to breathe, and keeps you from doing the right things.  · Dont smoke or be around others who are smoking.  · Try to drink more fluids than usual during a flare-up, unless your doctor has told you not to because of heart and kidney problems. More fluids can help loosen the mucus.  · Use your inhalers and nebulizer, if you have one, as you have been told to.  · If you were given antibiotics, take them until they are used up or your doctor tells you to stop. Its important to finish the antibiotics, even though you feel better. This will make sure the infection has cleared.  · If you were given prednisone or another steroid, finish it even if you feel better.  Preventing a flare-up  Even though flare-ups happen, the best way to treat one is to prevent it before it starts. Here are some pointers:  · Dont smoke or be around others who are smoking.  · Take your medicines as you have been told.  · Talk with your doctor about getting a flu shot every year. Also find out if you need a pneumonia shot.  · If there is a weather advisory warning to stay indoors, try to stay inside when possible.  · Try to eat healthy and get plenty of sleep.  · Try to avoid things that usually set you off, like dust, chemical fumes, hairsprays, or strong perfumes.  Follow-up care  Follow up with your healthcare provider, or as advised.  If a culture was done, you will be  told if your treatment needs to be changed. You can call as directed for the results.  If X-rays were done, you will be notified of any new findings that may affect your care.  Call 911  Call 911 if any of these occur:  · You have trouble breathing  · You feel confused or its difficult to wake you up  · You faint or lose consciousness  · You have a rapid heart rate  · You have new pain in your chest, arm, shoulder, neck or upper back  When to seek medical advice  Call your healthcare provider right away if any of these occur:  · Wheezing or shortness of breath gets worse  · You need to use your inhalers more often than usual without relief  · Fever of 100.4°F (38ºC) or higher, or as directed by your healthcare provider  · Coughing up lots of dark-colored or bloody mucus (sputum)  · Chest pain with each breath  · You do not start to get better within 24 hours  · Swelling of your ankles gets worse  · Dizziness or weakness  Date Last Reviewed: 9/1/2016  © 1285-1582 [employer] [address] [place] [address]. All rights reserved. This information is not intended as a substitute for professional medical care. Always follow your healthcare professional's instructions.        Acute Bronchitis  Your healthcare provider has told you that you have acute bronchitis. Bronchitis is infection or inflammation of the bronchial tubes (airways in the lungs). Normally, air moves easily in and out of the airways. Bronchitis narrows the airways, making it harder for air to flow in and out of the lungs. This causes symptoms such as shortness of breath, coughing up yellow or green mucus, and wheezing. Bronchitis can be acute or chronic. Acute means the condition comes on quickly and goes away in a short time, usually within 3 to 10 days. Chronic means a condition lasts a long time and often comes back.    What causes acute bronchitis?  Acute bronchitis almost always starts as a viral respiratory infection, such  as a cold or the flu. Certain factors make it more likely for a cold or flu to turn into bronchitis. These include being very young, being elderly, having a heart or lung problem, or having a weak immune system. Cigarette smoking also makes bronchitis more likely.  When bronchitis develops, the airways become swollen. The airways may also become infected with bacteria. This is known as a secondary infection.  Diagnosing acute bronchitis  Your healthcare provider will examine you and ask about your symptoms and health history. You may also have a sputum culture to test the fluid in your lungs. Chest X-rays may be done to look for infection in the lungs.  Treating acute bronchitis  Bronchitis usually clears up as the cold or flu goes away. You can help feel better faster by doing the following:  · Take medicine as directed. You may be told to take ibuprofen or other over-the-counter medicines. These help relieve inflammation in your bronchial tubes. Your healthcare provider may prescribe an inhaler to help open up the bronchial tubes. Most of the time, acute bronchitis is caused by a viral infection. Antibiotics are usually not prescribed for viral infections.  · Drink plenty of fluids, such as water, juice, or warm soup. Fluids loosen mucus so that you can cough it up. This helps you breathe more easily. Fluids also prevent dehydration.  · Make sure you get plenty of rest.  · Do not smoke. Do not allow anyone else to smoke in your home.  Recovery and follow-up  Follow up with your doctor as you are told. You will likely feel better in a week or two. But a dry cough can linger beyond that time. Let your doctor know if you still have symptoms (other than a dry cough) after 2 weeks, or if youre prone to getting bronchial infections. Take steps to protect yourself from future infections. These steps include stopping smoking and avoiding tobacco smoke, washing your hands often, and getting a yearly flu shot.  When to  call your healthcare provider  Call the healthcare provider if you have any of the following:  · Fever of 100.4°F (38.0°C) or higher, or as advised  · Symptoms that get worse, or new symptoms  · Trouble breathing  · Symptoms that dont start to improve within a week, or within 3 days of taking antibiotics   Date Last Reviewed: 12/1/2016  © 8040-8099 [employer]. [address] [place] [address], [place] [address]. All rights reserved. This information is not intended as a substitute for professional medical care. Always follow your healthcare professional's instructions.         Patient

## 2020-03-23 DIAGNOSIS — Z71.89 OTHER SPECIFIED COUNSELING: ICD-10-CM

## 2020-03-24 ENCOUNTER — APPOINTMENT (OUTPATIENT)
Dept: ENDOCRINOLOGY | Facility: CLINIC | Age: 35
End: 2020-03-24

## 2020-04-02 ENCOUNTER — APPOINTMENT (OUTPATIENT)
Dept: FAMILY MEDICINE | Facility: CLINIC | Age: 35
End: 2020-04-02

## 2020-04-08 ENCOUNTER — APPOINTMENT (OUTPATIENT)
Dept: FAMILY MEDICINE | Facility: CLINIC | Age: 35
End: 2020-04-08
Payer: MEDICARE

## 2020-04-08 DIAGNOSIS — N28.89 OTHER SPECIFIED DISORDERS OF KIDNEY AND URETER: ICD-10-CM

## 2020-04-08 PROCEDURE — G2012 BRIEF CHECK IN BY MD/QHP: CPT

## 2020-04-08 PROCEDURE — 99443: CPT

## 2020-04-27 RX ORDER — POTASSIUM CHLORIDE 750 MG/1
10 CAPSULE, EXTENDED RELEASE ORAL
Qty: 30 | Refills: 5 | Status: DISCONTINUED | COMMUNITY
Start: 2019-08-29 | End: 2020-04-27

## 2020-04-27 RX ORDER — POTASSIUM CHLORIDE 1500 MG/1
20 TABLET, EXTENDED RELEASE ORAL DAILY
Qty: 90 | Refills: 0 | Status: DISCONTINUED | COMMUNITY
Start: 2019-09-26 | End: 2020-04-27

## 2020-04-28 ENCOUNTER — APPOINTMENT (OUTPATIENT)
Dept: CARDIOLOGY | Facility: CLINIC | Age: 35
End: 2020-04-28

## 2020-04-30 ENCOUNTER — APPOINTMENT (OUTPATIENT)
Dept: ENDOCRINOLOGY | Facility: CLINIC | Age: 35
End: 2020-04-30

## 2020-05-01 ENCOUNTER — APPOINTMENT (OUTPATIENT)
Dept: PULMONOLOGY | Facility: CLINIC | Age: 35
End: 2020-05-01

## 2020-05-01 PROCEDURE — G9005: CPT

## 2020-05-11 ENCOUNTER — OUTPATIENT (OUTPATIENT)
Dept: OUTPATIENT SERVICES | Facility: HOSPITAL | Age: 35
LOS: 1 days | End: 2020-05-11

## 2020-05-11 ENCOUNTER — APPOINTMENT (OUTPATIENT)
Dept: MRI IMAGING | Facility: CLINIC | Age: 35
End: 2020-05-11

## 2020-05-11 DIAGNOSIS — N28.89 OTHER SPECIFIED DISORDERS OF KIDNEY AND URETER: ICD-10-CM

## 2020-05-15 ENCOUNTER — APPOINTMENT (OUTPATIENT)
Dept: FAMILY MEDICINE | Facility: CLINIC | Age: 35
End: 2020-05-15
Payer: MEDICARE

## 2020-05-15 VITALS
WEIGHT: 283 LBS | DIASTOLIC BLOOD PRESSURE: 90 MMHG | OXYGEN SATURATION: 97 % | HEART RATE: 76 BPM | SYSTOLIC BLOOD PRESSURE: 142 MMHG | BODY MASS INDEX: 47.15 KG/M2 | TEMPERATURE: 98.2 F | HEIGHT: 65 IN

## 2020-05-15 VITALS — SYSTOLIC BLOOD PRESSURE: 132 MMHG | DIASTOLIC BLOOD PRESSURE: 80 MMHG

## 2020-05-15 DIAGNOSIS — D50.9 IRON DEFICIENCY ANEMIA, UNSPECIFIED: ICD-10-CM

## 2020-05-15 DIAGNOSIS — Z20.828 CONTACT WITH AND (SUSPECTED) EXPOSURE TO OTHER VIRAL COMMUNICABLE DISEASES: ICD-10-CM

## 2020-05-15 PROCEDURE — 36415 COLL VENOUS BLD VENIPUNCTURE: CPT

## 2020-05-15 PROCEDURE — 99495 TRANSJ CARE MGMT MOD F2F 14D: CPT | Mod: 25

## 2020-05-15 NOTE — PHYSICAL EXAM
[No Acute Distress] : no acute distress [Well Developed] : well developed [Well Nourished] : well nourished [EOMI] : extraocular movements intact [Well-Appearing] : well-appearing [Normal Outer Ear/Nose] : the outer ears and nose were normal in appearance [No JVD] : no jugular venous distention [No Accessory Muscle Use] : no accessory muscle use [No Respiratory Distress] : no respiratory distress  [Clear to Auscultation] : lungs were clear to auscultation bilaterally [Normal Rate] : normal rate  [Regular Rhythm] : with a regular rhythm [Normal S1, S2] : normal S1 and S2 [No CVA Tenderness] : no CVA  tenderness [Grossly Normal Strength/Tone] : grossly normal strength/tone [No Rash] : no rash [Coordination Grossly Intact] : coordination grossly intact [No Focal Deficits] : no focal deficits [Normal Gait] : normal gait [Normal Mood] : the mood was normal [Normal Affect] : the affect was normal [Normal Insight/Judgement] : insight and judgment were intact [de-identified] : obese abdomen

## 2020-05-15 NOTE — ASSESSMENT
[FreeTextEntry1] : Cont'd f/u c Pscyhiatrist Dr. Park.  Last f/u 5/8/20. cont f/u once  a month \par \par see lab orders

## 2020-05-15 NOTE — HISTORY OF PRESENT ILLNESS
[Admitted on: ___] : The patient was admitted on [unfilled] [Discharged on ___] : discharged on [unfilled] [FreeTextEntry2] : 36 yo male presents to office s/p [facility] hospital admission on 4/24/20 for CPEP (Psychiatric ER) and then transferred to "[place]"  [facility] 5/1/20-5/6/20.   pt admitted secondary to disorientation, increased anxiety, poor compliance c meds.   Pt was not consistent c Psych meds thus triggering aforementioned symptoms.    Psych Aide "Zenon" called [facility] Psychiatric ED and had pt transported  via [facility].    \par \par \par pt denies f/c/s denies cough, SOB, and/or chest tightness no loose stools. intact sense of smell/taste \par \par **pt currently at baseline**  no CP no dizziness no palpitations no N/V.

## 2020-05-20 LAB
ALBUMIN SERPL ELPH-MCNC: 3.4 G/DL
ALP BLD-CCNC: 111 U/L
ALT SERPL-CCNC: 16 U/L
ANION GAP SERPL CALC-SCNC: 16 MMOL/L
APPEARANCE: CLEAR
AST SERPL-CCNC: 10 U/L
BACTERIA: NEGATIVE
BASOPHILS # BLD AUTO: 0.07 K/UL
BASOPHILS NFR BLD AUTO: 0.5 %
BILIRUB SERPL-MCNC: 0.2 MG/DL
BILIRUBIN URINE: NEGATIVE
BLOOD URINE: NORMAL
BUN SERPL-MCNC: 41 MG/DL
CALCIUM SERPL-MCNC: 9.1 MG/DL
CHLORIDE SERPL-SCNC: 99 MMOL/L
CHOLEST SERPL-MCNC: 178 MG/DL
CHOLEST/HDLC SERPL: 3.8 RATIO
CO2 SERPL-SCNC: 26 MMOL/L
COLOR: COLORLESS
CREAT SERPL-MCNC: 3.93 MG/DL
EOSINOPHIL # BLD AUTO: 0.65 K/UL
EOSINOPHIL NFR BLD AUTO: 4.4 %
ESTIMATED AVERAGE GLUCOSE: 367 MG/DL
FERRITIN SERPL-MCNC: 138 NG/ML
FOLATE SERPL-MCNC: 5.1 NG/ML
GLUCOSE QUALITATIVE U: ABNORMAL
GLUCOSE SERPL-MCNC: 280 MG/DL
HBA1C MFR BLD HPLC: 14.4 %
HCT VFR BLD CALC: 35.8 %
HDLC SERPL-MCNC: 47 MG/DL
HGB BLD-MCNC: 11 G/DL
HYALINE CASTS: 0 /LPF
IMM GRANULOCYTES NFR BLD AUTO: 0.4 %
IRON SATN MFR SERPL: 10 %
IRON SERPL-MCNC: 27 UG/DL
KETONES URINE: NEGATIVE
LDLC SERPL CALC-MCNC: 107 MG/DL
LEUKOCYTE ESTERASE URINE: NEGATIVE
LYMPHOCYTES # BLD AUTO: 2.86 K/UL
LYMPHOCYTES NFR BLD AUTO: 19.3 %
MAN DIFF?: NORMAL
MCHC RBC-ENTMCNC: 23.8 PG
MCHC RBC-ENTMCNC: 30.7 GM/DL
MCV RBC AUTO: 77.5 FL
MICROSCOPIC-UA: NORMAL
MONOCYTES # BLD AUTO: 0.86 K/UL
MONOCYTES NFR BLD AUTO: 5.8 %
NEUTROPHILS # BLD AUTO: 10.34 K/UL
NEUTROPHILS NFR BLD AUTO: 69.6 %
NITRITE URINE: NEGATIVE
PH URINE: 6.5
PLATELET # BLD AUTO: 363 K/UL
POTASSIUM SERPL-SCNC: 3.7 MMOL/L
PROT SERPL-MCNC: 7.1 G/DL
PROTEIN URINE: ABNORMAL
RBC # BLD: 4.62 M/UL
RBC # FLD: 16.8 %
RED BLOOD CELLS URINE: 0 /HPF
SARS-COV-2 IGG SERPL IA-ACNC: <0.1 INDEX
SARS-COV-2 IGG SERPL QL IA: NEGATIVE
SODIUM SERPL-SCNC: 140 MMOL/L
SPECIFIC GRAVITY URINE: 1.01
SQUAMOUS EPITHELIAL CELLS: 1 /HPF
TIBC SERPL-MCNC: 254 UG/DL
TRANSFERRIN SERPL-MCNC: 204 MG/DL
TRIGL SERPL-MCNC: 121 MG/DL
UIBC SERPL-MCNC: 227 UG/DL
UROBILINOGEN URINE: NORMAL
VIT B12 SERPL-MCNC: 830 PG/ML
WBC # FLD AUTO: 14.84 K/UL
WHITE BLOOD CELLS URINE: 1 /HPF

## 2020-06-05 LAB
BASOPHILS # BLD AUTO: 0.09 K/UL
BASOPHILS NFR BLD AUTO: 0.5 %
EOSINOPHIL # BLD AUTO: 0.63 K/UL
EOSINOPHIL NFR BLD AUTO: 3.7 %
HCT VFR BLD CALC: 34.6 %
HGB BLD-MCNC: 11.2 G/DL
IMM GRANULOCYTES NFR BLD AUTO: 0.7 %
LYMPHOCYTES # BLD AUTO: 3.14 K/UL
LYMPHOCYTES NFR BLD AUTO: 18.5 %
MAN DIFF?: NORMAL
MCHC RBC-ENTMCNC: 23.6 PG
MCHC RBC-ENTMCNC: 32.4 GM/DL
MCV RBC AUTO: 72.8 FL
MONOCYTES # BLD AUTO: 0.92 K/UL
MONOCYTES NFR BLD AUTO: 5.4 %
NEUTROPHILS # BLD AUTO: 12.03 K/UL
NEUTROPHILS NFR BLD AUTO: 71.2 %
PLATELET # BLD AUTO: 411 K/UL
RBC # BLD: 4.75 M/UL
RBC # FLD: 16 %
WBC # FLD AUTO: 16.93 K/UL

## 2020-06-09 LAB
ALBUMIN SERPL ELPH-MCNC: 3.1 G/DL
ALP BLD-CCNC: 123 U/L
ALT SERPL-CCNC: 14 U/L
ANION GAP SERPL CALC-SCNC: 14 MMOL/L
AST SERPL-CCNC: 12 U/L
BILIRUB SERPL-MCNC: 0.2 MG/DL
BUN SERPL-MCNC: 39 MG/DL
CALCIUM SERPL-MCNC: 9.1 MG/DL
CHLORIDE SERPL-SCNC: 93 MMOL/L
CO2 SERPL-SCNC: 26 MMOL/L
CREAT SERPL-MCNC: 4.18 MG/DL
GLUCOSE SERPL-MCNC: 460 MG/DL
POTASSIUM SERPL-SCNC: 3.3 MMOL/L
PROT SERPL-MCNC: 6.6 G/DL
SODIUM SERPL-SCNC: 134 MMOL/L

## 2020-06-26 ENCOUNTER — APPOINTMENT (OUTPATIENT)
Dept: FAMILY MEDICINE | Facility: CLINIC | Age: 35
End: 2020-06-26
Payer: MEDICARE

## 2020-06-26 VITALS
HEART RATE: 86 BPM | SYSTOLIC BLOOD PRESSURE: 140 MMHG | OXYGEN SATURATION: 97 % | BODY MASS INDEX: 48.32 KG/M2 | HEIGHT: 65 IN | WEIGHT: 290 LBS | DIASTOLIC BLOOD PRESSURE: 94 MMHG | TEMPERATURE: 98.3 F

## 2020-06-26 VITALS — SYSTOLIC BLOOD PRESSURE: 128 MMHG | DIASTOLIC BLOOD PRESSURE: 80 MMHG

## 2020-06-26 DIAGNOSIS — E66.01 MORBID (SEVERE) OBESITY DUE TO EXCESS CALORIES: ICD-10-CM

## 2020-06-26 PROCEDURE — 99214 OFFICE O/P EST MOD 30 MIN: CPT

## 2020-06-26 NOTE — HISTORY OF PRESENT ILLNESS
[FreeTextEntry1] : pt. f/u with elevated wbc/ Elevated glucose  [de-identified] : 34 yo male presents for eval secondary to elevated WBC count and elevated glucose level.   pt is s/p Nephro consult c Dr. Fink.  pt \par c ESRD will need HD likely by years end.  Referred to vascular for AV fistula consult.  \par \par Pt admits to intermittent fatigue.  Denies CP/SOB denies dizziness/palpitations at this time \par \par pt states home glucose reading this am >400, gave himself 12U of Humalog  and had NOT eaten at all today

## 2020-06-26 NOTE — PHYSICAL EXAM
[No Acute Distress] : no acute distress [Well Developed] : well developed [Well Nourished] : well nourished [EOMI] : extraocular movements intact [Normal Outer Ear/Nose] : the outer ears and nose were normal in appearance [Well-Appearing] : well-appearing [Normal Rate] : normal rate  [Clear to Auscultation] : lungs were clear to auscultation bilaterally [No Respiratory Distress] : no respiratory distress  [No Accessory Muscle Use] : no accessory muscle use [Normal S1, S2] : normal S1 and S2 [No CVA Tenderness] : no CVA  tenderness [Regular Rhythm] : with a regular rhythm [Speech Grossly Normal] : speech grossly normal [Normal Gait] : normal gait [Grossly Normal Strength/Tone] : grossly normal strength/tone [Normal Affect] : the affect was normal [Normal Mood] : the mood was normal [de-identified] : obese abdomen  [de-identified] : hypopigmented circular macular lesions c scaly borders R UE

## 2020-06-26 NOTE — ASSESSMENT
[FreeTextEntry1] : Glucose stick=433 despite 12U of Humalog this am 8:30 and did NOT eat at all today  \par \par \par \par Cardiology f/u scheduled 6/29/20  [facility] [place] \par cont'd f/u c Nephrology Dr. Fink secondary to ESRD,  pt awaiting vascular consult for AV fistula placement and future HD (likely end of year) \par \par Haem/Onc consult to be scheduled  secondary to chronic leukocytosis \par \par f/u 1M

## 2020-06-29 ENCOUNTER — APPOINTMENT (OUTPATIENT)
Dept: CARDIOLOGY | Facility: CLINIC | Age: 35
End: 2020-06-29
Payer: MEDICARE

## 2020-06-29 ENCOUNTER — NON-APPOINTMENT (OUTPATIENT)
Age: 35
End: 2020-06-29

## 2020-06-29 VITALS
SYSTOLIC BLOOD PRESSURE: 184 MMHG | WEIGHT: 284 LBS | HEART RATE: 80 BPM | DIASTOLIC BLOOD PRESSURE: 114 MMHG | HEIGHT: 65 IN | BODY MASS INDEX: 47.32 KG/M2 | OXYGEN SATURATION: 96 %

## 2020-06-29 VITALS — DIASTOLIC BLOOD PRESSURE: 93 MMHG | SYSTOLIC BLOOD PRESSURE: 146 MMHG

## 2020-06-29 PROCEDURE — 99214 OFFICE O/P EST MOD 30 MIN: CPT

## 2020-06-29 PROCEDURE — 93000 ELECTROCARDIOGRAM COMPLETE: CPT

## 2020-06-29 RX ORDER — INSULIN GLARGINE 100 [IU]/ML
100 INJECTION, SOLUTION SUBCUTANEOUS
Qty: 5 | Refills: 0 | Status: DISCONTINUED | COMMUNITY
Start: 2019-03-04 | End: 2020-06-29

## 2020-06-29 RX ORDER — HYDRALAZINE HYDROCHLORIDE 50 MG/1
50 TABLET ORAL
Qty: 90 | Refills: 0 | Status: DISCONTINUED | COMMUNITY
Start: 2020-05-15 | End: 2020-06-29

## 2020-06-29 RX ORDER — QUETIAPINE FUMARATE 100 MG/1
100 TABLET ORAL
Qty: 30 | Refills: 0 | Status: DISCONTINUED | COMMUNITY
Start: 2019-01-18 | End: 2020-06-29

## 2020-06-29 RX ORDER — ASPIRIN 81 MG
81 TABLET, DELAYED RELEASE (ENTERIC COATED) ORAL DAILY
Refills: 0 | Status: DISCONTINUED | COMMUNITY
End: 2020-06-29

## 2020-06-29 RX ORDER — INSULIN ASPART 100 [IU]/ML
100 INJECTION, SOLUTION INTRAVENOUS; SUBCUTANEOUS
Qty: 3 | Refills: 0 | Status: DISCONTINUED | COMMUNITY
Start: 2020-02-28 | End: 2020-06-29

## 2020-06-29 RX ORDER — TRAZODONE HYDROCHLORIDE 100 MG/1
100 TABLET ORAL
Refills: 0 | Status: ACTIVE | COMMUNITY
Start: 2020-06-29

## 2020-06-29 RX ORDER — INSULIN ASPART 100 [IU]/ML
100 INJECTION, SOLUTION INTRAVENOUS; SUBCUTANEOUS
Qty: 3 | Refills: 0 | Status: DISCONTINUED | COMMUNITY
Start: 2019-07-12 | End: 2020-06-29

## 2020-06-29 NOTE — PHYSICAL EXAM
[General Appearance - Well Developed] : well developed [Normal Appearance] : normal appearance [Well Groomed] : well groomed [General Appearance - In No Acute Distress] : no acute distress [General Appearance - Well Nourished] : well nourished [No Deformities] : no deformities [Normal Conjunctiva] : the conjunctiva exhibited no abnormalities [Eyelids - No Xanthelasma] : the eyelids demonstrated no xanthelasmas [Normal Oral Mucosa] : normal oral mucosa [No Oral Pallor] : no oral pallor [Normal Jugular Venous A Waves Present] : normal jugular venous A waves present [No Oral Cyanosis] : no oral cyanosis [Heart Rate And Rhythm] : heart rate and rhythm were normal [Normal Jugular Venous V Waves Present] : normal jugular venous V waves present [No Jugular Venous Priest A Waves] : no jugular venous priest A waves [Heart Sounds] : normal S1 and S2 [Murmurs] : no murmurs present [Auscultation Breath Sounds / Voice Sounds] : lungs were clear to auscultation bilaterally [Exaggerated Use Of Accessory Muscles For Inspiration] : no accessory muscle use [Respiration, Rhythm And Depth] : normal respiratory rhythm and effort [Abdomen Mass (___ Cm)] : no abdominal mass palpated [Abdomen Tenderness] : non-tender [Abdomen Soft] : soft [Abnormal Walk] : normal gait [Gait - Sufficient For Exercise Testing] : the gait was sufficient for exercise testing [Cyanosis, Localized] : no localized cyanosis [Nail Clubbing] : no clubbing of the fingernails [Petechial Hemorrhages (___cm)] : no petechial hemorrhages [Skin Color & Pigmentation] : normal skin color and pigmentation [] : no rash [No Xanthoma] : no  xanthoma was observed [No Venous Stasis] : no venous stasis [Skin Lesions] : no skin lesions [No Skin Ulcers] : no skin ulcer [Affect] : the affect was normal [Mood] : the mood was normal [Oriented To Time, Place, And Person] : oriented to person, place, and time [No Anxiety] : not feeling anxious

## 2020-06-29 NOTE — HISTORY OF PRESENT ILLNESS
[FreeTextEntry1] : \par HTN\par Pt was hospitalized for uncontrolled HTN. He is compliant with medicants. He has CKD 4 and will need HD likely by the end of this year. Pt is referred to vascular surgery for AV fistula. However, he hasn't gone yet.Denied CP,. Admits COOPER thinking it is due to asthma and mild. Denied fever, chill,s, orthopnea, and, edema, palpitation, syncope, near syncope. \par \par FUNCTIONAL CAPACITY\par Reports <4.0 METS\par \par CHRONIC STABLE CONDITIONS\par Schizoaffective Disorder\par ARVIND\par DM 2\par HTN\par CKD 4\par Morbid obesity\par Seizure\par \par

## 2020-06-30 ENCOUNTER — RESULT CHARGE (OUTPATIENT)
Age: 35
End: 2020-06-30

## 2020-07-22 ENCOUNTER — INPATIENT (INPATIENT)
Facility: HOSPITAL | Age: 35
LOS: 3 days | Discharge: HOME | DRG: 304 | End: 2020-07-26
Attending: INTERNAL MEDICINE | Admitting: INTERNAL MEDICINE
Payer: COMMERCIAL

## 2020-07-22 VITALS
TEMPERATURE: 99 F | DIASTOLIC BLOOD PRESSURE: 103 MMHG | OXYGEN SATURATION: 96 % | SYSTOLIC BLOOD PRESSURE: 179 MMHG | HEART RATE: 94 BPM | RESPIRATION RATE: 19 BRPM

## 2020-07-22 DIAGNOSIS — I16.1 HYPERTENSIVE EMERGENCY: ICD-10-CM

## 2020-07-22 LAB
ACETONE SERPL-MCNC: NEGATIVE — SIGNIFICANT CHANGE UP
ALBUMIN SERPL ELPH-MCNC: 3.2 G/DL — LOW (ref 3.3–5.2)
ALP SERPL-CCNC: 123 U/L — HIGH (ref 40–120)
ALT FLD-CCNC: 16 U/L — SIGNIFICANT CHANGE UP
ANION GAP SERPL CALC-SCNC: 16 MMOL/L — SIGNIFICANT CHANGE UP (ref 5–17)
ANISOCYTOSIS BLD QL: SLIGHT — SIGNIFICANT CHANGE UP
APPEARANCE UR: CLEAR — SIGNIFICANT CHANGE UP
APTT BLD: 28.6 SEC — SIGNIFICANT CHANGE UP (ref 27.5–35.5)
AST SERPL-CCNC: 14 U/L — SIGNIFICANT CHANGE UP
BASE EXCESS BLDV CALC-SCNC: 3.5 MMOL/L — HIGH (ref -2–2)
BASOPHILS # BLD AUTO: 0.06 K/UL — SIGNIFICANT CHANGE UP (ref 0–0.2)
BASOPHILS NFR BLD AUTO: 0.4 % — SIGNIFICANT CHANGE UP (ref 0–2)
BILIRUB SERPL-MCNC: <0.2 MG/DL — LOW (ref 0.4–2)
BILIRUB UR-MCNC: NEGATIVE — SIGNIFICANT CHANGE UP
BUN SERPL-MCNC: 38 MG/DL — HIGH (ref 8–20)
CALCIUM SERPL-MCNC: 8.9 MG/DL — SIGNIFICANT CHANGE UP (ref 8.6–10.2)
CHLORIDE SERPL-SCNC: 90 MMOL/L — LOW (ref 98–107)
CO2 SERPL-SCNC: 25 MMOL/L — SIGNIFICANT CHANGE UP (ref 22–29)
COLOR SPEC: YELLOW — SIGNIFICANT CHANGE UP
CREAT SERPL-MCNC: 4.4 MG/DL — HIGH (ref 0.5–1.3)
DIFF PNL FLD: ABNORMAL
EOSINOPHIL # BLD AUTO: 0.02 K/UL — SIGNIFICANT CHANGE UP (ref 0–0.5)
EOSINOPHIL NFR BLD AUTO: 0.1 % — SIGNIFICANT CHANGE UP (ref 0–6)
EPI CELLS # UR: SIGNIFICANT CHANGE UP
GAS PNL BLDV: SIGNIFICANT CHANGE UP
GLUCOSE BLDC GLUCOMTR-MCNC: 346 MG/DL — HIGH (ref 70–99)
GLUCOSE BLDC GLUCOMTR-MCNC: 351 MG/DL — HIGH (ref 70–99)
GLUCOSE BLDC GLUCOMTR-MCNC: 385 MG/DL — HIGH (ref 70–99)
GLUCOSE SERPL-MCNC: 508 MG/DL — CRITICAL HIGH (ref 70–99)
GLUCOSE UR QL: 1000 MG/DL
HCO3 BLDV-SCNC: 27 MMOL/L — HIGH (ref 20–26)
HCT VFR BLD CALC: 34.1 % — LOW (ref 39–50)
HGB BLD-MCNC: 11.4 G/DL — LOW (ref 13–17)
IMM GRANULOCYTES NFR BLD AUTO: 0.4 % — SIGNIFICANT CHANGE UP (ref 0–1.5)
INR BLD: 1.04 RATIO — SIGNIFICANT CHANGE UP (ref 0.88–1.16)
KETONES UR-MCNC: NEGATIVE — SIGNIFICANT CHANGE UP
LACTATE BLDV-MCNC: 1.1 MMOL/L — SIGNIFICANT CHANGE UP (ref 0.5–2)
LEUKOCYTE ESTERASE UR-ACNC: NEGATIVE — SIGNIFICANT CHANGE UP
LIDOCAIN IGE QN: 83 U/L — HIGH (ref 22–51)
LYMPHOCYTES # BLD AUTO: 18.6 % — SIGNIFICANT CHANGE UP (ref 13–44)
LYMPHOCYTES # BLD AUTO: 2.7 K/UL — SIGNIFICANT CHANGE UP (ref 1–3.3)
MANUAL SMEAR VERIFICATION: SIGNIFICANT CHANGE UP
MCHC RBC-ENTMCNC: 23.9 PG — LOW (ref 27–34)
MCHC RBC-ENTMCNC: 33.4 GM/DL — SIGNIFICANT CHANGE UP (ref 32–36)
MCV RBC AUTO: 71.5 FL — LOW (ref 80–100)
MICROCYTES BLD QL: SLIGHT — SIGNIFICANT CHANGE UP
MONOCYTES # BLD AUTO: 0.76 K/UL — SIGNIFICANT CHANGE UP (ref 0–0.9)
MONOCYTES NFR BLD AUTO: 5.2 % — SIGNIFICANT CHANGE UP (ref 2–14)
NEUTROPHILS # BLD AUTO: 10.94 K/UL — HIGH (ref 1.8–7.4)
NEUTROPHILS NFR BLD AUTO: 75.3 % — SIGNIFICANT CHANGE UP (ref 43–77)
NITRITE UR-MCNC: NEGATIVE — SIGNIFICANT CHANGE UP
NT-PROBNP SERPL-SCNC: 727 PG/ML — HIGH (ref 0–300)
OSMOLALITY UR: 231 MOSM/KG — LOW (ref 300–1000)
OVALOCYTES BLD QL SMEAR: SLIGHT — SIGNIFICANT CHANGE UP
PCO2 BLDV: 47 MMHG — SIGNIFICANT CHANGE UP (ref 35–50)
PH BLDV: 7.4 — SIGNIFICANT CHANGE UP (ref 7.32–7.43)
PH UR: 7 — SIGNIFICANT CHANGE UP (ref 5–8)
PLAT MORPH BLD: NORMAL — SIGNIFICANT CHANGE UP
PLATELET # BLD AUTO: 377 K/UL — SIGNIFICANT CHANGE UP (ref 150–400)
PO2 BLDV: 63 MMHG — HIGH (ref 25–45)
POIKILOCYTOSIS BLD QL AUTO: SLIGHT — SIGNIFICANT CHANGE UP
POLYCHROMASIA BLD QL SMEAR: SLIGHT — SIGNIFICANT CHANGE UP
POTASSIUM SERPL-MCNC: 2.9 MMOL/L — CRITICAL LOW (ref 3.5–5.3)
POTASSIUM SERPL-SCNC: 2.9 MMOL/L — CRITICAL LOW (ref 3.5–5.3)
PROT SERPL-MCNC: 7.4 G/DL — SIGNIFICANT CHANGE UP (ref 6.6–8.7)
PROT UR-MCNC: 100 MG/DL
PROTHROM AB SERPL-ACNC: 12 SEC — SIGNIFICANT CHANGE UP (ref 10.6–13.6)
RBC # BLD: 4.77 M/UL — SIGNIFICANT CHANGE UP (ref 4.2–5.8)
RBC # FLD: 16.2 % — HIGH (ref 10.3–14.5)
RBC BLD AUTO: ABNORMAL
RBC CASTS # UR COMP ASSIST: SIGNIFICANT CHANGE UP /HPF (ref 0–4)
SAO2 % BLDV: 92 % — SIGNIFICANT CHANGE UP
SARS-COV-2 RNA SPEC QL NAA+PROBE: SIGNIFICANT CHANGE UP
SODIUM SERPL-SCNC: 130 MMOL/L — LOW (ref 135–145)
SODIUM UR-SCNC: 48 MMOL/L — SIGNIFICANT CHANGE UP
SP GR SPEC: 1 — LOW (ref 1.01–1.02)
TROPONIN T SERPL-MCNC: 0.05 NG/ML — SIGNIFICANT CHANGE UP (ref 0–0.06)
UROBILINOGEN FLD QL: NEGATIVE MG/DL — SIGNIFICANT CHANGE UP
WBC # BLD: 14.54 K/UL — HIGH (ref 3.8–10.5)
WBC # FLD AUTO: 14.54 K/UL — HIGH (ref 3.8–10.5)
WBC UR QL: NEGATIVE — SIGNIFICANT CHANGE UP

## 2020-07-22 PROCEDURE — 99223 1ST HOSP IP/OBS HIGH 75: CPT

## 2020-07-22 PROCEDURE — 70498 CT ANGIOGRAPHY NECK: CPT | Mod: 26

## 2020-07-22 PROCEDURE — 71045 X-RAY EXAM CHEST 1 VIEW: CPT | Mod: 26

## 2020-07-22 PROCEDURE — 0042T: CPT

## 2020-07-22 PROCEDURE — 70496 CT ANGIOGRAPHY HEAD: CPT | Mod: 26

## 2020-07-22 PROCEDURE — 99291 CRITICAL CARE FIRST HOUR: CPT

## 2020-07-22 PROCEDURE — 70450 CT HEAD/BRAIN W/O DYE: CPT | Mod: 26,59

## 2020-07-22 PROCEDURE — 70551 MRI BRAIN STEM W/O DYE: CPT | Mod: 26

## 2020-07-22 PROCEDURE — 93010 ELECTROCARDIOGRAM REPORT: CPT

## 2020-07-22 PROCEDURE — 99292 CRITICAL CARE ADDL 30 MIN: CPT

## 2020-07-22 RX ORDER — POTASSIUM CHLORIDE 20 MEQ
10 PACKET (EA) ORAL
Refills: 0 | Status: COMPLETED | OUTPATIENT
Start: 2020-07-22 | End: 2020-07-22

## 2020-07-22 RX ORDER — AMLODIPINE BESYLATE 2.5 MG/1
10 TABLET ORAL DAILY
Refills: 0 | Status: DISCONTINUED | OUTPATIENT
Start: 2020-07-22 | End: 2020-07-26

## 2020-07-22 RX ORDER — LABETALOL HCL 100 MG
20 TABLET ORAL ONCE
Refills: 0 | Status: COMPLETED | OUTPATIENT
Start: 2020-07-22 | End: 2020-07-22

## 2020-07-22 RX ORDER — POTASSIUM CHLORIDE 20 MEQ
40 PACKET (EA) ORAL
Refills: 0 | Status: DISCONTINUED | OUTPATIENT
Start: 2020-07-22 | End: 2020-07-22

## 2020-07-22 RX ORDER — SODIUM CHLORIDE 9 MG/ML
1000 INJECTION INTRAMUSCULAR; INTRAVENOUS; SUBCUTANEOUS ONCE
Refills: 0 | Status: DISCONTINUED | OUTPATIENT
Start: 2020-07-22 | End: 2020-07-22

## 2020-07-22 RX ORDER — CALCITRIOL 0.5 UG/1
0.25 CAPSULE ORAL DAILY
Refills: 0 | Status: DISCONTINUED | OUTPATIENT
Start: 2020-07-22 | End: 2020-07-26

## 2020-07-22 RX ORDER — SODIUM CHLORIDE 9 MG/ML
1000 INJECTION, SOLUTION INTRAVENOUS
Refills: 0 | Status: DISCONTINUED | OUTPATIENT
Start: 2020-07-22 | End: 2020-07-23

## 2020-07-22 RX ORDER — HYDRALAZINE HCL 50 MG
10 TABLET ORAL ONCE
Refills: 0 | Status: COMPLETED | OUTPATIENT
Start: 2020-07-22 | End: 2020-07-22

## 2020-07-22 RX ORDER — HYDRALAZINE HCL 50 MG
100 TABLET ORAL EVERY 8 HOURS
Refills: 0 | Status: DISCONTINUED | OUTPATIENT
Start: 2020-07-22 | End: 2020-07-26

## 2020-07-22 RX ORDER — POTASSIUM CHLORIDE 20 MEQ
40 PACKET (EA) ORAL ONCE
Refills: 0 | Status: COMPLETED | OUTPATIENT
Start: 2020-07-22 | End: 2020-07-22

## 2020-07-22 RX ORDER — INSULIN HUMAN 100 [IU]/ML
6 INJECTION, SOLUTION SUBCUTANEOUS
Qty: 100 | Refills: 0 | Status: DISCONTINUED | OUTPATIENT
Start: 2020-07-22 | End: 2020-07-23

## 2020-07-22 RX ORDER — LAMOTRIGINE 25 MG/1
50 TABLET, ORALLY DISINTEGRATING ORAL
Refills: 0 | Status: DISCONTINUED | OUTPATIENT
Start: 2020-07-22 | End: 2020-07-26

## 2020-07-22 RX ORDER — NICARDIPINE HYDROCHLORIDE 30 MG/1
5 CAPSULE, EXTENDED RELEASE ORAL
Qty: 40 | Refills: 0 | Status: DISCONTINUED | OUTPATIENT
Start: 2020-07-22 | End: 2020-07-23

## 2020-07-22 RX ORDER — CHLORHEXIDINE GLUCONATE 213 G/1000ML
1 SOLUTION TOPICAL
Refills: 0 | Status: DISCONTINUED | OUTPATIENT
Start: 2020-07-22 | End: 2020-07-26

## 2020-07-22 RX ORDER — CARVEDILOL PHOSPHATE 80 MG/1
12.5 CAPSULE, EXTENDED RELEASE ORAL EVERY 12 HOURS
Refills: 0 | Status: DISCONTINUED | OUTPATIENT
Start: 2020-07-22 | End: 2020-07-26

## 2020-07-22 RX ORDER — ASPIRIN/CALCIUM CARB/MAGNESIUM 324 MG
81 TABLET ORAL DAILY
Refills: 0 | Status: DISCONTINUED | OUTPATIENT
Start: 2020-07-22 | End: 2020-07-26

## 2020-07-22 RX ORDER — BENZTROPINE MESYLATE 1 MG
0.5 TABLET ORAL
Refills: 0 | Status: DISCONTINUED | OUTPATIENT
Start: 2020-07-22 | End: 2020-07-26

## 2020-07-22 RX ORDER — SODIUM CHLORIDE 9 MG/ML
1000 INJECTION INTRAMUSCULAR; INTRAVENOUS; SUBCUTANEOUS ONCE
Refills: 0 | Status: COMPLETED | OUTPATIENT
Start: 2020-07-22 | End: 2020-07-22

## 2020-07-22 RX ORDER — DEXTROSE MONOHYDRATE, SODIUM CHLORIDE, AND POTASSIUM CHLORIDE 50; .745; 4.5 G/1000ML; G/1000ML; G/1000ML
1000 INJECTION, SOLUTION INTRAVENOUS
Refills: 0 | Status: DISCONTINUED | OUTPATIENT
Start: 2020-07-22 | End: 2020-07-22

## 2020-07-22 RX ORDER — HEPARIN SODIUM 5000 [USP'U]/ML
5000 INJECTION INTRAVENOUS; SUBCUTANEOUS EVERY 8 HOURS
Refills: 0 | Status: DISCONTINUED | OUTPATIENT
Start: 2020-07-22 | End: 2020-07-26

## 2020-07-22 RX ORDER — NICARDIPINE HYDROCHLORIDE 30 MG/1
5 CAPSULE, EXTENDED RELEASE ORAL
Qty: 40 | Refills: 0 | Status: DISCONTINUED | OUTPATIENT
Start: 2020-07-22 | End: 2020-07-22

## 2020-07-22 RX ADMIN — Medication 10 MILLIGRAM(S): at 17:04

## 2020-07-22 RX ADMIN — HEPARIN SODIUM 5000 UNIT(S): 5000 INJECTION INTRAVENOUS; SUBCUTANEOUS at 21:20

## 2020-07-22 RX ADMIN — Medication 100 MILLIEQUIVALENT(S): at 17:47

## 2020-07-22 RX ADMIN — Medication 10 MILLIGRAM(S): at 16:42

## 2020-07-22 RX ADMIN — Medication 10 MILLIEQUIVALENT(S): at 17:51

## 2020-07-22 RX ADMIN — Medication 20 MILLIGRAM(S): at 16:30

## 2020-07-22 RX ADMIN — DEXTROSE MONOHYDRATE, SODIUM CHLORIDE, AND POTASSIUM CHLORIDE 100 MILLILITER(S): 50; .745; 4.5 INJECTION, SOLUTION INTRAVENOUS at 17:44

## 2020-07-22 RX ADMIN — Medication 100 MILLIEQUIVALENT(S): at 16:41

## 2020-07-22 RX ADMIN — Medication 40 MILLIEQUIVALENT(S): at 20:51

## 2020-07-22 RX ADMIN — SODIUM CHLORIDE 1000 MILLILITER(S): 9 INJECTION INTRAMUSCULAR; INTRAVENOUS; SUBCUTANEOUS at 18:26

## 2020-07-22 RX ADMIN — Medication 100 MILLIEQUIVALENT(S): at 19:23

## 2020-07-22 RX ADMIN — Medication 10 MILLIEQUIVALENT(S): at 20:43

## 2020-07-22 RX ADMIN — NICARDIPINE HYDROCHLORIDE 25 MG/HR: 30 CAPSULE, EXTENDED RELEASE ORAL at 17:13

## 2020-07-22 RX ADMIN — INSULIN HUMAN 6 UNIT(S)/HR: 100 INJECTION, SOLUTION SUBCUTANEOUS at 20:47

## 2020-07-22 RX ADMIN — SODIUM CHLORIDE 1000 MILLILITER(S): 9 INJECTION INTRAMUSCULAR; INTRAVENOUS; SUBCUTANEOUS at 16:43

## 2020-07-22 RX ADMIN — Medication 100 MILLIGRAM(S): at 21:20

## 2020-07-22 RX ADMIN — Medication 0.2 MILLIGRAM(S): at 21:20

## 2020-07-22 RX ADMIN — DEXTROSE MONOHYDRATE, SODIUM CHLORIDE, AND POTASSIUM CHLORIDE 1000 MILLILITER(S): 50; .745; 4.5 INJECTION, SOLUTION INTRAVENOUS at 19:40

## 2020-07-22 RX ADMIN — SODIUM CHLORIDE 75 MILLILITER(S): 9 INJECTION, SOLUTION INTRAVENOUS at 19:49

## 2020-07-22 NOTE — ED ADULT NURSE NOTE - NSIMPLEMENTINTERV_GEN_ALL_ED
Implemented All Fall Risk Interventions:  [place] to call system. Call bell, personal items and telephone within reach. Instruct patient to call for assistance. Room bathroom lighting operational. Non-slip footwear when patient is off stretcher. Physically safe environment: no spills, clutter or unnecessary equipment. Stretcher in lowest position, wheels locked, appropriate side rails in place. Provide visual cue, wrist band, yellow gown, etc. Monitor gait and stability. Monitor for mental status changes and reorient to person, place, and time. Review medications for side effects contributing to fall risk. Reinforce activity limits and safety measures with patient and family.
[Follow-Up] : a follow-up visit

## 2020-07-22 NOTE — ED PROVIDER NOTE - OBJECTIVE STATEMENT
Pt is a 35 y.o. M hx of HTN, DM, CKD 3, bipolar, presenting with AMS for several days. The pt was brought in, unable to speak. No acute distress. Following simple commands. Pt is a 35 y.o. M hx of HTN, DM, CKD 3, bipolar, presenting with AMS for four days. The pt was brought in, unable to speak. No acute distress. Following simple commands. Pt is a 35 y.o. M hx of HTN, IDDM, CKD 3, bipolar, presenting with AMS for four days. Hx is provided by EMS, as the pt is unable to provide any elements of the Hx. The pt is slow to respond and unable to speak. No acute distress. As per EMS, pt is known to have poor compliance with his medications.

## 2020-07-22 NOTE — H&P ADULT - NSHPPHYSICALEXAM_GEN_ALL_CORE
Awake and alert, slow to answer questions but seems to answer appropriately   NAD  Large neck  reg rhythm  lungs clear to auscultation  abd obese, nontender  trace edema  No rico present

## 2020-07-22 NOTE — H&P ADULT - HISTORY OF PRESENT ILLNESS
36 yo male with hx of morbid obesity, HTN, DM, HLD, CKD, ARVIND not on CPAP, bipolar disorder, presented to the emergency department with altered mental status and aphasia.  Patient underwent CT/CTA/perfusion scan which did not reveal any acute stroke.  He was also noted to have severe hypertension with SBP in 240s.  His blood pressure was controlled with a nicardipine infusion and his mental status and aphasia gradually improved.  Patient reports missing doses of his medications over the past few days.

## 2020-07-22 NOTE — ED PROVIDER NOTE - CARE PLAN
Principal Discharge DX:	Hypertensive emergency  Secondary Diagnosis:	Hypokalemia  Secondary Diagnosis:	Hyperglycemia

## 2020-07-22 NOTE — ED PROVIDER NOTE - PROGRESS NOTE DETAILS
Marlee Wang, Resident: Pt's BP: 242/141, , giving labetalol. Goal MAP: 132. Marlee Wang, Resident: BP improved  to 183/104, priority CT head called. Other workup pending. Marlee Wang, Resident: BP stable at 187/104, hydralazine ordered. Pt in NAD, remains nonverbal. Hypokalemia and hyperglycemia noted, replenishing potassium and giving IVF. Marlee Wang, Resident: JAVIER 160, cardene gtt started. Electrolytes being replaced, consulted MICU. Miri: I just spoke with the patient's brother, who provides a significantly different hx than that obtained from EMS. They state that they saw the pt normal this morning, at approximately 11 am. he was walking and speaking at that time. They then found him to be acute altered afterwards. Pt re-examined, his has a complete expressive aphasia still. He moves his extremities equally. NIH 7. Called and spoke with neuro-intervention Dr. Thornton. She advises to proceed with CTA head/neck and Ct perfusion. Pt is now outside of any tpa window. Despite the renal failure we will proceed with contrast administration and dialyze if needed, as it is essential to see if there is a lesion that can be intervened upon. Called and spoke with neuro-intervention Dr. Thornton. She advises to proceed with CTA head/neck and Ct perfusion. Pt is now outside of any tpa window. Despite the renal failure we will proceed with contrast administration and dialyze if needed, as it is essential to see if there is a lesion that can be intervened upon. IV contrast is MEDICALLY NECESSARY AT THIS TIME Marlee Wang, Resident: , cardene gtt started. Electrolytes being replaced, consulted MICU. Spoke with neurologist Dr. Solis who recommends MRI. Marlee Wang, Resident: CTA head, neck, perfusion study completed. Pt now speaking full sentences and coherently, no neuro deficits. BP controlled on cardene gtt. Spoke with ICU attending Dr. Solis who agrees with admission for hypertensive encephalopathy. Miri: pt now speaking and aphasia improved. NIH 0. This coincided with improved bp control and might have been secondary to hypertensive encephalopathy. Reviewed images with Dr. rodríguez, perfusion neg and no large vessel occlusion

## 2020-07-22 NOTE — ED PROVIDER NOTE - CLINICAL SUMMARY MEDICAL DECISION MAKING FREE TEXT BOX
Pt is a 35 y.o. M presenting with AMS, hyperglycemia above the readable range of the portable glucometer, expressive aphasia. NAD. Labs, meds, imaging, EKG, priority head ct.

## 2020-07-22 NOTE — H&P ADULT - NSHPLABSRESULTS_GEN_ALL_CORE
11.4   14.54 )-----------( 377      ( 22 Jul 2020 15:53 )             34.1   07-22    130<L>  |  90<L>  |  38.0<H>  ----------------------------<  508<HH>  2.9<LL>   |  25.0  |  4.40<H>    Ca    8.9      22 Jul 2020 15:53    TPro  7.4  /  Alb  3.2<L>  /  TBili  <0.2<L>  /  DBili  x   /  AST  14  /  ALT  16  /  AlkPhos  123<H>  07-22    CXR- low lung volumes, no infiltrate noted

## 2020-07-22 NOTE — ED ADULT TRIAGE NOTE - CHIEF COMPLAINT QUOTE
pt arrive by ambulance from home with reports of AMS, fatigue and lethargy for past couple days. unknown if pt has been taking his meds. MD Chery at bedside for eval 1527.

## 2020-07-22 NOTE — H&P ADULT - NSHPSOCIALHISTORY_GEN_ALL_CORE
Lives at home with family.  Unemployed  Smokes tobacco occasionally  Denies illicit drug use  Denies ETOH use.

## 2020-07-22 NOTE — ED ADULT NURSE NOTE - OBJECTIVE STATEMENT
Pt BIBA for AMS x a few days. Pt is currently able to follow commands but is aphasic. Pt found to be hypertensive and hyperglycemic on arrival. MD Chery bedside for eval.

## 2020-07-22 NOTE — H&P ADULT - NSHPREVIEWOFSYSTEMS_GEN_ALL_CORE
Denies any fevers, chills.  Denies neck pain or headache.  No sputum production or cough.  No dysuria.

## 2020-07-22 NOTE — PATIENT PROFILE ADULT - STATED REASON FOR ADMISSION
"I felt fatigue and I had someone else call the ambulance for me and I was unresponsive when they got there"

## 2020-07-22 NOTE — ED PROVIDER NOTE - PHYSICAL EXAMINATION
General: NAD, appears lethargic and fatigued  Head:  NC, AT  Eyes: EOMI, PERRLA, no scleral icterus  Ears: no erythema/drainage  Nose: midline, no bleeding/drainage  Throat: MMM  Cardiac: RRR, no m/r/g, no lower extremity edema  Respiratory: CTABL, no wheezes/rales/rhonchi, equal chest wall expansions  Abdomen: soft, obese body habitus, NT, no rebound tenderness, no guarding, nonperitonitic  MSK/Vascular: full ROM, distal pulses intact, soft compartments, warm extremities  Neuro: AAOx3, follows simple commands without being able to verbalize, strength 5/5, sensation to light touch intact, finger to nose coordination intact  Psych: calm, cooperative

## 2020-07-22 NOTE — ED PROVIDER NOTE - CRITICAL CARE INDICATION, MLM
patient was critically ill... Patient was critically ill with a high probability of imminent or life threatening deterioration. Pt requiring active titration of his BP given hypertensive emergency.

## 2020-07-22 NOTE — ED ADULT NURSE REASSESSMENT NOTE - NS ED NURSE REASSESS COMMENT FT1
Report given to HERBIE Ramirez. Pt POC explained and verbalized understanding. Pt moving to ICU for continued monitoring.

## 2020-07-22 NOTE — H&P ADULT - ASSESSMENT
36 yo male with hx of morbid obesity, HTN, DM, HLD, CKD, ARVIND not on CPAP, bipolar disorder, presents with hypertensive emergency resulting in acute encephalopathy, hyperglycemia, acute on chronic renal failure.    Noted also to have hypokalemia.      Plan:  Hypertensive emergency  Suspect due to medication noncompliance   - nicardipine infusion, target SBP no lower than 180 tonight  - restart home meds    Acute encephalopathy  CT/CTA negative for stroke  Likely due to malignant hypertension.    - MRI tomorrow to rule out RPLS (PRES)    DM/hyperglycemia  - insulin infusion    MELISSA  suspect due poorly controlled HTN  patient reports poor oral intake over past few days - will hydrate overnight    Diet: renal diet  DVT prophylaxis: sc heparin

## 2020-07-23 LAB
ALBUMIN SERPL ELPH-MCNC: 2.8 G/DL — LOW (ref 3.3–5.2)
ALP SERPL-CCNC: 105 U/L — SIGNIFICANT CHANGE UP (ref 40–120)
ALT FLD-CCNC: 18 U/L — SIGNIFICANT CHANGE UP
ANION GAP SERPL CALC-SCNC: 13 MMOL/L — SIGNIFICANT CHANGE UP (ref 5–17)
AST SERPL-CCNC: 23 U/L — SIGNIFICANT CHANGE UP
BASOPHILS # BLD AUTO: 0.07 K/UL — SIGNIFICANT CHANGE UP (ref 0–0.2)
BASOPHILS NFR BLD AUTO: 0.5 % — SIGNIFICANT CHANGE UP (ref 0–2)
BILIRUB SERPL-MCNC: <0.2 MG/DL — LOW (ref 0.4–2)
BUN SERPL-MCNC: 35 MG/DL — HIGH (ref 8–20)
CALCIUM SERPL-MCNC: 8.9 MG/DL — SIGNIFICANT CHANGE UP (ref 8.6–10.2)
CHLORIDE SERPL-SCNC: 99 MMOL/L — SIGNIFICANT CHANGE UP (ref 98–107)
CO2 SERPL-SCNC: 25 MMOL/L — SIGNIFICANT CHANGE UP (ref 22–29)
CREAT SERPL-MCNC: 4.08 MG/DL — HIGH (ref 0.5–1.3)
EOSINOPHIL # BLD AUTO: 0.14 K/UL — SIGNIFICANT CHANGE UP (ref 0–0.5)
EOSINOPHIL NFR BLD AUTO: 0.9 % — SIGNIFICANT CHANGE UP (ref 0–6)
GLUCOSE BLDC GLUCOMTR-MCNC: 154 MG/DL — HIGH (ref 70–99)
GLUCOSE BLDC GLUCOMTR-MCNC: 171 MG/DL — HIGH (ref 70–99)
GLUCOSE BLDC GLUCOMTR-MCNC: 179 MG/DL — HIGH (ref 70–99)
GLUCOSE BLDC GLUCOMTR-MCNC: 182 MG/DL — HIGH (ref 70–99)
GLUCOSE BLDC GLUCOMTR-MCNC: 197 MG/DL — HIGH (ref 70–99)
GLUCOSE BLDC GLUCOMTR-MCNC: 226 MG/DL — HIGH (ref 70–99)
GLUCOSE BLDC GLUCOMTR-MCNC: 234 MG/DL — HIGH (ref 70–99)
GLUCOSE BLDC GLUCOMTR-MCNC: 263 MG/DL — HIGH (ref 70–99)
GLUCOSE BLDC GLUCOMTR-MCNC: 265 MG/DL — HIGH (ref 70–99)
GLUCOSE BLDC GLUCOMTR-MCNC: 270 MG/DL — HIGH (ref 70–99)
GLUCOSE BLDC GLUCOMTR-MCNC: 287 MG/DL — HIGH (ref 70–99)
GLUCOSE BLDC GLUCOMTR-MCNC: 288 MG/DL — HIGH (ref 70–99)
GLUCOSE BLDC GLUCOMTR-MCNC: 324 MG/DL — HIGH (ref 70–99)
GLUCOSE SERPL-MCNC: 210 MG/DL — HIGH (ref 70–99)
HCT VFR BLD CALC: 33.1 % — LOW (ref 39–50)
HGB BLD-MCNC: 10.9 G/DL — LOW (ref 13–17)
IMM GRANULOCYTES NFR BLD AUTO: 0.4 % — SIGNIFICANT CHANGE UP (ref 0–1.5)
LYMPHOCYTES # BLD AUTO: 23.4 % — SIGNIFICANT CHANGE UP (ref 13–44)
LYMPHOCYTES # BLD AUTO: 3.51 K/UL — HIGH (ref 1–3.3)
MAGNESIUM SERPL-MCNC: 2.3 MG/DL — SIGNIFICANT CHANGE UP (ref 1.6–2.6)
MCHC RBC-ENTMCNC: 23.7 PG — LOW (ref 27–34)
MCHC RBC-ENTMCNC: 32.9 GM/DL — SIGNIFICANT CHANGE UP (ref 32–36)
MCV RBC AUTO: 72 FL — LOW (ref 80–100)
MONOCYTES # BLD AUTO: 1.03 K/UL — HIGH (ref 0–0.9)
MONOCYTES NFR BLD AUTO: 6.9 % — SIGNIFICANT CHANGE UP (ref 2–14)
NEUTROPHILS # BLD AUTO: 10.16 K/UL — HIGH (ref 1.8–7.4)
NEUTROPHILS NFR BLD AUTO: 67.9 % — SIGNIFICANT CHANGE UP (ref 43–77)
PHOSPHATE SERPL-MCNC: 3 MG/DL — SIGNIFICANT CHANGE UP (ref 2.4–4.7)
PLATELET # BLD AUTO: 395 K/UL — SIGNIFICANT CHANGE UP (ref 150–400)
POTASSIUM SERPL-MCNC: 3.6 MMOL/L — SIGNIFICANT CHANGE UP (ref 3.5–5.3)
POTASSIUM SERPL-SCNC: 3.6 MMOL/L — SIGNIFICANT CHANGE UP (ref 3.5–5.3)
PROT SERPL-MCNC: 6.6 G/DL — SIGNIFICANT CHANGE UP (ref 6.6–8.7)
RBC # BLD: 4.6 M/UL — SIGNIFICANT CHANGE UP (ref 4.2–5.8)
RBC # FLD: 16.5 % — HIGH (ref 10.3–14.5)
SARS-COV-2 IGG SERPL QL IA: NEGATIVE — SIGNIFICANT CHANGE UP
SARS-COV-2 IGM SERPL IA-ACNC: <3.8 AU/ML — SIGNIFICANT CHANGE UP
SODIUM SERPL-SCNC: 137 MMOL/L — SIGNIFICANT CHANGE UP (ref 135–145)
WBC # BLD: 14.97 K/UL — HIGH (ref 3.8–10.5)
WBC # FLD AUTO: 14.97 K/UL — HIGH (ref 3.8–10.5)

## 2020-07-23 PROCEDURE — 99233 SBSQ HOSP IP/OBS HIGH 50: CPT

## 2020-07-23 PROCEDURE — 99223 1ST HOSP IP/OBS HIGH 75: CPT

## 2020-07-23 PROCEDURE — 76775 US EXAM ABDO BACK WALL LIM: CPT | Mod: 26

## 2020-07-23 RX ORDER — DEXTROSE 50 % IN WATER 50 %
25 SYRINGE (ML) INTRAVENOUS ONCE
Refills: 0 | Status: DISCONTINUED | OUTPATIENT
Start: 2020-07-23 | End: 2020-07-26

## 2020-07-23 RX ORDER — INSULIN LISPRO 100/ML
10 VIAL (ML) SUBCUTANEOUS
Refills: 0 | Status: DISCONTINUED | OUTPATIENT
Start: 2020-07-23 | End: 2020-07-26

## 2020-07-23 RX ORDER — INSULIN GLARGINE 100 [IU]/ML
20 INJECTION, SOLUTION SUBCUTANEOUS
Refills: 0 | Status: DISCONTINUED | OUTPATIENT
Start: 2020-07-23 | End: 2020-07-23

## 2020-07-23 RX ORDER — INSULIN GLARGINE 100 [IU]/ML
20 INJECTION, SOLUTION SUBCUTANEOUS
Refills: 0 | Status: DISCONTINUED | OUTPATIENT
Start: 2020-07-23 | End: 2020-07-26

## 2020-07-23 RX ORDER — SODIUM CHLORIDE 9 MG/ML
1000 INJECTION, SOLUTION INTRAVENOUS
Refills: 0 | Status: DISCONTINUED | OUTPATIENT
Start: 2020-07-23 | End: 2020-07-26

## 2020-07-23 RX ORDER — GLUCAGON INJECTION, SOLUTION 0.5 MG/.1ML
1 INJECTION, SOLUTION SUBCUTANEOUS ONCE
Refills: 0 | Status: DISCONTINUED | OUTPATIENT
Start: 2020-07-23 | End: 2020-07-26

## 2020-07-23 RX ORDER — QUETIAPINE FUMARATE 200 MG/1
100 TABLET, FILM COATED ORAL AT BEDTIME
Refills: 0 | Status: DISCONTINUED | OUTPATIENT
Start: 2020-07-23 | End: 2020-07-26

## 2020-07-23 RX ORDER — DEXTROSE 50 % IN WATER 50 %
12.5 SYRINGE (ML) INTRAVENOUS ONCE
Refills: 0 | Status: DISCONTINUED | OUTPATIENT
Start: 2020-07-23 | End: 2020-07-26

## 2020-07-23 RX ORDER — INSULIN LISPRO 100/ML
VIAL (ML) SUBCUTANEOUS
Refills: 0 | Status: DISCONTINUED | OUTPATIENT
Start: 2020-07-23 | End: 2020-07-26

## 2020-07-23 RX ORDER — DEXTROSE 50 % IN WATER 50 %
15 SYRINGE (ML) INTRAVENOUS ONCE
Refills: 0 | Status: DISCONTINUED | OUTPATIENT
Start: 2020-07-23 | End: 2020-07-26

## 2020-07-23 RX ORDER — ATORVASTATIN CALCIUM 80 MG/1
20 TABLET, FILM COATED ORAL AT BEDTIME
Refills: 0 | Status: DISCONTINUED | OUTPATIENT
Start: 2020-07-23 | End: 2020-07-26

## 2020-07-23 RX ADMIN — Medication 2: at 22:01

## 2020-07-23 RX ADMIN — INSULIN GLARGINE 20 UNIT(S): 100 INJECTION, SOLUTION SUBCUTANEOUS at 22:00

## 2020-07-23 RX ADMIN — Medication 6: at 17:46

## 2020-07-23 RX ADMIN — Medication 100 MILLIGRAM(S): at 22:12

## 2020-07-23 RX ADMIN — Medication 100 MILLIGRAM(S): at 05:34

## 2020-07-23 RX ADMIN — Medication 10 UNIT(S): at 17:46

## 2020-07-23 RX ADMIN — CHLORHEXIDINE GLUCONATE 1 APPLICATION(S): 213 SOLUTION TOPICAL at 05:43

## 2020-07-23 RX ADMIN — HEPARIN SODIUM 5000 UNIT(S): 5000 INJECTION INTRAVENOUS; SUBCUTANEOUS at 22:13

## 2020-07-23 RX ADMIN — INSULIN GLARGINE 20 UNIT(S): 100 INJECTION, SOLUTION SUBCUTANEOUS at 10:10

## 2020-07-23 RX ADMIN — Medication 0.2 MILLIGRAM(S): at 05:34

## 2020-07-23 RX ADMIN — Medication 0.5 MILLIGRAM(S): at 17:45

## 2020-07-23 RX ADMIN — HEPARIN SODIUM 5000 UNIT(S): 5000 INJECTION INTRAVENOUS; SUBCUTANEOUS at 05:33

## 2020-07-23 RX ADMIN — LAMOTRIGINE 100 MILLIGRAM(S): 25 TABLET, ORALLY DISINTEGRATING ORAL at 05:43

## 2020-07-23 RX ADMIN — Medication 0.2 MILLIGRAM(S): at 22:12

## 2020-07-23 RX ADMIN — Medication 0.2 MILLIGRAM(S): at 13:00

## 2020-07-23 RX ADMIN — CALCITRIOL 0.25 MICROGRAM(S): 0.5 CAPSULE ORAL at 11:34

## 2020-07-23 RX ADMIN — Medication 6: at 11:34

## 2020-07-23 RX ADMIN — AMLODIPINE BESYLATE 10 MILLIGRAM(S): 2.5 TABLET ORAL at 05:33

## 2020-07-23 RX ADMIN — CARVEDILOL PHOSPHATE 12.5 MILLIGRAM(S): 80 CAPSULE, EXTENDED RELEASE ORAL at 05:34

## 2020-07-23 RX ADMIN — HEPARIN SODIUM 5000 UNIT(S): 5000 INJECTION INTRAVENOUS; SUBCUTANEOUS at 13:00

## 2020-07-23 RX ADMIN — Medication 0.5 MILLIGRAM(S): at 05:34

## 2020-07-23 RX ADMIN — QUETIAPINE FUMARATE 100 MILLIGRAM(S): 200 TABLET, FILM COATED ORAL at 22:10

## 2020-07-23 RX ADMIN — Medication 81 MILLIGRAM(S): at 11:33

## 2020-07-23 RX ADMIN — LAMOTRIGINE 50 MILLIGRAM(S): 25 TABLET, ORALLY DISINTEGRATING ORAL at 17:49

## 2020-07-23 RX ADMIN — Medication 100 MILLIGRAM(S): at 13:00

## 2020-07-23 RX ADMIN — Medication 10 UNIT(S): at 11:34

## 2020-07-23 RX ADMIN — ATORVASTATIN CALCIUM 20 MILLIGRAM(S): 80 TABLET, FILM COATED ORAL at 22:10

## 2020-07-23 RX ADMIN — CARVEDILOL PHOSPHATE 12.5 MILLIGRAM(S): 80 CAPSULE, EXTENDED RELEASE ORAL at 17:49

## 2020-07-23 NOTE — PROGRESS NOTE ADULT - SUBJECTIVE AND OBJECTIVE BOX
REASON FOR CONSULT: ***    CONSULT REQUESTED BY: ***    Patient is a 35y old  Male who presents with a chief complaint of Hypertensive crisis (2020 08:07)      BRIEF HOSPITAL COURSE: ***    Events last 24 hours: ***    PAST MEDICAL & SURGICAL HISTORY:  Kidney disease  HTN (hypertension)  Sleep apnea  Diabetes  Bipolar 1 disorder  Hypertension  No significant past surgical history    Allergies    No Known Allergies    Intolerances      FAMILY HISTORY:  Family history of diabetes mellitus (Grandparent)      Review of Systems:  CONSTITUTIONAL: No fever, chills, or fatigue  EYES: No eye pain, visual disturbances, or discharge  ENMT:  No difficulty hearing, tinnitus, vertigo; No sinus or throat pain  NECK: No pain or stiffness  RESPIRATORY: No cough, wheezing, chills or hemoptysis; No shortness of breath  CARDIOVASCULAR: No chest pain, palpitations, dizziness, or leg swelling  GASTROINTESTINAL: No abdominal or epigastric pain. No nausea, vomiting, or hematemesis; No diarrhea or constipation. No melena or hematochezia.  GENITOURINARY: No dysuria, frequency, hematuria, or incontinence  NEUROLOGICAL: No headaches, memory loss, loss of strength, numbness, or tremors  SKIN: No itching, burning, rashes, or lesions   MUSCULOSKELETAL: No joint pain or swelling; No muscle, back, or extremity pain  PSYCHIATRIC: No depression, anxiety, mood swings, or difficulty sleeping      Medications:    amLODIPine   Tablet 10 milliGRAM(s) Oral daily  carvedilol 12.5 milliGRAM(s) Oral every 12 hours  cloNIDine 0.2 milliGRAM(s) Oral three times a day  hydrALAZINE 100 milliGRAM(s) Oral every 8 hours      benztropine 0.5 milliGRAM(s) Oral two times a day  lamoTRIgine 100 milliGRAM(s) Oral two times a day      aspirin  chewable 81 milliGRAM(s) Oral daily  heparin   Injectable 5000 Unit(s) SubCutaneous every 8 hours        dextrose 40% Gel 15 Gram(s) Oral once PRN  dextrose 50% Injectable 12.5 Gram(s) IV Push once  dextrose 50% Injectable 25 Gram(s) IV Push once  dextrose 50% Injectable 25 Gram(s) IV Push once  glucagon  Injectable 1 milliGRAM(s) IntraMuscular once PRN  insulin glargine Injectable (LANTUS) 20 Unit(s) SubCutaneous two times a day  insulin lispro (HumaLOG) corrective regimen sliding scale   SubCutaneous Before meals and at bedtime  insulin lispro Injectable (HumaLOG) 10 Unit(s) SubCutaneous three times a day before meals  insulin regular Infusion 6 Unit(s)/Hr IV Continuous <Continuous>    calcitriol   Capsule 0.25 MICROGram(s) Oral daily  dextrose 5%. 1000 milliLiter(s) IV Continuous <Continuous>      chlorhexidine 4% Liquid 1 Application(s) Topical <User Schedule>            ICU Vital Signs Last 24 Hrs  T(C): 36.8 (2020 08:04), Max: 37 (2020 15:26)  T(F): 98.2 (2020 08:04), Max: 98.6 (2020 15:26)  HR: 71 (2020 09:00) (71 - 102)  BP: 123/68 (2020 09:00) (123/68 - 211/93)  BP(mean): 88 (2020 09:00) (88 - 132)  ABP: --  ABP(mean): --  RR: 30 (2020 09:00) (13 - 36)  SpO2: 96% (2020 09:00) (92% - 98%)    Vital Signs Last 24 Hrs  T(C): 36.8 (2020 08:04), Max: 37 (2020 15:26)  T(F): 98.2 (2020 08:04), Max: 98.6 (2020 15:)  HR: 71 (2020 09:00) (71 - 102)  BP: 123/68 (2020 09:00) (123/68 - 211/93)  BP(mean): 88 (2020 09:00) (88 - 132)  RR: 30 (2020 09:00) (13 - 36)  SpO2: 96% (2020 09:00) (92% - 98%)        I&O's Detail    2020 07:01  -  2020 07:00  --------------------------------------------------------  IN:    insulin regular Infusion: 74 mL    multiple electrolytes Injection Type 1multiple electrolytes Injection Type 1: 600 mL    niCARdipine Infusion: 412.5 mL    Oral Fluid: 500 mL  Total IN: 1586.5 mL    OUT:    Voided: 1600 mL  Total OUT: 1600 mL    Total NET: -13.5 mL      2020 07:01  -  2020 10:44  --------------------------------------------------------  IN:    insulin regular Infusion: 4 mL    multiple electrolytes Injection Type 1multiple electrolytes Injection Type 1: 75 mL    niCARdipine Infusion: 25 mL  Total IN: 104 mL    OUT:    Voided: 700 mL  Total OUT: 700 mL    Total NET: -596 mL            LABS:                        10.9   14.97 )-----------( 395      ( 2020 05:40 )             33.1     07-    137  |  99  |  35.0<H>  ----------------------------<  210<H>  3.6   |  25.0  |  4.08<H>    Ca    8.9      2020 05:40  Phos  3.0     07-  Mg     2.3         TPro  6.6  /  Alb  2.8<L>  /  TBili  <0.2<L>  /  DBili  x   /  AST  23  /  ALT  18  /  AlkPhos  105  07-23      CARDIAC MARKERS ( 2020 15:53 )  x     / 0.05 ng/mL / x     / x     / x          CAPILLARY BLOOD GLUCOSE  179 (2020 07:00)      POCT Blood Glucose.: 263 mg/dL (2020 10:09)    PT/INR - ( 2020 15:54 )   PT: 12.0 sec;   INR: 1.04 ratio         PTT - ( 2020 15:54 )  PTT:28.6 sec  Urinalysis Basic - ( 2020 20:13 )    Color: Yellow / Appearance: Clear / S.005 / pH: x  Gluc: x / Ketone: Negative  / Bili: Negative / Urobili: Negative mg/dL   Blood: x / Protein: 100 mg/dL / Nitrite: Negative   Leuk Esterase: Negative / RBC: 0-2 /HPF / WBC Negative   Sq Epi: x / Non Sq Epi: Occasional / Bacteria: x      CULTURES:      Physical Examination:    General: No acute distress.  Alert, oriented, interactive, nonfocal    HEENT: Pupils equal, reactive to light.  Symmetric.    PULM: Clear to auscultation bilaterally, no significant sputum production    CVS: Regular rate and rhythm, no murmurs, rubs, or gallops    ABD: Soft, nondistended, nontender, normoactive bowel sounds, no masses    EXT: No edema, nontender    SKIN: Warm and well perfused, no rashes noted.    RADIOLOGY: *** Patient is a 35y old  Male who presents with a chief complaint of Hypertensive crisis (2020 08:07)      BRIEF HOSPITAL COURSE:   Patient is a 35 year old male with a pmhx of morbid obesity, HTN, DM, HLD, CKD, ARVIND not on CPAP, bipolar disorder, presented to the emergency department with altered mental status and aphasia.  Patient underwent CT/CTA/perfusion scan which did not reveal any acute stroke.  He was also noted to have severe hypertension with SBP in 240s.  His blood pressure was controlled with a nicardipine infusion and his mental status and aphasia gradually improved.  Patient reports missing doses of his medications over the past few days      Events last 24 hours:   - Pt seen and examined at bedside, c/o mild HA denies CP, SOB, ABd pain, N/V, vision changes  - liberated off cardene ggt this am  - transitioned to PO bp meds  - given lantus--> off insulin ggt  - MRI negative      PAST MEDICAL & SURGICAL HISTORY:  Kidney disease  HTN (hypertension)  Sleep apnea  Diabetes  Bipolar 1 disorder  Hypertension  No significant past surgical history    Allergies    No Known Allergies    Intolerances      FAMILY HISTORY:  Family history of diabetes mellitus (Grandparent)    Social hx:  Denies tobacco and alcohol abuse    Review of Systems:  CONSTITUTIONAL: No fever, chills, or fatigue  EYES: No eye pain, visual disturbances, or discharge  ENMT:  No difficulty hearing, tinnitus, vertigo; No sinus or throat pain  NECK: No pain or stiffness  RESPIRATORY: No cough, wheezing, chills or hemoptysis; No shortness of breath  CARDIOVASCULAR: No chest pain, palpitations, dizziness, or leg swelling  GASTROINTESTINAL: No abdominal or epigastric pain. No nausea, vomiting, or hematemesis; No diarrhea or constipation. No melena or hematochezia.  GENITOURINARY: No dysuria, frequency, hematuria, or incontinence  NEUROLOGICAL: + headache, no memory loss, loss of strength, numbness, or tremors  SKIN: No itching, burning, rashes, or lesions   MUSCULOSKELETAL: No joint pain or swelling; No muscle, back, or extremity pain  PSYCHIATRIC: No depression, anxiety, mood swings, or difficulty sleeping      Medications:  amLODIPine   Tablet 10 milliGRAM(s) Oral daily  carvedilol 12.5 milliGRAM(s) Oral every 12 hours  cloNIDine 0.2 milliGRAM(s) Oral three times a day  hydrALAZINE 100 milliGRAM(s) Oral every 8 hours  benztropine 0.5 milliGRAM(s) Oral two times a day  lamoTRIgine 100 milliGRAM(s) Oral two times a day  aspirin  chewable 81 milliGRAM(s) Oral daily  heparin   Injectable 5000 Unit(s) SubCutaneous every 8 hours  dextrose 40% Gel 15 Gram(s) Oral once PRN  dextrose 50% Injectable 12.5 Gram(s) IV Push once  dextrose 50% Injectable 25 Gram(s) IV Push once  dextrose 50% Injectable 25 Gram(s) IV Push once  glucagon  Injectable 1 milliGRAM(s) IntraMuscular once PRN  insulin glargine Injectable (LANTUS) 20 Unit(s) SubCutaneous two times a day  insulin lispro (HumaLOG) corrective regimen sliding scale   SubCutaneous Before meals and at bedtime  insulin lispro Injectable (HumaLOG) 10 Unit(s) SubCutaneous three times a day before meals  insulin regular Infusion 6 Unit(s)/Hr IV Continuous <Continuous>  calcitriol   Capsule 0.25 MICROGram(s) Oral daily  dextrose 5%. 1000 milliLiter(s) IV Continuous <Continuous>  chlorhexidine 4% Liquid 1 Application(s) Topical <User Schedule>      ICU Vital Signs Last 24 Hrs  T(C): 36.8 (2020 08:04), Max: 37 (2020 15:26)  T(F): 98.2 (2020 08:04), Max: 98.6 (2020 15:26)  HR: 71 (2020 09:00) (71 - 102)  BP: 123/68 (2020 09:00) (123/68 - 211/93)  BP(mean): 88 (2020 09:00) (88 - 132)  RR: 30 (2020 09:00) (13 - 36)  SpO2: 96% (2020 09:00) (92% - 98%)      I&O's Detail    2020 07:01  -  2020 07:00  --------------------------------------------------------  IN:    insulin regular Infusion: 74 mL    multiple electrolytes Injection Type 1multiple electrolytes Injection Type 1: 600 mL    niCARdipine Infusion: 412.5 mL    Oral Fluid: 500 mL  Total IN: 1586.5 mL    OUT:    Voided: 1600 mL  Total OUT: 1600 mL    Total NET: -13.5 mL      2020 07:01  -  2020 10:44  --------------------------------------------------------  IN:    insulin regular Infusion: 4 mL    multiple electrolytes Injection Type 1multiple electrolytes Injection Type 1: 75 mL    niCARdipine Infusion: 25 mL  Total IN: 104 mL    OUT:    Voided: 700 mL  Total OUT: 700 mL    Total NET: -596 mL          LABS:                        10.9   14.97 )-----------( 395      ( 2020 05:40 )             33.1     07-23    137  |  99  |  35.0<H>  ----------------------------<  210<H>  3.6   |  25.0  |  4.08<H>    Ca    8.9      2020 05:40  Phos  3.0     07-  Mg     2.3     07-    TPro  6.6  /  Alb  2.8<L>  /  TBili  <0.2<L>  /  DBili  x   /  AST  23  /  ALT  18  /  AlkPhos  105  07-23      CARDIAC MARKERS ( 2020 15:53 )  x     / 0.05 ng/mL / x     / x     / x          CAPILLARY BLOOD GLUCOSE  179 (2020 07:00)      POCT Blood Glucose.: 263 mg/dL (2020 10:09)    PT/INR - ( 2020 15:54 )   PT: 12.0 sec;   INR: 1.04 ratio         PTT - ( 2020 15:54 )  PTT:28.6 sec  Urinalysis Basic - ( 2020 20:13 )    Color: Yellow / Appearance: Clear / S.005 / pH: x  Gluc: x / Ketone: Negative  / Bili: Negative / Urobili: Negative mg/dL   Blood: x / Protein: 100 mg/dL / Nitrite: Negative   Leuk Esterase: Negative / RBC: 0-2 /HPF / WBC Negative   Sq Epi: x / Non Sq Epi: Occasional / Bacteria: x      CULTURES:      Physical Examination:    General: obese male in no acute distress.  resting comfortably in bed     HEENT: Pupils equal, reactive to light.  Symmetric.    PULM: Clear to auscultation bilaterally, no significant sputum production    CVS: +s1/s2    ABD: obese, soft, nondistended, nontender, normoactive bowel sounds, no masses    EXT: No edema, nontender    SKIN: Warm and well perfused    Neuro: alert and oriented x3, moves all extr, follows commands    RADIOLOGY:   < from: MR Head No Cont (20 @ 22:09) >    INTERPRETATION:  Clinical Indication: Aphasia    Technique: Multi-planar, multi-squence noncontrast brain MRI was performed.    Comparison: CTA of the head and neck dated 2020    Findings:    There is no acute infarct. There is no evidence of mass or intracranial hemorrhage. Ventricles and sulci are normal in size and configuration for the patient's stated age. No midline shift or other significant mass effect is noted. Gray-white differentiation is maintained throughout. Normal flow voids are maintained in the dominant arterial and venous structures.    The visualized paranasal sinuses and tympanomastoid spaces are clear. Orbits and orbital contents are unremarkable.    IMPRESSION:    No evidence for intracranial mass, acute territorial infarct, acute intracranial hemorrhage, or midline shift.

## 2020-07-23 NOTE — CONSULT NOTE ADULT - SUBJECTIVE AND OBJECTIVE BOX
HPI: 34 yo B male with PMH + morbid obesity, HTN, DM, HLD, ARVIND, bipolar disorder, CKD(III) who presented to the ED with AMS and severe HTN (SBP> 240).  He was transferred to the ICU and after improved BP control his mental status has improved. Pt has admitted to not taking his BP medications as prescribed.  We are called to evaluate his abnormal renal function.      PAST MEDICAL & SURGICAL HISTORY:  Kidney disease  HTN (hypertension)  Sleep apnea  Diabetes  Bipolar 1 disorder  Hypertension  No significant past surgical history      FAMILY HISTORY:  Family history of diabetes mellitus (Grandparent)      Social History:  Occ tobacco; no etoh nor drug abuse    MEDICATIONS  (STANDING):  amLODIPine   Tablet 10 milliGRAM(s) Oral daily  aspirin  chewable 81 milliGRAM(s) Oral daily  benztropine 0.5 milliGRAM(s) Oral two times a day  calcitriol   Capsule 0.25 MICROGram(s) Oral daily  carvedilol 12.5 milliGRAM(s) Oral every 12 hours  chlorhexidine 4% Liquid 1 Application(s) Topical <User Schedule>  cloNIDine 0.2 milliGRAM(s) Oral three times a day  dextrose 5%. 1000 milliLiter(s) (50 mL/Hr) IV Continuous <Continuous>  dextrose 50% Injectable 12.5 Gram(s) IV Push once  dextrose 50% Injectable 25 Gram(s) IV Push once  dextrose 50% Injectable 25 Gram(s) IV Push once  heparin   Injectable 5000 Unit(s) SubCutaneous every 8 hours  hydrALAZINE 100 milliGRAM(s) Oral every 8 hours  insulin glargine Injectable (LANTUS) 20 Unit(s) SubCutaneous two times a day  insulin lispro (HumaLOG) corrective regimen sliding scale   SubCutaneous Before meals and at bedtime  insulin lispro Injectable (HumaLOG) 10 Unit(s) SubCutaneous three times a day before meals  insulin regular Infusion 6 Unit(s)/Hr (6 mL/Hr) IV Continuous <Continuous>  lamoTRIgine 100 milliGRAM(s) Oral two times a day    MEDICATIONS  (PRN):  dextrose 40% Gel 15 Gram(s) Oral once PRN Blood Glucose LESS THAN 70 milliGRAM(s)/deciliter  glucagon  Injectable 1 milliGRAM(s) IntraMuscular once PRN Glucose LESS THAN 70 milligrams/deciliter      Allergies    No Known Allergies    Intolerances        REVIEW OF SYSTEMS:    CONSTITUTIONAL: No fever, weight loss, + fatigue + weakness  EYES: No eye pain, visual disturbances, or discharge  ENMT:  No difficulty hearing, tinnitus, vertigo; No sinus or throat pain  NECK: No pain or stiffness  RESPIRATORY: No cough, wheezing, chills or hemoptysis; + mild shortness of breath  CARDIOVASCULAR: No chest pain, palpitations, dizziness, or leg swelling  GASTROINTESTINAL: No abdominal or epigastric pain. No nausea, vomiting, or hematemesis; No diarrhea or constipation. No melena or hematochezia.  GENITOURINARY: No dysuria, frequency, hematuria, or incontinence  NEUROLOGICAL: No headaches, memory loss, loss of strength, numbness, or tremors  SKIN: No itching, burning, rashes, or lesions   MUSCULOSKELETAL: No joint pain or swelling; No muscle, back, or extremity pain        Vital Signs Last 24 Hrs  T(C): 36.8 (2020 08:04), Max: 37 (2020 15:26)  T(F): 98.2 (2020 08:04), Max: 98.6 (2020 15:26)  HR: 67 (2020 11:00) (67 - 102)  BP: 146/87 (2020 11:00) (123/68 - 211/93)  BP(mean): 111 (2020 11:00) (88 - 132)  RR: 13 (2020 11:00) (13 - 36)  SpO2: 98% (2020 11:00) (92% - 98%)    PHYSICAL EXAM:    GENERAL: Obese, debilitated  HEAD:  Atraumatic, Normocephalic  EYES: EOMI, PERRLA, conjunctiva and sclera clear  ENMT: Moist mucous membranes  NECK: Supple, No JVD  NERVOUS SYSTEM:  Awake, alert  CHEST/LUNG: Clear bilaterally with diminished BS  HEART: Regular rate and rhythm; No rub  ABDOMEN: Soft, Nontender, Nondistended; Bowel sounds present  EXTREMITIES:  2+ Peripheral Pulses, tr LE edema  SKIN: No rashes or lesions      LABS:                        10.9   14.97 )-----------( 395      ( 2020 05:40 )             33.1     07-23    137  |  99  |  35.0<H>  ----------------------------<  210<H>  3.6   |  25.0  |  4.08<H>    Creatinine, Serum: 4.40 mg/dL (20 @ 15:53)    Creatinine, Serum: 3.15 mg/dL (03.15.20 @ 09:47)        Ca    8.9      2020 05:40  Phos  3.0       Mg     2.3         TPro  6.6  /  Alb  2.8<L>  /  TBili  <0.2<L>  /  DBili  x   /  AST  23  /  ALT  18  /  AlkPhos  105      PT/INR - ( 2020 15:54 )   PT: 12.0 sec;   INR: 1.04 ratio         PTT - ( 2020 15:54 )  PTT:28.6 sec  Urinalysis Basic - ( 2020 20:13 )    Color: Yellow / Appearance: Clear / S.005 / pH: x  Gluc: x / Ketone: Negative  / Bili: Negative / Urobili: Negative mg/dL   Blood: x / Protein: 100 mg/dL / Nitrite: Negative   Leuk Esterase: Negative / RBC: 0-2 /HPF / WBC Negative   Sq Epi: x / Non Sq Epi: Occasional / Bacteria: x      Magnesium, Serum: 2.3 mg/dL ( @ 05:40)  Phosphorus Level, Serum: 3.0 mg/dL ( @ 05:40)      RADIOLOGY & ADDITIONAL TESTS:  < from: CT Perfusion w/ Maps w/ IV Cont (20 @ 18:38) >     EXAM:  CT ANGIO BRAIN (W)AW IC                         EXAM:  CT ANGIO NECK (W)AW IC                         EXAM:  CT PERFUSION W MAPS IC                          PROCEDURE DATE:  2020          INTERPRETATION:  INDICATION: Code stroke. Aphasia.    TECHNIQUE:  A thin section CT study of the head and  neck was conducted during rapid infusion of intravenous contrast (power injected).  In addition to the axial data, reformatted images were generated using a 3D workstation. Rapid AI was usedto screen for hemorrhage.  100 cc Omnipaque 350 was administered.    FINDINGS:      AORTIC ARCH no dissection or aneurysm dilatation is noted. Major vessel origins are patent.  COMMON CAROTID ARTERIES: Bilaterally patent with tortuosity. Both course medially and a retropharyngeal in the mid neck.  RIGHT BIFURCATION: Widely patent  LEFT BIFURCATION: Widely patent  INTERNAL CAROTID ARTERIES: Bilaterally patent with tortuosity  VERTEBRALS bilaterally patent  MISCELLANEOUS:  Degenerative changes are noted in the cervical spine.      BRAIN   no acute infarct or hemorrhage is suggested. Ventricles are midline and normal in size.    The petrous, cavernous, and supraclinoid carotid arteries are bilaterally patent    Both posterior communicating arteries are well developed.    The anterior and middle cerebral arteries are widely patent.    The vertebrobasilar system is widely patent with tortuosity. Both vertebral arteries are well visualized.    The venous sinuses are patent.    CT PERFUSION  rapid imaging was incorporated.    No core infarct or evidence of delayed mean transit time is identified.     CBF<30% volume: 0 ml   Tmax>6.0 s volume: The ml   Mismatch volume: 0 ml   Mismatch ratio: none     IMPRESSION    Widely patent head and neck vasculature. No significant stenosis or occlusion identified. No perfusion abnormality identified.    < end of copied text >

## 2020-07-23 NOTE — PROGRESS NOTE ADULT - ASSESSMENT
36 yo male with hx of morbid obesity, HTN, DM, HLD, CKD, ARVIND not on CPAP, bipolar disorder, presented to the emergency department with altered mental status and aphasia.  Patient underwent CT/CTA/perfusion scan which did not reveal any acute stroke.  He was also noted to have severe hypertension with SBP in 240s.  His blood pressure was controlled with a nicardipine infusion and his mental status and aphasia gradually improved.  Patient reports missing doses of his medications over the past few days. Pt admitted to MICU, now off nicardipine drip and BP better controlled with improved mental status. Initially sugars were also very elevated placed on insulin drip now transitioned to Lantus and pre meal insulin. Pt downgraded to medical service.    1) HTN Emergency: resolved  - off nicardipine drip  - continue coreg, Norvasc clonidine & hydralazine     2) Hyperglycemia with DM  - off insulin drip  - started on lantus 20 units (home dose 48) and insulin lispro 10 units with meals (home dose 18 units)  - CSI with fingerstick monitoring  - endocrinology consulted by ICU team    3) Acute Metabolic Encephalopathy due to HTN/elevated sugars  - resolved     4) HLD  - on statin    5) Bipolar Disorder  - on cogentin, lamoTRIgine & QUEtiapine    6) Prophylactic measure  - DVT ppx: Heparin SQ

## 2020-07-23 NOTE — CONSULT NOTE ADULT - SUBJECTIVE AND OBJECTIVE BOX
HPI:  34 yo male with hx of morbid obesity, HTN, DM, HLD, CKD, ARVIND not on CPAP, bipolar disorder, presented to the emergency department with altered mental status and aphasia.  Patient underwent CT/CTA/perfusion scan which did not reveal any acute stroke.  He was also noted to have severe hypertension with SBP in 240s.  His blood pressure was controlled with a nicardipine infusion and his mental status and aphasia gradually improved.  Patient reports missing doses of his medications over the past few days. (2020 19:47)    Pt is well known to me from outpatient practice.  He has T2DM since 2004 that is chronically poorly controlled due to nonadherent with diet, insulin and home glucose monitoring.  He admits to missing insulin doses because he was feeling unwell and lost lancets and unable to test FS. He is feeling bettter.      PAST MEDICAL & SURGICAL HISTORY:  Kidney disease  HTN (hypertension)  Sleep apnea  Diabetes  Bipolar 1 disorder  Hypertension  No significant past surgical history      FAMILY HISTORY:  Family history of diabetes mellitus (Grandparent)      SOCIAL HISTORY: denies tobacco, illicit drugs or EtOh use    MEDICATIONS  (STANDING):  amLODIPine   Tablet 10 milliGRAM(s) Oral daily  aspirin  chewable 81 milliGRAM(s) Oral daily  atorvastatin 20 milliGRAM(s) Oral at bedtime  benztropine 0.5 milliGRAM(s) Oral two times a day  calcitriol   Capsule 0.25 MICROGram(s) Oral daily  carvedilol 12.5 milliGRAM(s) Oral every 12 hours  chlorhexidine 4% Liquid 1 Application(s) Topical <User Schedule>  cloNIDine 0.2 milliGRAM(s) Oral three times a day  dextrose 5%. 1000 milliLiter(s) (50 mL/Hr) IV Continuous <Continuous>  dextrose 50% Injectable 12.5 Gram(s) IV Push once  dextrose 50% Injectable 25 Gram(s) IV Push once  dextrose 50% Injectable 25 Gram(s) IV Push once  heparin   Injectable 5000 Unit(s) SubCutaneous every 8 hours  hydrALAZINE 100 milliGRAM(s) Oral every 8 hours  insulin glargine Injectable (LANTUS) 20 Unit(s) SubCutaneous two times a day  insulin lispro (HumaLOG) corrective regimen sliding scale   SubCutaneous Before meals and at bedtime  insulin lispro Injectable (HumaLOG) 10 Unit(s) SubCutaneous three times a day before meals  lamoTRIgine 50 milliGRAM(s) Oral two times a day  QUEtiapine 100 milliGRAM(s) Oral at bedtime    MEDICATIONS  (PRN):  dextrose 40% Gel 15 Gram(s) Oral once PRN Blood Glucose LESS THAN 70 milliGRAM(s)/deciliter  glucagon  Injectable 1 milliGRAM(s) IntraMuscular once PRN Glucose LESS THAN 70 milligrams/deciliter      ALLERGIES: No Known Allergies      REVIEW OF SYSTEMS:    CONSTITUTIONAL: No fever, weight loss, or fatigue  EYES: No blurry vision  RESPIRATORY: No cough, w No shortness of breath  CARDIOVASCULAR: No chest pain, palpitations or dizziness  GASTROINTESTINAL: No abdominal or epigastric pain. No nausea or vomiting  GENITOURINARY: No dysuria  NEUROLOGICAL: No headaches  SKIN: No itching, burning, rashes, or lesions   LYMPH NODES: No enlarged glands  ENDOCRINE: No heat or cold intolerance; No hair loss  MUSCULOSKELETAL: No joint pain or swelling; No muscle, back, or extremity pain  PSYCHIATRIC: No depression, anxiety, mood swings, or difficulty sleeping  HEME/LYMPH: No easy bruising, or bleeding gums  ALLERY AND IMMUNOLOGIC: No hives or eczema      Vital Signs Last 24 Hrs  T(C): 36.9 (2020 12:02), Max: 37 (2020 04:39)  T(F): 98.4 (2020 12:02), Max: 98.6 (2020 04:39)  HR: 72 (2020 14:00) (67 - 102)  BP: 132/81 (2020 14:00) (123/68 - 211/93)  BP(mean): 101 (2020 14:00) (88 - 132)  RR: 22 (2020 14:00) (13 - 36)  SpO2: 98% (2020 14:00) (92% - 98%)    Physical Exam:  General appearance: Well developed, well nourished.  Eyes: Pupils equal. EOMI  Neck: Trachea midline. No thyroid enlargement. No palpable thyroid nodules  Lungs: Normal respiratory excursion. Lungs clear no w/r/r  CV: Normal S1S2, regular. No m/r/g.  Pedal pulses intact.  Abdomen: Soft, nontender, nondistended, (+) BS  Musculoskeletal: No cyanosis, clubbing, or edema.  Skin: Warm and moist. No rash. No acanthosis. Feet - no ulcers  Neuro: Cranial nerves intact. Good sensation to light touch.  DTR's normal.  Psych: Normal affect, good judgement/insight      LABS:                        10.9   14.97 )-----------( 395      ( 2020 05:40 )             33.1     07-    137  |  99  |  35.0<H>  ----------------------------<  210<H>  3.6   |  25.0  |  4.08<H>    Ca    8.9      2020 05:40  Phos  3.0     07-  Mg     2.3     07-    TPro  6.6  /  Alb  2.8<L>  /  TBili  <0.2<L>  /  DBili  x   /  AST  23  /  ALT  18  /  AlkPhos  105  07-23    LIVER FUNCTIONS - ( 2020 05:40 )  Alb: 2.8 g/dL / Pro: 6.6 g/dL / ALK PHOS: 105 U/L / ALT: 18 U/L / AST: 23 U/L / GGT: x                 CAPILLARY BLOOD GLUCOSE  POCT Blood Glucose.: 288 mg/dL (2020 11:31)  POCT Blood Glucose.: 263 mg/dL (2020 10:09)  POCT Blood Glucose.: 154 mg/dL (2020 08:05)  POCT Blood Glucose.: 179 mg/dL (2020 06:57)  POCT Blood Glucose.: 182 mg/dL (2020 06:10)  POCT Blood Glucose.: 197 mg/dL (2020 05:29)  POCT Blood Glucose.: 234 mg/dL (2020 04:01)  POCT Blood Glucose.: 226 mg/dL (2020 03:02)  POCT Blood Glucose.: 324 mg/dL (2020 02:07)  POCT Blood Glucose.: 270 mg/dL (2020 01:02)  POCT Blood Glucose.: 287 mg/dL (2020 00:03)  POCT Blood Glucose.: 351 mg/dL (2020 22:50)  POCT Blood Glucose.: 346 mg/dL (2020 22:20)  POCT Blood Glucose.: 385 mg/dL (2020 20:40)  POCT Blood Glucose.: 520 mg/dL (2020 15:28)          Urinalysis Basic - ( 2020 20:13 )    Color: Yellow / Appearance: Clear / S.005 / pH: x  Gluc: x / Ketone: Negative  / Bili: Negative / Urobili: Negative mg/dL   Blood: x / Protein: 100 mg/dL / Nitrite: Negative   Leuk Esterase: Negative / RBC: 0-2 /HPF / WBC Negative   Sq Epi: x / Non Sq Epi: Occasional / Bacteria: x HPI:  36 yo male with hx of morbid obesity, HTN, DM, HLD, CKD, ARVIND not on CPAP, bipolar disorder, presented to the emergency department with altered mental status and aphasia.  Patient underwent CT/CTA/perfusion scan which did not reveal any acute stroke.  He was also noted to have severe hypertension with SBP in 240s.  His blood pressure was controlled with a nicardipine infusion and his mental status and aphasia gradually improved.  Patient reports missing doses of his medications over the past few days. (22 Jul 2020 19:47) Pt admitted to MICU, now off nicardipine drip and BP better controlled with improved mental status. Initially sugars were also very elevated placed on insulin drip now transitioned to Lantus and pre meal insulin.     Pt is well known to me from outpatient practice.  He has T2DM since 2004 that is chronically poorly controlled due to nonadherent with diet, insulin and home glucose monitoring.  He admits to missing insulin doses because he was feeling unwell and lost lancets and unable to test FS. He is feeling bettter.      PAST MEDICAL & SURGICAL HISTORY:  Kidney disease  HTN (hypertension)  Sleep apnea  Diabetes  Bipolar 1 disorder  Hypertension  No significant past surgical history      FAMILY HISTORY:  Family history of diabetes mellitus (Grandparent)      SOCIAL HISTORY: denies tobacco, illicit drugs or EtOh use    MEDICATIONS  (STANDING):  amLODIPine   Tablet 10 milliGRAM(s) Oral daily  aspirin  chewable 81 milliGRAM(s) Oral daily  atorvastatin 20 milliGRAM(s) Oral at bedtime  benztropine 0.5 milliGRAM(s) Oral two times a day  calcitriol   Capsule 0.25 MICROGram(s) Oral daily  carvedilol 12.5 milliGRAM(s) Oral every 12 hours  chlorhexidine 4% Liquid 1 Application(s) Topical <User Schedule>  cloNIDine 0.2 milliGRAM(s) Oral three times a day  dextrose 5%. 1000 milliLiter(s) (50 mL/Hr) IV Continuous <Continuous>  dextrose 50% Injectable 12.5 Gram(s) IV Push once  dextrose 50% Injectable 25 Gram(s) IV Push once  dextrose 50% Injectable 25 Gram(s) IV Push once  heparin   Injectable 5000 Unit(s) SubCutaneous every 8 hours  hydrALAZINE 100 milliGRAM(s) Oral every 8 hours  insulin glargine Injectable (LANTUS) 20 Unit(s) SubCutaneous two times a day  insulin lispro (HumaLOG) corrective regimen sliding scale   SubCutaneous Before meals and at bedtime  insulin lispro Injectable (HumaLOG) 10 Unit(s) SubCutaneous three times a day before meals  lamoTRIgine 50 milliGRAM(s) Oral two times a day  QUEtiapine 100 milliGRAM(s) Oral at bedtime    MEDICATIONS  (PRN):  dextrose 40% Gel 15 Gram(s) Oral once PRN Blood Glucose LESS THAN 70 milliGRAM(s)/deciliter  glucagon  Injectable 1 milliGRAM(s) IntraMuscular once PRN Glucose LESS THAN 70 milligrams/deciliter      ALLERGIES: No Known Allergies      REVIEW OF SYSTEMS:    CONSTITUTIONAL: No fever, weight loss, or fatigue  EYES: No blurry vision  RESPIRATORY: No cough, No shortness of breath  CARDIOVASCULAR: No chest pain, palpitations or dizziness  GASTROINTESTINAL: No abdominal or epigastric pain. No nausea or vomiting  GENITOURINARY: No dysuria  NEUROLOGICAL: No headaches  SKIN: No itching, burning, rashes, or lesions   PSYCHIATRIC: No depression/ anxiety      Vital Signs Last 24 Hrs  T(C): 36.9 (23 Jul 2020 12:02), Max: 37 (23 Jul 2020 04:39)  T(F): 98.4 (23 Jul 2020 12:02), Max: 98.6 (23 Jul 2020 04:39)  HR: 72 (23 Jul 2020 14:00) (67 - 102)  BP: 132/81 (23 Jul 2020 14:00) (123/68 - 211/93)  BP(mean): 101 (23 Jul 2020 14:00) (88 - 132)  RR: 22 (23 Jul 2020 14:00) (13 - 36)  SpO2: 98% (23 Jul 2020 14:00) (92% - 98%)    Physical Exam:  General appearance: Well developed, well nourished.  Eyes: Pupils equal. EOMI  Lungs: Normal respiratory excursion. Lungs clear no w/r/r  CV: Normal S1S2, regular. No m/r/g.  Pedal pulses intact.  Abdomen: Soft, nontender, nondistended, (+) BS, (+) obese  Musculoskeletal: No cyanosis, clubbing, or edema.  Skin: Warm and moist.  No acanthosis. Feet - no ulcers  Neuro: Cranial nerves intact. Good sensation to light touch.    Psych: Normal affect, good judgement/insight      LABS:                        10.9   14.97 )-----------( 395      ( 23 Jul 2020 05:40 )             33.1     07-23    137  |  99  |  35.0<H>  ----------------------------<  210<H>  3.6   |  25.0  |  4.08<H>    Ca    8.9      23 Jul 2020 05:40  Phos  3.0     07-23  Mg     2.3     07-23    TPro  6.6  /  Alb  2.8<L>  /  TBili  <0.2<L>  /  DBili  x   /  AST  23  /  ALT  18  /  AlkPhos  105  07-23    LIVER FUNCTIONS - ( 23 Jul 2020 05:40 )  Alb: 2.8 g/dL / Pro: 6.6 g/dL / ALK PHOS: 105 U/L / ALT: 18 U/L / AST: 23 U/L / GGT: x           CAPILLARY BLOOD GLUCOSE  POCT Blood Glucose.: 288 mg/dL (23 Jul 2020 11:31)  POCT Blood Glucose.: 263 mg/dL (23 Jul 2020 10:09)  POCT Blood Glucose.: 154 mg/dL (23 Jul 2020 08:05)  POCT Blood Glucose.: 179 mg/dL (23 Jul 2020 06:57)  POCT Blood Glucose.: 182 mg/dL (23 Jul 2020 06:10)  POCT Blood Glucose.: 197 mg/dL (23 Jul 2020 05:29)  POCT Blood Glucose.: 234 mg/dL (23 Jul 2020 04:01)  POCT Blood Glucose.: 226 mg/dL (23 Jul 2020 03:02)  POCT Blood Glucose.: 324 mg/dL (23 Jul 2020 02:07)  POCT Blood Glucose.: 270 mg/dL (23 Jul 2020 01:02)  POCT Blood Glucose.: 287 mg/dL (23 Jul 2020 00:03)  POCT Blood Glucose.: 351 mg/dL (22 Jul 2020 22:50)  POCT Blood Glucose.: 346 mg/dL (22 Jul 2020 22:20)  POCT Blood Glucose.: 385 mg/dL (22 Jul 2020 20:40)  POCT Blood Glucose.: 520 mg/dL (22 Jul 2020 15:28)

## 2020-07-23 NOTE — CONSULT NOTE ADULT - ASSESSMENT
CKD(III): superimposed MELISSA in setting of hypertensive emergency  Pt received IV contrast yesterday but no signs of RCN/ATN (renal function is improving)  - avoid further potential nephrotoxins  - gradually lower BP to minimize hemodynamic effects on kidney  - no ACE nor ARB now  - follow labs
36 yo male with hx of morbid obesity, HTN, DM, HLD, CKD, ARVIND not on CPAP, bipolar disorder, presented to the emergency department with altered mental status and aphasia.  Patient underwent CT/CTA/perfusion scan which did not reveal any acute stroke and admitted w hypertensive emergency due to nonadherence with meds  1. poorly controlled T2DM (outpt A1c >15%)- glucoses improved with insulin drip now elevated off insulin drip and basal/bolus insulin started today  - cont Lantus 20 units BID, cont Humalog 10 units premeals + scale  - will titrate insulin doses based on sugars  - pt should follow up with me as outpt  - check A1c    2. HLD - cont statin    3. Hypertensive emergency - resolved, cont current BP regimen

## 2020-07-23 NOTE — CONSULT NOTE ADULT - SUBJECTIVE AND OBJECTIVE BOX
CHIEF COMPLAINT:    HPI: 35yMale  History of Present Illness: 	  34 yo male with hx of morbid obesity, HTN, DM, HLD, CKD, ARVIND not on CPAP, bipolar disorder, presented to the emergency department with altered mental status and aphasia.  Patient underwent CT/CTA/perfusion scan which did not reveal any acute stroke.  He was also noted to have severe hypertension with SBP in 240s.  His blood pressure was controlled with a nicardipine infusion and his mental status and aphasia gradually improved.  Patient reports missing doses of his medications over the past few days.      PAST MEDICAL & SURGICAL HISTORY:  Kidney disease  HTN (hypertension)  Sleep apnea  Diabetes  Bipolar 1 disorder  Hypertension  No significant past surgical history    MEDICATIONS  (STANDING):  amLODIPine   Tablet 10 milliGRAM(s) Oral daily  aspirin  chewable 81 milliGRAM(s) Oral daily  benztropine 0.5 milliGRAM(s) Oral two times a day  calcitriol   Capsule 0.25 MICROGram(s) Oral daily  carvedilol 12.5 milliGRAM(s) Oral every 12 hours  chlorhexidine 4% Liquid 1 Application(s) Topical <User Schedule>  cloNIDine 0.2 milliGRAM(s) Oral three times a day  heparin   Injectable 5000 Unit(s) SubCutaneous every 8 hours  hydrALAZINE 100 milliGRAM(s) Oral every 8 hours  insulin regular Infusion 6 Unit(s)/Hr (6 mL/Hr) IV Continuous <Continuous>  lamoTRIgine 100 milliGRAM(s) Oral two times a day  multiple electrolytes Injection Type 1 1000 milliLiter(s) (75 mL/Hr) IV Continuous <Continuous>  niCARdipine Infusion 5 mG/Hr (25 mL/Hr) IV Continuous <Continuous>    MEDICATIONS  (PRN):    Allergies    No Known Allergies    Intolerances        FAMILY HISTORY:  Family history of diabetes mellitus (Grandparent)          SOCIAL HISTORY:    Tobacco:    Alcohol:    Drugs:          REVIEW OF SYSTEMS:    Relevant systems are negative except as noted in the chart, HPI, and PMH      VITAL SIGNS:  Vital Signs Last 24 Hrs  T(C): 36.8 (23 Jul 2020 08:04), Max: 37 (22 Jul 2020 15:26)  T(F): 98.2 (23 Jul 2020 08:04), Max: 98.6 (22 Jul 2020 15:26)  HR: 73 (23 Jul 2020 07:00) (73 - 102)  BP: 150/75 (23 Jul 2020 06:00) (132/83 - 211/93)  BP(mean): 105 (23 Jul 2020 06:00) (103 - 132)  RR: 36 (23 Jul 2020 07:00) (13 - 36)  SpO2: 92% (23 Jul 2020 07:00) (92% - 98%)    PHYSICAL EXAMINATION:    General: Well-developed, well nourished, in no acute distress.  Cardiac:  Regular rate and rhythm. No carotid bruits appreciated.  Eyes: Fundoscopic examination was deferred.  Neurologic:  - Mental Status:  Alert, awake, oriented to person, place, and time; Speech is fluent. Language is normal. Follows commands well.  Insight and knowledge appear appropriate.  Cranial Nerves II-XII:    II:  Visual acuity is normal for age ; Visual fields are full to confrontation; Pupils are equal, round, and reactive to light.  III, IV, VI:  Extraocular movements are intact without nystagmus.  V:  Facial sensation is intact in the V1-V3 distribution bilaterally.  VII:  Face is symmetric with normal eye closure and smile  VIII:  Hearing is grossly intact  IX, X, XII:  speech is clear  XI:  Head turning and shoulder shrug are intact.  - Motor:  Strength is 5/5 x 4.   There is no pronator drift. .  - Reflexes:  2+ and symmetric at the knees.  Plantar responses flexor.  - Sensory:  Symmetric to light touch  - Coordination:  Finger-nose-finger is normal. Rapid alternating hand and foot  movements are intact. Dexterity appears normal      LABS:                          10.9   14.97 )-----------( 395      ( 23 Jul 2020 05:40 )             33.1     23 Jul 2020 05:40    137    |  99     |  35.0   ----------------------------<  210    3.6     |  25.0   |  4.08     Ca    8.9        23 Jul 2020 05:40  Phos  3.0       23 Jul 2020 05:40  Mg     2.3       23 Jul 2020 05:40    TPro  6.6    /  Alb  2.8    /  TBili  <0.2   /  DBili  x      /  AST  23     /  ALT  18     /  AlkPhos  105    23 Jul 2020 05:40    LIVER FUNCTIONS - ( 23 Jul 2020 05:40 )  Alb: 2.8 g/dL / Pro: 6.6 g/dL / ALK PHOS: 105 U/L / ALT: 18 U/L / AST: 23 U/L / GGT: x           PT/INR - ( 22 Jul 2020 15:54 )   PT: 12.0 sec;   INR: 1.04 ratio         PTT - ( 22 Jul 2020 15:54 )  PTT:28.6 sec      RADIOLOGY & ADDITIONAL STUDIES:    CTH, CTp,, CTAs- normal  Brain MRI - normal    IMPRESSION:      PLAN:  1.   2.   3.   4.  5.

## 2020-07-23 NOTE — PROGRESS NOTE ADULT - ASSESSMENT
Patient is a 35 year old male with a pmhx of morbid obesity, HTN, DM, HLD, CKD, ARVIND not on CPAP, bipolar disorder, presented to the emergency department with altered mental status and aphasia., found to have:    1. Hypertensive Emergency  2. AMS  3. hyperglycemia   4. MELISSA on CKD    Plan  Neuro: AMS improved with correction of BP, back to baseline. cont lamictal. CT/MRI negative for acute pathology. Restart Seroquel 100mf HS  Cardio: Weaned off cardene for HTN emergency 2/2 medication non-compliance. cont norvasc, coreg, clonidine, hydralazine   Pulm: no active issues, keep sats above 92%  GI: DM diet   Renal: MELISSA on CKD likely from uncontrolled DM as well as HTN, strict I/Os, renally dose meds  ID: No active issues   Endo: Given lantus 20mg BID to aid in liberation from insulin ggt. Start on 10unit premeal with ISS AC/HS. Of note patient takes 18 units premeal with 48 units of lantus at night. Will start with above and increase as needed. Previous hga1c is greater then 16. Endocrine consult  Heme: heparin and SCDs for DVT ppx, cont asp      Dispo: case d/w Dr. Peralta, ok for tele transfer   case endorsed to Patient is a 35 year old male with a pmhx of morbid obesity, HTN, DM, HLD, CKD, ARVIND not on CPAP, bipolar disorder, presented to the emergency department with altered mental status and aphasia., found to have:    1. Hypertensive Emergency  2. AMS  3. hyperglycemia   4. MELISSA on CKD    Plan  Neuro: AMS improved with correction of BP, back to baseline. cont lamictal. CT/MRI negative for acute pathology. Restart Seroquel 100mf HS  Cardio: Weaned off cardene for HTN emergency 2/2 medication non-compliance. cont norvasc, coreg, clonidine, hydralazine. restart statin  Pulm: no active issues, keep sats above 92%  GI: DM diet   Renal: MELISSA on CKD likely from uncontrolled DM as well as HTN, strict I/Os, renally dose meds  ID: No active issues   Endo: Given lantus 20mg BID to aid in liberation from insulin ggt. Start on 10unit premeal with ISS AC/HS. Of note patient takes 18 units premeal with 48 units of lantus at night. Will start with above and increase as needed. Previous hga1c is greater then 16. Endocrine consult  Heme: heparin and SCDs for DVT ppx, cont aspirin      Dispo: case d/w jorge Vizcaino for tele transfer   case endorsed to Dr. Morgan

## 2020-07-23 NOTE — PROGRESS NOTE ADULT - SUBJECTIVE AND OBJECTIVE BOX
CC: HTN emeregency    HPI:  36 yo male with hx of morbid obesity, HTN, DM, HLD, CKD, ARVIND not on CPAP, bipolar disorder, presented to the emergency department with altered mental status and aphasia.  Patient underwent CT/CTA/perfusion scan which did not reveal any acute stroke.  He was also noted to have severe hypertension with SBP in 240s.  His blood pressure was controlled with a nicardipine infusion and his mental status and aphasia gradually improved.  Patient reports missing doses of his medications over the past few days. Pt admitted to MICU, now off nicardipine drip and BP better controlled with improved mental status. Initially sugars were also very elevated placed on insulin drip now transitioned to Lantus and pre meal insulin. Pt downgraded to medical service.     Pt denies any sob, no chest pain, no n.v or abd pain, no pain.     PAST MEDICAL & SURGICAL HISTORY:  Kidney disease  HTN (hypertension)  Sleep apnea  Diabetes  Bipolar 1 disorder  Hypertension  No significant past surgical history      Social History:  Tobacco - smokes a few cigarettes per day for many years  ETOH - Quit alcohol many years ago   Illicit drug abuse - denies    FAMILY HISTORY:  Family history of diabetes mellitus (Grandparent)  Mother: ESRD on HD      Allergies  No Known Allergies      REVIEW OF SYSTEMS:  All other ROS are negative except noted in HPI    MEDICATIONS  (STANDING):  amLODIPine   Tablet 10 milliGRAM(s) Oral daily  aspirin  chewable 81 milliGRAM(s) Oral daily  atorvastatin 20 milliGRAM(s) Oral at bedtime  benztropine 0.5 milliGRAM(s) Oral two times a day  calcitriol   Capsule 0.25 MICROGram(s) Oral daily  carvedilol 12.5 milliGRAM(s) Oral every 12 hours  chlorhexidine 4% Liquid 1 Application(s) Topical <User Schedule>  cloNIDine 0.2 milliGRAM(s) Oral three times a day  dextrose 5%. 1000 milliLiter(s) (50 mL/Hr) IV Continuous <Continuous>  dextrose 50% Injectable 12.5 Gram(s) IV Push once  dextrose 50% Injectable 25 Gram(s) IV Push once  dextrose 50% Injectable 25 Gram(s) IV Push once  heparin   Injectable 5000 Unit(s) SubCutaneous every 8 hours  hydrALAZINE 100 milliGRAM(s) Oral every 8 hours  insulin glargine Injectable (LANTUS) 20 Unit(s) SubCutaneous two times a day  insulin lispro (HumaLOG) corrective regimen sliding scale   SubCutaneous Before meals and at bedtime  insulin lispro Injectable (HumaLOG) 10 Unit(s) SubCutaneous three times a day before meals  lamoTRIgine 50 milliGRAM(s) Oral two times a day  QUEtiapine 100 milliGRAM(s) Oral at bedtime    MEDICATIONS  (PRN):  dextrose 40% Gel 15 Gram(s) Oral once PRN Blood Glucose LESS THAN 70 milliGRAM(s)/deciliter  glucagon  Injectable 1 milliGRAM(s) IntraMuscular once PRN Glucose LESS THAN 70 milligrams/deciliter      Vital Signs Last 24 Hrs  T(C): 36.9 (2020 12:02), Max: 37 (2020 15:26)  T(F): 98.4 (2020 12:02), Max: 98.6 (2020 15:26)  HR: 74 (2020 13:00) (67 - 102)  BP: 154/83 (2020 13:00) (123/68 - 211/93)  BP(mean): 111 (2020 13:00) (88 - 132)  RR: 20 (2020 13:00) (13 - 36)  SpO2: 98% (2020 13:00) (92% - 98%)    PHYSICAL EXAM:  GENERAL: NAD, well-groomed, well-developed  HEAD:  Atraumatic, Normocephalic  EYES: EOMI, PERRLA, conjunctiva and sclera clear  NERVOUS SYSTEM:  Alert & Oriented X3, Good concentration; Motor Strength 5/5 B/L upper and lower extremities  CHEST/LUNG: CTA  b/l,  no rales, rhonchi, wheezing  HEART: Regular rate and rhythm; No murmurs  ABDOMEN: Soft, Nontender, Nondistended; Bowel sounds present  EXTREMITIES:  2+ Peripheral Pulses, No clubbing, cyanosis, or edema ,       LABS:                        10.9   14.97 )-----------( 395      ( 2020 05:40 )             33.1     07-    137  |  99  |  35.0<H>  ----------------------------<  210<H>  3.6   |  25.0  |  4.08<H>    Ca    8.9      2020 05:40  Phos  3.0     07-23  Mg     2.3     07-23    TPro  6.6  /  Alb  2.8<L>  /  TBili  <0.2<L>  /  DBili  x   /  AST  23  /  ALT  18  /  AlkPhos  105  07-23    PT/INR - ( 2020 15:54 )   PT: 12.0 sec;   INR: 1.04 ratio         PTT - ( 2020 15:54 )  PTT:28.6 sec  Urinalysis Basic - ( 2020 20:13 )    Color: Yellow / Appearance: Clear / S.005 / pH: x  Gluc: x / Ketone: Negative  / Bili: Negative / Urobili: Negative mg/dL   Blood: x / Protein: 100 mg/dL / Nitrite: Negative   Leuk Esterase: Negative / RBC: 0-2 /HPF / WBC Negative   Sq Epi: x / Non Sq Epi: Occasional / Bacteria: x

## 2020-07-24 ENCOUNTER — APPOINTMENT (OUTPATIENT)
Dept: CARDIOLOGY | Facility: CLINIC | Age: 35
End: 2020-07-24

## 2020-07-24 ENCOUNTER — TRANSCRIPTION ENCOUNTER (OUTPATIENT)
Age: 35
End: 2020-07-24

## 2020-07-24 LAB
A1C WITH ESTIMATED AVERAGE GLUCOSE RESULT: 15.8 % — HIGH (ref 4–5.6)
ALBUMIN SERPL ELPH-MCNC: 2.6 G/DL — LOW (ref 3.3–5.2)
ALP SERPL-CCNC: 94 U/L — SIGNIFICANT CHANGE UP (ref 40–120)
ALT FLD-CCNC: 10 U/L — SIGNIFICANT CHANGE UP
ANION GAP SERPL CALC-SCNC: 12 MMOL/L — SIGNIFICANT CHANGE UP (ref 5–17)
AST SERPL-CCNC: 9 U/L — SIGNIFICANT CHANGE UP
BILIRUB SERPL-MCNC: <0.2 MG/DL — LOW (ref 0.4–2)
BUN SERPL-MCNC: 34 MG/DL — HIGH (ref 8–20)
CALCIUM SERPL-MCNC: 8.8 MG/DL — SIGNIFICANT CHANGE UP (ref 8.6–10.2)
CHLORIDE SERPL-SCNC: 102 MMOL/L — SIGNIFICANT CHANGE UP (ref 98–107)
CHOLEST SERPL-MCNC: 182 MG/DL — SIGNIFICANT CHANGE UP (ref 110–199)
CO2 SERPL-SCNC: 27 MMOL/L — SIGNIFICANT CHANGE UP (ref 22–29)
CREAT SERPL-MCNC: 4.49 MG/DL — HIGH (ref 0.5–1.3)
CULTURE RESULTS: SIGNIFICANT CHANGE UP
ESTIMATED AVERAGE GLUCOSE: 407 MG/DL — HIGH (ref 68–114)
GLUCOSE BLDC GLUCOMTR-MCNC: 157 MG/DL — HIGH (ref 70–99)
GLUCOSE BLDC GLUCOMTR-MCNC: 170 MG/DL — HIGH (ref 70–99)
GLUCOSE BLDC GLUCOMTR-MCNC: 175 MG/DL — HIGH (ref 70–99)
GLUCOSE BLDC GLUCOMTR-MCNC: 97 MG/DL — SIGNIFICANT CHANGE UP (ref 70–99)
GLUCOSE SERPL-MCNC: 152 MG/DL — HIGH (ref 70–99)
HCT VFR BLD CALC: 32.2 % — LOW (ref 39–50)
HDLC SERPL-MCNC: 39 MG/DL — LOW
HGB BLD-MCNC: 10.3 G/DL — LOW (ref 13–17)
LIPID PNL WITH DIRECT LDL SERPL: 98 MG/DL — SIGNIFICANT CHANGE UP
MAGNESIUM SERPL-MCNC: 2.2 MG/DL — SIGNIFICANT CHANGE UP (ref 1.6–2.6)
MCHC RBC-ENTMCNC: 23.4 PG — LOW (ref 27–34)
MCHC RBC-ENTMCNC: 32 GM/DL — SIGNIFICANT CHANGE UP (ref 32–36)
MCV RBC AUTO: 73 FL — LOW (ref 80–100)
PHOSPHATE SERPL-MCNC: 4.1 MG/DL — SIGNIFICANT CHANGE UP (ref 2.4–4.7)
PLATELET # BLD AUTO: 368 K/UL — SIGNIFICANT CHANGE UP (ref 150–400)
POTASSIUM SERPL-MCNC: 2.6 MMOL/L — CRITICAL LOW (ref 3.5–5.3)
POTASSIUM SERPL-SCNC: 2.6 MMOL/L — CRITICAL LOW (ref 3.5–5.3)
PROT SERPL-MCNC: 6.2 G/DL — LOW (ref 6.6–8.7)
RBC # BLD: 4.41 M/UL — SIGNIFICANT CHANGE UP (ref 4.2–5.8)
RBC # FLD: 16.6 % — HIGH (ref 10.3–14.5)
SODIUM SERPL-SCNC: 141 MMOL/L — SIGNIFICANT CHANGE UP (ref 135–145)
SPECIMEN SOURCE: SIGNIFICANT CHANGE UP
TOTAL CHOLESTEROL/HDL RATIO MEASUREMENT: 5 RATIO — SIGNIFICANT CHANGE UP (ref 3.4–9.6)
TRIGL SERPL-MCNC: 223 MG/DL — HIGH (ref 10–200)
WBC # BLD: 13.83 K/UL — HIGH (ref 3.8–10.5)
WBC # FLD AUTO: 13.83 K/UL — HIGH (ref 3.8–10.5)

## 2020-07-24 PROCEDURE — 99233 SBSQ HOSP IP/OBS HIGH 50: CPT

## 2020-07-24 PROCEDURE — 99232 SBSQ HOSP IP/OBS MODERATE 35: CPT

## 2020-07-24 PROCEDURE — 74176 CT ABD & PELVIS W/O CONTRAST: CPT | Mod: 26

## 2020-07-24 RX ORDER — METOCLOPRAMIDE HCL 10 MG
5 TABLET ORAL EVERY 4 HOURS
Refills: 0 | Status: DISCONTINUED | OUTPATIENT
Start: 2020-07-24 | End: 2020-07-24

## 2020-07-24 RX ORDER — POTASSIUM CHLORIDE 20 MEQ
10 PACKET (EA) ORAL ONCE
Refills: 0 | Status: DISCONTINUED | OUTPATIENT
Start: 2020-07-24 | End: 2020-07-24

## 2020-07-24 RX ORDER — PANTOPRAZOLE SODIUM 20 MG/1
40 TABLET, DELAYED RELEASE ORAL DAILY
Refills: 0 | Status: DISCONTINUED | OUTPATIENT
Start: 2020-07-24 | End: 2020-07-26

## 2020-07-24 RX ORDER — POTASSIUM CHLORIDE 20 MEQ
10 PACKET (EA) ORAL
Refills: 0 | Status: COMPLETED | OUTPATIENT
Start: 2020-07-24 | End: 2020-07-24

## 2020-07-24 RX ORDER — POTASSIUM CHLORIDE 20 MEQ
40 PACKET (EA) ORAL EVERY 4 HOURS
Refills: 0 | Status: COMPLETED | OUTPATIENT
Start: 2020-07-24 | End: 2020-07-24

## 2020-07-24 RX ADMIN — Medication 0.2 MILLIGRAM(S): at 13:26

## 2020-07-24 RX ADMIN — Medication 2: at 17:32

## 2020-07-24 RX ADMIN — ATORVASTATIN CALCIUM 20 MILLIGRAM(S): 80 TABLET, FILM COATED ORAL at 22:39

## 2020-07-24 RX ADMIN — Medication 100 MILLIEQUIVALENT(S): at 10:54

## 2020-07-24 RX ADMIN — Medication 0.2 MILLIGRAM(S): at 05:46

## 2020-07-24 RX ADMIN — Medication 0.5 MILLIGRAM(S): at 05:49

## 2020-07-24 RX ADMIN — Medication 2: at 12:29

## 2020-07-24 RX ADMIN — INSULIN GLARGINE 20 UNIT(S): 100 INJECTION, SOLUTION SUBCUTANEOUS at 09:04

## 2020-07-24 RX ADMIN — LAMOTRIGINE 50 MILLIGRAM(S): 25 TABLET, ORALLY DISINTEGRATING ORAL at 17:32

## 2020-07-24 RX ADMIN — Medication 2: at 08:23

## 2020-07-24 RX ADMIN — Medication 10 UNIT(S): at 17:33

## 2020-07-24 RX ADMIN — HEPARIN SODIUM 5000 UNIT(S): 5000 INJECTION INTRAVENOUS; SUBCUTANEOUS at 13:26

## 2020-07-24 RX ADMIN — QUETIAPINE FUMARATE 100 MILLIGRAM(S): 200 TABLET, FILM COATED ORAL at 22:38

## 2020-07-24 RX ADMIN — Medication 40 MILLIEQUIVALENT(S): at 13:25

## 2020-07-24 RX ADMIN — Medication 100 MILLIEQUIVALENT(S): at 08:23

## 2020-07-24 RX ADMIN — Medication 10 UNIT(S): at 12:29

## 2020-07-24 RX ADMIN — CARVEDILOL PHOSPHATE 12.5 MILLIGRAM(S): 80 CAPSULE, EXTENDED RELEASE ORAL at 05:47

## 2020-07-24 RX ADMIN — AMLODIPINE BESYLATE 10 MILLIGRAM(S): 2.5 TABLET ORAL at 05:52

## 2020-07-24 RX ADMIN — PANTOPRAZOLE SODIUM 40 MILLIGRAM(S): 20 TABLET, DELAYED RELEASE ORAL at 10:29

## 2020-07-24 RX ADMIN — INSULIN GLARGINE 20 UNIT(S): 100 INJECTION, SOLUTION SUBCUTANEOUS at 22:39

## 2020-07-24 RX ADMIN — Medication 0.5 MILLIGRAM(S): at 17:31

## 2020-07-24 RX ADMIN — HEPARIN SODIUM 5000 UNIT(S): 5000 INJECTION INTRAVENOUS; SUBCUTANEOUS at 22:39

## 2020-07-24 RX ADMIN — CALCITRIOL 0.25 MICROGRAM(S): 0.5 CAPSULE ORAL at 10:31

## 2020-07-24 RX ADMIN — Medication 100 MILLIGRAM(S): at 22:39

## 2020-07-24 RX ADMIN — LAMOTRIGINE 50 MILLIGRAM(S): 25 TABLET, ORALLY DISINTEGRATING ORAL at 05:48

## 2020-07-24 RX ADMIN — Medication 10 UNIT(S): at 08:24

## 2020-07-24 RX ADMIN — CARVEDILOL PHOSPHATE 12.5 MILLIGRAM(S): 80 CAPSULE, EXTENDED RELEASE ORAL at 17:31

## 2020-07-24 RX ADMIN — Medication 100 MILLIGRAM(S): at 05:47

## 2020-07-24 RX ADMIN — Medication 100 MILLIGRAM(S): at 13:25

## 2020-07-24 RX ADMIN — HEPARIN SODIUM 5000 UNIT(S): 5000 INJECTION INTRAVENOUS; SUBCUTANEOUS at 05:50

## 2020-07-24 RX ADMIN — Medication 81 MILLIGRAM(S): at 10:31

## 2020-07-24 RX ADMIN — Medication 40 MILLIEQUIVALENT(S): at 10:29

## 2020-07-24 RX ADMIN — Medication 0.2 MILLIGRAM(S): at 22:39

## 2020-07-24 RX ADMIN — Medication 100 MILLIEQUIVALENT(S): at 09:39

## 2020-07-24 NOTE — DISCHARGE NOTE PROVIDER - CARE PROVIDERS DIRECT ADDRESSES
,[email or web address],[email or web address]

## 2020-07-24 NOTE — PROGRESS NOTE ADULT - SUBJECTIVE AND OBJECTIVE BOX
Follow up: T2DM  Interval Hx/Events: no issue, no events    MEDICATIONS  (STANDING):  amLODIPine   Tablet 10 milliGRAM(s) Oral daily  aspirin  chewable 81 milliGRAM(s) Oral daily  atorvastatin 20 milliGRAM(s) Oral at bedtime  benztropine 0.5 milliGRAM(s) Oral two times a day  calcitriol   Capsule 0.25 MICROGram(s) Oral daily  carvedilol 12.5 milliGRAM(s) Oral every 12 hours  chlorhexidine 4% Liquid 1 Application(s) Topical <User Schedule>  cloNIDine 0.2 milliGRAM(s) Oral three times a day  dextrose 5%. 1000 milliLiter(s) (50 mL/Hr) IV Continuous <Continuous>  dextrose 50% Injectable 12.5 Gram(s) IV Push once  dextrose 50% Injectable 25 Gram(s) IV Push once  dextrose 50% Injectable 25 Gram(s) IV Push once  heparin   Injectable 5000 Unit(s) SubCutaneous every 8 hours  hydrALAZINE 100 milliGRAM(s) Oral every 8 hours  insulin glargine Injectable (LANTUS) 20 Unit(s) SubCutaneous two times a day  insulin lispro (HumaLOG) corrective regimen sliding scale   SubCutaneous Before meals and at bedtime  insulin lispro Injectable (HumaLOG) 10 Unit(s) SubCutaneous three times a day before meals  lamoTRIgine 50 milliGRAM(s) Oral two times a day  pantoprazole    Tablet 40 milliGRAM(s) Oral daily  QUEtiapine 100 milliGRAM(s) Oral at bedtime    MEDICATIONS  (PRN):  dextrose 40% Gel 15 Gram(s) Oral once PRN Blood Glucose LESS THAN 70 milliGRAM(s)/deciliter  glucagon  Injectable 1 milliGRAM(s) IntraMuscular once PRN Glucose LESS THAN 70 milligrams/deciliter      ALLERGIES: No Known Allergies      REVIEW OF SYSTEMS:  CONSTITUTIONAL: No fever, weight loss, or fatigue  EYES: No blurry vision  RESPIRATORY: No cough, No shortness of breath  CARDIOVASCULAR: No chest pain, palpitations or dizziness  GASTROINTESTINAL: No abdominal or epigastric pain. No nausea or vomiting  GENITOURINARY: No dysuria  NEUROLOGICAL: No headaches  SKIN: No itching, burning, rashes, or lesions   PSYCHIATRIC: No depression/ anxiety      Vital Signs Last 24 Hrs  T(C): 36.8 (24 Jul 2020 12:31), Max: 37.2 (24 Jul 2020 05:41)  T(F): 98.2 (24 Jul 2020 12:31), Max: 98.9 (24 Jul 2020 05:41)  HR: 72 (24 Jul 2020 13:24) (70 - 78)  BP: 148/85 (24 Jul 2020 13:24) (128/69 - 155/81)  BP(mean): 94 (23 Jul 2020 18:00) (94 - 98)  RR: 20 (24 Jul 2020 12:31) (20 - 23)  SpO2: 95% (24 Jul 2020 12:31) (95% - 100%)    Physical Exam:  General appearance: Well developed, well nourished.  Eyes: Pupils equal. EOMI  Lungs: Normal respiratory excursion. Lungs clear no w/r/r  CV: Normal S1S2, regular. No m/r/g.  Pedal pulses intact.  Abdomen: Soft, nontender, nondistended, (+) BS, (+) obese  Musculoskeletal: No cyanosis, clubbing, or edema.  Skin: Warm and moist.  (+) acanthosis. Feet - no ulcers  Neuro: Cranial nerves intact. Good sensation to light touch.    Psych: Normal affect, good judgement/insight      LABS:                        10.3   13.83 )-----------( 368      ( 24 Jul 2020 06:07 )             32.2     07-24    141  |  102  |  34.0<H>  ----------------------------<  152<H>  2.6<LL>   |  27.0  |  4.49<H>    Ca    8.8      24 Jul 2020 06:07  Phos  4.1     07-24  Mg     2.2     07-24    TPro  6.2<L>  /  Alb  2.6<L>  /  TBili  <0.2<L>  /  DBili  x   /  AST  9   /  ALT  10  /  AlkPhos  94  07-24    LIVER FUNCTIONS - ( 24 Jul 2020 06:07 )  Alb: 2.6 g/dL / Pro: 6.2 g/dL / ALK PHOS: 94 U/L / ALT: 10 U/L / AST: 9 U/L / GGT: x             A1C with Estimated Average Glucose Result: 15.8 % (07-24-20 @ 06:07)      CAPILLARY BLOOD GLUCOSE  POCT Blood Glucose.: 157 mg/dL (24 Jul 2020 12:15)  POCT Blood Glucose.: 175 mg/dL (24 Jul 2020 08:02)  POCT Blood Glucose.: 171 mg/dL (23 Jul 2020 21:13)  POCT Blood Glucose.: 265 mg/dL (23 Jul 2020 17:00)  POCT Blood Glucose.: 288 mg/dL (23 Jul 2020 11:31)  POCT Blood Glucose.: 263 mg/dL (23 Jul 2020 10:09)  POCT Blood Glucose.: 154 mg/dL (23 Jul 2020 08:05)  POCT Blood Glucose.: 179 mg/dL (23 Jul 2020 06:57)  POCT Blood Glucose.: 182 mg/dL (23 Jul 2020 06:10)  POCT Blood Glucose.: 197 mg/dL (23 Jul 2020 05:29)  POCT Blood Glucose.: 234 mg/dL (23 Jul 2020 04:01)  POCT Blood Glucose.: 226 mg/dL (23 Jul 2020 03:02)  POCT Blood Glucose.: 324 mg/dL (23 Jul 2020 02:07)  POCT Blood Glucose.: 270 mg/dL (23 Jul 2020 01:02)  POCT Blood Glucose.: 287 mg/dL (23 Jul 2020 00:03)  POCT Blood Glucose.: 351 mg/dL (22 Jul 2020 22:50)  POCT Blood Glucose.: 346 mg/dL (22 Jul 2020 22:20)  POCT Blood Glucose.: 385 mg/dL (22 Jul 2020 20:40)

## 2020-07-24 NOTE — DISCHARGE NOTE PROVIDER - NSDCCPCAREPLAN_GEN_ALL_CORE_FT
PRINCIPAL DISCHARGE DIAGNOSIS  Diagnosis: Hypertensive emergency  Assessment and Plan of Treatment: continue to monitor closely at home ,see your doctor and take your medication as instructed      SECONDARY DISCHARGE DIAGNOSES  Diagnosis: Acute encephalopathy  Assessment and Plan of Treatment: improved fara wright BG and blood pressure    Diagnosis: Hyperglycemia  Assessment and Plan of Treatment: improved    Diagnosis: Hypokalemia  Assessment and Plan of Treatment: replaced PRINCIPAL DISCHARGE DIAGNOSIS  Diagnosis: Hypertensive emergency  Assessment and Plan of Treatment: Please continue your medications as prescribed, continue with low salt, low cholesterol diet.      SECONDARY DISCHARGE DIAGNOSES  Diagnosis: Secondary DM with CKD stage 4 and hypertension  Assessment and Plan of Treatment: Please continue with strict control of glucose and high blood pressure. Monitor renal function closely. Avoid medications which will further harm the kidney. Please do not take non steroidal medications, no ibuprofen/ advil or alleve, only use tylenol if needed. Please follow up with Dr. Workman this week and have potassium and kidney numbers checked.    Diagnosis: Hypokalemia  Assessment and Plan of Treatment: Possibly secondary to renin/aldosterone issues.  Please continue with supplementation with aldactone and potassium, please have your potassium checked by your primary care physician in 1-2 days. Please make an appointment this week to ensure that potassium remains stable on current dose.    Diagnosis: Type 2 diabetes mellitus, uncontrolled  Assessment and Plan of Treatment: HBA1C: 9  continue with insulin regimen and monitor glucose closely. Repeat HBA1C in 3 months and continue to maintain a journal or your glucose numbers. Please follow up with the endocrinologist.    Diagnosis: Obesity  Assessment and Plan of Treatment: Continue with diet and exercise modifications.

## 2020-07-24 NOTE — DISCHARGE NOTE PROVIDER - PROVIDER TOKENS
PROVIDER:[TOKEN:[[id number]]],PROVIDER:[TOKEN:[[id number]]],PROVIDER:[TOKEN:[[id number]]]

## 2020-07-24 NOTE — PROVIDER CONTACT NOTE (MEDICATION) - SITUATION
pt potassium 2.6. potassium chloride IV ordered to be given 3 times.  pt with one time order dose to be given of potassium chloride IV

## 2020-07-24 NOTE — PROGRESS NOTE ADULT - SUBJECTIVE AND OBJECTIVE BOX
NEPHROLOGY INTERVAL HPI/OVERNIGHT EVENTS:    No new events.    MEDICATIONS  (STANDING):  amLODIPine   Tablet 10 milliGRAM(s) Oral daily  aspirin  chewable 81 milliGRAM(s) Oral daily  atorvastatin 20 milliGRAM(s) Oral at bedtime  benztropine 0.5 milliGRAM(s) Oral two times a day  calcitriol   Capsule 0.25 MICROGram(s) Oral daily  carvedilol 12.5 milliGRAM(s) Oral every 12 hours  chlorhexidine 4% Liquid 1 Application(s) Topical <User Schedule>  cloNIDine 0.2 milliGRAM(s) Oral three times a day  dextrose 5%. 1000 milliLiter(s) (50 mL/Hr) IV Continuous <Continuous>  dextrose 50% Injectable 12.5 Gram(s) IV Push once  dextrose 50% Injectable 25 Gram(s) IV Push once  dextrose 50% Injectable 25 Gram(s) IV Push once  heparin   Injectable 5000 Unit(s) SubCutaneous every 8 hours  hydrALAZINE 100 milliGRAM(s) Oral every 8 hours  insulin glargine Injectable (LANTUS) 20 Unit(s) SubCutaneous two times a day  insulin lispro (HumaLOG) corrective regimen sliding scale   SubCutaneous Before meals and at bedtime  insulin lispro Injectable (HumaLOG) 10 Unit(s) SubCutaneous three times a day before meals  lamoTRIgine 50 milliGRAM(s) Oral two times a day  pantoprazole    Tablet 40 milliGRAM(s) Oral daily  potassium chloride    Tablet ER 40 milliEquivalent(s) Oral every 4 hours  potassium chloride  10 mEq/100 mL IVPB 10 milliEquivalent(s) IV Intermittent once  potassium chloride  10 mEq/100 mL IVPB 10 milliEquivalent(s) IV Intermittent once  QUEtiapine 100 milliGRAM(s) Oral at bedtime    MEDICATIONS  (PRN):  dextrose 40% Gel 15 Gram(s) Oral once PRN Blood Glucose LESS THAN 70 milliGRAM(s)/deciliter  glucagon  Injectable 1 milliGRAM(s) IntraMuscular once PRN Glucose LESS THAN 70 milligrams/deciliter      Allergies    No Known Allergies        Vital Signs Last 24 Hrs  T(C): 36.8 (2020 12:31), Max: 37.2 (2020 05:41)  T(F): 98.2 (2020 12:31), Max: 98.9 (2020 05:41)  HR: 75 (2020 12:31) (70 - 92)  BP: 136/90 (2020 12:31) (128/69 - 162/84)  BP(mean): 94 (2020 18:00) (94 - 116)  RR: 20 (2020 12:31) (20 - 38)  SpO2: 95% (2020 12:31) (95% - 100%)     Daily Weight in k.8 (2020 05:41)    PHYSICAL EXAM:    GENERAL: Comfortable in bed  HEAD: Wnl   EYES:   ENMT:   NECK: Veins wnl  NERVOUS SYSTEM: Alert, oriented   CHEST/LUNG: Decreased bs bases, no 02  HEART: RR, No gallop or rub  ABDOMEN: Soft, obese   EXTREMITIES: No edema   LYMPH:   SKIN: No rash    LABS:                        10.3   13.83 )-----------( 368      ( 2020 06:07 )             32.2     07-24    141  |  102  |  34.0<H>  ----------------------------<  152<H>  2.6<LL>   |  27.0  |  4.49<H>    Ca    8.8      2020 06:07  Phos  4.1     -  Mg     2.2     -24    TPro  6.2<L>  /  Alb  2.6<L>  /  TBili  <0.2<L>  /  DBili  x   /  AST  9   /  ALT  10  /  AlkPhos  94  07-24    PT/INR - ( 2020 15:54 )   PT: 12.0 sec;   INR: 1.04 ratio         PTT - ( 2020 15:54 )  PTT:28.6 sec  Urinalysis Basic - ( 2020 20:13 )    Color: Yellow / Appearance: Clear / S.005 / pH: x  Gluc: x / Ketone: Negative  / Bili: Negative / Urobili: Negative mg/dL   Blood: x / Protein: 100 mg/dL / Nitrite: Negative   Leuk Esterase: Negative / RBC: 0-2 /HPF / WBC Negative   Sq Epi: x / Non Sq Epi: Occasional / Bacteria: x      Magnesium, Serum: 2.2 mg/dL ( @ 06:07)  Phosphorus Level, Serum: 4.1 mg/dL ( @ 06:07)          RADIOLOGY & ADDITIONAL TESTS:

## 2020-07-24 NOTE — DISCHARGE NOTE PROVIDER - NSDCFUADDAPPT_GEN_ALL_CORE_FT
Please follow up with your primary care physician in 1-2 days to check blood work for potassium. Please follow up with the nephrologist within one week Dr. Deonte Workman and please follow up with the endocrinologist in 1-2 weeks.

## 2020-07-24 NOTE — DISCHARGE NOTE PROVIDER - HOSPITAL COURSE
36 yo B male with PMH + morbid obesity, HTN, DM, HLD, ARVIND, bipolar disorder, CKD(III) who presented to the ED with AMS and severe HTN (SBP> 240) and hyperglycemia  He was transferred to the ICU and after improved BP control his mental status has improved. Pt has admitted to not taking his BP medications as prescribed .His blood pressure was controlled with a nicardipine infusion and his mental status and aphasia gradually improved.  Patient reports missing doses of his medications over the past few days. Pt admitted to MICU, now off nicardipine drip and BP better controlled with improved mental status. Initially sugars were also very elevated placed on insulin drip now transitioned to Lantus and pre meal insulin. Pt downgraded to medical service 35 y.o. obese male with hx of HTN, DM2, HLD, ARVIND, bipolar disorder, CKD(III) who presented to the ED with acute metabolic encephalopathy and severe HTN (SBP> 240) and hyperglycemia and was transferred to the ICU s/p nicardipine and insulin drip with improvement in bp and glucose levels and thereafter improvement in mental status. Metabolic encephalopathy resolved. Hospital course complicated by MELISSA on CKD 4 now with stabilization in creatine but continual hypokalemia, thus far with negative ct adrenals, likely related to renin/alexandria; pending renin/aldosterone level. In the interim started on aldactone and potassium supplementation by nephro. Potassium supplemented and repeated and pt cleared by nephro to f/u outpt. Pt to f/u with pmd this week Dr. Harrington to have potassium/ renal fxn checked and nephro in 1 week as well as endo in 1-2 weeks.   Pt medically stable to be discharged home.             Vital Signs Last 24 Hrs    T(C): 36.5 (26 Jul 2020 06:00), Max: 36.7 (25 Jul 2020 21:00)    T(F): 97.7 (26 Jul 2020 06:00), Max: 98 (25 Jul 2020 21:00)    HR: 72 (26 Jul 2020 06:00) (72 - 81)    BP: 148/97 (26 Jul 2020 06:00) (118/74 - 148/97)    BP(mean): --    RR: 18 (26 Jul 2020 06:00) (18 - 20)    SpO2: 97% (26 Jul 2020 06:00) (93% - 97%)                CONSTITUTIONAL: Obese Well appearing, well nourished, awake, alert and in no apparent distress    CARDIAC: Normal rate, regular rhythm.  Heart sounds S1, S2.  No murmurs, rubs or gallops     RESPIRATORY: Breath sounds clear and equal bilaterally. No wheezes, rhales or rhonchi    GASTROENTEROLOGY: Soft nt nd bs + normoactive     EXTREMITIES: No edema, cyanosis or deformity     NEUROLOGICAL: Alert and oriented x 3     SKIN: No rash, skin turgor wnl                Discharge planning took 46 minutes

## 2020-07-24 NOTE — DISCHARGE NOTE PROVIDER - NSDCMRMEDTOKEN_GEN_ALL_CORE_FT
amLODIPine 10 mg oral tablet: 1 tab(s) orally once a day  aspirin 81 mg oral tablet: 1 tab(s) orally once a day  benztropine 0.5 mg oral tablet: 1 tab(s) orally 2 times a day  calcitriol 0.25 mcg oral capsule: 1 cap(s) orally once a day  carvedilol 12.5 mg oral tablet: 1 tab(s) orally every 12 hours  cloNIDine 0.2 mg oral tablet: 1 tab(s) orally 3 times a day hold if SBP is less than 110  Crestor 5 mg oral tablet: 1 tab(s) orally once a day  HumaLOG 100 units/mL subcutaneous solution: 18  subcutaneous 3 times a day (before meals)    +  sliding scale: 150-200 2 units; 201-250 4 units; 251-300 6 units; 301-350 8 units; 351-400 10 units; &gt; 400 call MD   hydrALAZINE 100 mg oral tablet: 1 tab(s) orally every 8 hours hold if SBP is less than 110  lamoTRIgine: 50 milligram(s) orally 2 times a day  Lantus Solostar Pen 100 units/mL subcutaneous solution: 48 unit(s) subcutaneous once a day (at bedtime)   paliperidone 6 mg oral tablet, extended release: 1 tab(s) orally once a day (in the morning)  potassium chloride 20 mEq oral tablet, extended release: 1 tab(s) orally 2 times a day  QUEtiapine 100 mg oral tablet: 1 tab(s) orally once a day (at bedtime) amLODIPine 10 mg oral tablet: 1 tab(s) orally once a day  aspirin 81 mg oral tablet: 1 tab(s) orally once a day  benztropine 0.5 mg oral tablet: 1 tab(s) orally 2 times a day  calcitriol 0.25 mcg oral capsule: 1 cap(s) orally once a day  carvedilol 12.5 mg oral tablet: 1 tab(s) orally every 12 hours  cloNIDine 0.2 mg oral tablet: 1 tab(s) orally 3 times a day  HumaLOG KwikPen 100 units/mL injectable solution: 10 unit(s) injectable 3 times a day (before meals)   hydrALAZINE 100 mg oral tablet: 1 tab(s) orally every 8 hours  lamoTRIgine: 50 milligram(s) orally 2 times a day  Lantus Solostar Pen 100 units/mL subcutaneous solution: 20  subcutaneous 2 times a day   paliperidone 6 mg oral tablet, extended release: 1 tab(s) orally once a day (in the morning)  pantoprazole 40 mg oral delayed release tablet: 1 tab(s) orally once a day  potassium chloride 20 mEq oral tablet, extended release: 1 tab(s) orally 2 times a day  QUEtiapine 100 mg oral tablet: 1 tab(s) orally once a day (at bedtime)  rosuvastatin 5 mg oral tablet: 1 tab(s) orally once a day  spironolactone 25 mg oral tablet: 1 tab(s) orally once a day

## 2020-07-24 NOTE — PROGRESS NOTE ADULT - ASSESSMENT
DM 2 with poor control, Hypertension poor compliance, CRF 4; Hypokalemia in pt with high Bp, high glucose not on diuretic .

## 2020-07-24 NOTE — PROGRESS NOTE ADULT - ASSESSMENT
36 yo B male with PMH + morbid obesity, HTN, DM, HLD, ARVIND, bipolar disorder, CKD(III) who presented to the ED with AMS and severe HTN (SBP> 240) and hyperglycemia  He was transferred to the ICU and after improved BP control his mental status has improved. Pt has admitted to not taking his BP medications as prescribed .His blood pressure was controlled with a nicardipine infusion and his mental status and aphasia gradually improved.  Patient reports missing doses of his medications over the past few days. Pt admitted to MICU, now off nicardipine drip and BP better controlled with improved mental status. Initially sugars were also very elevated placed on insulin drip now transitioned to Lantus and pre meal insulin. Pt downgraded to medical service.       1- Acute metabolic  encephalopathy likely due to hypertension and hyperglycemia   improved , BP controlled better   cont to monitor     2- Hypertensive emergency on arrivial   treated in ICU with cardene drip , now on po meds   BP is better   noncompliant , counseling provided to monitor BP closely at home and use meds as instructed     3- CRF stage 4   renal on board   avoid nephrotoxic     4- Diabetes Mellitus type 2 uncontrolled with hyperglycemia   s/p insulin drip in ICU now on lantus and sliding scale insulin coverage 34 yo B male with PMH + morbid obesity, HTN, DM, HLD, ARVIND, bipolar disorder, CKD(III) who presented to the ED with AMS and severe HTN (SBP> 240) and hyperglycemia  He was transferred to the ICU and after improved BP control his mental status has improved. Pt has admitted to not taking his BP medications as prescribed .His blood pressure was controlled with a nicardipine infusion and his mental status and aphasia gradually improved.  Patient reports missing doses of his medications over the past few days. Pt admitted to MICU, now off nicardipine drip and BP better controlled with improved mental status. Initially sugars were also very elevated placed on insulin drip now transitioned to Lantus and pre meal insulin. Pt downgraded to medical service.       1- Acute metabolic  encephalopathy likely due to hypertension and hyperglycemia   improved , BP controlled better   cont to monitor     2- Hypertensive emergency on arrivial   treated in ICU with cardene drip , now on po meds   BP is better   noncompliant , counseling provided to monitor BP closely at home and use meds as instructed     3- Hypokalemia   replace Po and Iv K ordered   repeat lytes     4- CRF stage 4   renal on board   avoid nephrotoxic     5- Diabetes Mellitus type 2 uncontrolled with hyperglycemia   s/p insulin drip in ICU now on lantus and sliding scale insulin coverage   endocrinology consult noted     6- Bipolar disorder   cont home meds   outpatient follwo up     7- Morbid obesity   will benefit from life tsyle modification , diet exercise

## 2020-07-24 NOTE — PROGRESS NOTE ADULT - SUBJECTIVE AND OBJECTIVE BOX
On Service Note   Pt was transferred from ICU to medical service   chart  reviewed , pt is seen examined   he is slow, awake alert oriented , poor historian   anxious wants to go home   no overnight events reported     pt is pain free , denies weakness     Vital Signs Last 24 Hrs  T(C): 37.2 (24 Jul 2020 05:41), Max: 37.2 (24 Jul 2020 05:41)  T(F): 98.9 (24 Jul 2020 05:41), Max: 98.9 (24 Jul 2020 05:41)  HR: 77 (24 Jul 2020 05:41) (67 - 92)  BP: 147/91 (24 Jul 2020 05:41) (128/66 - 164/76)  BP(mean): 94 (23 Jul 2020 18:00) (91 - 116)  RR: 20 (24 Jul 2020 05:41) (13 - 38)  SpO2: 100% (24 Jul 2020 05:41) (96% - 100%)      PHYSICAL EXAM:      Constitutional: morbidly obese , lying in the bed , no distress     Neck: supple , no JVD     Respiratory: CTA bilateral     Cardiovascular: regular s1 /s2     Gastrointestinal: soft no tenderness , BS positive     Extremities: no pretibial edema     Neurological:AxOx3 , non focal , MS all extremities 5/5                             10.3   13.83 )-----------( 368      ( 24 Jul 2020 06:07 )             32.2   07-24    141  |  102  |  34.0<H>  ----------------------------<  152<H>  2.6<LL>   |  27.0  |  4.49<H>    Ca    8.8      24 Jul 2020 06:07  Phos  4.1     07-24  Mg     2.2     07-24    TPro  6.2<L>  /  Alb  2.6<L>  /  TBili  <0.2<L>  /  DBili  x   /  AST  9   /  ALT  10  /  AlkPhos  94  07-24 On Service Note   Pt was transferred from ICU to medical service   chart  reviewed , pt is seen examined   he is slow, awake alert oriented , poor historian   anxious wants to go home   no overnight events reported     pt is pain free , denies weakness     Vital Signs Last 24 Hrs  T(C): 37.2 (24 Jul 2020 05:41), Max: 37.2 (24 Jul 2020 05:41)  T(F): 98.9 (24 Jul 2020 05:41), Max: 98.9 (24 Jul 2020 05:41)  HR: 77 (24 Jul 2020 05:41) (67 - 92)  BP: 147/91 (24 Jul 2020 05:41) (128/66 - 164/76)  BP(mean): 94 (23 Jul 2020 18:00) (91 - 116)  RR: 20 (24 Jul 2020 05:41) (13 - 38)  SpO2: 100% (24 Jul 2020 05:41) (96% - 100%)      PHYSICAL EXAM:      Constitutional: morbidly obese , lying in the bed , no distress     Neck: supple , no JVD     Respiratory: CTA bilateral     Cardiovascular: regular s1 /s2     Gastrointestinal: soft no tenderness , BS positive     Extremities: no pretibial edema     Neurological:AxOx3 , non focal , MS all extremities 5/5     CAPILLARY BLOOD GLUCOSE      POCT Blood Glucose.: 175 mg/dL (24 Jul 2020 08:02)  POCT Blood Glucose.: 171 mg/dL (23 Jul 2020 21:13)  POCT Blood Glucose.: 265 mg/dL (23 Jul 2020 17:00)  POCT Blood Glucose.: 288 mg/dL (23 Jul 2020 11:31)  POCT Blood Glucose.: 263 mg/dL (23 Jul 2020 10:09)                          10.3   13.83 )-----------( 368      ( 24 Jul 2020 06:07 )             32.2   07-24    141  |  102  |  34.0<H>  ----------------------------<  152<H>  2.6<LL>   |  27.0  |  4.49<H>    Ca    8.8      24 Jul 2020 06:07  Phos  4.1     07-24  Mg     2.2     07-24    TPro  6.2<L>  /  Alb  2.6<L>  /  TBili  <0.2<L>  /  DBili  x   /  AST  9   /  ALT  10  /  AlkPhos  94  07-24

## 2020-07-24 NOTE — DISCHARGE NOTE PROVIDER - NSDCFUSCHEDAPPT_GEN_ALL_CORE_FT
MILENA PUGA ; 08/03/2020 ; [facility] [address] [facility]  MILENA PUGA ; 08/07/2020 ; [facility] [address] MILENA PUGA ; 08/03/2020 ; [facility] [address] [facility]  MILENA PUGA ; 08/07/2020 ; [facility] [address] MILENA PUGA ; 08/03/2020 ; [facility] [address] [facility]  MILENA PUGA ; 08/07/2020 ; [facility] [address]

## 2020-07-24 NOTE — DISCHARGE NOTE PROVIDER - CARE PROVIDER_API CALL
Deonte Workman  INTERNAL MEDICINE  [address], [place] [id number]  Phone: [phone]  Fax: [phone]  Follow Up Time:     Bhavani Mccain (DO)  EndocrinologyMetabDiabetes; Internal Medicine  [address] [place] [address]  Phone: [phone]  Fax: [phone]  Follow Up Time:     Claudette Mcnally  FAMILY MEDICINE  [address] [place] [address]  Phone: [phone]  Fax: [phone]  Follow Up Time:

## 2020-07-24 NOTE — PROGRESS NOTE ADULT - ASSESSMENT
34 yo male with hx of morbid obesity, HTN, DM, HLD, CKD, ARVIND not on CPAP, bipolar disorder, presented to the emergency department with altered mental status and aphasia.  Patient underwent CT/CTA/perfusion scan which did not reveal any acute stroke and admitted w hypertensive emergency due to nonadherence with meds  1. poorly controlled T2DM (outpt A1c >15%) comp by CKD- glucoses improved with insulin drip now elevated off insulin drip and basal/bolus insulin restarted yesterday  - cont Lantus 20 units BID, cont Humalog 10 units premeals + scale  - will titrate insulin doses based on sugars  - pt should follow up with me as outpt    2. HLD - cont statin    3. Hypertensive emergency - resolved, BP improved, cont current BP regimen as per renal

## 2020-07-25 LAB
ANION GAP SERPL CALC-SCNC: 12 MMOL/L — SIGNIFICANT CHANGE UP (ref 5–17)
ANION GAP SERPL CALC-SCNC: 12 MMOL/L — SIGNIFICANT CHANGE UP (ref 5–17)
BUN SERPL-MCNC: 35 MG/DL — HIGH (ref 8–20)
BUN SERPL-MCNC: 35 MG/DL — HIGH (ref 8–20)
CALCIUM SERPL-MCNC: 8.5 MG/DL — LOW (ref 8.6–10.2)
CALCIUM SERPL-MCNC: 9 MG/DL — SIGNIFICANT CHANGE UP (ref 8.6–10.2)
CHLORIDE SERPL-SCNC: 100 MMOL/L — SIGNIFICANT CHANGE UP (ref 98–107)
CHLORIDE SERPL-SCNC: 104 MMOL/L — SIGNIFICANT CHANGE UP (ref 98–107)
CO2 SERPL-SCNC: 24 MMOL/L — SIGNIFICANT CHANGE UP (ref 22–29)
CO2 SERPL-SCNC: 26 MMOL/L — SIGNIFICANT CHANGE UP (ref 22–29)
CREAT SERPL-MCNC: 4.32 MG/DL — HIGH (ref 0.5–1.3)
CREAT SERPL-MCNC: 4.47 MG/DL — HIGH (ref 0.5–1.3)
GLUCOSE BLDC GLUCOMTR-MCNC: 137 MG/DL — HIGH (ref 70–99)
GLUCOSE BLDC GLUCOMTR-MCNC: 154 MG/DL — HIGH (ref 70–99)
GLUCOSE BLDC GLUCOMTR-MCNC: 184 MG/DL — HIGH (ref 70–99)
GLUCOSE BLDC GLUCOMTR-MCNC: 272 MG/DL — HIGH (ref 70–99)
GLUCOSE SERPL-MCNC: 135 MG/DL — HIGH (ref 70–99)
GLUCOSE SERPL-MCNC: 256 MG/DL — HIGH (ref 70–99)
MAGNESIUM SERPL-MCNC: 2.2 MG/DL — SIGNIFICANT CHANGE UP (ref 1.6–2.6)
PHOSPHATE SERPL-MCNC: 3.5 MG/DL — SIGNIFICANT CHANGE UP (ref 2.4–4.7)
POTASSIUM SERPL-MCNC: 3 MMOL/L — LOW (ref 3.5–5.3)
POTASSIUM SERPL-MCNC: 3.3 MMOL/L — LOW (ref 3.5–5.3)
POTASSIUM SERPL-SCNC: 3 MMOL/L — LOW (ref 3.5–5.3)
POTASSIUM SERPL-SCNC: 3.3 MMOL/L — LOW (ref 3.5–5.3)
SODIUM SERPL-SCNC: 136 MMOL/L — SIGNIFICANT CHANGE UP (ref 135–145)
SODIUM SERPL-SCNC: 142 MMOL/L — SIGNIFICANT CHANGE UP (ref 135–145)

## 2020-07-25 PROCEDURE — 99232 SBSQ HOSP IP/OBS MODERATE 35: CPT

## 2020-07-25 PROCEDURE — 99233 SBSQ HOSP IP/OBS HIGH 50: CPT

## 2020-07-25 RX ORDER — POTASSIUM CHLORIDE 20 MEQ
20 PACKET (EA) ORAL
Refills: 0 | Status: DISCONTINUED | OUTPATIENT
Start: 2020-07-25 | End: 2020-07-25

## 2020-07-25 RX ORDER — SPIRONOLACTONE 25 MG/1
25 TABLET, FILM COATED ORAL DAILY
Refills: 0 | Status: DISCONTINUED | OUTPATIENT
Start: 2020-07-25 | End: 2020-07-26

## 2020-07-25 RX ORDER — POTASSIUM CHLORIDE 20 MEQ
30 PACKET (EA) ORAL ONCE
Refills: 0 | Status: COMPLETED | OUTPATIENT
Start: 2020-07-25 | End: 2020-07-25

## 2020-07-25 RX ADMIN — Medication 100 MILLIGRAM(S): at 06:28

## 2020-07-25 RX ADMIN — CALCITRIOL 0.25 MICROGRAM(S): 0.5 CAPSULE ORAL at 08:52

## 2020-07-25 RX ADMIN — LAMOTRIGINE 50 MILLIGRAM(S): 25 TABLET, ORALLY DISINTEGRATING ORAL at 06:28

## 2020-07-25 RX ADMIN — CHLORHEXIDINE GLUCONATE 1 APPLICATION(S): 213 SOLUTION TOPICAL at 07:46

## 2020-07-25 RX ADMIN — QUETIAPINE FUMARATE 100 MILLIGRAM(S): 200 TABLET, FILM COATED ORAL at 21:11

## 2020-07-25 RX ADMIN — LAMOTRIGINE 50 MILLIGRAM(S): 25 TABLET, ORALLY DISINTEGRATING ORAL at 17:49

## 2020-07-25 RX ADMIN — AMLODIPINE BESYLATE 10 MILLIGRAM(S): 2.5 TABLET ORAL at 06:28

## 2020-07-25 RX ADMIN — Medication 2: at 08:52

## 2020-07-25 RX ADMIN — HEPARIN SODIUM 5000 UNIT(S): 5000 INJECTION INTRAVENOUS; SUBCUTANEOUS at 06:28

## 2020-07-25 RX ADMIN — HEPARIN SODIUM 5000 UNIT(S): 5000 INJECTION INTRAVENOUS; SUBCUTANEOUS at 13:08

## 2020-07-25 RX ADMIN — PANTOPRAZOLE SODIUM 40 MILLIGRAM(S): 20 TABLET, DELAYED RELEASE ORAL at 08:52

## 2020-07-25 RX ADMIN — Medication 100 MILLIGRAM(S): at 13:09

## 2020-07-25 RX ADMIN — Medication 0.2 MILLIGRAM(S): at 06:28

## 2020-07-25 RX ADMIN — ATORVASTATIN CALCIUM 20 MILLIGRAM(S): 80 TABLET, FILM COATED ORAL at 21:11

## 2020-07-25 RX ADMIN — Medication 81 MILLIGRAM(S): at 08:52

## 2020-07-25 RX ADMIN — Medication 30 MILLIEQUIVALENT(S): at 13:18

## 2020-07-25 RX ADMIN — Medication 0.5 MILLIGRAM(S): at 17:49

## 2020-07-25 RX ADMIN — HEPARIN SODIUM 5000 UNIT(S): 5000 INJECTION INTRAVENOUS; SUBCUTANEOUS at 21:21

## 2020-07-25 RX ADMIN — INSULIN GLARGINE 20 UNIT(S): 100 INJECTION, SOLUTION SUBCUTANEOUS at 23:23

## 2020-07-25 RX ADMIN — Medication 0.5 MILLIGRAM(S): at 06:28

## 2020-07-25 RX ADMIN — Medication 10 UNIT(S): at 08:53

## 2020-07-25 RX ADMIN — Medication 100 MILLIGRAM(S): at 21:11

## 2020-07-25 RX ADMIN — Medication 2: at 21:13

## 2020-07-25 RX ADMIN — CARVEDILOL PHOSPHATE 12.5 MILLIGRAM(S): 80 CAPSULE, EXTENDED RELEASE ORAL at 06:28

## 2020-07-25 RX ADMIN — Medication 20 MILLIEQUIVALENT(S): at 21:14

## 2020-07-25 RX ADMIN — INSULIN GLARGINE 20 UNIT(S): 100 INJECTION, SOLUTION SUBCUTANEOUS at 08:52

## 2020-07-25 RX ADMIN — CARVEDILOL PHOSPHATE 12.5 MILLIGRAM(S): 80 CAPSULE, EXTENDED RELEASE ORAL at 17:49

## 2020-07-25 RX ADMIN — Medication 0.2 MILLIGRAM(S): at 13:08

## 2020-07-25 RX ADMIN — Medication 10 UNIT(S): at 13:08

## 2020-07-25 RX ADMIN — Medication 6: at 17:50

## 2020-07-25 RX ADMIN — Medication 0.2 MILLIGRAM(S): at 21:11

## 2020-07-25 RX ADMIN — Medication 10 UNIT(S): at 17:49

## 2020-07-25 NOTE — PROGRESS NOTE ADULT - SUBJECTIVE AND OBJECTIVE BOX
NEPHROLOGY INTERVAL HPI/OVERNIGHT EVENTS:    No new events.    MEDICATIONS  (STANDING):  amLODIPine   Tablet 10 milliGRAM(s) Oral daily  aspirin  chewable 81 milliGRAM(s) Oral daily  atorvastatin 20 milliGRAM(s) Oral at bedtime  benztropine 0.5 milliGRAM(s) Oral two times a day  calcitriol   Capsule 0.25 MICROGram(s) Oral daily  carvedilol 12.5 milliGRAM(s) Oral every 12 hours  chlorhexidine 4% Liquid 1 Application(s) Topical <User Schedule>  cloNIDine 0.2 milliGRAM(s) Oral three times a day  dextrose 5%. 1000 milliLiter(s) (50 mL/Hr) IV Continuous <Continuous>  dextrose 50% Injectable 12.5 Gram(s) IV Push once  dextrose 50% Injectable 25 Gram(s) IV Push once  dextrose 50% Injectable 25 Gram(s) IV Push once  heparin   Injectable 5000 Unit(s) SubCutaneous every 8 hours  hydrALAZINE 100 milliGRAM(s) Oral every 8 hours  insulin glargine Injectable (LANTUS) 20 Unit(s) SubCutaneous two times a day  insulin lispro (HumaLOG) corrective regimen sliding scale   SubCutaneous Before meals and at bedtime  insulin lispro Injectable (HumaLOG) 10 Unit(s) SubCutaneous three times a day before meals  lamoTRIgine 50 milliGRAM(s) Oral two times a day  pantoprazole    Tablet 40 milliGRAM(s) Oral daily  potassium chloride    Tablet ER 40 milliEquivalent(s) Oral every 4 hours  potassium chloride  10 mEq/100 mL IVPB 10 milliEquivalent(s) IV Intermittent once  potassium chloride  10 mEq/100 mL IVPB 10 milliEquivalent(s) IV Intermittent once  QUEtiapine 100 milliGRAM(s) Oral at bedtime    MEDICATIONS  (PRN):  dextrose 40% Gel 15 Gram(s) Oral once PRN Blood Glucose LESS THAN 70 milliGRAM(s)/deciliter  glucagon  Injectable 1 milliGRAM(s) IntraMuscular once PRN Glucose LESS THAN 70 milligrams/deciliter      Allergies    No Known Allergies        Vital Signs Last 24 Hrs  T(C): 36.6 (2020 06:32), Max: 36.8 (2020 12:31)  T(F): 97.8 (2020 06:32), Max: 98.2 (2020 12:31)  HR: 80 (2020 06:32) (70 - 80)  BP: 149/92 (2020 06:32) (134/56 - 149/92)  BP(mean): --  RR: 18 (2020 06:32) (18 - 20)  SpO2: 100% (2020 06:32) (95% - 100%)  T(C): 36.8 (2020 12:31), Max: 37.2 (2020 05:41)  T(F): 98.2 (2020 12:31), Max: 98.9 (2020 05:41)  HR: 75 (2020 12:31) (70 - 92)  BP: 136/90 (2020 12:31) (128/69 - 162/84)  BP(mean): 94 (2020 18:00) (94 - 116)  RR: 20 (2020 12:31) (20 - 38)  SpO2: 95% (2020 12:31) (95% - 100%)     Daily Weight in k.8 (2020 05:41)    PHYSICAL EXAM:    GENERAL: Comfortable in bed  HEAD: Wnl   EYES:   ENMT:   NECK: Veins wnl  NERVOUS SYSTEM: Alert, oriented   CHEST/LUNG: Decreased bs bases, no 02  HEART: RR, No gallop or rub  ABDOMEN: Soft, obese   EXTREMITIES: No edema   LYMPH:   SKIN: No rash    LABS:               142  |  104  |  35.0<H>  ----------------------------<  135<H>  3.0<L>   |  26.0  |  4.32<H>    Ca    8.5<L>      2020 06:30  Phos  4.1     07-24  Mg     2.2     07-24    TPro  6.2<L>  /  Alb  2.6<L>  /  TBili  <0.2<L>  /  DBili  x   /  AST  9   /  ALT  10  /  AlkPhos  94  07-24               10.3   13.83 )-----------( 368      ( 2020 06:07 )             32.2     07-24    141  |  102  |  34.0<H>  ----------------------------<  152<H>  2.6<LL>   |  27.0  |  4.49<H>    Ca    8.8      2020 06:07  Phos  4.1       Mg     2.2         TPro  6.2<L>  /  Alb  2.6<L>  /  TBili  <0.2<L>  /  DBili  x   /  AST  9   /  ALT  10  /  AlkPhos  94      PT/INR - ( 2020 15:54 )   PT: 12.0 sec;   INR: 1.04 ratio         PTT - ( 2020 15:54 )  PTT:28.6 sec  Urinalysis Basic - ( 2020 20:13 )    Color: Yellow / Appearance: Clear / S.005 / pH: x  Gluc: x / Ketone: Negative  / Bili: Negative / Urobili: Negative mg/dL   Blood: x / Protein: 100 mg/dL / Nitrite: Negative   Leuk Esterase: Negative / RBC: 0-2 /HPF / WBC Negative   Sq Epi: x / Non Sq Epi: Occasional / Bacteria: x      Magnesium, Serum: 2.2 mg/dL ( @ 06:07)  Phosphorus Level, Serum: 4.1 mg/dL ( @ 06:07)          RADIOLOGY & ADDITIONAL TESTS:

## 2020-07-25 NOTE — PROGRESS NOTE ADULT - ATTENDING COMMENTS
CAT of adrenals, [place]-renin
PO K, Added aldactone, follow up labs in am. Amilcar. level pending

## 2020-07-25 NOTE — PROGRESS NOTE ADULT - SUBJECTIVE AND OBJECTIVE BOX
Follow up: T2DM  Interval Hx/Events: no issues    MEDICATIONS  (STANDING):  amLODIPine   Tablet 10 milliGRAM(s) Oral daily  aspirin  chewable 81 milliGRAM(s) Oral daily  atorvastatin 20 milliGRAM(s) Oral at bedtime  benztropine 0.5 milliGRAM(s) Oral two times a day  calcitriol   Capsule 0.25 MICROGram(s) Oral daily  carvedilol 12.5 milliGRAM(s) Oral every 12 hours  chlorhexidine 4% Liquid 1 Application(s) Topical <User Schedule>  cloNIDine 0.2 milliGRAM(s) Oral three times a day  dextrose 5%. 1000 milliLiter(s) (50 mL/Hr) IV Continuous <Continuous>  dextrose 50% Injectable 12.5 Gram(s) IV Push once  dextrose 50% Injectable 25 Gram(s) IV Push once  dextrose 50% Injectable 25 Gram(s) IV Push once  heparin   Injectable 5000 Unit(s) SubCutaneous every 8 hours  hydrALAZINE 100 milliGRAM(s) Oral every 8 hours  insulin glargine Injectable (LANTUS) 20 Unit(s) SubCutaneous two times a day  insulin lispro (HumaLOG) corrective regimen sliding scale   SubCutaneous Before meals and at bedtime  insulin lispro Injectable (HumaLOG) 10 Unit(s) SubCutaneous three times a day before meals  lamoTRIgine 50 milliGRAM(s) Oral two times a day  pantoprazole    Tablet 40 milliGRAM(s) Oral daily  QUEtiapine 100 milliGRAM(s) Oral at bedtime  spironolactone 25 milliGRAM(s) Oral daily    MEDICATIONS  (PRN):  dextrose 40% Gel 15 Gram(s) Oral once PRN Blood Glucose LESS THAN 70 milliGRAM(s)/deciliter  glucagon  Injectable 1 milliGRAM(s) IntraMuscular once PRN Glucose LESS THAN 70 milligrams/deciliter      ALLERGIES: No Known Allergies      REVIEW OF SYSTEMS:  CONSTITUTIONAL: No fever, weight loss, or fatigue  EYES: No blurry vision  RESPIRATORY: No cough, No shortness of breath  CARDIOVASCULAR: No chest pain, palpitations or dizziness  GASTROINTESTINAL: No abdominal or epigastric pain. No nausea or vomiting  GENITOURINARY: No dysuria  NEUROLOGICAL: No headaches  SKIN: No itching, burning, rashes, or lesions   PSYCHIATRIC: No depression/ anxiety      Vital Signs Last 24 Hrs  T(C): 36.6 (25 Jul 2020 15:52), Max: 36.9 (25 Jul 2020 10:00)  T(F): 97.9 (25 Jul 2020 15:52), Max: 98.5 (25 Jul 2020 10:00)  HR: 81 (25 Jul 2020 15:52) (65 - 81)  BP: 118/74 (25 Jul 2020 15:52) (114/77 - 149/92)  BP(mean): --  RR: 19 (25 Jul 2020 15:52) (18 - 20)  SpO2: 93% (25 Jul 2020 15:52) (93% - 100%)    Physical Exam:  General appearance: Well developed, well nourished. Obese  Eyes: Pupils equal. EOMI  Lungs: Normal respiratory excursion. Lungs clear no w/r/r  CV: Normal S1S2, regular. No m/r/g.    Abdomen: Soft, nontender, nondistended, (+) BS, (+) obese  Musculoskeletal: No cyanosis, clubbing, or edema.  Skin: Warm and moist.  (+) acanthosis.   Neuro: Cranial nerves intact.   Psych: Normal affect, good judgement/insight    LABS:                        10.3   13.83 )-----------( 368      ( 24 Jul 2020 06:07 )             32.2     07-25    142  |  104  |  35.0<H>  ----------------------------<  135<H>  3.0<L>   |  26.0  |  4.32<H>    Ca    8.5<L>      25 Jul 2020 06:30  Phos  4.1     07-24  Mg     2.2     07-24    TPro  6.2<L>  /  Alb  2.6<L>  /  TBili  <0.2<L>  /  DBili  x   /  AST  9   /  ALT  10  /  AlkPhos  94  07-24    LIVER FUNCTIONS - ( 24 Jul 2020 06:07 )  Alb: 2.6 g/dL / Pro: 6.2 g/dL / ALK PHOS: 94 U/L / ALT: 10 U/L / AST: 9 U/L / GGT: x             A1C with Estimated Average Glucose Result: 15.8 % (07-24-20 @ 06:07)      CAPILLARY BLOOD GLUCOSE  POCT Blood Glucose.: 137 mg/dL (25 Jul 2020 12:09)  POCT Blood Glucose.: 154 mg/dL (25 Jul 2020 07:59)  POCT Blood Glucose.: 97 mg/dL (24 Jul 2020 21:32)  POCT Blood Glucose.: 170 mg/dL (24 Jul 2020 17:23)  POCT Blood Glucose.: 157 mg/dL (24 Jul 2020 12:15)  POCT Blood Glucose.: 175 mg/dL (24 Jul 2020 08:02)  POCT Blood Glucose.: 171 mg/dL (23 Jul 2020 21:13)  POCT Blood Glucose.: 265 mg/dL (23 Jul 2020 17:00)

## 2020-07-25 NOTE — PROGRESS NOTE ADULT - SUBJECTIVE AND OBJECTIVE BOX
Patient is a 35y old  Male who presents with a chief complaint of Hypertensive crisis (25 Jul 2020 08:46) remains with hypokalemia       HEALTH ISSUES - PROBLEM Dx:  Hypokalemia   MELISSA on CKD   Uncontrolled htn   uncontrolled dm2       INTERVAL HPI/OVERNIGHT EVENTS:  Patient seen and examined at bedside. No acute events overnight. Patient states he is feeling well and currently has no complaints.        Vital Signs Last 24 Hrs  T(C): 36.6 (25 Jul 2020 06:32), Max: 36.8 (24 Jul 2020 12:31)  T(F): 97.8 (25 Jul 2020 06:32), Max: 98.2 (24 Jul 2020 12:31)  HR: 80 (25 Jul 2020 06:32) (70 - 80)  BP: 149/92 (25 Jul 2020 06:32) (134/56 - 149/92)  BP(mean): --  RR: 18 (25 Jul 2020 06:32) (18 - 20)  SpO2: 100% (25 Jul 2020 06:32) (95% - 100%)    CAPILLARY BLOOD GLUCOSE  POCT Blood Glucose.: 154 mg/dL (25 Jul 2020 07:59)  POCT Blood Glucose.: 97 mg/dL (24 Jul 2020 21:32)  POCT Blood Glucose.: 170 mg/dL (24 Jul 2020 17:23)  POCT Blood Glucose.: 157 mg/dL (24 Jul 2020 12:15)      I&O's Summary    24 Jul 2020 07:01  -  25 Jul 2020 07:00  --------------------------------------------------------  IN: 1160 mL / OUT: 2800 mL / NET: -1640 mL        CONSTITUTIONAL: Obese Well appearing, well nourished, awake, alert and in no apparent distress  CARDIAC: Normal rate, regular rhythm.  Heart sounds S1, S2.  No murmurs, rubs or gallops   RESPIRATORY: Breath sounds clear and equal bilaterally. No wheezes, rhales or rhonchi  GASTROENTEROLOGY: Soft nt nd bs + normoactive   EXTREMITIES: No edema, cyanosis or deformity   NEUROLOGICAL: Alert and oriented x 3   SKIN: No rash, skin turgor wnl    MEDICATIONS  (STANDING):  amLODIPine   Tablet 10 milliGRAM(s) Oral daily  aspirin  chewable 81 milliGRAM(s) Oral daily  atorvastatin 20 milliGRAM(s) Oral at bedtime  benztropine 0.5 milliGRAM(s) Oral two times a day  calcitriol   Capsule 0.25 MICROGram(s) Oral daily  carvedilol 12.5 milliGRAM(s) Oral every 12 hours  chlorhexidine 4% Liquid 1 Application(s) Topical <User Schedule>  cloNIDine 0.2 milliGRAM(s) Oral three times a day  dextrose 5%. 1000 milliLiter(s) (50 mL/Hr) IV Continuous <Continuous>  dextrose 50% Injectable 12.5 Gram(s) IV Push once  dextrose 50% Injectable 25 Gram(s) IV Push once  dextrose 50% Injectable 25 Gram(s) IV Push once  heparin   Injectable 5000 Unit(s) SubCutaneous every 8 hours  hydrALAZINE 100 milliGRAM(s) Oral every 8 hours  insulin glargine Injectable (LANTUS) 20 Unit(s) SubCutaneous two times a day  insulin lispro (HumaLOG) corrective regimen sliding scale   SubCutaneous Before meals and at bedtime  insulin lispro Injectable (HumaLOG) 10 Unit(s) SubCutaneous three times a day before meals  lamoTRIgine 50 milliGRAM(s) Oral two times a day  pantoprazole    Tablet 40 milliGRAM(s) Oral daily  potassium chloride    Tablet ER 30 milliEquivalent(s) Oral once  QUEtiapine 100 milliGRAM(s) Oral at bedtime  spironolactone 25 milliGRAM(s) Oral daily    MEDICATIONS  (PRN):  dextrose 40% Gel 15 Gram(s) Oral once PRN Blood Glucose LESS THAN 70 milliGRAM(s)/deciliter  glucagon  Injectable 1 milliGRAM(s) IntraMuscular once PRN Glucose LESS THAN 70 milligrams/deciliter      LABS:                        10.3   13.83 )-----------( 368      ( 24 Jul 2020 06:07 )             32.2     07-25    142  |  104  |  35.0<H>  ----------------------------<  135<H>  3.0<L>   |  26.0  |  4.32<H>    Ca    8.5<L>      25 Jul 2020 06:30  Phos  4.1     07-24  Mg     2.2     07-24    TPro  6.2<L>  /  Alb  2.6<L>  /  TBili  <0.2<L>  /  DBili  x   /  AST  9   /  ALT  10  /  AlkPhos  94  07-24        LIVER FUNCTIONS - ( 24 Jul 2020 06:07 )  Alb: 2.6 g/dL / Pro: 6.2 g/dL / ALK PHOS: 94 U/L / ALT: 10 U/L / AST: 9 U/L / GGT: x             RADIOLOGY & ADDITIONAL TESTS:  ct abd/pelvis:   IMPRESSION:     Prominent soft tissue density in the mid right kidney, unchanged since 3/10/2020; a nonemergent MRI of the abdomen is recommended for further evaluation (a noncontrast study can be performed if intravenous contrast is contraindicated by the patient's renal function).    Bladder wall thickening; correlation is recommended for cystitis.    Skin thickening within the lower anterior abdominal wall; correlation is recommended for underlying infection. Patient is a 35y old  Male who presents with a chief complaint of Hypertensive crisis (25 Jul 2020 08:46) remains with hypokalemia       HEALTH ISSUES - PROBLEM Dx:  Hypokalemia   MELISSA on CKD   Uncontrolled htn   uncontrolled dm2       INTERVAL HPI/OVERNIGHT EVENTS:  Patient seen and examined at bedside. No acute events overnight. Patient states he is feeling well and currently has no complaints. Understands the plan of care and awaiting improvement in potassium for disposition. He understands possible dispo tomorrow. Patient denies chest pain, SOB, abd pain, N/V, fever, chills, dysuria or any other complaints. All remainder ROS negative.         Vital Signs Last 24 Hrs  T(C): 36.6 (25 Jul 2020 06:32), Max: 36.8 (24 Jul 2020 12:31)  T(F): 97.8 (25 Jul 2020 06:32), Max: 98.2 (24 Jul 2020 12:31)  HR: 80 (25 Jul 2020 06:32) (70 - 80)  BP: 149/92 (25 Jul 2020 06:32) (134/56 - 149/92)  BP(mean): --  RR: 18 (25 Jul 2020 06:32) (18 - 20)  SpO2: 100% (25 Jul 2020 06:32) (95% - 100%)    CAPILLARY BLOOD GLUCOSE  POCT Blood Glucose.: 154 mg/dL (25 Jul 2020 07:59)  POCT Blood Glucose.: 97 mg/dL (24 Jul 2020 21:32)  POCT Blood Glucose.: 170 mg/dL (24 Jul 2020 17:23)  POCT Blood Glucose.: 157 mg/dL (24 Jul 2020 12:15)      I&O's Summary    24 Jul 2020 07:01  -  25 Jul 2020 07:00  --------------------------------------------------------  IN: 1160 mL / OUT: 2800 mL / NET: -1640 mL        CONSTITUTIONAL: Obese Well appearing, well nourished, awake, alert and in no apparent distress  CARDIAC: Normal rate, regular rhythm.  Heart sounds S1, S2.  No murmurs, rubs or gallops   RESPIRATORY: Breath sounds clear and equal bilaterally. No wheezes, rhales or rhonchi  GASTROENTEROLOGY: Soft nt nd bs + normoactive   EXTREMITIES: No edema, cyanosis or deformity   NEUROLOGICAL: Alert and oriented x 3   SKIN: No rash, skin turgor wnl    MEDICATIONS  (STANDING):  amLODIPine   Tablet 10 milliGRAM(s) Oral daily  aspirin  chewable 81 milliGRAM(s) Oral daily  atorvastatin 20 milliGRAM(s) Oral at bedtime  benztropine 0.5 milliGRAM(s) Oral two times a day  calcitriol   Capsule 0.25 MICROGram(s) Oral daily  carvedilol 12.5 milliGRAM(s) Oral every 12 hours  chlorhexidine 4% Liquid 1 Application(s) Topical <User Schedule>  cloNIDine 0.2 milliGRAM(s) Oral three times a day  dextrose 5%. 1000 milliLiter(s) (50 mL/Hr) IV Continuous <Continuous>  dextrose 50% Injectable 12.5 Gram(s) IV Push once  dextrose 50% Injectable 25 Gram(s) IV Push once  dextrose 50% Injectable 25 Gram(s) IV Push once  heparin   Injectable 5000 Unit(s) SubCutaneous every 8 hours  hydrALAZINE 100 milliGRAM(s) Oral every 8 hours  insulin glargine Injectable (LANTUS) 20 Unit(s) SubCutaneous two times a day  insulin lispro (HumaLOG) corrective regimen sliding scale   SubCutaneous Before meals and at bedtime  insulin lispro Injectable (HumaLOG) 10 Unit(s) SubCutaneous three times a day before meals  lamoTRIgine 50 milliGRAM(s) Oral two times a day  pantoprazole    Tablet 40 milliGRAM(s) Oral daily  potassium chloride    Tablet ER 30 milliEquivalent(s) Oral once  QUEtiapine 100 milliGRAM(s) Oral at bedtime  spironolactone 25 milliGRAM(s) Oral daily    MEDICATIONS  (PRN):  dextrose 40% Gel 15 Gram(s) Oral once PRN Blood Glucose LESS THAN 70 milliGRAM(s)/deciliter  glucagon  Injectable 1 milliGRAM(s) IntraMuscular once PRN Glucose LESS THAN 70 milligrams/deciliter      LABS:                        10.3   13.83 )-----------( 368      ( 24 Jul 2020 06:07 )             32.2     07-25    142  |  104  |  35.0<H>  ----------------------------<  135<H>  3.0<L>   |  26.0  |  4.32<H>    Ca    8.5<L>      25 Jul 2020 06:30  Phos  4.1     07-24  Mg     2.2     07-24    TPro  6.2<L>  /  Alb  2.6<L>  /  TBili  <0.2<L>  /  DBili  x   /  AST  9   /  ALT  10  /  AlkPhos  94  07-24        LIVER FUNCTIONS - ( 24 Jul 2020 06:07 )  Alb: 2.6 g/dL / Pro: 6.2 g/dL / ALK PHOS: 94 U/L / ALT: 10 U/L / AST: 9 U/L / GGT: x             RADIOLOGY & ADDITIONAL TESTS:  ct abd/pelvis:   IMPRESSION:     Prominent soft tissue density in the mid right kidney, unchanged since 3/10/2020; a nonemergent MRI of the abdomen is recommended for further evaluation (a noncontrast study can be performed if intravenous contrast is contraindicated by the patient's renal function).    Bladder wall thickening; correlation is recommended for cystitis.    Skin thickening within the lower anterior abdominal wall; correlation is recommended for underlying infection.

## 2020-07-25 NOTE — PROGRESS NOTE ADULT - ASSESSMENT
35 y.o. obese male with hx of HTN, DM2, HLD, ARVIND, bipolar disorder, CKD(III) who presented to the ED with acute metabolic encephalopathy and severe HTN (SBP> 240) and hyperglycemia  He was transferred to the ICU and after improved BP control his mental status has improved. Pt has admitted to not taking his BP medications as prescribed .His blood pressure was controlled with a nicardipine infusion and his mental status and aphasia gradually improved.  Patient reports missing doses of his medications over the past few days. Pt admitted to MICU, now off nicardipine drip and BP better controlled with improved mental status. Initially sugars were also very elevated placed on insulin drip now transitioned to Lantus and pre meal insulin. Pt downgraded to medical service.       Acute metabolic  encephalopathy likely due to hypertension and hyperglycemia   improved , BP controlled better   cont to monitor     Hypertensive emergency on arrivial   treated in ICU with cardene drip , now on po meds   BP is better   noncompliant , counseling provided to monitor BP closely at home and use meds as instructed     Hypokalemia   replace Po and Iv K ordered   repeat lytes     4- CRF stage 4   renal on board   avoid nephrotoxic     5- Diabetes Mellitus type 2 uncontrolled with hyperglycemia   s/p insulin drip in ICU now on lantus and sliding scale insulin coverage   endocrinology consult noted     6- Bipolar disorder   cont home meds   outpatient follwo up     7- Morbid obesity   will benefit from life tsyle modification , diet exercise 35 y.o. obese male with hx of HTN, DM2, HLD, ARVIND, bipolar disorder, CKD(III) who presented to the ED with acute metabolic encephalopathy and severe HTN (SBP> 240) and hyperglycemia and was transferred to the ICU s/p nicardipine and insulin drip with improvement in bp and glucose levels and thereafter improvement in mental status. Metabolic encephalopathy resolved. Hospital course complicated by MELISSA on CKD 4 now with stabilization in creatine but continual hypokalemia, thus far with negative ct adrenals, pending renin/aldosterone level and initiation of aldactone as well as replacement of potassium. Slowly improving potassium level.     Acute metabolic  encephalopathy likely due to hypertension and hyperglycemia -resolved  -improved bp and glucose levels  -c/w management stated below     MELISSA on ckd now stage 4    in the setting of htn emergency/ ATN   - persistent hypokalemia   - replaced with potassium chloride 30meq x 1 dose   -started aldactone 25mg po qd  - will repeat bmp in the evening and the am   -avoid nephrotoxic medications  - nephro f/u noted and appreciated     Hypertensive emergency   -s/p nicardipine drip   -bp stable  -c/w hydralzine  po tid   -c/w norvasc po qd  -c/w coreg po bid   -c/w clonidine po tid     Diabetes Mellitus type 2 uncontrolled with hyperglycemia   -hba1c: 15.8   -fs stable  -c/w accuchecks achs tid  -c/w lantus 20 u sq bid   -c/w humalog 10u premreals   -endo f/u noted and appreciated   - pt being taught how to self inject / low carb diet     Bipolar disorder   -stable    -c/w cogentin po bid   -c/w seroquel po qhs   -c/w lamictal po bid   - outpt follow up with psych     HLD  -c/w atorvastatin po qhs     ARVIND  -counselled on lifestyle modifications/ diet/ exercise   -pt to f/u outpt for sleep study and further management.     Morbid obesity   -bmi: 48  -counselled on lifestyle modifications/ diet/ exercise     Activity level: Increase as tolerated    Dispo: Anticipated discharge in 24 hrs to home pending improvement in potassium.

## 2020-07-25 NOTE — PROGRESS NOTE ADULT - ASSESSMENT
DM 2 with poor control, Hypertension poor compliance, CRF 4; Hypokalemia in pt with high Bp, high glucose not on diuretic .  CAT neg for adrenal mass.

## 2020-07-25 NOTE — PROGRESS NOTE ADULT - REASON FOR ADMISSION
Hypertensive crisis

## 2020-07-25 NOTE — PROGRESS NOTE ADULT - PROVIDER SPECIALTY LIST ADULT
Critical Care
Endocrinology
Endocrinology
Hospitalist
Nephrology
[facility] Hospital Medicine  Progress Note    Patient Name: Agus Ramos Jr.  MRN: [id number]  Patient Class: IP- Inpatient   Admission Date: 5/3/2018  Length of Stay: 3 days  Attending Physician: Tigist Whitney MD  Primary Care Provider: Keaton Hardy MD        Subjective:     Principal Problem:Symptomatic anemia    HPI:  Mr. Agus Ramos Jr. is a 80 y.o. male with essential hypertension, type 2 diabetes mellitus (HbA1c 7.5%Sept 2016), CKD Stage 3, chronic atrial fibrillation (DEP1LD0-BVKz score 4) on chronic anticoagulation, anemia of chronic disease, and tobacco abuse who presents to [facility] ED with complaints of dyspnea for two weeks.  The dyspnea is on exertion and is associated with some mild wheezing and a non-productive cough.  He denies any fevers, chills, nausea, vomiting, chest pain, palpitations, diaphoresis, hemoptysis, nor any lower extremity pain or swelling.  There have been no sick contacts or recent travel.  He has not experienced any PND or orthopnea, and has not experienced any bleeding anywhere.  He otherwise has been doing well.    Hospital Course:  Admitted with concern for symptomatic anemia and HFpEF exacerbation. Found to have low Hgb 6.5, no bleeding identified though patient refused and continues to refuse rectal exam. Transfused 2U with appropriate response to Hgb 8.5. Continued shortness of breath symptoms after transfusion. Started diuresis for CHF exacerbation. TTE 5/4/2018 with normal EF, NST negative for ischemia but does show scar. Still requiring O2.     Interval History: Shortness of breath and edema improving    Review of Systems   Constitutional: Negative for chills and fever.   Respiratory: Positive for shortness of breath. Negative for cough and chest tightness.    Cardiovascular: Positive for leg swelling. Negative for chest pain and palpitations.   Gastrointestinal: Negative for abdominal pain, anal bleeding, blood in stool, constipation, 
diarrhea, nausea and vomiting.   Genitourinary: Negative for difficulty urinating.     Objective:     Vital Signs (Most Recent):  Temp: 97.2 °F (36.2 °C) (05/06/18 0751)  Pulse: 88 (05/06/18 0811)  Resp: (!) 24 (05/06/18 0811)  BP: (!) 169/74 (05/06/18 0751)  SpO2: (!) 92 % (05/06/18 0811) Vital Signs (24h Range):  Temp:  [97.2 °F (36.2 °C)-98.4 °F (36.9 °C)] 97.2 °F (36.2 °C)  Pulse:  [73-88] 88  Resp:  [18-24] 24  SpO2:  [91 %-100 %] 92 %  BP: (115-169)/(55-74) 169/74     Weight: 88.8 kg (195 lb 12.3 oz)  Body mass index is 28.91 kg/m².    Intake/Output Summary (Last 24 hours) at 05/06/18 0943  Last data filed at 05/06/18 0811   Gross per 24 hour   Intake              270 ml   Output              725 ml   Net             -455 ml      Physical Exam   Constitutional: He is oriented to person, place, and time. He appears well-developed and well-nourished. No distress.   HENT:   Head: Normocephalic and atraumatic.   Nose: Nose normal.   Mouth/Throat: Oropharynx is clear and moist.   Cardiovascular: Intact distal pulses.  Exam reveals no gallop and no friction rub.    No murmur heard.  Device L chest wall. Irregularly irregular   Pulmonary/Chest: Effort normal. No respiratory distress. He has no wheezes. He has no rales.   3L NC. Decreased breath sounds at bases, improving   Abdominal: Soft. Bowel sounds are normal. He exhibits no distension and no mass. There is no tenderness. There is no guarding.   Musculoskeletal: He exhibits edema. He exhibits no tenderness or deformity.   Neurological: He is alert and oriented to person, place, and time.   Skin: He is not diaphoretic.       Significant Labs: All pertinent labs within the past 24 hours have been reviewed.    Significant Imaging: I have reviewed and interpreted all pertinent imaging results/findings within the past 24 hours.    Assessment/Plan:      * Symptomatic anemia    - unknown baseline Hgb  - presented with Hgb 6.3  - transfused 2U with appropriate 
response  - refuses rectal exam. No objective blood loss  - monitor Hgb        Acute hypoxemic respiratory failure    Due to pulmonary edema  Continue diuresis          Elevated troponin    NST 5/4 with no ischemia  May be due to strain from HFpEF exacerbation         Pacemaker    In place          Tobacco abuse    Patient was counseled on smoking cessation and he will be provided a nicotine transdermal patch applied while inpatient.  Will provide additional smoking cessation counseling prior to discharge.        Anemia of chronic disease    As addressed above.        Chronic anticoagulation    As addressed above.        Acute on chronic diastolic congestive heart failure    - prior TTE showing diastolic dysfunction   - with edema, pulmonary edema on CXR, elevated BNP  - repeat TTE with normal EF, no comment on diastology  - wean O2 as tolerated  - NST 5/4 with no ischemia, does show scar   - still requiring O2- increase lasix to 60mg IV BID  - if still requiring O2 tomorrow will repeat CXR          Type 2 diabetes mellitus, controlled, with renal complications    - Well-controlled on a home regimen of metformin and a sulfonylurea;   - detemir 15 + aspart 5 QAC while hospitalized  - ADA diet        CKD Stage 3    - renal function stable  - renally dose all meds, avoid nephrotoxins  - monitor         Essential hypertension    - generally well controlled   - continue losartan and metoprolol        Chronic atrial fibrillation    - remains in Afib  - rate controlled with metoprolol tartrate 50 mg BID  - anticoagulation with apixaban          VTE Risk Mitigation         Ordered     apixaban tablet 5 mg  2 times daily      05/04/18 1691              Tigist Whitney MD  Department of Hospital Medicine   [facility]  
Nephrology

## 2020-07-25 NOTE — CHART NOTE - NSCHARTNOTEFT_GEN_A_CORE
Potassium low: 3.3   Will check magnesium and phosphate now and in AM  will add Potassium Chloride ER 20 mg PO q 2 x3 and recheck

## 2020-07-25 NOTE — PROGRESS NOTE ADULT - ASSESSMENT
34 yo male with hx of morbid obesity, HTN, DM, HLD, CKD, ARVIND not on CPAP, bipolar disorder, presented to the emergency department with altered mental status and aphasia.  Patient underwent CT/CTA/perfusion scan which did not reveal any acute stroke and admitted w hypertensive emergency due to nonadherence with meds  1. poorly controlled T2DM (outpt A1c >15%) comp by CKD- glucoses improved with insulin drip now elevated off insulin drip and on basal/bolus insulin  - cont Lantus 20 units BID, cont Humalog 10 units premeals + scale  - will titrate insulin doses based on sugars  - pt should follow up with me as outpt    2. HLD - cont statin    3. Hypertensive emergency - resolved, BP improved, cont current BP regimen as per renal

## 2020-07-26 ENCOUNTER — TRANSCRIPTION ENCOUNTER (OUTPATIENT)
Age: 35
End: 2020-07-26

## 2020-07-26 VITALS
HEART RATE: 65 BPM | RESPIRATION RATE: 18 BRPM | TEMPERATURE: 98 F | SYSTOLIC BLOOD PRESSURE: 114 MMHG | DIASTOLIC BLOOD PRESSURE: 76 MMHG | OXYGEN SATURATION: 98 %

## 2020-07-26 LAB
ANION GAP SERPL CALC-SCNC: 14 MMOL/L — SIGNIFICANT CHANGE UP (ref 5–17)
ANION GAP SERPL CALC-SCNC: 14 MMOL/L — SIGNIFICANT CHANGE UP (ref 5–17)
ANISOCYTOSIS BLD QL: SLIGHT — SIGNIFICANT CHANGE UP
BASOPHILS # BLD AUTO: 0 K/UL — SIGNIFICANT CHANGE UP (ref 0–0.2)
BASOPHILS NFR BLD AUTO: 0 % — SIGNIFICANT CHANGE UP (ref 0–2)
BUN SERPL-MCNC: 35 MG/DL — HIGH (ref 8–20)
BUN SERPL-MCNC: 35 MG/DL — HIGH (ref 8–20)
CALCIUM SERPL-MCNC: 8.8 MG/DL — SIGNIFICANT CHANGE UP (ref 8.6–10.2)
CALCIUM SERPL-MCNC: 8.9 MG/DL — SIGNIFICANT CHANGE UP (ref 8.6–10.2)
CHLORIDE SERPL-SCNC: 100 MMOL/L — SIGNIFICANT CHANGE UP (ref 98–107)
CHLORIDE SERPL-SCNC: 102 MMOL/L — SIGNIFICANT CHANGE UP (ref 98–107)
CO2 SERPL-SCNC: 25 MMOL/L — SIGNIFICANT CHANGE UP (ref 22–29)
CO2 SERPL-SCNC: 25 MMOL/L — SIGNIFICANT CHANGE UP (ref 22–29)
CREAT SERPL-MCNC: 4.87 MG/DL — HIGH (ref 0.5–1.3)
CREAT SERPL-MCNC: 4.94 MG/DL — HIGH (ref 0.5–1.3)
EOSINOPHIL # BLD AUTO: 0.88 K/UL — HIGH (ref 0–0.5)
EOSINOPHIL NFR BLD AUTO: 7 % — HIGH (ref 0–6)
GLUCOSE BLDC GLUCOMTR-MCNC: 176 MG/DL — HIGH (ref 70–99)
GLUCOSE BLDC GLUCOMTR-MCNC: 193 MG/DL — HIGH (ref 70–99)
GLUCOSE SERPL-MCNC: 174 MG/DL — HIGH (ref 70–99)
GLUCOSE SERPL-MCNC: 212 MG/DL — HIGH (ref 70–99)
HCT VFR BLD CALC: 32.5 % — LOW (ref 39–50)
HGB BLD-MCNC: 10.5 G/DL — LOW (ref 13–17)
HYPOCHROMIA BLD QL: SLIGHT — SIGNIFICANT CHANGE UP
LYMPHOCYTES # BLD AUTO: 1.96 K/UL — SIGNIFICANT CHANGE UP (ref 1–3.3)
LYMPHOCYTES # BLD AUTO: 15.6 % — SIGNIFICANT CHANGE UP (ref 13–44)
MAGNESIUM SERPL-MCNC: 2.2 MG/DL — SIGNIFICANT CHANGE UP (ref 1.6–2.6)
MANUAL SMEAR VERIFICATION: SIGNIFICANT CHANGE UP
MCHC RBC-ENTMCNC: 24 PG — LOW (ref 27–34)
MCHC RBC-ENTMCNC: 32.3 GM/DL — SIGNIFICANT CHANGE UP (ref 32–36)
MCV RBC AUTO: 74.4 FL — LOW (ref 80–100)
MICROCYTES BLD QL: SLIGHT — SIGNIFICANT CHANGE UP
MONOCYTES # BLD AUTO: 0.65 K/UL — SIGNIFICANT CHANGE UP (ref 0–0.9)
MONOCYTES NFR BLD AUTO: 5.2 % — SIGNIFICANT CHANGE UP (ref 2–14)
NEUTROPHILS # BLD AUTO: 8.88 K/UL — HIGH (ref 1.8–7.4)
NEUTROPHILS NFR BLD AUTO: 69.6 % — SIGNIFICANT CHANGE UP (ref 43–77)
NEUTS BAND # BLD: 0.9 % — SIGNIFICANT CHANGE UP (ref 0–8)
PHOSPHATE SERPL-MCNC: 4 MG/DL — SIGNIFICANT CHANGE UP (ref 2.4–4.7)
PLAT MORPH BLD: NORMAL — SIGNIFICANT CHANGE UP
PLATELET # BLD AUTO: 394 K/UL — SIGNIFICANT CHANGE UP (ref 150–400)
POTASSIUM SERPL-MCNC: 3.1 MMOL/L — LOW (ref 3.5–5.3)
POTASSIUM SERPL-MCNC: 3.4 MMOL/L — LOW (ref 3.5–5.3)
POTASSIUM SERPL-SCNC: 3.1 MMOL/L — LOW (ref 3.5–5.3)
POTASSIUM SERPL-SCNC: 3.4 MMOL/L — LOW (ref 3.5–5.3)
RBC # BLD: 4.37 M/UL — SIGNIFICANT CHANGE UP (ref 4.2–5.8)
RBC # FLD: 17.1 % — HIGH (ref 10.3–14.5)
RBC BLD AUTO: ABNORMAL
SMUDGE CELLS # BLD: PRESENT — SIGNIFICANT CHANGE UP
SODIUM SERPL-SCNC: 138 MMOL/L — SIGNIFICANT CHANGE UP (ref 135–145)
SODIUM SERPL-SCNC: 141 MMOL/L — SIGNIFICANT CHANGE UP (ref 135–145)
VARIANT LYMPHS # BLD: 1.7 % — SIGNIFICANT CHANGE UP (ref 0–6)
WBC # BLD: 12.59 K/UL — HIGH (ref 3.8–10.5)
WBC # FLD AUTO: 12.59 K/UL — HIGH (ref 3.8–10.5)

## 2020-07-26 PROCEDURE — 83935 ASSAY OF URINE OSMOLALITY: CPT

## 2020-07-26 PROCEDURE — 71045 X-RAY EXAM CHEST 1 VIEW: CPT

## 2020-07-26 PROCEDURE — 82962 GLUCOSE BLOOD TEST: CPT

## 2020-07-26 PROCEDURE — 0042T: CPT

## 2020-07-26 PROCEDURE — 83690 ASSAY OF LIPASE: CPT

## 2020-07-26 PROCEDURE — 74176 CT ABD & PELVIS W/O CONTRAST: CPT

## 2020-07-26 PROCEDURE — 70496 CT ANGIOGRAPHY HEAD: CPT

## 2020-07-26 PROCEDURE — 96376 TX/PRO/DX INJ SAME DRUG ADON: CPT

## 2020-07-26 PROCEDURE — 84484 ASSAY OF TROPONIN QUANT: CPT

## 2020-07-26 PROCEDURE — 86769 SARS-COV-2 COVID-19 ANTIBODY: CPT

## 2020-07-26 PROCEDURE — 99291 CRITICAL CARE FIRST HOUR: CPT | Mod: 25

## 2020-07-26 PROCEDURE — 85610 PROTHROMBIN TIME: CPT

## 2020-07-26 PROCEDURE — 83036 HEMOGLOBIN GLYCOSYLATED A1C: CPT

## 2020-07-26 PROCEDURE — 83735 ASSAY OF MAGNESIUM: CPT

## 2020-07-26 PROCEDURE — 87086 URINE CULTURE/COLONY COUNT: CPT

## 2020-07-26 PROCEDURE — 99292 CRITICAL CARE ADDL 30 MIN: CPT | Mod: 25

## 2020-07-26 PROCEDURE — 83880 ASSAY OF NATRIURETIC PEPTIDE: CPT

## 2020-07-26 PROCEDURE — 80061 LIPID PANEL: CPT

## 2020-07-26 PROCEDURE — 83605 ASSAY OF LACTIC ACID: CPT

## 2020-07-26 PROCEDURE — 82803 BLOOD GASES ANY COMBINATION: CPT

## 2020-07-26 PROCEDURE — 85730 THROMBOPLASTIN TIME PARTIAL: CPT

## 2020-07-26 PROCEDURE — 70551 MRI BRAIN STEM W/O DYE: CPT

## 2020-07-26 PROCEDURE — 81001 URINALYSIS AUTO W/SCOPE: CPT

## 2020-07-26 PROCEDURE — 85027 COMPLETE CBC AUTOMATED: CPT

## 2020-07-26 PROCEDURE — 99239 HOSP IP/OBS DSCHRG MGMT >30: CPT

## 2020-07-26 PROCEDURE — 84100 ASSAY OF PHOSPHORUS: CPT

## 2020-07-26 PROCEDURE — 84244 ASSAY OF RENIN: CPT

## 2020-07-26 PROCEDURE — 93005 ELECTROCARDIOGRAM TRACING: CPT

## 2020-07-26 PROCEDURE — 82088 ASSAY OF ALDOSTERONE: CPT

## 2020-07-26 PROCEDURE — 96375 TX/PRO/DX INJ NEW DRUG ADDON: CPT | Mod: XU

## 2020-07-26 PROCEDURE — 70450 CT HEAD/BRAIN W/O DYE: CPT

## 2020-07-26 PROCEDURE — 87635 SARS-COV-2 COVID-19 AMP PRB: CPT

## 2020-07-26 PROCEDURE — 80053 COMPREHEN METABOLIC PANEL: CPT

## 2020-07-26 PROCEDURE — 70498 CT ANGIOGRAPHY NECK: CPT

## 2020-07-26 PROCEDURE — 96365 THER/PROPH/DIAG IV INF INIT: CPT | Mod: XU

## 2020-07-26 PROCEDURE — 36415 COLL VENOUS BLD VENIPUNCTURE: CPT

## 2020-07-26 PROCEDURE — 80048 BASIC METABOLIC PNL TOTAL CA: CPT

## 2020-07-26 PROCEDURE — 82009 KETONE BODYS QUAL: CPT

## 2020-07-26 PROCEDURE — 96361 HYDRATE IV INFUSION ADD-ON: CPT

## 2020-07-26 PROCEDURE — 84300 ASSAY OF URINE SODIUM: CPT

## 2020-07-26 PROCEDURE — 76775 US EXAM ABDO BACK WALL LIM: CPT

## 2020-07-26 RX ORDER — INSULIN LISPRO 100/ML
10 VIAL (ML) SUBCUTANEOUS
Qty: 2 | Refills: 0
Start: 2020-07-26 | End: 2020-08-24

## 2020-07-26 RX ORDER — CEFAZOLIN SODIUM 1 G
500 VIAL (EA) INJECTION EVERY 8 HOURS
Refills: 0 | Status: DISCONTINUED | OUTPATIENT
Start: 2020-07-26 | End: 2020-07-26

## 2020-07-26 RX ORDER — CALCITRIOL 0.5 UG/1
1 CAPSULE ORAL
Qty: 30 | Refills: 0
Start: 2020-07-26 | End: 2020-08-24

## 2020-07-26 RX ORDER — POTASSIUM CHLORIDE 20 MEQ
1 PACKET (EA) ORAL
Qty: 0 | Refills: 0 | DISCHARGE

## 2020-07-26 RX ORDER — SPIRONOLACTONE 25 MG/1
1 TABLET, FILM COATED ORAL
Qty: 30 | Refills: 0
Start: 2020-07-26 | End: 2020-08-24

## 2020-07-26 RX ORDER — ROSUVASTATIN CALCIUM 5 MG/1
1 TABLET ORAL
Qty: 30 | Refills: 0
Start: 2020-07-26 | End: 2020-08-24

## 2020-07-26 RX ORDER — CEFAZOLIN SODIUM 1 G
500 VIAL (EA) INJECTION EVERY 12 HOURS
Refills: 0 | Status: DISCONTINUED | OUTPATIENT
Start: 2020-07-26 | End: 2020-07-26

## 2020-07-26 RX ORDER — POTASSIUM CHLORIDE 20 MEQ
1 PACKET (EA) ORAL
Qty: 28 | Refills: 0
Start: 2020-07-26 | End: 2020-08-08

## 2020-07-26 RX ORDER — CARVEDILOL PHOSPHATE 80 MG/1
1 CAPSULE, EXTENDED RELEASE ORAL
Qty: 60 | Refills: 0
Start: 2020-07-26 | End: 2020-08-24

## 2020-07-26 RX ORDER — PANTOPRAZOLE SODIUM 20 MG/1
1 TABLET, DELAYED RELEASE ORAL
Qty: 30 | Refills: 0
Start: 2020-07-26 | End: 2020-08-24

## 2020-07-26 RX ORDER — ENOXAPARIN SODIUM 100 MG/ML
20 INJECTION SUBCUTANEOUS
Qty: 2 | Refills: 0
Start: 2020-07-26 | End: 2020-08-24

## 2020-07-26 RX ORDER — POTASSIUM CHLORIDE 20 MEQ
40 PACKET (EA) ORAL ONCE
Refills: 0 | Status: COMPLETED | OUTPATIENT
Start: 2020-07-26 | End: 2020-07-26

## 2020-07-26 RX ORDER — HYDRALAZINE HCL 50 MG
1 TABLET ORAL
Qty: 90 | Refills: 0
Start: 2020-07-26 | End: 2020-08-24

## 2020-07-26 RX ORDER — AMLODIPINE BESYLATE 2.5 MG/1
1 TABLET ORAL
Qty: 30 | Refills: 0
Start: 2020-07-26 | End: 2020-08-24

## 2020-07-26 RX ORDER — POTASSIUM CHLORIDE 20 MEQ
40 PACKET (EA) ORAL ONCE
Refills: 0 | Status: DISCONTINUED | OUTPATIENT
Start: 2020-07-26 | End: 2020-07-26

## 2020-07-26 RX ADMIN — Medication 10 UNIT(S): at 09:09

## 2020-07-26 RX ADMIN — PANTOPRAZOLE SODIUM 40 MILLIGRAM(S): 20 TABLET, DELAYED RELEASE ORAL at 12:21

## 2020-07-26 RX ADMIN — Medication 100 MILLIGRAM(S): at 06:25

## 2020-07-26 RX ADMIN — Medication 0.5 MILLIGRAM(S): at 06:25

## 2020-07-26 RX ADMIN — AMLODIPINE BESYLATE 10 MILLIGRAM(S): 2.5 TABLET ORAL at 06:25

## 2020-07-26 RX ADMIN — HEPARIN SODIUM 5000 UNIT(S): 5000 INJECTION INTRAVENOUS; SUBCUTANEOUS at 06:25

## 2020-07-26 RX ADMIN — Medication 40 MILLIEQUIVALENT(S): at 09:09

## 2020-07-26 RX ADMIN — Medication 100 MILLIGRAM(S): at 13:44

## 2020-07-26 RX ADMIN — Medication 0.2 MILLIGRAM(S): at 13:44

## 2020-07-26 RX ADMIN — Medication 100 MILLIGRAM(S): at 12:22

## 2020-07-26 RX ADMIN — Medication 2: at 12:22

## 2020-07-26 RX ADMIN — CARVEDILOL PHOSPHATE 12.5 MILLIGRAM(S): 80 CAPSULE, EXTENDED RELEASE ORAL at 06:25

## 2020-07-26 RX ADMIN — Medication 81 MILLIGRAM(S): at 12:21

## 2020-07-26 RX ADMIN — INSULIN GLARGINE 20 UNIT(S): 100 INJECTION, SOLUTION SUBCUTANEOUS at 09:10

## 2020-07-26 RX ADMIN — Medication 0.2 MILLIGRAM(S): at 06:25

## 2020-07-26 RX ADMIN — Medication 2: at 09:09

## 2020-07-26 RX ADMIN — CHLORHEXIDINE GLUCONATE 1 APPLICATION(S): 213 SOLUTION TOPICAL at 06:33

## 2020-07-26 RX ADMIN — SPIRONOLACTONE 25 MILLIGRAM(S): 25 TABLET, FILM COATED ORAL at 06:29

## 2020-07-26 RX ADMIN — LAMOTRIGINE 50 MILLIGRAM(S): 25 TABLET, ORALLY DISINTEGRATING ORAL at 06:25

## 2020-07-26 RX ADMIN — Medication 10 UNIT(S): at 12:22

## 2020-07-26 NOTE — DISCHARGE NOTE NURSING/CASE MANAGEMENT/SOCIAL WORK - PATIENT PORTAL LINK FT
You can access the FollowMyHealth Patient Portal offered by [facility] by registering at the following website: [email or web address]. By joining [facility]’s FollowMyHealth portal, you will also be able to view your health information using other applications (apps) compatible with our system.

## 2020-07-27 LAB — ALDOST SERPL-MCNC: 90.5 NG/DL — HIGH

## 2020-07-28 RX ORDER — CEPHALEXIN 500 MG
1 CAPSULE ORAL
Qty: 10 | Refills: 0
Start: 2020-07-28 | End: 2020-08-01

## 2020-07-29 LAB — RENIN PLAS-CCNC: 0.83 NG/ML/HR — SIGNIFICANT CHANGE UP (ref 0.17–5.38)

## 2020-08-03 ENCOUNTER — APPOINTMENT (OUTPATIENT)
Dept: NEUROLOGY | Facility: CLINIC | Age: 35
End: 2020-08-03

## 2020-08-04 ENCOUNTER — APPOINTMENT (OUTPATIENT)
Dept: ENDOCRINOLOGY | Facility: CLINIC | Age: 35
End: 2020-08-04

## 2020-08-07 ENCOUNTER — APPOINTMENT (OUTPATIENT)
Dept: FAMILY MEDICINE | Facility: CLINIC | Age: 35
End: 2020-08-07
Payer: MEDICARE

## 2020-08-07 VITALS
HEIGHT: 65 IN | OXYGEN SATURATION: 98 % | HEART RATE: 78 BPM | DIASTOLIC BLOOD PRESSURE: 102 MMHG | WEIGHT: 290 LBS | TEMPERATURE: 97.6 F | BODY MASS INDEX: 48.32 KG/M2 | SYSTOLIC BLOOD PRESSURE: 140 MMHG

## 2020-08-07 VITALS — SYSTOLIC BLOOD PRESSURE: 140 MMHG | DIASTOLIC BLOOD PRESSURE: 98 MMHG

## 2020-08-07 DIAGNOSIS — G93.40 ENCEPHALOPATHY, UNSPECIFIED: ICD-10-CM

## 2020-08-07 DIAGNOSIS — G47.00 INSOMNIA, UNSPECIFIED: ICD-10-CM

## 2020-08-07 PROCEDURE — 99495 TRANSJ CARE MGMT MOD F2F 14D: CPT | Mod: 25

## 2020-08-07 PROCEDURE — 36415 COLL VENOUS BLD VENIPUNCTURE: CPT

## 2020-08-07 RX ORDER — PALIPERIDONE 9 MG/1
9 TABLET, FILM COATED, EXTENDED RELEASE ORAL DAILY
Refills: 0 | Status: COMPLETED | COMMUNITY
End: 2020-08-07

## 2020-08-07 NOTE — ASSESSMENT
[FreeTextEntry1] : Increase Aldactone 25mg qd to BID  provided K+ WNL and  Cr/GFR  STABLE \par pt to f/u c Dr. Fink Nephrology on 8/13/20.    pt candidate for HD,  needs consult and AV fistula placement ASAP!!!! \par \par see lab orders  \par \par Glucose stick= 396  \par pt to dose insulin appropriately upon arrival at home in accordance c sliding scale   \par \par f/u 3d c phone call

## 2020-08-07 NOTE — PHYSICAL EXAM
[Well Nourished] : well nourished [No Acute Distress] : no acute distress [Well Developed] : well developed [EOMI] : extraocular movements intact [Normal Outer Ear/Nose] : the outer ears and nose were normal in appearance [No Respiratory Distress] : no respiratory distress  [No JVD] : no jugular venous distention [Clear to Auscultation] : lungs were clear to auscultation bilaterally [No Accessory Muscle Use] : no accessory muscle use [Normal Rate] : normal rate  [Regular Rhythm] : with a regular rhythm [Normal S1, S2] : normal S1 and S2 [No Edema] : there was no peripheral edema [No CVA Tenderness] : no CVA  tenderness [Grossly Normal Strength/Tone] : grossly normal strength/tone [Coordination Grossly Intact] : coordination grossly intact [No Rash] : no rash [Normal Gait] : normal gait [No Focal Deficits] : no focal deficits [Memory Grossly Normal] : memory grossly normal [Speech Grossly Normal] : speech grossly normal [Normal Affect] : the affect was normal [Alert and Oriented x3] : oriented to person, place, and time [Normal Mood] : the mood was normal [de-identified] : soft obese abdomen  [Normal Insight/Judgement] : insight and judgment were intact [de-identified] : AAOx 3

## 2020-08-07 NOTE — HISTORY OF PRESENT ILLNESS
[Admitted on: ___] : The patient was admitted on [unfilled] [Discharge Summary] : discharge summary [Discharged on ___] : discharged on [unfilled]

## 2020-08-11 ENCOUNTER — RESULT CHARGE (OUTPATIENT)
Age: 35
End: 2020-08-11

## 2020-08-11 LAB
ALBUMIN SERPL ELPH-MCNC: 3.4 G/DL
ALP BLD-CCNC: 141 U/L
ALT SERPL-CCNC: 15 U/L
ANION GAP SERPL CALC-SCNC: 17 MMOL/L
AST SERPL-CCNC: 12 U/L
BASOPHILS # BLD AUTO: 0.13 K/UL
BASOPHILS NFR BLD AUTO: 0.7 %
BILIRUB SERPL-MCNC: <0.2 MG/DL
BUN SERPL-MCNC: 38 MG/DL
CALCIUM SERPL-MCNC: 9.3 MG/DL
CHLORIDE SERPL-SCNC: 94 MMOL/L
CO2 SERPL-SCNC: 23 MMOL/L
CREAT SERPL-MCNC: 4.67 MG/DL
EOSINOPHIL # BLD AUTO: 0.82 K/UL
EOSINOPHIL NFR BLD AUTO: 4.5 %
GLUCOSE SERPL-MCNC: 426 MG/DL
HCT VFR BLD CALC: 37.5 %
HGB BLD-MCNC: 11.8 G/DL
IMM GRANULOCYTES NFR BLD AUTO: 0.4 %
LYMPHOCYTES # BLD AUTO: 3.4 K/UL
LYMPHOCYTES NFR BLD AUTO: 18.5 %
MAN DIFF?: NORMAL
MCHC RBC-ENTMCNC: 23.4 PG
MCHC RBC-ENTMCNC: 31.5 GM/DL
MCV RBC AUTO: 74.4 FL
MONOCYTES # BLD AUTO: 0.95 K/UL
MONOCYTES NFR BLD AUTO: 5.2 %
NEUTROPHILS # BLD AUTO: 13.03 K/UL
NEUTROPHILS NFR BLD AUTO: 70.7 %
PLATELET # BLD AUTO: 395 K/UL
POTASSIUM SERPL-SCNC: 3.6 MMOL/L
PROT SERPL-MCNC: 7.1 G/DL
RBC # BLD: 5.04 M/UL
RBC # FLD: 17.9 %
SODIUM SERPL-SCNC: 134 MMOL/L
URATE SERPL-MCNC: 7.7 MG/DL
WBC # FLD AUTO: 18.41 K/UL

## 2020-08-19 ENCOUNTER — APPOINTMENT (OUTPATIENT)
Dept: ENDOCRINOLOGY | Facility: CLINIC | Age: 35
End: 2020-08-19
Payer: MEDICARE

## 2020-08-19 PROCEDURE — 99214 OFFICE O/P EST MOD 30 MIN: CPT | Mod: 95

## 2020-08-19 NOTE — HISTORY OF PRESENT ILLNESS
[Home] : at home, [unfilled] , at the time of the visit. [Medical Office: ([facility])___] : at the medical office located in  [Verbal consent obtained from patient] : the patient, [unfilled] [FreeTextEntry1] : Pt. reports that he was sent to [facility] last week due to poor renal function and elevated WBC by nephrology. During his hospitalization he had AV fistula placed to prep for dialysis. Pt. has not started on dialysis yet. \par \par Quality: Type 2\par Severity: severe, uncontrolled\par Duration: 2004\par Onset: hospitalized for psychiatric illness and diagnosed with diabetes at that time\par \par ASSOCIATED SYMPTOMS/COMPLICATIONS: \par no recent eye exam\par denies neuropathy. \par (+) proteinuria - he has CKD and following with nephrology.  \par he denies history of heart disease.\par \par MODIFYING FACTORS: staying at father house\par Diet: Nonadherent with diet.  Eats morning and light dinner. He does not always eat lunch.\par Exercise: not that much\par Current DM meds:\par Lantus (pens) 20 units in morning, sometimes forgets to take this\par Humalog scale premeals\par \par SMBG - 4 times a day - average blood glucose 200s, none hypoglycemic symptoms \par

## 2020-08-19 NOTE — REVIEW OF SYSTEMS
[Recent Weight Gain (___ Lbs)] : recent weight gain: [unfilled] lbs [Visual Field Defect] : no visual field defect [Blurred Vision] : no blurred vision [Polyuria] : no polyuria [Dysuria] : no dysuria [Polydipsia] : no polydipsia

## 2020-08-19 NOTE — ASSESSMENT
[FreeTextEntry1] : 35 year old morbidly obese male with :\par  uncontrolled Type 2 DM complicated by CKD. Uncontrolled glucoses due to diet, psych meds.\par - increase Lantus to 24 units and take at 1 pm to help with adherence\par - continue Humalog scale premeals :  10 units, 151-200 12 units, 201-300 14 units, 301-400 16 units, 401+ 18 units\par - continue testing sugars 4x daily and record numbers on logs sheets, bring logs to visits\par - he will need close follow up, f/u monthly\par - eye exam with ophthalmology\par - rec cardio eval for COOPER with uncontrolled diabetes\par - labs in 3 months: A1c, CMP\par - continue to f/u renal\par - increase water intake\par - ordered shawn sensor and reader\par Glucose Sensor Necessity: This patient with diabetes performs 4 or more glucose checks per day utilizing a home blood glucose monitor. The patient is treated with insulin via 3 or more injections daily. This patient requires frequent adjustments to their insulin treatment on the basis of therapeutic continuous glucose monitoring results. In addition, the patient has been to our office for an evaluation of their diabetes control within the past 6 months.\par \par \par HLD - continue statin\par Morbid obesity -counseled on lifestyle changes with diet and exercise\par \par Start Time: 10:18\par End Time: 10:45 [Importance of Diet and Exercise] : importance of diet and exercise to improve glycemic control, achieve weight loss and improve cardiovascular health [Retinopathy Screening] : Patient was referred to ophthalmology for retinopathy screening

## 2020-08-26 ENCOUNTER — APPOINTMENT (OUTPATIENT)
Dept: FAMILY MEDICINE | Facility: CLINIC | Age: 35
End: 2020-08-26
Payer: MEDICARE

## 2020-08-26 VITALS
SYSTOLIC BLOOD PRESSURE: 138 MMHG | DIASTOLIC BLOOD PRESSURE: 82 MMHG | OXYGEN SATURATION: 97 % | HEIGHT: 65 IN | BODY MASS INDEX: 48.15 KG/M2 | TEMPERATURE: 97.8 F | HEART RATE: 85 BPM | WEIGHT: 289 LBS

## 2020-08-26 DIAGNOSIS — N39.0 URINARY TRACT INFECTION, SITE NOT SPECIFIED: ICD-10-CM

## 2020-08-26 DIAGNOSIS — D72.829 ELEVATED WHITE BLOOD CELL COUNT, UNSPECIFIED: ICD-10-CM

## 2020-08-26 PROCEDURE — 99214 OFFICE O/P EST MOD 30 MIN: CPT | Mod: 25

## 2020-08-26 PROCEDURE — 36415 COLL VENOUS BLD VENIPUNCTURE: CPT

## 2020-08-26 RX ORDER — CLONIDINE HYDROCHLORIDE 0.2 MG/1
0.2 TABLET ORAL
Qty: 270 | Refills: 0 | Status: COMPLETED | COMMUNITY
End: 2020-08-26

## 2020-08-26 RX ORDER — SPIRONOLACTONE 25 MG/1
25 TABLET ORAL
Qty: 90 | Refills: 0 | Status: COMPLETED | COMMUNITY
Start: 2020-08-07 | End: 2020-08-26

## 2020-08-26 NOTE — PHYSICAL EXAM
[Well Nourished] : well nourished [No Acute Distress] : no acute distress [Well Developed] : well developed [EOMI] : extraocular movements intact [Well-Appearing] : well-appearing [No JVD] : no jugular venous distention [Normal Outer Ear/Nose] : the outer ears and nose were normal in appearance [No Respiratory Distress] : no respiratory distress  [Normal Rate] : normal rate  [No Accessory Muscle Use] : no accessory muscle use [Clear to Auscultation] : lungs were clear to auscultation bilaterally [Regular Rhythm] : with a regular rhythm [Normal S1, S2] : normal S1 and S2 [de-identified] : obese abdomen

## 2020-08-26 NOTE — HISTORY OF PRESENT ILLNESS
[FreeTextEntry1] : follow up on CKD/DM2 [de-identified] : 36 yo male c hx of Stage V kidney disease presents to office s/p Nephro consult c Dr. Fink on 8/13/20.  pt sent to [facility] directly from office visit secondary to decreased GFR, increased Cr, and increased WBC count.     Admitted to [facility] 8/13/20-8/16/20.  AV fistula placed LUE by Dr. Ian Rivera  [phone] ([facility])     Dx'ed c UTI treated c IV abx during hospital stay.  no d/c abx rx'ed \par \par Feels well,  denies f/c/s denies shaking chills.   +ve nocturia c decreased urinary frequency    +ve increased mental alertness \par Denies pain upon urination

## 2020-08-26 NOTE — ASSESSMENT
[FreeTextEntry1] : see lab orders \par \par scheduled to consult c Nephro Dr. Fink 9/10/20 \par scheduled to consult c Vascular Dr. Rivera s/p AV fistula placement \par \par f/u pending lab results

## 2020-09-21 ENCOUNTER — APPOINTMENT (OUTPATIENT)
Dept: ENDOCRINOLOGY | Facility: CLINIC | Age: 35
End: 2020-09-21

## 2020-09-23 LAB
25(OH)D3 SERPL-MCNC: 19.6 NG/ML
ALBUMIN SERPL ELPH-MCNC: 3.6 G/DL
ALP BLD-CCNC: 116 U/L
ALT SERPL-CCNC: 13 U/L
ANION GAP SERPL CALC-SCNC: 14 MMOL/L
AST SERPL-CCNC: 11 U/L
BASOPHILS # BLD AUTO: 0.11 K/UL
BASOPHILS NFR BLD AUTO: 0.6 %
BILIRUB SERPL-MCNC: 0.2 MG/DL
BUN SERPL-MCNC: 32 MG/DL
CALCIUM SERPL-MCNC: 9.3 MG/DL
CHLORIDE SERPL-SCNC: 100 MMOL/L
CHOLEST SERPL-MCNC: 189 MG/DL
CHOLEST/HDLC SERPL: 5 RATIO
CO2 SERPL-SCNC: 22 MMOL/L
CREAT SERPL-MCNC: 4.73 MG/DL
CREAT SPEC-SCNC: 186 MG/DL
EOSINOPHIL # BLD AUTO: 0.08 K/UL
EOSINOPHIL NFR BLD AUTO: 0.5 %
ESTIMATED AVERAGE GLUCOSE: 346 MG/DL
GLUCOSE SERPL-MCNC: 204 MG/DL
HBA1C MFR BLD HPLC: 13.7 %
HCT VFR BLD CALC: 33.9 %
HDLC SERPL-MCNC: 38 MG/DL
HGB BLD-MCNC: 10.7 G/DL
IMM GRANULOCYTES NFR BLD AUTO: 0.3 %
LDLC SERPL CALC-MCNC: NORMAL MG/DL
LYMPHOCYTES # BLD AUTO: 3.45 K/UL
LYMPHOCYTES NFR BLD AUTO: 19.4 %
MAN DIFF?: NORMAL
MCHC RBC-ENTMCNC: 23.6 PG
MCHC RBC-ENTMCNC: 31.6 GM/DL
MCV RBC AUTO: 74.7 FL
MICROALBUMIN 24H UR DL<=1MG/L-MCNC: 707 MG/DL
MICROALBUMIN/CREAT 24H UR-RTO: 3794 MG/G
MONOCYTES # BLD AUTO: 1.02 K/UL
MONOCYTES NFR BLD AUTO: 5.7 %
NEUTROPHILS # BLD AUTO: 13.02 K/UL
NEUTROPHILS NFR BLD AUTO: 73.5 %
PLATELET # BLD AUTO: 462 K/UL
POTASSIUM SERPL-SCNC: 3.9 MMOL/L
PROT SERPL-MCNC: 7.2 G/DL
RBC # BLD: 4.54 M/UL
RBC # FLD: 18.2 %
SODIUM SERPL-SCNC: 136 MMOL/L
T4 FREE SERPL-MCNC: 1.4 NG/DL
TRIGL SERPL-MCNC: 412 MG/DL
TSH SERPL-ACNC: 0.58 UIU/ML
VIT B12 SERPL-MCNC: 883 PG/ML
WBC # FLD AUTO: 17.74 K/UL

## 2020-10-12 ENCOUNTER — RX RENEWAL (OUTPATIENT)
Age: 35
End: 2020-10-12

## 2020-11-13 NOTE — CHART NOTE - NSCHARTNOTEFT_GEN_A_CORE
Spoke with patient's father ([phone]) regarding patient's medication regimen. Father does not know what meds the patient takes. He is not sure if the patient has been compliant with all his medications. He states that the patient has had a few episodes of uncontrolled BP in the past, most recent episode was 1-2 months ago when he was seen at [facility]. Father states that he suspects his son has not been taking all his medications regularly. yes

## 2020-11-13 NOTE — H&P ADULT - MENTAL STATUS
awake. looks drowsy. expressive aphasia

## 2020-12-23 PROBLEM — N39.0 ACUTE UTI: Status: RESOLVED | Noted: 2020-08-26 | Resolved: 2020-12-23

## 2020-12-28 ENCOUNTER — APPOINTMENT (OUTPATIENT)
Dept: CARDIOLOGY | Facility: CLINIC | Age: 35
End: 2020-12-28

## 2020-12-30 ENCOUNTER — INPATIENT (INPATIENT)
Facility: HOSPITAL | Age: 35
LOS: 13 days | Discharge: HOME | DRG: 91 | End: 2021-01-13
Attending: GENERAL ACUTE CARE HOSPITAL | Admitting: INTERNAL MEDICINE
Payer: COMMERCIAL

## 2020-12-30 VITALS
DIASTOLIC BLOOD PRESSURE: 99 MMHG | RESPIRATION RATE: 19 BRPM | OXYGEN SATURATION: 97 % | SYSTOLIC BLOOD PRESSURE: 217 MMHG | HEART RATE: 117 BPM | TEMPERATURE: 98 F | HEIGHT: 65 IN

## 2020-12-30 LAB
ALBUMIN SERPL ELPH-MCNC: 3.4 G/DL — SIGNIFICANT CHANGE UP (ref 3.3–5.2)
ALP SERPL-CCNC: 112 U/L — SIGNIFICANT CHANGE UP (ref 40–120)
ALT FLD-CCNC: 23 U/L — SIGNIFICANT CHANGE UP
ANION GAP SERPL CALC-SCNC: 19 MMOL/L — HIGH (ref 5–17)
APTT BLD: 28.4 SEC — SIGNIFICANT CHANGE UP (ref 27.5–35.5)
AST SERPL-CCNC: 29 U/L — SIGNIFICANT CHANGE UP
BASOPHILS # BLD AUTO: 0 K/UL — SIGNIFICANT CHANGE UP (ref 0–0.2)
BASOPHILS NFR BLD AUTO: 0 % — SIGNIFICANT CHANGE UP (ref 0–2)
BILIRUB SERPL-MCNC: 0.3 MG/DL — LOW (ref 0.4–2)
BLD GP AB SCN SERPL QL: SIGNIFICANT CHANGE UP
BUN SERPL-MCNC: 78 MG/DL — HIGH (ref 8–20)
CALCIUM SERPL-MCNC: 9.6 MG/DL — SIGNIFICANT CHANGE UP (ref 8.6–10.2)
CHLORIDE SERPL-SCNC: 95 MMOL/L — LOW (ref 98–107)
CO2 SERPL-SCNC: 21 MMOL/L — LOW (ref 22–29)
CREAT SERPL-MCNC: 7.39 MG/DL — HIGH (ref 0.5–1.3)
EOSINOPHIL # BLD AUTO: 1.43 K/UL — HIGH (ref 0–0.5)
EOSINOPHIL NFR BLD AUTO: 6.1 % — HIGH (ref 0–6)
ETHANOL SERPL-MCNC: <10 MG/DL — HIGH (ref 0–9)
GLUCOSE SERPL-MCNC: 303 MG/DL — HIGH (ref 70–99)
HCT VFR BLD CALC: 31.2 % — LOW (ref 39–50)
HGB BLD-MCNC: 10.3 G/DL — LOW (ref 13–17)
INR BLD: 1.1 RATIO — SIGNIFICANT CHANGE UP (ref 0.88–1.16)
LACTATE BLDV-MCNC: 0.9 MMOL/L — SIGNIFICANT CHANGE UP (ref 0.5–2)
LYMPHOCYTES # BLD AUTO: 13.1 % — SIGNIFICANT CHANGE UP (ref 13–44)
LYMPHOCYTES # BLD AUTO: 3.08 K/UL — SIGNIFICANT CHANGE UP (ref 1–3.3)
MANUAL SMEAR VERIFICATION: SIGNIFICANT CHANGE UP
MCHC RBC-ENTMCNC: 23.4 PG — LOW (ref 27–34)
MCHC RBC-ENTMCNC: 33 GM/DL — SIGNIFICANT CHANGE UP (ref 32–36)
MCV RBC AUTO: 70.9 FL — LOW (ref 80–100)
METAMYELOCYTES # FLD: 0.9 % — HIGH (ref 0–0)
MONOCYTES # BLD AUTO: 1.24 K/UL — HIGH (ref 0–0.9)
MONOCYTES NFR BLD AUTO: 5.3 % — SIGNIFICANT CHANGE UP (ref 2–14)
MYELOCYTES NFR BLD: 0.9 % — HIGH (ref 0–0)
NEUTROPHILS # BLD AUTO: 17.1 K/UL — HIGH (ref 1.8–7.4)
NEUTROPHILS NFR BLD AUTO: 72.8 % — SIGNIFICANT CHANGE UP (ref 43–77)
PLAT MORPH BLD: NORMAL — SIGNIFICANT CHANGE UP
PLATELET # BLD AUTO: 442 K/UL — HIGH (ref 150–400)
POLYCHROMASIA BLD QL SMEAR: SIGNIFICANT CHANGE UP
POTASSIUM SERPL-MCNC: 2.9 MMOL/L — CRITICAL LOW (ref 3.5–5.3)
POTASSIUM SERPL-SCNC: 2.9 MMOL/L — CRITICAL LOW (ref 3.5–5.3)
PROT SERPL-MCNC: 7.2 G/DL — SIGNIFICANT CHANGE UP (ref 6.6–8.7)
PROTHROM AB SERPL-ACNC: 12.7 SEC — SIGNIFICANT CHANGE UP (ref 10.6–13.6)
RBC # BLD: 4.4 M/UL — SIGNIFICANT CHANGE UP (ref 4.2–5.8)
RBC # FLD: 16.3 % — HIGH (ref 10.3–14.5)
RBC BLD AUTO: ABNORMAL
SODIUM SERPL-SCNC: 134 MMOL/L — LOW (ref 135–145)
TROPONIN T SERPL-MCNC: 0.1 NG/ML — HIGH (ref 0–0.06)
VARIANT LYMPHS # BLD: 0.9 % — SIGNIFICANT CHANGE UP (ref 0–6)
WBC # BLD: 23.49 K/UL — HIGH (ref 3.8–10.5)
WBC # FLD AUTO: 23.49 K/UL — HIGH (ref 3.8–10.5)

## 2020-12-30 PROCEDURE — 71045 X-RAY EXAM CHEST 1 VIEW: CPT | Mod: 26

## 2020-12-30 PROCEDURE — 93010 ELECTROCARDIOGRAM REPORT: CPT

## 2020-12-30 PROCEDURE — 99291 CRITICAL CARE FIRST HOUR: CPT

## 2020-12-30 RX ORDER — POTASSIUM CHLORIDE 20 MEQ
10 PACKET (EA) ORAL
Refills: 0 | Status: COMPLETED | OUTPATIENT
Start: 2020-12-30 | End: 2020-12-31

## 2020-12-30 RX ORDER — LABETALOL HCL 100 MG
10 TABLET ORAL ONCE
Refills: 0 | Status: COMPLETED | OUTPATIENT
Start: 2020-12-30 | End: 2020-12-30

## 2020-12-30 RX ORDER — LABETALOL HCL 100 MG
20 TABLET ORAL ONCE
Refills: 0 | Status: COMPLETED | OUTPATIENT
Start: 2020-12-30 | End: 2020-12-30

## 2020-12-30 RX ADMIN — Medication 10 MILLIGRAM(S): at 22:56

## 2020-12-30 RX ADMIN — Medication 100 MILLIEQUIVALENT(S): at 23:12

## 2020-12-30 NOTE — ED ADULT NURSE NOTE - NSIMPLEMENTINTERV_GEN_ALL_ED
Implemented All Fall Risk Interventions:  [place] to call system. Call bell, personal items and telephone within reach. Instruct patient to call for assistance. Room bathroom lighting operational. Non-slip footwear when patient is off stretcher. Physically safe environment: no spills, clutter or unnecessary equipment. Stretcher in lowest position, wheels locked, appropriate side rails in place. Provide visual cue, wrist band, yellow gown, etc. Monitor gait and stability. Monitor for mental status changes and reorient to person, place, and time. Review medications for side effects contributing to fall risk. Reinforce activity limits and safety measures with patient and family.

## 2020-12-30 NOTE — ED ADULT NURSE NOTE - ED STAT RN HANDOFF DETAILS
Report given to Chris Lacy RN in ED. Patient in no apparent acute distress. Patient remains on cardiac monitor with continuous pulse ox. Safety maintained. Call bell within reach.

## 2020-12-30 NOTE — ED PROVIDER NOTE - PROGRESS NOTE DETAILS
Jovita PARRA: patient seen and cleared by MICU, accepted by hospitalist, nephro consulted. Blood cx and abx ordered as per hospitalist.

## 2020-12-30 NOTE — ED PROVIDER NOTE - CARE PLAN
Principal Discharge DX:	Encephalopathy  Secondary Diagnosis:	Renal failure  Secondary Diagnosis:	Hypokalemia

## 2020-12-30 NOTE — ED ADULT NURSE NOTE - SUICIDE SCREENING QUESTION 2
Patient unable to complete Begin with liquids and light food ( tea, toast, Jello, soups). Advance to what you normally eat. Liquids should taken in adequate amounts today.     CALL the DOCTOR:    -Fever greater than  101F  - Signs  of infection such as : increase pain,swelling,redness,or a bad  smell coming from the wound.  -Excessive amount of bleeding.  - Any pain that appears to be getting worse.  - Vomiting  -  If you have  not urinated 8 hours after surgery or have any difficulty urinating.     A responsible adult should be with you for the rest of the day and night for your safety and to help you if you needed.    Review attached FACT SHEET if applicable.

## 2020-12-30 NOTE — ED PROVIDER NOTE - PHYSICAL EXAMINATION
Gen: Well appearing obese male responding to voice but only intermittently answering verbally. When he answers it is one word mumbled answers.   Head: NC/AT, PERRL, EOMI  Neck: trachea midline  Resp:  No distress, CTAB  CV: tachycardic, regular rhythm  GI: soft, NTND  Ext: no deformities, moving all extremities, AVF to LUE.   Neuro:  Patient alert, not answering questions. Not following neuro exam. No focal deficits. Moving all extremities  Skin:  Warm and dry as visualized

## 2020-12-30 NOTE — ED PROVIDER NOTE - OBJECTIVE STATEMENT
35M h/o bipolar, DM, HTN, CKD s/p AVF (NOT ON HD, unlike triage) p/w AMS. As per sister at bedside patient has AMS. Has been intermittently pacing around, has not been responding to questions properly, sometimes answering but very slowly, sometimes not answering. Denies drug use. Denies alcohol use. Denies fever, vomiting, falls, trauma. Denies sick contacts. Patient is responsive to voice but only intermittently answering questions. Sister states this is how he has been for 3 days.

## 2020-12-30 NOTE — ED ADULT TRIAGE NOTE - CHIEF COMPLAINT QUOTE
while triaging patient with complain of chest pain and not feeling well. Patient went lethargic and responsive to pain full stimuli only while talking. Patient's transferred to bed and moved to critical care. Sister at bed side. Patient with hx of renal failure on dialysis. Code stroke activated last well known [id number]

## 2020-12-30 NOTE — ED ADULT NURSE NOTE - OBJECTIVE STATEMENT
Assumed patient care at this time. Patient presents alert. No answering questions at this time. Responsive to painful stimuli. RN unable to obtain history at this time. No signs of nonverbal indicators of pain noted. Airway patent. No signs of difficulty breathing. Respirations even, spontaneous, and unlabored. Patient placed on cardiac monitor with continuous pulse ox. Safety maintained. Call bell within reach.

## 2020-12-30 NOTE — ED PROVIDER NOTE - CADM POA URETHRAL CATHETER
Pt not appropriate for PO trials at this time. Pt unable to maintain adequate arousal. Pt w/ audible rhonchi and increased secretions in oral cavity. Pt unable to follow commands. No

## 2020-12-30 NOTE — ED PROVIDER NOTE - CLINICAL SUMMARY MEDICAL DECISION MAKING FREE TEXT BOX
Patient presenting with altered mental status, hypertension, likely infectious (UTI vs PNA) vs Neurologic  - CBC, CMP, VBG, UA, CXR, tox screen, BP management.  - CT

## 2020-12-31 DIAGNOSIS — G93.40 ENCEPHALOPATHY, UNSPECIFIED: ICD-10-CM

## 2020-12-31 LAB
ALT FLD-CCNC: 18 U/L — SIGNIFICANT CHANGE UP
AMPHET UR-MCNC: NEGATIVE — SIGNIFICANT CHANGE UP
ANION GAP SERPL CALC-SCNC: 17 MMOL/L — SIGNIFICANT CHANGE UP (ref 5–17)
APPEARANCE UR: ABNORMAL
B PERT DNA SPEC QL NAA+PROBE: SIGNIFICANT CHANGE UP
BARBITURATES UR SCN-MCNC: NEGATIVE — SIGNIFICANT CHANGE UP
BASOPHILS # BLD AUTO: 0.07 K/UL — SIGNIFICANT CHANGE UP (ref 0–0.2)
BASOPHILS NFR BLD AUTO: 0.4 % — SIGNIFICANT CHANGE UP (ref 0–2)
BENZODIAZ UR-MCNC: NEGATIVE — SIGNIFICANT CHANGE UP
BILIRUB UR-MCNC: NEGATIVE — SIGNIFICANT CHANGE UP
BUN SERPL-MCNC: 77 MG/DL — HIGH (ref 8–20)
C PNEUM DNA SPEC QL NAA+PROBE: SIGNIFICANT CHANGE UP
CALCIUM SERPL-MCNC: 9.2 MG/DL — SIGNIFICANT CHANGE UP (ref 8.6–10.2)
CHLORIDE SERPL-SCNC: 97 MMOL/L — LOW (ref 98–107)
CO2 SERPL-SCNC: 22 MMOL/L — SIGNIFICANT CHANGE UP (ref 22–29)
COCAINE METAB.OTHER UR-MCNC: NEGATIVE — SIGNIFICANT CHANGE UP
COLOR SPEC: YELLOW — SIGNIFICANT CHANGE UP
COMMENT - URINE: SIGNIFICANT CHANGE UP
CREAT SERPL-MCNC: 7.29 MG/DL — HIGH (ref 0.5–1.3)
DIFF PNL FLD: ABNORMAL
EOSINOPHIL # BLD AUTO: 0.13 K/UL — SIGNIFICANT CHANGE UP (ref 0–0.5)
EOSINOPHIL NFR BLD AUTO: 0.7 % — SIGNIFICANT CHANGE UP (ref 0–6)
EPI CELLS # UR: SIGNIFICANT CHANGE UP
FLUAV H1 2009 PAND RNA SPEC QL NAA+PROBE: SIGNIFICANT CHANGE UP
FLUAV H1 RNA SPEC QL NAA+PROBE: SIGNIFICANT CHANGE UP
FLUAV H3 RNA SPEC QL NAA+PROBE: SIGNIFICANT CHANGE UP
FLUAV SUBTYP SPEC NAA+PROBE: SIGNIFICANT CHANGE UP
FLUBV RNA SPEC QL NAA+PROBE: SIGNIFICANT CHANGE UP
GAS PNL BLDA: SIGNIFICANT CHANGE UP
GLUCOSE BLDC GLUCOMTR-MCNC: 173 MG/DL — HIGH (ref 70–99)
GLUCOSE BLDC GLUCOMTR-MCNC: 184 MG/DL — HIGH (ref 70–99)
GLUCOSE BLDC GLUCOMTR-MCNC: 276 MG/DL — HIGH (ref 70–99)
GLUCOSE BLDC GLUCOMTR-MCNC: 314 MG/DL — HIGH (ref 70–99)
GLUCOSE SERPL-MCNC: 285 MG/DL — HIGH (ref 70–99)
GLUCOSE UR QL: 250 MG/DL
HADV DNA SPEC QL NAA+PROBE: SIGNIFICANT CHANGE UP
HAV IGM SER-ACNC: SIGNIFICANT CHANGE UP
HBV CORE AB SER-ACNC: SIGNIFICANT CHANGE UP
HBV CORE IGM SER-ACNC: SIGNIFICANT CHANGE UP
HBV SURFACE AB SER-ACNC: 712 MIU/ML — SIGNIFICANT CHANGE UP
HBV SURFACE AG SER-ACNC: SIGNIFICANT CHANGE UP
HCOV PNL SPEC NAA+PROBE: SIGNIFICANT CHANGE UP
HCT VFR BLD CALC: 29.9 % — LOW (ref 39–50)
HCV AB S/CO SERPL IA: 0.12 S/CO — SIGNIFICANT CHANGE UP (ref 0–0.99)
HCV AB SERPL-IMP: SIGNIFICANT CHANGE UP
HGB BLD-MCNC: 9.8 G/DL — LOW (ref 13–17)
HMPV RNA SPEC QL NAA+PROBE: SIGNIFICANT CHANGE UP
HPIV1 RNA SPEC QL NAA+PROBE: SIGNIFICANT CHANGE UP
HPIV2 RNA SPEC QL NAA+PROBE: SIGNIFICANT CHANGE UP
HPIV3 RNA SPEC QL NAA+PROBE: SIGNIFICANT CHANGE UP
HPIV4 RNA SPEC QL NAA+PROBE: SIGNIFICANT CHANGE UP
IMM GRANULOCYTES NFR BLD AUTO: 0.3 % — SIGNIFICANT CHANGE UP (ref 0–1.5)
KETONES UR-MCNC: NEGATIVE — SIGNIFICANT CHANGE UP
LEUKOCYTE ESTERASE UR-ACNC: NEGATIVE — SIGNIFICANT CHANGE UP
LYMPHOCYTES # BLD AUTO: 15.3 % — SIGNIFICANT CHANGE UP (ref 13–44)
LYMPHOCYTES # BLD AUTO: 2.87 K/UL — SIGNIFICANT CHANGE UP (ref 1–3.3)
MAGNESIUM SERPL-MCNC: 2.3 MG/DL — SIGNIFICANT CHANGE UP (ref 1.6–2.6)
MCHC RBC-ENTMCNC: 23.4 PG — LOW (ref 27–34)
MCHC RBC-ENTMCNC: 32.8 GM/DL — SIGNIFICANT CHANGE UP (ref 32–36)
MCV RBC AUTO: 71.5 FL — LOW (ref 80–100)
METHADONE UR-MCNC: NEGATIVE — SIGNIFICANT CHANGE UP
MONOCYTES # BLD AUTO: 1 K/UL — HIGH (ref 0–0.9)
MONOCYTES NFR BLD AUTO: 5.3 % — SIGNIFICANT CHANGE UP (ref 2–14)
NEUTROPHILS # BLD AUTO: 14.68 K/UL — HIGH (ref 1.8–7.4)
NEUTROPHILS NFR BLD AUTO: 78 % — HIGH (ref 43–77)
NITRITE UR-MCNC: NEGATIVE — SIGNIFICANT CHANGE UP
OPIATES UR-MCNC: NEGATIVE — SIGNIFICANT CHANGE UP
PCP SPEC-MCNC: SIGNIFICANT CHANGE UP
PCP UR-MCNC: NEGATIVE — SIGNIFICANT CHANGE UP
PH UR: 6 — SIGNIFICANT CHANGE UP (ref 5–8)
PHOSPHATE SERPL-MCNC: 6.8 MG/DL — HIGH (ref 2.4–4.7)
PLATELET # BLD AUTO: 436 K/UL — HIGH (ref 150–400)
POTASSIUM SERPL-MCNC: 2.8 MMOL/L — CRITICAL LOW (ref 3.5–5.3)
POTASSIUM SERPL-SCNC: 2.8 MMOL/L — CRITICAL LOW (ref 3.5–5.3)
PROT UR-MCNC: 500 MG/DL
RAPID RVP RESULT: SIGNIFICANT CHANGE UP
RBC # BLD: 4.18 M/UL — LOW (ref 4.2–5.8)
RBC # FLD: 16.4 % — HIGH (ref 10.3–14.5)
RBC CASTS # UR COMP ASSIST: ABNORMAL /HPF (ref 0–4)
RSV RNA SPEC QL NAA+PROBE: SIGNIFICANT CHANGE UP
RV+EV RNA SPEC QL NAA+PROBE: SIGNIFICANT CHANGE UP
SARS-COV-2 IGG SERPL QL IA: NEGATIVE — SIGNIFICANT CHANGE UP
SARS-COV-2 IGM SERPL IA-ACNC: 0.06 INDEX — SIGNIFICANT CHANGE UP
SARS-COV-2 RNA SPEC QL NAA+PROBE: SIGNIFICANT CHANGE UP
SODIUM SERPL-SCNC: 136 MMOL/L — SIGNIFICANT CHANGE UP (ref 135–145)
SP GR SPEC: 1.01 — SIGNIFICANT CHANGE UP (ref 1.01–1.02)
THC UR QL: NEGATIVE — SIGNIFICANT CHANGE UP
UROBILINOGEN FLD QL: NEGATIVE MG/DL — SIGNIFICANT CHANGE UP
WBC # BLD: 18.81 K/UL — HIGH (ref 3.8–10.5)
WBC # FLD AUTO: 18.81 K/UL — HIGH (ref 3.8–10.5)
WBC UR QL: SIGNIFICANT CHANGE UP

## 2020-12-31 PROCEDURE — 70450 CT HEAD/BRAIN W/O DYE: CPT | Mod: 26

## 2020-12-31 PROCEDURE — 99223 1ST HOSP IP/OBS HIGH 75: CPT

## 2020-12-31 PROCEDURE — 12345: CPT | Mod: NC

## 2020-12-31 RX ORDER — HEPARIN SODIUM 5000 [USP'U]/ML
5000 INJECTION INTRAVENOUS; SUBCUTANEOUS EVERY 8 HOURS
Refills: 0 | Status: DISCONTINUED | OUTPATIENT
Start: 2020-12-31 | End: 2021-01-13

## 2020-12-31 RX ORDER — DEXTROSE 50 % IN WATER 50 %
25 SYRINGE (ML) INTRAVENOUS ONCE
Refills: 0 | Status: DISCONTINUED | OUTPATIENT
Start: 2020-12-31 | End: 2021-01-13

## 2020-12-31 RX ORDER — SODIUM CHLORIDE 9 MG/ML
1000 INJECTION, SOLUTION INTRAVENOUS
Refills: 0 | Status: DISCONTINUED | OUTPATIENT
Start: 2020-12-31 | End: 2021-01-01

## 2020-12-31 RX ORDER — SODIUM CHLORIDE 9 MG/ML
1000 INJECTION, SOLUTION INTRAVENOUS
Refills: 0 | Status: DISCONTINUED | OUTPATIENT
Start: 2020-12-31 | End: 2021-01-13

## 2020-12-31 RX ORDER — LABETALOL HCL 100 MG
20 TABLET ORAL ONCE
Refills: 0 | Status: COMPLETED | OUTPATIENT
Start: 2020-12-31 | End: 2020-12-31

## 2020-12-31 RX ORDER — GLUCAGON INJECTION, SOLUTION 0.5 MG/.1ML
1 INJECTION, SOLUTION SUBCUTANEOUS ONCE
Refills: 0 | Status: DISCONTINUED | OUTPATIENT
Start: 2020-12-31 | End: 2021-01-13

## 2020-12-31 RX ORDER — INSULIN LISPRO 100/ML
VIAL (ML) SUBCUTANEOUS EVERY 6 HOURS
Refills: 0 | Status: DISCONTINUED | OUTPATIENT
Start: 2020-12-31 | End: 2021-01-13

## 2020-12-31 RX ORDER — POTASSIUM CHLORIDE 20 MEQ
40 PACKET (EA) ORAL EVERY 4 HOURS
Refills: 0 | Status: COMPLETED | OUTPATIENT
Start: 2020-12-31 | End: 2020-12-31

## 2020-12-31 RX ORDER — INSULIN LISPRO 100/ML
VIAL (ML) SUBCUTANEOUS
Refills: 0 | Status: DISCONTINUED | OUTPATIENT
Start: 2020-12-31 | End: 2020-12-31

## 2020-12-31 RX ORDER — VANCOMYCIN HCL 1 G
1000 VIAL (EA) INTRAVENOUS ONCE
Refills: 0 | Status: COMPLETED | OUTPATIENT
Start: 2020-12-31 | End: 2020-12-31

## 2020-12-31 RX ORDER — DEXTROSE 50 % IN WATER 50 %
15 SYRINGE (ML) INTRAVENOUS ONCE
Refills: 0 | Status: DISCONTINUED | OUTPATIENT
Start: 2020-12-31 | End: 2021-01-13

## 2020-12-31 RX ORDER — HYDRALAZINE HCL 50 MG
10 TABLET ORAL EVERY 6 HOURS
Refills: 0 | Status: DISCONTINUED | OUTPATIENT
Start: 2020-12-31 | End: 2020-12-31

## 2020-12-31 RX ORDER — DEXTROSE 50 % IN WATER 50 %
12.5 SYRINGE (ML) INTRAVENOUS ONCE
Refills: 0 | Status: DISCONTINUED | OUTPATIENT
Start: 2020-12-31 | End: 2021-01-13

## 2020-12-31 RX ORDER — PIPERACILLIN AND TAZOBACTAM 4; .5 G/20ML; G/20ML
3.38 INJECTION, POWDER, LYOPHILIZED, FOR SOLUTION INTRAVENOUS ONCE
Refills: 0 | Status: COMPLETED | OUTPATIENT
Start: 2020-12-31 | End: 2020-12-31

## 2020-12-31 RX ORDER — POTASSIUM CHLORIDE 20 MEQ
10 PACKET (EA) ORAL ONCE
Refills: 0 | Status: COMPLETED | OUTPATIENT
Start: 2020-12-31 | End: 2020-12-31

## 2020-12-31 RX ORDER — HYDRALAZINE HCL 50 MG
10 TABLET ORAL EVERY 4 HOURS
Refills: 0 | Status: DISCONTINUED | OUTPATIENT
Start: 2020-12-31 | End: 2021-01-13

## 2020-12-31 RX ADMIN — Medication 100 MILLIEQUIVALENT(S): at 00:13

## 2020-12-31 RX ADMIN — Medication 3: at 17:06

## 2020-12-31 RX ADMIN — Medication 20 MILLIGRAM(S): at 03:02

## 2020-12-31 RX ADMIN — Medication 10 MILLIGRAM(S): at 18:39

## 2020-12-31 RX ADMIN — Medication 100 MILLIEQUIVALENT(S): at 08:10

## 2020-12-31 RX ADMIN — Medication 10 MILLIGRAM(S): at 15:55

## 2020-12-31 RX ADMIN — Medication 250 MILLIGRAM(S): at 03:31

## 2020-12-31 RX ADMIN — Medication 100 MILLIEQUIVALENT(S): at 01:24

## 2020-12-31 RX ADMIN — Medication 3: at 23:39

## 2020-12-31 RX ADMIN — Medication 20 MILLIGRAM(S): at 00:06

## 2020-12-31 RX ADMIN — Medication 10 MILLIGRAM(S): at 22:55

## 2020-12-31 RX ADMIN — Medication 3: at 11:28

## 2020-12-31 RX ADMIN — HEPARIN SODIUM 5000 UNIT(S): 5000 INJECTION INTRAVENOUS; SUBCUTANEOUS at 11:27

## 2020-12-31 RX ADMIN — SODIUM CHLORIDE 100 MILLILITER(S): 9 INJECTION, SOLUTION INTRAVENOUS at 17:28

## 2020-12-31 RX ADMIN — HEPARIN SODIUM 5000 UNIT(S): 5000 INJECTION INTRAVENOUS; SUBCUTANEOUS at 21:40

## 2020-12-31 RX ADMIN — Medication 4: at 08:09

## 2020-12-31 RX ADMIN — PIPERACILLIN AND TAZOBACTAM 200 GRAM(S): 4; .5 INJECTION, POWDER, LYOPHILIZED, FOR SOLUTION INTRAVENOUS at 02:54

## 2020-12-31 NOTE — CONSULT NOTE ADULT - SUBJECTIVE AND OBJECTIVE BOX
REASON FOR CONSULT: AMS / CKD now ESRD    CONSULT REQUESTED BY: Dr Red    Patient is a 35y old  Male who presents with a chief complaint of     BRIEF HOSPITAL COURSE: 35 year old male PMHx (obtained from chart record) Bipolar Dz, DM2, HTN, CKD ( s/p AVF not on HD as yet).  Brought to Ed by sister ( no longer at bedside) for AMS for past 3 days.  On exam patient staring at TV and responding to voice but limited answers  States he had been nauseas for 2 days.  Does not elaborate further and did not answer any other questions when asked.  Unable to assist in HPI or ROS.    ED reports that sister claimed that he has had no falls, fevers or sick contacts.     Events last 24 hours: In ED noted to have HTN with SBP > 200 which responded to IV Hydralazine,  K of 2.9 and Scr > 7 with no emergent need for HD. Leukocytosis without fever and RVP was Neg    PAST MEDICAL & SURGICAL HISTORY:  Kidney disease  HTN (hypertension)  Sleep apnea  Diabetes  Bipolar 1 disorder  Hypertension  No significant past surgical history      Allergies    No Known Allergies    Intolerances      FAMILY HISTORY:  Family history of diabetes mellitus (Grandparent)        Social History: Unable to access     Review of Systems: Unable to acess    Medications:        Vital Signs Last 24 Hrs  T(C): 37.3 (30 Dec 2020 22:21), Max: 37.3 (30 Dec 2020 22:21)  T(F): 99.1 (30 Dec 2020 22:21), Max: 99.1 (30 Dec 2020 22:21)  HR: 92 (31 Dec 2020 03:28) (91 - 117)  BP: 192/96 (31 Dec 2020 03:28) (185/98 - 219/104)  RR: 20 (31 Dec 2020 03:28) (19 - 22)  SpO2: 96% (31 Dec 2020 03:28) (96% - 98%)              LABS:                        10.3   23.49 )-----------( 442      ( 30 Dec 2020 22:21 )             31.2     12-30    134<L>  |  95<L>  |  78.0<H>  ----------------------------<  303<H>  2.9<LL>   |  21.0<L>  |  7.39<H>    Ca    9.6      30 Dec 2020 22:21    TPro  7.2  /  Alb  3.4  /  TBili  0.3<L>  /  DBili  x   /  AST  29  /  ALT  23  /  AlkPhos  112  12-30      CARDIAC MARKERS ( 30 Dec 2020 22:21 )  x     / 0.10 ng/mL / x     / x     / x          CAPILLARY BLOOD GLUCOSE      POCT Blood Glucose.: 299 mg/dL (30 Dec 2020 22:09)    PT/INR - ( 30 Dec 2020 22:21 )   PT: 12.7 sec;   INR: 1.10 ratio         PTT - ( 30 Dec 2020 22:21 )  PTT:28.4 sec  Urinalysis Basic - ( 31 Dec 2020 02:54 )    Color: Yellow / Appearance: Cloudy / S.010 / pH: x  Gluc: x / Ketone: Negative  / Bili: Negative / Urobili: Negative mg/dL   Blood: x / Protein: 500 mg/dL / Nitrite: Negative   Leuk Esterase: Negative / RBC: 3-5 /HPF / WBC 0-2   Sq Epi: x / Non Sq Epi: Occasional / Bacteria: x      CULTURES:      Physical Examination:    General:  Awake.  nonfocal    HEENT: Pupils equal, reactive to light.  Symmetric. No JVD     PULM: Clear to auscultation bilaterally, no significant sputum production    CVS: Regular rate and rhythm, no murmurs, rubs, or gallops    ABD: Softly distended, nontender, normoactive bowel sounds, no masses    EXT: No edema, nontender    SKIN: Warm and well perfused, no rashes noted.        RADIOLOGY:     < from: CT Brain Stroke Protocol (12..20 @ 01:13) >     EXAM:  CT BRAIN STROKE PROTOCOL                          PROCEDURE DATE:  2020          INTERPRETATION:  CT HEAD WITHOUT CONTRAST    INDICATION: 35 years old. Male. Stroke Code.    COMPARISON: 2020.    TECHNIQUE: Noncontrast axial CT head was obtained from the skull base to vertex. RAPID artificial intelligence was used for preliminary evaluation of intracranial hemorrhage.    FINDINGS:  No acute intracranial hemorrhage, mass effect or midline shift.  No CT evidence of acute large territory vascular infarct.  The ventricles and cortical sulci are within normal limits.    The visualized paranasal sinuses and mastoid air cells are well aerated.  No displaced calvarial fracture.    IMPRESSION:  No acute intracranial hemorrhage or mass effect. Further evaluation with MRI may be performed as clinically indicated.  Please call radiology if there are any questions. There may be phone issues at [place]? I've been calling to try to give results. Thank you.    < end of copied text >    CXR without active disease - official read is pending     CRITICAL CARE TIME SPENT: 35 minutes     (Assessing presenting problems of acute illness, which pose high probability of life threatening deterioration or end organ damage/dysfunction, as well as medical decision making including initiating plan of care, reviewing data, reviewing radiologic exams, discussing with multidisciplinary team,  discussing goals of care with patient/family, and writing this note.  Non-inclusive of procedures performed) REASON FOR CONSULT: AMS / CKD now ESRD    CONSULT REQUESTED BY: Dr Pizarro    Patient is a 35y old  Male who presents with a chief complaint of     BRIEF HOSPITAL COURSE: 35 year old male PMHx (obtained from chart record) Bipolar Dz, DM2, HTN, CKD ( s/p AVF not on HD as yet).  Brought to Ed by sister ( no longer at bedside) for AMS for past 3 days.  On exam patient staring at TV and responding to voice but limited answers  States he had been nauseas for 2 days.  Does not elaborate further and did not answer any other questions when asked.  Unable to assist in HPI or ROS.    ED reports that sister claimed that he has had no falls, fevers or sick contacts.     Events last 24 hours: In ED noted to have HTN with SBP > 200 which responded to IV Hydralazine,  K of 2.9 and Scr > 7 with no emergent need for HD. Leukocytosis without fever and RVP was Neg    PAST MEDICAL & SURGICAL HISTORY:  Kidney disease  HTN (hypertension)  Sleep apnea  Diabetes  Bipolar 1 disorder  Hypertension  No significant past surgical history      Allergies    No Known Allergies    Intolerances      FAMILY HISTORY:  Family history of diabetes mellitus (Grandparent)        Social History: Unable to access     Review of Systems: Unable to acess    Medications:        Vital Signs Last 24 Hrs  T(C): 37.3 (30 Dec 2020 22:21), Max: 37.3 (30 Dec 2020 22:21)  T(F): 99.1 (30 Dec 2020 22:21), Max: 99.1 (30 Dec 2020 22:21)  HR: 92 (31 Dec 2020 03:28) (91 - 117)  BP: 192/96 (31 Dec 2020 03:28) (185/98 - 219/104)  RR: 20 (31 Dec 2020 03:28) (19 - 22)  SpO2: 96% (31 Dec 2020 03:28) (96% - 98%)              LABS:                        10.3   23.49 )-----------( 442      ( 30 Dec 2020 22:21 )             31.2     12-30    134<L>  |  95<L>  |  78.0<H>  ----------------------------<  303<H>  2.9<LL>   |  21.0<L>  |  7.39<H>    Ca    9.6      30 Dec 2020 22:21    TPro  7.2  /  Alb  3.4  /  TBili  0.3<L>  /  DBili  x   /  AST  29  /  ALT  23  /  AlkPhos  112  12-30      CARDIAC MARKERS ( 30 Dec 2020 22:21 )  x     / 0.10 ng/mL / x     / x     / x          CAPILLARY BLOOD GLUCOSE      POCT Blood Glucose.: 299 mg/dL (30 Dec 2020 22:09)    PT/INR - ( 30 Dec 2020 22:21 )   PT: 12.7 sec;   INR: 1.10 ratio         PTT - ( 30 Dec 2020 22:21 )  PTT:28.4 sec  Urinalysis Basic - ( 31 Dec 2020 02:54 )    Color: Yellow / Appearance: Cloudy / S.010 / pH: x  Gluc: x / Ketone: Negative  / Bili: Negative / Urobili: Negative mg/dL   Blood: x / Protein: 500 mg/dL / Nitrite: Negative   Leuk Esterase: Negative / RBC: 3-5 /HPF / WBC 0-2   Sq Epi: x / Non Sq Epi: Occasional / Bacteria: x      CULTURES:      Physical Examination:    General:  Awake.  nonfocal    HEENT: Pupils equal, reactive to light.  Symmetric. No JVD     PULM: Clear to auscultation bilaterally, no significant sputum production    CVS: Regular rate and rhythm, no murmurs, rubs, or gallops    ABD: Softly distended, nontender, normoactive bowel sounds, no masses    EXT: No edema, nontender    SKIN: Warm and well perfused, no rashes noted.        RADIOLOGY:     < from: CT Brain Stroke Protocol (12..20 @ 01:13) >     EXAM:  CT BRAIN STROKE PROTOCOL                          PROCEDURE DATE:  2020          INTERPRETATION:  CT HEAD WITHOUT CONTRAST    INDICATION: 35 years old. Male. Stroke Code.    COMPARISON: 2020.    TECHNIQUE: Noncontrast axial CT head was obtained from the skull base to vertex. RAPID artificial intelligence was used for preliminary evaluation of intracranial hemorrhage.    FINDINGS:  No acute intracranial hemorrhage, mass effect or midline shift.  No CT evidence of acute large territory vascular infarct.  The ventricles and cortical sulci are within normal limits.    The visualized paranasal sinuses and mastoid air cells are well aerated.  No displaced calvarial fracture.    IMPRESSION:  No acute intracranial hemorrhage or mass effect. Further evaluation with MRI may be performed as clinically indicated.  Please call radiology if there are any questions. There may be phone issues at [place]? I've been calling to try to give results. Thank you.    < end of copied text >    CXR without active disease - official read is pending     CRITICAL CARE TIME SPENT: 35 minutes     (Assessing presenting problems of acute illness, which pose high probability of life threatening deterioration or end organ damage/dysfunction, as well as medical decision making including initiating plan of care, reviewing data, reviewing radiologic exams, discussing with multidisciplinary team,  discussing goals of care with patient/family, and writing this note.  Non-inclusive of procedures performed)

## 2020-12-31 NOTE — CONSULT NOTE ADULT - SUBJECTIVE AND OBJECTIVE BOX
HPI:  36yo M with hx of morbid obesity, HTN, DM, HLD, ESRD not on dialysis s/p AVF creation, ARVIND not on CPAP, bipolar disorder, presented to the emergency department with altered mental status. Pt unable to answer any questions at this time, he does respond to voice, alert to self and place but does not answer any other questions. Called Emergency contact list as Leobardo Haro Father, the person who picked up the phone who appeared to be brother of the patient got upset saying that why I'm calling his dead father and he himself not interested answer any questions regarding his brother, saying it's too early and he is about to go to sleep. As per ED chart sister at bedside patient has AMS. Has been intermittently pacing around, has not been responding to questions properly, sometimes answering but very slowly, sometimes not answering. In ED noted to have HTN with SBP > 200 which responded to IV labetalol, K of 2.9 and cr >7, Leukocytosis 23k without fever and RVP and COVID-19 was Neg.     PAST MEDICAL & SURGICAL HISTORY:  Kidney disease    HTN (hypertension)    Sleep apnea    Diabetes    Bipolar 1 disorder    Hypertension    No significant past surgical history      FAMILY HISTORY:  Family history of diabetes mellitus (Grandparent)    [place]    Social History:Non smoker    MEDICATIONS  (STANDING):  dextrose 40% Gel 15 Gram(s) Oral once  dextrose 5%. 1000 milliLiter(s) (50 mL/Hr) IV Continuous <Continuous>  dextrose 5%. 1000 milliLiter(s) (100 mL/Hr) IV Continuous <Continuous>  dextrose 50% Injectable 25 Gram(s) IV Push once  dextrose 50% Injectable 12.5 Gram(s) IV Push once  dextrose 50% Injectable 25 Gram(s) IV Push once  glucagon  Injectable 1 milliGRAM(s) IntraMuscular once  heparin   Injectable 5000 Unit(s) SubCutaneous every 8 hours  insulin lispro (ADMELOG) corrective regimen sliding scale   SubCutaneous three times a day before meals  potassium chloride    Tablet ER 40 milliEquivalent(s) Oral every 4 hours    MEDICATIONS  (PRN):  hydrALAZINE Injectable 10 milliGRAM(s) IV Push every 6 hours PRN SBP>180   Meds reviewed    Allergies    No Known Allergies    Intolerances      Vital Signs Last 24 Hrs  T(C): 36.9 (31 Dec 2020 11:15), Max: 37.4 (31 Dec 2020 07:22)  T(F): 98.5 (31 Dec 2020 11:15), Max: 99.3 (31 Dec 2020 07:22)  HR: 101 (31 Dec 2020 11:15) (85 - 117)  BP: 161/85 (31 Dec 2020 11:15) (158/84 - 219/104)  BP(mean): --  RR: 20 (31 Dec 2020 11:15) (19 - 22)  SpO2: 96% (31 Dec 2020 11:15) (91% - 98%)  Daily Height in cm: 165.1 (30 Dec 2020 22:07)    Daily     PHYSICAL EXAM:    GENERAL: appears chronically ill  HEAD:  Atraumatic, Normocephalic  EYES: EOMI  NECK: Supple, neck  veins full  NERVOUS SYSTEM:  Alert & Oriented X3  CHEST/LUNG: Clear to percussion bilaterally  HEART: Regular rate and rhythm  ABDOMEN: Soft, Nontender, Nondistended; Bowel sounds present  EXTREMITIES: edema      LABS:                        9.8    18.81 )-----------( 436      ( 31 Dec 2020 10:33 )             29.9         136  |  97<L>  |  77.0<H>  ----------------------------<  285<H>  2.8<LL>   |  22.0  |  7.29<H>    Ca    9.2      31 Dec 2020 10:33  Mg     2.3         TPro  x   /  Alb  x   /  TBili  x   /  DBili  x   /  AST  x   /  ALT  18  /  AlkPhos  x       PT/INR - ( 30 Dec 2020 22:21 )   PT: 12.7 sec;   INR: 1.10 ratio         PTT - ( 30 Dec 2020 22:21 )  PTT:28.4 sec  Urinalysis Basic - ( 31 Dec 2020 02:54 )    Color: Yellow / Appearance: Cloudy / S.010 / pH: x  Gluc: x / Ketone: Negative  / Bili: Negative / Urobili: Negative mg/dL   Blood: x / Protein: 500 mg/dL / Nitrite: Negative   Leuk Esterase: Negative / RBC: 3-5 /HPF / WBC 0-2   Sq Epi: x / Non Sq Epi: Occasional / Bacteria: x      Magnesium, Serum: 2.3 mg/dL ( @ 10:33)    ABG - ( 31 Dec 2020 06:51 )  pH, Arterial: 7.36  pH, Blood: x     /  pCO2: 40    /  pO2: 74    / HCO3: 22    / Base Excess: -2.3  /  SaO2: 95                    RADIOLOGY & ADDITIONAL TESTS:     HPI:  34yo M with hx of morbid obesity, HTN, DM, HLD, ESRD not on dialysis s/p AVF creation, ARVIND not on CPAP, bipolar disorder, presented to the emergency department with altered mental status. Pt unable to answer any questions at this time, he does respond to voice, alert to self and place but does not answer any other questions. Called Emergency contact list as Leobardo Haro Father, the person who picked up the phone who appeared to be brother of the patient got upset saying that why I'm calling his dead father and he himself not interested answer any questions regarding his brother, saying it's too early and he is about to go to sleep. As per ED chart sister at bedside patient has AMS. Has been intermittently pacing around, has not been responding to questions properly, sometimes answering but very slowly, sometimes not answering. In ED noted to have HTN with SBP > 200 which responded to IV labetalol, K of 2.9 and cr >7, Leukocytosis 23k without fever and RVP and COVID-19 was Neg. Pt arousable but not answering questions ROS UTO no acute distress    PAST MEDICAL & SURGICAL HISTORY:  Kidney disease    HTN (hypertension)    Sleep apnea    Diabetes    Bipolar 1 disorder    Hypertension    No significant past surgical history      FAMILY HISTORY:  Family history of diabetes mellitus (Grandparent)    [place]    Social History:Non smoker    MEDICATIONS  (STANDING):  dextrose 40% Gel 15 Gram(s) Oral once  dextrose 5%. 1000 milliLiter(s) (50 mL/Hr) IV Continuous <Continuous>  dextrose 5%. 1000 milliLiter(s) (100 mL/Hr) IV Continuous <Continuous>  dextrose 50% Injectable 25 Gram(s) IV Push once  dextrose 50% Injectable 12.5 Gram(s) IV Push once  dextrose 50% Injectable 25 Gram(s) IV Push once  glucagon  Injectable 1 milliGRAM(s) IntraMuscular once  heparin   Injectable 5000 Unit(s) SubCutaneous every 8 hours  insulin lispro (ADMELOG) corrective regimen sliding scale   SubCutaneous three times a day before meals  potassium chloride    Tablet ER 40 milliEquivalent(s) Oral every 4 hours    MEDICATIONS  (PRN):  hydrALAZINE Injectable 10 milliGRAM(s) IV Push every 6 hours PRN SBP>180   Meds reviewed    Allergies    No Known Allergies    Intolerances      Vital Signs Last 24 Hrs  T(C): 36.9 (31 Dec 2020 11:15), Max: 37.4 (31 Dec 2020 07:22)  T(F): 98.5 (31 Dec 2020 11:15), Max: 99.3 (31 Dec 2020 07:22)  HR: 101 (31 Dec 2020 11:15) (85 - 117)  BP: 161/85 (31 Dec 2020 11:15) (158/84 - 219/104)  BP(mean): --  RR: 20 (31 Dec 2020 11:15) (19 - 22)  SpO2: 96% (31 Dec 2020 11:15) (91% - 98%)  Daily Height in cm: 165.1 (30 Dec 2020 22:07)    Daily     PHYSICAL EXAM:    GENERAL: appears acutely and chronically ill  HEAD:  Atraumatic, Normocephalic  EYES: EOMI  NECK: Supple, neck  veins full  NERVOUS SYSTEM:  Awake not answering questions  CHEST/LUNG: Clear to percussion bilaterally  HEART: Regular rate and rhythm  ABDOMEN: Soft, Nontender, Nondistended; Bowel sounds present  EXTREMITIES: trace LE edema, L wrist AVF good thrill      LABS:                        9.8    18.81 )-----------( 436      ( 31 Dec 2020 10:33 )             29.9     12-    136  |  97<L>  |  77.0<H>  ----------------------------<  285<H>  2.8<LL>   |  22.0  |  7.29<H>    Ca    9.2      31 Dec 2020 10:33  Mg     2.3     12-31    TPro  x   /  Alb  x   /  TBili  x   /  DBili  x   /  AST  x   /  ALT  18  /  AlkPhos  x   12-    PT/INR - ( 30 Dec 2020 22:21 )   PT: 12.7 sec;   INR: 1.10 ratio         PTT - ( 30 Dec 2020 22:21 )  PTT:28.4 sec  Urinalysis Basic - ( 31 Dec 2020 02:54 )    Color: Yellow / Appearance: Cloudy / S.010 / pH: x  Gluc: x / Ketone: Negative  / Bili: Negative / Urobili: Negative mg/dL   Blood: x / Protein: 500 mg/dL / Nitrite: Negative   Leuk Esterase: Negative / RBC: 3-5 /HPF / WBC 0-2   Sq Epi: x / Non Sq Epi: Occasional / Bacteria: x      Magnesium, Serum: 2.3 mg/dL (12-31 @ 10:33)    ABG - ( 31 Dec 2020 06:51 )  pH, Arterial: 7.36  pH, Blood: x     /  pCO2: 40    /  pO2: 74    / HCO3: 22    / Base Excess: -2.3  /  SaO2: 95                    RADIOLOGY & ADDITIONAL TESTS:

## 2020-12-31 NOTE — CONSULT NOTE ADULT - ASSESSMENT
MELISSA on CKD stage IV/ V  Worsening renal function, Has AVF  Has h/o BiPolar d/o ARVIND, HTN, DM  HypoKalemia- supplementation ongoing  COVID Negative  Sepsis? Leukocytosis  HTN Urgency responded t Labetalol IV  ICU evaluation   MELISSA on CKD stage IV/ V  Worsening renal function, Has AVF good thrill  Has h/o BiPolar d/o ARVIND, HTN, DM  AMS not answering questions- uremic?  Will send UTOX  Will arrange for HD today via AVF- repeat tomorrow  I spoke to sister Letty [phone]  She tells me at baseline yest he is A&Ox3 but does have mental illness- would call psych consult  Sister also tells me he was suppose to start HD few mo ago for some reason did not happen  i obtained verbal consent from sister to start HD  HypoKalemia- supplementation ongoing  COVID Negative  Sepsis? Leukocytosis empiric abx noted  HTN Urgency responded to Labetalol IV  had 750 cc UOP on straight cath yest evening  Will give trial of IVF  ICU evaluation  repeat BMP this afternoon  AM labs     Will follow

## 2020-12-31 NOTE — H&P ADULT - NSHPPHYSICALEXAM_GEN_ALL_CORE
T(C): 37.3 (12-31-20 @ 04:57), Max: 37.3 (12-30-20 @ 22:21)  HR: 93 (12-31-20 @ 04:57) (91 - 117)  BP: 160/93 (12-31-20 @ 04:57) (160/93 - 219/104)  RR: 21 (12-31-20 @ 04:57) (19 - 22)  SpO2: 91% (12-31-20 @ 04:57) (91% - 98%)    GENERAL: patient appears well, no acute distress, appropriate, pleasant  EYES: sclera clear, no exudates  ENMT: oropharynx clear without erythema, no exudates, moist mucous membranes  NECK: supple, soft, no thyromegaly noted  LUNGS: good air entry bilaterally, clear to auscultation, symmetric breath sounds, no wheezing or rhonchi appreciated  HEART: soft S1/S2, regular rate and rhythm, no murmurs noted, no lower extremity edema  GASTROINTESTINAL: abdomen is soft, nontender, nondistended, normoactive bowel sounds, no palpable masses  INTEGUMENT: good skin turgor, warm skin, appears well perfused  MUSCULOSKELETAL: no clubbing or cyanosis, no obvious deformity  NEUROLOGIC: awake, alert, oriented x3, good muscle tone in 4 extremities, no obvious sensory deficits  PSYCHIATRIC: mood is good, affect is congruent, linear and logical thought process  HEME/LYMPH: no palpable supraclavicular nodules, no obvious ecchymosis or petechiae

## 2020-12-31 NOTE — CONSULT NOTE ADULT - ASSESSMENT
35 year old male PMHx (obtained from chart record) Bipolar Dz, DM2, HTN, CKD ( s/p AVF not on HD as yet).  Brought to Ed by sister ( no longer at bedside) for AMS for past 3 days.          CKD now ESRD ( has maturing AVF)  HTN with SBP > 200 which responded to IV Hydralazine  Hypokalemia of 2.9 -  no emergent need for HD.  Leukocytosis without fever and RVP was Neg - unclear significance   AMS - Poss Uremic component vs HTN related Vs Psych Meds vs Metabolic Encephalopathy      NEURO - Monitor MS - Restart Home Psych meds      PULM - unlabored on Room Air - Sats 97%     CV - [employer]  -180 tonight - restart home BP meds           Would hold Diuretics listed in home meds given now ESRD     GI - Cardiac / Renal diet as tolerates     RENAL - To consult will need HD                wWould place Méndez to access UOP                Supplement K     ID - F/U Cultures          RVP and COVID Neg          Send Urine for UA / CNS     DVT Heparin SQ 7500 TID as BMI > 35    Case D/W eICU Dr Fry who agrees that at this time patient does not require ICU level of care.      35 year old male PMHx (obtained from chart record) Bipolar Dz, DM2, HTN, CKD ( s/p AVF not on HD as yet).  Brought to Ed by sister ( no longer at bedside) for AMS for past 3 days.          CKD now ESRD ( has maturing AVF)  HTN with SBP > 200 which responded to IV Hydralazine  Hypokalemia of 2.9 -  no emergent need for HD.  Leukocytosis without fever and RVP was Neg - unclear significance   AMS - Poss Uremic component vs HTN related Vs Psych Meds vs Metabolic Encephalopathy      NEURO - Monitor MS - Restart Home Psych meds      PULM - unlabored on Room Air - Sats 97%     CV - Target  -180 tonight - restart home BP meds           Would hold Diuretics listed in home meds given now ESRD     GI - Cardiac / Renal diet as tolerates     RENAL - To consult will need HD                wWould place Méndez to access UOP                Supplement K     ID - F/U Cultures          RVP and COVID Neg          Send Urine for UA / CNS     DVT Heparin SQ 7500 TID as BMI > 35    Case D/W eICU Dr Jordan who agrees that at this time patient does not require ICU level of care.

## 2020-12-31 NOTE — PROGRESS NOTE ADULT - SUBJECTIVE AND OBJECTIVE BOX
Advanced CKD,    DM,    Bi Polar Disorder,    In ED wConnie LAGOS,    Access LUE AVF ( maturing )    Vital Signs Last 24 Hrs,    T(C): 37.4 (31 Dec 2020 07:22), Max: 37.4 (31 Dec 2020 07:22)  T(F): 99.3 (31 Dec 2020 07:22), Max: 99.3 (31 Dec 2020 07:22)  HR: 85 (31 Dec 2020 07:22) (85 - 117)  BP: 158/84 (31 Dec 2020 07:22) (158/84 - 219/104)  RR: 22 (31 Dec 2020 07:22) (19 - 22)  SpO2: 95% (31 Dec 2020 07:22) (91% - 98%)    134<L>  |  95<L>  |  78.0<H>  ----------------------------<  303<H>  Ca:9.6   (30 Dec 2020 22:21)  2.9<LL>   |  21.0<L>  |  7.39<H>    eGFR if Non : 9 <L>  eGFR if : 10 <L>    TPro  7.2    /  Alb  3.4    /  TBili  0.3<L>  /  DBili  x      /  AST  29     /  ALT  23     /  AlkPhos  112    30 Dec 2020 22:21                             10.3<L>  23.49<H> )-----------( 442<H>    ( 30 Dec 2020 22:21 )                  31.2<L>    Urinalysis Basic - ( 31 Dec 2020 02:54 )  Color: Yellow / Appearance: Cloudy<!> / S.010 / pH: x  Gluc: x / Ketone: Negative  / Bili: Negative / Urobili: Negative mg/dL   Blood: x / Protein: 500 mg/dL<!> / Nitrite: Negative   Leuk Esterase: Negative / RBC: 3-5 /HPF<!> / WBC 0-2   Sq Epi: x / Non Sq Epi: Occasional / Bacteria: x    No Indication for Emergent HD,     HD RN Aware,

## 2020-12-31 NOTE — H&P ADULT - ASSESSMENT
34yo M with hx of morbid obesity, HTN, DM, HLD, ESRD not on dialysis s/p AVF creation, ARVIND not on CPAP, bipolar disorder, presented to the emergency department with altered mental status.    Acute toxic metabolic encephalopathy  -likely due to Uremia vs hypertensive urgency or  Psych related   -unknown baseline mental status, called family but unwilling to answer question  -CT Head unremarkable  -with leukocytosis but afebrile and no other sign of acute infectious process, s/p vanco and zosyn in the ED   -keep NPO for now until mentation improves   -blood, UA cultures pending, UA negative     AG Metabolic acidosis   -likely due to acute on chronic renal failure    ESRD   -with worsening renal failure likely with uremia    -Cr 7.38, BUN 78, will likely need dialysis this admission   -no acute fluid overload on exam   -evaluated by MICU no need for urgent dialysis   -pt s/p  L arm AVF creation   -Nephro consulted     Hypertensive urgency   -likely multifactorial in the setting of renal failure   -BP>200 systolic improve with Labetalol  -unsure if pt has been taking meds in the past few days  -unable to verify home BP meds, please call pt's pharmacy in the am   -hydralazine 10mg IVP Q6hrs prn for sbp>180    IDDM   -unable to verify home insulin dosing   -Cont with ISS, Hypoglycemic protocol      ARVIND  -not on CPAP as per chart     Bipolar disorder  -unable to verify meds     DVT ppx  heparin     Medication rec not completed, please call pt's pharmacy in the am     34yo M with hx of morbid obesity, HTN, DM, HLD, ESRD not on dialysis s/p AVF creation, ARVIND not on CPAP, bipolar disorder, presented to the emergency department with altered mental status.    Acute toxic metabolic encephalopathy  -likely due to Uremia vs hypertensive urgency or  Psych related   -unknown baseline mental status, called family but unwilling to answer question  -CT Head unremarkable  -with leukocytosis but afebrile and no other sign of acute infectious process, s/p vanco and zosyn in the ED   -keep NPO for now until mentation improves   -blood, UA cultures pending, UA negative     AG Metabolic acidosis   -likely due to acute on chronic renal failure  -hypokalemia, repleted, caution with renal failure     ESRD   -with worsening renal failure likely with uremia    -Cr 7.38, BUN 78, will likely need dialysis this admission   -no acute fluid overload on exam   -evaluated by MICU no need for urgent dialysis   -pt s/p  L arm AVF creation   -Nephro consulted     Hypertensive urgency   -likely multifactorial in the setting of renal failure   -BP>200 systolic improve with Labetalol  -unsure if pt has been taking meds in the past few days  -unable to verify home BP meds, please call pt's pharmacy in the am   -hydralazine 10mg IVP Q6hrs prn for sbp>180    IDDM   -unable to verify home insulin dosing   -Cont with ISS, Hypoglycemic protocol      ARVIND  -not on CPAP as per chart     Bipolar disorder  -unable to verify meds     DVT ppx  heparin     Medication rec not completed, please call pt's pharmacy in the am

## 2020-12-31 NOTE — H&P ADULT - HISTORY OF PRESENT ILLNESS
36 yo male with hx of morbid obesity, HTN, DM, HLD, CKD, ARVIND not on CPAP, bipolar disorder, presented to the emergency department with altered mental status 36yo M with hx of morbid obesity, HTN, DM, HLD, ESRD not on dialysis s/p AVF creation, ARVIND not on CPAP, bipolar disorder, presented to the emergency department with altered mental status. Pt unable to answer any questions at this time, he does respond to voice, alert to self and place but does not answer any other questions. Called Emergency contact list as Leobardo Haro Father, the person who picked up the phone who appeared to be brother of the patient got upset saying that why I'm calling his dead father and he himself not interested answer any questions regarding his brother, saying it's too early and he is about to go to sleep. As per ED chart sister at bedside patient has AMS. Has been intermittently pacing around, has not been responding to questions properly, sometimes answering but very slowly, sometimes not answering. In ED noted to have HTN with SBP > 200 which responded to IV labetalol, K of 2.9 and cr >7, Leukocytosis 23k without fever and RVP and COVID-19 was Neg.

## 2020-12-31 NOTE — CHART NOTE - NSCHARTNOTEFT_GEN_A_CORE
pt seen and examined    Vital Signs Last 24 Hrs  T(C): 37.2 (12-31-20 @ 16:40), Max: 37.4 (12-31-20 @ 07:22)  T(F): 98.9 (12-31-20 @ 16:40), Max: 99.3 (12-31-20 @ 07:22)  HR: 101 (12-31-20 @ 16:40) (85 - 117)  BP: 172/106 (12-31-20 @ 16:40) (158/84 - 219/104)  BP(mean): --  RR: 18 (12-31-20 @ 16:40) (17 - 22)  SpO2: 100% (12-31-20 @ 16:40) (91% - 100%)    GENERAL: appears toxic, groggy,  HEAD:  Atraumatic, Normocephalic  EYES: conjunctiva and sclera clear  NECK: No JVD, Normal thyroid  NERVOUS SYSTEM: lethargic, but comes awake, without any real conversation, Moving all extremities  CHEST/LUNG: CTA bilaterally; No rales, rhonchi, wheezing, or rubs  HEART: Regular rate and rhythm; No murmurs, rubs, or gallops  ABDOMEN: Soft, Nontender, Nondistended; Bowel sounds present  EXTREMITIES: No LE edema, L wrist AVF good thrill    Labs/ Imaging/ Chart reviewed    A/P-    36yo M with hx of morbid obesity, HTN, DM, HLD, ESRD not on dialysis s/p AVF creation, ARVIND not on CPAP, bipolar disorder, presented to the emergency department with altered mental status.    Acute toxic metabolic encephalopathy  -likely due to Uremia vs hypertensive urgency vs lyte imbalance vs Psych related vs all of it  -CT Head unremarkable  -with leukocytosis but afebrile and no other sign of acute infectious process, s/p vanco and zosyn in the ED   -Hold further antibiotics  -keep NPO for now until mentation improves   -blood, UA cultures testing, UA negative     s/p AG Metabolic acidosis   -likely due to acute on chronic renal failure  -hypokalemia, repleted, caution with renal failure   -now resolved    ESRD impending HD  -with worsening renal failure likely with uremia    -Cr 7.38, BUN 78, will likely need dialysis this admission   -no acute fluid overload on exam   -evaluated by MICU no need for urgent dialysis   -already has L arm AVF with good thrill  -Nephro consulted   - IVF per Renal  - HD planned for today    Hypokalemia  - to be corrected at HD   - If nto corrected replete IV, cannot give PO    Hypertensive urgency   -likely multifactorial in the setting of renal failure   -BP>200 systolic improve with Labetalol  -unsure if pt has been taking meds in the past few days  -hydralazine 10mg IVP Q6hrs prn for sbp>180  -increase home dose Coreg, resume hydralazine PO, spironolactone Po when more awake and can take PO    IDDM uncontrolled  -unable to verify home insulin dosing   -Cont with ISS, Hypoglycemic protocol      ARVIND  -not on CPAP as per chart     Bipolar disorder  -unable to verify meds     DVT ppx  heparin pt seen and examined    Vital Signs Last 24 Hrs  T(C): 37.2 (12-31-20 @ 16:40), Max: 37.4 (12-31-20 @ 07:22)  T(F): 98.9 (12-31-20 @ 16:40), Max: 99.3 (12-31-20 @ 07:22)  HR: 101 (12-31-20 @ 16:40) (85 - 117)  BP: 172/106 (12-31-20 @ 16:40) (158/84 - 219/104)  BP(mean): --  RR: 18 (12-31-20 @ 16:40) (17 - 22)  SpO2: 100% (12-31-20 @ 16:40) (91% - 100%)    GENERAL: appears toxic, groggy,  HEAD:  Atraumatic, Normocephalic  EYES: conjunctiva and sclera clear  NECK: No JVD, Normal thyroid  NERVOUS SYSTEM: lethargic, but comes awake, without any real conversation, Moving all extremities  CHEST/LUNG: CTA bilaterally; No rales, rhonchi, wheezing, or rubs  HEART: Regular rate and rhythm; No murmurs, rubs, or gallops  ABDOMEN: Soft, Nontender, Nondistended; Bowel sounds present  EXTREMITIES: No LE edema, L wrist AVF good thrill    Labs/ Imaging/ Chart reviewed    A/P-    34yo M with hx of morbid obesity, HTN, DM, HLD, ESRD not on dialysis s/p AVF creation, ARVIND not on CPAP, bipolar disorder, presented to the emergency department with altered mental status.    Acute toxic metabolic encephalopathy  -likely due to Uremia vs hypertensive urgency vs lyte imbalance vs Psych related vs all of it  -CT Head unremarkable  -with leukocytosis but afebrile and no other sign of acute infectious process, s/p vanco and zosyn in the ED   -Hold further antibiotics  -keep NPO for now until mentation improves   -blood, UA cultures testing, UA negative     s/p AG Metabolic acidosis   -likely due to acute on chronic renal failure  -hypokalemia, repleted, caution with renal failure   -now resolved    ESRD impending HD  -with worsening renal failure likely with uremia    -Cr 7.38, BUN 78, will likely need dialysis this admission   -no acute fluid overload on exam   -evaluated by MICU no need for urgent dialysis   -already has L arm AVF with good thrill  -Nephro consulted   - IVF per Renal  - HD planned for today    Hypokalemia  - to be corrected at HD   - If nto corrected replete IV, cannot give PO    Hypertensive urgency   -likely multifactorial in the setting of renal failure   -BP>200 systolic improve with Labetalol  -unsure if pt has been taking meds in the past few days  -hydralazine 10mg IVP Q6hrs prn for sbp>180  -increase home dose Coreg, resume hydralazine PO, spironolactone Po when more awake and can take PO    Leucocytosis  - unclear etiology  - if fever recurs or other source identified then start Antibx    IDDM uncontrolled  -unable to verify home insulin dosing   -Cont with ISS, Hypoglycemic protocol      ARVIND  -not on CPAP as per chart     Bipolar disorder  -unable to verify meds     DVT ppx  heparin

## 2021-01-01 LAB
A1C WITH ESTIMATED AVERAGE GLUCOSE RESULT: 12.3 % — HIGH (ref 4–5.6)
ANION GAP SERPL CALC-SCNC: 18 MMOL/L — HIGH (ref 5–17)
BASE EXCESS BLDA CALC-SCNC: -0.8 MMOL/L — SIGNIFICANT CHANGE UP (ref -2–2)
BLOOD GAS COMMENTS ARTERIAL: SIGNIFICANT CHANGE UP
BUN SERPL-MCNC: 57 MG/DL — HIGH (ref 8–20)
CALCIUM SERPL-MCNC: 9.3 MG/DL — SIGNIFICANT CHANGE UP (ref 8.6–10.2)
CHLORIDE SERPL-SCNC: 99 MMOL/L — SIGNIFICANT CHANGE UP (ref 98–107)
CO2 SERPL-SCNC: 20 MMOL/L — LOW (ref 22–29)
CREAT SERPL-MCNC: 6.32 MG/DL — HIGH (ref 0.5–1.3)
CULTURE RESULTS: NO GROWTH — SIGNIFICANT CHANGE UP
CULTURE RESULTS: NO GROWTH — SIGNIFICANT CHANGE UP
ESTIMATED AVERAGE GLUCOSE: 306 MG/DL — HIGH (ref 68–114)
GAS PNL BLDA: SIGNIFICANT CHANGE UP
GLUCOSE BLDC GLUCOMTR-MCNC: 152 MG/DL — HIGH (ref 70–99)
GLUCOSE BLDC GLUCOMTR-MCNC: 156 MG/DL — HIGH (ref 70–99)
GLUCOSE BLDC GLUCOMTR-MCNC: 188 MG/DL — HIGH (ref 70–99)
GLUCOSE BLDC GLUCOMTR-MCNC: 211 MG/DL — HIGH (ref 70–99)
GLUCOSE SERPL-MCNC: 170 MG/DL — HIGH (ref 70–99)
HCO3 BLDA-SCNC: 24 MMOL/L — SIGNIFICANT CHANGE UP (ref 20–26)
HCT VFR BLD CALC: 31.4 % — LOW (ref 39–50)
HGB BLD-MCNC: 10.1 G/DL — LOW (ref 13–17)
HOROWITZ INDEX BLDA+IHG-RTO: SIGNIFICANT CHANGE UP
MCHC RBC-ENTMCNC: 23.1 PG — LOW (ref 27–34)
MCHC RBC-ENTMCNC: 32.2 GM/DL — SIGNIFICANT CHANGE UP (ref 32–36)
MCV RBC AUTO: 71.9 FL — LOW (ref 80–100)
PCO2 BLDA: 42 MMHG — SIGNIFICANT CHANGE UP (ref 35–45)
PH BLDA: 7.37 — SIGNIFICANT CHANGE UP (ref 7.35–7.45)
PLATELET # BLD AUTO: 425 K/UL — HIGH (ref 150–400)
PO2 BLDA: 109 MMHG — HIGH (ref 83–108)
POTASSIUM SERPL-MCNC: 2.8 MMOL/L — CRITICAL LOW (ref 3.5–5.3)
POTASSIUM SERPL-SCNC: 2.8 MMOL/L — CRITICAL LOW (ref 3.5–5.3)
RBC # BLD: 4.37 M/UL — SIGNIFICANT CHANGE UP (ref 4.2–5.8)
RBC # FLD: 16.5 % — HIGH (ref 10.3–14.5)
SAO2 % BLDA: 98 % — SIGNIFICANT CHANGE UP (ref 95–99)
SODIUM SERPL-SCNC: 137 MMOL/L — SIGNIFICANT CHANGE UP (ref 135–145)
SPECIMEN SOURCE: SIGNIFICANT CHANGE UP
SPECIMEN SOURCE: SIGNIFICANT CHANGE UP
TSH SERPL-MCNC: 0.48 UIU/ML — SIGNIFICANT CHANGE UP (ref 0.27–4.2)
WBC # BLD: 19.93 K/UL — HIGH (ref 3.8–10.5)
WBC # FLD AUTO: 19.93 K/UL — HIGH (ref 3.8–10.5)

## 2021-01-01 PROCEDURE — 99223 1ST HOSP IP/OBS HIGH 75: CPT

## 2021-01-01 PROCEDURE — 99233 SBSQ HOSP IP/OBS HIGH 50: CPT

## 2021-01-01 RX ORDER — LAMOTRIGINE 25 MG/1
50 TABLET, ORALLY DISINTEGRATING ORAL
Qty: 0 | Refills: 0 | DISCHARGE

## 2021-01-01 RX ORDER — SODIUM CHLORIDE 9 MG/ML
1000 INJECTION, SOLUTION INTRAVENOUS
Refills: 0 | Status: DISCONTINUED | OUTPATIENT
Start: 2021-01-01 | End: 2021-01-02

## 2021-01-01 RX ORDER — METOPROLOL TARTRATE 50 MG
2.5 TABLET ORAL ONCE
Refills: 0 | Status: COMPLETED | OUTPATIENT
Start: 2021-01-01 | End: 2021-01-01

## 2021-01-01 RX ORDER — POTASSIUM CHLORIDE 20 MEQ
40 PACKET (EA) ORAL ONCE
Refills: 0 | Status: COMPLETED | OUTPATIENT
Start: 2021-01-01 | End: 2021-01-01

## 2021-01-01 RX ORDER — INSULIN GLARGINE 100 [IU]/ML
20 INJECTION, SOLUTION SUBCUTANEOUS AT BEDTIME
Refills: 0 | Status: DISCONTINUED | OUTPATIENT
Start: 2021-01-01 | End: 2021-01-13

## 2021-01-01 RX ORDER — ACETAMINOPHEN 500 MG
1000 TABLET ORAL ONCE
Refills: 0 | Status: DISCONTINUED | OUTPATIENT
Start: 2021-01-01 | End: 2021-01-01

## 2021-01-01 RX ORDER — ACETAMINOPHEN 500 MG
650 TABLET ORAL EVERY 6 HOURS
Refills: 0 | Status: DISCONTINUED | OUTPATIENT
Start: 2021-01-01 | End: 2021-01-13

## 2021-01-01 RX ORDER — ASPIRIN/CALCIUM CARB/MAGNESIUM 324 MG
81 TABLET ORAL DAILY
Refills: 0 | Status: DISCONTINUED | OUTPATIENT
Start: 2021-01-01 | End: 2021-01-13

## 2021-01-01 RX ORDER — METOPROLOL TARTRATE 50 MG
5 TABLET ORAL ONCE
Refills: 0 | Status: COMPLETED | OUTPATIENT
Start: 2021-01-01 | End: 2021-01-01

## 2021-01-01 RX ORDER — HYDRALAZINE HCL 50 MG
25 TABLET ORAL THREE TIMES A DAY
Refills: 0 | Status: DISCONTINUED | OUTPATIENT
Start: 2021-01-01 | End: 2021-01-13

## 2021-01-01 RX ORDER — POTASSIUM CHLORIDE 20 MEQ
40 PACKET (EA) ORAL ONCE
Refills: 0 | Status: DISCONTINUED | OUTPATIENT
Start: 2021-01-01 | End: 2021-01-01

## 2021-01-01 RX ORDER — CARVEDILOL PHOSPHATE 80 MG/1
12.5 CAPSULE, EXTENDED RELEASE ORAL EVERY 12 HOURS
Refills: 0 | Status: DISCONTINUED | OUTPATIENT
Start: 2021-01-01 | End: 2021-01-13

## 2021-01-01 RX ORDER — POTASSIUM CHLORIDE 20 MEQ
10 PACKET (EA) ORAL
Refills: 0 | Status: COMPLETED | OUTPATIENT
Start: 2021-01-01 | End: 2021-01-01

## 2021-01-01 RX ORDER — SODIUM CHLORIDE 9 MG/ML
250 INJECTION INTRAMUSCULAR; INTRAVENOUS; SUBCUTANEOUS ONCE
Refills: 0 | Status: DISCONTINUED | OUTPATIENT
Start: 2021-01-01 | End: 2021-01-01

## 2021-01-01 RX ORDER — ATORVASTATIN CALCIUM 80 MG/1
20 TABLET, FILM COATED ORAL AT BEDTIME
Refills: 0 | Status: DISCONTINUED | OUTPATIENT
Start: 2021-01-01 | End: 2021-01-13

## 2021-01-01 RX ADMIN — Medication 10 MILLIGRAM(S): at 06:14

## 2021-01-01 RX ADMIN — Medication 3: at 21:37

## 2021-01-01 RX ADMIN — Medication 25 MILLIGRAM(S): at 21:36

## 2021-01-01 RX ADMIN — Medication 100 MILLIEQUIVALENT(S): at 08:13

## 2021-01-01 RX ADMIN — Medication 650 MILLIGRAM(S): at 13:56

## 2021-01-01 RX ADMIN — Medication 25 MILLIGRAM(S): at 13:57

## 2021-01-01 RX ADMIN — Medication 6: at 17:19

## 2021-01-01 RX ADMIN — Medication 40 MILLIEQUIVALENT(S): at 13:56

## 2021-01-01 RX ADMIN — Medication 100 MILLIEQUIVALENT(S): at 05:45

## 2021-01-01 RX ADMIN — HEPARIN SODIUM 5000 UNIT(S): 5000 INJECTION INTRAVENOUS; SUBCUTANEOUS at 13:57

## 2021-01-01 RX ADMIN — CARVEDILOL PHOSPHATE 12.5 MILLIGRAM(S): 80 CAPSULE, EXTENDED RELEASE ORAL at 17:18

## 2021-01-01 RX ADMIN — Medication 5 MILLIGRAM(S): at 00:53

## 2021-01-01 RX ADMIN — HEPARIN SODIUM 5000 UNIT(S): 5000 INJECTION INTRAVENOUS; SUBCUTANEOUS at 21:36

## 2021-01-01 RX ADMIN — Medication 81 MILLIGRAM(S): at 13:57

## 2021-01-01 RX ADMIN — Medication 3: at 06:17

## 2021-01-01 RX ADMIN — SODIUM CHLORIDE 100 MILLILITER(S): 9 INJECTION, SOLUTION INTRAVENOUS at 05:07

## 2021-01-01 RX ADMIN — Medication 100 MILLIEQUIVALENT(S): at 07:15

## 2021-01-01 RX ADMIN — Medication 2.5 MILLIGRAM(S): at 02:53

## 2021-01-01 RX ADMIN — ATORVASTATIN CALCIUM 20 MILLIGRAM(S): 80 TABLET, FILM COATED ORAL at 21:36

## 2021-01-01 RX ADMIN — Medication 3: at 13:58

## 2021-01-01 RX ADMIN — HEPARIN SODIUM 5000 UNIT(S): 5000 INJECTION INTRAVENOUS; SUBCUTANEOUS at 06:06

## 2021-01-01 RX ADMIN — INSULIN GLARGINE 20 UNIT(S): 100 INJECTION, SOLUTION SUBCUTANEOUS at 21:36

## 2021-01-01 RX ADMIN — SODIUM CHLORIDE 82 MILLILITER(S): 9 INJECTION, SOLUTION INTRAVENOUS at 13:57

## 2021-01-01 NOTE — PROGRESS NOTE ADULT - SUBJECTIVE AND OBJECTIVE BOX
NEPHROLOGY INTERVAL HPI/OVERNIGHT EVENTS:    Examined  Lethargic but arousable non answering questions  Had first HD yest using L wrist AVF  No distress    MEDICATIONS  (STANDING):  dextrose 40% Gel 15 Gram(s) Oral once  dextrose 5%. 1000 milliLiter(s) (50 mL/Hr) IV Continuous <Continuous>  dextrose 5%. 1000 milliLiter(s) (100 mL/Hr) IV Continuous <Continuous>  dextrose 50% Injectable 25 Gram(s) IV Push once  dextrose 50% Injectable 12.5 Gram(s) IV Push once  dextrose 50% Injectable 25 Gram(s) IV Push once  glucagon  Injectable 1 milliGRAM(s) IntraMuscular once  heparin   Injectable 5000 Unit(s) SubCutaneous every 8 hours  insulin glargine Injectable (LANTUS) 20 Unit(s) SubCutaneous at bedtime  insulin lispro (ADMELOG) corrective regimen sliding scale   SubCutaneous every 6 hours  potassium chloride   Powder 40 milliEquivalent(s) Oral once    MEDICATIONS  (PRN):  acetaminophen   Tablet .. 650 milliGRAM(s) Oral every 6 hours PRN Temp greater or equal to 38C (100.4F), Mild Pain (1 - 3)  hydrALAZINE Injectable 10 milliGRAM(s) IV Push every 4 hours PRN GIVE FOR  or more      Allergies    No Known Allergies    Intolerances        Vital Signs Last 24 Hrs  T(C): 37.8 (2021 08:16), Max: 37.9 (2021 04:30)  T(F): 100 (2021 08:16), Max: 100.3 (2021 04:30)  HR: 110 (2021 08:16) (93 - 118)  BP: 141/66 (2021 08:16) (134/77 - 184/101)  BP(mean): 113 (31 Dec 2020 20:30) (113 - 113)  RR: 20 (2021 08:16) (17 - 21)  SpO2: 96% (2021 08:16) (95% - 100%)  Daily     Daily Weight in k (31 Dec 2020 16:40)    PHYSICAL EXAM:  GENERAL: appears acutely and chronically ill  HEAD:  Atraumatic, Normocephalic  EYES: EOMI  NECK: Supple, neck  veins full  NERVOUS SYSTEM:  Awake not answering questions  CHEST/LUNG: Clear to percussion bilaterally  HEART: Regular rate and rhythm  ABDOMEN: Soft, Nontender, Nondistended; Bowel sounds present  EXTREMITIES: trace LE edema, L wrist AVF good thrill    LABS:                        10.1   19.93 )-----------( 425      ( 2021 04:20 )             31.4         137  |  99  |  57.0<H>  ----------------------------<  170<H>  2.8<LL>   |  20.0<L>  |  6.32<H>    Ca    9.3      2021 04:20  Phos  6.8       Mg     2.3         TPro  x   /  Alb  x   /  TBili  x   /  DBili  x   /  AST  x   /  ALT  18  /  AlkPhos  x       PT/INR - ( 30 Dec 2020 22:21 )   PT: 12.7 sec;   INR: 1.10 ratio         PTT - ( 30 Dec 2020 22:21 )  PTT:28.4 sec  Urinalysis Basic - ( 31 Dec 2020 02:54 )    Color: Yellow / Appearance: Cloudy / S.010 / pH: x  Gluc: x / Ketone: Negative  / Bili: Negative / Urobili: Negative mg/dL   Blood: x / Protein: 500 mg/dL / Nitrite: Negative   Leuk Esterase: Negative / RBC: 3-5 /HPF / WBC 0-2   Sq Epi: x / Non Sq Epi: Occasional / Bacteria: x      Magnesium, Serum: 2.3 mg/dL ( @ 10:33)  Phosphorus Level, Serum: 6.8 mg/dL ( @ 10:33)    ABG - ( 31 Dec 2020 06:51 )  pH, Arterial: 7.36  pH, Blood: x     /  pCO2: 40    /  pO2: 74    / HCO3: 22    / Base Excess: -2.3  /  SaO2: 95                    RADIOLOGY & ADDITIONAL TESTS:

## 2021-01-01 NOTE — PROGRESS NOTE ADULT - ASSESSMENT
34yo M with hx of morbid obesity, HTN, DM, HLD, ESRD not on dialysis s/p AVF creation, ARVIND not on CPAP, bipolar disorder, presented to the emergency department with altered mental status.    Acute toxic metabolic encephalopathy  -likely due to Uremia vs hypertensive urgency vs lyte imbalance vs Psych overmedication   -CT Head unremarkable  -with leukocytosis but afebrile and no other sign of acute infectious process, s/p vanco and zosyn in the ED   -Hold further antibiotics  -blood, UA cultures testing, UA negative     s/p AG Metabolic acidosis   -likely due to acute on chronic renal failure  renal following     ESRD impending HD  had hd yesterday   - for hd tomorrow     Hypokalemia  - replete    Hypertensive urgency   - improved  - coreg and hydralaIne      IDDM uncontrolled  a1c elevated  --> start lantus and ssi     ARVIND  -not on CPAP as per chart     Bipolar disorder- home meds include invega , seroquel, trazodone, cogentin and lamictal at home  -->hold for now  -->? meds seem too strong     DVT ppx  heparin.

## 2021-01-01 NOTE — PROGRESS NOTE ADULT - ASSESSMENT
MELISSA on CKD stage IV/ V- suspect progression of dz  Poorly controlled DM A1C 12 (yest)  Worsening renal function, Has AVF good thrill  Has h/o BiPolar d/o ARVIND, HTN, DM  AMS not answering questions- uremic?  UTOX noted neg  Had first HD yest (12/31/20) tolerated ok - used AVF- will repeat HD tomorrow  I spoke to sister Letty [phone]- yest  She told me at baseline yest he is A&Ox3 but does have mental illness- psych meds??  Sister also told me he was suppose to start HD few mo ago for some reason did not happen  I obtained verbal consent from sister to start HD  HypoKalemia- supplementation ongoing will adjust IVF add additional K+  COVID Negative  Sepsis? Leukocytosis empiric abx noted  HTN Urgency yest BP better today  Will cont IVF w adjustments  CXR clear  repeat BMP this afternoon  AM labs     Will follow

## 2021-01-01 NOTE — CHART NOTE - NSCHARTNOTEFT_GEN_A_CORE
Called by RN, /82  Temp 98.6 SpO2 95  S/p hydralazine 10mg IVP at 11pm    Patient admitted with Acute toxic metabolic encephalopathy and AMS   Patient unable to question ROS or questions which is at baseline since admission    GENERAL: NAD, alert but not does not answer questions   CHEST/LUNG: Clear to auscultation b/l; Unlabored respirations  HEART: S1S2+, + tachycardic   ABDOMEN: Soft, Nontender, Nondistended; BS+   EXTREMITIES:  2+ Peripheral Pulses, No LE edema, no tenderness to palpation of b/l LE  SKIN: Warm and dry    - Leukocytosis but remains afebrile  - S/p vanco and zosyn in ED  - Blood and UA cultures pending  - UA negative   - CXR on 12/30 clear   - Mildly tachycardia since admission   - Lopressor 5 mg IV x 1  - RN to notify with any acute changes Called by RN, /82  Temp 98.6 SpO2 95  S/p hydralazine 10mg IVP at 11pm    Patient admitted with Acute toxic metabolic encephalopathy and AMS   Patient seen and evaluated at bedside   Patient unable to question ROS or questions which is at baseline since admission    GENERAL: NAD, alert but not does not answer questions   CHEST/LUNG: Clear to auscultation b/l; Unlabored respirations  HEART: S1S2+, + tachycardic   ABDOMEN: Soft, Nontender, Nondistended; BS+   EXTREMITIES:  2+ Peripheral Pulses, No LE edema, no tenderness to palpation of b/l LE  SKIN: Warm and dry    - Leukocytosis but remains afebrile  - S/p vanco and zosyn in ED  - Blood and UA cultures pending  - UA negative   - CXR on 12/30 clear   - Mildly tachycardia since admission   - Lopressor 5 mg IV x 1  - RN to notify with any acute changes Called by RN, /82 -115 sinus tach on CM Temp 98.6 SpO2 95  S/p hydralazine 10mg IVP at 11pm    Patient admitted with Acute toxic metabolic encephalopathy and AMS   Patient seen and evaluated at bedside   Patient unable to question ROS or questions which is at baseline since admission    GENERAL: NAD, alert but not does not answer questions   CHEST/LUNG: Clear to auscultation b/l; Unlabored respirations  HEART: S1S2+, + tachycardic   ABDOMEN: Soft, Nontender, Nondistended; BS+   EXTREMITIES:  2+ Peripheral Pulses, No LE edema, no tenderness to palpation of b/l LE  SKIN: Warm and dry    - Leukocytosis but remains afebrile  - S/p vanco and zosyn in ED  - Blood and UA cultures pending  - UA negative   - CXR on 12/30 clear   - Mildly tachycardia since admission   - Lopressor 5 mg IV x 1  - RN to notify with any acute changes Called by RN, /82 -115 sinus tach on CM Temp 98.6 SpO2 95  S/p hydralazine 10mg IVP at 11pm    Patient admitted with Acute toxic metabolic encephalopathy and AMS   Patient seen and evaluated at bedside   Patient unable to question ROS or questions which is at baseline since admission    GENERAL: NAD, alert but not does not answer questions   CHEST/LUNG: Clear to auscultation b/l; Unlabored respirations  HEART: S1S2+, + tachycardic   ABDOMEN: Soft, Nontender, Nondistended; BS+   EXTREMITIES:  2+ Peripheral Pulses, No LE edema, no tenderness to palpation of b/l LE  SKIN: Warm and dry    Leukocytosis but remains afebrile  S/p vanco and zosyn in ED  Blood and UA cultures pending  UA negative   CXR on 12/30 clear   Mildly tachycardia since admission   Lopressor 5 mg IV x 1  RN to notify with any acute changes    Addendum 2:20AM   /72 -110   250cc bolus ordered   Tachycardia 2/2 to dehydration? patient has been NPO 2/2 failed S&S eval   HD tomorrow   RN to notify with any acute changes Called by RN, /82 -115 sinus tach on CM Temp 98.6 SpO2 95  S/p hydralazine 10mg IVP at 11pm    Patient admitted with Acute toxic metabolic encephalopathy and AMS   Patient seen and evaluated at bedside   Patient unable to question ROS or questions which is at baseline since admission    GENERAL: NAD, alert but not does not answer questions   CHEST/LUNG: Clear to auscultation b/l; Unlabored respirations  HEART: S1S2+, + tachycardic   ABDOMEN: Soft, Nontender, Nondistended; BS+   EXTREMITIES:  2+ Peripheral Pulses, No LE edema, no tenderness to palpation of b/l LE  SKIN: Warm and dry    Leukocytosis but remains afebrile  S/p vanco and zosyn in ED  Blood and UA cultures pending  UA negative   CXR on 12/30 clear   Mildly tachycardia since admission   Lopressor 5 mg IV x 1  RN to notify with any acute changes    Addendum 2:20AM   /72 -110 Rectal temp 99.9   250cc bolus ordered   Tachycardia 2/2 to dehydration? patient has been NPO 2/2 failed S&S eval   HD tomorrow   RN to notify with any acute changes Called by RN, /82 -115 sinus tach on CM Temp 98.6 SpO2 95  S/p hydralazine 10mg IVP at 11pm    Patient admitted with Acute toxic metabolic encephalopathy and AMS   Patient seen and evaluated at bedside   Patient unable to question ROS or questions which is at baseline since admission    GENERAL: NAD, alert but not does not answer questions   CHEST/LUNG: Clear to auscultation b/l; Unlabored respirations  HEART: S1S2+, + tachycardic   ABDOMEN: Soft, Nontender, Nondistended; BS+   EXTREMITIES:  2+ Peripheral Pulses, No LE edema, no tenderness to palpation of b/l LE  SKIN: Warm and dry    Leukocytosis but remains afebrile  S/p vanco and zosyn in ED  Blood and UA cultures pending  UA negative   CXR on 12/30 clear   Mildly tachycardia since admission   Lopressor 5 mg IV x 1  RN to notify with any acute changes    Addendum 2:20AM   /72 -110 Rectal temp 99.9   Will order Lopressor 2.5mg IVP x 1 as patient is not receiving his PO BP meds due to NPO status  Chronic tachycardia at baseline vs anxiety?   TSH ordered for AM   RN to notify with any acute changes Called by RN, /82 -115 sinus tach on CM Temp 98.6 SpO2 95  S/p hydralazine 10mg IVP at 11pm    Patient admitted with Acute toxic metabolic encephalopathy and AMS   Patient seen and evaluated at bedside   Patient unable to question ROS or questions which is at baseline since admission    GENERAL: NAD, alert but not does not answer questions   CHEST/LUNG: Clear to auscultation b/l; Unlabored respirations  HEART: S1S2+, + tachycardic   ABDOMEN: Soft, Nontender, Nondistended; BS+   EXTREMITIES:  2+ Peripheral Pulses, No LE edema, no tenderness to palpation of b/l LE  SKIN: Warm and dry    Leukocytosis but remains afebrile  S/p vanco and zosyn in ED  Blood and UA cultures pending  UA negative   CXR on 12/30 clear   Mildly tachycardia since admission   Lopressor 5 mg IV x 1  RN to notify with any acute changes    Addendum 2:20AM   /72 -110 Rectal temp 99.9   Will order Lopressor 2.5mg IVP x 1 as patient is not receiving his PO BP meds due to NPO status  TSH ordered for AM   RN to notify with any acute changes Called by RN, /82 -115 sinus tach on CM Temp 98.6 SpO2 95  S/p hydralazine 10mg IVP at 11pm    Patient admitted with Acute toxic metabolic encephalopathy and AMS   Patient seen and evaluated at bedside   Patient unable to question ROS or questions which is at baseline since admission    GENERAL: NAD, alert but not does not answer questions   CHEST/LUNG: Clear to auscultation b/l; Unlabored respirations  HEART: S1S2+, + tachycardic   ABDOMEN: Soft, Nontender, Nondistended; BS+   EXTREMITIES:  2+ Peripheral Pulses, No LE edema, no tenderness to palpation of b/l LE  SKIN: Warm and dry    Leukocytosis but remains afebrile  S/p vanco and zosyn in ED  Blood and UA cultures pending  UA negative   CXR on 12/30 clear   Mildly tachycardia since admission   Lopressor 5 mg IV x 1  RN to notify with any acute changes    Addendum   /72 -110 Rectal temp 99.9   Will order Lopressor 2.5mg IVP x 1 as patient is not receiving his PO BP meds due to NPO status  TSH ordered for AM   RN to notify with any acute changes  Potassium 2.8 on AM CBC - supplemented

## 2021-01-01 NOTE — PROGRESS NOTE ADULT - SUBJECTIVE AND OBJECTIVE BOX
MILENA PUGA    [id number]    35y      Male    INTERVAL HPI/OVERNIGHT EVENTS: patient being seen for ams and ckd. patient seen at bedside and is slighlt drowsy but arousable. patient denies any complaints.       REVIEW OF SYSTEMS:    CONSTITUTIONAL: No fever, weight loss, or fatigue  RESPIRATORY: No cough, wheezing, hemoptysis; No shortness of breath  CARDIOVASCULAR: No chest pain, palpitations  GASTROINTESTINAL: No abdominal or epigastric pain. No nausea, vomiting  NEUROLOGICAL: No headaches, memory loss, loss of strength.  MISCELLANEOUS:      Vital Signs Last 24 Hrs  T(C): 37.8 (2021 08:16), Max: 37.9 (2021 04:30)  T(F): 100 (2021 08:16), Max: 100.3 (2021 04:30)  HR: 110 (2021 08:16) (93 - 118)  BP: 141/66 (2021 08:16) (134/77 - 184/101)  BP(mean): 113 (31 Dec 2020 20:30) (113 - 113)  RR: 20 (2021 08:16) (17 - 21)  SpO2: 96% (2021 08:16) (95% - 100%)    PHYSICAL EXAM:    GENERAL: nad, obese  HEAD:  Atraumatic, Normocephalic  EYES: conjunctiva and sclera clear  NECK: No JVD, Normal thyroid  NERVOUS SYSTEM: lethargic, but comes awake, without any real conversation, Moving all extremities  CHEST/LUNG: CTA bilaterally; No rales, rhonchi, wheezing, or rubs  HEART: Regular rate and rhythm; No murmurs, rubs, or gallops  ABDOMEN: Soft, Nontender, Nondistended; Bowel sounds present  EXTREMITIES:  L wrist AVF good thrill      LABS:                        10.1   19.93 )-----------( 425      ( 2021 04:20 )             31.4         137  |  99  |  57.0<H>  ----------------------------<  170<H>  2.8<LL>   |  20.0<L>  |  6.32<H>    Ca    9.3      2021 04:20  Phos  6.8     12-31  Mg     2.3         TPro  x   /  Alb  x   /  TBili  x   /  DBili  x   /  AST  x   /  ALT  18  /  AlkPhos  x       PT/INR - ( 30 Dec 2020 22:21 )   PT: 12.7 sec;   INR: 1.10 ratio         PTT - ( 30 Dec 2020 22:21 )  PTT:28.4 sec  Urinalysis Basic - ( 31 Dec 2020 02:54 )    Color: Yellow / Appearance: Cloudy / S.010 / pH: x  Gluc: x / Ketone: Negative  / Bili: Negative / Urobili: Negative mg/dL   Blood: x / Protein: 500 mg/dL / Nitrite: Negative   Leuk Esterase: Negative / RBC: 3-5 /HPF / WBC 0-2   Sq Epi: x / Non Sq Epi: Occasional / Bacteria: x          MEDICATIONS  (STANDING):  dextrose 40% Gel 15 Gram(s) Oral once  dextrose 5%. 1000 milliLiter(s) (50 mL/Hr) IV Continuous <Continuous>  dextrose 5%. 1000 milliLiter(s) (100 mL/Hr) IV Continuous <Continuous>  dextrose 50% Injectable 25 Gram(s) IV Push once  dextrose 50% Injectable 12.5 Gram(s) IV Push once  dextrose 50% Injectable 25 Gram(s) IV Push once  glucagon  Injectable 1 milliGRAM(s) IntraMuscular once  heparin   Injectable 5000 Unit(s) SubCutaneous every 8 hours  insulin glargine Injectable (LANTUS) 20 Unit(s) SubCutaneous at bedtime  insulin lispro (ADMELOG) corrective regimen sliding scale   SubCutaneous every 6 hours  potassium chloride   Powder 40 milliEquivalent(s) Oral once  sodium chloride 0.45% 1000 milliLiter(s) (82 mL/Hr) IV Continuous <Continuous>    MEDICATIONS  (PRN):  acetaminophen   Tablet .. 650 milliGRAM(s) Oral every 6 hours PRN Temp greater or equal to 38C (100.4F), Mild Pain (1 - 3)  hydrALAZINE Injectable 10 milliGRAM(s) IV Push every 4 hours PRN GIVE FOR  or more      RADIOLOGY & ADDITIONAL TESTS:

## 2021-01-01 NOTE — PATIENT PROFILE ADULT - STATED REASON FOR ADMISSION
"I felt fatigue and I had someone else call the ambulance for me and I was unresponsive when they got there" pt ams on admission unable to verbalize reason for arrival

## 2021-01-02 LAB
ALBUMIN SERPL ELPH-MCNC: 2.8 G/DL — LOW (ref 3.3–5.2)
ALP SERPL-CCNC: 84 U/L — SIGNIFICANT CHANGE UP (ref 40–120)
ALT FLD-CCNC: 12 U/L — SIGNIFICANT CHANGE UP
AMMONIA BLD-MCNC: 24 UMOL/L — SIGNIFICANT CHANGE UP (ref 11–55)
ANION GAP SERPL CALC-SCNC: 13 MMOL/L — SIGNIFICANT CHANGE UP (ref 5–17)
AST SERPL-CCNC: 11 U/L — SIGNIFICANT CHANGE UP
BASOPHILS # BLD AUTO: 0.09 K/UL — SIGNIFICANT CHANGE UP (ref 0–0.2)
BASOPHILS NFR BLD AUTO: 0.5 % — SIGNIFICANT CHANGE UP (ref 0–2)
BILIRUB SERPL-MCNC: 0.2 MG/DL — LOW (ref 0.4–2)
BUN SERPL-MCNC: 64 MG/DL — HIGH (ref 8–20)
CALCIUM SERPL-MCNC: 9.1 MG/DL — SIGNIFICANT CHANGE UP (ref 8.6–10.2)
CHLORIDE SERPL-SCNC: 101 MMOL/L — SIGNIFICANT CHANGE UP (ref 98–107)
CO2 SERPL-SCNC: 23 MMOL/L — SIGNIFICANT CHANGE UP (ref 22–29)
CREAT SERPL-MCNC: 6.78 MG/DL — HIGH (ref 0.5–1.3)
EOSINOPHIL # BLD AUTO: 1.12 K/UL — HIGH (ref 0–0.5)
EOSINOPHIL NFR BLD AUTO: 6.7 % — HIGH (ref 0–6)
FERRITIN SERPL-MCNC: 202 NG/ML — SIGNIFICANT CHANGE UP (ref 30–400)
GLUCOSE BLDC GLUCOMTR-MCNC: 141 MG/DL — HIGH (ref 70–99)
GLUCOSE BLDC GLUCOMTR-MCNC: 144 MG/DL — HIGH (ref 70–99)
GLUCOSE BLDC GLUCOMTR-MCNC: 157 MG/DL — HIGH (ref 70–99)
GLUCOSE BLDC GLUCOMTR-MCNC: 161 MG/DL — HIGH (ref 70–99)
GLUCOSE SERPL-MCNC: 157 MG/DL — HIGH (ref 70–99)
HCT VFR BLD CALC: 28.6 % — LOW (ref 39–50)
HGB BLD-MCNC: 9 G/DL — LOW (ref 13–17)
IMM GRANULOCYTES NFR BLD AUTO: 0.7 % — SIGNIFICANT CHANGE UP (ref 0–1.5)
IRON SATN MFR SERPL: 24 UG/DL — LOW (ref 59–158)
LYMPHOCYTES # BLD AUTO: 19.5 % — SIGNIFICANT CHANGE UP (ref 13–44)
LYMPHOCYTES # BLD AUTO: 3.27 K/UL — SIGNIFICANT CHANGE UP (ref 1–3.3)
MAGNESIUM SERPL-MCNC: 2.3 MG/DL — SIGNIFICANT CHANGE UP (ref 1.6–2.6)
MCHC RBC-ENTMCNC: 23 PG — LOW (ref 27–34)
MCHC RBC-ENTMCNC: 31.5 GM/DL — LOW (ref 32–36)
MCV RBC AUTO: 73.1 FL — LOW (ref 80–100)
MONOCYTES # BLD AUTO: 1.11 K/UL — HIGH (ref 0–0.9)
MONOCYTES NFR BLD AUTO: 6.6 % — SIGNIFICANT CHANGE UP (ref 2–14)
MYOGLOBIN UR-MCNC: 1209 MCG/L — HIGH
NEUTROPHILS # BLD AUTO: 11.11 K/UL — HIGH (ref 1.8–7.4)
NEUTROPHILS NFR BLD AUTO: 66 % — SIGNIFICANT CHANGE UP (ref 43–77)
PLATELET # BLD AUTO: 386 K/UL — SIGNIFICANT CHANGE UP (ref 150–400)
POTASSIUM SERPL-MCNC: 3.2 MMOL/L — LOW (ref 3.5–5.3)
POTASSIUM SERPL-SCNC: 3.2 MMOL/L — LOW (ref 3.5–5.3)
PROT SERPL-MCNC: 6.3 G/DL — LOW (ref 6.6–8.7)
RBC # BLD: 3.91 M/UL — LOW (ref 4.2–5.8)
RBC # FLD: 17.2 % — HIGH (ref 10.3–14.5)
SODIUM SERPL-SCNC: 137 MMOL/L — SIGNIFICANT CHANGE UP (ref 135–145)
TSH SERPL-MCNC: 0.63 UIU/ML — SIGNIFICANT CHANGE UP (ref 0.27–4.2)
WBC # BLD: 16.81 K/UL — HIGH (ref 3.8–10.5)
WBC # FLD AUTO: 16.81 K/UL — HIGH (ref 3.8–10.5)

## 2021-01-02 PROCEDURE — 99233 SBSQ HOSP IP/OBS HIGH 50: CPT

## 2021-01-02 RX ORDER — FOLIC ACID 0.8 MG
1 TABLET ORAL DAILY
Refills: 0 | Status: DISCONTINUED | OUTPATIENT
Start: 2021-01-02 | End: 2021-01-13

## 2021-01-02 RX ORDER — ERYTHROPOIETIN 10000 [IU]/ML
10000 INJECTION, SOLUTION INTRAVENOUS; SUBCUTANEOUS ONCE
Refills: 0 | Status: COMPLETED | OUTPATIENT
Start: 2021-01-02 | End: 2021-01-02

## 2021-01-02 RX ORDER — SEVELAMER CARBONATE 2400 MG/1
800 POWDER, FOR SUSPENSION ORAL
Refills: 0 | Status: DISCONTINUED | OUTPATIENT
Start: 2021-01-02 | End: 2021-01-13

## 2021-01-02 RX ORDER — POTASSIUM CHLORIDE 20 MEQ
20 PACKET (EA) ORAL ONCE
Refills: 0 | Status: COMPLETED | OUTPATIENT
Start: 2021-01-02 | End: 2021-01-03

## 2021-01-02 RX ADMIN — Medication 25 MILLIGRAM(S): at 16:24

## 2021-01-02 RX ADMIN — Medication 25 MILLIGRAM(S): at 06:07

## 2021-01-02 RX ADMIN — HEPARIN SODIUM 5000 UNIT(S): 5000 INJECTION INTRAVENOUS; SUBCUTANEOUS at 06:07

## 2021-01-02 RX ADMIN — ERYTHROPOIETIN 10000 UNIT(S): 10000 INJECTION, SOLUTION INTRAVENOUS; SUBCUTANEOUS at 16:03

## 2021-01-02 RX ADMIN — CARVEDILOL PHOSPHATE 12.5 MILLIGRAM(S): 80 CAPSULE, EXTENDED RELEASE ORAL at 06:07

## 2021-01-02 RX ADMIN — Medication 10 MILLIGRAM(S): at 19:54

## 2021-01-02 RX ADMIN — Medication 3: at 07:44

## 2021-01-02 RX ADMIN — Medication 3: at 11:00

## 2021-01-02 NOTE — PROGRESS NOTE ADULT - SUBJECTIVE AND OBJECTIVE BOX
NEPHROLOGY INTERVAL HPI/OVERNIGHT EVENTS:    Tolerated first HD   Awake   No new events   Not too conversant   No SOB or CP   Access w/ thrill     MEDICATIONS  (STANDING):  aspirin  chewable 81 milliGRAM(s) Oral daily  atorvastatin 20 milliGRAM(s) Oral at bedtime  carvedilol 12.5 milliGRAM(s) Oral every 12 hours  dextrose 40% Gel 15 Gram(s) Oral once  dextrose 5%. 1000 milliLiter(s) (50 mL/Hr) IV Continuous <Continuous>  dextrose 5%. 1000 milliLiter(s) (100 mL/Hr) IV Continuous <Continuous>  dextrose 50% Injectable 25 Gram(s) IV Push once  dextrose 50% Injectable 12.5 Gram(s) IV Push once  dextrose 50% Injectable 25 Gram(s) IV Push once  glucagon  Injectable 1 milliGRAM(s) IntraMuscular once  heparin   Injectable 5000 Unit(s) SubCutaneous every 8 hours  hydrALAZINE 25 milliGRAM(s) Oral three times a day  insulin glargine Injectable (LANTUS) 20 Unit(s) SubCutaneous at bedtime  insulin lispro (ADMELOG) corrective regimen sliding scale   SubCutaneous every 6 hours  sodium chloride 0.45% 1000 milliLiter(s) (82 mL/Hr) IV Continuous <Continuous>    MEDICATIONS  (PRN):  acetaminophen   Tablet .. 650 milliGRAM(s) Oral every 6 hours PRN Temp greater or equal to 38C (100.4F), Mild Pain (1 - 3)  hydrALAZINE Injectable 10 milliGRAM(s) IV Push every 4 hours PRN GIVE FOR  or more      Allergies    No Known Allergies    Intolerances        Vital Signs Last 24 Hrs  T(C): 37 (02 Jan 2021 07:22), Max: 37.7 (01 Jan 2021 14:02)  T(F): 98.6 (02 Jan 2021 07:22), Max: 99.8 (01 Jan 2021 14:02)  HR: 76 (02 Jan 2021 07:22) (76 - 105)  BP: 132/87 (02 Jan 2021 07:22) (132/87 - 169/80)  BP(mean): --  RR: 18 (02 Jan 2021 07:22) (18 - 20)  SpO2: 97% (02 Jan 2021 07:22) (91% - 98%)  Daily     Daily   I&O's Detail    01 Jan 2021 07:01  -  02 Jan 2021 07:00  --------------------------------------------------------  IN:  Total IN: 0 mL    OUT:    Voided (mL): 1500 mL  Total OUT: 1500 mL    Total NET: -1500 mL        I&O's Summary    01 Jan 2021 07:01  -  02 Jan 2021 07:00  --------------------------------------------------------  IN: 0 mL / OUT: 1500 mL / NET: -1500 mL    PHYSICAL EXAM:  GENERAL: appears acutely and chronically ill  HEAD:  Atraumatic, Normocephalic  EYES: EOMI  NECK: Supple, neck  veins full  NERVOUS SYSTEM:  Awake not answering questions  CHEST/LUNG: Clear to percussion bilaterally  HEART: Regular rate and rhythm  ABDOMEN: Soft, Nontender, Nondistended; Bowel sounds present  EXTREMITIES: trace LE edema, L wrist AVF good thrill      LABS:                        9.0    16.81 )-----------( 386      ( 02 Jan 2021 07:14 )             28.6     01-02    137  |  101  |  64.0<H>  ----------------------------<  157<H>  3.2<L>   |  23.0  |  6.78<H>    Ca    9.1      02 Jan 2021 07:14  Phos  6.8     12-31  Mg     2.3     01-02    TPro  6.3<L>  /  Alb  2.8<L>  /  TBili  0.2<L>  /  DBili  x   /  AST  11  /  ALT  12  /  AlkPhos  84  01-02        Magnesium, Serum: 2.3 mg/dL (01-02 @ 07:14)    ABG - ( 01 Jan 2021 12:59 )  pH, Arterial: 7.37  pH, Blood: x     /  pCO2: 42    /  pO2: 109   / HCO3: 24    / Base Excess: -0.8  /  SaO2: 98                    RADIOLOGY & ADDITIONAL TESTS:

## 2021-01-02 NOTE — PROGRESS NOTE ADULT - ASSESSMENT
The patient is a 35 year old male with a past medical history of morbid obesity, hypertension, ESRD not on HD with AVF, diabetes mellitus, bipolar disorder and hyperlipidemia who presented to the ER for altered mental status. In the ER noted to have a SBP >200; CT head was negative. Worsening renal failure with hypokalemia and leukocytosis. COVID was negative. Seen by nephrology and started on HD. Patient lethargic with altered mental status on admission. Utox and VIral panel were negative.   Blood cultures and urine culture negative. Afebrile. Given empiric zosyn and vanco in the Er.     Assessment/Plan:    1. Acute toxic metabolic encephalopathy: Etiology unclear  Uremia vs hypertensive urgency vs Psych overmedication    2. Leukocytosis; Afebrile  Chest xray, RVP, Urine culture, Blood culture x 2, COVID all negative  Monitor off antibiotics    3. Worsening CKD stage 4 with metabolic acidosis: Now on HD  Nephrology following    4. Hypokalemia; SUpplement KCL    5. Hypertensive urgency: COntinue coreg and hydralazine  BP improved    6. IDDM: Uncontrolled  COntinue lantus and HSS  Monitor BSL    7. Bipolar disorder: Psych evaluation for medication recommendations    VTE_ Heparin subcut    The patient is a 35 year old male with a past medical history of morbid obesity, hypertension, ESRD not on HD with AVF, diabetes mellitus, bipolar disorder and hyperlipidemia who presented to the ER for altered mental status. In the ER noted to have a SBP >200; CT head was negative. Worsening renal failure with hypokalemia and leukocytosis. COVID was negative. Seen by nephrology and started on HD. Patient lethargic with altered mental status on admission. Utox and VIral panel were negative.   Blood cultures and urine culture negative. Afebrile. Given empiric zosyn and vanco in the Er.     Assessment/Plan:    1. Acute toxic metabolic encephalopathy: Etiology unclear  Patient is awake and alert but not responding verbally or following commands. Psychiatry consult requested   Uremia vs hypertensive urgency vs Psych overmedication    2. Leukocytosis; Afebrile  Chest xray, RVP, Urine culture, Blood culture x 2, COVID all negative  Monitor off antibiotics    3. Worsening CKD stage 4 with metabolic acidosis: Now on HD  Nephrology following    4. Hypokalemia; SUpplement KCL    5. Hypertensive urgency: COntinue coreg and hydralazine  BP improved    6. IDDM: Uncontrolled  COntinue lantus and HSS  Monitor BSL    7. Bipolar disorder: Psych evaluation for medication recommendations    VTE_ Heparin subcut

## 2021-01-02 NOTE — PROGRESS NOTE ADULT - ASSESSMENT
ESRD - HD again today   D/C IVF   Third HD tomorrow   Hep B and C negative   Check ammonia level     HTN - Watch on meds     Anemia   Add folate and Epogen   Check iron stores    RO - Add Renvela w/ meals     BiPolar d/o - Meds noted  ESRD - HD again today   D/C IVF   Third HD tomorrow   Hep B and C negative   Check ammonia level   4 K+ bath with HD today     HTN - Watch on meds     Anemia   Add folate and Epogen   Check iron stores    RO - Add Renvela w/ meals     BiPolar d/o - Meds noted

## 2021-01-02 NOTE — PROGRESS NOTE ADULT - SUBJECTIVE AND OBJECTIVE BOX
CC: Follow up    INTERVAL HPI/OVERNIGHT EVENTS: Patient seen and examined,       Vital Signs Last 24 Hrs  T(C): 37 (02 Jan 2021 07:22), Max: 37.7 (01 Jan 2021 14:02)  T(F): 98.6 (02 Jan 2021 07:22), Max: 99.8 (01 Jan 2021 14:02)  HR: 76 (02 Jan 2021 07:22) (76 - 105)  BP: 132/87 (02 Jan 2021 07:22) (132/87 - 169/80)  BP(mean): --  RR: 18 (02 Jan 2021 07:22) (18 - 20)  SpO2: 97% (02 Jan 2021 07:22) (91% - 98%)    PHYSICAL EXAM:    GENERAL: NAD, well-groomed, well-developed  HEAD:  Atraumatic, Normocephalic  EYES: EOMI, PERRLA, conjunctiva and sclera clear  ENMT: Moist mucous membranes  NECK: Supple, No JVD  NERVOUS SYSTEM:  Alert & Oriented X3, Motor Strength 5/5 B/L upper and lower extremities; DTRs 2+ intact and symmetric  CHEST/LUNG: Clear to auscultation bilaterally; No rales, rhonchi, wheezing, or rubs  HEART: Regular rate and rhythm; No murmurs, rubs, or gallops  ABDOMEN: Soft, Nontender, Nondistended; Bowel sounds present  EXTREMITIES:  2+ Peripheral Pulses, No clubbing, cyanosis, or edema        MEDICATIONS  (STANDING):  aspirin  chewable 81 milliGRAM(s) Oral daily  atorvastatin 20 milliGRAM(s) Oral at bedtime  carvedilol 12.5 milliGRAM(s) Oral every 12 hours  dextrose 40% Gel 15 Gram(s) Oral once  dextrose 5%. 1000 milliLiter(s) (50 mL/Hr) IV Continuous <Continuous>  dextrose 5%. 1000 milliLiter(s) (100 mL/Hr) IV Continuous <Continuous>  dextrose 50% Injectable 25 Gram(s) IV Push once  dextrose 50% Injectable 12.5 Gram(s) IV Push once  dextrose 50% Injectable 25 Gram(s) IV Push once  epoetin alma-epbx (RETACRIT) Injectable 64672 Unit(s) IV Push once  folic acid 1 milliGRAM(s) Oral daily  glucagon  Injectable 1 milliGRAM(s) IntraMuscular once  heparin   Injectable 5000 Unit(s) SubCutaneous every 8 hours  hydrALAZINE 25 milliGRAM(s) Oral three times a day  insulin glargine Injectable (LANTUS) 20 Unit(s) SubCutaneous at bedtime  insulin lispro (ADMELOG) corrective regimen sliding scale   SubCutaneous every 6 hours  sevelamer carbonate 800 milliGRAM(s) Oral three times a day with meals    MEDICATIONS  (PRN):  acetaminophen   Tablet .. 650 milliGRAM(s) Oral every 6 hours PRN Temp greater or equal to 38C (100.4F), Mild Pain (1 - 3)  hydrALAZINE Injectable 10 milliGRAM(s) IV Push every 4 hours PRN GIVE FOR  or more      Allergies    No Known Allergies    Intolerances          LABS:                          9.0    16.81 )-----------( 386      ( 02 Jan 2021 07:14 )             28.6     01-02    137  |  101  |  64.0<H>  ----------------------------<  157<H>  3.2<L>   |  23.0  |  6.78<H>    Ca    9.1      02 Jan 2021 07:14  Mg     2.3     01-02    TPro  6.3<L>  /  Alb  2.8<L>  /  TBili  0.2<L>  /  DBili  x   /  AST  11  /  ALT  12  /  AlkPhos  84  01-02          RADIOLOGY & ADDITIONAL TESTS:   CC: Follow up    INTERVAL HPI/OVERNIGHT EVENTS: Patient seen and examined, he is sitting awake in bed in no distress. He was noted to just have had lunch. Starting straight and not responding verbally to questions.       Vital Signs Last 24 Hrs  T(C): 37 (02 Jan 2021 07:22), Max: 37.7 (01 Jan 2021 14:02)  T(F): 98.6 (02 Jan 2021 07:22), Max: 99.8 (01 Jan 2021 14:02)  HR: 76 (02 Jan 2021 07:22) (76 - 105)  BP: 132/87 (02 Jan 2021 07:22) (132/87 - 169/80)  BP(mean): --  RR: 18 (02 Jan 2021 07:22) (18 - 20)  SpO2: 97% (02 Jan 2021 07:22) (91% - 98%)    PHYSICAL EXAM:    GENERAL: NADAlert Obese   HEAD:  Atraumatic, Normocephalic  ENMT: Moist mucous membranes  NECK: Supple, No JVD  CHEST/LUNG: Clear to auscultation bilaterally; No rales, rhonchi, wheezing, or rubs  HEART: Regular rate and rhythm; No murmurs, rubs, or gallops  ABDOMEN: Soft, Nontender, Nondistended; Bowel sounds present  EXTREMITIES:  2+ Peripheral Pulses, No clubbing, cyanosis, or edema        MEDICATIONS  (STANDING):  aspirin  chewable 81 milliGRAM(s) Oral daily  atorvastatin 20 milliGRAM(s) Oral at bedtime  carvedilol 12.5 milliGRAM(s) Oral every 12 hours  dextrose 40% Gel 15 Gram(s) Oral once  dextrose 5%. 1000 milliLiter(s) (50 mL/Hr) IV Continuous <Continuous>  dextrose 5%. 1000 milliLiter(s) (100 mL/Hr) IV Continuous <Continuous>  dextrose 50% Injectable 25 Gram(s) IV Push once  dextrose 50% Injectable 12.5 Gram(s) IV Push once  dextrose 50% Injectable 25 Gram(s) IV Push once  epoetin alma-epbx (RETACRIT) Injectable 61548 Unit(s) IV Push once  folic acid 1 milliGRAM(s) Oral daily  glucagon  Injectable 1 milliGRAM(s) IntraMuscular once  heparin   Injectable 5000 Unit(s) SubCutaneous every 8 hours  hydrALAZINE 25 milliGRAM(s) Oral three times a day  insulin glargine Injectable (LANTUS) 20 Unit(s) SubCutaneous at bedtime  insulin lispro (ADMELOG) corrective regimen sliding scale   SubCutaneous every 6 hours  sevelamer carbonate 800 milliGRAM(s) Oral three times a day with meals    MEDICATIONS  (PRN):  acetaminophen   Tablet .. 650 milliGRAM(s) Oral every 6 hours PRN Temp greater or equal to 38C (100.4F), Mild Pain (1 - 3)  hydrALAZINE Injectable 10 milliGRAM(s) IV Push every 4 hours PRN GIVE FOR  or more      Allergies    No Known Allergies    Intolerances          LABS:                          9.0    16.81 )-----------( 386      ( 02 Jan 2021 07:14 )             28.6     01-02    137  |  101  |  64.0<H>  ----------------------------<  157<H>  3.2<L>   |  23.0  |  6.78<H>    Ca    9.1      02 Jan 2021 07:14  Mg     2.3     01-02    TPro  6.3<L>  /  Alb  2.8<L>  /  TBili  0.2<L>  /  DBili  x   /  AST  11  /  ALT  12  /  AlkPhos  84  01-02          RADIOLOGY & ADDITIONAL TESTS:

## 2021-01-03 LAB
ANION GAP SERPL CALC-SCNC: 10 MMOL/L — SIGNIFICANT CHANGE UP (ref 5–17)
BUN SERPL-MCNC: 43 MG/DL — HIGH (ref 8–20)
CALCIUM SERPL-MCNC: 8.6 MG/DL — SIGNIFICANT CHANGE UP (ref 8.6–10.2)
CHLORIDE SERPL-SCNC: 101 MMOL/L — SIGNIFICANT CHANGE UP (ref 98–107)
CO2 SERPL-SCNC: 27 MMOL/L — SIGNIFICANT CHANGE UP (ref 22–29)
CREAT SERPL-MCNC: 5.64 MG/DL — HIGH (ref 0.5–1.3)
FERRITIN SERPL-MCNC: 198 NG/ML — SIGNIFICANT CHANGE UP (ref 30–400)
GLUCOSE BLDC GLUCOMTR-MCNC: 132 MG/DL — HIGH (ref 70–99)
GLUCOSE BLDC GLUCOMTR-MCNC: 136 MG/DL — HIGH (ref 70–99)
GLUCOSE BLDC GLUCOMTR-MCNC: 153 MG/DL — HIGH (ref 70–99)
GLUCOSE BLDC GLUCOMTR-MCNC: 168 MG/DL — HIGH (ref 70–99)
GLUCOSE BLDC GLUCOMTR-MCNC: 184 MG/DL — HIGH (ref 70–99)
GLUCOSE BLDC GLUCOMTR-MCNC: 238 MG/DL — HIGH (ref 70–99)
GLUCOSE SERPL-MCNC: 227 MG/DL — HIGH (ref 70–99)
HCT VFR BLD CALC: 29.2 % — LOW (ref 39–50)
HGB BLD-MCNC: 9.2 G/DL — LOW (ref 13–17)
IRON SATN MFR SERPL: 10 % — LOW (ref 16–55)
IRON SATN MFR SERPL: 22 UG/DL — LOW (ref 59–158)
MCHC RBC-ENTMCNC: 23.1 PG — LOW (ref 27–34)
MCHC RBC-ENTMCNC: 31.5 GM/DL — LOW (ref 32–36)
MCV RBC AUTO: 73.2 FL — LOW (ref 80–100)
PHOSPHATE SERPL-MCNC: 4.1 MG/DL — SIGNIFICANT CHANGE UP (ref 2.4–4.7)
PLATELET # BLD AUTO: 340 K/UL — SIGNIFICANT CHANGE UP (ref 150–400)
POTASSIUM SERPL-MCNC: 3.2 MMOL/L — LOW (ref 3.5–5.3)
POTASSIUM SERPL-SCNC: 3.2 MMOL/L — LOW (ref 3.5–5.3)
RBC # BLD: 3.99 M/UL — LOW (ref 4.2–5.8)
RBC # FLD: 16.9 % — HIGH (ref 10.3–14.5)
SODIUM SERPL-SCNC: 138 MMOL/L — SIGNIFICANT CHANGE UP (ref 135–145)
T4 FREE SERPL-MCNC: 1.3 NG/DL — SIGNIFICANT CHANGE UP (ref 0.9–1.8)
TIBC SERPL-MCNC: 226 UG/DL — SIGNIFICANT CHANGE UP (ref 220–430)
TRANSFERRIN SERPL-MCNC: 158 MG/DL — LOW (ref 180–329)
WBC # BLD: 16.56 K/UL — HIGH (ref 3.8–10.5)
WBC # FLD AUTO: 16.56 K/UL — HIGH (ref 3.8–10.5)

## 2021-01-03 PROCEDURE — 99233 SBSQ HOSP IP/OBS HIGH 50: CPT

## 2021-01-03 RX ORDER — IRON SUCROSE 20 MG/ML
200 INJECTION, SOLUTION INTRAVENOUS EVERY 24 HOURS
Refills: 0 | Status: COMPLETED | OUTPATIENT
Start: 2021-01-03 | End: 2021-01-05

## 2021-01-03 RX ORDER — INFLUENZA VIRUS VACCINE 15; 15; 15; 15 UG/.5ML; UG/.5ML; UG/.5ML; UG/.5ML
0.5 SUSPENSION INTRAMUSCULAR ONCE
Refills: 0 | Status: DISCONTINUED | OUTPATIENT
Start: 2021-01-03 | End: 2021-01-13

## 2021-01-03 RX ADMIN — SEVELAMER CARBONATE 800 MILLIGRAM(S): 2400 POWDER, FOR SUSPENSION ORAL at 17:12

## 2021-01-03 RX ADMIN — Medication 3: at 17:12

## 2021-01-03 RX ADMIN — HEPARIN SODIUM 5000 UNIT(S): 5000 INJECTION INTRAVENOUS; SUBCUTANEOUS at 11:47

## 2021-01-03 RX ADMIN — Medication 10 MILLIGRAM(S): at 15:46

## 2021-01-03 RX ADMIN — ATORVASTATIN CALCIUM 20 MILLIGRAM(S): 80 TABLET, FILM COATED ORAL at 22:11

## 2021-01-03 RX ADMIN — HEPARIN SODIUM 5000 UNIT(S): 5000 INJECTION INTRAVENOUS; SUBCUTANEOUS at 05:34

## 2021-01-03 RX ADMIN — CARVEDILOL PHOSPHATE 12.5 MILLIGRAM(S): 80 CAPSULE, EXTENDED RELEASE ORAL at 05:34

## 2021-01-03 RX ADMIN — Medication 25 MILLIGRAM(S): at 22:11

## 2021-01-03 RX ADMIN — ATORVASTATIN CALCIUM 20 MILLIGRAM(S): 80 TABLET, FILM COATED ORAL at 00:06

## 2021-01-03 RX ADMIN — Medication 1 MILLIGRAM(S): at 07:46

## 2021-01-03 RX ADMIN — Medication 25 MILLIGRAM(S): at 05:34

## 2021-01-03 RX ADMIN — IRON SUCROSE 110 MILLIGRAM(S): 20 INJECTION, SOLUTION INTRAVENOUS at 08:06

## 2021-01-03 RX ADMIN — Medication 3: at 11:47

## 2021-01-03 RX ADMIN — INSULIN GLARGINE 20 UNIT(S): 100 INJECTION, SOLUTION SUBCUTANEOUS at 22:16

## 2021-01-03 RX ADMIN — CARVEDILOL PHOSPHATE 12.5 MILLIGRAM(S): 80 CAPSULE, EXTENDED RELEASE ORAL at 17:11

## 2021-01-03 RX ADMIN — Medication 25 MILLIGRAM(S): at 00:06

## 2021-01-03 RX ADMIN — Medication 81 MILLIGRAM(S): at 07:46

## 2021-01-03 RX ADMIN — HEPARIN SODIUM 5000 UNIT(S): 5000 INJECTION INTRAVENOUS; SUBCUTANEOUS at 22:11

## 2021-01-03 RX ADMIN — HEPARIN SODIUM 5000 UNIT(S): 5000 INJECTION INTRAVENOUS; SUBCUTANEOUS at 00:05

## 2021-01-03 RX ADMIN — Medication 20 MILLIEQUIVALENT(S): at 06:29

## 2021-01-03 RX ADMIN — CARVEDILOL PHOSPHATE 12.5 MILLIGRAM(S): 80 CAPSULE, EXTENDED RELEASE ORAL at 00:12

## 2021-01-03 RX ADMIN — SEVELAMER CARBONATE 800 MILLIGRAM(S): 2400 POWDER, FOR SUSPENSION ORAL at 07:46

## 2021-01-03 RX ADMIN — SEVELAMER CARBONATE 800 MILLIGRAM(S): 2400 POWDER, FOR SUSPENSION ORAL at 12:00

## 2021-01-03 RX ADMIN — Medication 6: at 05:34

## 2021-01-03 RX ADMIN — INSULIN GLARGINE 20 UNIT(S): 100 INJECTION, SOLUTION SUBCUTANEOUS at 00:14

## 2021-01-03 RX ADMIN — Medication 25 MILLIGRAM(S): at 11:47

## 2021-01-03 NOTE — PROGRESS NOTE ADULT - ASSESSMENT
ESRD - HD again today ( # 3 ).   Hep B and C negative   Ammonia level 24    4 K+ bath with HD    HTN - Watch on meds     Anemia - Hgb stable   Added folate and Epogen   Iron stores noted , add Venofer     RO - Added Renvela w/ meals     BiPolar d/o - Meds noted   Psych to evaluate

## 2021-01-03 NOTE — PROGRESS NOTE ADULT - ASSESSMENT
The patient is a 35 year old male with a past medical history of morbid obesity, hypertension, ESRD not on HD with AVF, diabetes mellitus, bipolar disorder and hyperlipidemia who presented to the ER for altered mental status. In the ER noted to have a SBP >200; CT head was negative. Worsening renal failure with hypokalemia and leukocytosis. COVID was negative. Seen by nephrology and started on HD. Patient lethargic with altered mental status on admission. Utox and VIral panel were negative. Blood cultures and urine culture negative. Afebrile. Given empiric zosyn and vanco in the Er.     Assessment/Plan:    1. Acute toxic metabolic encephalopathy: Etiology unclear  Patient is awake and alert but not responding verbally or following commands. Psychiatry consult requested   Uremia vs hypertensive urgency vs Psych overmedication    2. Leukocytosis; Low grade temp of 100F with hypoxia on O2   Chest xray, RVP, Urine culture, Blood culture x 2, COVID all negative  Monitor off antibiotics    3. Worsening CKD stage 4 with metabolic acidosis: Now on HD  Nephrology following    4. Hypokalemia; Supplement KCL    5. Hypertensive urgency: Continue coreg and hydralazine  BP elevated overnight requiring IV hdyralazine  Will monitor bp now- if elevated will increase hydralazine     6. IDDM: Uncontrolled  COntinue lantus and HSS  Monitor BSL    7. Bipolar disorder: Psych evaluation for medication recommendations    VTE_ Heparin subcut  The patient is a 35 year old male with a past medical history of morbid obesity, hypertension, ESRD not on HD with AVF, diabetes mellitus, bipolar disorder and hyperlipidemia who presented to the ER for altered mental status. In the ER noted to have a SBP >200; CT head was negative. Worsening renal failure with hypokalemia and leukocytosis. COVID was negative. Seen by nephrology and started on HD. Patient lethargic with altered mental status on admission. Utox and VIral panel were negative. Blood cultures and urine culture negative. Afebrile. Given empiric zosyn and vanco in the Er.     Assessment/Plan:    1. Acute toxic metabolic encephalopathy: Mentation today improved; answering questions appropriately/awake  Uremia vs hypertensive urgency vs Psych overmedication    2. Leukocytosis; Low grade temp of 100F with hypoxia on O2   Chest xray, RVP, Urine culture, Blood culture x 2, COVID all negative  Monitor off antibiotics    3. Worsening CKD stage 4 with metabolic acidosis: Now on HD  Nephrology following    4. Hypokalemia; Supplement KCL    5. Hypertensive urgency: Continue coreg and hydralazine  BP elevated overnight requiring IV hdyralazine  Will monitor bp now- if elevated will increase hydralazine     6. IDDM: Uncontrolled  COntinue lantus and HSS  Monitor BSL    7. Bipolar disorder: Psych evaluation for medication recommendations    VTE_ Heparin subcut

## 2021-01-03 NOTE — PROGRESS NOTE ADULT - SUBJECTIVE AND OBJECTIVE BOX
CC: Follow up    INTERVAL HPI/OVERNIGHT EVENTS: Patient seen and examined during HD. Verbal today answering questions appropriately. Denies any complaints. Tmax of 100 overnight.       Vital Signs Last 24 Hrs  T(C): 36.7 (03 Jan 2021 12:44), Max: 37.8 (03 Jan 2021 11:11)  T(F): 98.1 (03 Jan 2021 12:44), Max: 100 (03 Jan 2021 11:11)  HR: 87 (03 Jan 2021 12:44) (83 - 93)  BP: 162/103 (03 Jan 2021 12:44) (136/83 - 175/83)  BP(mean): 129 (03 Jan 2021 00:14) (129 - 129)  RR: 18 (03 Jan 2021 12:44) (16 - 18)  SpO2: 99% (03 Jan 2021 12:44) (95% - 100%)    PHYSICAL EXAM:    GENERAL: NAD, AOX3 obese   HEAD:  Atraumatic, Normocephalic  EYES:  conjunctiva and sclera clear  ENMT: Moist mucous membranes  NECK: Supple, No JVD  CHEST/LUNG: Clear to auscultation bilaterally; No rales, rhonchi, wheezing, or rubs  HEART: Regular rate and rhythm; No murmurs, rubs, or gallops  ABDOMEN: Soft, Nontender, Nondistended; Bowel sounds present  EXTREMITIES:  2+ Peripheral Pulses, No clubbing, cyanosis, or edema        MEDICATIONS  (STANDING):  aspirin  chewable 81 milliGRAM(s) Oral daily  atorvastatin 20 milliGRAM(s) Oral at bedtime  carvedilol 12.5 milliGRAM(s) Oral every 12 hours  dextrose 40% Gel 15 Gram(s) Oral once  dextrose 5%. 1000 milliLiter(s) (50 mL/Hr) IV Continuous <Continuous>  dextrose 5%. 1000 milliLiter(s) (100 mL/Hr) IV Continuous <Continuous>  dextrose 50% Injectable 25 Gram(s) IV Push once  dextrose 50% Injectable 12.5 Gram(s) IV Push once  dextrose 50% Injectable 25 Gram(s) IV Push once  folic acid 1 milliGRAM(s) Oral daily  glucagon  Injectable 1 milliGRAM(s) IntraMuscular once  heparin   Injectable 5000 Unit(s) SubCutaneous every 8 hours  hydrALAZINE 25 milliGRAM(s) Oral three times a day  insulin glargine Injectable (LANTUS) 20 Unit(s) SubCutaneous at bedtime  insulin lispro (ADMELOG) corrective regimen sliding scale   SubCutaneous every 6 hours  iron sucrose IVPB 200 milliGRAM(s) IV Intermittent every 24 hours  sevelamer carbonate 800 milliGRAM(s) Oral three times a day with meals    MEDICATIONS  (PRN):  acetaminophen   Tablet .. 650 milliGRAM(s) Oral every 6 hours PRN Temp greater or equal to 38C (100.4F), Mild Pain (1 - 3)  hydrALAZINE Injectable 10 milliGRAM(s) IV Push every 4 hours PRN GIVE FOR  or more      Allergies    No Known Allergies    Intolerances          LABS:                          9.2    16.56 )-----------( 340      ( 03 Jan 2021 04:18 )             29.2     01-03    138  |  101  |  43.0<H>  ----------------------------<  227<H>  3.2<L>   |  27.0  |  5.64<H>    Ca    8.6      03 Jan 2021 04:18  Phos  4.1     01-03  Mg     2.3     01-02    TPro  6.3<L>  /  Alb  2.8<L>  /  TBili  0.2<L>  /  DBili  x   /  AST  11  /  ALT  12  /  AlkPhos  84  01-02          RADIOLOGY & ADDITIONAL TESTS:

## 2021-01-03 NOTE — PROGRESS NOTE ADULT - SUBJECTIVE AND OBJECTIVE BOX
NEPHROLOGY INTERVAL HPI/OVERNIGHT EVENTS:    Awake but not participating in conversation   NAD , looks comfortable   Psych to evaluate re. meds   No fever . VSS    MEDICATIONS  (STANDING):  aspirin  chewable 81 milliGRAM(s) Oral daily  atorvastatin 20 milliGRAM(s) Oral at bedtime  carvedilol 12.5 milliGRAM(s) Oral every 12 hours  dextrose 40% Gel 15 Gram(s) Oral once  dextrose 5%. 1000 milliLiter(s) (50 mL/Hr) IV Continuous <Continuous>  dextrose 5%. 1000 milliLiter(s) (100 mL/Hr) IV Continuous <Continuous>  dextrose 50% Injectable 25 Gram(s) IV Push once  dextrose 50% Injectable 12.5 Gram(s) IV Push once  dextrose 50% Injectable 25 Gram(s) IV Push once  folic acid 1 milliGRAM(s) Oral daily  glucagon  Injectable 1 milliGRAM(s) IntraMuscular once  heparin   Injectable 5000 Unit(s) SubCutaneous every 8 hours  hydrALAZINE 25 milliGRAM(s) Oral three times a day  insulin glargine Injectable (LANTUS) 20 Unit(s) SubCutaneous at bedtime  insulin lispro (ADMELOG) corrective regimen sliding scale   SubCutaneous every 6 hours  sevelamer carbonate 800 milliGRAM(s) Oral three times a day with meals    MEDICATIONS  (PRN):  acetaminophen   Tablet .. 650 milliGRAM(s) Oral every 6 hours PRN Temp greater or equal to 38C (100.4F), Mild Pain (1 - 3)  hydrALAZINE Injectable 10 milliGRAM(s) IV Push every 4 hours PRN GIVE FOR  or more      Allergies    No Known Allergies    Intolerances  Vital Signs Last 24 Hrs  T(C): 37.1 (2021 05:06), Max: 37.4 (2021 19:50)  T(F): 98.8 (2021 05:06), Max: 99.4 (2021 19:50)  HR: 88 (2021 05:06) (76 - 93)  BP: 142/85 (2021 05:06) (132/87 - 175/83)  BP(mean): 129 (2021 00:14) (129 - 129)  RR: 16 (2021 05:06) (16 - 18)  SpO2: 95% (2021 05:06) (95% - 100%)  Daily     Daily Weight in k.1 (2021 15:26)  I&O's Detail    I&O's Summary      PHYSICAL EXAM:  GENERAL: looiks better   HEAD:  Atraumatic, Normocephalic  EYES: EOMI  NECK: Supple, neck  veins full  NERVOUS SYSTEM:  Awake not answering questions  CHEST/LUNG: Clear to percussion bilaterally  HEART: Regular rate and rhythm  ABDOMEN: Soft, Nontender, Nondistended; Bowel sounds present  EXTREMITIES: trace LE edema, L wrist AVF good thrill    LABS:                        9.2    16.56 )-----------( 340      ( 2021 04:18 )             29.2         138  |  101  |  43.0<H>  ----------------------------<  227<H>  3.2<L>   |  27.0  |  5.64<H>    Ca    8.6      2021 04:18  Phos  4.1       Mg     2.3         TPro  6.3<L>  /  Alb  2.8<L>  /  TBili  0.2<L>  /  DBili  x   /  AST  11  /  ALT  12  /  AlkPhos  84          Phosphorus Level, Serum: 4.1 mg/dL ( @ 04:18)  Magnesium, Serum: 2.3 mg/dL ( @ 07:14)    ABG - ( 2021 12:59 )  pH, Arterial: 7.37  pH, Blood: x     /  pCO2: 42    /  pO2: 109   / HCO3: 24    / Base Excess: -0.8  /  SaO2: 98                    RADIOLOGY & ADDITIONAL TESTS:

## 2021-01-04 LAB
ANION GAP SERPL CALC-SCNC: 13 MMOL/L — SIGNIFICANT CHANGE UP (ref 5–17)
BUN SERPL-MCNC: 34 MG/DL — HIGH (ref 8–20)
CALCIUM SERPL-MCNC: 9.5 MG/DL — SIGNIFICANT CHANGE UP (ref 8.6–10.2)
CHLORIDE SERPL-SCNC: 100 MMOL/L — SIGNIFICANT CHANGE UP (ref 98–107)
CO2 SERPL-SCNC: 26 MMOL/L — SIGNIFICANT CHANGE UP (ref 22–29)
CREAT SERPL-MCNC: 4.6 MG/DL — HIGH (ref 0.5–1.3)
GLUCOSE BLDC GLUCOMTR-MCNC: 120 MG/DL — HIGH (ref 70–99)
GLUCOSE BLDC GLUCOMTR-MCNC: 130 MG/DL — HIGH (ref 70–99)
GLUCOSE BLDC GLUCOMTR-MCNC: 133 MG/DL — HIGH (ref 70–99)
GLUCOSE BLDC GLUCOMTR-MCNC: 142 MG/DL — HIGH (ref 70–99)
GLUCOSE BLDC GLUCOMTR-MCNC: 188 MG/DL — HIGH (ref 70–99)
GLUCOSE SERPL-MCNC: 204 MG/DL — HIGH (ref 70–99)
HCT VFR BLD CALC: 32.1 % — LOW (ref 39–50)
HGB BLD-MCNC: 10 G/DL — LOW (ref 13–17)
MCHC RBC-ENTMCNC: 23.1 PG — LOW (ref 27–34)
MCHC RBC-ENTMCNC: 31.2 GM/DL — LOW (ref 32–36)
MCV RBC AUTO: 74.1 FL — LOW (ref 80–100)
PHOSPHATE SERPL-MCNC: 4.3 MG/DL — SIGNIFICANT CHANGE UP (ref 2.4–4.7)
PLATELET # BLD AUTO: 331 K/UL — SIGNIFICANT CHANGE UP (ref 150–400)
POTASSIUM SERPL-MCNC: 3.5 MMOL/L — SIGNIFICANT CHANGE UP (ref 3.5–5.3)
POTASSIUM SERPL-SCNC: 3.5 MMOL/L — SIGNIFICANT CHANGE UP (ref 3.5–5.3)
RBC # BLD: 4.33 M/UL — SIGNIFICANT CHANGE UP (ref 4.2–5.8)
RBC # FLD: 16.8 % — HIGH (ref 10.3–14.5)
SODIUM SERPL-SCNC: 138 MMOL/L — SIGNIFICANT CHANGE UP (ref 135–145)
WBC # BLD: 17.95 K/UL — HIGH (ref 3.8–10.5)
WBC # FLD AUTO: 17.95 K/UL — HIGH (ref 3.8–10.5)

## 2021-01-04 PROCEDURE — 99233 SBSQ HOSP IP/OBS HIGH 50: CPT

## 2021-01-04 RX ADMIN — SEVELAMER CARBONATE 800 MILLIGRAM(S): 2400 POWDER, FOR SUSPENSION ORAL at 16:34

## 2021-01-04 RX ADMIN — CARVEDILOL PHOSPHATE 12.5 MILLIGRAM(S): 80 CAPSULE, EXTENDED RELEASE ORAL at 16:34

## 2021-01-04 RX ADMIN — Medication 81 MILLIGRAM(S): at 08:53

## 2021-01-04 RX ADMIN — HEPARIN SODIUM 5000 UNIT(S): 5000 INJECTION INTRAVENOUS; SUBCUTANEOUS at 22:28

## 2021-01-04 RX ADMIN — SEVELAMER CARBONATE 800 MILLIGRAM(S): 2400 POWDER, FOR SUSPENSION ORAL at 12:56

## 2021-01-04 RX ADMIN — IRON SUCROSE 110 MILLIGRAM(S): 20 INJECTION, SOLUTION INTRAVENOUS at 08:51

## 2021-01-04 RX ADMIN — SEVELAMER CARBONATE 800 MILLIGRAM(S): 2400 POWDER, FOR SUSPENSION ORAL at 08:52

## 2021-01-04 RX ADMIN — Medication 10 MILLIGRAM(S): at 22:28

## 2021-01-04 RX ADMIN — Medication 25 MILLIGRAM(S): at 12:57

## 2021-01-04 RX ADMIN — HEPARIN SODIUM 5000 UNIT(S): 5000 INJECTION INTRAVENOUS; SUBCUTANEOUS at 06:18

## 2021-01-04 RX ADMIN — Medication 10 MILLIGRAM(S): at 06:35

## 2021-01-04 RX ADMIN — Medication 1 MILLIGRAM(S): at 08:52

## 2021-01-04 RX ADMIN — ATORVASTATIN CALCIUM 20 MILLIGRAM(S): 80 TABLET, FILM COATED ORAL at 22:28

## 2021-01-04 RX ADMIN — Medication 10 MILLIGRAM(S): at 11:49

## 2021-01-04 NOTE — PROGRESS NOTE ADULT - SUBJECTIVE AND OBJECTIVE BOX
CC: Follow up    INTERVAL HPI/OVERNIGHT EVENTS: Patient seen and examined, laying in bed awake. Not responding to questions or commands. Is in no distress. Refusing food and po medications.       Vital Signs Last 24 Hrs  T(C): 37 (04 Jan 2021 11:40), Max: 37.3 (04 Jan 2021 04:55)  T(F): 98.6 (04 Jan 2021 11:40), Max: 99.1 (04 Jan 2021 04:55)  HR: 89 (04 Jan 2021 11:40) (84 - 98)  BP: 167/106 (04 Jan 2021 11:40) (143/99 - 170/106)  BP(mean): --  RR: 20 (04 Jan 2021 11:40) (18 - 20)  SpO2: 98% (04 Jan 2021 11:40) (94% - 100%)    PHYSICAL EXAM:    GENERAL: NAD,Alert and awake   HEAD:  Atraumatic, Normocephalic  EYES: conjunctiva and sclera clear  ENMT: Moist mucous membranes  CHEST/LUNG: Clear to auscultation bilaterally; No rales, rhonchi, wheezing, or rubs  HEART: Regular rate and rhythm; No murmurs, rubs, or gallops  ABDOMEN: Soft, Nontender, Nondistended; Bowel sounds present  EXTREMITIES:  2+ Peripheral Pulses, No clubbing, cyanosis, or edema        MEDICATIONS  (STANDING):  aspirin  chewable 81 milliGRAM(s) Oral daily  atorvastatin 20 milliGRAM(s) Oral at bedtime  carvedilol 12.5 milliGRAM(s) Oral every 12 hours  dextrose 40% Gel 15 Gram(s) Oral once  dextrose 5%. 1000 milliLiter(s) (50 mL/Hr) IV Continuous <Continuous>  dextrose 5%. 1000 milliLiter(s) (100 mL/Hr) IV Continuous <Continuous>  dextrose 50% Injectable 25 Gram(s) IV Push once  dextrose 50% Injectable 12.5 Gram(s) IV Push once  dextrose 50% Injectable 25 Gram(s) IV Push once  folic acid 1 milliGRAM(s) Oral daily  glucagon  Injectable 1 milliGRAM(s) IntraMuscular once  heparin   Injectable 5000 Unit(s) SubCutaneous every 8 hours  hydrALAZINE 25 milliGRAM(s) Oral three times a day  influenza   Vaccine 0.5 milliLiter(s) IntraMuscular once  insulin glargine Injectable (LANTUS) 20 Unit(s) SubCutaneous at bedtime  insulin lispro (ADMELOG) corrective regimen sliding scale   SubCutaneous every 6 hours  iron sucrose IVPB 200 milliGRAM(s) IV Intermittent every 24 hours  sevelamer carbonate 800 milliGRAM(s) Oral three times a day with meals    MEDICATIONS  (PRN):  acetaminophen   Tablet .. 650 milliGRAM(s) Oral every 6 hours PRN Temp greater or equal to 38C (100.4F), Mild Pain (1 - 3)  hydrALAZINE Injectable 10 milliGRAM(s) IV Push every 4 hours PRN GIVE FOR  or more      Allergies    No Known Allergies    Intolerances          LABS:                          10.0   17.95 )-----------( 331      ( 04 Jan 2021 07:20 )             32.1     01-04    138  |  100  |  34.0<H>  ----------------------------<  204<H>  3.5   |  26.0  |  4.60<H>    Ca    9.5      04 Jan 2021 07:20  Phos  4.3     01-04            RADIOLOGY & ADDITIONAL TESTS:

## 2021-01-04 NOTE — PROGRESS NOTE ADULT - ASSESSMENT
ESRD - HD again tomorrow  ( # 4 ).   Hep B and C negative   Ammonia level 24    4 K+ bath with HD    HTN - Watch on meds     Anemia - Hgb stable   Added folate and Epogen   Iron stores noted , added Venofer     RO - Added Renvela w/ meals     BiPolar d/o - Meds noted   Psych to evaluate

## 2021-01-04 NOTE — PROGRESS NOTE ADULT - SUBJECTIVE AND OBJECTIVE BOX
NEPHROLOGY INTERVAL HPI/OVERNIGHT EVENTS:    Interim noted   Still not communicating well   Psych follow up noted   Tolerating well     MEDICATIONS  (STANDING):  aspirin  chewable 81 milliGRAM(s) Oral daily  atorvastatin 20 milliGRAM(s) Oral at bedtime  carvedilol 12.5 milliGRAM(s) Oral every 12 hours  dextrose 40% Gel 15 Gram(s) Oral once  dextrose 5%. 1000 milliLiter(s) (50 mL/Hr) IV Continuous <Continuous>  dextrose 5%. 1000 milliLiter(s) (100 mL/Hr) IV Continuous <Continuous>  dextrose 50% Injectable 25 Gram(s) IV Push once  dextrose 50% Injectable 12.5 Gram(s) IV Push once  dextrose 50% Injectable 25 Gram(s) IV Push once  folic acid 1 milliGRAM(s) Oral daily  glucagon  Injectable 1 milliGRAM(s) IntraMuscular once  heparin   Injectable 5000 Unit(s) SubCutaneous every 8 hours  hydrALAZINE 25 milliGRAM(s) Oral three times a day  influenza   Vaccine 0.5 milliLiter(s) IntraMuscular once  insulin glargine Injectable (LANTUS) 20 Unit(s) SubCutaneous at bedtime  insulin lispro (ADMELOG) corrective regimen sliding scale   SubCutaneous every 6 hours  iron sucrose IVPB 200 milliGRAM(s) IV Intermittent every 24 hours  sevelamer carbonate 800 milliGRAM(s) Oral three times a day with meals    MEDICATIONS  (PRN):  acetaminophen   Tablet .. 650 milliGRAM(s) Oral every 6 hours PRN Temp greater or equal to 38C (100.4F), Mild Pain (1 - 3)  hydrALAZINE Injectable 10 milliGRAM(s) IV Push every 4 hours PRN GIVE FOR  or more      Allergies    No Known Allergies    Intolerances        Vital Signs Last 24 Hrs  T(C): 37.2 (2021 07:48), Max: 37.8 (2021 11:11)  T(F): 99 (2021 07:48), Max: 100 (2021 11:11)  HR: 98 (2021 07:48) (84 - 98)  BP: 143/99 (2021 07:48) (138/87 - 170/106)  BP(mean): --  RR: 20 (2021 07:48) (17 - 20)  SpO2: 94% (2021 07:48) (94% - 100%)  Daily     Daily Weight in k.6 (2021 12:44)  I&O's Detail    2021 07:01  -  2021 07:00  --------------------------------------------------------  IN:  Total IN: 0 mL    OUT:    Other (mL): 0 mL  Total OUT: 0 mL    Total NET: 0 mL        I&O's Summary    2021 07:01  -  2021 07:00  --------------------------------------------------------  IN: 0 mL / OUT: 0 mL / NET: 0 mL            PHYSICAL EXAM:  GENERAL: Looks better   HEAD:  Atraumatic, Normocephalic  EYES: EOMI  NECK: Supple, neck  veins full  NERVOUS SYSTEM:  Awake not answering questions  CHEST/LUNG: Clear to percussion bilaterally  HEART: Regular rate and rhythm  ABDOMEN: Soft, Nontender, Nondistended; Bowel sounds present  EXTREMITIES: trace LE edema, L wrist AVF good thrill    LABS:                        10.0   17.95 )-----------( 331      ( 2021 07:20 )             32.1     01-04    138  |  100  |  34.0<H>  ----------------------------<  204<H>  3.5   |  26.0  |  4.60<H>    Ca    9.5      2021 07:20  Phos  4.3     01-04          Phosphorus Level, Serum: 4.3 mg/dL ( @ 07:20)          RADIOLOGY & ADDITIONAL TESTS:

## 2021-01-04 NOTE — PROGRESS NOTE ADULT - ASSESSMENT
The patient is a 35 year old male with a past medical history of morbid obesity, hypertension, ESRD not on HD with AVF, diabetes mellitus, bipolar disorder and hyperlipidemia who presented to the ER for altered mental status. In the ER noted to have a SBP >200; CT head was negative. Worsening renal failure with hypokalemia and leukocytosis. COVID was negative. Seen by nephrology and started on HD. Patient lethargic with altered mental status on admission. Utox and VIral panel were negative. Blood cultures and urine culture negative. Afebrile. Given empiric zosyn and vanco in the Er.     Assessment/Plan:    1. Acute toxic metabolic encephalopathy: Patient waxes and wanes. He is awake but staring; does not answer questions or follow commands. He is in no distress. Refusing medications PO  Suspect psych overmedications    2. Leukocytosis; Low grade temp of 100F with hypoxia on O2   Chest xray, RVP, Urine culture, Blood culture x 2, COVID all negative  Monitor off antibiotics    3. Worsening CKD stage 4 with metabolic acidosis: HD in AM   Nephrology following    4. Hypokalemia; replaced     5. Hypertensive urgency: Patient refusing PO medications  IV hydralazine Q 4hours ordered for now     6. IDDM: Uncontrolled  COntinue lantus and HSS  Monitor BSL    7. Bipolar disorder: Psych evaluation for medication recommendations. Recalled again for follow up  SUspect mentation is secondary to Bipolar disorder    VTE_ Heparin subcut

## 2021-01-05 DIAGNOSIS — F31.9 BIPOLAR DISORDER, UNSPECIFIED: ICD-10-CM

## 2021-01-05 LAB
CULTURE RESULTS: SIGNIFICANT CHANGE UP
CULTURE RESULTS: SIGNIFICANT CHANGE UP
GLUCOSE BLDC GLUCOMTR-MCNC: 111 MG/DL — HIGH (ref 70–99)
GLUCOSE BLDC GLUCOMTR-MCNC: 113 MG/DL — HIGH (ref 70–99)
GLUCOSE BLDC GLUCOMTR-MCNC: 126 MG/DL — HIGH (ref 70–99)
GLUCOSE BLDC GLUCOMTR-MCNC: 209 MG/DL — HIGH (ref 70–99)
GLUCOSE BLDC GLUCOMTR-MCNC: 231 MG/DL — HIGH (ref 70–99)
HCT VFR BLD CALC: 31.3 % — LOW (ref 39–50)
HGB BLD-MCNC: 9.8 G/DL — LOW (ref 13–17)
MCHC RBC-ENTMCNC: 23.6 PG — LOW (ref 27–34)
MCHC RBC-ENTMCNC: 31.3 GM/DL — LOW (ref 32–36)
MCV RBC AUTO: 75.2 FL — LOW (ref 80–100)
PLATELET # BLD AUTO: 351 K/UL — SIGNIFICANT CHANGE UP (ref 150–400)
PROCALCITONIN SERPL-MCNC: 0.34 NG/ML — HIGH (ref 0.02–0.1)
RBC # BLD: 4.16 M/UL — LOW (ref 4.2–5.8)
RBC # FLD: 16.7 % — HIGH (ref 10.3–14.5)
SPECIMEN SOURCE: SIGNIFICANT CHANGE UP
SPECIMEN SOURCE: SIGNIFICANT CHANGE UP
WBC # BLD: 19.69 K/UL — HIGH (ref 3.8–10.5)
WBC # FLD AUTO: 19.69 K/UL — HIGH (ref 3.8–10.5)

## 2021-01-05 PROCEDURE — 71046 X-RAY EXAM CHEST 2 VIEWS: CPT | Mod: 26

## 2021-01-05 PROCEDURE — 99222 1ST HOSP IP/OBS MODERATE 55: CPT

## 2021-01-05 PROCEDURE — 99233 SBSQ HOSP IP/OBS HIGH 50: CPT

## 2021-01-05 RX ORDER — RISPERIDONE 4 MG/1
2 TABLET ORAL
Refills: 0 | Status: DISCONTINUED | OUTPATIENT
Start: 2021-01-05 | End: 2021-01-09

## 2021-01-05 RX ORDER — CHLORHEXIDINE GLUCONATE 213 G/1000ML
1 SOLUTION TOPICAL
Refills: 0 | Status: DISCONTINUED | OUTPATIENT
Start: 2021-01-05 | End: 2021-01-13

## 2021-01-05 RX ORDER — ERYTHROPOIETIN 10000 [IU]/ML
10000 INJECTION, SOLUTION INTRAVENOUS; SUBCUTANEOUS
Refills: 0 | Status: DISCONTINUED | OUTPATIENT
Start: 2021-01-05 | End: 2021-01-13

## 2021-01-05 RX ADMIN — SEVELAMER CARBONATE 800 MILLIGRAM(S): 2400 POWDER, FOR SUSPENSION ORAL at 16:51

## 2021-01-05 RX ADMIN — CARVEDILOL PHOSPHATE 12.5 MILLIGRAM(S): 80 CAPSULE, EXTENDED RELEASE ORAL at 06:16

## 2021-01-05 RX ADMIN — INSULIN GLARGINE 20 UNIT(S): 100 INJECTION, SOLUTION SUBCUTANEOUS at 00:10

## 2021-01-05 RX ADMIN — Medication 25 MILLIGRAM(S): at 00:10

## 2021-01-05 RX ADMIN — Medication 25 MILLIGRAM(S): at 15:05

## 2021-01-05 RX ADMIN — CHLORHEXIDINE GLUCONATE 1 APPLICATION(S): 213 SOLUTION TOPICAL at 15:20

## 2021-01-05 RX ADMIN — RISPERIDONE 2 MILLIGRAM(S): 4 TABLET ORAL at 17:38

## 2021-01-05 RX ADMIN — Medication 25 MILLIGRAM(S): at 23:06

## 2021-01-05 RX ADMIN — Medication 25 MILLIGRAM(S): at 06:16

## 2021-01-05 RX ADMIN — CARVEDILOL PHOSPHATE 12.5 MILLIGRAM(S): 80 CAPSULE, EXTENDED RELEASE ORAL at 17:39

## 2021-01-05 RX ADMIN — HEPARIN SODIUM 5000 UNIT(S): 5000 INJECTION INTRAVENOUS; SUBCUTANEOUS at 23:03

## 2021-01-05 RX ADMIN — ATORVASTATIN CALCIUM 20 MILLIGRAM(S): 80 TABLET, FILM COATED ORAL at 23:03

## 2021-01-05 RX ADMIN — INSULIN GLARGINE 20 UNIT(S): 100 INJECTION, SOLUTION SUBCUTANEOUS at 23:02

## 2021-01-05 RX ADMIN — HEPARIN SODIUM 5000 UNIT(S): 5000 INJECTION INTRAVENOUS; SUBCUTANEOUS at 15:05

## 2021-01-05 RX ADMIN — Medication 6: at 17:38

## 2021-01-05 RX ADMIN — IRON SUCROSE 110 MILLIGRAM(S): 20 INJECTION, SOLUTION INTRAVENOUS at 11:39

## 2021-01-05 NOTE — BEHAVIORAL HEALTH ASSESSMENT NOTE - PAST PSYCHOTROPIC MEDICATION
cloNIDine 0.2 mg oral tablet: 1 tab(s) orally 3 times a day  QUEtiapine 100 mg oral tablet: 1 tab(s) orally once a day (at bedtime)  benztropine 0.5 mg oral tablet: 1 tab(s) orally 2 times a day  paliperidone 6 mg oral tablet, extended release: 1 tab(s) orally once a day (in the morning)  lamoTRIgine: 50 milligram(s) orally 2 times a clonidine 0.2 mg oral tablet: 1 tab(s) orally 3 times a day  quetiapine 100 mg oral tablet: 1 tab(s) orally once a day (at bedtime)  benztropine 0.5 mg oral tablet: 1 tab(s) orally 2 times a day  paliperidone 6 mg oral tablet, extended release: 1 tab(s) orally once a day (in the morning)  lamoTRIgine: 50 milligram(s) orally 2 times a

## 2021-01-05 NOTE — PROGRESS NOTE ADULT - ASSESSMENT
ESRD - HD again today # 4   Hep B and C negative   Ammonia level 24        HTN - Watch on meds     Anemia - Hgb stable   Added folate and Epogen   Iron stores noted , added Venofer     RO - Added Renvela w/ meals     BiPolar d/o - Meds noted   Psych follow up

## 2021-01-05 NOTE — PROGRESS NOTE ADULT - SUBJECTIVE AND OBJECTIVE BOX
NEPHROLOGY INTERVAL HPI/OVERNIGHT EVENTS:    seen at HD   No access issues   BP stable   Affect the same   Psych follow up noted     MEDICATIONS  (STANDING):  aspirin  chewable 81 milliGRAM(s) Oral daily  atorvastatin 20 milliGRAM(s) Oral at bedtime  carvedilol 12.5 milliGRAM(s) Oral every 12 hours  chlorhexidine 2% Cloths 1 Application(s) Topical <User Schedule>  dextrose 40% Gel 15 Gram(s) Oral once  dextrose 5%. 1000 milliLiter(s) (50 mL/Hr) IV Continuous <Continuous>  dextrose 5%. 1000 milliLiter(s) (100 mL/Hr) IV Continuous <Continuous>  dextrose 50% Injectable 25 Gram(s) IV Push once  dextrose 50% Injectable 12.5 Gram(s) IV Push once  dextrose 50% Injectable 25 Gram(s) IV Push once  folic acid 1 milliGRAM(s) Oral daily  glucagon  Injectable 1 milliGRAM(s) IntraMuscular once  heparin   Injectable 5000 Unit(s) SubCutaneous every 8 hours  hydrALAZINE 25 milliGRAM(s) Oral three times a day  influenza   Vaccine 0.5 milliLiter(s) IntraMuscular once  insulin glargine Injectable (LANTUS) 20 Unit(s) SubCutaneous at bedtime  insulin lispro (ADMELOG) corrective regimen sliding scale   SubCutaneous every 6 hours  risperiDONE   Tablet 2 milliGRAM(s) Oral two times a day  sevelamer carbonate 800 milliGRAM(s) Oral three times a day with meals    MEDICATIONS  (PRN):  acetaminophen   Tablet .. 650 milliGRAM(s) Oral every 6 hours PRN Temp greater or equal to 38C (100.4F), Mild Pain (1 - 3)  hydrALAZINE Injectable 10 milliGRAM(s) IV Push every 4 hours PRN GIVE FOR  or more      Allergies    No Known Allergies    Intolerances      Vital Signs Last 24 Hrs  T(C): 36.7 (2021 13:04), Max: 37.9 (2021 19:22)  T(F): 98.1 (2021 13:04), Max: 100.2 (2021 19:22)  HR: 94 (2021 13:04) (65 - 95)  BP: 149/96 (2021 13:04) (115/68 - 177/92)  BP(mean): --  RR: 18 (2021 13:04) (18 - 20)  SpO2: 97% (2021 13:04) (92% - 98%)  Daily     Daily Weight in k.1 (2021 09:35)  I&O's Detail    I&O's Summary      PHYSICAL EXAM:    GENERAL: Looks better   HEAD:  Atraumatic, Normocephalic  EYES: EOMI  NECK: Supple, neck  veins full  NERVOUS SYSTEM:  Awake not answering questions  CHEST/LUNG: Clear to percussion bilaterally  HEART: Regular rate and rhythm  ABDOMEN: Soft, Nontender, Nondistended; Bowel sounds present  EXTREMITIES: trace LE edema, L wrist AVF good thrill    LABS:                        9.8    19.69 )-----------( 351      ( 2021 10:25 )             31.3     01-04    138  |  100  |  34.0<H>  ----------------------------<  204<H>  3.5   |  26.0  |  4.60<H>    Ca    9.5      2021 07:20  Phos  4.3     01-04                  RADIOLOGY & ADDITIONAL TESTS:

## 2021-01-05 NOTE — BEHAVIORAL HEALTH ASSESSMENT NOTE - NSBHCHARTREVIEWINVESTIGATE_PSY_A_CORE FT
Ventricular Rate 99 BPM    Atrial Rate 99 BPM    P-R Interval 162 ms    QRS Duration 100 ms    Q-T Interval 400 ms    QTC Calculation(Bazett) 513 ms    P Axis 46 degrees    R Axis -80 degrees    T Axis 46 degrees    Diagnosis Line Normal sinus rhythm  Left axis deviation  Inferior infarct , age undetermined  Possible Anterolateral infarct , age undetermined  Prolonged QT  Abnormal ECG    Confirmed by Demetris Ruiz ([id number]) on 12/31/2020 2:12:27 PM

## 2021-01-05 NOTE — BEHAVIORAL HEALTH ASSESSMENT NOTE - NSBHCONSULTRECOMMENDOTHER_PSY_A_CORE FT
Consider using Risperdal Consta or Invega Sustenna IM injection for patient's symptoms. Consider using Risperdal Consta or Invega Sustenna IM injection for patient's symptoms.  attempt oral Risperdal for bipolar disorder prior to long acting injection

## 2021-01-05 NOTE — BEHAVIORAL HEALTH ASSESSMENT NOTE - HPI (INCLUDE ILLNESS QUALITY, SEVERITY, DURATION, TIMING, CONTEXT, MODIFYING FACTORS, ASSOCIATED SIGNS AND SYMPTOMS)
Patient is a 35 year old,  male; single; non-caregiver; unemployed; past psychiatric history of Bipolar 1 disorder; no known psychiatric hospitalizations; no known suicide attempts; no known history of violence or arrests; PMH of morbid obesity, HTN, ESRD not on HD with AVF, DM, HLD; presenting with AMS.  Patient was seen this morning in the dialysis suite. The patient was calm during the interview but was non-verbal throughout. After asking that patient a question he blinks as if he is answering the question. The patient did follow commands when asked wiggle his toes and fingers.

## 2021-01-05 NOTE — PROGRESS NOTE ADULT - SUBJECTIVE AND OBJECTIVE BOX
CC: Follow up     INTERVAL HPI/OVERNIGHT EVENTS: Patient seen and examined, is awake, ate breakfast and medications this morning. He is staring ahead with flat affect and one work response " what time is dialysis"      Vital Signs Last 24 Hrs  T(C): 36.8 (05 Jan 2021 09:35), Max: 37.9 (04 Jan 2021 19:22)  T(F): 98.2 (05 Jan 2021 09:35), Max: 100.2 (04 Jan 2021 19:22)  HR: 82 (05 Jan 2021 09:35) (65 - 95)  BP: 148/78 (05 Jan 2021 09:35) (115/68 - 177/92)  BP(mean): --  RR: 18 (05 Jan 2021 09:35) (18 - 20)  SpO2: 95% (05 Jan 2021 09:35) (92% - 98%)    PHYSICAL EXAM:    GENERAL: NAD, Alert   HEAD:  Atraumatic, Normocephalic  EYES: conjunctiva and sclera clear  ENMT: Moist mucous membranes  NECK: Supple, No JVD  CHEST/LUNG: Clear to auscultation bilaterally; No rales, rhonchi, wheezing, or rubs  HEART: Regular rate and rhythm; No murmurs, rubs, or gallops  ABDOMEN: Soft, Nontender, Nondistended; Bowel sounds present  EXTREMITIES:  2+ Peripheral Pulses, No clubbing, cyanosis, or edema        MEDICATIONS  (STANDING):  aspirin  chewable 81 milliGRAM(s) Oral daily  atorvastatin 20 milliGRAM(s) Oral at bedtime  carvedilol 12.5 milliGRAM(s) Oral every 12 hours  chlorhexidine 2% Cloths 1 Application(s) Topical <User Schedule>  dextrose 40% Gel 15 Gram(s) Oral once  dextrose 5%. 1000 milliLiter(s) (50 mL/Hr) IV Continuous <Continuous>  dextrose 5%. 1000 milliLiter(s) (100 mL/Hr) IV Continuous <Continuous>  dextrose 50% Injectable 25 Gram(s) IV Push once  dextrose 50% Injectable 12.5 Gram(s) IV Push once  dextrose 50% Injectable 25 Gram(s) IV Push once  folic acid 1 milliGRAM(s) Oral daily  glucagon  Injectable 1 milliGRAM(s) IntraMuscular once  heparin   Injectable 5000 Unit(s) SubCutaneous every 8 hours  hydrALAZINE 25 milliGRAM(s) Oral three times a day  influenza   Vaccine 0.5 milliLiter(s) IntraMuscular once  insulin glargine Injectable (LANTUS) 20 Unit(s) SubCutaneous at bedtime  insulin lispro (ADMELOG) corrective regimen sliding scale   SubCutaneous every 6 hours  iron sucrose IVPB 200 milliGRAM(s) IV Intermittent every 24 hours  sevelamer carbonate 800 milliGRAM(s) Oral three times a day with meals    MEDICATIONS  (PRN):  acetaminophen   Tablet .. 650 milliGRAM(s) Oral every 6 hours PRN Temp greater or equal to 38C (100.4F), Mild Pain (1 - 3)  hydrALAZINE Injectable 10 milliGRAM(s) IV Push every 4 hours PRN GIVE FOR  or more      Allergies    No Known Allergies    Intolerances          LABS:                          9.8    19.69 )-----------( 351      ( 05 Jan 2021 10:25 )             31.3     01-04    138  |  100  |  34.0<H>  ----------------------------<  204<H>  3.5   |  26.0  |  4.60<H>    Ca    9.5      04 Jan 2021 07:20  Phos  4.3     01-04            RADIOLOGY & ADDITIONAL TESTS:

## 2021-01-05 NOTE — BEHAVIORAL HEALTH ASSESSMENT NOTE - NSBHCHARTREVIEWVS_PSY_A_CORE FT
T(C): 36.8 (05 Jan 2021 09:35), Max: 37.9 (04 Jan 2021 19:22)  T(F): 98.2 (05 Jan 2021 09:35), Max: 100.2 (04 Jan 2021 19:22)  HR: 82 (05 Jan 2021 09:35) (65 - 95)  BP: 148/78 (05 Jan 2021 09:35) (115/68 - 177/92)  RR: 18 (05 Jan 2021 09:35) (18 - 20)  SpO2: 95% (05 Jan 2021 09:35) (92% - 98%)

## 2021-01-05 NOTE — BEHAVIORAL HEALTH ASSESSMENT NOTE - NSBHCHARTREVIEWLAB_PSY_A_CORE FT
10.0   17.95 )-----------( 331      ( 04 Jan 2021 07:20 )             32.1   01-04    138  |  100  |  34.0<H>  ----------------------------<  204<H>  3.5   |  26.0  |  4.60<H>    Ca    9.5      04 Jan 2021 07:20  Phos  4.3     01-04    SARS-CoV-2: Julisa (30 Dec 2020 22:58)

## 2021-01-05 NOTE — PROGRESS NOTE ADULT - ASSESSMENT
The patient is a 35 year old male with a past medical history of morbid obesity, hypertension, ESRD not on HD with AVF, diabetes mellitus, bipolar disorder and hyperlipidemia who presented to the ER for altered mental status. In the ER noted to have a SBP >200; CT head was negative. Worsening renal failure with hypokalemia and leukocytosis. COVID was negative. Seen by nephrology and started on HD. Patient lethargic with altered mental status on admission. Utox and VIral panel were negative. Blood cultures and urine culture negative. Afebrile. Given empiric zosyn and vanco in the Er.     Assessment/Plan:    1. Acute toxic metabolic encephalopathy: Patient waxes and wanes. He is awake but staring; does not answer questions or follow commands. He is in no distress. Refusing medications PO  Suspect secondary to bipolar disorder. Psychiatry following     2. Leukocytosis; Low grade temp of 100F with hypoxia on O2   Chest xray, RVP, Urine culture, Blood culture x 2, COVID all negative    Worsening leukocytosis. Repeat all cultures and chest xray with procalcitonin     3. Worsening CKD stage 4 with metabolic acidosis: HD today   Nephrology following    4. Hypokalemia; replaced     5. Hypertensive urgency: Bp stable  patient agreeable to medications today     6. IDDM: Uncontrolled  COntinue lantus and HSS  Monitor BSL    7. Bipolar disorder: Psych following for medication recommendations    VTE_ Heparin subcut

## 2021-01-05 NOTE — BEHAVIORAL HEALTH ASSESSMENT NOTE - OTHER
Patient was non-verbal Unable to assess, patient is non-verbal Unable to assess patient in bed Patient is non-verbal Unable to assess

## 2021-01-06 LAB
ANION GAP SERPL CALC-SCNC: 11 MMOL/L — SIGNIFICANT CHANGE UP (ref 5–17)
BUN SERPL-MCNC: 37 MG/DL — HIGH (ref 8–20)
CALCIUM SERPL-MCNC: 9.3 MG/DL — SIGNIFICANT CHANGE UP (ref 8.6–10.2)
CHLORIDE SERPL-SCNC: 101 MMOL/L — SIGNIFICANT CHANGE UP (ref 98–107)
CO2 SERPL-SCNC: 27 MMOL/L — SIGNIFICANT CHANGE UP (ref 22–29)
CREAT SERPL-MCNC: 4.88 MG/DL — HIGH (ref 0.5–1.3)
GLUCOSE BLDC GLUCOMTR-MCNC: 102 MG/DL — HIGH (ref 70–99)
GLUCOSE BLDC GLUCOMTR-MCNC: 126 MG/DL — HIGH (ref 70–99)
GLUCOSE BLDC GLUCOMTR-MCNC: 128 MG/DL — HIGH (ref 70–99)
GLUCOSE BLDC GLUCOMTR-MCNC: 159 MG/DL — HIGH (ref 70–99)
GLUCOSE BLDC GLUCOMTR-MCNC: 176 MG/DL — HIGH (ref 70–99)
GLUCOSE SERPL-MCNC: 115 MG/DL — HIGH (ref 70–99)
HCT VFR BLD CALC: 30.5 % — LOW (ref 39–50)
HGB BLD-MCNC: 9.4 G/DL — LOW (ref 13–17)
MCHC RBC-ENTMCNC: 23.2 PG — LOW (ref 27–34)
MCHC RBC-ENTMCNC: 30.8 GM/DL — LOW (ref 32–36)
MCV RBC AUTO: 75.3 FL — LOW (ref 80–100)
MRSA PCR RESULT.: SIGNIFICANT CHANGE UP
PLATELET # BLD AUTO: 331 K/UL — SIGNIFICANT CHANGE UP (ref 150–400)
POTASSIUM SERPL-MCNC: 3.5 MMOL/L — SIGNIFICANT CHANGE UP (ref 3.5–5.3)
POTASSIUM SERPL-SCNC: 3.5 MMOL/L — SIGNIFICANT CHANGE UP (ref 3.5–5.3)
RBC # BLD: 4.05 M/UL — LOW (ref 4.2–5.8)
RBC # FLD: 17 % — HIGH (ref 10.3–14.5)
S AUREUS DNA NOSE QL NAA+PROBE: SIGNIFICANT CHANGE UP
SODIUM SERPL-SCNC: 139 MMOL/L — SIGNIFICANT CHANGE UP (ref 135–145)
WBC # BLD: 17.53 K/UL — HIGH (ref 3.8–10.5)
WBC # FLD AUTO: 17.53 K/UL — HIGH (ref 3.8–10.5)

## 2021-01-06 PROCEDURE — 99233 SBSQ HOSP IP/OBS HIGH 50: CPT

## 2021-01-06 RX ORDER — TUBERCULIN PURIFIED PROTEIN DERIVATIVE 5 [IU]/.1ML
5 INJECTION, SOLUTION INTRADERMAL ONCE
Refills: 0 | Status: COMPLETED | OUTPATIENT
Start: 2021-01-06 | End: 2021-01-06

## 2021-01-06 RX ADMIN — CARVEDILOL PHOSPHATE 12.5 MILLIGRAM(S): 80 CAPSULE, EXTENDED RELEASE ORAL at 17:10

## 2021-01-06 RX ADMIN — HEPARIN SODIUM 5000 UNIT(S): 5000 INJECTION INTRAVENOUS; SUBCUTANEOUS at 10:27

## 2021-01-06 RX ADMIN — Medication 81 MILLIGRAM(S): at 10:27

## 2021-01-06 RX ADMIN — HEPARIN SODIUM 5000 UNIT(S): 5000 INJECTION INTRAVENOUS; SUBCUTANEOUS at 21:25

## 2021-01-06 RX ADMIN — CARVEDILOL PHOSPHATE 12.5 MILLIGRAM(S): 80 CAPSULE, EXTENDED RELEASE ORAL at 04:57

## 2021-01-06 RX ADMIN — Medication 25 MILLIGRAM(S): at 21:25

## 2021-01-06 RX ADMIN — INSULIN GLARGINE 20 UNIT(S): 100 INJECTION, SOLUTION SUBCUTANEOUS at 21:25

## 2021-01-06 RX ADMIN — TUBERCULIN PURIFIED PROTEIN DERIVATIVE 5 UNIT(S): 5 INJECTION, SOLUTION INTRADERMAL at 17:46

## 2021-01-06 RX ADMIN — SEVELAMER CARBONATE 800 MILLIGRAM(S): 2400 POWDER, FOR SUSPENSION ORAL at 08:17

## 2021-01-06 RX ADMIN — CHLORHEXIDINE GLUCONATE 1 APPLICATION(S): 213 SOLUTION TOPICAL at 04:50

## 2021-01-06 RX ADMIN — ATORVASTATIN CALCIUM 20 MILLIGRAM(S): 80 TABLET, FILM COATED ORAL at 21:25

## 2021-01-06 RX ADMIN — RISPERIDONE 2 MILLIGRAM(S): 4 TABLET ORAL at 04:57

## 2021-01-06 RX ADMIN — Medication 1 MILLIGRAM(S): at 10:27

## 2021-01-06 RX ADMIN — Medication 3: at 11:56

## 2021-01-06 RX ADMIN — Medication 25 MILLIGRAM(S): at 04:57

## 2021-01-06 RX ADMIN — HEPARIN SODIUM 5000 UNIT(S): 5000 INJECTION INTRAVENOUS; SUBCUTANEOUS at 04:57

## 2021-01-06 RX ADMIN — SEVELAMER CARBONATE 800 MILLIGRAM(S): 2400 POWDER, FOR SUSPENSION ORAL at 10:27

## 2021-01-06 RX ADMIN — Medication 10 MILLIGRAM(S): at 08:17

## 2021-01-06 RX ADMIN — RISPERIDONE 2 MILLIGRAM(S): 4 TABLET ORAL at 17:10

## 2021-01-06 RX ADMIN — SEVELAMER CARBONATE 800 MILLIGRAM(S): 2400 POWDER, FOR SUSPENSION ORAL at 17:10

## 2021-01-06 RX ADMIN — Medication 25 MILLIGRAM(S): at 10:27

## 2021-01-06 NOTE — PROGRESS NOTE BEHAVIORAL HEALTH - NSBHFUPINTERVALHXFT_PSY_A_CORE
Patient is a 35 year old,  male; single; non-caregiver; unemployed; past psychiatric history of Bipolar 1 disorder; no known psychiatric hospitalizations; no known suicide attempts; no known history of violence or arrests; PMH of morbid obesity, HTN, ESRD not on HD with AVF, DM, HLD; presenting with AMS.  Patient was seen this morning at bedside. The patient was calm during the interview but was non-verbal throughout. Patient has poor eye contact and does not endorse any information.  The patient did not follow commands when asked wiggle his toes and fingers.

## 2021-01-06 NOTE — PROGRESS NOTE ADULT - ASSESSMENT
ESRD - HD again tomorrow   Hep B and C negative   Ammonia level 24      HTN - Watch on meds     Anemia - Hgb stable   Cont folate and Epogen   Iron stores noted cont Venofer     RO - Added Renvela w/ meals     BiPolar d/o - Meds noted   Psych follow up noted    Will follow

## 2021-01-06 NOTE — PROGRESS NOTE ADULT - ASSESSMENT
The patient is a 35 year old male with a past medical history of morbid obesity, hypertension, ESRD not on HD with AVF, diabetes mellitus, bipolar disorder and hyperlipidemia who presented to the ER for altered mental status. In the ER noted to have a SBP >200; CT head was negative. Worsening renal failure with hypokalemia and leukocytosis. COVID was negative. Seen by nephrology and started on HD. Patient lethargic with altered mental status on admission. Utox and VIral panel were negative. Blood cultures and urine culture negative. Afebrile. Given empiric zosyn and vanco in the Er.     Assessment/Plan:    1. Acute toxic metabolic encephalopathy: Resolved. patient alert and oriented but does not answer questions    2. Leukocytosis; Afebrile, Off oxygen on room air at rest  Mild elevation of procalcitonin; chest xray was repeated- negative  Repeat blood cultures sent on 1/5   Chest xray, RVP, Urine culture, Blood culture x 2, COVID all negative  Monitor cbc     3. Worsening CKD stage 4 with metabolic acidosis: HD per nephrology  Hepatitis panel negative  PPD ordered 1/6  Nephrology following    4. Hypokalemia; replaced     5. Hypertensive urgency: Bp stable    6. IDDM: Uncontrolled  COntinue lantus and HSS  Monitor BSL    7. Bipolar disorder: Flat effect appears secondary to underlying mood disorder  Started on PO risperidone by psychiatry     VTE_ Heparin subcut

## 2021-01-06 NOTE — PROGRESS NOTE BEHAVIORAL HEALTH - SUMMARY
Patient is a 35 year old,  male; single; non-caregiver; unemployed; past psychiatric history of Bipolar 1 disorder; no known psychiatric hospitalizations; no known suicide attempts; no known history of violence or arrests; PMH of morbid obesity, HTN, ESRD not on HD with AVF, DM, HLD; presenting with AMS. Patient was seen this morning at bedside. The patient was calm during the interview but was non-verbal throughout. Patient has poor eye contact and does not endorse any information.  The patient did not follow commands when asked wiggle his toes and fingers. Will continue to follow patient. Continue the Risperdal PO as patient is compliant with taking this medication

## 2021-01-06 NOTE — PROGRESS NOTE ADULT - SUBJECTIVE AND OBJECTIVE BOX
CC: Follow up    INTERVAL HPI/OVERNIGHT EVENTS: Patient seen and examined, patient was on the phone alert and oriented speaking to his family member when i walked into the room. However, refused to answer my questions or speak to me.       Vital Signs Last 24 Hrs  T(C): 37.2 (06 Jan 2021 08:35), Max: 37.7 (05 Jan 2021 16:19)  T(F): 98.9 (06 Jan 2021 08:35), Max: 99.8 (05 Jan 2021 16:19)  HR: 82 (06 Jan 2021 08:35) (82 - 95)  BP: 150/110 (06 Jan 2021 08:35) (127/86 - 169/81)  BP(mean): --  RR: 18 (06 Jan 2021 08:35) (18 - 18)  SpO2: 96% (06 Jan 2021 08:35) (91% - 96%)    PHYSICAL EXAM:    GENERAL: NAD, AOX3 obese   HEAD:  Atraumatic, Normocephalic  EYES: conjunctiva and sclera clear  CHEST/LUNG: Clear to auscultation bilaterally; No rales, rhonchi, wheezing, or rubs  HEART: Regular rate and rhythm; No murmurs, rubs, or gallops  ABDOMEN: Soft, Nontender, Nondistended; Bowel sounds present  EXTREMITIES:  2+ Peripheral Pulses, No clubbing, cyanosis, or edema        MEDICATIONS  (STANDING):  aspirin  chewable 81 milliGRAM(s) Oral daily  atorvastatin 20 milliGRAM(s) Oral at bedtime  carvedilol 12.5 milliGRAM(s) Oral every 12 hours  chlorhexidine 2% Cloths 1 Application(s) Topical <User Schedule>  dextrose 40% Gel 15 Gram(s) Oral once  dextrose 5%. 1000 milliLiter(s) (50 mL/Hr) IV Continuous <Continuous>  dextrose 5%. 1000 milliLiter(s) (100 mL/Hr) IV Continuous <Continuous>  dextrose 50% Injectable 25 Gram(s) IV Push once  dextrose 50% Injectable 12.5 Gram(s) IV Push once  dextrose 50% Injectable 25 Gram(s) IV Push once  epoetin alma-epbx (RETACRIT) Injectable 69044 Unit(s) IV Push <User Schedule>  folic acid 1 milliGRAM(s) Oral daily  glucagon  Injectable 1 milliGRAM(s) IntraMuscular once  heparin   Injectable 5000 Unit(s) SubCutaneous every 8 hours  hydrALAZINE 25 milliGRAM(s) Oral three times a day  influenza   Vaccine 0.5 milliLiter(s) IntraMuscular once  insulin glargine Injectable (LANTUS) 20 Unit(s) SubCutaneous at bedtime  insulin lispro (ADMELOG) corrective regimen sliding scale   SubCutaneous every 6 hours  PPD  5 Tuberculin Unit(s) Injectable 5 Unit(s) IntraDermal once  risperiDONE   Tablet 2 milliGRAM(s) Oral two times a day  sevelamer carbonate 800 milliGRAM(s) Oral three times a day with meals    MEDICATIONS  (PRN):  acetaminophen   Tablet .. 650 milliGRAM(s) Oral every 6 hours PRN Temp greater or equal to 38C (100.4F), Mild Pain (1 - 3)  hydrALAZINE Injectable 10 milliGRAM(s) IV Push every 4 hours PRN GIVE FOR  or more      Allergies    No Known Allergies    Intolerances          LABS:                          9.4    17.53 )-----------( 331      ( 06 Jan 2021 09:02 )             30.5     01-06    139  |  101  |  37.0<H>  ----------------------------<  115<H>  3.5   |  27.0  |  4.88<H>    Ca    9.3      06 Jan 2021 09:02            RADIOLOGY & ADDITIONAL TESTS:

## 2021-01-06 NOTE — PROGRESS NOTE ADULT - SUBJECTIVE AND OBJECTIVE BOX
NEPHROLOGY INTERVAL HPI/OVERNIGHT EVENTS:    Examined  Arousable not talkative  No distress noted    MEDICATIONS  (STANDING):  aspirin  chewable 81 milliGRAM(s) Oral daily  atorvastatin 20 milliGRAM(s) Oral at bedtime  carvedilol 12.5 milliGRAM(s) Oral every 12 hours  chlorhexidine 2% Cloths 1 Application(s) Topical <User Schedule>  dextrose 40% Gel 15 Gram(s) Oral once  dextrose 5%. 1000 milliLiter(s) (50 mL/Hr) IV Continuous <Continuous>  dextrose 5%. 1000 milliLiter(s) (100 mL/Hr) IV Continuous <Continuous>  dextrose 50% Injectable 25 Gram(s) IV Push once  dextrose 50% Injectable 12.5 Gram(s) IV Push once  dextrose 50% Injectable 25 Gram(s) IV Push once  epoetin alma-epbx (RETACRIT) Injectable 19643 Unit(s) IV Push <User Schedule>  folic acid 1 milliGRAM(s) Oral daily  glucagon  Injectable 1 milliGRAM(s) IntraMuscular once  heparin   Injectable 5000 Unit(s) SubCutaneous every 8 hours  hydrALAZINE 25 milliGRAM(s) Oral three times a day  influenza   Vaccine 0.5 milliLiter(s) IntraMuscular once  insulin glargine Injectable (LANTUS) 20 Unit(s) SubCutaneous at bedtime  insulin lispro (ADMELOG) corrective regimen sliding scale   SubCutaneous every 6 hours  PPD  5 Tuberculin Unit(s) Injectable 5 Unit(s) IntraDermal once  risperiDONE   Tablet 2 milliGRAM(s) Oral two times a day  sevelamer carbonate 800 milliGRAM(s) Oral three times a day with meals    MEDICATIONS  (PRN):  acetaminophen   Tablet .. 650 milliGRAM(s) Oral every 6 hours PRN Temp greater or equal to 38C (100.4F), Mild Pain (1 - 3)  hydrALAZINE Injectable 10 milliGRAM(s) IV Push every 4 hours PRN GIVE FOR  or more      Allergies    No Known Allergies    Intolerances        Vital Signs Last 24 Hrs  T(C): 37.2 (06 Jan 2021 08:35), Max: 37.7 (05 Jan 2021 16:19)  T(F): 98.9 (06 Jan 2021 08:35), Max: 99.8 (05 Jan 2021 16:19)  HR: 82 (06 Jan 2021 08:35) (82 - 95)  BP: 150/110 (06 Jan 2021 08:35) (127/86 - 169/81)  BP(mean): --  RR: 18 (06 Jan 2021 08:35) (18 - 18)  SpO2: 96% (06 Jan 2021 08:35) (91% - 97%)  Daily     Daily     PHYSICAL EXAM:  GENERAL: NAD  HEAD: NCAT  EYES: EOMI  NECK: Supple, neck  veins full  NERVOUS SYSTEM:  Awake not answering questions  CHEST/LUNG: Clear to percussion bilaterally  HEART: Regular rate and rhythm  ABDOMEN: Soft, Nontender, Nondistended; Bowel sounds present  EXTREMITIES: trace LE edema, L wrist AVF good thrill    LABS:                        9.4    17.53 )-----------( 331      ( 06 Jan 2021 09:02 )             30.5     01-06    139  |  101  |  37.0<H>  ----------------------------<  115<H>  3.5   |  27.0  |  4.88<H>    Ca    9.3      06 Jan 2021 09:02                  RADIOLOGY & ADDITIONAL TESTS:

## 2021-01-07 LAB
ANION GAP SERPL CALC-SCNC: 11 MMOL/L — SIGNIFICANT CHANGE UP (ref 5–17)
BUN SERPL-MCNC: 40 MG/DL — HIGH (ref 8–20)
CALCIUM SERPL-MCNC: 9.5 MG/DL — SIGNIFICANT CHANGE UP (ref 8.6–10.2)
CHLORIDE SERPL-SCNC: 99 MMOL/L — SIGNIFICANT CHANGE UP (ref 98–107)
CO2 SERPL-SCNC: 28 MMOL/L — SIGNIFICANT CHANGE UP (ref 22–29)
CREAT SERPL-MCNC: 5.19 MG/DL — HIGH (ref 0.5–1.3)
GLUCOSE BLDC GLUCOMTR-MCNC: 140 MG/DL — HIGH (ref 70–99)
GLUCOSE BLDC GLUCOMTR-MCNC: 155 MG/DL — HIGH (ref 70–99)
GLUCOSE BLDC GLUCOMTR-MCNC: 196 MG/DL — HIGH (ref 70–99)
GLUCOSE BLDC GLUCOMTR-MCNC: 86 MG/DL — SIGNIFICANT CHANGE UP (ref 70–99)
GLUCOSE SERPL-MCNC: 80 MG/DL — SIGNIFICANT CHANGE UP (ref 70–99)
HCT VFR BLD CALC: 29.4 % — LOW (ref 39–50)
HGB BLD-MCNC: 9.4 G/DL — LOW (ref 13–17)
MCHC RBC-ENTMCNC: 23.6 PG — LOW (ref 27–34)
MCHC RBC-ENTMCNC: 32 GM/DL — SIGNIFICANT CHANGE UP (ref 32–36)
MCV RBC AUTO: 73.7 FL — LOW (ref 80–100)
PLATELET # BLD AUTO: 326 K/UL — SIGNIFICANT CHANGE UP (ref 150–400)
POTASSIUM SERPL-MCNC: 3.7 MMOL/L — SIGNIFICANT CHANGE UP (ref 3.5–5.3)
POTASSIUM SERPL-SCNC: 3.7 MMOL/L — SIGNIFICANT CHANGE UP (ref 3.5–5.3)
RBC # BLD: 3.99 M/UL — LOW (ref 4.2–5.8)
RBC # FLD: 17.1 % — HIGH (ref 10.3–14.5)
SARS-COV-2 RNA SPEC QL NAA+PROBE: SIGNIFICANT CHANGE UP
SODIUM SERPL-SCNC: 138 MMOL/L — SIGNIFICANT CHANGE UP (ref 135–145)
WBC # BLD: 17.07 K/UL — HIGH (ref 3.8–10.5)
WBC # FLD AUTO: 17.07 K/UL — HIGH (ref 3.8–10.5)

## 2021-01-07 PROCEDURE — 99233 SBSQ HOSP IP/OBS HIGH 50: CPT

## 2021-01-07 PROCEDURE — 99232 SBSQ HOSP IP/OBS MODERATE 35: CPT

## 2021-01-07 RX ADMIN — CHLORHEXIDINE GLUCONATE 1 APPLICATION(S): 213 SOLUTION TOPICAL at 05:07

## 2021-01-07 RX ADMIN — Medication 6: at 23:47

## 2021-01-07 RX ADMIN — Medication 3: at 16:06

## 2021-01-07 RX ADMIN — HEPARIN SODIUM 5000 UNIT(S): 5000 INJECTION INTRAVENOUS; SUBCUTANEOUS at 21:36

## 2021-01-07 RX ADMIN — Medication 25 MILLIGRAM(S): at 21:36

## 2021-01-07 RX ADMIN — Medication 25 MILLIGRAM(S): at 05:07

## 2021-01-07 RX ADMIN — SEVELAMER CARBONATE 800 MILLIGRAM(S): 2400 POWDER, FOR SUSPENSION ORAL at 16:57

## 2021-01-07 RX ADMIN — INSULIN GLARGINE 20 UNIT(S): 100 INJECTION, SOLUTION SUBCUTANEOUS at 21:36

## 2021-01-07 RX ADMIN — ERYTHROPOIETIN 10000 UNIT(S): 10000 INJECTION, SOLUTION INTRAVENOUS; SUBCUTANEOUS at 12:08

## 2021-01-07 RX ADMIN — RISPERIDONE 2 MILLIGRAM(S): 4 TABLET ORAL at 16:57

## 2021-01-07 RX ADMIN — CARVEDILOL PHOSPHATE 12.5 MILLIGRAM(S): 80 CAPSULE, EXTENDED RELEASE ORAL at 16:58

## 2021-01-07 RX ADMIN — HEPARIN SODIUM 5000 UNIT(S): 5000 INJECTION INTRAVENOUS; SUBCUTANEOUS at 05:08

## 2021-01-07 RX ADMIN — CARVEDILOL PHOSPHATE 12.5 MILLIGRAM(S): 80 CAPSULE, EXTENDED RELEASE ORAL at 05:07

## 2021-01-07 RX ADMIN — ATORVASTATIN CALCIUM 20 MILLIGRAM(S): 80 TABLET, FILM COATED ORAL at 21:36

## 2021-01-07 NOTE — PROGRESS NOTE ADULT - ASSESSMENT
ESRD - HD today will cont on TTS schedule  Hep B and C negative     HTN - BP acceptable  Watch on meds     Anemia - Hgb stable   Cont folate and Epogen   Iron stores noted cont Venofer     RO - cont Renvela w/ meals     BiPolar d/o - Meds noted   Psych follow up noted    Will follow

## 2021-01-07 NOTE — PROGRESS NOTE ADULT - ASSESSMENT
The patient is a 35 year old male with a past medical history of morbid obesity, hypertension, ESRD not on HD with AVF, diabetes mellitus, bipolar disorder and hyperlipidemia who presented to the ER for altered mental status. In the ER noted to have a SBP >200; CT head was negative. Worsening renal failure with hypokalemia and leukocytosis. COVID was negative. Seen by nephrology and started on HD. Patient lethargic with altered mental status on admission. Utox and VIral panel were negative. Blood cultures and urine culture negative. Afebrile. Given empiric zosyn and vanco in the Er.     Assessment/Plan:    1. Acute toxic metabolic encephalopathy: Resolved.    2. Leukocytosis; Afebrile, Off oxygen on room air at rest  Mild elevation of procalcitonin; chest xray was repeated- negative  Repeat blood cultures sent on 1/5 still pending  REpeat COVID ordered  Chest xray, RVP, Urine culture, Blood culture x 2, COVID all negative  Monitor cbc     3. Worsening CKD stage 4 with metabolic acidosis: HD per nephrology  Hepatitis panel negative  PPD ordered 1/6  Nephrology following  SW on consult for outpatient HD placement     4. Hypokalemia; replaced     5. Hypertensive urgency: Bp stable    6. IDDM: Uncontrolled  COntinue lantus and HSS  Monitor BSL    7. Bipolar disorder: Flat effect appears secondary to underlying mood disorder  Started on PO risperidone by psychiatry     VTE_ Heparin subcut

## 2021-01-07 NOTE — DIETITIAN INITIAL EVALUATION ADULT. - PERTINENT LABORATORY DATA
01-07 Na138 mmol/L Glu 80 mg/dL K+ 3.7 mmol/L Cr  5.19 mg/dL<H> BUN 40.0 mg/dL<H> Phos n/a   Alb n/a   PAB n/a

## 2021-01-07 NOTE — PROGRESS NOTE ADULT - SUBJECTIVE AND OBJECTIVE BOX
CC: Follow up    INTERVAL HPI/OVERNIGHT EVENTS: Patient seen and examined during HD. Flat affect but answering questions appropriately. Offers no complaints.       Vital Signs Last 24 Hrs  T(C): 37.5 (07 Jan 2021 08:00), Max: 37.5 (07 Jan 2021 08:00)  T(F): 99.5 (07 Jan 2021 08:00), Max: 99.5 (07 Jan 2021 08:00)  HR: 87 (07 Jan 2021 08:00) (79 - 93)  BP: 164/97 (07 Jan 2021 08:00) (131/84 - 164/97)  BP(mean): --  RR: 18 (07 Jan 2021 08:00) (18 - 20)  SpO2: 95% (07 Jan 2021 08:00) (93% - 98%)    PHYSICAL EXAM:    GENERAL: NAD, AOX3  HEAD:  Atraumatic, Normocephalic  EYES: conjunctiva and sclera clear  ENMT: Moist mucous membranes  NECK: Supple, No JVD  CHEST/LUNG: Clear to auscultation bilaterally; No rales, rhonchi, wheezing, or rubs  HEART: Regular rate and rhythm; No murmurs, rubs, or gallops  ABDOMEN: Soft, Nontender, Nondistended; Bowel sounds present  EXTREMITIES:  2+ Peripheral Pulses, No clubbing, cyanosis, or edema  LEft AVF        MEDICATIONS  (STANDING):  aspirin  chewable 81 milliGRAM(s) Oral daily  atorvastatin 20 milliGRAM(s) Oral at bedtime  carvedilol 12.5 milliGRAM(s) Oral every 12 hours  chlorhexidine 2% Cloths 1 Application(s) Topical <User Schedule>  dextrose 40% Gel 15 Gram(s) Oral once  dextrose 5%. 1000 milliLiter(s) (50 mL/Hr) IV Continuous <Continuous>  dextrose 5%. 1000 milliLiter(s) (100 mL/Hr) IV Continuous <Continuous>  dextrose 50% Injectable 25 Gram(s) IV Push once  dextrose 50% Injectable 12.5 Gram(s) IV Push once  dextrose 50% Injectable 25 Gram(s) IV Push once  epoetin alma-epbx (RETACRIT) Injectable 37781 Unit(s) IV Push <User Schedule>  folic acid 1 milliGRAM(s) Oral daily  glucagon  Injectable 1 milliGRAM(s) IntraMuscular once  heparin   Injectable 5000 Unit(s) SubCutaneous every 8 hours  hydrALAZINE 25 milliGRAM(s) Oral three times a day  influenza   Vaccine 0.5 milliLiter(s) IntraMuscular once  insulin glargine Injectable (LANTUS) 20 Unit(s) SubCutaneous at bedtime  insulin lispro (ADMELOG) corrective regimen sliding scale   SubCutaneous every 6 hours  risperiDONE   Tablet 2 milliGRAM(s) Oral two times a day  sevelamer carbonate 800 milliGRAM(s) Oral three times a day with meals    MEDICATIONS  (PRN):  acetaminophen   Tablet .. 650 milliGRAM(s) Oral every 6 hours PRN Temp greater or equal to 38C (100.4F), Mild Pain (1 - 3)  hydrALAZINE Injectable 10 milliGRAM(s) IV Push every 4 hours PRN GIVE FOR  or more      Allergies    No Known Allergies    Intolerances          LABS:                          9.4    17.07 )-----------( 326      ( 07 Jan 2021 09:02 )             29.4     01-07    138  |  99  |  40.0<H>  ----------------------------<  80  3.7   |  28.0  |  5.19<H>    Ca    9.5      07 Jan 2021 09:01            RADIOLOGY & ADDITIONAL TESTS:

## 2021-01-07 NOTE — DIETITIAN INITIAL EVALUATION ADULT. - OTHER INFO
The patient is a 35 year old male with a past medical history of morbid obesity, hypertension, ESRD not on HD with AVF, diabetes mellitus, bipolar disorder and hyperlipidemia who presented to the ER for altered mental status. In the ER noted to have a SBP >200; CT head was negative. Worsening renal failure with hypokalemia and leukocytosis. COVID was negative. Seen by nephrology and started on HD. Patient lethargic with altered mental status on admission. Utox and VIral panel were negative. Blood cultures and urine culture negative. Afebrile. Given empiric zosyn and vanco in the Er.

## 2021-01-07 NOTE — PROGRESS NOTE BEHAVIORAL HEALTH - SUMMARY
Patient is a 35 year old,  male; single; non-caregiver; unemployed; past psychiatric history of Bipolar 1 disorder; no known psychiatric hospitalizations; no known suicide attempts; no known history of violence or arrests; PMH of morbid obesity, HTN, ESRD not on HD with AVF, DM, HLD; presenting with AMS.    Pt able to communicate today. Continue pt on risperidone. Psych will continue to follow.

## 2021-01-07 NOTE — DIETITIAN INITIAL EVALUATION ADULT. - PERTINENT MEDS FT
MEDICATIONS  (STANDING):  aspirin  chewable 81 milliGRAM(s) Oral daily  atorvastatin 20 milliGRAM(s) Oral at bedtime  carvedilol 12.5 milliGRAM(s) Oral every 12 hours  chlorhexidine 2% Cloths 1 Application(s) Topical <User Schedule>  dextrose 40% Gel 15 Gram(s) Oral once  dextrose 5%. 1000 milliLiter(s) (50 mL/Hr) IV Continuous <Continuous>  dextrose 5%. 1000 milliLiter(s) (100 mL/Hr) IV Continuous <Continuous>  dextrose 50% Injectable 25 Gram(s) IV Push once  dextrose 50% Injectable 12.5 Gram(s) IV Push once  dextrose 50% Injectable 25 Gram(s) IV Push once  epoetin alma-epbx (RETACRIT) Injectable 59881 Unit(s) IV Push <User Schedule>  folic acid 1 milliGRAM(s) Oral daily  glucagon  Injectable 1 milliGRAM(s) IntraMuscular once  heparin   Injectable 5000 Unit(s) SubCutaneous every 8 hours  hydrALAZINE 25 milliGRAM(s) Oral three times a day  influenza   Vaccine 0.5 milliLiter(s) IntraMuscular once  insulin glargine Injectable (LANTUS) 20 Unit(s) SubCutaneous at bedtime  insulin lispro (ADMELOG) corrective regimen sliding scale   SubCutaneous every 6 hours  risperiDONE   Tablet 2 milliGRAM(s) Oral two times a day  sevelamer carbonate 800 milliGRAM(s) Oral three times a day with meals    MEDICATIONS  (PRN):  acetaminophen   Tablet .. 650 milliGRAM(s) Oral every 6 hours PRN Temp greater or equal to 38C (100.4F), Mild Pain (1 - 3)  hydrALAZINE Injectable 10 milliGRAM(s) IV Push every 4 hours PRN GIVE FOR  or more

## 2021-01-07 NOTE — PROGRESS NOTE BEHAVIORAL HEALTH - NSBHFUPINTERVALHXFT_PSY_A_CORE
Patient is a 35 year old,  male; single; non-caregiver; unemployed; past psychiatric history of Bipolar 1 disorder; no known psychiatric hospitalizations; no known suicide attempts; no known history of violence or arrests; PMH of morbid obesity, HTN, ESRD not on HD with AVF, DM, HLD; presenting with AMS.  Pt seen in dialysis suite, appears to be responding to risperidone. Pt is able to communicate today, when asked why he hasn't been talking patient become guarded and was only able to attribute this to not feeling well. Pt selectively answering questions. Pt not responsive to questioning in regards to  auditory hallucinations.

## 2021-01-07 NOTE — DIETITIAN INITIAL EVALUATION ADULT. - PATIENT PROFILE REVIEWED
Problem: Patient Care Overview  Goal: Plan of Care Review  Outcome: Ongoing (interventions implemented as appropriate)  No significant events over night. Reports +FM; denies ctx, leakage of fluids or vag bleeding. VSS. Plan of care reviewed with patient and family. All questions answered.            yes

## 2021-01-07 NOTE — PROGRESS NOTE ADULT - SUBJECTIVE AND OBJECTIVE BOX
NEPHROLOGY INTERVAL HPI/OVERNIGHT EVENTS:    Examined on HD tolerating ok    MEDICATIONS  (STANDING):  aspirin  chewable 81 milliGRAM(s) Oral daily  atorvastatin 20 milliGRAM(s) Oral at bedtime  carvedilol 12.5 milliGRAM(s) Oral every 12 hours  chlorhexidine 2% Cloths 1 Application(s) Topical <User Schedule>  dextrose 40% Gel 15 Gram(s) Oral once  dextrose 5%. 1000 milliLiter(s) (50 mL/Hr) IV Continuous <Continuous>  dextrose 5%. 1000 milliLiter(s) (100 mL/Hr) IV Continuous <Continuous>  dextrose 50% Injectable 25 Gram(s) IV Push once  dextrose 50% Injectable 12.5 Gram(s) IV Push once  dextrose 50% Injectable 25 Gram(s) IV Push once  epoetin alma-epbx (RETACRIT) Injectable 07879 Unit(s) IV Push <User Schedule>  folic acid 1 milliGRAM(s) Oral daily  glucagon  Injectable 1 milliGRAM(s) IntraMuscular once  heparin   Injectable 5000 Unit(s) SubCutaneous every 8 hours  hydrALAZINE 25 milliGRAM(s) Oral three times a day  influenza   Vaccine 0.5 milliLiter(s) IntraMuscular once  insulin glargine Injectable (LANTUS) 20 Unit(s) SubCutaneous at bedtime  insulin lispro (ADMELOG) corrective regimen sliding scale   SubCutaneous every 6 hours  risperiDONE   Tablet 2 milliGRAM(s) Oral two times a day  sevelamer carbonate 800 milliGRAM(s) Oral three times a day with meals    MEDICATIONS  (PRN):  acetaminophen   Tablet .. 650 milliGRAM(s) Oral every 6 hours PRN Temp greater or equal to 38C (100.4F), Mild Pain (1 - 3)  hydrALAZINE Injectable 10 milliGRAM(s) IV Push every 4 hours PRN GIVE FOR  or more      Allergies    No Known Allergies    Intolerances        Vital Signs Last 24 Hrs  T(C): 37.3 (2021 11:40), Max: 37.5 (2021 08:00)  T(F): 99.2 (2021 11:40), Max: 99.5 (2021 08:00)  HR: 100 (2021 11:40) (79 - 100)  BP: 144/80 (2021 11:40) (131/84 - 164/97)  BP(mean): --  RR: 18 (2021 11:40) (18 - 20)  SpO2: 95% (2021 11:40) (93% - 98%)  Daily     Daily Weight in k.4 (2021 08:00)    PHYSICAL EXAM:  GENERAL: NAD  HEAD: NCAT  EYES: EOMI  NECK: Supple, neck  veins full  NERVOUS SYSTEM:  Awake not answering questions  CHEST/LUNG: Clear to percussion bilaterally  HEART: Regular rate and rhythm  ABDOMEN: Soft, Nontender, Nondistended; Bowel sounds present  EXTREMITIES: trace LE edema, L wrist AVF good thrill    LABS:                        9.4    17.07 )-----------( 326      ( 2021 09:02 )             29.4     -    138  |  99  |  40.0<H>  ----------------------------<  80  3.7   |  28.0  |  5.19<H>    Ca    9.5      2021 09:01                  RADIOLOGY & ADDITIONAL TESTS:

## 2021-01-08 LAB
GLUCOSE BLDC GLUCOMTR-MCNC: 130 MG/DL — HIGH (ref 70–99)
GLUCOSE BLDC GLUCOMTR-MCNC: 132 MG/DL — HIGH (ref 70–99)
GLUCOSE BLDC GLUCOMTR-MCNC: 172 MG/DL — HIGH (ref 70–99)
GLUCOSE BLDC GLUCOMTR-MCNC: 173 MG/DL — HIGH (ref 70–99)
GLUCOSE BLDC GLUCOMTR-MCNC: 173 MG/DL — HIGH (ref 70–99)
GLUCOSE BLDC GLUCOMTR-MCNC: 232 MG/DL — HIGH (ref 70–99)
HCT VFR BLD CALC: 29.5 % — LOW (ref 39–50)
HGB BLD-MCNC: 9.4 G/DL — LOW (ref 13–17)
MCHC RBC-ENTMCNC: 23.9 PG — LOW (ref 27–34)
MCHC RBC-ENTMCNC: 31.9 GM/DL — LOW (ref 32–36)
MCV RBC AUTO: 75.1 FL — LOW (ref 80–100)
PLATELET # BLD AUTO: 270 K/UL — SIGNIFICANT CHANGE UP (ref 150–400)
RBC # BLD: 3.93 M/UL — LOW (ref 4.2–5.8)
RBC # FLD: 16.9 % — HIGH (ref 10.3–14.5)
WBC # BLD: 20.49 K/UL — HIGH (ref 3.8–10.5)
WBC # FLD AUTO: 20.49 K/UL — HIGH (ref 3.8–10.5)

## 2021-01-08 PROCEDURE — 99233 SBSQ HOSP IP/OBS HIGH 50: CPT

## 2021-01-08 PROCEDURE — 99232 SBSQ HOSP IP/OBS MODERATE 35: CPT

## 2021-01-08 RX ADMIN — Medication 25 MILLIGRAM(S): at 16:58

## 2021-01-08 RX ADMIN — Medication 25 MILLIGRAM(S): at 05:09

## 2021-01-08 RX ADMIN — Medication 25 MILLIGRAM(S): at 22:11

## 2021-01-08 RX ADMIN — RISPERIDONE 2 MILLIGRAM(S): 4 TABLET ORAL at 05:08

## 2021-01-08 RX ADMIN — RISPERIDONE 2 MILLIGRAM(S): 4 TABLET ORAL at 16:53

## 2021-01-08 RX ADMIN — CHLORHEXIDINE GLUCONATE 1 APPLICATION(S): 213 SOLUTION TOPICAL at 05:07

## 2021-01-08 RX ADMIN — INSULIN GLARGINE 20 UNIT(S): 100 INJECTION, SOLUTION SUBCUTANEOUS at 22:11

## 2021-01-08 RX ADMIN — HEPARIN SODIUM 5000 UNIT(S): 5000 INJECTION INTRAVENOUS; SUBCUTANEOUS at 22:11

## 2021-01-08 RX ADMIN — HEPARIN SODIUM 5000 UNIT(S): 5000 INJECTION INTRAVENOUS; SUBCUTANEOUS at 05:08

## 2021-01-08 RX ADMIN — Medication 3: at 12:08

## 2021-01-08 RX ADMIN — SEVELAMER CARBONATE 800 MILLIGRAM(S): 2400 POWDER, FOR SUSPENSION ORAL at 16:54

## 2021-01-08 RX ADMIN — TUBERCULIN PURIFIED PROTEIN DERIVATIVE 5 UNIT(S): 5 INJECTION, SOLUTION INTRADERMAL at 18:18

## 2021-01-08 RX ADMIN — SEVELAMER CARBONATE 800 MILLIGRAM(S): 2400 POWDER, FOR SUSPENSION ORAL at 22:12

## 2021-01-08 RX ADMIN — CARVEDILOL PHOSPHATE 12.5 MILLIGRAM(S): 80 CAPSULE, EXTENDED RELEASE ORAL at 16:56

## 2021-01-08 RX ADMIN — Medication 81 MILLIGRAM(S): at 12:08

## 2021-01-08 RX ADMIN — CARVEDILOL PHOSPHATE 12.5 MILLIGRAM(S): 80 CAPSULE, EXTENDED RELEASE ORAL at 05:09

## 2021-01-08 RX ADMIN — SEVELAMER CARBONATE 800 MILLIGRAM(S): 2400 POWDER, FOR SUSPENSION ORAL at 12:07

## 2021-01-08 RX ADMIN — ATORVASTATIN CALCIUM 20 MILLIGRAM(S): 80 TABLET, FILM COATED ORAL at 22:11

## 2021-01-08 RX ADMIN — Medication 1 MILLIGRAM(S): at 12:08

## 2021-01-08 NOTE — PROGRESS NOTE BEHAVIORAL HEALTH - NSBHFUPINTERVALHXFT_PSY_A_CORE
Patient seen this morning walking in his room and he was cooperative through out the interview. The patient states that he has not had breakfast yet but was unable to elicit other coherent responses from the patient. He seems that he has poverty of content of thought along with a thought blocking thought process.

## 2021-01-08 NOTE — PHYSICAL THERAPY INITIAL EVALUATION ADULT - PERTINENT HX OF CURRENT PROBLEM, REHAB EVAL
The patient is a 35 year old male with a past medical history of morbid obesity, hypertension, ESRD not on HD with AVF, diabetes mellitus, bipolar disorder and hyperlipidemia who presented to the ER for altered mental status. In the ER noted to have a SBP >200; CT head was negative. Worsening renal failure with hypokalemia and leukocytosis. COVID was negative.

## 2021-01-08 NOTE — PROGRESS NOTE ADULT - ASSESSMENT
The patient is a 35 year old male with a past medical history of morbid obesity, hypertension, ESRD not on HD with AVF, diabetes mellitus, bipolar disorder and hyperlipidemia who presented to the ER for altered mental status. In the ER noted to have a SBP >200; CT head was negative. Worsening renal failure with hypokalemia and leukocytosis. COVID was negative. Seen by nephrology and started on HD. Patient lethargic with altered mental status on admission. Utox and VIral panel were negative. Blood cultures and urine culture negative. Afebrile. Given empiric zosyn and vanco in the Er.     Assessment/Plan:    1. Acute toxic metabolic encephalopathy: Resolved.    2. Leukocytosis; Afebrile, Off oxygen on room air at rest  Mild elevation of procalcitonin; chest xray was repeated- negative  Repeat blood cultures sent on 1/5 are negative to date  REpeat COVID negative  Chest xray, RVP, Urine culture, Blood culture x 2, COVID all negative  Monitor cbc     3. Worsening CKD stage 4 with metabolic acidosis: HD per nephrology  Hepatitis panel negative  PPD ordered 1/6, to be read today  Nephrology following  SW on consult for outpatient HD placement     4. Hypokalemia; replaced, monitor    5. Hypertensive urgency: Bp stable    6. IDDM: Uncontrolled  COntinue lantus and HSS  Monitor BSL    7. Bipolar disorder: Flat effect appears secondary to underlying mood disorder  Started on PO risperidone by psychiatry, titrate as per Dr. Newman    VTE_ Heparin subcut     Dispo: Home when HD arrangements made, needs Psych reassessment prior to dc

## 2021-01-08 NOTE — PROGRESS NOTE ADULT - SUBJECTIVE AND OBJECTIVE BOX
NEPHROLOGY INTERVAL HPI/OVERNIGHT EVENTS:    No new events.    MEDICATIONS  (STANDING):  aspirin  chewable 81 milliGRAM(s) Oral daily  atorvastatin 20 milliGRAM(s) Oral at bedtime  carvedilol 12.5 milliGRAM(s) Oral every 12 hours  chlorhexidine 2% Cloths 1 Application(s) Topical <User Schedule>  dextrose 40% Gel 15 Gram(s) Oral once  dextrose 5%. 1000 milliLiter(s) (50 mL/Hr) IV Continuous <Continuous>  dextrose 5%. 1000 milliLiter(s) (100 mL/Hr) IV Continuous <Continuous>  dextrose 50% Injectable 25 Gram(s) IV Push once  dextrose 50% Injectable 12.5 Gram(s) IV Push once  dextrose 50% Injectable 25 Gram(s) IV Push once  epoetin alma-epbx (RETACRIT) Injectable 55906 Unit(s) IV Push <User Schedule>  folic acid 1 milliGRAM(s) Oral daily  glucagon  Injectable 1 milliGRAM(s) IntraMuscular once  heparin   Injectable 5000 Unit(s) SubCutaneous every 8 hours  hydrALAZINE 25 milliGRAM(s) Oral three times a day  influenza   Vaccine 0.5 milliLiter(s) IntraMuscular once  insulin glargine Injectable (LANTUS) 20 Unit(s) SubCutaneous at bedtime  insulin lispro (ADMELOG) corrective regimen sliding scale   SubCutaneous every 6 hours  risperiDONE   Tablet 2 milliGRAM(s) Oral two times a day  sevelamer carbonate 800 milliGRAM(s) Oral three times a day with meals    MEDICATIONS  (PRN):  acetaminophen   Tablet .. 650 milliGRAM(s) Oral every 6 hours PRN Temp greater or equal to 38C (100.4F), Mild Pain (1 - 3)  hydrALAZINE Injectable 10 milliGRAM(s) IV Push every 4 hours PRN GIVE FOR  or more      Allergies    No Known Allergies        Vital Signs Last 24 Hrs  T(C): 36.7 (08 Jan 2021 08:14), Max: 36.9 (07 Jan 2021 23:49)  T(F): 98.1 (08 Jan 2021 08:14), Max: 98.5 (07 Jan 2021 23:49)  HR: 69 (08 Jan 2021 08:14) (69 - 92)  BP: 146/83 (08 Jan 2021 08:14) (146/83 - 154/89)  BP(mean): --  RR: 18 (08 Jan 2021 08:14) (18 - 18)  SpO2: 96% (08 Jan 2021 08:14) (96% - 99%)  T(C): 37.3 (07 Jan 2021 11:40), Max: 37.5 (07 Jan 2021 08:00)  T(F): 99.2 (07 Jan 2021 11:40), Max: 99.5 (07 Jan 2021 08:00)  HR: 100 (07 Jan 2021 11:40) (79 - 100)      PHYSICAL EXAM:    GENERAL: comfortable in bed  HEAD: NCAT  EYES: EOMI  NECK: Supple, neck  veins wnl  NERVOUS SYSTEM:  same  CHEST/LUNG: Clear   HEART: Regular rate and rhythm  ABDOMEN: Soft, Nontender, Nondistended; Bowel sounds present  EXTREMITIES: trace LE edema, L wrist AVF good thrill    LABS:                            9.4    17.07 )-----------( 326      ( 07 Jan 2021 09:02 )             29.4     01-07    138  |  99  |  40.0<H>  ----------------------------<  80  3.7   |  28.0  |  5.19<H>    Ca    9.5      07 Jan 2021 09:01                  RADIOLOGY & ADDITIONAL TESTS:

## 2021-01-08 NOTE — PHYSICAL THERAPY INITIAL EVALUATION ADULT - ADDITIONAL COMMENTS
pt states he lives with his parents and brother in a 2-story house with 3 steps to enter(+rail) and 10 steps to second floor(+rail). independent with all mobility without device prior to admit.

## 2021-01-08 NOTE — PROGRESS NOTE ADULT - SUBJECTIVE AND OBJECTIVE BOX
CC: AMS/ESRD    INTERVAL HPI/OVERNIGHT EVENTS: Patient seen and examined. Snoring loudly sitting up in chair. Participated with PT and is independent upon their assessment. Unwilling to answer questions. Appears comfortable.     Vital Signs Last 24 Hrs  T(C): 36.7 (08 Jan 2021 08:14), Max: 36.9 (07 Jan 2021 23:49)  T(F): 98.1 (08 Jan 2021 08:14), Max: 98.5 (07 Jan 2021 23:49)  HR: 69 (08 Jan 2021 08:14) (69 - 92)  BP: 146/83 (08 Jan 2021 08:14) (146/83 - 154/89)  BP(mean): --  RR: 18 (08 Jan 2021 08:14) (18 - 18)  SpO2: 96% (08 Jan 2021 08:14) (96% - 99%)    ROS:   Limited due to patient mood.    PHYSICAL EXAM:    GENERAL: NAD, AOX3  HEAD:  Atraumatic, Normocephalic  EYES: conjunctiva and sclera clear  ENMT: Moist mucous membranes  NECK: Supple, No JVD  CHEST/LUNG: Clear to auscultation bilaterally; No rales, rhonchi, wheezing, or rubs  HEART: Regular rate and rhythm; No murmurs, rubs, or gallops  ABDOMEN: Soft, Nontender, Nondistended; Bowel sounds present  EXTREMITIES:  2+ Peripheral Pulses, No clubbing, cyanosis, or edema    I&O's Detail    07 Jan 2021 07:01  -  08 Jan 2021 07:00  --------------------------------------------------------  IN:  Total IN: 0 mL    OUT:    Other (mL): 1500 mL  Total OUT: 1500 mL    Total NET: -1500 mL                                    9.4    20.49 )-----------( 270      ( 08 Jan 2021 11:58 )             29.5     07 Jan 2021 09:01    138    |  99     |  40.0   ----------------------------<  80     3.7     |  28.0   |  5.19     Ca    9.5        07 Jan 2021 09:01        CAPILLARY BLOOD GLUCOSE      POCT Blood Glucose.: 173 mg/dL (08 Jan 2021 11:42)  POCT Blood Glucose.: 130 mg/dL (08 Jan 2021 05:05)  POCT Blood Glucose.: 232 mg/dL (07 Jan 2021 23:46)  POCT Blood Glucose.: 196 mg/dL (07 Jan 2021 21:35)  POCT Blood Glucose.: 155 mg/dL (07 Jan 2021 16:05)          MEDICATIONS  (STANDING):  aspirin  chewable 81 milliGRAM(s) Oral daily  atorvastatin 20 milliGRAM(s) Oral at bedtime  carvedilol 12.5 milliGRAM(s) Oral every 12 hours  chlorhexidine 2% Cloths 1 Application(s) Topical <User Schedule>  dextrose 40% Gel 15 Gram(s) Oral once  dextrose 5%. 1000 milliLiter(s) (50 mL/Hr) IV Continuous <Continuous>  dextrose 5%. 1000 milliLiter(s) (100 mL/Hr) IV Continuous <Continuous>  dextrose 50% Injectable 25 Gram(s) IV Push once  dextrose 50% Injectable 12.5 Gram(s) IV Push once  dextrose 50% Injectable 25 Gram(s) IV Push once  epoetin alma-epbx (RETACRIT) Injectable 83416 Unit(s) IV Push <User Schedule>  folic acid 1 milliGRAM(s) Oral daily  glucagon  Injectable 1 milliGRAM(s) IntraMuscular once  heparin   Injectable 5000 Unit(s) SubCutaneous every 8 hours  hydrALAZINE 25 milliGRAM(s) Oral three times a day  influenza   Vaccine 0.5 milliLiter(s) IntraMuscular once  insulin glargine Injectable (LANTUS) 20 Unit(s) SubCutaneous at bedtime  insulin lispro (ADMELOG) corrective regimen sliding scale   SubCutaneous every 6 hours  risperiDONE   Tablet 2 milliGRAM(s) Oral two times a day  sevelamer carbonate 800 milliGRAM(s) Oral three times a day with meals    MEDICATIONS  (PRN):  acetaminophen   Tablet .. 650 milliGRAM(s) Oral every 6 hours PRN Temp greater or equal to 38C (100.4F), Mild Pain (1 - 3)  hydrALAZINE Injectable 10 milliGRAM(s) IV Push every 4 hours PRN GIVE FOR  or more      RADIOLOGY & ADDITIONAL TESTS:

## 2021-01-08 NOTE — PROGRESS NOTE BEHAVIORAL HEALTH - SUMMARY
Patient seems to be reacting well to Risperdal 2mg PO BID as his mental state is improving slowly.  Patient is still exhibiting poverty of content of thought and thought blocking content.

## 2021-01-09 LAB
ANION GAP SERPL CALC-SCNC: 13 MMOL/L — SIGNIFICANT CHANGE UP (ref 5–17)
BUN SERPL-MCNC: 49 MG/DL — HIGH (ref 8–20)
CALCIUM SERPL-MCNC: 9 MG/DL — SIGNIFICANT CHANGE UP (ref 8.6–10.2)
CHLORIDE SERPL-SCNC: 95 MMOL/L — LOW (ref 98–107)
CO2 SERPL-SCNC: 26 MMOL/L — SIGNIFICANT CHANGE UP (ref 22–29)
CREAT SERPL-MCNC: 5.83 MG/DL — HIGH (ref 0.5–1.3)
GLUCOSE BLDC GLUCOMTR-MCNC: 129 MG/DL — HIGH (ref 70–99)
GLUCOSE BLDC GLUCOMTR-MCNC: 138 MG/DL — HIGH (ref 70–99)
GLUCOSE BLDC GLUCOMTR-MCNC: 145 MG/DL — HIGH (ref 70–99)
GLUCOSE BLDC GLUCOMTR-MCNC: 253 MG/DL — HIGH (ref 70–99)
GLUCOSE SERPL-MCNC: 141 MG/DL — HIGH (ref 70–99)
HCT VFR BLD CALC: 28.4 % — LOW (ref 39–50)
HGB BLD-MCNC: 9.1 G/DL — LOW (ref 13–17)
MAGNESIUM SERPL-MCNC: 2.1 MG/DL — SIGNIFICANT CHANGE UP (ref 1.6–2.6)
MCHC RBC-ENTMCNC: 23.6 PG — LOW (ref 27–34)
MCHC RBC-ENTMCNC: 32 GM/DL — SIGNIFICANT CHANGE UP (ref 32–36)
MCV RBC AUTO: 73.6 FL — LOW (ref 80–100)
PHOSPHATE SERPL-MCNC: 4.2 MG/DL — SIGNIFICANT CHANGE UP (ref 2.4–4.7)
PLATELET # BLD AUTO: 277 K/UL — SIGNIFICANT CHANGE UP (ref 150–400)
POTASSIUM SERPL-MCNC: 3.2 MMOL/L — LOW (ref 3.5–5.3)
POTASSIUM SERPL-SCNC: 3.2 MMOL/L — LOW (ref 3.5–5.3)
RBC # BLD: 3.86 M/UL — LOW (ref 4.2–5.8)
RBC # FLD: 17.1 % — HIGH (ref 10.3–14.5)
SODIUM SERPL-SCNC: 133 MMOL/L — LOW (ref 135–145)
WBC # BLD: 18.11 K/UL — HIGH (ref 3.8–10.5)
WBC # FLD AUTO: 18.11 K/UL — HIGH (ref 3.8–10.5)

## 2021-01-09 PROCEDURE — 99233 SBSQ HOSP IP/OBS HIGH 50: CPT

## 2021-01-09 RX ORDER — RISPERIDONE 4 MG/1
3 TABLET ORAL
Refills: 0 | Status: DISCONTINUED | OUTPATIENT
Start: 2021-01-09 | End: 2021-01-13

## 2021-01-09 RX ADMIN — ERYTHROPOIETIN 10000 UNIT(S): 10000 INJECTION, SOLUTION INTRAVENOUS; SUBCUTANEOUS at 15:51

## 2021-01-09 RX ADMIN — CARVEDILOL PHOSPHATE 12.5 MILLIGRAM(S): 80 CAPSULE, EXTENDED RELEASE ORAL at 17:36

## 2021-01-09 RX ADMIN — INSULIN GLARGINE 20 UNIT(S): 100 INJECTION, SOLUTION SUBCUTANEOUS at 21:38

## 2021-01-09 RX ADMIN — HEPARIN SODIUM 5000 UNIT(S): 5000 INJECTION INTRAVENOUS; SUBCUTANEOUS at 21:38

## 2021-01-09 RX ADMIN — Medication 25 MILLIGRAM(S): at 21:38

## 2021-01-09 RX ADMIN — Medication 25 MILLIGRAM(S): at 17:21

## 2021-01-09 RX ADMIN — RISPERIDONE 3 MILLIGRAM(S): 4 TABLET ORAL at 17:36

## 2021-01-09 RX ADMIN — RISPERIDONE 2 MILLIGRAM(S): 4 TABLET ORAL at 05:28

## 2021-01-09 RX ADMIN — SEVELAMER CARBONATE 800 MILLIGRAM(S): 2400 POWDER, FOR SUSPENSION ORAL at 10:52

## 2021-01-09 RX ADMIN — Medication 1 MILLIGRAM(S): at 10:53

## 2021-01-09 RX ADMIN — CHLORHEXIDINE GLUCONATE 1 APPLICATION(S): 213 SOLUTION TOPICAL at 05:27

## 2021-01-09 RX ADMIN — ATORVASTATIN CALCIUM 20 MILLIGRAM(S): 80 TABLET, FILM COATED ORAL at 21:38

## 2021-01-09 RX ADMIN — HEPARIN SODIUM 5000 UNIT(S): 5000 INJECTION INTRAVENOUS; SUBCUTANEOUS at 05:28

## 2021-01-09 RX ADMIN — Medication 9: at 21:38

## 2021-01-09 RX ADMIN — SEVELAMER CARBONATE 800 MILLIGRAM(S): 2400 POWDER, FOR SUSPENSION ORAL at 17:36

## 2021-01-09 RX ADMIN — Medication 81 MILLIGRAM(S): at 10:52

## 2021-01-09 NOTE — PROGRESS NOTE ADULT - SUBJECTIVE AND OBJECTIVE BOX
NEPHROLOGY INTERVAL HPI/OVERNIGHT EVENTS:    Examined  No distress  Sitting in chair    MEDICATIONS  (STANDING):  aspirin  chewable 81 milliGRAM(s) Oral daily  atorvastatin 20 milliGRAM(s) Oral at bedtime  carvedilol 12.5 milliGRAM(s) Oral every 12 hours  chlorhexidine 2% Cloths 1 Application(s) Topical <User Schedule>  dextrose 40% Gel 15 Gram(s) Oral once  dextrose 5%. 1000 milliLiter(s) (50 mL/Hr) IV Continuous <Continuous>  dextrose 5%. 1000 milliLiter(s) (100 mL/Hr) IV Continuous <Continuous>  dextrose 50% Injectable 25 Gram(s) IV Push once  dextrose 50% Injectable 12.5 Gram(s) IV Push once  dextrose 50% Injectable 25 Gram(s) IV Push once  epoetin lama-epbx (RETACRIT) Injectable 06257 Unit(s) IV Push <User Schedule>  folic acid 1 milliGRAM(s) Oral daily  glucagon  Injectable 1 milliGRAM(s) IntraMuscular once  heparin   Injectable 5000 Unit(s) SubCutaneous every 8 hours  hydrALAZINE 25 milliGRAM(s) Oral three times a day  influenza   Vaccine 0.5 milliLiter(s) IntraMuscular once  insulin glargine Injectable (LANTUS) 20 Unit(s) SubCutaneous at bedtime  insulin lispro (ADMELOG) corrective regimen sliding scale   SubCutaneous every 6 hours  risperiDONE   Tablet 2 milliGRAM(s) Oral two times a day  sevelamer carbonate 800 milliGRAM(s) Oral three times a day with meals    MEDICATIONS  (PRN):  acetaminophen   Tablet .. 650 milliGRAM(s) Oral every 6 hours PRN Temp greater or equal to 38C (100.4F), Mild Pain (1 - 3)  hydrALAZINE Injectable 10 milliGRAM(s) IV Push every 4 hours PRN GIVE FOR  or more      Allergies    No Known Allergies    Intolerances        Vital Signs Last 24 Hrs  T(C): 36.6 (09 Jan 2021 05:30), Max: 36.6 (08 Jan 2021 19:39)  T(F): 97.8 (09 Jan 2021 05:30), Max: 97.8 (08 Jan 2021 19:39)  HR: 72 (09 Jan 2021 05:30) (64 - 89)  BP: 148/87 (09 Jan 2021 05:30) (133/92 - 150/98)  BP(mean): --  RR: 19 (09 Jan 2021 05:30) (16 - 20)  SpO2: 97% (09 Jan 2021 05:30) (96% - 97%)  Daily     Daily     PHYSICAL EXAM:  GENERAL: NAD  HEAD: NCAT  EYES: EOMI  NECK: Supple, neck  veins full  NERVOUS SYSTEM:  Awake not answering questions  CHEST/LUNG: Clear to percussion bilaterally  HEART: Regular rate and rhythm  ABDOMEN: Soft, Nontender, Nondistended; Bowel sounds present  EXTREMITIES: trace LE edema, L wrist AVF good thrill    LABS:                        9.4    20.49 )-----------( 270      ( 08 Jan 2021 11:58 )             29.5     01-07    138  |  99  |  40.0<H>  ----------------------------<  80  3.7   |  28.0  |  5.19<H>    Ca    9.5      07 Jan 2021 09:01                  RADIOLOGY & ADDITIONAL TESTS:

## 2021-01-09 NOTE — PROGRESS NOTE ADULT - ASSESSMENT
ESRD - HD today will cont on TTS schedule  Suppose to be starting HD at [facility] 3 rd shift MWF  Hep B and C negative     HTN - BP acceptable  Watch on meds     Anemia - Hgb stable   Cont folate and Epogen   Iron stores noted cont Venofer     RO - cont Renvela w/ meals     BiPolar d/o - Meds noted   Psych follow up noted    Will follow

## 2021-01-09 NOTE — PROGRESS NOTE ADULT - SUBJECTIVE AND OBJECTIVE BOX
CHIEF COMPLAINT/INTERVAL HISTORY:    Patient is a 35y old  Male who presents with a chief complaint of AMS (08 Jan 2021 14:38)    SUBJECTIVE & OBJECTIVE: Pt seen and examined at bedside. No overnight events. Patient unwilling to answer any questions. Not in acute distress.    ROS: Unobtainable; patient unwilling to answer questions    ICU Vital Signs Last 24 Hrs  T(C): 36.6 (09 Jan 2021 15:28), Max: 36.7 (09 Jan 2021 14:00)  T(F): 97.8 (09 Jan 2021 15:28), Max: 98.1 (09 Jan 2021 14:00)  HR: 54 (09 Jan 2021 15:28) (54 - 83)  BP: 146/88 (09 Jan 2021 15:28) (133/92 - 186/117)  RR: 18 (09 Jan 2021 15:28) (16 - 20)  SpO2: 95% (09 Jan 2021 15:28) (92% - 97%)    MEDICATIONS  (STANDING):  aspirin  chewable 81 milliGRAM(s) Oral daily  atorvastatin 20 milliGRAM(s) Oral at bedtime  carvedilol 12.5 milliGRAM(s) Oral every 12 hours  chlorhexidine 2% Cloths 1 Application(s) Topical <User Schedule>  dextrose 40% Gel 15 Gram(s) Oral once  dextrose 5%. 1000 milliLiter(s) (50 mL/Hr) IV Continuous <Continuous>  dextrose 5%. 1000 milliLiter(s) (100 mL/Hr) IV Continuous <Continuous>  dextrose 50% Injectable 25 Gram(s) IV Push once  dextrose 50% Injectable 12.5 Gram(s) IV Push once  dextrose 50% Injectable 25 Gram(s) IV Push once  epoetin alma-epbx (RETACRIT) Injectable 20319 Unit(s) IV Push <User Schedule>  folic acid 1 milliGRAM(s) Oral daily  glucagon  Injectable 1 milliGRAM(s) IntraMuscular once  heparin   Injectable 5000 Unit(s) SubCutaneous every 8 hours  hydrALAZINE 25 milliGRAM(s) Oral three times a day  influenza   Vaccine 0.5 milliLiter(s) IntraMuscular once  insulin glargine Injectable (LANTUS) 20 Unit(s) SubCutaneous at bedtime  insulin lispro (ADMELOG) corrective regimen sliding scale   SubCutaneous every 6 hours  risperiDONE   Tablet 3 milliGRAM(s) Oral two times a day  sevelamer carbonate 800 milliGRAM(s) Oral three times a day with meals    MEDICATIONS  (PRN):  acetaminophen   Tablet .. 650 milliGRAM(s) Oral every 6 hours PRN Temp greater or equal to 38C (100.4F), Mild Pain (1 - 3)  hydrALAZINE Injectable 10 milliGRAM(s) IV Push every 4 hours PRN GIVE FOR  or more      LABS:                        9.1    18.11 )-----------( 277      ( 09 Jan 2021 10:34 )             28.4     01-09    133<L>  |  95<L>  |  49.0<H>  ----------------------------<  141<H>  3.2<L>   |  26.0  |  5.83<H>    Ca    9.0      09 Jan 2021 10:34  Phos  4.2     01-09  Mg     2.1     01-09      CAPILLARY BLOOD GLUCOSE      POCT Blood Glucose.: 129 mg/dL (09 Jan 2021 16:58)  POCT Blood Glucose.: 138 mg/dL (09 Jan 2021 10:51)  POCT Blood Glucose.: 145 mg/dL (09 Jan 2021 05:26)  POCT Blood Glucose.: 172 mg/dL (08 Jan 2021 23:52)  POCT Blood Glucose.: 173 mg/dL (08 Jan 2021 22:09)      PHYSICAL EXAM:    GENERAL: morbidly obese male, laying in bed, flat affect  HEAD:  Atraumatic, Normocephalic  EYES: EOMI, PERRLA, conjunctiva and sclera clear  ENMT: Moist mucous membranes  NECK: Supple   CHEST/LUNG: Clear to auscultation bilaterally; No rales, rhonchi, wheezing, or rubs  HEART: Regular rate and rhythm; + S1/S2  ABDOMEN: Soft, Nontender, obese; Bowel sounds present  EXTREMITIES:  no pedal edema

## 2021-01-09 NOTE — PROGRESS NOTE ADULT - NUTRITIONAL ASSESSMENT
The patient is a 35 year old male with a past medical history of morbid obesity, hypertension, ESRD not on HD with AVF, diabetes mellitus, bipolar disorder and hyperlipidemia who presented to the ER for altered mental status. In the ER noted to have a SBP >200; CT head was negative. Worsening renal failure with hypokalemia and leukocytosis. COVID was negative. Seen by nephrology and started on HD. Patient lethargic with altered mental status on admission. Utox and VIral panel were negative. Blood cultures and urine culture negative. Afebrile. Given empiric zosyn and vanco in the Er.     1. Acute Metabolic Encephalopathy   -resolved  -likely due to uremia    2. Leukocytosis  -unclear etiology  -WBC trending down; remains afebrile  -Mild elevation of procalcitonin, repeat  -Repeat chest xray - negative  -Repeat blood cultures sent on 1/5 are negative to date  -Repeat COVID negative  -RVP negative  -UCx negative  -panculture if febrile  -repeat procal and CBC in AM  -observe off abx     3. CKD stage 5D most likely secondary to DM/HTN  HD today; UF goal per renal  Hepatitis panel negative  PPD negative  Strict I/Os, daily weights  MBD- check phos with next lab draw, continue sevelamer as ordered  Avoid nephrotoxic agents and renally dose medications   Nephrology recommendations appreciated  SW on consult for outpatient HD placement     4. Hypokalemia  -adjust potassium bath at HD  -repeat labs in AM    5. HTN  -BP stable  -continue coreg and hydralazine  -PRN hydralazine as needed  -low sodium diet    6. Uncontrolled IDDM   Continue lantus and HSS  Monitor BSL closely  ADA diet    7. Bipolar disorder  Flat affect today  Started on PO risperidone by psychiatry, titrate as per Dr. Newman  f/u further psych recommendations    8. Anemia of chronic kidney disease  -check iron studies  -continue EPO - goal Hb 10-11  -continue folic acid  -monitor H/H closely  -no overt bleeding    DVT ppx - Heparin SC    Dispo: Home when HD arrangements made, needs Psych reassessment prior to dc

## 2021-01-10 LAB
ANION GAP SERPL CALC-SCNC: 13 MMOL/L — SIGNIFICANT CHANGE UP (ref 5–17)
BASOPHILS # BLD AUTO: 0.09 K/UL — SIGNIFICANT CHANGE UP (ref 0–0.2)
BASOPHILS NFR BLD AUTO: 0.5 % — SIGNIFICANT CHANGE UP (ref 0–2)
BUN SERPL-MCNC: 33 MG/DL — HIGH (ref 8–20)
CALCIUM SERPL-MCNC: 9 MG/DL — SIGNIFICANT CHANGE UP (ref 8.6–10.2)
CHLORIDE SERPL-SCNC: 93 MMOL/L — LOW (ref 98–107)
CO2 SERPL-SCNC: 26 MMOL/L — SIGNIFICANT CHANGE UP (ref 22–29)
CREAT SERPL-MCNC: 4.65 MG/DL — HIGH (ref 0.5–1.3)
CULTURE RESULTS: SIGNIFICANT CHANGE UP
CULTURE RESULTS: SIGNIFICANT CHANGE UP
EOSINOPHIL # BLD AUTO: 0.87 K/UL — HIGH (ref 0–0.5)
EOSINOPHIL NFR BLD AUTO: 4.9 % — SIGNIFICANT CHANGE UP (ref 0–6)
FERRITIN SERPL-MCNC: 388 NG/ML — SIGNIFICANT CHANGE UP (ref 30–400)
GLUCOSE BLDC GLUCOMTR-MCNC: 132 MG/DL — HIGH (ref 70–99)
GLUCOSE BLDC GLUCOMTR-MCNC: 176 MG/DL — HIGH (ref 70–99)
GLUCOSE BLDC GLUCOMTR-MCNC: 181 MG/DL — HIGH (ref 70–99)
GLUCOSE BLDC GLUCOMTR-MCNC: 191 MG/DL — HIGH (ref 70–99)
GLUCOSE SERPL-MCNC: 146 MG/DL — HIGH (ref 70–99)
HCT VFR BLD CALC: 29.7 % — LOW (ref 39–50)
HGB BLD-MCNC: 9.3 G/DL — LOW (ref 13–17)
IMM GRANULOCYTES NFR BLD AUTO: 0.7 % — SIGNIFICANT CHANGE UP (ref 0–1.5)
IRON SATN MFR SERPL: 21 UG/DL — LOW (ref 59–158)
IRON SATN MFR SERPL: 7 % — LOW (ref 16–55)
LYMPHOCYTES # BLD AUTO: 22.8 % — SIGNIFICANT CHANGE UP (ref 13–44)
LYMPHOCYTES # BLD AUTO: 4.07 K/UL — HIGH (ref 1–3.3)
MAGNESIUM SERPL-MCNC: 2.1 MG/DL — SIGNIFICANT CHANGE UP (ref 1.6–2.6)
MCHC RBC-ENTMCNC: 23.5 PG — LOW (ref 27–34)
MCHC RBC-ENTMCNC: 31.3 GM/DL — LOW (ref 32–36)
MCV RBC AUTO: 75 FL — LOW (ref 80–100)
MONOCYTES # BLD AUTO: 0.98 K/UL — HIGH (ref 0–0.9)
MONOCYTES NFR BLD AUTO: 5.5 % — SIGNIFICANT CHANGE UP (ref 2–14)
NEUTROPHILS # BLD AUTO: 11.71 K/UL — HIGH (ref 1.8–7.4)
NEUTROPHILS NFR BLD AUTO: 65.6 % — SIGNIFICANT CHANGE UP (ref 43–77)
PHOSPHATE SERPL-MCNC: 3.4 MG/DL — SIGNIFICANT CHANGE UP (ref 2.4–4.7)
PLATELET # BLD AUTO: 282 K/UL — SIGNIFICANT CHANGE UP (ref 150–400)
POTASSIUM SERPL-MCNC: 3.5 MMOL/L — SIGNIFICANT CHANGE UP (ref 3.5–5.3)
POTASSIUM SERPL-SCNC: 3.5 MMOL/L — SIGNIFICANT CHANGE UP (ref 3.5–5.3)
PROCALCITONIN SERPL-MCNC: 0.71 NG/ML — HIGH (ref 0.02–0.1)
PROCALCITONIN SERPL-MCNC: 0.77 NG/ML — HIGH (ref 0.02–0.1)
RBC # BLD: 3.96 M/UL — LOW (ref 4.2–5.8)
RBC # FLD: 17.2 % — HIGH (ref 10.3–14.5)
SODIUM SERPL-SCNC: 132 MMOL/L — LOW (ref 135–145)
SPECIMEN SOURCE: SIGNIFICANT CHANGE UP
SPECIMEN SOURCE: SIGNIFICANT CHANGE UP
TIBC SERPL-MCNC: 290 UG/DL — SIGNIFICANT CHANGE UP (ref 220–430)
TRANSFERRIN SERPL-MCNC: 203 MG/DL — SIGNIFICANT CHANGE UP (ref 180–329)
WBC # BLD: 17.85 K/UL — HIGH (ref 3.8–10.5)
WBC # FLD AUTO: 17.85 K/UL — HIGH (ref 3.8–10.5)

## 2021-01-10 PROCEDURE — 99232 SBSQ HOSP IP/OBS MODERATE 35: CPT

## 2021-01-10 RX ADMIN — SEVELAMER CARBONATE 800 MILLIGRAM(S): 2400 POWDER, FOR SUSPENSION ORAL at 10:53

## 2021-01-10 RX ADMIN — HEPARIN SODIUM 5000 UNIT(S): 5000 INJECTION INTRAVENOUS; SUBCUTANEOUS at 13:04

## 2021-01-10 RX ADMIN — RISPERIDONE 3 MILLIGRAM(S): 4 TABLET ORAL at 16:19

## 2021-01-10 RX ADMIN — CARVEDILOL PHOSPHATE 12.5 MILLIGRAM(S): 80 CAPSULE, EXTENDED RELEASE ORAL at 16:19

## 2021-01-10 RX ADMIN — HEPARIN SODIUM 5000 UNIT(S): 5000 INJECTION INTRAVENOUS; SUBCUTANEOUS at 21:48

## 2021-01-10 RX ADMIN — Medication 81 MILLIGRAM(S): at 08:27

## 2021-01-10 RX ADMIN — SEVELAMER CARBONATE 800 MILLIGRAM(S): 2400 POWDER, FOR SUSPENSION ORAL at 08:27

## 2021-01-10 RX ADMIN — CARVEDILOL PHOSPHATE 12.5 MILLIGRAM(S): 80 CAPSULE, EXTENDED RELEASE ORAL at 05:25

## 2021-01-10 RX ADMIN — Medication 1 MILLIGRAM(S): at 08:27

## 2021-01-10 RX ADMIN — Medication 3: at 23:41

## 2021-01-10 RX ADMIN — Medication 3: at 16:19

## 2021-01-10 RX ADMIN — CHLORHEXIDINE GLUCONATE 1 APPLICATION(S): 213 SOLUTION TOPICAL at 05:28

## 2021-01-10 RX ADMIN — INSULIN GLARGINE 20 UNIT(S): 100 INJECTION, SOLUTION SUBCUTANEOUS at 21:48

## 2021-01-10 RX ADMIN — Medication 25 MILLIGRAM(S): at 13:04

## 2021-01-10 RX ADMIN — ATORVASTATIN CALCIUM 20 MILLIGRAM(S): 80 TABLET, FILM COATED ORAL at 21:48

## 2021-01-10 RX ADMIN — SEVELAMER CARBONATE 800 MILLIGRAM(S): 2400 POWDER, FOR SUSPENSION ORAL at 16:19

## 2021-01-10 RX ADMIN — Medication 3: at 06:55

## 2021-01-10 RX ADMIN — Medication 25 MILLIGRAM(S): at 05:28

## 2021-01-10 RX ADMIN — HEPARIN SODIUM 5000 UNIT(S): 5000 INJECTION INTRAVENOUS; SUBCUTANEOUS at 05:25

## 2021-01-10 RX ADMIN — Medication 25 MILLIGRAM(S): at 21:48

## 2021-01-10 RX ADMIN — RISPERIDONE 3 MILLIGRAM(S): 4 TABLET ORAL at 05:25

## 2021-01-10 NOTE — PROGRESS NOTE ADULT - ASSESSMENT
The patient is a 35 year old male with a past medical history of morbid obesity, hypertension, ESRD not on HD with AVF, diabetes mellitus, bipolar disorder and hyperlipidemia who presented to the ER for altered mental status. In the ER noted to have a SBP >200; CT head was negative. Worsening renal failure with hypokalemia and leukocytosis. COVID was negative. Seen by nephrology and started on HD. Patient lethargic with altered mental status on admission. Utox and VIral panel were negative. Blood cultures and urine culture negative. Afebrile. Given empiric zosyn and vanco in the Er.     1. Acute Metabolic Encephalopathy   -resolved  -likely due to uremia    2. Leukocytosis   -unclear etiology but not infectious; chronic since April 2018  -remains afebrile  -Mild elevation of procalcitonin which is not accurate in the setting of ESRD  -Repeat chest xray - negative  -Repeat blood cultures sent on 1/5 are negative to date  -Repeat COVID negative  -RVP negative  -UCx negative  -AVF without signs of infection  -panculture if febrile  -observe off abx     3. CKD stage 5D most likely secondary to DM/HTN  Next HD tentatively scheduled for 1/12  UF goal per renal  Hepatitis panel negative  PPD negative  Strict I/Os, daily weights  MBD- continue sevelamer as ordered; phos WNL  Avoid nephrotoxic agents and renally dose medications   Nephrology recommendations appreciated  SW on consult for outpatient HD placement     4. Hypokalemia  -adjust potassium bath at HD  -repeat labs in AM    5. HTN  -BP stable  -continue coreg and hydralazine  -PRN hydralazine as needed  -low sodium diet    6. Uncontrolled IDDM   Continue lantus and HSS  Monitor BSL closely  ADA diet    7. Bipolar disorder  Flat affect today  Continue risperidone per psychiatry, titrate as per Dr. Newman  f/u further psych recommendations; follow up requested    8. Anemia of chronic kidney disease  -iron studies reviewed; venofer per renal  -continue EPO - goal Hb 10-11  -continue folic acid  -monitor H/H closely  -no overt bleeding    DVT ppx - Heparin SC    Dispo: Home when HD arrangements made, needs Psych reassessment prior to dc which was requested.    Attending Attestation:   Plan discussed with patient, RN, SW

## 2021-01-10 NOTE — PROGRESS NOTE ADULT - SUBJECTIVE AND OBJECTIVE BOX
CHIEF COMPLAINT/INTERVAL HISTORY:    Patient is a 35y old  Male who presents with a chief complaint of renal failure (09 Jan 2021 18:27)    SUBJECTIVE & OBJECTIVE: Pt seen and examined at bedside. No overnight events. Flat affect, not conversant.     ROS: Unobtainable    ICU Vital Signs Last 24 Hrs  T(C): 36.9 (10 [place] 2021 16:31), Max: 37.2 (10 [place] 2021 00:21)  T(F): 98.4 (10 [place]rell 2021 16:31), Max: 98.9 (10 [place]rell 2021 00:21)  HR: 80 (10 Kentrell 2021 16:31) (79 - 89)  BP: 130/95 (10 Kentrell 2021 16:31) (130/95 - 168/103)  RR: 18 (10 Kentrell 2021 16:31) (15 - 18)  SpO2: 98% (10 Kentrell 2021 16:31) (93% - 98%)    MEDICATIONS  (STANDING):  aspirin  chewable 81 milliGRAM(s) Oral daily  atorvastatin 20 milliGRAM(s) Oral at bedtime  carvedilol 12.5 milliGRAM(s) Oral every 12 hours  chlorhexidine 2% Cloths 1 Application(s) Topical <User Schedule>  dextrose 40% Gel 15 Gram(s) Oral once  dextrose 5%. 1000 milliLiter(s) (50 mL/Hr) IV Continuous <Continuous>  dextrose 5%. 1000 milliLiter(s) (100 mL/Hr) IV Continuous <Continuous>  dextrose 50% Injectable 25 Gram(s) IV Push once  dextrose 50% Injectable 12.5 Gram(s) IV Push once  dextrose 50% Injectable 25 Gram(s) IV Push once  epoetin alma-epbx (RETACRIT) Injectable 69660 Unit(s) IV Push <User Schedule>  folic acid 1 milliGRAM(s) Oral daily  glucagon  Injectable 1 milliGRAM(s) IntraMuscular once  heparin   Injectable 5000 Unit(s) SubCutaneous every 8 hours  hydrALAZINE 25 milliGRAM(s) Oral three times a day  influenza   Vaccine 0.5 milliLiter(s) IntraMuscular once  insulin glargine Injectable (LANTUS) 20 Unit(s) SubCutaneous at bedtime  insulin lispro (ADMELOG) corrective regimen sliding scale   SubCutaneous every 6 hours  risperiDONE   Tablet 3 milliGRAM(s) Oral two times a day  sevelamer carbonate 800 milliGRAM(s) Oral three times a day with meals    MEDICATIONS  (PRN):  acetaminophen   Tablet .. 650 milliGRAM(s) Oral every 6 hours PRN Temp greater or equal to 38C (100.4F), Mild Pain (1 - 3)  hydrALAZINE Injectable 10 milliGRAM(s) IV Push every 4 hours PRN GIVE FOR  or more      LABS:                        9.3    17.85 )-----------( 282      ( 10 [place]rell 2021 09:23 )             29.7     01-10    132<L>  |  93<L>  |  33.0<H>  ----------------------------<  146<H>  3.5   |  26.0  |  4.65<H>    Ca    9.0      10 [place]rell 2021 09:23  Phos  3.4     01-10  Mg     2.1     01-10      CAPILLARY BLOOD GLUCOSE      POCT Blood Glucose.: 191 mg/dL (10 Kentrell 2021 16:19)  POCT Blood Glucose.: 132 mg/dL (10 Kentrell 2021 10:52)  POCT Blood Glucose.: 176 mg/dL (10 [place]rell 2021 06:53)  POCT Blood Glucose.: 253 mg/dL (09 Jan 2021 21:37)  POCT Blood Glucose.: 129 mg/dL (09 Jan 2021 16:58)

## 2021-01-10 NOTE — PROGRESS NOTE ADULT - ASSESSMENT
ESRD - HD  will cont on TTS schedule  Suppose to be starting HD at [facility] 3 rd shift MWF  Hep B and C negative   HypoKalemia yest HD was on 4 K bath - will repeat BMP today    HTN - BP acceptable  Watch on meds     Anemia - Hgb stable   Cont Retacrit w HD  Cont folate and Epogen   Iron stores noted cont Venofer     RO - cont Renvela w/ meals     BiPolar d/o - Meds noted   Psych follow up noted    Clear for d/c from renal perspective  Will follow

## 2021-01-10 NOTE — PROGRESS NOTE ADULT - SUBJECTIVE AND OBJECTIVE BOX
NEPHROLOGY INTERVAL HPI/OVERNIGHT EVENTS:    Examined  Lethargic  Slow to respond     MEDICATIONS  (STANDING):  aspirin  chewable 81 milliGRAM(s) Oral daily  atorvastatin 20 milliGRAM(s) Oral at bedtime  carvedilol 12.5 milliGRAM(s) Oral every 12 hours  chlorhexidine 2% Cloths 1 Application(s) Topical <User Schedule>  dextrose 40% Gel 15 Gram(s) Oral once  dextrose 5%. 1000 milliLiter(s) (50 mL/Hr) IV Continuous <Continuous>  dextrose 5%. 1000 milliLiter(s) (100 mL/Hr) IV Continuous <Continuous>  dextrose 50% Injectable 25 Gram(s) IV Push once  dextrose 50% Injectable 12.5 Gram(s) IV Push once  dextrose 50% Injectable 25 Gram(s) IV Push once  epoetin alma-epbx (RETACRIT) Injectable 46340 Unit(s) IV Push <User Schedule>  folic acid 1 milliGRAM(s) Oral daily  glucagon  Injectable 1 milliGRAM(s) IntraMuscular once  heparin   Injectable 5000 Unit(s) SubCutaneous every 8 hours  hydrALAZINE 25 milliGRAM(s) Oral three times a day  influenza   Vaccine 0.5 milliLiter(s) IntraMuscular once  insulin glargine Injectable (LANTUS) 20 Unit(s) SubCutaneous at bedtime  insulin lispro (ADMELOG) corrective regimen sliding scale   SubCutaneous every 6 hours  risperiDONE   Tablet 3 milliGRAM(s) Oral two times a day  sevelamer carbonate 800 milliGRAM(s) Oral three times a day with meals    MEDICATIONS  (PRN):  acetaminophen   Tablet .. 650 milliGRAM(s) Oral every 6 hours PRN Temp greater or equal to 38C (100.4F), Mild Pain (1 - 3)  hydrALAZINE Injectable 10 milliGRAM(s) IV Push every 4 hours PRN GIVE FOR  or more      Allergies    No Known Allergies    Intolerances        Vital Signs Last 24 Hrs  T(C): 36.7 (10 [place] 2021 08:10), Max: 37.2 (10 Kentrell 2021 00:21)  T(F): 98.1 (10 [place]rell 2021 08:10), Max: 98.9 (10 Kentrell 2021 00:21)  HR: 79 (10 [place]rell 2021 08:10) (54 - 89)  BP: 131/93 (10 [place]rell 2021 08:10) (131/93 - 186/117)  BP(mean): --  RR: 17 (10 [place]rell 2021 08:10) (15 - 18)  SpO2: 96% (10 Kentrell 2021 08:10) (93% - 97%)  Daily     Daily Weight in k (2021 17:20)    PHYSICAL EXAM:  GENERAL: NAD  HEAD: NCAT  EYES: EOMI  NECK: Supple, neck  veins full  NERVOUS SYSTEM:  Awake not answering questions  CHEST/LUNG: Clear to percussion bilaterally  HEART: Regular rate and rhythm  ABDOMEN: Soft, Nontender, Nondistended; Bowel sounds present  EXTREMITIES: trace LE edema, L wrist AVF good thrill    LABS:                        9.3    17.85 )-----------( 282      ( 10 [place]rell 2021 09:23 )             29.7         133<L>  |  95<L>  |  49.0<H>  ----------------------------<  141<H>  3.2<L>   |  26.0  |  5.83<H>    Ca    9.0      2021 10:34  Phos  4.2       Mg     2.1               Magnesium, Serum: 2.1 mg/dL ( @ 10:34)  Phosphorus Level, Serum: 4.2 mg/dL ( @ 10:34)          RADIOLOGY & ADDITIONAL TESTS:

## 2021-01-11 ENCOUNTER — APPOINTMENT (OUTPATIENT)
Dept: CARDIOLOGY | Facility: CLINIC | Age: 36
End: 2021-01-11

## 2021-01-11 LAB
BASOPHILS # BLD AUTO: 0.09 K/UL — SIGNIFICANT CHANGE UP (ref 0–0.2)
BASOPHILS NFR BLD AUTO: 0.5 % — SIGNIFICANT CHANGE UP (ref 0–2)
EOSINOPHIL # BLD AUTO: 0.95 K/UL — HIGH (ref 0–0.5)
EOSINOPHIL NFR BLD AUTO: 5.1 % — SIGNIFICANT CHANGE UP (ref 0–6)
GLUCOSE BLDC GLUCOMTR-MCNC: 118 MG/DL — HIGH (ref 70–99)
GLUCOSE BLDC GLUCOMTR-MCNC: 149 MG/DL — HIGH (ref 70–99)
GLUCOSE BLDC GLUCOMTR-MCNC: 194 MG/DL — HIGH (ref 70–99)
HCT VFR BLD CALC: 30.1 % — LOW (ref 39–50)
HGB BLD-MCNC: 9.2 G/DL — LOW (ref 13–17)
IMM GRANULOCYTES NFR BLD AUTO: 0.7 % — SIGNIFICANT CHANGE UP (ref 0–1.5)
LYMPHOCYTES # BLD AUTO: 20.6 % — SIGNIFICANT CHANGE UP (ref 13–44)
LYMPHOCYTES # BLD AUTO: 3.84 K/UL — HIGH (ref 1–3.3)
MCHC RBC-ENTMCNC: 23.2 PG — LOW (ref 27–34)
MCHC RBC-ENTMCNC: 30.6 GM/DL — LOW (ref 32–36)
MCV RBC AUTO: 75.8 FL — LOW (ref 80–100)
MONOCYTES # BLD AUTO: 1.23 K/UL — HIGH (ref 0–0.9)
MONOCYTES NFR BLD AUTO: 6.6 % — SIGNIFICANT CHANGE UP (ref 2–14)
NEUTROPHILS # BLD AUTO: 12.42 K/UL — HIGH (ref 1.8–7.4)
NEUTROPHILS NFR BLD AUTO: 66.5 % — SIGNIFICANT CHANGE UP (ref 43–77)
PLATELET # BLD AUTO: 320 K/UL — SIGNIFICANT CHANGE UP (ref 150–400)
RBC # BLD: 3.97 M/UL — LOW (ref 4.2–5.8)
RBC # FLD: 17.2 % — HIGH (ref 10.3–14.5)
WBC # BLD: 18.67 K/UL — HIGH (ref 3.8–10.5)
WBC # FLD AUTO: 18.67 K/UL — HIGH (ref 3.8–10.5)

## 2021-01-11 PROCEDURE — 99232 SBSQ HOSP IP/OBS MODERATE 35: CPT

## 2021-01-11 PROCEDURE — 99233 SBSQ HOSP IP/OBS HIGH 50: CPT

## 2021-01-11 RX ADMIN — Medication 25 MILLIGRAM(S): at 21:06

## 2021-01-11 RX ADMIN — SEVELAMER CARBONATE 800 MILLIGRAM(S): 2400 POWDER, FOR SUSPENSION ORAL at 17:39

## 2021-01-11 RX ADMIN — CARVEDILOL PHOSPHATE 12.5 MILLIGRAM(S): 80 CAPSULE, EXTENDED RELEASE ORAL at 06:23

## 2021-01-11 RX ADMIN — HEPARIN SODIUM 5000 UNIT(S): 5000 INJECTION INTRAVENOUS; SUBCUTANEOUS at 06:23

## 2021-01-11 RX ADMIN — HEPARIN SODIUM 5000 UNIT(S): 5000 INJECTION INTRAVENOUS; SUBCUTANEOUS at 21:06

## 2021-01-11 RX ADMIN — CHLORHEXIDINE GLUCONATE 1 APPLICATION(S): 213 SOLUTION TOPICAL at 08:24

## 2021-01-11 RX ADMIN — CARVEDILOL PHOSPHATE 12.5 MILLIGRAM(S): 80 CAPSULE, EXTENDED RELEASE ORAL at 17:39

## 2021-01-11 RX ADMIN — Medication 81 MILLIGRAM(S): at 12:08

## 2021-01-11 RX ADMIN — Medication 25 MILLIGRAM(S): at 12:08

## 2021-01-11 RX ADMIN — Medication 1 MILLIGRAM(S): at 12:08

## 2021-01-11 RX ADMIN — ATORVASTATIN CALCIUM 20 MILLIGRAM(S): 80 TABLET, FILM COATED ORAL at 21:06

## 2021-01-11 RX ADMIN — SEVELAMER CARBONATE 800 MILLIGRAM(S): 2400 POWDER, FOR SUSPENSION ORAL at 08:23

## 2021-01-11 RX ADMIN — HEPARIN SODIUM 5000 UNIT(S): 5000 INJECTION INTRAVENOUS; SUBCUTANEOUS at 12:08

## 2021-01-11 RX ADMIN — RISPERIDONE 3 MILLIGRAM(S): 4 TABLET ORAL at 06:23

## 2021-01-11 RX ADMIN — INSULIN GLARGINE 20 UNIT(S): 100 INJECTION, SOLUTION SUBCUTANEOUS at 21:06

## 2021-01-11 RX ADMIN — RISPERIDONE 3 MILLIGRAM(S): 4 TABLET ORAL at 17:39

## 2021-01-11 RX ADMIN — SEVELAMER CARBONATE 800 MILLIGRAM(S): 2400 POWDER, FOR SUSPENSION ORAL at 12:08

## 2021-01-11 RX ADMIN — Medication 25 MILLIGRAM(S): at 06:23

## 2021-01-11 NOTE — PROGRESS NOTE ADULT - ASSESSMENT
ESRD - HD  tomorrow will cont on TTS schedule  Suppose to be starting HD at [facility] 3 rd shift MWF  Hep B and C negative   HypoKalemia yest HD was on 4 K bath - will repeat BMP today    HTN - BP acceptable  Watch on meds     Anemia - Hgb stable   Cont Retacrit w HD  Cont folate and Epogen   Iron stores noted cont Venofer     RO - cont Renvela w/ meals     BiPolar d/o - Meds noted   Psych follow up noted    Clear for d/c from renal perspective  Will follow

## 2021-01-11 NOTE — PROGRESS NOTE BEHAVIORAL HEALTH - NSBHCONSULTRECOMMENDOTHER_PSY_A_CORE FT
Start on Risperdal Consta 25 mg IM every 2 weeks Start on Risperdal Consta 25 mg IM every 2 weeks 1/12/21

## 2021-01-11 NOTE — PROGRESS NOTE BEHAVIORAL HEALTH - SUMMARY
Patient is a 35 year old,  male; single; non-caregiver; unemployed; past psychiatric history of Bipolar 1 disorder; no known psychiatric hospitalizations; no known suicide attempts; no known history of violence or arrests; PMH of morbid obesity, HTN, ESRD not on HD with AVF, DM, HLD. Pt not responding to questioning this morning. Will Start on Risperdal Consta 25 mg IM every 2 weeks Patient is a 35 year old,  male; single; non-caregiver; unemployed; past psychiatric history of Bipolar 1 disorder; no known psychiatric hospitalizations; no known suicide attempts; no known history of violence or arrests; PMH of morbid obesity, HTN, ESRD not on HD with AVF, DM, HLD. Pt not responding to questioning this morning. Will Start on Risperdal Consta 25 mg IM every 2 weeks after discussion with his psychiatrist

## 2021-01-11 NOTE — PROGRESS NOTE BEHAVIORAL HEALTH - NSBHFUPINTERVALHXFT_PSY_A_CORE
Pt seen this morning laying in bed in his street cloths with no sheets on his mattress. Pt with flat affect and poverty of thought. Pt not able to respond to questioning this am.

## 2021-01-11 NOTE — PROGRESS NOTE ADULT - SUBJECTIVE AND OBJECTIVE BOX
NEPHROLOGY INTERVAL HPI/OVERNIGHT EVENTS:    Examined  Sitting in chair  Feeling better      MEDICATIONS  (STANDING):  aspirin  chewable 81 milliGRAM(s) Oral daily  atorvastatin 20 milliGRAM(s) Oral at bedtime  carvedilol 12.5 milliGRAM(s) Oral every 12 hours  chlorhexidine 2% Cloths 1 Application(s) Topical <User Schedule>  dextrose 40% Gel 15 Gram(s) Oral once  dextrose 5%. 1000 milliLiter(s) (50 mL/Hr) IV Continuous <Continuous>  dextrose 5%. 1000 milliLiter(s) (100 mL/Hr) IV Continuous <Continuous>  dextrose 50% Injectable 25 Gram(s) IV Push once  dextrose 50% Injectable 12.5 Gram(s) IV Push once  dextrose 50% Injectable 25 Gram(s) IV Push once  epoetin alma-epbx (RETACRIT) Injectable 58650 Unit(s) IV Push <User Schedule>  folic acid 1 milliGRAM(s) Oral daily  glucagon  Injectable 1 milliGRAM(s) IntraMuscular once  heparin   Injectable 5000 Unit(s) SubCutaneous every 8 hours  hydrALAZINE 25 milliGRAM(s) Oral three times a day  influenza   Vaccine 0.5 milliLiter(s) IntraMuscular once  insulin glargine Injectable (LANTUS) 20 Unit(s) SubCutaneous at bedtime  insulin lispro (ADMELOG) corrective regimen sliding scale   SubCutaneous every 6 hours  risperiDONE   Tablet 3 milliGRAM(s) Oral two times a day  sevelamer carbonate 800 milliGRAM(s) Oral three times a day with meals    MEDICATIONS  (PRN):  acetaminophen   Tablet .. 650 milliGRAM(s) Oral every 6 hours PRN Temp greater or equal to 38C (100.4F), Mild Pain (1 - 3)  hydrALAZINE Injectable 10 milliGRAM(s) IV Push every 4 hours PRN GIVE FOR  or more      Allergies    No Known Allergies    Intolerances        Vital Signs Last 24 Hrs  T(C): 37.4 (11 Jan 2021 08:18), Max: 37.4 (11 Jan 2021 08:18)  T(F): 99.4 (11 Jan 2021 08:18), Max: 99.4 (11 Jan 2021 08:18)  HR: 83 (11 Jan 2021 08:18) (80 - 85)  BP: 146/89 (11 Jan 2021 08:18) (130/95 - 158/100)  BP(mean): --  RR: 18 (11 Jan 2021 08:18) (16 - 18)  SpO2: 100% (11 Jan 2021 08:18) (95% - 100%)  Daily     Daily     PHYSICAL EXAM:  GENERAL: NAD  HEAD: NCAT  EYES: EOMI  NECK: Supple, neck  veins full  NERVOUS SYSTEM:  Awake not answering questions  CHEST/LUNG: Clear to percussion bilaterally  HEART: Regular rate and rhythm  ABDOMEN: Soft, Nontender, Nondistended; Bowel sounds present  EXTREMITIES: trace LE edema, L wrist AVF good thrill      LABS:                        9.2    18.67 )-----------( 320      ( 11 Jan 2021 08:44 )             30.1     01-10    132<L>  |  93<L>  |  33.0<H>  ----------------------------<  146<H>  3.5   |  26.0  |  4.65<H>    Ca    9.0      10 Kentrell 2021 09:23  Phos  3.4     01-10  Mg     2.1     01-10                  RADIOLOGY & ADDITIONAL TESTS:

## 2021-01-11 NOTE — PROGRESS NOTE ADULT - SUBJECTIVE AND OBJECTIVE BOX
CC: dialysis (10 Kentrell 2021 16:38)    HPI:  36yo M with hx of morbid obesity, HTN, DM, HLD, ESRD not on dialysis s/p AVF creation, ARVIND not on CPAP, bipolar disorder, presented to the emergency department with altered mental status.    INTERVAL HPI/OVERNIGHT EVENTS: Patient seen and examined sitting up in the chair.  Patient denies any complaints.  States he wants to go home.  No acute events noted.      Vital Signs Last 24 Hrs  T(C): 37.4 (11 Jan 2021 08:18), Max: 37.4 (11 Jan 2021 08:18)  T(F): 99.4 (11 Jan 2021 08:18), Max: 99.4 (11 Jan 2021 08:18)  HR: 83 (11 Jan 2021 08:18) (80 - 85)  BP: 146/89 (11 Jan 2021 08:18) (130/95 - 158/100)  BP(mean): --  RR: 18 (11 Jan 2021 08:18) (16 - 18)  SpO2: 100% (11 Jan 2021 08:18) (95% - 100%)  I&O's Detail      PHYSICAL EXAM:  GENERAL: NAD  HEAD:  Atraumatic, Normocephalic  NECK: Supple, No JVD, Normal thyroid  NERVOUS SYSTEM:  Alert & Oriented X3, Good concentration; Motor Strength 5/5 B/L upper and lower extremities  CHEST/LUNG: Clear to auscultation bilaterally; No rales, rhonchi, wheezing, or rubs  HEART: Regular rate and rhythm; No murmurs, rubs, or gallops  ABDOMEN: Soft, Nontender, Nondistended; Bowel sounds present  EXTREMITIES:  2+ Peripheral Pulses, No clubbing, cyanosis, or edema                            9.2    18.67 )-----------( 320      ( 11 Jan 2021 08:44 )             30.1     10 Kentrell 2021 09:23    132    |  93     |  33.0   ----------------------------<  146    3.5     |  26.0   |  4.65     Ca    9.0        10 Kentrell 2021 09:23  Phos  3.4       10 Kentrell 2021 09:23  Mg     2.1       10 Kentrell 2021 09:23        CAPILLARY BLOOD GLUCOSE  POCT Blood Glucose.: 149 mg/dL (11 Jan 2021 11:38)  POCT Blood Glucose.: 118 mg/dL (11 Jan 2021 06:22)  POCT Blood Glucose.: 181 mg/dL (10 Kentrell 2021 23:28)  POCT Blood Glucose.: 191 mg/dL (10 Kentrell 2021 16:19)          MEDICATIONS  (STANDING):  aspirin  chewable 81 milliGRAM(s) Oral daily  atorvastatin 20 milliGRAM(s) Oral at bedtime  carvedilol 12.5 milliGRAM(s) Oral every 12 hours  chlorhexidine 2% Cloths 1 Application(s) Topical <User Schedule>  dextrose 40% Gel 15 Gram(s) Oral once  dextrose 5%. 1000 milliLiter(s) (50 mL/Hr) IV Continuous <Continuous>  dextrose 5%. 1000 milliLiter(s) (100 mL/Hr) IV Continuous <Continuous>  dextrose 50% Injectable 25 Gram(s) IV Push once  dextrose 50% Injectable 12.5 Gram(s) IV Push once  dextrose 50% Injectable 25 Gram(s) IV Push once  epoetin alma-epbx (RETACRIT) Injectable 91245 Unit(s) IV Push <User Schedule>  folic acid 1 milliGRAM(s) Oral daily  glucagon  Injectable 1 milliGRAM(s) IntraMuscular once  heparin   Injectable 5000 Unit(s) SubCutaneous every 8 hours  hydrALAZINE 25 milliGRAM(s) Oral three times a day  influenza   Vaccine 0.5 milliLiter(s) IntraMuscular once  insulin glargine Injectable (LANTUS) 20 Unit(s) SubCutaneous at bedtime  insulin lispro (ADMELOG) corrective regimen sliding scale   SubCutaneous every 6 hours  risperiDONE   Tablet 3 milliGRAM(s) Oral two times a day  sevelamer carbonate 800 milliGRAM(s) Oral three times a day with meals    MEDICATIONS  (PRN):  acetaminophen   Tablet .. 650 milliGRAM(s) Oral every 6 hours PRN Temp greater or equal to 38C (100.4F), Mild Pain (1 - 3)  hydrALAZINE Injectable 10 milliGRAM(s) IV Push every 4 hours PRN GIVE FOR  or more

## 2021-01-11 NOTE — PROGRESS NOTE ADULT - ASSESSMENT
The patient is a 35 year old male with a past medical history of morbid obesity, hypertension, ESRD not on HD with AVF, diabetes mellitus, bipolar disorder and hyperlipidemia who presented to the ER for altered mental status. In the ER noted to have a SBP >200; CT head was negative. Worsening renal failure with hypokalemia and leukocytosis. COVID was negative. Seen by nephrology and started on HD. Patient lethargic with altered mental status on admission. Utox and VIral panel were negative. Blood cultures and urine culture negative. Afebrile. Given empiric zosyn and vanco in the Er.     1. Acute Metabolic Encephalopathy   -resolved  -likely due to uremia    2. Leukocytosis   -unclear etiology but not infectious; chronic since April 2018  -remains afebrile  -Mild elevation of procalcitonin which is not accurate in the setting of ESRD  -Repeat chest xray - negative  -Repeat blood cultures sent on 1/5 are negative to date  -Repeat COVID negative  -RVP negative  -UCx negative  -AVF without signs of infection  -panculture if febrile  -observe off abx     3. CKD stage 5D most likely secondary to DM/HTN  Next HD tentatively scheduled for 1/12  UF goal per renal  Hepatitis panel negative  PPD negative  Strict I/Os, daily weights  MBD- continue sevelamer as ordered; phos WNL  Avoid nephrotoxic agents and renally dose medications   Nephrology recommendations appreciated  SW on consult for outpatient HD placement     4. Hypokalemia  -adjust potassium bath at HD  -repeat labs in AM    5. HTN  -BP stable  -continue coreg and hydralazine  -PRN hydralazine as needed  -low sodium diet    6. Uncontrolled IDDM   Continue lantus and HSS  Monitor BSL closely  ADA diet    7. Bipolar disorder  Flat affect continued  Continue risperidone per psychiatry, titrate as per Dr. Newman  f/u further psych recommendations    8. Anemia of chronic kidney disease  -iron studies reviewed; venofer per renal  -continue EPO - goal Hb 10-11  -continue folic acid  -monitor H/H closely  -no overt bleeding    DVT ppx - Heparin SC    Dispo: Home when HD arrangements made, needs Psych reassessment prior to dc which was requested.    Attending Attestation:   Plan discussed with patient, RN, SW     The patient is a 35 year old male with a past medical history of morbid obesity, hypertension, ESRD not on HD with AVF, diabetes mellitus, bipolar disorder and hyperlipidemia who presented to the ER for altered mental status. In the ER noted to have a SBP >200; CT head was negative. Worsening renal failure with hypokalemia and leukocytosis. COVID was negative. Seen by nephrology and started on HD. Patient lethargic with altered mental status on admission. Utox and VIral panel were negative. Blood cultures and urine culture negative. Afebrile. Given empiric zosyn and vanco in the ER.    1. Acute Metabolic Encephalopathy   -resolved  -likely due to uremia    2. Leukocytosis   -unclear etiology but not infectious; chronic since April 2018  -remains afebrile  -Mild elevation of procalcitonin which is not accurate in the setting of ESRD  -Repeat chest xray - negative  -Repeat blood cultures sent on 1/5 are negative to date  -Repeat COVID negative  -RVP negative  -UCx negative  -AVF without signs of infection  -panculture if febrile  -observe off abx   -outpatient referral to hem/onc    3. CKD stage 5D most likely secondary to DM/HTN  Next HD tentatively scheduled for 1/12  UF goal per renal  Hepatitis panel negative  PPD negative  Strict I/Os, daily weights  MBD- continue sevelamer as ordered; phos WNL  Avoid nephrotoxic agents and renally dose medications   Nephrology recommendations appreciated  SW on consult for outpatient HD placement     4. Hypokalemia  -adjust potassium bath at HD  -repeat labs in AM    5. HTN  -BP stable  -continue coreg and hydralazine  -PRN hydralazine as needed  -low sodium diet    6. Uncontrolled IDDM   Continue lantus and HSS  Monitor BSL closely  ADA diet    7. Bipolar disorder  Flat affect continued  Continue risperidone per psychiatry, titrate as per Dr. Newman  psych recommendations appreciated    8. Anemia of chronic kidney disease  -iron studies reviewed; venofer per renal  -continue EPO - goal Hb 10-11  -continue folic acid  -monitor H/H closely  -no overt bleeding    DVT ppx - Heparin SC    Dispo - Cleared by psych per Dr. Newman. Discharge home once HD placement is obtained. Otherwise, medically stable for discharge.    Dispo: Home when HD arrangements made, needs Psych reassessment prior to dc which was requested.    Attending Attestation:   Plan discussed with patient, RN, SW

## 2021-01-12 LAB
GLUCOSE BLDC GLUCOMTR-MCNC: 136 MG/DL — HIGH (ref 70–99)
GLUCOSE BLDC GLUCOMTR-MCNC: 145 MG/DL — HIGH (ref 70–99)
GLUCOSE BLDC GLUCOMTR-MCNC: 172 MG/DL — HIGH (ref 70–99)
GLUCOSE BLDC GLUCOMTR-MCNC: 175 MG/DL — HIGH (ref 70–99)

## 2021-01-12 PROCEDURE — 99233 SBSQ HOSP IP/OBS HIGH 50: CPT

## 2021-01-12 PROCEDURE — 99232 SBSQ HOSP IP/OBS MODERATE 35: CPT

## 2021-01-12 RX ORDER — RISPERIDONE 4 MG/1
25 TABLET ORAL ONCE
Refills: 0 | Status: COMPLETED | OUTPATIENT
Start: 2021-01-12 | End: 2021-01-12

## 2021-01-12 RX ADMIN — Medication 25 MILLIGRAM(S): at 22:05

## 2021-01-12 RX ADMIN — SEVELAMER CARBONATE 800 MILLIGRAM(S): 2400 POWDER, FOR SUSPENSION ORAL at 17:22

## 2021-01-12 RX ADMIN — Medication 3: at 17:22

## 2021-01-12 RX ADMIN — Medication 81 MILLIGRAM(S): at 12:59

## 2021-01-12 RX ADMIN — Medication 25 MILLIGRAM(S): at 05:35

## 2021-01-12 RX ADMIN — ATORVASTATIN CALCIUM 20 MILLIGRAM(S): 80 TABLET, FILM COATED ORAL at 22:05

## 2021-01-12 RX ADMIN — RISPERIDONE 3 MILLIGRAM(S): 4 TABLET ORAL at 05:36

## 2021-01-12 RX ADMIN — CARVEDILOL PHOSPHATE 12.5 MILLIGRAM(S): 80 CAPSULE, EXTENDED RELEASE ORAL at 05:36

## 2021-01-12 RX ADMIN — SEVELAMER CARBONATE 800 MILLIGRAM(S): 2400 POWDER, FOR SUSPENSION ORAL at 13:18

## 2021-01-12 RX ADMIN — HEPARIN SODIUM 5000 UNIT(S): 5000 INJECTION INTRAVENOUS; SUBCUTANEOUS at 14:59

## 2021-01-12 RX ADMIN — SEVELAMER CARBONATE 800 MILLIGRAM(S): 2400 POWDER, FOR SUSPENSION ORAL at 08:45

## 2021-01-12 RX ADMIN — Medication 3: at 22:05

## 2021-01-12 RX ADMIN — Medication 25 MILLIGRAM(S): at 14:59

## 2021-01-12 RX ADMIN — INSULIN GLARGINE 20 UNIT(S): 100 INJECTION, SOLUTION SUBCUTANEOUS at 22:05

## 2021-01-12 RX ADMIN — CHLORHEXIDINE GLUCONATE 1 APPLICATION(S): 213 SOLUTION TOPICAL at 05:37

## 2021-01-12 RX ADMIN — RISPERIDONE 3 MILLIGRAM(S): 4 TABLET ORAL at 17:22

## 2021-01-12 RX ADMIN — Medication 1 MILLIGRAM(S): at 12:59

## 2021-01-12 RX ADMIN — HEPARIN SODIUM 5000 UNIT(S): 5000 INJECTION INTRAVENOUS; SUBCUTANEOUS at 05:36

## 2021-01-12 RX ADMIN — HEPARIN SODIUM 5000 UNIT(S): 5000 INJECTION INTRAVENOUS; SUBCUTANEOUS at 22:05

## 2021-01-12 RX ADMIN — Medication 3: at 00:04

## 2021-01-12 RX ADMIN — CARVEDILOL PHOSPHATE 12.5 MILLIGRAM(S): 80 CAPSULE, EXTENDED RELEASE ORAL at 17:22

## 2021-01-12 RX ADMIN — ERYTHROPOIETIN 10000 UNIT(S): 10000 INJECTION, SOLUTION INTRAVENOUS; SUBCUTANEOUS at 10:59

## 2021-01-12 NOTE — PROGRESS NOTE ADULT - SUBJECTIVE AND OBJECTIVE BOX
CHIEF COMPLAINT/INTERVAL HISTORY:    Patient is a 35y old  Male who presents with a chief complaint of dialysis (10 Kentrell 2021 16:38)    SUBJECTIVE & OBJECTIVE: Pt seen and examined at bedside during HD. No overnight events. No acute complaints. Awaiting HD placement. Discussed with Dr. Newman; patient is psychiatrically cleared for discharge.    ROS: Limited; denies chest pain or SOB.    ICU Vital Signs Last 24 Hrs  T(C): 37.4 (12 Jan 2021 17:18), Max: 37.4 (12 Jan 2021 17:18)  T(F): 99.4 (12 Jan 2021 17:18), Max: 99.4 (12 Jan 2021 17:18)  HR: 94 (12 Jan 2021 17:18) (76 - 94)  BP: 160/99 (12 Jan 2021 17:18) (158/98 - 174/102)  RR: 18 (12 Jan 2021 17:18) (18 - 18)  SpO2: 94% (12 Jan 2021 17:18) (94% - 100%)    MEDICATIONS  (STANDING):  aspirin  chewable 81 milliGRAM(s) Oral daily  atorvastatin 20 milliGRAM(s) Oral at bedtime  carvedilol 12.5 milliGRAM(s) Oral every 12 hours  chlorhexidine 2% Cloths 1 Application(s) Topical <User Schedule>  dextrose 40% Gel 15 Gram(s) Oral once  dextrose 5%. 1000 milliLiter(s) (50 mL/Hr) IV Continuous <Continuous>  dextrose 5%. 1000 milliLiter(s) (100 mL/Hr) IV Continuous <Continuous>  dextrose 50% Injectable 25 Gram(s) IV Push once  dextrose 50% Injectable 12.5 Gram(s) IV Push once  dextrose 50% Injectable 25 Gram(s) IV Push once  epoetin alma-epbx (RETACRIT) Injectable 40753 Unit(s) IV Push <User Schedule>  folic acid 1 milliGRAM(s) Oral daily  glucagon  Injectable 1 milliGRAM(s) IntraMuscular once  heparin   Injectable 5000 Unit(s) SubCutaneous every 8 hours  hydrALAZINE 25 milliGRAM(s) Oral three times a day  influenza   Vaccine 0.5 milliLiter(s) IntraMuscular once  insulin glargine Injectable (LANTUS) 20 Unit(s) SubCutaneous at bedtime  insulin lispro (ADMELOG) corrective regimen sliding scale   SubCutaneous every 6 hours  risperiDONE   Tablet 3 milliGRAM(s) Oral two times a day  sevelamer carbonate 800 milliGRAM(s) Oral three times a day with meals    MEDICATIONS  (PRN):  acetaminophen   Tablet .. 650 milliGRAM(s) Oral every 6 hours PRN Temp greater or equal to 38C (100.4F), Mild Pain (1 - 3)  hydrALAZINE Injectable 10 milliGRAM(s) IV Push every 4 hours PRN GIVE FOR  or more      LABS:                        9.2    18.67 )-----------( 320      ( 11 Jan 2021 08:44 )             30.1       CAPILLARY BLOOD GLUCOSE      POCT Blood Glucose.: 175 mg/dL (12 Jan 2021 16:57)  POCT Blood Glucose.: 145 mg/dL (12 Jan 2021 12:10)  POCT Blood Glucose.: 136 mg/dL (12 Jan 2021 05:34)  POCT Blood Glucose.: 194 mg/dL (11 Jan 2021 23:58)         PHYSICAL EXAM:    GENERAL: morbidly obese male, laying in bed, flat affect  HEAD:  Atraumatic, Normocephalic  EYES: EOMI, PERRLA, conjunctiva and sclera clear  ENMT: Moist mucous membranes  NECK: Supple   CHEST/LUNG: Clear to auscultation bilaterally; No rales, rhonchi, wheezing, or rubs  HEART: Regular rate and rhythm; + S1/S2  ABDOMEN: Soft, Nontender, obese; Bowel sounds present  EXTREMITIES:  no pedal edema

## 2021-01-12 NOTE — PROGRESS NOTE BEHAVIORAL HEALTH - SUMMARY
Patient is a 35 year old,  male; single; non-caregiver; unemployed; past psychiatric history of Bipolar 1 disorder; no known psychiatric hospitalizations; no known suicide attempts; no known history of violence or arrests; PMH of morbid obesity, HTN, ESRD not on HD with AVF, DM, HLD. Pt not responding to questioning this morning. Started on Risperdal Consta 25 mg IM every 2 weeks after discussion with his psychiatrist Patient is a 35 year old,  male; single; non-caregiver; unemployed; past psychiatric history of Bipolar 1 disorder; no known psychiatric hospitalizations; no known suicide attempts; no known history of violence or arrests; PMH of morbid obesity, HTN, ESRD not on HD with AVF, DM, HLD.  Started on Risperdal Consta 25 mg IM every 2 weeks after discussion with his psychiatrist Patient is stable for discharge home with family and to resume outpatient dialysis. He has been calm and cooperative with dialysis treatments here in hospital.

## 2021-01-12 NOTE — PROGRESS NOTE ADULT - ASSESSMENT
ESRD - HD  today on TTS schedule  [facility]  Hep B and C negative     HTN - BP acceptable  Watch on meds     Anemia - Hgb stable   Cont Retacrit w HD  Cont folate and Epogen   Iron stores noted cont Venofer     RO - cont Renvela w/ meals     BiPolar d/o - Meds noted   Psych follow up noted    Clear for d/c from renal perspective  Will follow

## 2021-01-12 NOTE — PROGRESS NOTE BEHAVIORAL HEALTH - NSBHFUPINTERVALHXFT_PSY_A_CORE
Pt seen this morning in the dialysis suite. Pt speaking today, looks forward to going home. Pt still with word finding difficulties and poverty of thought. Discussed case with pt's psychiatrist Dr Lopez [phone]. Pt seen this morning in the dialysis suite. Pt speaking today, looks forward to going home. Pt still with word finding difficulties and poverty of thought. Discussed case with pt's psychiatrist Dr Park [phone].

## 2021-01-12 NOTE — PROGRESS NOTE ADULT - SUBJECTIVE AND OBJECTIVE BOX
NEPHROLOGY INTERVAL HPI/OVERNIGHT EVENTS:    Examined on HD tolerating ok  No distress  bizarre affect   Occasionally responds to questions appropriately    MEDICATIONS  (STANDING):  aspirin  chewable 81 milliGRAM(s) Oral daily  atorvastatin 20 milliGRAM(s) Oral at bedtime  carvedilol 12.5 milliGRAM(s) Oral every 12 hours  chlorhexidine 2% Cloths 1 Application(s) Topical <User Schedule>  dextrose 40% Gel 15 Gram(s) Oral once  dextrose 5%. 1000 milliLiter(s) (50 mL/Hr) IV Continuous <Continuous>  dextrose 5%. 1000 milliLiter(s) (100 mL/Hr) IV Continuous <Continuous>  dextrose 50% Injectable 25 Gram(s) IV Push once  dextrose 50% Injectable 12.5 Gram(s) IV Push once  dextrose 50% Injectable 25 Gram(s) IV Push once  epoetin alma-epbx (RETACRIT) Injectable 16171 Unit(s) IV Push <User Schedule>  folic acid 1 milliGRAM(s) Oral daily  glucagon  Injectable 1 milliGRAM(s) IntraMuscular once  heparin   Injectable 5000 Unit(s) SubCutaneous every 8 hours  hydrALAZINE 25 milliGRAM(s) Oral three times a day  influenza   Vaccine 0.5 milliLiter(s) IntraMuscular once  insulin glargine Injectable (LANTUS) 20 Unit(s) SubCutaneous at bedtime  insulin lispro (ADMELOG) corrective regimen sliding scale   SubCutaneous every 6 hours  risperiDONE   Tablet 3 milliGRAM(s) Oral two times a day  risperiDONE Injectable, Long Acting 25 milliGRAM(s) IntraMuscular once  sevelamer carbonate 800 milliGRAM(s) Oral three times a day with meals    MEDICATIONS  (PRN):  acetaminophen   Tablet .. 650 milliGRAM(s) Oral every 6 hours PRN Temp greater or equal to 38C (100.4F), Mild Pain (1 - 3)  hydrALAZINE Injectable 10 milliGRAM(s) IV Push every 4 hours PRN GIVE FOR  or more      Allergies    No Known Allergies    Intolerances        Vital Signs Last 24 Hrs  T(C): 36.8 (12 Jan 2021 10:45), Max: 37.2 (12 Jan 2021 07:36)  T(F): 98.3 (12 Jan 2021 10:45), Max: 98.9 (12 Jan 2021 07:36)  HR: 81 (12 Jan 2021 07:42) (76 - 81)  BP: 164/81 (12 Jan 2021 10:45) (149/110 - 174/102)  BP(mean): --  RR: 18 (12 Jan 2021 10:45) (18 - 18)  SpO2: 94% (12 Jan 2021 10:45) (94% - 100%)  Daily     Daily     PHYSICAL EXAM:  GENERAL: NAD  HEAD: NCAT  EYES: EOMI  NECK: Supple, neck  veins full  NERVOUS SYSTEM:  Awake not answering questions  CHEST/LUNG: Clear to percussion bilaterally  HEART: Regular rate and rhythm  ABDOMEN: Soft, Nontender, Nondistended; Bowel sounds present  EXTREMITIES: trace LE edema, L wrist AVF good thrill      LABS:                        9.2    18.67 )-----------( 320      ( 11 Jan 2021 08:44 )             30.1                       RADIOLOGY & ADDITIONAL TESTS:

## 2021-01-12 NOTE — PROGRESS NOTE BEHAVIORAL HEALTH - NSBHCONSULTMEDS_PSY_A_CORE FT
risperidone Tablet 2 milliGRAM(s) Oral two times a day risperidone Tablet 3 milliGRAM(s) Oral two times a day

## 2021-01-12 NOTE — PROGRESS NOTE BEHAVIORAL HEALTH - NSBHCONSULTFOLLOWDETAILS_PSY_A_CORE FT
Psych will follow up
consider CURRY due to deconditioning  needs PT evaluation
Patient needs further psychiatric safety assessment prior to discharge as he is non-verbal

## 2021-01-12 NOTE — PROGRESS NOTE ADULT - ASSESSMENT
The patient is a 35 year old male with a past medical history of morbid obesity, hypertension, ESRD not on HD with AVF, diabetes mellitus, bipolar disorder and hyperlipidemia who presented to the ER for altered mental status. In the ER noted to have a SBP >200; CT head was negative. Worsening renal failure with hypokalemia and leukocytosis. COVID was negative. Seen by nephrology and started on HD. Patient lethargic with altered mental status on admission. Utox and VIral panel were negative. Blood cultures and urine culture negative. Afebrile. Given empiric zosyn and vanco in the Er.     1. Acute Metabolic Encephalopathy   -resolved  -likely due to uremia    2. Leukocytosis   -unclear etiology but not infectious; chronic since April 2018  -remains afebrile  -Mild elevation of procalcitonin which is not accurate in the setting of ESRD  -Repeat chest xray - negative  -Repeat blood cultures sent on 1/5 are negative to date  -Repeat COVID negative  -RVP negative  -UCx negative  -AVF without signs of infection  -panculture if febrile  -observe off abx   -outpatient follow up with hem/onc    3. CKD stage 5D most likely secondary to DM/HTN  Uneventful HD today  Hepatitis panel negative  PPD negative  Strict I/Os, daily weights  MBD- continue sevelamer as ordered; phos WNL  Avoid nephrotoxic agents and renally dose medications   Nephrology recommendations appreciated  SW on consult for outpatient HD placement     4. Hypokalemia - resolved    5. HTN  -BP stable  -continue coreg and hydralazine  -PRN hydralazine as needed  -low sodium diet    6. Uncontrolled IDDM   Continue lantus and HSS  Monitor BSL closely  ADA diet    7. Bipolar disorder  Mood stable; intermittent episodes of being mute  However, patient is cooperative at dialysis with no major mood disruptions  Continue risperidone per psychiatry   No psychiatric barriers to discharge as per discussion with Dr. Newman    8. Anemia of chronic kidney disease  -iron studies reviewed; defer venofer per renal  -continue EPO - goal Hb 10-11  -continue folic acid  -monitor H/H closely  -no overt bleeding    DVT ppx - Heparin SC    Dispo: Home when HD arrangements made; SW on board. Medically stable for discharge.    Attending Attestation:   Plan discussed with patient, RN, SW, Dr. Newman, medicine NP

## 2021-01-13 ENCOUNTER — TRANSCRIPTION ENCOUNTER (OUTPATIENT)
Age: 36
End: 2021-01-13

## 2021-01-13 VITALS
RESPIRATION RATE: 20 BRPM | DIASTOLIC BLOOD PRESSURE: 89 MMHG | HEART RATE: 85 BPM | TEMPERATURE: 98 F | OXYGEN SATURATION: 96 % | SYSTOLIC BLOOD PRESSURE: 162 MMHG

## 2021-01-13 LAB
GLUCOSE BLDC GLUCOMTR-MCNC: 102 MG/DL — HIGH (ref 70–99)
GLUCOSE BLDC GLUCOMTR-MCNC: 118 MG/DL — HIGH (ref 70–99)
GLUCOSE BLDC GLUCOMTR-MCNC: 134 MG/DL — HIGH (ref 70–99)

## 2021-01-13 PROCEDURE — 99233 SBSQ HOSP IP/OBS HIGH 50: CPT

## 2021-01-13 PROCEDURE — 99239 HOSP IP/OBS DSCHRG MGMT >30: CPT

## 2021-01-13 RX ORDER — RISPERIDONE 4 MG/1
25 TABLET ORAL ONCE
Refills: 0 | Status: DISCONTINUED | OUTPATIENT
Start: 2021-01-13 | End: 2021-01-13

## 2021-01-13 RX ORDER — HYDRALAZINE HCL 50 MG
1 TABLET ORAL
Qty: 90 | Refills: 0
Start: 2021-01-13 | End: 2021-02-11

## 2021-01-13 RX ORDER — FOLIC ACID 0.8 MG
1 TABLET ORAL
Qty: 30 | Refills: 0
Start: 2021-01-13 | End: 2021-02-11

## 2021-01-13 RX ORDER — LAMOTRIGINE 25 MG/1
50 TABLET, ORALLY DISINTEGRATING ORAL
Qty: 0 | Refills: 0 | DISCHARGE

## 2021-01-13 RX ORDER — HYDRALAZINE HCL 50 MG
50 TABLET ORAL THREE TIMES A DAY
Refills: 0 | Status: DISCONTINUED | OUTPATIENT
Start: 2021-01-13 | End: 2021-01-13

## 2021-01-13 RX ORDER — RISPERIDONE 4 MG/1
1 TABLET ORAL
Qty: 60 | Refills: 0
Start: 2021-01-13 | End: 2021-02-11

## 2021-01-13 RX ORDER — NIFEDIPINE 30 MG
1 TABLET, EXTENDED RELEASE 24 HR ORAL
Qty: 30 | Refills: 0
Start: 2021-01-13 | End: 2021-02-11

## 2021-01-13 RX ORDER — SEVELAMER CARBONATE 2400 MG/1
1 POWDER, FOR SUSPENSION ORAL
Qty: 90 | Refills: 0
Start: 2021-01-13 | End: 2021-02-11

## 2021-01-13 RX ORDER — NIFEDIPINE 30 MG
30 TABLET, EXTENDED RELEASE 24 HR ORAL DAILY
Refills: 0 | Status: DISCONTINUED | OUTPATIENT
Start: 2021-01-13 | End: 2021-01-13

## 2021-01-13 RX ORDER — PALIPERIDONE 1.5 MG/1
1 TABLET, EXTENDED RELEASE ORAL
Qty: 0 | Refills: 0 | DISCHARGE

## 2021-01-13 RX ADMIN — SEVELAMER CARBONATE 800 MILLIGRAM(S): 2400 POWDER, FOR SUSPENSION ORAL at 09:50

## 2021-01-13 RX ADMIN — CHLORHEXIDINE GLUCONATE 1 APPLICATION(S): 213 SOLUTION TOPICAL at 05:41

## 2021-01-13 RX ADMIN — Medication 81 MILLIGRAM(S): at 12:10

## 2021-01-13 RX ADMIN — HEPARIN SODIUM 5000 UNIT(S): 5000 INJECTION INTRAVENOUS; SUBCUTANEOUS at 05:01

## 2021-01-13 RX ADMIN — Medication 25 MILLIGRAM(S): at 05:01

## 2021-01-13 RX ADMIN — SEVELAMER CARBONATE 800 MILLIGRAM(S): 2400 POWDER, FOR SUSPENSION ORAL at 12:10

## 2021-01-13 RX ADMIN — Medication 1 MILLIGRAM(S): at 12:19

## 2021-01-13 RX ADMIN — RISPERIDONE 3 MILLIGRAM(S): 4 TABLET ORAL at 05:01

## 2021-01-13 RX ADMIN — Medication 50 MILLIGRAM(S): at 12:19

## 2021-01-13 RX ADMIN — CARVEDILOL PHOSPHATE 12.5 MILLIGRAM(S): 80 CAPSULE, EXTENDED RELEASE ORAL at 05:01

## 2021-01-13 RX ADMIN — Medication 30 MILLIGRAM(S): at 12:21

## 2021-01-13 RX ADMIN — HEPARIN SODIUM 5000 UNIT(S): 5000 INJECTION INTRAVENOUS; SUBCUTANEOUS at 12:09

## 2021-01-13 NOTE — DISCHARGE NOTE PROVIDER - CARE PROVIDER_API CALL
Deonte Workman)  Internal Medicine; Nephrology  [address] [place] [id number]  Phone: [phone]  Fax: [phone]  Follow Up Time:     Armaan Park  Phone: (   )    -  Fax: (   )    -  Follow Up Time:

## 2021-01-13 NOTE — DISCHARGE NOTE PROVIDER - NSDCCPCAREPLAN_GEN_ALL_CORE_FT
PRINCIPAL DISCHARGE DIAGNOSIS  Diagnosis: Toxic metabolic encephalopathy  Assessment and Plan of Treatment: Continue HD as scheduled.      SECONDARY DISCHARGE DIAGNOSES  Diagnosis: Renal failure  Assessment and Plan of Treatment: Continue HD as scheduled.  Follow up with nephrology.    Diagnosis: HTN (hypertension)  Assessment and Plan of Treatment: Continue current medications as prescribed.    Follow up with primary doctor.    Diagnosis: Bipolar 1 disorder  Assessment and Plan of Treatment: Continue current medications as prescribed.  Follow up with psych.     PRINCIPAL DISCHARGE DIAGNOSIS  Diagnosis: Toxic metabolic encephalopathy  Assessment and Plan of Treatment: Continue HD as scheduled.      SECONDARY DISCHARGE DIAGNOSES  Diagnosis: HTN (hypertension)  Assessment and Plan of Treatment: Continue current medications as prescribed.    Follow up with primary doctor.    Diagnosis: Bipolar 1 disorder  Assessment and Plan of Treatment: Continue current medications as prescribed.  Follow up with psych within 2 weeks for next injection.    Diagnosis: Renal failure  Assessment and Plan of Treatment: Continue HD as scheduled.  Follow up with nephrology.

## 2021-01-13 NOTE — PROGRESS NOTE BEHAVIORAL HEALTH - LANGUAGE
Other
No abnormalities noted
Other

## 2021-01-13 NOTE — PROGRESS NOTE BEHAVIORAL HEALTH - THOUGHT PROCESS
Perseverative/Other
Perseverative/Thought blocking
Linear/Perseverative
Linear/Perseverative
Other
Perseverative/Thought blocking

## 2021-01-13 NOTE — DISCHARGE NOTE PROVIDER - NSDCMRMEDTOKEN_GEN_ALL_CORE_FT
aspirin 81 mg oral tablet: 1 tab(s) orally once a day  carvedilol 12.5 mg oral tablet: 1 tab(s) orally every 12 hours  folic acid 1 mg oral tablet: 1 tab(s) orally once a day  hydrALAZINE 50 mg oral tablet: 1 tab(s) orally 3 times a day  Lantus Solostar Pen 100 units/mL subcutaneous solution: 20  subcutaneous 2 times a day   NIFEdipine 30 mg oral tablet, extended release: 1 tab(s) orally once a day  risperiDONE 3 mg oral tablet: 1 tab(s) orally 2 times a day  rosuvastatin 5 mg oral tablet: 1 tab(s) orally once a day  sevelamer carbonate 800 mg oral tablet: 1 tab(s) orally 3 times a day (with meals)

## 2021-01-13 NOTE — DISCHARGE NOTE PROVIDER - PROVIDER TOKENS
PROVIDER:[TOKEN:[[id number]]],FREE:[LAST:[Vivian],FIRST:[Primary Psych],PHONE:[(   )    -],FAX:[(   )    -]]

## 2021-01-13 NOTE — PROGRESS NOTE ADULT - PROVIDER SPECIALTY LIST ADULT
Hospitalist
Internal Medicine
Nephrology
Hospitalist
Nephrology
Hospitalist
Nephrology
Hospitalist
Nephrology

## 2021-01-13 NOTE — PROGRESS NOTE BEHAVIORAL HEALTH - SUMMARY
Patient is a 35 year old,  male; single; non-caregiver; unemployed; past psychiatric history of Bipolar 1 disorder; no known psychiatric hospitalizations; no known suicide attempts; no known history of violence or arrests; PMH of morbid obesity, HTN, ESRD not on HD with AVF, DM, HLD. Will start patient on Risperdal Consta 25 mg IM every 2 weeks after discussion with his psychiatrist. Patient is stable for discharge home with family and to resume outpatient dialysis. He has been calm and cooperative with dialysis treatments here in hospital.  Pt will follow up with outpatient Psychiatrist Dr. Park.

## 2021-01-13 NOTE — PROGRESS NOTE BEHAVIORAL HEALTH - MUSCLE TONE / STRENGTH
Normal muscle tone/strength
Other
Other

## 2021-01-13 NOTE — DISCHARGE NOTE NURSING/CASE MANAGEMENT/SOCIAL WORK - PATIENT PORTAL LINK FT
You can access the FollowMyHealth Patient Portal offered by [facility] by registering at the following website: [email or web address]. By joining [facility]’s FollowMyHealth portal, you will also be able to view your health information using other applications (apps) compatible with our system.

## 2021-01-13 NOTE — PROGRESS NOTE BEHAVIORAL HEALTH - NSBHFUPINTERVALHXFT_PSY_A_CORE
Pt seen this morning sitting in the car in his street clothes. No o/n events. Discussed with the patient the plan for discharge home with family and to resume outpatient dialysis. Dr. Polo made aware that the patient is cleared for discharge from a psychiatric standpoint. Pt seen this morning sitting in the car in his street clothes. No o/n events. Discussed with the patient the plan for discharge home with family and to resume outpatient dialysis. Dr. Polo made aware that the patient is cleared for discharge from a psychiatric standpoint. He took PO medications but apparently refused IM Risperdal Consta

## 2021-01-13 NOTE — DISCHARGE NOTE PROVIDER - HOSPITAL COURSE
The patient is a 35 year old male with a past medical history of morbid obesity, hypertension, ESRD not on HD with AVF, diabetes mellitus, bipolar disorder and hyperlipidemia who presented to the ER for altered mental status. In the ER noted to have a SBP >200; CT head was negative. Worsening renal failure with hypokalemia and leukocytosis. COVID was negative. Seen by nephrology and started on HD. Patient lethargic with altered mental status on admission. Utox and Viral panel were negative. Blood cultures and urine culture negative. Afebrile. Given empiric zosyn and vanco in the Er. Metabolic encephalopathy improved.  Tolerating HD.  Patient has had chronic leukocytosis since 4/2018.  no need for further antibiotics.  Patient followed by psych. Patient is cooperative at dialysis with no major mood disruptions, cleared for discharge with outpatient follow up with primary psychiatrist.  Patient stable for discharge to home with outpatient follow up with primary doctor, nephrologist, psych.      Vital Signs Last 24 Hrs  T(C): 36.8 (13 Jan 2021 08:43), Max: 37.4 (12 Jan 2021 17:18)  T(F): 98.3 (13 Jan 2021 08:43), Max: 99.4 (12 Jan 2021 17:18)  HR: 84 (13 Jan 2021 08:43) (81 - 94)  BP: 170/100 (13 Jan 2021 08:50) (160/99 - 170/100)  BP(mean): --  RR: 20 (13 Jan 2021 08:50) (18 - 20)  SpO2: 95% (13 Jan 2021 08:50) (93% - 96%)    PHYSICAL EXAM:  GENERAL: NAD, well-groomed, well-developed  HEAD:  Atraumatic, Normocephalic  NECK: Supple, No JVD, Normal thyroid  NERVOUS SYSTEM:  Alert & Oriented X3, Good concentration; Motor Strength 5/5 B/L upper and lower extremities  CHEST/LUNG: Clear to auscultation bilaterally; No rales, rhonchi, wheezing, or rubs  HEART: Regular rate and rhythm; No murmurs, rubs, or gallops  ABDOMEN: Soft, Nontender, Nondistended; Bowel sounds present  EXTREMITIES:  2+ Peripheral Pulses, No clubbing, cyanosis, or edema

## 2021-01-13 NOTE — PROGRESS NOTE ADULT - SUBJECTIVE AND OBJECTIVE BOX
NEPHROLOGY INTERVAL HPI/OVERNIGHT EVENTS:    No new events.    MEDICATIONS  (STANDING):  aspirin  chewable 81 milliGRAM(s) Oral daily  atorvastatin 20 milliGRAM(s) Oral at bedtime  carvedilol 12.5 milliGRAM(s) Oral every 12 hours  chlorhexidine 2% Cloths 1 Application(s) Topical <User Schedule>  dextrose 40% Gel 15 Gram(s) Oral once  dextrose 5%. 1000 milliLiter(s) (50 mL/Hr) IV Continuous <Continuous>  dextrose 5%. 1000 milliLiter(s) (100 mL/Hr) IV Continuous <Continuous>  dextrose 50% Injectable 25 Gram(s) IV Push once  dextrose 50% Injectable 12.5 Gram(s) IV Push once  dextrose 50% Injectable 25 Gram(s) IV Push once  epoetin alma-epbx (RETACRIT) Injectable 24519 Unit(s) IV Push <User Schedule>  folic acid 1 milliGRAM(s) Oral daily  glucagon  Injectable 1 milliGRAM(s) IntraMuscular once  heparin   Injectable 5000 Unit(s) SubCutaneous every 8 hours  hydrALAZINE 25 milliGRAM(s) Oral three times a day  influenza   Vaccine 0.5 milliLiter(s) IntraMuscular once  insulin glargine Injectable (LANTUS) 20 Unit(s) SubCutaneous at bedtime  insulin lispro (ADMELOG) corrective regimen sliding scale   SubCutaneous every 6 hours  risperiDONE   Tablet 3 milliGRAM(s) Oral two times a day  risperiDONE Injectable, Long Acting 25 milliGRAM(s) IntraMuscular once  sevelamer carbonate 800 milliGRAM(s) Oral three times a day with meals    MEDICATIONS  (PRN):  acetaminophen   Tablet .. 650 milliGRAM(s) Oral every 6 hours PRN Temp greater or equal to 38C (100.4F), Mild Pain (1 - 3)  hydrALAZINE Injectable 10 milliGRAM(s) IV Push every 4 hours PRN GIVE FOR  or more      Allergies    No Known Allergies        Vital Signs Last 24 Hrs  T(C): 36.8 (13 Jan 2021 08:43), Max: 37.4 (12 Jan 2021 17:18)  T(F): 98.3 (13 Jan 2021 08:43), Max: 99.4 (12 Jan 2021 17:18)  HR: 84 (13 Jan 2021 08:43) (81 - 94)  BP: 169/109 (13 Jan 2021 08:43) (160/99 - 169/109)  BP(mean): --  RR: 20 (13 Jan 2021 08:43) (18 - 20)  SpO2: 93% (13 Jan 2021 08:43) (93% - 96%)  T(C): 36.8 (12 Jan 2021 10:45), Max: 37.2 (12 Jan 2021 07:36)  T(F): 98.3 (12 Jan 2021 10:45), Max: 98.9 (12 Jan 2021 07:36)  HR: 81 (12 Jan 2021 07:42) (76 - 81)  BP: 164/81 (12 Jan 2021 10:45) (149/110 - 174/102)    PHYSICAL EXAM:    GENERAL: OOB in chair  HEAD: WNL  EYES: EOMI  NECK: Supple, neck  veins wnl  NERVOUS SYSTEM:  Awake , verbal  CHEST/LUNG: Clear to percussion bilaterally  HEART: Regular rate and rhythm  ABDOMEN: Soft, Nontender, Nondistended; Bowel sounds present  EXTREMITIES: trace LE edema, L wrist AVF good thrill      LABS:                        9.2    18.67 )-----------( 320      ( 11 Jan 2021 08:44 )             30.1                       RADIOLOGY & ADDITIONAL TESTS:

## 2021-01-13 NOTE — PROGRESS NOTE BEHAVIORAL HEALTH - NSBHCHARTREVIEWLAB_PSY_A_CORE FT
9.2    18.67 )-----------( 320      ( 11 Jan 2021 08:44 )             30.1     01-10    132<L>  |  93<L>  |  33.0<H>  ----------------------------<  146<H>  3.5   |  26.0  |  4.65<H>    Ca    9.0      10 [place] 2021 09:23  Phos  3.4     01-10  Mg     2.1     01-10    COVID-19 PCR: NotDetec (07 Jan 2021 13:53)  SARS-CoV-2: NotDetec (30 Dec 2020 22:58)  COVID-19 PCR: NotDetec (22 Jul 2020 19:45)
9.4    17.07 )-----------( 326      ( 07 Jan 2021 09:02 )             29.4   01-07    138  |  99  |  40.0<H>  ----------------------------<  80  3.7   |  28.0  |  5.19<H>    Ca    9.5      07 Jan 2021 09:01    SARS-CoV-2: Julisa (30 Dec 2020 22:58)  COVID-19 PCR: Julisa (22 Jul 2020 19:45)
9.2    18.67 )-----------( 320      ( 11 Jan 2021 08:44 )             30.1           COVID-19 PCR: NotDetec (07 Jan 2021 13:53)  SARS-CoV-2: NotDetec (30 Dec 2020 22:58)  COVID-19 PCR: NotDetec (22 Jul 2020 19:45)
COVID-19 PCR: NotDete (07 Jan 2021 13:53)  SARS-CoV-2: NotDete (30 Dec 2020 22:58)  COVID-19 PCR: NotDete (22 Jul 2020 19:45)
9.4    17.53 )-----------( 331      ( 06 Jan 2021 09:02 )             30.5   CBC Full  -  ( 06 Jan 2021 09:02 )  WBC Count : 17.53 K/uL  RBC Count : 4.05 M/uL  Hemoglobin : 9.4 g/dL  Hematocrit : 30.5 %  Platelet Count - Automated : 331 K/uL  Mean Cell Volume : 75.3 fl  Mean Cell Hemoglobin : 23.2 pg  Mean Cell Hemoglobin Concentration : 30.8 gm/dL    01-06    139  |  101  |  37.0<H>  ----------------------------<  115<H>  3.5   |  27.0  |  4.88<H>    SARS-CoV-2: Julisa (30 Dec 2020 22:58)
9.4    17.07 )-----------( 326      ( 07 Jan 2021 09:02 )             29.4   01-07    138  |  99  |  40.0<H>  ----------------------------<  80  3.7   |  28.0  |  5.19<H>    Ca    9.5      07 Jan 2021 09:01

## 2021-01-13 NOTE — PROGRESS NOTE ADULT - ATTENDING COMMENTS
Bp meds adjusted, follow up as op.
[facility] tomorrow with labs.
Patient seen and examined, agree with the above assessment and plan
I have seen and examined the patient and agree with the above assessment and plan. Edits made where needed. Patient awake and alert today. Eager to be discharged. Denies any active complaints. Patient is cleared from psychiatric standpoint. Medically stable for discharge home; awaiting HD placement.    Vitals stable  Physical exam  General - morbidly obese male, sitting in bed, NAD  HEENT - MMM  CVS - RRR, +S1/S2  Resp- bilateral air entry  GI- soft, obese, nontender  Ext- no pedal edema  Access- LUE AVF

## 2021-01-13 NOTE — DISCHARGE NOTE NURSING/CASE MANAGEMENT/SOCIAL WORK - NSDCFUADDAPPT_GEN_ALL_CORE_FT
You will start [facility] on January 15, 2021, Friday at 3pm for your initial visit.   Please bring insurance and ID cards. Please also wear comfortable clothing and bring a sweaters.   Please bring entertainment ([place], phone, tablet, newspaper), to keep you occupied.     For future appointments your schedule will be Monday, Wednesday and Friday.     [facility]   [phone]  [address] [place] [address]

## 2021-01-13 NOTE — PROGRESS NOTE BEHAVIORAL HEALTH - NSBHCONSFOLLOWNEEDS_PSY_A_CORE
Patient needs further psychiatric safety assessment prior to discharge
no psychiatric contraindications to discharge
Patient needs further psychiatric safety assessment prior to discharge
Patient needs further psychiatric safety assessment prior to discharge

## 2021-01-13 NOTE — PROGRESS NOTE BEHAVIORAL HEALTH - NSBHPTASSESSDT_PSY_A_CORE
07-Jan-2021 11:16
08-Jan-2021 10:30
11-Jan-2021 10:04
11-Jan-2021 10:04
13-Jan-2021 10:04
06-Jan-2021 11:00

## 2021-01-13 NOTE — PROGRESS NOTE BEHAVIORAL HEALTH - NSBHCHARTREVIEWVS_PSY_A_CORE FT
T(C): 36.7 (08 Jan 2021 08:14), Max: 37.3 (07 Jan 2021 11:40)  T(F): 98.1 (08 Jan 2021 08:14), Max: 99.2 (07 Jan 2021 11:40)  HR: 69 (08 Jan 2021 08:14) (69 - 100)  BP: 146/83 (08 Jan 2021 08:14) (144/80 - 154/89)  RR: 18 (08 Jan 2021 08:14) (18 - 18)  SpO2: 96% (08 Jan 2021 08:14) (95% - 99%)
Vital Signs Last 24 Hrs  T(C): 36.8 (12 Jan 2021 07:42), Max: 37.2 (12 Jan 2021 07:36)  T(F): 98.3 (12 Jan 2021 07:42), Max: 98.9 (12 Jan 2021 07:36)  HR: 81 (12 Jan 2021 07:42) (76 - 81)  BP: 174/102 (12 Jan 2021 07:42) (149/110 - 174/102)  BP(mean): --  RR: 18 (12 Jan 2021 07:42) (18 - 18)  SpO2: 100% (12 Jan 2021 07:42) (96% - 100%)
Vital Signs Last 24 Hrs  T(C): 36.8 (13 Jan 2021 08:43), Max: 37.4 (12 Jan 2021 17:18)  T(F): 98.3 (13 Jan 2021 08:43), Max: 99.4 (12 Jan 2021 17:18)  HR: 84 (13 Jan 2021 08:43) (81 - 94)  BP: 169/109 (13 Jan 2021 08:43) (160/99 - 169/109)  BP(mean): --  RR: 20 (13 Jan 2021 08:43) (18 - 20)  SpO2: 93% (13 Jan 2021 08:43) (93% - 96%)
Vital Signs Last 24 Hrs  T(C): 37.5 (07 Jan 2021 08:00), Max: 37.5 (07 Jan 2021 08:00)  T(F): 99.5 (07 Jan 2021 08:00), Max: 99.5 (07 Jan 2021 08:00)  HR: 87 (07 Jan 2021 08:00) (79 - 93)  BP: 164/97 (07 Jan 2021 08:00) (131/84 - 164/97)  BP(mean): --  RR: 18 (07 Jan 2021 08:00) (18 - 20)  SpO2: 95% (07 Jan 2021 08:00) (93% - 98%)
Vital Signs Last 24 Hrs  T(C): 37.2 (06 Jan 2021 08:35), Max: 37.7 (05 Jan 2021 16:19)  T(F): 98.9 (06 Jan 2021 08:35), Max: 99.8 (05 Jan 2021 16:19)  HR: 82 (06 Jan 2021 08:35) (82 - 95)  BP: 150/110 (06 Jan 2021 08:35) (127/86 - 169/81)  RR: 18 (06 Jan 2021 08:35) (18 - 18)  SpO2: 96% (06 Jan 2021 08:35) (91% - 97%)
Vital Signs Last 24 Hrs  T(C): 37.4 (11 Jan 2021 08:18), Max: 37.4 (11 Jan 2021 08:18)  T(F): 99.4 (11 Jan 2021 08:18), Max: 99.4 (11 Jan 2021 08:18)  HR: 83 (11 Jan 2021 08:18) (80 - 85)  BP: 146/89 (11 Jan 2021 08:18) (130/95 - 158/100)  BP(mean): --  RR: 18 (11 Jan 2021 08:18) (16 - 18)  SpO2: 100% (11 Jan 2021 08:18) (95% - 100%)

## 2021-01-13 NOTE — PROGRESS NOTE BEHAVIORAL HEALTH - NSBHATTESTSEENBY_PSY_A_CORE
Attending Psychiatrist supervising NP/Trainee, meeting pt...

## 2021-01-13 NOTE — PROGRESS NOTE BEHAVIORAL HEALTH - ORIENTATION OTHER
Unable to assess ; patient selectively answers questions
Patient was non-verbal
Unable to assess ; patient not answering questions
Unable to assess ; patient selectively answers questions
Unable to assess ; patient not answering questions
Unable to assess ; patient not answering questions

## 2021-01-13 NOTE — PROGRESS NOTE BEHAVIORAL HEALTH - NSBHCONSULTFOLLOWAFTERCARE_PSY_A_CORE FT
Pt cleared for discharge from a psychiatric standpoint. Pt will follow up with outpatient Psychiatrist Dr. Park

## 2021-01-13 NOTE — PROGRESS NOTE BEHAVIORAL HEALTH - NSBHFUPINTERVALCCFT_PSY_A_CORE
"I am feeling better"
No complaints
" I wasn't feeling well"
"good morning"
"Good Morning"
Non-verbal patient

## 2021-01-15 ENCOUNTER — NON-APPOINTMENT (OUTPATIENT)
Age: 36
End: 2021-01-15

## 2021-01-20 ENCOUNTER — RX RENEWAL (OUTPATIENT)
Age: 36
End: 2021-01-20

## 2021-01-21 PROCEDURE — 97163 PT EVAL HIGH COMPLEX 45 MIN: CPT

## 2021-01-21 PROCEDURE — 83540 ASSAY OF IRON: CPT

## 2021-01-21 PROCEDURE — 36415 COLL VENOUS BLD VENIPUNCTURE: CPT

## 2021-01-21 PROCEDURE — 83605 ASSAY OF LACTIC ACID: CPT

## 2021-01-21 PROCEDURE — 71045 X-RAY EXAM CHEST 1 VIEW: CPT

## 2021-01-21 PROCEDURE — 86769 SARS-COV-2 COVID-19 ANTIBODY: CPT

## 2021-01-21 PROCEDURE — 84132 ASSAY OF SERUM POTASSIUM: CPT

## 2021-01-21 PROCEDURE — 85610 PROTHROMBIN TIME: CPT

## 2021-01-21 PROCEDURE — 87040 BLOOD CULTURE FOR BACTERIA: CPT

## 2021-01-21 PROCEDURE — 86706 HEP B SURFACE ANTIBODY: CPT

## 2021-01-21 PROCEDURE — 82962 GLUCOSE BLOOD TEST: CPT

## 2021-01-21 PROCEDURE — 93005 ELECTROCARDIOGRAM TRACING: CPT

## 2021-01-21 PROCEDURE — 83550 IRON BINDING TEST: CPT

## 2021-01-21 PROCEDURE — 86900 BLOOD TYPING SEROLOGIC ABO: CPT

## 2021-01-21 PROCEDURE — 83874 ASSAY OF MYOGLOBIN: CPT

## 2021-01-21 PROCEDURE — 85018 HEMOGLOBIN: CPT

## 2021-01-21 PROCEDURE — 0225U NFCT DS DNA&RNA 21 SARSCOV2: CPT

## 2021-01-21 PROCEDURE — U0005: CPT

## 2021-01-21 PROCEDURE — 86709 HEPATITIS A IGM ANTIBODY: CPT

## 2021-01-21 PROCEDURE — 87641 MR-STAPH DNA AMP PROBE: CPT

## 2021-01-21 PROCEDURE — 86901 BLOOD TYPING SEROLOGIC RH(D): CPT

## 2021-01-21 PROCEDURE — 82728 ASSAY OF FERRITIN: CPT

## 2021-01-21 PROCEDURE — 85025 COMPLETE CBC W/AUTO DIFF WBC: CPT

## 2021-01-21 PROCEDURE — 86803 HEPATITIS C AB TEST: CPT

## 2021-01-21 PROCEDURE — 85730 THROMBOPLASTIN TIME PARTIAL: CPT

## 2021-01-21 PROCEDURE — 87340 HEPATITIS B SURFACE AG IA: CPT

## 2021-01-21 PROCEDURE — 84460 ALANINE AMINO (ALT) (SGPT): CPT

## 2021-01-21 PROCEDURE — 80048 BASIC METABOLIC PNL TOTAL CA: CPT

## 2021-01-21 PROCEDURE — 85027 COMPLETE CBC AUTOMATED: CPT

## 2021-01-21 PROCEDURE — U0003: CPT

## 2021-01-21 PROCEDURE — 86850 RBC ANTIBODY SCREEN: CPT

## 2021-01-21 PROCEDURE — 84439 ASSAY OF FREE THYROXINE: CPT

## 2021-01-21 PROCEDURE — 86704 HEP B CORE ANTIBODY TOTAL: CPT

## 2021-01-21 PROCEDURE — 82947 ASSAY GLUCOSE BLOOD QUANT: CPT

## 2021-01-21 PROCEDURE — 87086 URINE CULTURE/COLONY COUNT: CPT

## 2021-01-21 PROCEDURE — 96375 TX/PRO/DX INJ NEW DRUG ADDON: CPT

## 2021-01-21 PROCEDURE — 99261: CPT

## 2021-01-21 PROCEDURE — 83036 HEMOGLOBIN GLYCOSYLATED A1C: CPT

## 2021-01-21 PROCEDURE — 82803 BLOOD GASES ANY COMBINATION: CPT

## 2021-01-21 PROCEDURE — 82435 ASSAY OF BLOOD CHLORIDE: CPT

## 2021-01-21 PROCEDURE — 87640 STAPH A DNA AMP PROBE: CPT

## 2021-01-21 PROCEDURE — 84295 ASSAY OF SERUM SODIUM: CPT

## 2021-01-21 PROCEDURE — 84100 ASSAY OF PHOSPHORUS: CPT

## 2021-01-21 PROCEDURE — 70450 CT HEAD/BRAIN W/O DYE: CPT

## 2021-01-21 PROCEDURE — 80307 DRUG TEST PRSMV CHEM ANLYZR: CPT

## 2021-01-21 PROCEDURE — 84484 ASSAY OF TROPONIN QUANT: CPT

## 2021-01-21 PROCEDURE — 96374 THER/PROPH/DIAG INJ IV PUSH: CPT

## 2021-01-21 PROCEDURE — 96376 TX/PRO/DX INJ SAME DRUG ADON: CPT

## 2021-01-21 PROCEDURE — 99291 CRITICAL CARE FIRST HOUR: CPT | Mod: 25

## 2021-01-21 PROCEDURE — 86705 HEP B CORE ANTIBODY IGM: CPT

## 2021-01-21 PROCEDURE — 36600 WITHDRAWAL OF ARTERIAL BLOOD: CPT

## 2021-01-21 PROCEDURE — 84443 ASSAY THYROID STIM HORMONE: CPT

## 2021-01-21 PROCEDURE — 83735 ASSAY OF MAGNESIUM: CPT

## 2021-01-21 PROCEDURE — 82140 ASSAY OF AMMONIA: CPT

## 2021-01-21 PROCEDURE — 82330 ASSAY OF CALCIUM: CPT

## 2021-01-21 PROCEDURE — 80053 COMPREHEN METABOLIC PANEL: CPT

## 2021-01-21 PROCEDURE — 85014 HEMATOCRIT: CPT

## 2021-01-21 PROCEDURE — 81001 URINALYSIS AUTO W/SCOPE: CPT

## 2021-01-21 PROCEDURE — 71046 X-RAY EXAM CHEST 2 VIEWS: CPT

## 2021-01-21 PROCEDURE — 84466 ASSAY OF TRANSFERRIN: CPT

## 2021-01-21 PROCEDURE — 84145 PROCALCITONIN (PCT): CPT

## 2021-01-26 ENCOUNTER — APPOINTMENT (OUTPATIENT)
Dept: FAMILY MEDICINE | Facility: CLINIC | Age: 36
End: 2021-01-26

## 2021-02-03 ENCOUNTER — INPATIENT (INPATIENT)
Facility: HOSPITAL | Age: 36
LOS: 6 days | Discharge: HOME | DRG: 871 | End: 2021-02-10
Attending: HOSPITALIST | Admitting: EMERGENCY MEDICINE
Payer: COMMERCIAL

## 2021-02-03 VITALS
SYSTOLIC BLOOD PRESSURE: 187 MMHG | HEIGHT: 65 IN | DIASTOLIC BLOOD PRESSURE: 122 MMHG | HEART RATE: 81 BPM | TEMPERATURE: 98 F | OXYGEN SATURATION: 87 % | WEIGHT: 279.99 LBS | RESPIRATION RATE: 24 BRPM

## 2021-02-03 DIAGNOSIS — R06.02 SHORTNESS OF BREATH: ICD-10-CM

## 2021-02-03 LAB
ALBUMIN SERPL ELPH-MCNC: 3.4 G/DL — SIGNIFICANT CHANGE UP (ref 3.3–5.2)
ALP SERPL-CCNC: 121 U/L — HIGH (ref 40–120)
ALT FLD-CCNC: 71 U/L — HIGH
ANION GAP SERPL CALC-SCNC: 13 MMOL/L — SIGNIFICANT CHANGE UP (ref 5–17)
ANISOCYTOSIS BLD QL: SLIGHT — SIGNIFICANT CHANGE UP
AST SERPL-CCNC: 30 U/L — SIGNIFICANT CHANGE UP
BASOPHILS # BLD AUTO: 0 K/UL — SIGNIFICANT CHANGE UP (ref 0–0.2)
BASOPHILS NFR BLD AUTO: 0 % — SIGNIFICANT CHANGE UP (ref 0–2)
BILIRUB SERPL-MCNC: 0.4 MG/DL — SIGNIFICANT CHANGE UP (ref 0.4–2)
BUN SERPL-MCNC: 56 MG/DL — HIGH (ref 8–20)
CALCIUM SERPL-MCNC: 9.1 MG/DL — SIGNIFICANT CHANGE UP (ref 8.6–10.2)
CHLORIDE SERPL-SCNC: 102 MMOL/L — SIGNIFICANT CHANGE UP (ref 98–107)
CO2 SERPL-SCNC: 24 MMOL/L — SIGNIFICANT CHANGE UP (ref 22–29)
CREAT SERPL-MCNC: 6.12 MG/DL — HIGH (ref 0.5–1.3)
EOSINOPHIL # BLD AUTO: 0.55 K/UL — HIGH (ref 0–0.5)
EOSINOPHIL NFR BLD AUTO: 2.6 % — SIGNIFICANT CHANGE UP (ref 0–6)
FERRITIN SERPL-MCNC: 148 NG/ML — SIGNIFICANT CHANGE UP (ref 30–400)
GIANT PLATELETS BLD QL SMEAR: PRESENT — SIGNIFICANT CHANGE UP
GLUCOSE BLDC GLUCOMTR-MCNC: 135 MG/DL — HIGH (ref 70–99)
GLUCOSE SERPL-MCNC: 139 MG/DL — HIGH (ref 70–99)
HCT VFR BLD CALC: 24.6 % — LOW (ref 39–50)
HGB BLD-MCNC: 7.8 G/DL — LOW (ref 13–17)
HYPOCHROMIA BLD QL: SLIGHT — SIGNIFICANT CHANGE UP
IRON SATN MFR SERPL: 20 UG/DL — LOW (ref 59–158)
IRON SATN MFR SERPL: 6 % — LOW (ref 16–55)
LYMPHOCYTES # BLD AUTO: 2.05 K/UL — SIGNIFICANT CHANGE UP (ref 1–3.3)
LYMPHOCYTES # BLD AUTO: 9.6 % — LOW (ref 13–44)
MACROCYTES BLD QL: SLIGHT — SIGNIFICANT CHANGE UP
MAGNESIUM SERPL-MCNC: 2.1 MG/DL — SIGNIFICANT CHANGE UP (ref 1.8–2.6)
MANUAL SMEAR VERIFICATION: SIGNIFICANT CHANGE UP
MCHC RBC-ENTMCNC: 23.6 PG — LOW (ref 27–34)
MCHC RBC-ENTMCNC: 31.7 GM/DL — LOW (ref 32–36)
MCV RBC AUTO: 74.3 FL — LOW (ref 80–100)
MONOCYTES # BLD AUTO: 0.19 K/UL — SIGNIFICANT CHANGE UP (ref 0–0.9)
MONOCYTES NFR BLD AUTO: 0.9 % — LOW (ref 2–14)
NEUTROPHILS # BLD AUTO: 18.53 K/UL — HIGH (ref 1.8–7.4)
NEUTROPHILS NFR BLD AUTO: 86.9 % — HIGH (ref 43–77)
NT-PROBNP SERPL-SCNC: 3031 PG/ML — HIGH (ref 0–300)
PLAT MORPH BLD: NORMAL — SIGNIFICANT CHANGE UP
PLATELET # BLD AUTO: 351 K/UL — SIGNIFICANT CHANGE UP (ref 150–400)
POIKILOCYTOSIS BLD QL AUTO: SIGNIFICANT CHANGE UP
POLYCHROMASIA BLD QL SMEAR: SLIGHT — SIGNIFICANT CHANGE UP
POTASSIUM SERPL-MCNC: 3.4 MMOL/L — LOW (ref 3.5–5.3)
POTASSIUM SERPL-SCNC: 3.4 MMOL/L — LOW (ref 3.5–5.3)
PROT SERPL-MCNC: 7.4 G/DL — SIGNIFICANT CHANGE UP (ref 6.6–8.7)
RBC # BLD: 3.31 M/UL — LOW (ref 4.2–5.8)
RBC # FLD: 17.2 % — HIGH (ref 10.3–14.5)
RBC BLD AUTO: ABNORMAL
SARS-COV-2 RNA SPEC QL NAA+PROBE: SIGNIFICANT CHANGE UP
SODIUM SERPL-SCNC: 139 MMOL/L — SIGNIFICANT CHANGE UP (ref 135–145)
TARGETS BLD QL SMEAR: SIGNIFICANT CHANGE UP
TIBC SERPL-MCNC: 316 UG/DL — SIGNIFICANT CHANGE UP (ref 220–430)
TRANSFERRIN SERPL-MCNC: 221 MG/DL — SIGNIFICANT CHANGE UP (ref 180–329)
TROPONIN T SERPL-MCNC: 0.05 NG/ML — SIGNIFICANT CHANGE UP (ref 0–0.06)
WBC # BLD: 21.32 K/UL — HIGH (ref 3.8–10.5)
WBC # FLD AUTO: 21.32 K/UL — HIGH (ref 3.8–10.5)

## 2021-02-03 PROCEDURE — 99223 1ST HOSP IP/OBS HIGH 75: CPT

## 2021-02-03 PROCEDURE — 99497 ADVNCD CARE PLAN 30 MIN: CPT | Mod: GC,25

## 2021-02-03 PROCEDURE — 99285 EMERGENCY DEPT VISIT HI MDM: CPT

## 2021-02-03 PROCEDURE — 71045 X-RAY EXAM CHEST 1 VIEW: CPT | Mod: 26

## 2021-02-03 RX ORDER — CEFTRIAXONE 500 MG/1
1000 INJECTION, POWDER, FOR SOLUTION INTRAMUSCULAR; INTRAVENOUS ONCE
Refills: 0 | Status: COMPLETED | OUTPATIENT
Start: 2021-02-03 | End: 2021-02-03

## 2021-02-03 RX ORDER — HYDRALAZINE HCL 50 MG
50 TABLET ORAL THREE TIMES A DAY
Refills: 0 | Status: DISCONTINUED | OUTPATIENT
Start: 2021-02-03 | End: 2021-02-10

## 2021-02-03 RX ORDER — ACETAMINOPHEN 500 MG
650 TABLET ORAL EVERY 6 HOURS
Refills: 0 | Status: DISCONTINUED | OUTPATIENT
Start: 2021-02-03 | End: 2021-02-10

## 2021-02-03 RX ORDER — SODIUM CHLORIDE 9 MG/ML
1000 INJECTION, SOLUTION INTRAVENOUS
Refills: 0 | Status: DISCONTINUED | OUTPATIENT
Start: 2021-02-03 | End: 2021-02-10

## 2021-02-03 RX ORDER — RISPERIDONE 4 MG/1
3 TABLET ORAL
Refills: 0 | Status: DISCONTINUED | OUTPATIENT
Start: 2021-02-03 | End: 2021-02-10

## 2021-02-03 RX ORDER — DEXTROSE 50 % IN WATER 50 %
12.5 SYRINGE (ML) INTRAVENOUS ONCE
Refills: 0 | Status: DISCONTINUED | OUTPATIENT
Start: 2021-02-03 | End: 2021-02-10

## 2021-02-03 RX ORDER — NITROGLYCERIN 6.5 MG
0.4 CAPSULE, EXTENDED RELEASE ORAL ONCE
Refills: 0 | Status: COMPLETED | OUTPATIENT
Start: 2021-02-03 | End: 2021-02-03

## 2021-02-03 RX ORDER — ASPIRIN/CALCIUM CARB/MAGNESIUM 324 MG
81 TABLET ORAL DAILY
Refills: 0 | Status: DISCONTINUED | OUTPATIENT
Start: 2021-02-03 | End: 2021-02-10

## 2021-02-03 RX ORDER — INSULIN LISPRO 100/ML
VIAL (ML) SUBCUTANEOUS
Refills: 0 | Status: DISCONTINUED | OUTPATIENT
Start: 2021-02-03 | End: 2021-02-10

## 2021-02-03 RX ORDER — TRAZODONE HCL 50 MG
100 TABLET ORAL AT BEDTIME
Refills: 0 | Status: DISCONTINUED | OUTPATIENT
Start: 2021-02-03 | End: 2021-02-10

## 2021-02-03 RX ORDER — INSULIN GLARGINE 100 [IU]/ML
20 INJECTION, SOLUTION SUBCUTANEOUS
Refills: 0 | Status: DISCONTINUED | OUTPATIENT
Start: 2021-02-03 | End: 2021-02-09

## 2021-02-03 RX ORDER — FUROSEMIDE 40 MG
40 TABLET ORAL ONCE
Refills: 0 | Status: COMPLETED | OUTPATIENT
Start: 2021-02-03 | End: 2021-02-03

## 2021-02-03 RX ORDER — CARVEDILOL PHOSPHATE 80 MG/1
12.5 CAPSULE, EXTENDED RELEASE ORAL EVERY 12 HOURS
Refills: 0 | Status: DISCONTINUED | OUTPATIENT
Start: 2021-02-03 | End: 2021-02-10

## 2021-02-03 RX ORDER — FOLIC ACID 0.8 MG
1 TABLET ORAL DAILY
Refills: 0 | Status: DISCONTINUED | OUTPATIENT
Start: 2021-02-03 | End: 2021-02-10

## 2021-02-03 RX ORDER — NIFEDIPINE 30 MG
30 TABLET, EXTENDED RELEASE 24 HR ORAL DAILY
Refills: 0 | Status: DISCONTINUED | OUTPATIENT
Start: 2021-02-03 | End: 2021-02-04

## 2021-02-03 RX ORDER — SEVELAMER CARBONATE 2400 MG/1
800 POWDER, FOR SUSPENSION ORAL
Refills: 0 | Status: DISCONTINUED | OUTPATIENT
Start: 2021-02-03 | End: 2021-02-10

## 2021-02-03 RX ORDER — ASPIRIN/CALCIUM CARB/MAGNESIUM 324 MG
325 TABLET ORAL ONCE
Refills: 0 | Status: COMPLETED | OUTPATIENT
Start: 2021-02-03 | End: 2021-02-03

## 2021-02-03 RX ORDER — CHOLECALCIFEROL (VITAMIN D3) 125 MCG
2000 CAPSULE ORAL
Refills: 0 | Status: DISCONTINUED | OUTPATIENT
Start: 2021-02-03 | End: 2021-02-10

## 2021-02-03 RX ORDER — DEXTROSE 50 % IN WATER 50 %
15 SYRINGE (ML) INTRAVENOUS ONCE
Refills: 0 | Status: DISCONTINUED | OUTPATIENT
Start: 2021-02-03 | End: 2021-02-10

## 2021-02-03 RX ORDER — ATORVASTATIN CALCIUM 80 MG/1
20 TABLET, FILM COATED ORAL AT BEDTIME
Refills: 0 | Status: DISCONTINUED | OUTPATIENT
Start: 2021-02-03 | End: 2021-02-10

## 2021-02-03 RX ORDER — ERYTHROPOIETIN 10000 [IU]/ML
10000 INJECTION, SOLUTION INTRAVENOUS; SUBCUTANEOUS ONCE
Refills: 0 | Status: COMPLETED | OUTPATIENT
Start: 2021-02-03 | End: 2021-02-03

## 2021-02-03 RX ORDER — DEXTROSE 50 % IN WATER 50 %
25 SYRINGE (ML) INTRAVENOUS ONCE
Refills: 0 | Status: DISCONTINUED | OUTPATIENT
Start: 2021-02-03 | End: 2021-02-10

## 2021-02-03 RX ORDER — GLUCAGON INJECTION, SOLUTION 0.5 MG/.1ML
1 INJECTION, SOLUTION SUBCUTANEOUS ONCE
Refills: 0 | Status: DISCONTINUED | OUTPATIENT
Start: 2021-02-03 | End: 2021-02-10

## 2021-02-03 RX ORDER — ALBUTEROL 90 UG/1
2.5 AEROSOL, METERED ORAL ONCE
Refills: 0 | Status: DISCONTINUED | OUTPATIENT
Start: 2021-02-03 | End: 2021-02-10

## 2021-02-03 RX ORDER — QUETIAPINE FUMARATE 200 MG/1
100 TABLET, FILM COATED ORAL AT BEDTIME
Refills: 0 | Status: DISCONTINUED | OUTPATIENT
Start: 2021-02-03 | End: 2021-02-10

## 2021-02-03 RX ADMIN — Medication 110 MILLIGRAM(S): at 18:53

## 2021-02-03 RX ADMIN — Medication 100 MILLIGRAM(S): at 21:17

## 2021-02-03 RX ADMIN — CEFTRIAXONE 100 MILLIGRAM(S): 500 INJECTION, POWDER, FOR SOLUTION INTRAMUSCULAR; INTRAVENOUS at 18:02

## 2021-02-03 RX ADMIN — Medication 40 MILLIGRAM(S): at 16:57

## 2021-02-03 RX ADMIN — Medication 325 MILLIGRAM(S): at 16:57

## 2021-02-03 RX ADMIN — Medication 0.4 MILLIGRAM(S): at 16:57

## 2021-02-03 RX ADMIN — ERYTHROPOIETIN 10000 UNIT(S): 10000 INJECTION, SOLUTION INTRAVENOUS; SUBCUTANEOUS at 21:17

## 2021-02-03 NOTE — CONSULT NOTE ADULT - SUBJECTIVE AND OBJECTIVE BOX
HPI: 37 y/o AAM usually gets dialysis MWF last went Saturday due to snow, c/o increasing SOB and cough which started yesterday, dialysis center sent to ED to rule out covid b/c of cough, fistula to left arm, now w + Dyspnea at rest volume overloaded denies HA CP N/V/D       PAST MEDICAL & SURGICAL HISTORY:  Insulin dependent type 2 diabetes mellitus    CKD (chronic kidney disease) requiring chronic dialysis    HTN (hypertension)    Sleep apnea    Bipolar 1 disorder    No significant past surgical history      FAMILY HISTORY:  FH: HTN (hypertension)  Father, siblings    Family history of CKD (chronic kidney disease)  mother    Family history of diabetes mellitus (Grandparent)  Father, siblings    [place]    Social History:Non smoker    MEDICATIONS  (STANDING):  ALBUTerol    0.083%. 2.5 milliGRAM(s) Nebulizer once  aspirin  chewable 81 milliGRAM(s) Oral daily  atorvastatin 20 milliGRAM(s) Oral at bedtime  carvedilol 12.5 milliGRAM(s) Oral every 12 hours  cholecalciferol 2000 Unit(s) Oral two times a day  dextrose 40% Gel 15 Gram(s) Oral once  dextrose 5%. 1000 milliLiter(s) (50 mL/Hr) IV Continuous <Continuous>  dextrose 5%. 1000 milliLiter(s) (100 mL/Hr) IV Continuous <Continuous>  dextrose 50% Injectable 25 Gram(s) IV Push once  dextrose 50% Injectable 12.5 Gram(s) IV Push once  dextrose 50% Injectable 25 Gram(s) IV Push once  folic acid 1 milliGRAM(s) Oral daily  glucagon  Injectable 1 milliGRAM(s) IntraMuscular once  hydrALAZINE 50 milliGRAM(s) Oral three times a day  insulin glargine Injectable (LANTUS) 20 Unit(s) SubCutaneous two times a day  insulin lispro (ADMELOG) corrective regimen sliding scale   SubCutaneous three times a day before meals  NIFEdipine XL 30 milliGRAM(s) Oral daily  QUEtiapine 100 milliGRAM(s) Oral at bedtime  risperiDONE   Tablet 3 milliGRAM(s) Oral two times a day  sevelamer carbonate 800 milliGRAM(s) Oral three times a day with meals  traZODone 100 milliGRAM(s) Oral at bedtime    MEDICATIONS  (PRN):   Meds reviewed      Vital Signs Last 24 Hrs  T(C): 36.7 (03 Feb 2021 19:37), Max: 36.7 (03 Feb 2021 15:53)  T(F): 98 (03 Feb 2021 19:37), Max: 98.1 (03 Feb 2021 15:53)  HR: 75 (03 Feb 2021 19:37) (75 - 88)  BP: 161/82 (03 Feb 2021 19:37) (160/77 - 187/122)  BP(mean): --  RR: 25 (03 Feb 2021 19:37) (24 - 26)  SpO2: 93% (03 Feb 2021 19:37) (87% - 93%)  Daily Height in cm: 165.1 (03 Feb 2021 15:53)    Daily     PHYSICAL EXAM:  GENERAL: NAD  HEAD: NCAT  EYES: EOMI  NECK: Supple, neck  veins full  NERVOUS SYSTEM:  Awake alert  CHEST/LUNG: Clear to percussion bilaterally  HEART: Regular rate and rhythm  ABDOMEN: Soft, Nontender, Nondistended; Bowel sounds present  EXTREMITIES: trace LE edema, L wrist AVF good thrill      LABS:                        7.8    21.32 )-----------( 351      ( 03 Feb 2021 17:10 )             24.6     02-03    139  |  102  |  56.0<H>  ----------------------------<  139<H>  3.4<L>   |  24.0  |  6.12<H>    Ca    9.1      03 Feb 2021 17:10  Mg     2.1     02-03    TPro  7.4  /  Alb  3.4  /  TBili  0.4  /  DBili  x   /  AST  30  /  ALT  71<H>  /  AlkPhos  121<H>  02-03        Magnesium, Serum: 2.1 mg/dL (02-03 @ 17:10)          RADIOLOGY & ADDITIONAL TESTS:

## 2021-02-03 NOTE — H&P ADULT - ASSESSMENT
34yo M w/ PMHx of HTN, ESRD (on HD M/W/F), AOCD, IDDM-2, ARVIND (not on cpap), morbid obesity, bipolar disorder presents to the ER due to difficulty breathing. Admitted for pulmonary edema.     -Admit to medicine   -Continuous pulse ox     Pulmonary edema in setting of volume overload 2/2 missed HD   -C/w O2 via NC, maintain O2 above 92%  -Last HD was last Wednesday.   -CXR consistent w/ pulmonary edema   -covid pcr negative   -Nephro consulted. HD tonight   -s/p Lasix 40mg X1   -BNP 3031  -AG wnl     ESRD on HD (MWF)  -C/w HD sessions   -Nephro consulted   -C/w Folic acid, sevelamer   -Avoid nephrotoxic meds     AOCD in setting of ESRD  -Hb 7.8, baseline 9  -No active bleeding  -F/u CBC in am     IDDM-2  -A1c 12.3 in 1/1/21  -Lantus 20U BID  -HISS, hypoglycemia protocol     HTN  -Elevated BP noted, likely in setting of volume overload  -continue to monitor   -C/w home medications: Coreg, Hydralazine, Nifedipine   -DASH/TLC diet    Bipolar disorder  -Stable, denies SI/HI or intents   -C/w Risperidone, Seroquel  -Pt also takes Risperdal Consta q2w, last dose scheduled on 1/27/21    DVT ppx: SCD boots for now. Will hold off pharm AC due to concerns for bleeding risk w/ drop in Hb from baseline.           36yo M w/ PMHx of HTN, ESRD (on HD M/W/F), AOCD, IDDM-2, ARVIND (not on cpap), morbid obesity, bipolar disorder presents to the ER due to difficulty breathing. Admitted for pulmonary edema.     -Admit to medicine   -Continuous pulse ox     Pulmonary edema in setting of volume overload 2/2 missed HD   -C/w O2 via NC, maintain O2 above 92%  -Last HD was last Wednesday.   -CXR consistent w/ pulmonary edema   -covid pcr negative   -Nephro consulted. HD tonight   -s/p Lasix 40mg X1   -BNP 3031  -AG wnl   -Trop neg X1     ESRD on HD (MWF)  -C/w HD sessions   -Nephro consulted   -C/w Folic acid, sevelamer   -Avoid nephrotoxic meds   -Renal diet, daily weights, strict I&O's     HTN  -Elevated BP noted, likely in setting of volume overload  -continue to monitor   -C/w home medications: Coreg, Hydralazine, Nifedipine   -DASH/TLC diet    AOCD  -Likely in setting of ESRD  -Hb 7.8, baseline 9  -No active bleeding  -F/u CBC in am     Leukocytosis, chronic   -Noted during prior admission since 4/18  -Pt afebrile, covid negative, AVF no concerns for infection, cxr w/ no consolidation   -unclear etiology  -consider Heme-onc consult for further eval     IDDM-2  -A1c 12.3 in 1/1/21  -Lantus 20U BID  -HISS, hypoglycemia protocol     Bipolar disorder  -Stable, denies SI/HI or intents   -C/w Risperidone, Seroquel  -Pt also takes Risperdal Consta q2w, last dose scheduled on 1/27/21    DVT ppx: SCD boots for now. Will hold off pharm AC due to concerns for bleeding risk w/ drop in Hb from baseline.           34yo M w/ PMHx of HTN, ESRD (on HD M/W/F), AOCD, IDDM-2, ARVIND (not on cpap), morbid obesity, bipolar disorder presents to the ER due to difficulty breathing. Admitted for pulmonary edema.     -Admit to medicine   -Continuous pulse ox     Pulmonary edema in setting of volume overload 2/2 missed HD   -C/w O2 via NC, maintain O2 above 92%  -Last HD was last Wednesday.   -CXR consistent w/ pulmonary edema   -covid pcr negative   -Nephro consulted. HD tonight   -s/p Lasix 40mg X1   -BNP 3031  -AG wnl   -Trop neg X1, EKG noted     ESRD on HD (MWF)  -C/w HD sessions   -Nephro consulted   -C/w Folic acid, sevelamer   -Avoid nephrotoxic meds   -Renal diet, daily weights, strict I&O's     HTN  -Elevated BP noted, likely in setting of volume overload  -continue to monitor   -C/w home medications: Coreg, Hydralazine, Nifedipine   -DASH/TLC diet    AOCD  -Likely in setting of ESRD  -Hb 7.8, baseline 9  -No active bleeding  -F/u CBC & iron studies   -C/w EPO  -Nephro following     Leukocytosis, chronic   -Noted during prior admission since 4/18  -Pt afebrile, covid negative, AVF no concerns for infection, cxr w/ no consolidation   -unclear etiology  -consider Heme-onc consult for further eval     IDDM-2  -A1c 12.3 in 1/1/21  -Lantus 20U BID  -HISS, hypoglycemia protocol     Bipolar disorder & MDD  -Stable, denies SI/HI or intents   -C/w Risperidone, Seroquel. QTc 480 continue to monitor   -C/w Trazodone   -Pt also takes Risperdal Consta q2w, last dose scheduled on 1/27/21    HLD  -C/w Atorvastatin     DVT ppx: SCD boots for now. Will hold off pharm AC due to concerns for bleeding risk w/ drop in Hb from baseline.           34yo M w/ PMHx of HTN, ESRD (on HD M/W/F), AOCD, IDDM-2, ARVIND (not on cpap), morbid obesity, bipolar disorder presents to the ER due to difficulty breathing. Admitted for acute pulmonary edema.     -Admit to medicine   -Continuous pulse ox     Acute Pulmonary edema in setting of volume overload 2/2 missed HD   -C/w O2 via NC, maintain O2 above 92%  -Last HD was last Wednesday.   -CXR consistent w/ pulmonary edema   -covid pcr negative   -Nephro consulted. HD tonight   -s/p Lasix 40mg X1   -BNP 3031  -AG wnl   -Trop neg X1, EKG noted     ESRD on HD (MWF)  -C/w HD sessions   -Nephro consulted   -C/w Folic acid, sevelamer   -Avoid nephrotoxic meds   -Renal diet, daily weights, strict I&O's     HTN  -Elevated BP noted, likely in setting of volume overload  -continue to monitor   -C/w home medications: Coreg, Hydralazine, Nifedipine   -DASH/TLC diet    AOCD  -Likely in setting of ESRD  -Hb 7.8, baseline 9  -No active bleeding  -F/u CBC & iron studies   -C/w EPO  -Nephro following     Leukocytosis, chronic   -Noted during prior admission since 4/18  -Pt afebrile, covid negative, AVF no concerns for infection, cxr w/ no consolidation   -unclear etiology  -consider Heme-onc consult for further eval     IDDM-2  -A1c 12.3 in 1/1/21  -Lantus 20U BID  -HISS, hypoglycemia protocol     Bipolar disorder & MDD  -Stable, denies SI/HI or intents   -C/w Risperidone, Seroquel. QTc 480 continue to monitor   -C/w Trazodone   -Pt also takes Risperdal Consta q2w, last dose scheduled on 1/27/21    HLD  -C/w Atorvastatin     DVT ppx: SCD boots for now. Will hold off pharm AC due to concerns for bleeding risk w/ drop in Hb from baseline.           34yo M w/ PMHx of HTN, ESRD (on HD M/W/F), AOCD, IDDM-2, ARVIND (not on cpap), morbid obesity, bipolar disorder, MDD presents to the ER due to difficulty breathing. Admitted for acute pulmonary edema.     -Admit to medicine   -Continuous pulse ox     Acute Pulmonary edema in setting of volume overload 2/2 missed HD   -C/w O2 via NC, maintain O2 above 92%  -Last HD was last Wednesday.   -CXR consistent w/ pulmonary edema   -covid pcr negative   -Nephro consulted. HD tonight   -s/p Lasix 40mg X1   -BNP 3031  -AG wnl   -Trop neg X1, EKG noted     ESRD on HD (MWF)  -C/w HD sessions   -Nephro consulted   -C/w Folic acid, sevelamer   -Avoid nephrotoxic meds   -Renal diet, daily weights, strict I&O's     HTN  -Elevated BP noted, likely in setting of volume overload  -continue to monitor   -C/w home medications: Coreg, Hydralazine, Nifedipine   -DASH/TLC diet    AOCD  -Likely in setting of ESRD  -Hb 7.8, baseline 9  -No active bleeding  -F/u CBC & iron studies   -C/w EPO  -Nephro following     Leukocytosis, chronic   -Noted during prior admission since 4/18  -Pt afebrile, covid negative, AVF no concerns for infection, cxr w/ no consolidation   -unclear etiology  -consider Heme-onc consult for further eval     IDDM-2  -A1c 12.3 in 1/1/21  -Lantus 20U BID  -HISS, hypoglycemia protocol     Bipolar disorder & MDD  -Stable, denies SI/HI or intents   -C/w Risperidone, Seroquel. QTc 480 continue to monitor   -C/w Trazodone   -Pt also takes Risperdal Consta q2w, last dose scheduled on 1/27/21    ARVIND and obesity  -Not on CPAP  -Counselled on diet and weight loss     HLD  -C/w Atorvastatin     DVT ppx: SCD boots for now. Will hold off pharm AC due to concerns for bleeding risk w/ drop in Hb from baseline.

## 2021-02-03 NOTE — H&P ADULT - NSICDXFAMILYHX_GEN_ALL_CORE_FT
FAMILY HISTORY:  Family history of CKD (chronic kidney disease), mother  FH: HTN (hypertension), Father, siblings    Grandparent  Still living? No  Family history of diabetes mellitus, Age at diagnosis: Age Unknown

## 2021-02-03 NOTE — H&P ADULT - NSHPPHYSICALEXAM_GEN_ALL_CORE
Vital Signs Last 24 Hrs  T(C): 36.7 (03 Feb 2021 19:37), Max: 36.7 (03 Feb 2021 15:53)  T(F): 98 (03 Feb 2021 19:37), Max: 98.1 (03 Feb 2021 15:53)  HR: 75 (03 Feb 2021 19:37) (75 - 88)  BP: 161/82 (03 Feb 2021 19:37) (160/77 - 187/122)  RR: 25 (03 Feb 2021 19:37) (24 - 26)  SpO2: 93% (03 Feb 2021 19:37) (87% - 93%)    PHYSICAL EXAMINATION  General: The patient is awake & alert, NAD  HEENT: NCAT, MMM, EOMI, exophthalmus b/l?   Neck: Supple, no JVD  Pulm: CTA b/l, no wheezing, rales or rhonchi; increased work of breathing during coughing episodes   Cardio: RRR, Normal +S1/S2, no m/g/r  GI: +obese, BS +, Soft, NT, ND, no rebound, guarding or rigidity  Extremities: No edema, calf tenderness or cyanosis, +2 DP b/l, Right forearm: +AVF w/ +thrill   Derm: warm, dry, normal turgor  Neurologic: No focal deficits, CN II-XII grossly intact, moving all extremities spontaneously   Psych: Normal affect and mood Vital Signs Last 24 Hrs  T(C): 36.7 (03 Feb 2021 19:37), Max: 36.7 (03 Feb 2021 15:53)  T(F): 98 (03 Feb 2021 19:37), Max: 98.1 (03 Feb 2021 15:53)  HR: 75 (03 Feb 2021 19:37) (75 - 88)  BP: 161/82 (03 Feb 2021 19:37) (160/77 - 187/122)  RR: 25 (03 Feb 2021 19:37) (24 - 26)  SpO2: 93% (03 Feb 2021 19:37) (87% - 93%)    PHYSICAL EXAMINATION  General: The patient is awake & alert, NAD  HEENT: NCAT, MMM, EOMI, exophthalmus b/l?   Neck: Supple, no JVD  Pulm: CTA b/l, no wheezing, rales or rhonchi; increased work of breathing during coughing episodes   Cardio: RRR, Normal +S1/S2, no m/g/r  GI: +obese, BS +, Soft, NT, ND, no rebound, guarding or rigidity  Extremities: No edema, calf tenderness or cyanosis, +2 DP b/l, Right forearm: +AVF w/ +thrill, no concerns for infection   Derm: warm, dry, normal turgor  Neurologic: No focal deficits, CN II-XII grossly intact, moving all extremities spontaneously   Psych: Normal affect and mood

## 2021-02-03 NOTE — ED PROVIDER NOTE - OBJECTIVE STATEMENT
Pt is a 35 y/o m, PMH significant for HTN, HLD. DM, ESRD ( dialysis M/W/F ), presents to ED c/o SOB. Pt states he was at the dialysis center this afternoon when he developed a chest tightness associated with SOB prior to starting treatment and was brought to the ED by EMS. Pt reports feeling a substernal chest tightness and difficulty catching his breath. Pt states he has had a nonproductive cough since onset of symptoms.  Pt last dialysis was last Saturday secondary to inclement weather. Pt has not taken any medication in attempt to alleviate his symptoms. No other complaints at this time. Denies fevers, chills, nausea, vomiting, abdominal pain, urinary symptoms.  Nephrology: Dr. Fink ( [facility] )

## 2021-02-03 NOTE — CONSULT NOTE ADULT - ASSESSMENT
ESRD   has h/o DM HTN Bipolar dz  Missed HD is now SOB / Volume overloaded  Will arrange urgent HD today  May need treatment again ascencion  Anemia will give KEV  Will check iron studies  Consent for HD in chart    Will follow

## 2021-02-03 NOTE — H&P ADULT - NSHPREVIEWOFSYSTEMS_GEN_ALL_CORE
+dry cough, +sob  Denies fever, chills, n/v/d/c, palpitations, abdominal pain, numbness or tingling, urinary or bowel changes, burning with urination, blood in urine or stool, calf tenderness.

## 2021-02-03 NOTE — H&P ADULT - NSHPSOCIALHISTORY_GEN_ALL_CORE
-Former tobacco user, quit 2 months ago, smoked 3 cig/day for < 1 yr  -Denies EtOH or illicit drug use   -Lives with siblings

## 2021-02-03 NOTE — ED PROVIDER NOTE - CONSTITUTIONAL, MLM
Tachypneic  awake, alert, oriented to person, place, time/situation and in no apparent distress. normal...

## 2021-02-03 NOTE — H&P ADULT - NSICDXPASTMEDICALHX_GEN_ALL_CORE_FT
PAST MEDICAL HISTORY:  Bipolar 1 disorder     CKD (chronic kidney disease) requiring chronic dialysis     HTN (hypertension)     Insulin dependent type 2 diabetes mellitus     Sleep apnea

## 2021-02-03 NOTE — ED ADULT NURSE NOTE - CHIEF COMPLAINT QUOTE
Pt BIBA from dialysis center, usually gets dialysis MWF last went Saturday due to snow, c/o increasing SOB and cough which started yesterday, dialysis center sent to ED to rule out covid b/c of cough, fistula to left arm

## 2021-02-03 NOTE — ED PROVIDER NOTE - CLINICAL SUMMARY MEDICAL DECISION MAKING FREE TEXT BOX
36m acute onset of SOB associated with chest tightness while at dialysis today. Sating 86% on room air, + crackles base of lungs. Last dialysis Saturday. Symptoms likely secondary to hypervolemia. Will check labs, CXR, COVID, treat with lasix, consult nephrology. Likely medicine admission.

## 2021-02-03 NOTE — H&P ADULT - HISTORY OF PRESENT ILLNESS
34yo M w/ PMHx of HTN, ESRD (on HD M/W/F), AOCD, IDDM-2, ARVIND (not on cpap), morbid obesity, bipolar disorder presents to the ER due to difficulty breathing. Pt per, he has not been feeling well for the past few days. Reports shortness of breath at rest and w/ ambulation w/ associated dry cough. Pt also reports that his stomach feels bloated. On Saturday he was supposed to have HD, however, he was able to get a ride. Today, he went and was noted to have worsening shortness of breath and was told to go to the ER for further evaluation. Pt denies any sick contacts, recent travel, chest pain, palpitations.     In the ED: /122, RR 24, O2 on RA 87% - improved w/ 6L O2 via NC. S/p Lasix 40mg IVP X1, Doxy 100mg IV X1, Rocephin 1g X1, Nitroglycerin 0.4mg X1 34yo M w/ PMHx of HTN, ESRD (on HD M/W/F), AOCD, IDDM-2, ARVIND (not on cpap), morbid obesity, bipolar disorder, MDD presents to the ER due to difficulty breathing. Pt per, he has not been feeling well for the past few days. Reports shortness of breath at rest and w/ ambulation w/ associated dry cough. Pt also reports that his stomach feels bloated. On Saturday he was supposed to have HD, however, he was able to get a ride. Today, he went and was noted to have worsening shortness of breath and was told to go to the ER for further evaluation. Pt denies any sick contacts, recent travel, chest pain, palpitations.     In the ED: /122, RR 24, O2 on RA 87% - improved w/ 6L O2 via NC. S/p Lasix 40mg IVP X1, Doxy 100mg IV X1, Rocephin 1g X1, Nitroglycerin 0.4mg X1

## 2021-02-03 NOTE — ED PROVIDER NOTE - PROGRESS NOTE DETAILS
PA NOTE: Pt reports improvement in symptoms s/p treatment, still mildly tachypneic. Pt with + b/l lung infiltrates, infection vs fluid overload. + urine output s/p lasix. Case discussed with Dr. Fink, pt to be admitted to hospitalist service for further management. Dr. Villalpando to arrange dialysis.

## 2021-02-03 NOTE — ED PROVIDER NOTE - ATTENDING CONTRIBUTION TO CARE
The patient seen and examined    Dyspnea    I, Douglas Coreas, performed the initial face to face bedside interview with this patient regarding history of present illness, review of symptoms and relevant past medical, social and family history.  I completed an independent physical examination.  I was the initial provider who evaluated this patient. I have signed out the follow up of any pending tests (i.e. labs, radiological studies) to the ACP.  I have communicated the patient’s plan of care and disposition with the ACP.

## 2021-02-03 NOTE — H&P ADULT - NSHPLABSRESULTS_GEN_ALL_CORE
7.8    21.32 )-----------( 351      ( 03 Feb 2021 17:10 )             24.6     02-03    139  |  102  |  56.0<H>  ----------------------------<  139<H>  3.4<L>   |  24.0  |  6.12<H>    Ca    9.1      03 Feb 2021 17:10  Mg     2.1     02-03    TPro  7.4  /  Alb  3.4  /  TBili  0.4  /  DBili  x   /  AST  30  /  ALT  71<H>  /  AlkPhos  121<H>  02-03    Troponin T, Serum (02.03.21 @ 17:11)    Troponin T, Serum: 0.05

## 2021-02-04 LAB
ALBUMIN SERPL ELPH-MCNC: 3.3 G/DL — SIGNIFICANT CHANGE UP (ref 3.3–5.2)
ALP SERPL-CCNC: 113 U/L — SIGNIFICANT CHANGE UP (ref 40–120)
ALT FLD-CCNC: 55 U/L — HIGH
ANION GAP SERPL CALC-SCNC: 13 MMOL/L — SIGNIFICANT CHANGE UP (ref 5–17)
APPEARANCE UR: CLEAR — SIGNIFICANT CHANGE UP
APPEARANCE UR: CLEAR — SIGNIFICANT CHANGE UP
AST SERPL-CCNC: 21 U/L — SIGNIFICANT CHANGE UP
BACTERIA # UR AUTO: ABNORMAL
BASE EXCESS BLDA CALC-SCNC: 4.1 MMOL/L — HIGH (ref -3–3)
BASE EXCESS BLDA CALC-SCNC: 5 MMOL/L — HIGH (ref -3–3)
BASOPHILS # BLD AUTO: 0.1 K/UL — SIGNIFICANT CHANGE UP (ref 0–0.2)
BASOPHILS NFR BLD AUTO: 0.4 % — SIGNIFICANT CHANGE UP (ref 0–2)
BILIRUB SERPL-MCNC: 0.4 MG/DL — SIGNIFICANT CHANGE UP (ref 0.4–2)
BILIRUB UR-MCNC: NEGATIVE — SIGNIFICANT CHANGE UP
BILIRUB UR-MCNC: NEGATIVE — SIGNIFICANT CHANGE UP
BLOOD GAS COMMENTS ARTERIAL: SIGNIFICANT CHANGE UP
BLOOD GAS COMMENTS ARTERIAL: SIGNIFICANT CHANGE UP
BUN SERPL-MCNC: 42 MG/DL — HIGH (ref 8–20)
CALCIUM SERPL-MCNC: 9.1 MG/DL — SIGNIFICANT CHANGE UP (ref 8.6–10.2)
CHLORIDE SERPL-SCNC: 101 MMOL/L — SIGNIFICANT CHANGE UP (ref 98–107)
CO2 SERPL-SCNC: 26 MMOL/L — SIGNIFICANT CHANGE UP (ref 22–29)
COLOR SPEC: YELLOW — SIGNIFICANT CHANGE UP
COLOR SPEC: YELLOW — SIGNIFICANT CHANGE UP
CREAT SERPL-MCNC: 5.69 MG/DL — HIGH (ref 0.5–1.3)
DIFF PNL FLD: ABNORMAL
DIFF PNL FLD: ABNORMAL
EOSINOPHIL # BLD AUTO: 0.94 K/UL — HIGH (ref 0–0.5)
EOSINOPHIL NFR BLD AUTO: 3.7 % — SIGNIFICANT CHANGE UP (ref 0–6)
EPI CELLS # UR: SIGNIFICANT CHANGE UP
EPI CELLS # UR: SIGNIFICANT CHANGE UP
GAS PNL BLDA: SIGNIFICANT CHANGE UP
GAS PNL BLDA: SIGNIFICANT CHANGE UP
GLUCOSE BLDC GLUCOMTR-MCNC: 156 MG/DL — HIGH (ref 70–99)
GLUCOSE BLDC GLUCOMTR-MCNC: 175 MG/DL — HIGH (ref 70–99)
GLUCOSE BLDC GLUCOMTR-MCNC: 178 MG/DL — HIGH (ref 70–99)
GLUCOSE BLDC GLUCOMTR-MCNC: 180 MG/DL — HIGH (ref 70–99)
GLUCOSE BLDC GLUCOMTR-MCNC: 242 MG/DL — HIGH (ref 70–99)
GLUCOSE SERPL-MCNC: 170 MG/DL — HIGH (ref 70–99)
GLUCOSE UR QL: 100 MG/DL
GLUCOSE UR QL: NEGATIVE MG/DL — SIGNIFICANT CHANGE UP
HCO3 BLDA-SCNC: 28 MMOL/L — HIGH (ref 20–26)
HCO3 BLDA-SCNC: 29 MMOL/L — HIGH (ref 20–26)
HCT VFR BLD CALC: 24.9 % — LOW (ref 39–50)
HGB BLD-MCNC: 7.7 G/DL — LOW (ref 13–17)
HOROWITZ INDEX BLDA+IHG-RTO: 0.5 — SIGNIFICANT CHANGE UP
HOROWITZ INDEX BLDA+IHG-RTO: SIGNIFICANT CHANGE UP
IMM GRANULOCYTES NFR BLD AUTO: 0.6 % — SIGNIFICANT CHANGE UP (ref 0–1.5)
KETONES UR-MCNC: NEGATIVE — SIGNIFICANT CHANGE UP
KETONES UR-MCNC: NEGATIVE — SIGNIFICANT CHANGE UP
LEUKOCYTE ESTERASE UR-ACNC: NEGATIVE — SIGNIFICANT CHANGE UP
LEUKOCYTE ESTERASE UR-ACNC: NEGATIVE — SIGNIFICANT CHANGE UP
LYMPHOCYTES # BLD AUTO: 10.1 % — LOW (ref 13–44)
LYMPHOCYTES # BLD AUTO: 2.58 K/UL — SIGNIFICANT CHANGE UP (ref 1–3.3)
MAGNESIUM SERPL-MCNC: 1.9 MG/DL — SIGNIFICANT CHANGE UP (ref 1.8–2.6)
MCHC RBC-ENTMCNC: 23.1 PG — LOW (ref 27–34)
MCHC RBC-ENTMCNC: 30.9 GM/DL — LOW (ref 32–36)
MCV RBC AUTO: 74.6 FL — LOW (ref 80–100)
MONOCYTES # BLD AUTO: 1.51 K/UL — HIGH (ref 0–0.9)
MONOCYTES NFR BLD AUTO: 5.9 % — SIGNIFICANT CHANGE UP (ref 2–14)
NEUTROPHILS # BLD AUTO: 20.25 K/UL — HIGH (ref 1.8–7.4)
NEUTROPHILS NFR BLD AUTO: 79.3 % — HIGH (ref 43–77)
NITRITE UR-MCNC: NEGATIVE — SIGNIFICANT CHANGE UP
NITRITE UR-MCNC: NEGATIVE — SIGNIFICANT CHANGE UP
PCO2 BLDA: 47 MMHG — HIGH (ref 35–45)
PCO2 BLDA: 50 MMHG — HIGH (ref 35–45)
PH BLDA: 7.39 — SIGNIFICANT CHANGE UP (ref 7.35–7.45)
PH BLDA: 7.42 — SIGNIFICANT CHANGE UP (ref 7.35–7.45)
PH UR: 6 — SIGNIFICANT CHANGE UP (ref 5–8)
PH UR: 6 — SIGNIFICANT CHANGE UP (ref 5–8)
PHOSPHATE SERPL-MCNC: 3.5 MG/DL — SIGNIFICANT CHANGE UP (ref 2.4–4.7)
PLATELET # BLD AUTO: 320 K/UL — SIGNIFICANT CHANGE UP (ref 150–400)
PO2 BLDA: 45 MMHG — CRITICAL LOW (ref 83–108)
PO2 BLDA: 57 MMHG — LOW (ref 83–108)
POTASSIUM SERPL-MCNC: 3.6 MMOL/L — SIGNIFICANT CHANGE UP (ref 3.5–5.3)
POTASSIUM SERPL-SCNC: 3.6 MMOL/L — SIGNIFICANT CHANGE UP (ref 3.5–5.3)
PROT SERPL-MCNC: 7 G/DL — SIGNIFICANT CHANGE UP (ref 6.6–8.7)
PROT UR-MCNC: 500 MG/DL
PROT UR-MCNC: 500 MG/DL
RBC # BLD: 3.34 M/UL — LOW (ref 4.2–5.8)
RBC # FLD: 17.1 % — HIGH (ref 10.3–14.5)
RBC CASTS # UR COMP ASSIST: SIGNIFICANT CHANGE UP /HPF (ref 0–4)
RBC CASTS # UR COMP ASSIST: SIGNIFICANT CHANGE UP /HPF (ref 0–4)
SAO2 % BLDA: 81 % — LOW (ref 95–99)
SAO2 % BLDA: 91 % — LOW (ref 95–99)
SARS-COV-2 IGG SERPL QL IA: NEGATIVE — SIGNIFICANT CHANGE UP
SARS-COV-2 IGM SERPL IA-ACNC: 0.06 INDEX — SIGNIFICANT CHANGE UP
SODIUM SERPL-SCNC: 140 MMOL/L — SIGNIFICANT CHANGE UP (ref 135–145)
SP GR SPEC: 1.01 — SIGNIFICANT CHANGE UP (ref 1.01–1.02)
SP GR SPEC: 1.01 — SIGNIFICANT CHANGE UP (ref 1.01–1.02)
UROBILINOGEN FLD QL: NEGATIVE MG/DL — SIGNIFICANT CHANGE UP
UROBILINOGEN FLD QL: NEGATIVE MG/DL — SIGNIFICANT CHANGE UP
WBC # BLD: 25.54 K/UL — HIGH (ref 3.8–10.5)
WBC # FLD AUTO: 25.54 K/UL — HIGH (ref 3.8–10.5)
WBC UR QL: NEGATIVE — SIGNIFICANT CHANGE UP
WBC UR QL: SIGNIFICANT CHANGE UP

## 2021-02-04 PROCEDURE — 99233 SBSQ HOSP IP/OBS HIGH 50: CPT

## 2021-02-04 PROCEDURE — 71045 X-RAY EXAM CHEST 1 VIEW: CPT | Mod: 26

## 2021-02-04 RX ORDER — NIFEDIPINE 30 MG
60 TABLET, EXTENDED RELEASE 24 HR ORAL DAILY
Refills: 0 | Status: DISCONTINUED | OUTPATIENT
Start: 2021-02-05 | End: 2021-02-10

## 2021-02-04 RX ORDER — PIPERACILLIN AND TAZOBACTAM 4; .5 G/20ML; G/20ML
3.38 INJECTION, POWDER, LYOPHILIZED, FOR SOLUTION INTRAVENOUS EVERY 12 HOURS
Refills: 0 | Status: DISCONTINUED | OUTPATIENT
Start: 2021-02-04 | End: 2021-02-04

## 2021-02-04 RX ORDER — ALBUMIN HUMAN 25 %
100 VIAL (ML) INTRAVENOUS ONCE
Refills: 0 | Status: COMPLETED | OUTPATIENT
Start: 2021-02-04 | End: 2021-02-04

## 2021-02-04 RX ORDER — SENNA PLUS 8.6 MG/1
2 TABLET ORAL AT BEDTIME
Refills: 0 | Status: DISCONTINUED | OUTPATIENT
Start: 2021-02-04 | End: 2021-02-10

## 2021-02-04 RX ORDER — PIPERACILLIN AND TAZOBACTAM 4; .5 G/20ML; G/20ML
3.38 INJECTION, POWDER, LYOPHILIZED, FOR SOLUTION INTRAVENOUS ONCE
Refills: 0 | Status: COMPLETED | OUTPATIENT
Start: 2021-02-04 | End: 2021-02-04

## 2021-02-04 RX ORDER — PIPERACILLIN AND TAZOBACTAM 4; .5 G/20ML; G/20ML
3.38 INJECTION, POWDER, LYOPHILIZED, FOR SOLUTION INTRAVENOUS EVERY 12 HOURS
Refills: 0 | Status: DISCONTINUED | OUTPATIENT
Start: 2021-02-04 | End: 2021-02-09

## 2021-02-04 RX ORDER — NIFEDIPINE 30 MG
30 TABLET, EXTENDED RELEASE 24 HR ORAL ONCE
Refills: 0 | Status: COMPLETED | OUTPATIENT
Start: 2021-02-04 | End: 2021-02-04

## 2021-02-04 RX ORDER — SACCHAROMYCES BOULARDII 250 MG
250 POWDER IN PACKET (EA) ORAL
Refills: 0 | Status: DISCONTINUED | OUTPATIENT
Start: 2021-02-04 | End: 2021-02-10

## 2021-02-04 RX ORDER — FUROSEMIDE 40 MG
80 TABLET ORAL ONCE
Refills: 0 | Status: COMPLETED | OUTPATIENT
Start: 2021-02-04 | End: 2021-02-04

## 2021-02-04 RX ORDER — IRON SUCROSE 20 MG/ML
200 INJECTION, SOLUTION INTRAVENOUS EVERY 24 HOURS
Refills: 0 | Status: COMPLETED | OUTPATIENT
Start: 2021-02-04 | End: 2021-02-08

## 2021-02-04 RX ORDER — POLYETHYLENE GLYCOL 3350 17 G/17G
17 POWDER, FOR SOLUTION ORAL DAILY
Refills: 0 | Status: DISCONTINUED | OUTPATIENT
Start: 2021-02-04 | End: 2021-02-10

## 2021-02-04 RX ORDER — CHLORHEXIDINE GLUCONATE 213 G/1000ML
1 SOLUTION TOPICAL
Refills: 0 | Status: DISCONTINUED | OUTPATIENT
Start: 2021-02-04 | End: 2021-02-10

## 2021-02-04 RX ORDER — HYDRALAZINE HCL 50 MG
10 TABLET ORAL EVERY 6 HOURS
Refills: 0 | Status: DISCONTINUED | OUTPATIENT
Start: 2021-02-04 | End: 2021-02-05

## 2021-02-04 RX ORDER — ERYTHROPOIETIN 10000 [IU]/ML
10000 INJECTION, SOLUTION INTRAVENOUS; SUBCUTANEOUS ONCE
Refills: 0 | Status: DISCONTINUED | OUTPATIENT
Start: 2021-02-04 | End: 2021-02-08

## 2021-02-04 RX ORDER — PIPERACILLIN AND TAZOBACTAM 4; .5 G/20ML; G/20ML
INJECTION, POWDER, LYOPHILIZED, FOR SOLUTION INTRAVENOUS
Refills: 0 | Status: DISCONTINUED | OUTPATIENT
Start: 2021-02-04 | End: 2021-02-04

## 2021-02-04 RX ORDER — INSULIN LISPRO 100/ML
2 VIAL (ML) SUBCUTANEOUS
Refills: 0 | Status: DISCONTINUED | OUTPATIENT
Start: 2021-02-04 | End: 2021-02-07

## 2021-02-04 RX ADMIN — PIPERACILLIN AND TAZOBACTAM 200 GRAM(S): 4; .5 INJECTION, POWDER, LYOPHILIZED, FOR SOLUTION INTRAVENOUS at 17:11

## 2021-02-04 RX ADMIN — Medication 2000 UNIT(S): at 16:03

## 2021-02-04 RX ADMIN — PIPERACILLIN AND TAZOBACTAM 25 GRAM(S): 4; .5 INJECTION, POWDER, LYOPHILIZED, FOR SOLUTION INTRAVENOUS at 21:09

## 2021-02-04 RX ADMIN — Medication 100 MILLIGRAM(S): at 00:25

## 2021-02-04 RX ADMIN — Medication 1 MILLIGRAM(S): at 08:41

## 2021-02-04 RX ADMIN — IRON SUCROSE 110 MILLIGRAM(S): 20 INJECTION, SOLUTION INTRAVENOUS at 16:03

## 2021-02-04 RX ADMIN — Medication 100 MILLIGRAM(S): at 03:02

## 2021-02-04 RX ADMIN — Medication 80 MILLIGRAM(S): at 08:28

## 2021-02-04 RX ADMIN — INSULIN GLARGINE 20 UNIT(S): 100 INJECTION, SOLUTION SUBCUTANEOUS at 21:09

## 2021-02-04 RX ADMIN — SEVELAMER CARBONATE 800 MILLIGRAM(S): 2400 POWDER, FOR SUSPENSION ORAL at 16:03

## 2021-02-04 RX ADMIN — INSULIN GLARGINE 20 UNIT(S): 100 INJECTION, SOLUTION SUBCUTANEOUS at 00:24

## 2021-02-04 RX ADMIN — POLYETHYLENE GLYCOL 3350 17 GRAM(S): 17 POWDER, FOR SOLUTION ORAL at 12:06

## 2021-02-04 RX ADMIN — SENNA PLUS 2 TABLET(S): 8.6 TABLET ORAL at 21:10

## 2021-02-04 RX ADMIN — Medication 30 MILLIGRAM(S): at 08:40

## 2021-02-04 RX ADMIN — INSULIN GLARGINE 20 UNIT(S): 100 INJECTION, SOLUTION SUBCUTANEOUS at 08:28

## 2021-02-04 RX ADMIN — CARVEDILOL PHOSPHATE 12.5 MILLIGRAM(S): 80 CAPSULE, EXTENDED RELEASE ORAL at 16:04

## 2021-02-04 RX ADMIN — Medication 1: at 07:35

## 2021-02-04 RX ADMIN — RISPERIDONE 3 MILLIGRAM(S): 4 TABLET ORAL at 06:27

## 2021-02-04 RX ADMIN — ATORVASTATIN CALCIUM 20 MILLIGRAM(S): 80 TABLET, FILM COATED ORAL at 21:10

## 2021-02-04 RX ADMIN — Medication 30 MILLIGRAM(S): at 06:27

## 2021-02-04 RX ADMIN — Medication 2: at 12:03

## 2021-02-04 RX ADMIN — Medication 1: at 16:06

## 2021-02-04 RX ADMIN — Medication 50 MILLIGRAM(S): at 21:10

## 2021-02-04 RX ADMIN — Medication 81 MILLIGRAM(S): at 08:41

## 2021-02-04 RX ADMIN — Medication 250 MILLIGRAM(S): at 18:39

## 2021-02-04 RX ADMIN — Medication 50 MILLIGRAM(S): at 06:27

## 2021-02-04 RX ADMIN — RISPERIDONE 3 MILLIGRAM(S): 4 TABLET ORAL at 16:03

## 2021-02-04 RX ADMIN — Medication 50 MILLILITER(S): at 13:13

## 2021-02-04 RX ADMIN — SEVELAMER CARBONATE 800 MILLIGRAM(S): 2400 POWDER, FOR SUSPENSION ORAL at 07:36

## 2021-02-04 RX ADMIN — SEVELAMER CARBONATE 800 MILLIGRAM(S): 2400 POWDER, FOR SUSPENSION ORAL at 12:04

## 2021-02-04 RX ADMIN — Medication 2000 UNIT(S): at 06:27

## 2021-02-04 RX ADMIN — Medication 650 MILLIGRAM(S): at 01:51

## 2021-02-04 RX ADMIN — CARVEDILOL PHOSPHATE 12.5 MILLIGRAM(S): 80 CAPSULE, EXTENDED RELEASE ORAL at 06:27

## 2021-02-04 RX ADMIN — Medication 50 MILLIGRAM(S): at 00:24

## 2021-02-04 NOTE — PROGRESS NOTE ADULT - ASSESSMENT
35 years old male with PMH of ESRD on Dialysis (MWF), Morbid Obesity, ARVIND (not on CPAP), DM 2, HTN, HLD and Bipolar Disorder presented with difficulty breathing after missing dialysis.     1) Acute Respiratory Failure with Hypoxia  - Likely due to volume overload due to missed dialysis  - s/p HD yesterday and today  - Gave a dose of Lasix 80 mg in am  - Continue BiPAP PRN  - Renal following     2) R/O Pneumonia  - Blood and sputum cultures  - Will start on empiric Zosyn     3) Leukocytosis  - Chronic    - Monitor     4) Anemia  - Low Iron Stores  - Started Venofer 200 mg x 5  - Retacrit with dialysis   - FOBT    5) Uncontrolled HTN  - Likely due to missed dialysis   - Continue Coreg, Hydralazine and Procardia (increased to 60 mg)    6) DM 2 - Uncontrolled   - HbA1c 12.3 on 1/1/21  - Accu checks and ISS  - Continue Lantus 20 units BID  - Add Admelog 2 units before meals     7) HLD  - Continue Lipitor     8) Bipolar Disorder    - Continue Risperidone Seroquel and Trazodone   - Also takes Risperdal Consta q2w, last dose scheduled on 1/27/21    DVT Prophylaxis -- SCDs. Start Heparin if FOBT negative.     Dispo: Home in 2-3 days

## 2021-02-04 NOTE — PROGRESS NOTE ADULT - SUBJECTIVE AND OBJECTIVE BOX
Shortness of breath    HPI:  36yo M w/ PMHx of HTN, ESRD (on HD M/W/F), AOCD, IDDM-2, ARVIND (not on cpap), morbid obesity, bipolar disorder, MDD presents to the ER due to difficulty breathing. Pt per, he has not been feeling well for the past few days. Reports shortness of breath at rest and w/ ambulation w/ associated dry cough. Pt also reports that his stomach feels bloated. On Saturday he was supposed to have HD, however, he was able to get a ride. Today, he went and was noted to have worsening shortness of breath and was told to go to the ER for further evaluation. Pt denies any sick contacts, recent travel, chest pain, palpitations.     In the ED: /122, RR 24, O2 on RA 87% - improved w/ 6L O2 via NC. S/p Lasix 40mg IVP X1, Doxy 100mg IV X1, Rocephin 1g X1, Nitroglycerin 0.4mg X1 (2021 19:53)    Interval History:  Patient was seen and examined at bedside around 9 am. Breathing is improving. Has productive cough. Denies fever or sick contacts.   Denied chest pain, palpitations, headache, dizziness, visual symptoms, nausea, vomiting or abdominal pain.    ROS:  As per interval history otherwise unremarkable.    PHYSICAL EXAM:  Vital Signs   T(C): 37.9 (2021 15:37), Max: 38 (2021 08:35)  T(F): 100.2 (2021 15:37), Max: 100.4 (2021 08:35)  HR: 86 (2021 15:58) (75 - 808)  BP: 151/96 (2021 15:37) (130/75 - 164/75)  RR: 42 (2021 15:58) (18 - 42)  SpO2: 90% (2021 15:58) (90% - 99%)  General: Morbidly obese young male sitting in bed comfortably. No acute distress  HEENT: EOMI. Clear conjunctivae. Moist mucus membrane  Neck: Supple.   Chest: Decreased air entry bilaterally. No wheezing or rhonchi. No chest wall tenderness. Tachypneic.   Heart: Normal S1 & S2. RRR.    Abdomen: Obese. Soft. Non-tender. + BS  Ext: No pedal edema. No calf tenderness. AVF in LUE.   Neuro: Active and alert. No focal deficit. No speech disorder  Skin: Warm and Dry  Psychiatry: Normal mood and affect    I&O's Summary    2021 07:01  -  2021 07:00  --------------------------------------------------------  IN: 500 mL / OUT: 3750 mL / NET: -3250 mL    2021 07:  -  2021 16:40  --------------------------------------------------------  IN: 0 mL / OUT: 2100 mL / NET: -2100 mL    MEDICATIONS  (STANDING):  ALBUTerol    0.083%. 2.5 milliGRAM(s) Nebulizer once  aspirin  chewable 81 milliGRAM(s) Oral daily  atorvastatin 20 milliGRAM(s) Oral at bedtime  carvedilol 12.5 milliGRAM(s) Oral every 12 hours  chlorhexidine 4% Liquid 1 Application(s) Topical <User Schedule>  cholecalciferol 2000 Unit(s) Oral two times a day  dextrose 40% Gel 15 Gram(s) Oral once  dextrose 5%. 1000 milliLiter(s) (50 mL/Hr) IV Continuous <Continuous>  dextrose 5%. 1000 milliLiter(s) (100 mL/Hr) IV Continuous <Continuous>  dextrose 50% Injectable 25 Gram(s) IV Push once  dextrose 50% Injectable 12.5 Gram(s) IV Push once  dextrose 50% Injectable 25 Gram(s) IV Push once  epoetin alma-epbx (RETACRIT) Injectable 53480 Unit(s) IV Push once  folic acid 1 milliGRAM(s) Oral daily  glucagon  Injectable 1 milliGRAM(s) IntraMuscular once  hydrALAZINE 50 milliGRAM(s) Oral three times a day  insulin glargine Injectable (LANTUS) 20 Unit(s) SubCutaneous two times a day  insulin lispro (ADMELOG) corrective regimen sliding scale   SubCutaneous three times a day before meals  iron sucrose IVPB 200 milliGRAM(s) IV Intermittent every 24 hours  piperacillin/tazobactam IVPB. 3.375 Gram(s) IV Intermittent once  piperacillin/tazobactam IVPB.. 3.375 Gram(s) IV Intermittent every 12 hours  polyethylene glycol 3350 17 Gram(s) Oral daily  QUEtiapine 100 milliGRAM(s) Oral at bedtime  risperiDONE   Tablet 3 milliGRAM(s) Oral two times a day  senna 2 Tablet(s) Oral at bedtime  sevelamer carbonate 800 milliGRAM(s) Oral three times a day with meals  traZODone 100 milliGRAM(s) Oral at bedtime    MEDICATIONS  (PRN):  acetaminophen   Tablet .. 650 milliGRAM(s) Oral every 6 hours PRN Temp greater or equal to 38C (100.4F), Mild Pain (1 - 3)  guaiFENesin   Syrup  (Sugar-Free) 100 milliGRAM(s) Oral every 6 hours PRN Cough    LABS:  CAPILLARY BLOOD GLUCOSE  POCT Blood Glucose.: 180 mg/dL (2021 16:06)  POCT Blood Glucose.: 242 mg/dL (2021 11:10)  POCT Blood Glucose.: 156 mg/dL (2021 07:35)  POCT Blood Glucose.: 175 mg/dL (2021 00:13)  POCT Blood Glucose.: 135 mg/dL (2021 21:52)                        7.7    25.54 )-----------( 320      ( 2021 10:15 )             24.9     02-04    140  |  101  |  42.0<H>  ----------------------------<  170<H>  3.6   |  26.0  |  5.69<H>    Ca    9.1      2021 10:16  Phos  3.5     02-04  Mg     1.9     02-04    TPro  7.0  /  Alb  3.3  /  TBili  0.4  /  DBili  x   /  AST  21  /  ALT  55<H>  /  AlkPhos  113      Urinalysis Basic - ( 2021 00:57 )    Color: Yellow / Appearance: Clear / S.015 / pH: x  Gluc: x / Ketone: Negative  / Bili: Negative / Urobili: Negative mg/dL   Blood: x / Protein: 500 mg/dL / Nitrite: Negative   Leuk Esterase: Negative / RBC: 0-2 /HPF / WBC Negative   Sq Epi: x / Non Sq Epi: Occasional / Bacteria: x    RADIOLOGY & ADDITIONAL STUDIES:  Xray Chest 1 View-PORTABLE IMMEDIATE (21 @ 17:00)   Significant bilateral infiltrates right greater than left.

## 2021-02-04 NOTE — CHART NOTE - NSCHARTNOTEFT_GEN_A_CORE
Called for patient desaturating below 90%, on supplemental O2 NC 6L    Patient seen and assessed at bedside, and chart reviewed. Patient is a 35 y/o AAM with h/o ESRD, usually gets dialysis MWF, admitted with cough and acute respiratory distress secondary to fluid overload, after missing hemodialysis. Seen by nephrology consult. HD in am    Vital Signs Last 24 Hrs  T(C): 37.8 (04 Feb 2021 01:40), Max: 37.8 (04 Feb 2021 01:40)  T(F): 100.1 (04 Feb 2021 01:40), Max: 100.1 (04 Feb 2021 01:40)  HR: 99 (04 Feb 2021 01:40) (75 - 99)  BP: 161/93 (04 Feb 2021 01:40) (130/75 - 187/122)  BP(mean): --  RR: 20 (04 Feb 2021 01:40) (20 - 26)  SpO2: 93% (04 Feb 2021 01:40) (87% - 95%)        A/P: ESRD, acute respiratory distress secondary to fluid overload. Seen and being followed by nephrology consult. Urgent HD scheduled for today, per nephrology  -Supplemental oxygen 40% via Venturi mask; sPO2 improved to 91-93% Called for patient desaturating below 90%, on supplemental O2 NC 6L    Patient seen and assessed at bedside, and chart reviewed. Patient is a 37 y/o AAM with h/o ESRD, usually gets dialysis MWF, admitted with cough and acute respiratory distress secondary to fluid overload, after missing hemodialysis. Seen by nephrology consult. HD in am    Vital Signs Last 24 Hrs  T(C): 37.8 (04 Feb 2021 01:40), Max: 37.8 (04 Feb 2021 01:40)  T(F): 100.1 (04 Feb 2021 01:40), Max: 100.1 (04 Feb 2021 01:40)  HR: 99 (04 Feb 2021 01:40) (75 - 99)  BP: 161/93 (04 Feb 2021 01:40) (130/75 - 187/122)  BP(mean): --  RR: 20 (04 Feb 2021 01:40) (20 - 26)  SpO2: 93% (04 Feb 2021 01:40) (87% - 95%)    Physical Exam  General: morbidly obese male, in acute respiratory distress with labored respiration  Head: Normocephalic, atraumatic, venturi mask on face  ENMT:  EOM intact, moist mucous membranes. oropharynx clear, with no secretions, no erythema/exudates  Neck: Supple, No JVD, no accessory muscle use  Respiratory: Labored breathing, clear upper lung fields, crackles at lung base bilaterally. Intermittent nonproductive cough. No ronchi  Abdomen: soft, nontender, non-distended. Normal bowel sounds  Skin: no cyanosis, rash or lesion  Extremities: No pedal edema  Neuro: CNs appear grossly intact. No focal deficits grossly noted      A/P: ESRD, acute respiratory distress secondary to fluid overload. Seen and being followed by nephrology consult. S/P HD from ER, per nephrology  -Supplemental oxygen 40% via Venturi mask; sPO2 improved to 91-93%  -Reposition patient ; raise HOB to 45 degrees  -ABG : pH 7.39, pCO2 50, pO2 45, HCO3 2, Base excess  4.1  -BiPAP 05:09  t0o 8:00 am    Reassess; sPO2 98% on BiPAP; improved  -ABG @ 8:30am

## 2021-02-04 NOTE — SBIRT NOTE ADULT - NSSBIRTDRGPOSREINDET_GEN_A_CORE
Education info provided to patient for him to ready. Patient report that THC use was last on his birthday 1/16/2020, does not feel that it is a problem.

## 2021-02-04 NOTE — PROGRESS NOTE ADULT - ASSESSMENT
ESRD: +PVC (COVID negative)  Clinically improved post HD but still symptomatic  - will arrange for additional UF today    Anemia: +CKD  - add KEV  - check IV Fe  - follow H/H  - consider PRBCs    RO:  - low phos diet  - cont Renvela

## 2021-02-04 NOTE — PROGRESS NOTE ADULT - SUBJECTIVE AND OBJECTIVE BOX
NEPHROLOGY INTERVAL HPI/OVERNIGHT EVENTS:  pt still with sob==> required BiPAP overnight  tolerated HD    MEDICATIONS  (STANDING):  ALBUTerol    0.083%. 2.5 milliGRAM(s) Nebulizer once  aspirin  chewable 81 milliGRAM(s) Oral daily  atorvastatin 20 milliGRAM(s) Oral at bedtime  carvedilol 12.5 milliGRAM(s) Oral every 12 hours  chlorhexidine 4% Liquid 1 Application(s) Topical <User Schedule>  cholecalciferol 2000 Unit(s) Oral two times a day  dextrose 40% Gel 15 Gram(s) Oral once  dextrose 5%. 1000 milliLiter(s) (50 mL/Hr) IV Continuous <Continuous>  dextrose 5%. 1000 milliLiter(s) (100 mL/Hr) IV Continuous <Continuous>  dextrose 50% Injectable 25 Gram(s) IV Push once  dextrose 50% Injectable 12.5 Gram(s) IV Push once  dextrose 50% Injectable 25 Gram(s) IV Push once  folic acid 1 milliGRAM(s) Oral daily  glucagon  Injectable 1 milliGRAM(s) IntraMuscular once  hydrALAZINE 50 milliGRAM(s) Oral three times a day  insulin glargine Injectable (LANTUS) 20 Unit(s) SubCutaneous two times a day  insulin lispro (ADMELOG) corrective regimen sliding scale   SubCutaneous three times a day before meals  iron sucrose IVPB 200 milliGRAM(s) IV Intermittent every 24 hours  polyethylene glycol 3350 17 Gram(s) Oral daily  QUEtiapine 100 milliGRAM(s) Oral at bedtime  risperiDONE   Tablet 3 milliGRAM(s) Oral two times a day  senna 2 Tablet(s) Oral at bedtime  sevelamer carbonate 800 milliGRAM(s) Oral three times a day with meals  traZODone 100 milliGRAM(s) Oral at bedtime    MEDICATIONS  (PRN):  acetaminophen   Tablet .. 650 milliGRAM(s) Oral every 6 hours PRN Temp greater or equal to 38C (100.4F), Mild Pain (1 - 3)  guaiFENesin   Syrup  (Sugar-Free) 100 milliGRAM(s) Oral every 6 hours PRN Cough      Allergies    No Known Allergies        Vital Signs Last 24 Hrs  T(C): 38 (2021 08:35), Max: 38 (2021 08:35)  T(F): 100.4 (2021 08:35), Max: 100.4 (2021 08:35)  HR: 96 (2021 08:35) (75 - 99)  BP: 160/83 (2021 08:35) (130/75 - 187/122)  BP(mean): --  RR: 34 (2021 08:35) (20 - 34)  SpO2: 99% (2021 09:30) (87% - 99%)    PHYSICAL EXAM:  Appears dyspneic  NECK: +JVD  NERVOUS SYSTEM: AAOx3  CHEST/LUNG: Diminished BS with +crackles  HEART: No rub  ABDOMEN: Soft, Nontender, Nondistended; BS+  EXTREMITIES: Mild LE edema; AVF patent    LABS:                        7.8    21.32 )-----------( 351      ( 2021 17:10 )             24.6     -    139  |  102  |  56.0<H>  ----------------------------<  139<H>  3.4<L>   |  24.0  |  6.12<H>    Ca    9.1      2021 17:10  Mg     2.1     -    TPro  7.4  /  Alb  3.4  /  TBili  0.4  /  DBili  x   /  AST  30  /  ALT  71<H>  /  AlkPhos  121<H>        Urinalysis Basic - ( 2021 00:57 )    Color: Yellow / Appearance: Clear / S.015 / pH: x  Gluc: x / Ketone: Negative  / Bili: Negative / Urobili: Negative mg/dL   Blood: x / Protein: 500 mg/dL / Nitrite: Negative   Leuk Esterase: Negative / RBC: 0-2 /HPF / WBC Negative   Sq Epi: x / Non Sq Epi: Occasional / Bacteria: x      Magnesium, Serum: 2.1 mg/dL ( @ 17:10)      COVID-19 PCR: NotDetec (2/3)    RADIOLOGY & ADDITIONAL TESTS:  < from: Xray Chest 1 View-PORTABLE IMMEDIATE (21 @ 17:00) >   EXAM:  XR CHEST PORTABLE IMMED 1V                          PROCEDURE DATE:  2021          INTERPRETATION:  AP chest on February 3, 2021 at 4:51 PM. Patient has chest pain.    Heart suggest enlargement.    A diffuse bilateral infiltrates somewhat greater on the right. Congestion versus infection are in the differential. Infiltrates are new since 2020 and also new since 2020.    IMPRESSION: Significant bilateral infiltrates right greater than left.    < end of copied text >

## 2021-02-05 LAB
ALBUMIN SERPL ELPH-MCNC: 3.3 G/DL — SIGNIFICANT CHANGE UP (ref 3.3–5.2)
ALP SERPL-CCNC: 100 U/L — SIGNIFICANT CHANGE UP (ref 40–120)
ALT FLD-CCNC: 37 U/L — SIGNIFICANT CHANGE UP
ANION GAP SERPL CALC-SCNC: 19 MMOL/L — HIGH (ref 5–17)
AST SERPL-CCNC: 15 U/L — SIGNIFICANT CHANGE UP
BASOPHILS # BLD AUTO: 0.08 K/UL — SIGNIFICANT CHANGE UP (ref 0–0.2)
BASOPHILS NFR BLD AUTO: 0.4 % — SIGNIFICANT CHANGE UP (ref 0–2)
BILIRUB SERPL-MCNC: 0.4 MG/DL — SIGNIFICANT CHANGE UP (ref 0.4–2)
BLD GP AB SCN SERPL QL: SIGNIFICANT CHANGE UP
BUN SERPL-MCNC: 56 MG/DL — HIGH (ref 8–20)
CALCIUM SERPL-MCNC: 9 MG/DL — SIGNIFICANT CHANGE UP (ref 8.6–10.2)
CHLORIDE SERPL-SCNC: 100 MMOL/L — SIGNIFICANT CHANGE UP (ref 98–107)
CO2 SERPL-SCNC: 22 MMOL/L — SIGNIFICANT CHANGE UP (ref 22–29)
CREAT SERPL-MCNC: 7.37 MG/DL — HIGH (ref 0.5–1.3)
CRP SERPL-MCNC: 15.5 MG/DL — HIGH (ref 0–0.4)
D DIMER BLD IA.RAPID-MCNC: 570 NG/ML DDU — HIGH
EOSINOPHIL # BLD AUTO: 0.8 K/UL — HIGH (ref 0–0.5)
EOSINOPHIL NFR BLD AUTO: 3.7 % — SIGNIFICANT CHANGE UP (ref 0–6)
FERRITIN SERPL-MCNC: 158 NG/ML — SIGNIFICANT CHANGE UP (ref 30–400)
GAS PNL BLDA: SIGNIFICANT CHANGE UP
GAS PNL BLDA: SIGNIFICANT CHANGE UP
GLUCOSE BLDC GLUCOMTR-MCNC: 136 MG/DL — HIGH (ref 70–99)
GLUCOSE BLDC GLUCOMTR-MCNC: 169 MG/DL — HIGH (ref 70–99)
GLUCOSE BLDC GLUCOMTR-MCNC: 226 MG/DL — HIGH (ref 70–99)
GLUCOSE BLDC GLUCOMTR-MCNC: 229 MG/DL — HIGH (ref 70–99)
GLUCOSE SERPL-MCNC: 117 MG/DL — HIGH (ref 70–99)
HCT VFR BLD CALC: 24.2 % — LOW (ref 39–50)
HGB BLD-MCNC: 7.5 G/DL — LOW (ref 13–17)
IMM GRANULOCYTES NFR BLD AUTO: 0.8 % — SIGNIFICANT CHANGE UP (ref 0–1.5)
LDH SERPL L TO P-CCNC: 208 U/L — HIGH (ref 98–192)
LYMPHOCYTES # BLD AUTO: 16.8 % — SIGNIFICANT CHANGE UP (ref 13–44)
LYMPHOCYTES # BLD AUTO: 3.6 K/UL — HIGH (ref 1–3.3)
MCHC RBC-ENTMCNC: 23.1 PG — LOW (ref 27–34)
MCHC RBC-ENTMCNC: 31 GM/DL — LOW (ref 32–36)
MCV RBC AUTO: 74.7 FL — LOW (ref 80–100)
MONOCYTES # BLD AUTO: 1.77 K/UL — HIGH (ref 0–0.9)
MONOCYTES NFR BLD AUTO: 8.3 % — SIGNIFICANT CHANGE UP (ref 2–14)
NEUTROPHILS # BLD AUTO: 15 K/UL — HIGH (ref 1.8–7.4)
NEUTROPHILS NFR BLD AUTO: 70 % — SIGNIFICANT CHANGE UP (ref 43–77)
PLATELET # BLD AUTO: 327 K/UL — SIGNIFICANT CHANGE UP (ref 150–400)
POTASSIUM SERPL-MCNC: 3.3 MMOL/L — LOW (ref 3.5–5.3)
POTASSIUM SERPL-SCNC: 3.3 MMOL/L — LOW (ref 3.5–5.3)
PROCALCITONIN SERPL-MCNC: 0.75 NG/ML — HIGH (ref 0.02–0.1)
PROT SERPL-MCNC: 7 G/DL — SIGNIFICANT CHANGE UP (ref 6.6–8.7)
RAPID RVP RESULT: SIGNIFICANT CHANGE UP
RBC # BLD: 3.24 M/UL — LOW (ref 4.2–5.8)
RBC # FLD: 17.2 % — HIGH (ref 10.3–14.5)
SARS-COV-2 RNA SPEC QL NAA+PROBE: SIGNIFICANT CHANGE UP
SODIUM SERPL-SCNC: 141 MMOL/L — SIGNIFICANT CHANGE UP (ref 135–145)
WBC # BLD: 21.42 K/UL — HIGH (ref 3.8–10.5)
WBC # FLD AUTO: 21.42 K/UL — HIGH (ref 3.8–10.5)

## 2021-02-05 PROCEDURE — 99233 SBSQ HOSP IP/OBS HIGH 50: CPT

## 2021-02-05 PROCEDURE — 99222 1ST HOSP IP/OBS MODERATE 55: CPT

## 2021-02-05 PROCEDURE — 99223 1ST HOSP IP/OBS HIGH 75: CPT | Mod: GC

## 2021-02-05 PROCEDURE — 99223 1ST HOSP IP/OBS HIGH 75: CPT

## 2021-02-05 RX ORDER — HYDRALAZINE HCL 50 MG
10 TABLET ORAL EVERY 6 HOURS
Refills: 0 | Status: DISCONTINUED | OUTPATIENT
Start: 2021-02-05 | End: 2021-02-10

## 2021-02-05 RX ORDER — POTASSIUM CHLORIDE 20 MEQ
40 PACKET (EA) ORAL ONCE
Refills: 0 | Status: COMPLETED | OUTPATIENT
Start: 2021-02-05 | End: 2021-02-08

## 2021-02-05 RX ORDER — ACETAMINOPHEN 500 MG
1000 TABLET ORAL ONCE
Refills: 0 | Status: COMPLETED | OUTPATIENT
Start: 2021-02-05 | End: 2021-02-05

## 2021-02-05 RX ORDER — VANCOMYCIN HCL 1 G
1000 VIAL (EA) INTRAVENOUS ONCE
Refills: 0 | Status: COMPLETED | OUTPATIENT
Start: 2021-02-05 | End: 2021-02-05

## 2021-02-05 RX ADMIN — Medication 40 MILLIGRAM(S): at 15:53

## 2021-02-05 RX ADMIN — RISPERIDONE 3 MILLIGRAM(S): 4 TABLET ORAL at 18:01

## 2021-02-05 RX ADMIN — Medication 250 MILLIGRAM(S): at 10:35

## 2021-02-05 RX ADMIN — Medication 40 MILLIGRAM(S): at 20:49

## 2021-02-05 RX ADMIN — Medication 400 MILLIGRAM(S): at 05:46

## 2021-02-05 RX ADMIN — PIPERACILLIN AND TAZOBACTAM 25 GRAM(S): 4; .5 INJECTION, POWDER, LYOPHILIZED, FOR SOLUTION INTRAVENOUS at 17:58

## 2021-02-05 RX ADMIN — Medication 250 MILLIGRAM(S): at 06:54

## 2021-02-05 RX ADMIN — Medication 40 MILLIGRAM(S): at 08:29

## 2021-02-05 RX ADMIN — Medication 100 MILLIGRAM(S): at 00:10

## 2021-02-05 RX ADMIN — CARVEDILOL PHOSPHATE 12.5 MILLIGRAM(S): 80 CAPSULE, EXTENDED RELEASE ORAL at 18:01

## 2021-02-05 RX ADMIN — Medication 60 MILLIGRAM(S): at 06:52

## 2021-02-05 RX ADMIN — Medication 50 MILLIGRAM(S): at 06:53

## 2021-02-05 RX ADMIN — INSULIN GLARGINE 20 UNIT(S): 100 INJECTION, SOLUTION SUBCUTANEOUS at 11:18

## 2021-02-05 RX ADMIN — QUETIAPINE FUMARATE 100 MILLIGRAM(S): 200 TABLET, FILM COATED ORAL at 00:10

## 2021-02-05 RX ADMIN — RISPERIDONE 3 MILLIGRAM(S): 4 TABLET ORAL at 06:51

## 2021-02-05 RX ADMIN — Medication 2000 UNIT(S): at 06:54

## 2021-02-05 RX ADMIN — Medication 80 MILLIGRAM(S): at 10:40

## 2021-02-05 RX ADMIN — INSULIN GLARGINE 20 UNIT(S): 100 INJECTION, SOLUTION SUBCUTANEOUS at 22:40

## 2021-02-05 RX ADMIN — IRON SUCROSE 110 MILLIGRAM(S): 20 INJECTION, SOLUTION INTRAVENOUS at 17:58

## 2021-02-05 RX ADMIN — CHLORHEXIDINE GLUCONATE 1 APPLICATION(S): 213 SOLUTION TOPICAL at 06:54

## 2021-02-05 RX ADMIN — CARVEDILOL PHOSPHATE 12.5 MILLIGRAM(S): 80 CAPSULE, EXTENDED RELEASE ORAL at 06:51

## 2021-02-05 RX ADMIN — PIPERACILLIN AND TAZOBACTAM 25 GRAM(S): 4; .5 INJECTION, POWDER, LYOPHILIZED, FOR SOLUTION INTRAVENOUS at 09:10

## 2021-02-05 NOTE — PROGRESS NOTE ADULT - SUBJECTIVE AND OBJECTIVE BOX
CC: F/u fluid overload ESRD on HD, hypercapnic resp failure with hypoxia currently on BIPAP, r/o sepsis 2/2 pna, acute on chronic anemia, DM2  INTERVAL HPI/OVERNIGHT EVENTS: Pt seen and examined at bedside this AM sleeping while on BIPAP in place. Pt is somnolent but arousable. Tmax 102F overnight. Brown colored BM yesterday. Nods 'no' to pain or complaints. No difficulty breathing or chest pain. STAT ABG done, shows Co2 60, pH 7.2. MICU, pulm and ID called. Pt more awake later in afternoon.     REVIEW OF SYSTEMS:  CONSTITUTIONAL: No fever, weight loss, or fatigue  RESPIRATORY: No cough, wheezing, chills or hemoptysis; No shortness of breath  CARDIOVASCULAR: No chest pain, palpitations, dizziness, or leg swelling  GASTROINTESTINAL: No abdominal or epigastric pain. No nausea, vomiting or hematemesis; No diarrhea or constipation  NEUROLOGICAL: No headaches, memory loss, loss of strength, numbness, or tremors  SKIN: No itching, burning, rashes, or lesions   MUSCULOSKELETAL: No joint pain or swelling; No muscle, back, or extremity pain    Allergies  No Known Allergies    PAST MEDICAL & SURGICAL HISTORY:  Insulin dependent type 2 diabetes mellitus  CKD (chronic kidney disease) requiring chronic dialysis  HTN (hypertension)  Sleep apnea  Bipolar 1 disorder  No significant past surgical history    VITAL SIGNS:  T(C): 37.6 (21 @ 10:09), Max: 38.9 (21 @ 05:20)  HR: 75 (21 @ 10:09) (68 - 808)  BP: 104/57 (21 @ 10:09) (104/57 - 166/77)  RR: 33 (21 @ 10:09) (18 - 42)  SpO2: 94% (21 @ 10:09) (90% - 99%)  Wt(kg): --Vital Signs Last 24 Hrs  T(C): 37.6 (2021 10:09), Max: 38.9 (2021 05:20)  T(F): 99.6 (2021 10:09), Max: 102.1 (2021 05:20)  HR: 75 (2021 10:09) (68 - 808)  BP: 104/57 (2021 10:09) (104/57 - 166/77)  BP(mean): --  RR: 33 (2021 10:09) (18 - 42)  SpO2: 94% (2021 10:09) (90% - 99%)    PHYSICAL EXAM:  GENERAL: Pt sleeping in bed in NAD  HEAD:  Atraumatic  EYES: EOMI, PERRL, conjunctiva and sclera clear  ENT: Dry mucous membranes  NECK: Obese. No JVD  CHEST/LUNG: Coarse upper resp sounds, otherwise clear. poor inspiratory effort, equal sounds B/L. No rhonchi or wheezing. Shallow respirations   HEART: S1, S2, Regular rate and rhythm   ABDOMEN: Obese, Bowel sounds present; Soft, Nontender. No guarding or rigidity   EXTREMITIES:  2+ Peripheral Pulses, brisk capillary refill. No clubbing, cyanosis, or edema  NERVOUS SYSTEM:  somnolent, awake to voice, follows commands. No deficits   SKIN: No rashes or lesions    LABS:                      7.5    21.42 )-----------( 327      ( 2021 07:59 )             24.2     02-05  141  |  100  |  56.0<H>  ----------------------------<  117<H>  3.3<L>   |  22.0  |  7.37<H>    Ca    9.0      2021 07:59  Phos  3.5     02-04  Mg     1.9     02-04    TPro  7.0  /  Alb  3.3  /  TBili  0.4  /  DBili  x   /  AST  15  /  ALT  37  /  AlkPhos  100  02-05    CAPILLARY BLOOD GLUCOSE  POCT Blood Glucose.: 169 mg/dL (2021 11:16)  POCT Blood Glucose.: 136 mg/dL (2021 08:31)  POCT Blood Glucose.: 178 mg/dL (2021 20:40)  POCT Blood Glucose.: 180 mg/dL (2021 16:06)    ABG - ( 2021 09:01 )  pH, Arterial: 7.27  pH, Blood: x     /  pCO2: 60    /  pO2: 95    / HCO3: 25    / Base Excess: 0.5   /  SaO2: 98        Urinalysis Basic - ( 2021 22:04 )  Color: Yellow / Appearance: Clear / S.015 / pH: x  Gluc: x / Ketone: Negative  / Bili: Negative / Urobili: Negative mg/dL   Blood: x / Protein: 500 mg/dL / Nitrite: Negative   Leuk Esterase: Negative / RBC: 0-2 /HPF / WBC 0-2   Sq Epi: x / Non Sq Epi: Occasional / Bacteria: Few    MEDICATIONS  (STANDING):  ALBUTerol    0.083%. 2.5 milliGRAM(s) Nebulizer once  aspirin  chewable 81 milliGRAM(s) Oral daily  atorvastatin 20 milliGRAM(s) Oral at bedtime  carvedilol 12.5 milliGRAM(s) Oral every 12 hours  chlorhexidine 4% Liquid 1 Application(s) Topical <User Schedule>  cholecalciferol 2000 Unit(s) Oral two times a day  dextrose 40% Gel 15 Gram(s) Oral once  dextrose 5%. 1000 milliLiter(s) (50 mL/Hr) IV Continuous <Continuous>  dextrose 5%. 1000 milliLiter(s) (100 mL/Hr) IV Continuous <Continuous>  dextrose 50% Injectable 25 Gram(s) IV Push once  dextrose 50% Injectable 12.5 Gram(s) IV Push once  dextrose 50% Injectable 25 Gram(s) IV Push once  epoetin alma-epbx (RETACRIT) Injectable 46574 Unit(s) IV Push once  folic acid 1 milliGRAM(s) Oral daily  glucagon  Injectable 1 milliGRAM(s) IntraMuscular once  hydrALAZINE 50 milliGRAM(s) Oral three times a day  insulin glargine Injectable (LANTUS) 20 Unit(s) SubCutaneous two times a day  insulin lispro (ADMELOG) corrective regimen sliding scale   SubCutaneous three times a day before meals  insulin lispro Injectable (ADMELOG) 2 Unit(s) SubCutaneous three times a day before meals  iron sucrose IVPB 200 milliGRAM(s) IV Intermittent every 24 hours  methylPREDNISolone sodium succinate Injectable 40 milliGRAM(s) IV Push every 8 hours  NIFEdipine XL 60 milliGRAM(s) Oral daily  piperacillin/tazobactam IVPB.. 3.375 Gram(s) IV Intermittent every 12 hours  polyethylene glycol 3350 17 Gram(s) Oral daily  potassium chloride   Powder 40 milliEquivalent(s) Oral once  QUEtiapine 100 milliGRAM(s) Oral at bedtime  risperiDONE   Tablet 3 milliGRAM(s) Oral two times a day  saccharomyces boulardii 250 milliGRAM(s) Oral two times a day  senna 2 Tablet(s) Oral at bedtime  sevelamer carbonate 800 milliGRAM(s) Oral three times a day with meals  traZODone 100 milliGRAM(s) Oral at bedtime    MEDICATIONS  (PRN):  acetaminophen   Tablet .. 650 milliGRAM(s) Oral every 6 hours PRN Temp greater or equal to 38C (100.4F), Mild Pain (1 - 3)  guaiFENesin   Syrup  (Sugar-Free) 100 milliGRAM(s) Oral every 6 hours PRN Cough  hydrALAZINE Injectable 10 milliGRAM(s) IV Push every 6 hours PRN If SBP > 160   CC: F/u fluid overload ESRD on HD, hypercapnic resp failure with hypoxia currently on BIPAP, r/o sepsis 2/2 pna, acute on chronic anemia, DM2  INTERVAL HPI/OVERNIGHT EVENTS: Pt seen and examined at bedside this AM. Pt sleeping while with BIPAP in place. Pt is somnolent but arousable. Noted to have Tmax 102F overnight. Brown colored BM yesterday. Nods 'no' to pain or complaints. No difficulty breathing or chest pain. ABG done, shows Co2 60, pH 7.2. MICU, pulm and ID called. Pt more awake later in afternoon.     REVIEW OF SYSTEMS:  CONSTITUTIONAL: No fever, weight loss, or fatigue  RESPIRATORY: No cough, wheezing, chills or hemoptysis; No shortness of breath  CARDIOVASCULAR: No chest pain, palpitations, dizziness, or leg swelling  GASTROINTESTINAL: No abdominal or epigastric pain. No nausea, vomiting or hematemesis; No diarrhea or constipation  NEUROLOGICAL: No headaches, memory loss, loss of strength, numbness, or tremors  SKIN: No itching, burning, rashes, or lesions   MUSCULOSKELETAL: No joint pain or swelling; No muscle, back, or extremity pain    Allergies  No Known Allergies    PAST MEDICAL & SURGICAL HISTORY:  Insulin dependent type 2 diabetes mellitus  CKD (chronic kidney disease) requiring chronic dialysis  HTN (hypertension)  Sleep apnea  Bipolar 1 disorder  No significant past surgical history    VITAL SIGNS:  T(C): 37.6 (21 @ 10:09), Max: 38.9 (21 @ 05:20)  HR: 75 (21 @ 10:09) (68 - 808)  BP: 104/57 (21 @ 10:09) (104/57 - 166/77)  RR: 33 (21 @ 10:09) (18 - 42)  SpO2: 94% (21 @ 10:09) (90% - 99%)  Wt(kg): --Vital Signs Last 24 Hrs  T(C): 37.6 (2021 10:09), Max: 38.9 (2021 05:20)  T(F): 99.6 (2021 10:09), Max: 102.1 (2021 05:20)  HR: 75 (2021 10:09) (68 - 808)  BP: 104/57 (2021 10:09) (104/57 - 166/77)  BP(mean): --  RR: 33 (2021 10:09) (18 - 42)  SpO2: 94% (2021 10:09) (90% - 99%)    PHYSICAL EXAM:  GENERAL: Pt sleeping in bed in NAD  HEAD:  Atraumatic  EYES: EOMI, PERRL, conjunctiva and sclera clear  ENT: Dry mucous membranes  NECK: Obese. No JVD  CHEST/LUNG: Coarse upper resp sounds, otherwise clear. poor inspiratory effort, equal sounds B/L. No rhonchi or wheezing. Shallow respirations   HEART: S1, S2, Regular rate and rhythm   ABDOMEN: Obese, Bowel sounds present; Soft, Nontender. No guarding or rigidity   EXTREMITIES:  2+ Peripheral Pulses, brisk capillary refill. No clubbing, cyanosis, or edema  NERVOUS SYSTEM:  somnolent, awake to voice, follows commands. No deficits   SKIN: No rashes or lesions    LABS:                      7.5    21.42 )-----------( 327      ( 2021 07:59 )             24.2     02-05  141  |  100  |  56.0<H>  ----------------------------<  117<H>  3.3<L>   |  22.0  |  7.37<H>    Ca    9.0      2021 07:59  Phos  3.5     02-04  Mg     1.9     02-04    TPro  7.0  /  Alb  3.3  /  TBili  0.4  /  DBili  x   /  AST  15  /  ALT  37  /  AlkPhos  100  02-05    CAPILLARY BLOOD GLUCOSE  POCT Blood Glucose.: 169 mg/dL (2021 11:16)  POCT Blood Glucose.: 136 mg/dL (2021 08:31)  POCT Blood Glucose.: 178 mg/dL (2021 20:40)  POCT Blood Glucose.: 180 mg/dL (2021 16:06)    ABG - ( 2021 09:01 )  pH, Arterial: 7.27  pH, Blood: x     /  pCO2: 60    /  pO2: 95    / HCO3: 25    / Base Excess: 0.5   /  SaO2: 98        Urinalysis Basic - ( 2021 22:04 )  Color: Yellow / Appearance: Clear / S.015 / pH: x  Gluc: x / Ketone: Negative  / Bili: Negative / Urobili: Negative mg/dL   Blood: x / Protein: 500 mg/dL / Nitrite: Negative   Leuk Esterase: Negative / RBC: 0-2 /HPF / WBC 0-2   Sq Epi: x / Non Sq Epi: Occasional / Bacteria: Few    MEDICATIONS  (STANDING):  ALBUTerol    0.083%. 2.5 milliGRAM(s) Nebulizer once  aspirin  chewable 81 milliGRAM(s) Oral daily  atorvastatin 20 milliGRAM(s) Oral at bedtime  carvedilol 12.5 milliGRAM(s) Oral every 12 hours  chlorhexidine 4% Liquid 1 Application(s) Topical <User Schedule>  cholecalciferol 2000 Unit(s) Oral two times a day  dextrose 40% Gel 15 Gram(s) Oral once  dextrose 5%. 1000 milliLiter(s) (50 mL/Hr) IV Continuous <Continuous>  dextrose 5%. 1000 milliLiter(s) (100 mL/Hr) IV Continuous <Continuous>  dextrose 50% Injectable 25 Gram(s) IV Push once  dextrose 50% Injectable 12.5 Gram(s) IV Push once  dextrose 50% Injectable 25 Gram(s) IV Push once  epoetin alma-epbx (RETACRIT) Injectable 30309 Unit(s) IV Push once  folic acid 1 milliGRAM(s) Oral daily  glucagon  Injectable 1 milliGRAM(s) IntraMuscular once  hydrALAZINE 50 milliGRAM(s) Oral three times a day  insulin glargine Injectable (LANTUS) 20 Unit(s) SubCutaneous two times a day  insulin lispro (ADMELOG) corrective regimen sliding scale   SubCutaneous three times a day before meals  insulin lispro Injectable (ADMELOG) 2 Unit(s) SubCutaneous three times a day before meals  iron sucrose IVPB 200 milliGRAM(s) IV Intermittent every 24 hours  methylPREDNISolone sodium succinate Injectable 40 milliGRAM(s) IV Push every 8 hours  NIFEdipine XL 60 milliGRAM(s) Oral daily  piperacillin/tazobactam IVPB.. 3.375 Gram(s) IV Intermittent every 12 hours  polyethylene glycol 3350 17 Gram(s) Oral daily  potassium chloride   Powder 40 milliEquivalent(s) Oral once  QUEtiapine 100 milliGRAM(s) Oral at bedtime  risperiDONE   Tablet 3 milliGRAM(s) Oral two times a day  saccharomyces boulardii 250 milliGRAM(s) Oral two times a day  senna 2 Tablet(s) Oral at bedtime  sevelamer carbonate 800 milliGRAM(s) Oral three times a day with meals  traZODone 100 milliGRAM(s) Oral at bedtime    MEDICATIONS  (PRN):  acetaminophen   Tablet .. 650 milliGRAM(s) Oral every 6 hours PRN Temp greater or equal to 38C (100.4F), Mild Pain (1 - 3)  guaiFENesin   Syrup  (Sugar-Free) 100 milliGRAM(s) Oral every 6 hours PRN Cough  hydrALAZINE Injectable 10 milliGRAM(s) IV Push every 6 hours PRN If SBP > 160   CC: F/u fluid overload ESRD on HD, hypercapnic resp failure with hypoxia currently on BIPAP, r/o sepsis 2/2 pna, acute on chronic anemia, DM2  INTERVAL HPI/OVERNIGHT EVENTS: Pt seen and examined at bedside this AM. Pt sleeping with BIPAP in place. Pt is somnolent but arousable. Noted to have Tmax 102F overnight. Brown colored BM yesterday. Nods 'no' to pain or complaints. No difficulty breathing or chest pain. ABG done, shows Co2 60, pH 7.2. MICU, pulm and ID called. Pt more awake later in afternoon.     REVIEW OF SYSTEMS:  CONSTITUTIONAL: No fever, weight loss, or fatigue  RESPIRATORY: No cough, wheezing, chills or hemoptysis; No shortness of breath  CARDIOVASCULAR: No chest pain, palpitations, dizziness, or leg swelling  GASTROINTESTINAL: No abdominal or epigastric pain. No nausea, vomiting or hematemesis; No diarrhea or constipation  NEUROLOGICAL: No headaches, memory loss, loss of strength, numbness, or tremors  SKIN: No itching, burning, rashes, or lesions   MUSCULOSKELETAL: No joint pain or swelling; No muscle, back, or extremity pain    Allergies  No Known Allergies    PAST MEDICAL & SURGICAL HISTORY:  Insulin dependent type 2 diabetes mellitus  CKD (chronic kidney disease) requiring chronic dialysis  HTN (hypertension)  Sleep apnea  Bipolar 1 disorder  No significant past surgical history    VITAL SIGNS:  T(C): 37.6 (21 @ 10:09), Max: 38.9 (21 @ 05:20)  HR: 75 (21 @ 10:09) (68 - 808)  BP: 104/57 (21 @ 10:09) (104/57 - 166/77)  RR: 33 (21 @ 10:09) (18 - 42)  SpO2: 94% (21 @ 10:09) (90% - 99%)  Wt(kg): --Vital Signs Last 24 Hrs  T(C): 37.6 (2021 10:09), Max: 38.9 (2021 05:20)  T(F): 99.6 (2021 10:09), Max: 102.1 (2021 05:20)  HR: 75 (2021 10:09) (68 - 808)  BP: 104/57 (2021 10:09) (104/57 - 166/77)  BP(mean): --  RR: 33 (2021 10:09) (18 - 42)  SpO2: 94% (2021 10:09) (90% - 99%)    PHYSICAL EXAM:  GENERAL: Pt sleeping in bed in NAD  HEAD:  Atraumatic  EYES: EOMI, PERRL, conjunctiva and sclera clear  ENT: Dry mucous membranes  NECK: Obese. No JVD  CHEST/LUNG: Coarse upper resp sounds, otherwise clear. poor inspiratory effort, equal sounds B/L. No rhonchi or wheezing. Shallow respirations   HEART: S1, S2, Regular rate and rhythm   ABDOMEN: Obese, Bowel sounds present; Soft, Nontender. No guarding or rigidity   EXTREMITIES:  2+ Peripheral Pulses, brisk capillary refill. No clubbing, cyanosis, or edema  NERVOUS SYSTEM:  somnolent, awake to voice, follows commands. No deficits   SKIN: No rashes or lesions    LABS:                      7.5    21.42 )-----------( 327      ( 2021 07:59 )             24.2     02-05  141  |  100  |  56.0<H>  ----------------------------<  117<H>  3.3<L>   |  22.0  |  7.37<H>    Ca    9.0      2021 07:59  Phos  3.5     02-04  Mg     1.9     02-04    TPro  7.0  /  Alb  3.3  /  TBili  0.4  /  DBili  x   /  AST  15  /  ALT  37  /  AlkPhos  100  02-05    CAPILLARY BLOOD GLUCOSE  POCT Blood Glucose.: 169 mg/dL (2021 11:16)  POCT Blood Glucose.: 136 mg/dL (2021 08:31)  POCT Blood Glucose.: 178 mg/dL (2021 20:40)  POCT Blood Glucose.: 180 mg/dL (2021 16:06)    ABG - ( 2021 09:01 )  pH, Arterial: 7.27  pH, Blood: x     /  pCO2: 60    /  pO2: 95    / HCO3: 25    / Base Excess: 0.5   /  SaO2: 98        Urinalysis Basic - ( 2021 22:04 )  Color: Yellow / Appearance: Clear / S.015 / pH: x  Gluc: x / Ketone: Negative  / Bili: Negative / Urobili: Negative mg/dL   Blood: x / Protein: 500 mg/dL / Nitrite: Negative   Leuk Esterase: Negative / RBC: 0-2 /HPF / WBC 0-2   Sq Epi: x / Non Sq Epi: Occasional / Bacteria: Few    MEDICATIONS  (STANDING):  ALBUTerol    0.083%. 2.5 milliGRAM(s) Nebulizer once  aspirin  chewable 81 milliGRAM(s) Oral daily  atorvastatin 20 milliGRAM(s) Oral at bedtime  carvedilol 12.5 milliGRAM(s) Oral every 12 hours  chlorhexidine 4% Liquid 1 Application(s) Topical <User Schedule>  cholecalciferol 2000 Unit(s) Oral two times a day  dextrose 40% Gel 15 Gram(s) Oral once  dextrose 5%. 1000 milliLiter(s) (50 mL/Hr) IV Continuous <Continuous>  dextrose 5%. 1000 milliLiter(s) (100 mL/Hr) IV Continuous <Continuous>  dextrose 50% Injectable 25 Gram(s) IV Push once  dextrose 50% Injectable 12.5 Gram(s) IV Push once  dextrose 50% Injectable 25 Gram(s) IV Push once  epoetin alma-epbx (RETACRIT) Injectable 51602 Unit(s) IV Push once  folic acid 1 milliGRAM(s) Oral daily  glucagon  Injectable 1 milliGRAM(s) IntraMuscular once  hydrALAZINE 50 milliGRAM(s) Oral three times a day  insulin glargine Injectable (LANTUS) 20 Unit(s) SubCutaneous two times a day  insulin lispro (ADMELOG) corrective regimen sliding scale   SubCutaneous three times a day before meals  insulin lispro Injectable (ADMELOG) 2 Unit(s) SubCutaneous three times a day before meals  iron sucrose IVPB 200 milliGRAM(s) IV Intermittent every 24 hours  methylPREDNISolone sodium succinate Injectable 40 milliGRAM(s) IV Push every 8 hours  NIFEdipine XL 60 milliGRAM(s) Oral daily  piperacillin/tazobactam IVPB.. 3.375 Gram(s) IV Intermittent every 12 hours  polyethylene glycol 3350 17 Gram(s) Oral daily  potassium chloride   Powder 40 milliEquivalent(s) Oral once  QUEtiapine 100 milliGRAM(s) Oral at bedtime  risperiDONE   Tablet 3 milliGRAM(s) Oral two times a day  saccharomyces boulardii 250 milliGRAM(s) Oral two times a day  senna 2 Tablet(s) Oral at bedtime  sevelamer carbonate 800 milliGRAM(s) Oral three times a day with meals  traZODone 100 milliGRAM(s) Oral at bedtime    MEDICATIONS  (PRN):  acetaminophen   Tablet .. 650 milliGRAM(s) Oral every 6 hours PRN Temp greater or equal to 38C (100.4F), Mild Pain (1 - 3)  guaiFENesin   Syrup  (Sugar-Free) 100 milliGRAM(s) Oral every 6 hours PRN Cough  hydrALAZINE Injectable 10 milliGRAM(s) IV Push every 6 hours PRN If SBP > 160

## 2021-02-05 NOTE — CONSULT NOTE ADULT - ATTENDING COMMENTS
A total of 55 minutes were spent on the entire encounter. Greater than 50% of the time was spent reviewing labs, notes, orders, radiographic studies, as well as counseling and coordinating care with the relevant multidisciplinary team, including with the primary and consulting providers.
34yo M w/ PMHx of HTN, ESRD (on HD M/W/F), AOCD, IDDM-2, ARVIND (not on cpap), morbid obesity, bipolar disorder, MDD presents to the ER due to difficulty breathing and being treated for HTN urgency and volume overload with NIV and HD with BP management.   ICU called today for new onset high fever, Resp distress and now acute on chronic hypercapnic and hypoxic resp failure and patient not placed on NIV last night with lethargy and hypotension this AM.   During my evaluation with pulmonary physician, patient had improved with mentation and we changed to AVAPs with Pmax/min 35/15 with EPAP 10 with back up rate 18 at 60% with improvement in BP. Blood cx, RVP and COVID PCR with broad spectrum Abx for treatment with Vanco and zosyn. Recommended SDU and repeat ABG, and if improved mentation, could change to HFNC for day and AVAPs for night and PRN   If needed HD today would not remove fluid.   Plan d/w primary team and ID and in agreement   CCT: 50 min

## 2021-02-05 NOTE — CONSULT NOTE ADULT - ASSESSMENT
36M PMH ESRD (MWF), IDDM2, ARVIND (previously on CPAP), morbid obesity, bipolar disorder, depression who presents with SOB, missed HD session, f/w sepsis 2/2 presumed PNA    Impression:  - Morbid obesity  - Volume overload 2/2 missed HD session  - Possible multifocal PNA  - Sepsis  - ARVIND previously on CPAP  - Component of metabolic acidosis    Plan:  - AVAPs 35/15, EPAP 8,  continue  - Improvement of ABG noted with AVAPs  - Needs NIPPV QHS + PRN  - ABx per ID  - Check RVP  - F/u BCx, check sputum cultures  - Lactate 0.5 today  - HD per nephrology, as BP tolerates  - Monitor mental status closely  - Keep HOB elevation  - Aspiration precautions, NPO while on BPAP  - ICU consulted  - Rest of care per primary team    Abdulaziz Oleary M.D.  Pulmonary & Critical Care Medicine  [facility]  Pulmonary Medicine at [place]  [address]  [place] [address]  T: [phone]  F: [phone]

## 2021-02-05 NOTE — PROGRESS NOTE ADULT - ASSESSMENT
ESRD: +PVC (COVID negative)  Clinically improved post HD but still symptomatic  - will arrange for additional UF removal tomorrow on HD  - Would recheck COVID    Anemia: +CKD  - cont KEV  - Low iron stores, will check ferritin before giving iV iron  - follow H/H  - consider PRBCs    RO:  - low phos diet  - cont Renvela    Will follow

## 2021-02-05 NOTE — CONSULT NOTE ADULT - SUBJECTIVE AND OBJECTIVE BOX
[facility]  INFECTIOUS DISEASES AND INTERNAL MEDICINE at [place]  =======================================================  Sandeep Bustos MD  Diplomates American Board of Internal Medicine and Infectious Diseases  Tel: [phone]      Fax: [phone]  =======================================================      [facility]-[id number]  MILENA PUGA    CC: Patient is a 36y old  Male who presents with a chief complaint of difficulty breathing (04 Feb 2021 16:40)      36y  Male       Past Medical & Surgical Hx:  PAST MEDICAL & SURGICAL HISTORY:  Insulin dependent type 2 diabetes mellitus    CKD (chronic kidney disease) requiring chronic dialysis    HTN (hypertension)    Sleep apnea    Bipolar 1 disorder    No significant past surgical history            Social Hx:    FAMILY HISTORY:  FH: HTN (hypertension)  Father, siblings    Family history of CKD (chronic kidney disease)  mother    Family history of diabetes mellitus (Grandparent)  Father, siblings        Allergies    No Known Allergies    Intolerances             REVIEW OF SYSTEMS:  CONSTITUTIONAL:  No Fever or chills  HEENT:  No diplopia or blurred vision.  No earache, sore throat or runny nose.  CARDIOVASCULAR:  No pressure, squeezing, strangling, tightness, heaviness or aching about the chest, neck, axilla or epigastrium.  RESPIRATORY:  No cough, shortness of breath  GASTROINTESTINAL:  No nausea, vomiting or diarrhea.  GENITOURINARY:  No dysuria, frequency or urgency. No Blood in urine  MUSCULOSKELETAL:  no joint aches, no muscle pain  SKIN:  No change in skin, hair or nails.  NEUROLOGIC:  No Headaches, seizures or weakness.  PSYCHIATRIC:  No disorder of thought or mood.  ENDOCRINE:  No heat or cold intolerance  HEMATOLOGICAL:  No easy bruising or bleeding.       Physical Exam:    GEN: NAD, pleasant  HEENT: normocephalic and atraumatic. EOMI. PERRL.  Anicteric  NECK: Supple.   LUNGS: Clear to auscultation.  HEART: Regular rate and rhythm without murmur.  ABDOMEN: Soft, nontender, and nondistended.  Positive bowel sounds.    : No CVA tenderness  EXTREMITIES: Without any edema.  MSK: No joint swelling  NEUROLOGIC: Cranial nerves II through XII are grossly intact. No Focal Deficits  PSYCHIATRIC: Appropriate affect .  SKIN: No Rash        Vitals:    T(F): 99.6 (05 Feb 2021 10:09), Max: 102.1 (05 Feb 2021 05:20)  HR: 75 (05 Feb 2021 10:09)  BP: 104/57 (05 Feb 2021 10:09)  RR: 33 (05 Feb 2021 10:09)  SpO2: 94% (05 Feb 2021 10:09) (90% - 99%)  temp max in last 48H T(F): , Max: 102.1 (02-05-21 @ 05:20)    Current Antibiotics:  piperacillin/tazobactam IVPB.. 3.375 Gram(s) IV Intermittent every 12 hours    Other medications:  ALBUTerol    0.083%. 2.5 milliGRAM(s) Nebulizer once  aspirin  chewable 81 milliGRAM(s) Oral daily  atorvastatin 20 milliGRAM(s) Oral at bedtime  carvedilol 12.5 milliGRAM(s) Oral every 12 hours  chlorhexidine 4% Liquid 1 Application(s) Topical <User Schedule>  cholecalciferol 2000 Unit(s) Oral two times a day  dextrose 40% Gel 15 Gram(s) Oral once  dextrose 5%. 1000 milliLiter(s) IV Continuous <Continuous>  dextrose 5%. 1000 milliLiter(s) IV Continuous <Continuous>  dextrose 50% Injectable 25 Gram(s) IV Push once  dextrose 50% Injectable 12.5 Gram(s) IV Push once  dextrose 50% Injectable 25 Gram(s) IV Push once  epoetin alma-epbx (RETACRIT) Injectable 83141 Unit(s) IV Push once  folic acid 1 milliGRAM(s) Oral daily  glucagon  Injectable 1 milliGRAM(s) IntraMuscular once  hydrALAZINE 50 milliGRAM(s) Oral three times a day  insulin glargine Injectable (LANTUS) 20 Unit(s) SubCutaneous two times a day  insulin lispro (ADMELOG) corrective regimen sliding scale   SubCutaneous three times a day before meals  insulin lispro Injectable (ADMELOG) 2 Unit(s) SubCutaneous three times a day before meals  iron sucrose IVPB 200 milliGRAM(s) IV Intermittent every 24 hours  methylPREDNISolone sodium succinate Injectable 40 milliGRAM(s) IV Push every 8 hours  NIFEdipine XL 60 milliGRAM(s) Oral daily  polyethylene glycol 3350 17 Gram(s) Oral daily  potassium chloride   Powder 40 milliEquivalent(s) Oral once  QUEtiapine 100 milliGRAM(s) Oral at bedtime  risperiDONE   Tablet 3 milliGRAM(s) Oral two times a day  saccharomyces boulardii 250 milliGRAM(s) Oral two times a day  senna 2 Tablet(s) Oral at bedtime  sevelamer carbonate 800 milliGRAM(s) Oral three times a day with meals  traZODone 100 milliGRAM(s) Oral at bedtime                            7.5    21.42 )-----------( 327      ( 05 Feb 2021 07:59 )             24.2     02-05    141  |  100  |  56.0<H>  ----------------------------<  117<H>  3.3<L>   |  22.0  |  7.37<H>    Ca    9.0      05 Feb 2021 07:59  Phos  3.5     02-04  Mg     1.9     02-04    TPro  7.0  /  Alb  3.3  /  TBili  0.4  /  DBili  x   /  AST  15  /  ALT  37  /  AlkPhos  100  02-05    RECENT CULTURES:      WBC Count: 21.42 K/uL (02-05-21 @ 07:59)  WBC Count: 25.54 K/uL (02-04-21 @ 10:15)  WBC Count: 21.32 K/uL (02-03-21 @ 17:10)    Creatinine, Serum: 7.37 mg/dL (02-05-21 @ 07:59)  Creatinine, Serum: 5.69 mg/dL (02-04-21 @ 10:16)  Creatinine, Serum: 6.12 mg/dL (02-03-21 @ 17:10)    C-Reactive Protein, Serum: 15.50 mg/dL (02-05-21 @ 07:59)    Ferritin, Serum: 148 ng/mL (02-03-21 @ 17:11)           COVID-19 IgG Antibody Interpretation: Negative (02-04-21 @ 04:15)  COVID-19 IgG Antibody Index: 0.06 Index (02-04-21 @ 04:15)  COVID-19 PCR: NotDetec (02-03-21 @ 17:12)  COVID-19 PCR: NotDetec (01-07-21 @ 13:53)   [facility]  INFECTIOUS DISEASES AND INTERNAL MEDICINE at [place]  =======================================================  Sandeep Bustos MD  Diplomates American Board of Internal Medicine and Infectious Diseases  Tel: [phone]      Fax: [phone]  =======================================================      [facility]-[id number]  MILENA PUGA    CC: Patient is a 36y old  Male who presents with a chief complaint of difficulty breathing (04 Feb 2021 16:40)      36yo M w/ PMHx of HTN, ESRD (on HD M/W/F), AOCD, IDDM-2, ARVIND (not on cpap), morbid obesity, bipolar disorder, MDD presents to the ER due to difficulty breathing. Pt per, he has not been feeling well for the past few days. Reports shortness of breath at rest and w/ ambulation w/ associated dry cough. Pt also reports that his stomach feels bloated. On Saturday he was supposed to have HD, however, he was able to get a ride. Today, he went and was noted to have worsening shortness of breath and was told to go to the ER for further evaluation. Pt denies any sick contacts, recent travel, chest pain, palpitations.     In the ED: /122, RR 24, O2 on RA 87% - improved w/ 6L O2 via NC. S/p Lasix 40mg IVP X1, Doxy 100mg IV X1, Rocephin 1g X1, Nitroglycerin 0.4mg X1      Past Medical & Surgical Hx:  PAST MEDICAL & SURGICAL HISTORY:  Insulin dependent type 2 diabetes mellitus    CKD (chronic kidney disease) requiring chronic dialysis    HTN (hypertension)    Sleep apnea    Bipolar 1 disorder    No significant past surgical history            Social Hx:    FAMILY HISTORY:  FH: HTN (hypertension)  Father, siblings    Family history of CKD (chronic kidney disease)  mother    Family history of diabetes mellitus (Grandparent)  Father, siblings        Allergies    No Known Allergies    Intolerances             REVIEW OF SYSTEMS:  CONSTITUTIONAL:  No Fever or chills  HEENT:  No diplopia or blurred vision.  No earache, sore throat or runny nose.  CARDIOVASCULAR:  No pressure, squeezing, strangling, tightness, heaviness or aching about the chest, neck, axilla or epigastrium.  RESPIRATORY:  No cough, shortness of breath  GASTROINTESTINAL:  No nausea, vomiting or diarrhea.  GENITOURINARY:  No dysuria, frequency or urgency. No Blood in urine  MUSCULOSKELETAL:  no joint aches, no muscle pain  SKIN:  No change in skin, hair or nails.  NEUROLOGIC:  No Headaches, seizures or weakness.  PSYCHIATRIC:  No disorder of thought or mood.  ENDOCRINE:  No heat or cold intolerance  HEMATOLOGICAL:  No easy bruising or bleeding.       Physical Exam:    GEN: NAD, pleasant  HEENT: normocephalic and atraumatic. EOMI. PERRL.  Anicteric  NECK: Supple.   LUNGS: Clear to auscultation.  HEART: Regular rate and rhythm without murmur.  ABDOMEN: Soft, nontender, and nondistended.  Positive bowel sounds.    : No CVA tenderness  EXTREMITIES: Without any edema.  MSK: No joint swelling  NEUROLOGIC: Cranial nerves II through XII are grossly intact. No Focal Deficits  PSYCHIATRIC: Appropriate affect .  SKIN: No Rash        Vitals:    T(F): 99.6 (05 Feb 2021 10:09), Max: 102.1 (05 Feb 2021 05:20)  HR: 75 (05 Feb 2021 10:09)  BP: 104/57 (05 Feb 2021 10:09)  RR: 33 (05 Feb 2021 10:09)  SpO2: 94% (05 Feb 2021 10:09) (90% - 99%)  temp max in last 48H T(F): , Max: 102.1 (02-05-21 @ 05:20)    Current Antibiotics:  piperacillin/tazobactam IVPB.. 3.375 Gram(s) IV Intermittent every 12 hours    Other medications:  ALBUTerol    0.083%. 2.5 milliGRAM(s) Nebulizer once  aspirin  chewable 81 milliGRAM(s) Oral daily  atorvastatin 20 milliGRAM(s) Oral at bedtime  carvedilol 12.5 milliGRAM(s) Oral every 12 hours  chlorhexidine 4% Liquid 1 Application(s) Topical <User Schedule>  cholecalciferol 2000 Unit(s) Oral two times a day  dextrose 40% Gel 15 Gram(s) Oral once  dextrose 5%. 1000 milliLiter(s) IV Continuous <Continuous>  dextrose 5%. 1000 milliLiter(s) IV Continuous <Continuous>  dextrose 50% Injectable 25 Gram(s) IV Push once  dextrose 50% Injectable 12.5 Gram(s) IV Push once  dextrose 50% Injectable 25 Gram(s) IV Push once  epoetin alma-epbx (RETACRIT) Injectable 52544 Unit(s) IV Push once  folic acid 1 milliGRAM(s) Oral daily  glucagon  Injectable 1 milliGRAM(s) IntraMuscular once  hydrALAZINE 50 milliGRAM(s) Oral three times a day  insulin glargine Injectable (LANTUS) 20 Unit(s) SubCutaneous two times a day  insulin lispro (ADMELOG) corrective regimen sliding scale   SubCutaneous three times a day before meals  insulin lispro Injectable (ADMELOG) 2 Unit(s) SubCutaneous three times a day before meals  iron sucrose IVPB 200 milliGRAM(s) IV Intermittent every 24 hours  methylPREDNISolone sodium succinate Injectable 40 milliGRAM(s) IV Push every 8 hours  NIFEdipine XL 60 milliGRAM(s) Oral daily  polyethylene glycol 3350 17 Gram(s) Oral daily  potassium chloride   Powder 40 milliEquivalent(s) Oral once  QUEtiapine 100 milliGRAM(s) Oral at bedtime  risperiDONE   Tablet 3 milliGRAM(s) Oral two times a day  saccharomyces boulardii 250 milliGRAM(s) Oral two times a day  senna 2 Tablet(s) Oral at bedtime  sevelamer carbonate 800 milliGRAM(s) Oral three times a day with meals  traZODone 100 milliGRAM(s) Oral at bedtime                            7.5    21.42 )-----------( 327      ( 05 Feb 2021 07:59 )             24.2     02-05    141  |  100  |  56.0<H>  ----------------------------<  117<H>  3.3<L>   |  22.0  |  7.37<H>    Ca    9.0      05 Feb 2021 07:59  Phos  3.5     02-04  Mg     1.9     02-04    TPro  7.0  /  Alb  3.3  /  TBili  0.4  /  DBili  x   /  AST  15  /  ALT  37  /  AlkPhos  100  02-05    RECENT CULTURES:      WBC Count: 21.42 K/uL (02-05-21 @ 07:59)  WBC Count: 25.54 K/uL (02-04-21 @ 10:15)  WBC Count: 21.32 K/uL (02-03-21 @ 17:10)    Creatinine, Serum: 7.37 mg/dL (02-05-21 @ 07:59)  Creatinine, Serum: 5.69 mg/dL (02-04-21 @ 10:16)  Creatinine, Serum: 6.12 mg/dL (02-03-21 @ 17:10)    C-Reactive Protein, Serum: 15.50 mg/dL (02-05-21 @ 07:59)    Ferritin, Serum: 148 ng/mL (02-03-21 @ 17:11)           COVID-19 IgG Antibody Interpretation: Negative (02-04-21 @ 04:15)  COVID-19 IgG Antibody Index: 0.06 Index (02-04-21 @ 04:15)  COVID-19 PCR: NotDetec (02-03-21 @ 17:12)  COVID-19 PCR: NotDetec (01-07-21 @ 13:53)      < from: Xray Chest 1 View AP/PA. (02.04.21 @ 16:36) >  FINDINGS:  The lungs show unchanged bilateral  multifocal and diffuse ill-defined airspace consolidations. No pneumothorax.    The heart and mediastinum are within normal limits.    Visualized osseous structures are intact.        IMPRESSION:   Unchanged bilateral  multifocal and diffuse ill-defined airspace consolidations..    < end of copied text >

## 2021-02-05 NOTE — PROGRESS NOTE ADULT - ASSESSMENT
35 years old male with PMH of ESRD on Dialysis (MWF), Morbid Obesity, ARVIND (not on CPAP), DM 2, HTN, HLD and Bipolar Disorder presented with difficulty breathing after missing dialysis.     1) Acute Respiratory Failure with Hypoxia with Hypercapnea   - Likely due to volume overload due to missed dialysis  - s/p HD 2/3 and 2/4   - s/p a dose of Lasix 80 mg  - started on Solumedrol 120mg IVP then will taper  - Continue BiPAP PRN  - Renal following   - CXR resulted noted. holding off CTA for now   - Pulm and MICU consulted for now cont BIPAP  - f/u RVP, Ddimer, repeat ABG    2) R/O sepsis 2/2 Pneumonia, seen on CXR  - Blood and sputum cultures pending  - Started on Zosyn, s/p 1 dose Vanco  - ID consulted  - repeat bcx, legionella urine ag    3) Leukocytosis  - Chronic    - Monitor     4) Anemia  - Low Iron Stores  - Started Venofer 200 mg x 5  - Retacrit with dialysis   - FOBT pending   - no source of acute bleeding     5) Uncontrolled HTN  - Likely due to missed dialysis   - Continue Coreg, Hydralazine   - Procardia was increased to 60 mg 2/5   - continue to trend closely       6) DM 2 - Uncontrolled   - HbA1c 12.3 on 1/1/21  - Accu checks and ISS  - Continue Lantus 20 units BID  - c./w Admelog 2 units before meals, will titrate up as needed   - continue to monitor while on solumedrol  - DM education    7) HLD  - Continue Lipitor     8) Bipolar Disorder    - Continue Risperidone Seroquel and Trazodone   - Also takes Risperdal Consta q2w, last dose scheduled on 1/27/21    DVT Prophylaxis -- SCDs given anemia   Dispo: Not MICU candidate at this time. Transfer to stepdown for closer monitoring, monitor and . Eventual poss home when medically stable    35 years old male with PMH of ESRD on Dialysis (MWF), Morbid Obesity, ARVIND (not on CPAP), DM 2, HTN, HLD and Bipolar Disorder presented with difficulty breathing after missing dialysis. Seen by Nephrology and s/p emergent HD 2/3 and 2/4. s/p a dose of Lasix 80 mg with improvement. Noted to have low grade temperatures and fever workup done. CXR shows B/L pna, UA negative. Blood cultures and sputum culture obtained. Started on Zosyn for possible pneumonia. Pt was doing well with 5L NC with BIPAP PRN. On 2/5, noted Tmax overnight to 102F and noted to be more somnolent. ABG shows acute hypercapnic respiratory failure. ICU, ID and Pulmonology were consulted.     1) Acute Respiratory Failure with Hypoxia with Hypercapnea   - Likely due to volume overload due to missed dialysis  - s/p HD 2/3 and 2/4   - s/p a dose of Lasix 80 mg  - started on Solumedrol 120mg IVP then will taper  - Continue BiPAP    - Renal following   - CXR resulted noted. holding off CTA for now   - ABG noted. Pulm and MICU consulted for recs, ARVIND, cont BIPAP  - f/u RVP, Ddimer, repeat ABG    2) R/O sepsis 2/2 Pneumonia, seen on CXR  - Blood and sputum cultures pending  - Started on Zosyn, s/p 1 dose Vanco  - ID consulted  - repeat bcx, legionella urine ag    3) Leukocytosis  - Chronic    - Monitor     4) Anemia  - Low Iron Stores  - Started Venofer 200 mg x 5  - Retacrit with dialysis   - FOBT pending   - no source of acute bleeding     5) Uncontrolled HTN  - Likely due to missed dialysis   - Continue Coreg, Hydralazine   - Procardia was increased to 60 mg 2/5   - continue to trend closely       6) DM 2 - Uncontrolled   - HbA1c 12.3 on 1/1/21  - Accu checks and ISS  - Continue Lantus 20 units BID  - c./w Admelog 2 units before meals, will titrate up as needed   - continue to monitor while on solumedrol  - DM education    7) HLD  - Continue Lipitor     8) Bipolar Disorder    - Continue Risperidone Seroquel and Trazodone   - Also takes Risperdal Consta q2w, last dose scheduled on 1/27/21    DVT Prophylaxis -- SCDs given anemia   Dispo: Not MICU candidate at this time. Transfer to stepdown for closer monitoring, monitor and . Eventual poss home when medically stable    35 years old male with PMH of ESRD on Dialysis (MWF), Morbid Obesity, ARVIND (not on CPAP), DM 2, HTN, HLD and Bipolar Disorder presented with difficulty breathing after missing dialysis. Seen by Nephrology and s/p emergent HD 2/3 and 2/4. s/p a dose of Lasix 80 mg with improvement. Noted to have low grade temperatures and fever workup done. CXR shows B/L pna, UA negative. Blood cultures and sputum culture obtained. Started on Zosyn for possible pneumonia. Pt was doing well with 5L NC with BIPAP PRN. On 2/5, noted Tmax overnight to 102F and noted to be more somnolent. ABG shows acute hypercapnic respiratory failure. ICU, ID and Pulmonology were consulted.     1) Acute Respiratory Failure with Hypoxia with Hypercapnea   - one missed dialysis session not the cause for current hypoxia  - s/p HD 2/3 and 2/4   ABG with resp acidosis liekly from ARVIND & OHAS; no hx of COPD  Currently on BiPAP with tachypnea  Pt's Tmax 102  CXR with GGO b/l  COVID -ve  Will repeat RVP  Check Blood cx, sputum cx, Urine Legionella ag  C/w zosyn, add vanco x 1 dose  ID consult called  D-dimer 570  CTA to be done in am after HD as discussed with renal  Will hold off on AC due to drop in Hg  -will add Solumedrol IV  - Continue BiPAP    - Renal following   ICU consult noted     2) R/O sepsis 2/2 Pneumonia, seen on CXR  - Blood and sputum cultures pending  - Started on Zosyn, s/p 1 dose Vanco  - ID consulted  - repeat bcx, legionella urine ag    3) Leukocytosis with left shift  No bandemia  Chronic since Dec 2020  Treat PNA  If no resolution there after then hemonc as outpatient       4) Microcytic Anemia due to CM-  - Low Iron Stores  Baseline Hg 9-10, now slowly trending down to 7.2  No overt bleeding  - Started Venofer 200 mg x 5  - Retacrit with dialysis   - FOBT pending   Will need colonoscopy with GI as outpatient of no etio for CM found      5) Uncontrolled HTN  - Likely due to missed dialysis   - Continue Coreg, Hydralazine   - Procardia was increased to 60 mg 2/5   - continue to trend closely       6) DM 2 - Uncontrolled   - HbA1c 12.3 on 1/1/21  - Accu checks and ISS  - Continue Lantus 20 units BID  - c./w Admelog 2 units before meals, will titrate up as needed   - continue to monitor while on solumedrol  - DM education    7) HLD  - Continue Lipitor     8) Bipolar Disorder    - Continue Risperidone Seroquel and Trazodone   - Also takes Risperdal Consta q2w, last dose scheduled on 1/27/21    Morbid obesity-  Diet & weight loss    DVT Prophylaxis -- SCDs given anemia   Dispo: Not MICU candidate at this time. Transfer to stepdown for closer monitoring, monitor and . Eventual poss home when medically stable

## 2021-02-05 NOTE — CONSULT NOTE ADULT - ASSESSMENT
36yo M w/ PMHx of HTN, ESRD (on HD M/W/F), AOCD, IDDM-2, ARVIND (not on cpap), morbid obesity, bipolar disorder, MDD presents to the ER due to difficulty breathing.    1 - Acute Respiratory Failure with Hypoxia  2 - R/O Pneumonia  3 - Leukocytosis  4 - Anemia  5 - Uncontrolled HTN  6 - DM 2 - Uncontrolled   7 - HLD  8 - Bipolar Disorder      - ABG improved on AVAPS, improved mentation  - Will need to continue with AVAPS PRN and over night (35/15, EPAP 8,  currently), NPO while on  - ABx per ID  - F/u BCx, RVP, check sputum cultures  - Monitor mental status closely  - H/H trending down, recommend PRBC if <7.0

## 2021-02-05 NOTE — PROGRESS NOTE ADULT - SUBJECTIVE AND OBJECTIVE BOX
NEPHROLOGY INTERVAL HPI/OVERNIGHT EVENTS:    Examined on Bipap  had HD last two days    MEDICATIONS  (STANDING):  ALBUTerol    0.083%. 2.5 milliGRAM(s) Nebulizer once  aspirin  chewable 81 milliGRAM(s) Oral daily  atorvastatin 20 milliGRAM(s) Oral at bedtime  carvedilol 12.5 milliGRAM(s) Oral every 12 hours  chlorhexidine 4% Liquid 1 Application(s) Topical <User Schedule>  cholecalciferol 2000 Unit(s) Oral two times a day  dextrose 40% Gel 15 Gram(s) Oral once  dextrose 5%. 1000 milliLiter(s) (50 mL/Hr) IV Continuous <Continuous>  dextrose 5%. 1000 milliLiter(s) (100 mL/Hr) IV Continuous <Continuous>  dextrose 50% Injectable 25 Gram(s) IV Push once  dextrose 50% Injectable 12.5 Gram(s) IV Push once  dextrose 50% Injectable 25 Gram(s) IV Push once  epoetin alma-epbx (RETACRIT) Injectable 36883 Unit(s) IV Push once  folic acid 1 milliGRAM(s) Oral daily  glucagon  Injectable 1 milliGRAM(s) IntraMuscular once  hydrALAZINE 50 milliGRAM(s) Oral three times a day  insulin glargine Injectable (LANTUS) 20 Unit(s) SubCutaneous two times a day  insulin lispro (ADMELOG) corrective regimen sliding scale   SubCutaneous three times a day before meals  insulin lispro Injectable (ADMELOG) 2 Unit(s) SubCutaneous three times a day before meals  iron sucrose IVPB 200 milliGRAM(s) IV Intermittent every 24 hours  methylPREDNISolone sodium succinate Injectable 40 milliGRAM(s) IV Push every 8 hours  NIFEdipine XL 60 milliGRAM(s) Oral daily  piperacillin/tazobactam IVPB.. 3.375 Gram(s) IV Intermittent every 12 hours  polyethylene glycol 3350 17 Gram(s) Oral daily  potassium chloride   Powder 40 milliEquivalent(s) Oral once  QUEtiapine 100 milliGRAM(s) Oral at bedtime  risperiDONE   Tablet 3 milliGRAM(s) Oral two times a day  saccharomyces boulardii 250 milliGRAM(s) Oral two times a day  senna 2 Tablet(s) Oral at bedtime  sevelamer carbonate 800 milliGRAM(s) Oral three times a day with meals  traZODone 100 milliGRAM(s) Oral at bedtime    MEDICATIONS  (PRN):  acetaminophen   Tablet .. 650 milliGRAM(s) Oral every 6 hours PRN Temp greater or equal to 38C (100.4F), Mild Pain (1 - 3)  guaiFENesin   Syrup  (Sugar-Free) 100 milliGRAM(s) Oral every 6 hours PRN Cough  hydrALAZINE Injectable 10 milliGRAM(s) IV Push every 6 hours PRN If SBP > 160      Allergies    No Known Allergies    Intolerances        Vital Signs Last 24 Hrs  T(C): 36.9 (2021 13:44), Max: 38.9 (2021 05:20)  T(F): 98.5 (2021 13:44), Max: 102.1 (2021 05:20)  HR: 77 (2021 13:44) (68 - 808)  BP: 138/70 (2021 13:44) (104/57 - 166/77)  BP(mean): --  RR: 31 (2021 13:44) (18 - 42)  SpO2: 97% (2021 13:44) (90% - 99%)  Daily     Daily Weight in k (2021 14:40)    PHYSICAL EXAM:  Appears dyspneic is on BiPaP  NECK: +JVD  NERVOUS SYSTEM: AAOx3  CHEST/LUNG: Diminished BS with +crackles  HEART: No rub  ABDOMEN: Soft, Nontender, Nondistended; BS+  EXTREMITIES: Mild LE edema; AVF patent    LABS:                        7.5    21.42 )-----------( 327      ( 2021 07:59 )             24.2     02-05    141  |  100  |  56.0<H>  ----------------------------<  117<H>  3.3<L>   |  22.0  |  7.37<H>    Ca    9.0      2021 07:59  Phos  3.5     02-04  Mg     1.9     02-04    TPro  7.0  /  Alb  3.3  /  TBili  0.4  /  DBili  x   /  AST  15  /  ALT  37  /  AlkPhos  100  02-05      Urinalysis Basic - ( 2021 22:04 )    Color: Yellow / Appearance: Clear / S.015 / pH: x  Gluc: x / Ketone: Negative  / Bili: Negative / Urobili: Negative mg/dL   Blood: x / Protein: 500 mg/dL / Nitrite: Negative   Leuk Esterase: Negative / RBC: 0-2 /HPF / WBC 0-2   Sq Epi: x / Non Sq Epi: Occasional / Bacteria: Few        ABG - ( 2021 12:38 )  pH, Arterial: 7.32  pH, Blood: x     /  pCO2: 51    /  pO2: 73    / HCO3: 24    / Base Excess: 0.1   /  SaO2: 95                    RADIOLOGY & ADDITIONAL TESTS:

## 2021-02-05 NOTE — CONSULT NOTE ADULT - SUBJECTIVE AND OBJECTIVE BOX
PULMONARY CONSULT NOTE      MILENA PUGA  MRN-[id number]    Patient is a 36y old  Male who presents with a chief complaint of difficulty breathing (2021 11:55)      HISTORY OF PRESENT ILLNESS:  36M PMH ESRD (MWF), IDDM2, ARVIND (previously on CPAP), morbid obesity, bipolar disorder, depression who presents with SOB. He has had a dry cough and dyspnea on exertion. He missed his Saturday HD session. He was found with B/L alveolar infiltrates. Found with sepsis, fevers. Noted with worsening lethargy, work of breathing. ABG showed 7.27/60/95 on BPAP 16/6 @ 60%. He was placed on AVAPS , 35/15, RR 18, EPAP 12 and 60% FiO2 and gas showed 7.32/51/73. Denies acute pain. No fevers, chills. Extensive history unable to be obtained given AMS/lethargy.    MEDICATIONS  (STANDING):  ALBUTerol    0.083%. 2.5 milliGRAM(s) Nebulizer once  aspirin  chewable 81 milliGRAM(s) Oral daily  atorvastatin 20 milliGRAM(s) Oral at bedtime  carvedilol 12.5 milliGRAM(s) Oral every 12 hours  chlorhexidine 4% Liquid 1 Application(s) Topical <User Schedule>  cholecalciferol 2000 Unit(s) Oral two times a day  dextrose 40% Gel 15 Gram(s) Oral once  dextrose 5%. 1000 milliLiter(s) (50 mL/Hr) IV Continuous <Continuous>  dextrose 5%. 1000 milliLiter(s) (100 mL/Hr) IV Continuous <Continuous>  dextrose 50% Injectable 25 Gram(s) IV Push once  dextrose 50% Injectable 12.5 Gram(s) IV Push once  dextrose 50% Injectable 25 Gram(s) IV Push once  epoetin alma-epbx (RETACRIT) Injectable 22764 Unit(s) IV Push once  folic acid 1 milliGRAM(s) Oral daily  glucagon  Injectable 1 milliGRAM(s) IntraMuscular once  hydrALAZINE 50 milliGRAM(s) Oral three times a day  insulin glargine Injectable (LANTUS) 20 Unit(s) SubCutaneous two times a day  insulin lispro (ADMELOG) corrective regimen sliding scale   SubCutaneous three times a day before meals  insulin lispro Injectable (ADMELOG) 2 Unit(s) SubCutaneous three times a day before meals  iron sucrose IVPB 200 milliGRAM(s) IV Intermittent every 24 hours  methylPREDNISolone sodium succinate Injectable 40 milliGRAM(s) IV Push every 8 hours  NIFEdipine XL 60 milliGRAM(s) Oral daily  piperacillin/tazobactam IVPB.. 3.375 Gram(s) IV Intermittent every 12 hours  polyethylene glycol 3350 17 Gram(s) Oral daily  potassium chloride   Powder 40 milliEquivalent(s) Oral once  QUEtiapine 100 milliGRAM(s) Oral at bedtime  risperiDONE   Tablet 3 milliGRAM(s) Oral two times a day  saccharomyces boulardii 250 milliGRAM(s) Oral two times a day  senna 2 Tablet(s) Oral at bedtime  sevelamer carbonate 800 milliGRAM(s) Oral three times a day with meals  traZODone 100 milliGRAM(s) Oral at bedtime    MEDICATIONS  (PRN):  acetaminophen   Tablet .. 650 milliGRAM(s) Oral every 6 hours PRN Temp greater or equal to 38C (100.4F), Mild Pain (1 - 3)  guaiFENesin   Syrup  (Sugar-Free) 100 milliGRAM(s) Oral every 6 hours PRN Cough  hydrALAZINE Injectable 10 milliGRAM(s) IV Push every 6 hours PRN If SBP > 160    Allergies    No Known Allergies    Intolerances      PAST MEDICAL & SURGICAL HISTORY:  Insulin dependent type 2 diabetes mellitus    CKD (chronic kidney disease) requiring chronic dialysis    HTN (hypertension)    Sleep apnea    Bipolar 1 disorder    No significant past surgical history      FAMILY HISTORY:  FH: HTN (hypertension)  Father, siblings    Family history of CKD (chronic kidney disease)  mother    Family history of diabetes mellitus (Grandparent)  Father, siblings          SOCIAL HISTORY  Smoking History:   Non-smoker    REVIEW OF SYSTEMS:  CONSTITUTIONAL:  No fevers, chills  HEENT:  No headache, blurry vision  CARDIOVASCULAR:  No chest pain  RESPIRATORY:  As per HPI  GASTROINTESTINAL:  No abdominal pain  GENITOURINARY:  No dysuria, frequency or urgency  NEUROLOGIC:  No seizures or headaches  PSYCHIATRIC:  No disorder of thought or mood      Vital Signs Last 24 Hrs  T(C): 37.6 (2021 10:09), Max: 38.9 (2021 05:20)  T(F): 99.6 (2021 10:09), Max: 102.1 (2021 05:20)  HR: 80 (2021 12:17) (68 - 808)  BP: 147/64 (2021 11:20) (104/57 - 166/77)  BP(mean): --  RR: 28 (2021 11:20) (18 - 42)  SpO2: 99% (2021 12:17) (90% - 99%)      PHYSICAL EXAMINATION:  GENERAL: In no apparent distress, lethargic  HEENT: NC/AT  NECK: Supple  LUNGS: Crackles at bases. Moderate inspiratory effort, non-labored breathing  CV: +S1, S2  ABDOMEN: Soft, NT/ND  EXTREMITIES: No rashes, lesions, edema  NEUROLOGIC: Lethargic  PSYCH: Normal affect      LABS:                        7.5    21.42 )-----------( 327      ( 2021 07:59 )             24.2     02-05    141  |  100  |  56.0<H>  ----------------------------<  117<H>  3.3<L>   |  22.0  |  7.37<H>    Ca    9.0      2021 07:59  Phos  3.5     02-04  Mg     1.9     02-04    TPro  7.0  /  Alb  3.3  /  TBili  0.4  /  DBili  x   /  AST  15  /  ALT  37  /  AlkPhos  100  02-05      Urinalysis Basic - ( 2021 22:04 )    Color: Yellow / Appearance: Clear / S.015 / pH: x  Gluc: x / Ketone: Negative  / Bili: Negative / Urobili: Negative mg/dL   Blood: x / Protein: 500 mg/dL / Nitrite: Negative   Leuk Esterase: Negative / RBC: 0-2 /HPF / WBC 0-2   Sq Epi: x / Non Sq Epi: Occasional / Bacteria: Few      ABG - ( 2021 12:38 )  pH, Arterial: 7.32  pH, Blood: x     /  pCO2: 51    /  pO2: 73    / HCO3: 24    / Base Excess: 0.1   /  SaO2: 95                CARDIAC MARKERS ( 2021 17:11 )  x     / 0.05 ng/mL / x     / x     / x          D-Dimer Assay, Quantitative: 570 ng/mL DDU (21 @ 11:17)    Serum Pro-Brain Natriuretic Peptide: 3031 pg/mL (21 @ 17:10)      Procalcitonin, Serum: 0.75 ng/mL (21 @ 11:17)      MICROBIOLOGY:  COVID-19 PCR . (21 @ 17:12)    COVID-19 PCR: NotDetec: You can help in the fight against COVID-19. [employer] may contact  you to see if you are interested in voluntarily participating in one of  our clinical trials.  Testing is performed using polymerase chain reaction (PCR) or  transcription mediated amplification (TMA). This COVID-19 (SARS-CoV-2)  nucleic acid amplification test was validated by [employer] and is  in use under the FDA Emergency Use Authorization (EUA) for clinical labs  CLIA-certified to perform high complexity testing. Test results should be  correlated with clinical presentation, patient history, and epidemiology.    COVID-19  Antibody - for prior infection screening (21 @ 04:15)    COVID-19 IgG Antibody Index: 0.06: Roche ECLIA Total AB (JENNIFER)  NOTE: This result index represents a total antibody measurement,  which  includes IgG, IgA, and IgM  Measures Nucleocapsid  Negative <= 0.99 Index  Positive >= 1.00 Index Index    COVID-19 IgG Antibody Interpretation: Negative: This test has not been reviewed by the FDA by the standard review  process. It has been authorized for emergency use by the FDA. [employer] has validated this test to be accurate.  Negative results do not rule out SARS-CoV-2 infection, particularly in  those who have been in recent contact with the virus. Follow-up testing  with a molecular diagnostic test should be considered to rule out  infection in these individuals.  Results from antibody testing should not be used as thesole basis to  diagnose or exclude SARS-CoV-2 infection, or to inform infection status.  Positive results may rarely be due to past or present infection with  non-SARS-CoV-2 coronavirus strains, such as coronavirus HKU1, NL63, OC43,  or 229E. [employer], through extensive validation  testing, has confirmed that this risk is minimal with this test.          RADIOLOGY & ADDITIONAL STUDIES:  < from: Xray Chest 1 View AP/PA. (02.04.21 @ 16:36) >  EXAM:  XR CHEST AP OR PA 1V                          PROCEDURE DATE:  2021          INTERPRETATION:  Portable chest radiograph    CLINICAL INFORMATION: Fever    TECHNIQUE:  Portable  AP view of the chest was obtained.    COMPARISON: 2/3/2021chest available for review.    FINDINGS:  The lungs show unchanged bilateral  multifocal and diffuse ill-defined airspace consolidations. No pneumothorax.    The heart and mediastinum are within normal limits.    Visualized osseous structures are intact.        IMPRESSION:   Unchanged bilateral  multifocal and diffuse ill-defined airspace consolidations..    < end of copied text >      ECHO:  < from: TTE Echo Complete w/Doppler (19 @ 10:54) >  Summary:   1. Technically difficult study.   2. Mildly enlarged left atrium.   3. There is moderate concentric left ventricular hypertrophy.   4. Normal wall motion. Left ventricular ejection fraction, by visual   estimation, is 60 to 65%. Grade II diastolic dysfunction.   5. Normal right atrial size.   6. Normal right ventricular size and function.   7. No significant valvular abnormality.   8. There is no evidence of pericardial effusion.    [id number] Ama Kirkpatrick MD, RPVI, Electronically signed on 2019 at   4:19:24 PM    < end of copied text >

## 2021-02-05 NOTE — CHART NOTE - NSCHARTNOTEFT_GEN_A_CORE
Called by RN at 5:35 for patient having Tachypnea, RR 40, and elevated temp 1o2.1.    Patient seen and examined at bedside, and chart reviewed. The patient is a 35 years old male with PMH of ESRD on Dialysis (MWF), Morbid Obesity, ARVIND (not on CPAP), DM 2, HTN, HLD and Bipolar Disorder admitted for acute respiratory failure secondary to fluid overload due to missed HD. Morbidly obese male noted laying in bed almost flat supine with neck bent with head on pillow. Patient c/o difficulty breathing. Patient had been on BiPAP which was replaced with NC 6L, per RN because patient was tachypneic. Patient repositioned RT consulted and pa Called by RN at 5:35 for patient having Tachypnea, RR 40, and elevated temp 102.1.    Patient seen and examined at bedside, and chart reviewed. The patient is a 35 years old male with PMH of ESRD on Dialysis (MWF), Morbid Obesity, ARVIND (not on CPAP), DM 2, HTN, HLD and Bipolar Disorder admitted for acute respiratory failure secondary to fluid overload due to missed HD, now with hypoxia, tachypnea. Morbidly obese male noted laying in bed almost flat supine with neck bent with head on pillow. Patient c/o difficulty breathing. Patient had been on BiPAP which was replaced with NC 6L, per RN because patient was tachypneic. The patient is a  Morbidly obese male noted laying in bed almost flat supine with neck bent with head on pillow. Patient c/o difficulty breathing. Patient had been on BiPAP which was replaced with NC 6L, per RN because patient was tachypneic.    Vital Signs Last 24 Hrs  T(C): 38.9 (05 Feb 2021 05:20), Max: 38.9 (05 Feb 2021 05:20)  T(F): 102.1 (05 Feb 2021 05:20), Max: 102.1 (05 Feb 2021 05:20)  HR: 90 (05 Feb 2021 05:31) (76 - 808)  BP: 166/77 (05 Feb 2021 05:20) (136/82 - 166/77)  BP(mean): --  RR: 40 (05 Feb 2021 05:20) (18 - 42)  SpO2: 98% (05 Feb 2021 05:31) (90% - 99%)    Physical Exam  General: Morbidly obese male, in respiratory discomfort on supplemental O2 w/ NC.  Head: normocephalic, atraumatic.   ENMT: EOM intact. scleral clear, nonicteric. No conjunctival injection.  mucous membrane mildly dry. Oropharynx clear.  Neck: Large, no JVD  Respiratory: Lungs CTA, patient with labored breathing, with no use of accessory muscle. No wheeze/ronchi/rales  Cardiovascular: S1S2, regualr rate and rhythm. No murmur. No clubbing, no cyanosis  Gastrointestinal: Abdomen soft, nontender, nondistended. Normal bowel sounds.  Extremities> Moving all extrem. No pedal edema  Neuro: CNs appears grossly intact. No focal deficits grossly noted  Psych: Patient calm w/ normal affect, and appropriate response to questions      A/P:   The patient is a 35 years old male with PMH of ESRD on Dialysis (MWF), Morbid Obesity, ARVIND (not on CPAP), DM 2, HTN, HLD and Bipolar Disorder admitted for acute respiratory failure secondary to fluid overload due to missed HD here with acute respiratory failure now Tachypneic , and febrile T 102.1  -Reposition patient in bed with HOB at 45 degree; Work of breathing improve. Patient admits improved breathing. sPO2 96-98 improved  -Consulted with RT  -Re-instate BiPAP   -Ofirmev 1000mg IV x1 dose  RN instructed to repeat vital signs in 1-2 hours  Signed off to day shift PA

## 2021-02-05 NOTE — CONSULT NOTE ADULT - SUBJECTIVE AND OBJECTIVE BOX
REASON FOR CONSULT: hypoxia    CONSULT REQUESTED BY: Dr. Jay    Patient is a 36y old  Male who presents with a chief complaint of difficulty breathing (2021 14:25)      BRIEF HOSPITAL COURSE: 34yo M w/ PMHx of HTN, ESRD (on HD M/W/F), AOCD, IDDM-2, ARVIND (not on cpap), morbid obesity, bipolar disorder, MDD presents to the ER due to difficulty breathing. Pt per, he has not been feeling well for the past few days. Reports shortness of breath at rest and w/ ambulation w/ associated dry cough. Pt also reports that his stomach feels bloated. On Saturday he was supposed to have HD, however, he was able to get a ride. Today, he went and was noted to have worsening shortness of breath and was told to go to the ER for further evaluation. Pt denies any sick contacts, recent travel, chest pain, palpitations.    In the ED: /122, RR 24, O2 on RA 87% - improved w/ 6L O2 via NC. S/p Lasix 40mg IVP X1, Doxy 100mg IV X1, Rocephin 1g X1, Nitroglycerin 0.4mg X1      Events last 24 hours: Pt became tachypneic, was taken off of BiPap and place on HFNC.  Worsening mentation, placed back on BiPap at time of evaluation.    PAST MEDICAL & SURGICAL HISTORY:  Insulin dependent type 2 diabetes mellitus    CKD (chronic kidney disease) requiring chronic dialysis    HTN (hypertension)    Sleep apnea    Bipolar 1 disorder    No significant past surgical history      Allergies    No Known Allergies    Intolerances      FAMILY HISTORY:  FH: HTN (hypertension)  Father, siblings    Family history of CKD (chronic kidney disease)  mother    Family history of diabetes mellitus (Grandparent)  Father, siblings        Review of Systems:  CONSTITUTIONAL: No fever, chills, or fatigue  EYES: No eye pain, visual disturbances, or discharge  ENMT:  No difficulty hearing, tinnitus, vertigo; No sinus or throat pain  NECK: No pain or stiffness  RESPIRATORY: No cough, wheezing, chills or hemoptysis; +shortness of breath  CARDIOVASCULAR: No chest pain, palpitations, dizziness, or leg swelling  GASTROINTESTINAL: +abdominal. No nausea, vomiting, or hematemesis; No diarrhea or constipation. No melena or hematochezia.  GENITOURINARY: No dysuria, frequency, hematuria, or incontinence  NEUROLOGICAL: No headaches, memory loss, loss of strength, numbness, or tremors  SKIN: No itching, burning, rashes, or lesions   MUSCULOSKELETAL: No joint pain or swelling; No muscle, back, or extremity pain  PSYCHIATRIC: No depression, anxiety, mood swings, or difficulty sleeping      Medications:  piperacillin/tazobactam IVPB.. 3.375 Gram(s) IV Intermittent every 12 hours    carvedilol 12.5 milliGRAM(s) Oral every 12 hours  hydrALAZINE 50 milliGRAM(s) Oral three times a day  hydrALAZINE Injectable 10 milliGRAM(s) IV Push every 6 hours PRN  NIFEdipine XL 60 milliGRAM(s) Oral daily    ALBUTerol    0.083%. 2.5 milliGRAM(s) Nebulizer once  guaiFENesin   Syrup  (Sugar-Free) 100 milliGRAM(s) Oral every 6 hours PRN    acetaminophen   Tablet .. 650 milliGRAM(s) Oral every 6 hours PRN  QUEtiapine 100 milliGRAM(s) Oral at bedtime  risperiDONE   Tablet 3 milliGRAM(s) Oral two times a day  traZODone 100 milliGRAM(s) Oral at bedtime      aspirin  chewable 81 milliGRAM(s) Oral daily    polyethylene glycol 3350 17 Gram(s) Oral daily  senna 2 Tablet(s) Oral at bedtime      atorvastatin 20 milliGRAM(s) Oral at bedtime  dextrose 40% Gel 15 Gram(s) Oral once  dextrose 50% Injectable 25 Gram(s) IV Push once  dextrose 50% Injectable 12.5 Gram(s) IV Push once  dextrose 50% Injectable 25 Gram(s) IV Push once  glucagon  Injectable 1 milliGRAM(s) IntraMuscular once  insulin glargine Injectable (LANTUS) 20 Unit(s) SubCutaneous two times a day  insulin lispro (ADMELOG) corrective regimen sliding scale   SubCutaneous three times a day before meals  insulin lispro Injectable (ADMELOG) 2 Unit(s) SubCutaneous three times a day before meals  methylPREDNISolone sodium succinate Injectable 40 milliGRAM(s) IV Push every 8 hours    cholecalciferol 2000 Unit(s) Oral two times a day  dextrose 5%. 1000 milliLiter(s) IV Continuous <Continuous>  dextrose 5%. 1000 milliLiter(s) IV Continuous <Continuous>  folic acid 1 milliGRAM(s) Oral daily  iron sucrose IVPB 200 milliGRAM(s) IV Intermittent every 24 hours  potassium chloride   Powder 40 milliEquivalent(s) Oral once    epoetin alma-epbx (RETACRIT) Injectable 50824 Unit(s) IV Push once    chlorhexidine 4% Liquid 1 Application(s) Topical <User Schedule>    saccharomyces boulardii 250 milliGRAM(s) Oral two times a day  sevelamer carbonate 800 milliGRAM(s) Oral three times a day with meals          ICU Vital Signs Last 24 Hrs  T(C): 36.7 (2021 14:30), Max: 38.9 (2021 05:20)  T(F): 98.1 (2021 14:30), Max: 102.1 (2021 05:20)  HR: 71 (2021 14:30) (68 - 101)  BP: 162/73 (2021 14:30) (104/57 - 166/77)  BP(mean): 96 (2021 14:30) (96 - 96)  ABP: --  ABP(mean): --  RR: 35 (2021 14:30) (20 - 42)  SpO2: 100% (2021 14:30) (90% - 100%)    Vital Signs Last 24 Hrs  T(C): 36.7 (2021 14:30), Max: 38.9 (2021 05:20)  T(F): 98.1 (2021 14:30), Max: 102.1 (2021 05:20)  HR: 71 (2021 14:30) (68 - 101)  BP: 162/73 (2021 14:30) (104/57 - 166/77)  BP(mean): 96 (2021 14:30) (96 - 96)  RR: 35 (2021 14:30) (20 - 42)  SpO2: 100% (2021 14:30) (90% - 100%)    ABG - ( 2021 12:38 )  pH, Arterial: 7.32  pH, Blood: x     /  pCO2: 51    /  pO2: 73    / HCO3: 24    / Base Excess: 0.1   /  SaO2: 95                  I&O's Detail    2021 07:01  -  2021 07:00  --------------------------------------------------------  IN:    Oral Fluid: 240 mL  Total IN: 240 mL    OUT:    Other (mL): 1700 mL    Voided (mL): 650 mL  Total OUT: 2350 mL    Total NET: -2110 mL            LABS:                        7.5    21.42 )-----------( 327      ( 2021 07:59 )             24.2     02-    141  |  100  |  56.0<H>  ----------------------------<  117<H>  3.3<L>   |  22.0  |  7.37<H>    Ca    9.0      2021 07:59  Phos  3.5     02-04  Mg     1.9     02-04    TPro  7.0  /  Alb  3.3  /  TBili  0.4  /  DBili  x   /  AST  15  /  ALT  37  /  AlkPhos  100  02-05      CARDIAC MARKERS ( 2021 17:11 )  x     / 0.05 ng/mL / x     / x     / x          CAPILLARY BLOOD GLUCOSE      POCT Blood Glucose.: 169 mg/dL (2021 11:16)      Urinalysis Basic - ( 2021 22:04 )    Color: Yellow / Appearance: Clear / S.015 / pH: x  Gluc: x / Ketone: Negative  / Bili: Negative / Urobili: Negative mg/dL   Blood: x / Protein: 500 mg/dL / Nitrite: Negative   Leuk Esterase: Negative / RBC: 0-2 /HPF / WBC 0-2   Sq Epi: x / Non Sq Epi: Occasional / Bacteria: Few      CULTURES:      Physical Examination:    General: No acute distress.  Alert, oriented, interactive, nonfocal, on BiPap 16/6 60%    HEENT: Pupils equal, reactive to light.  Symmetric.    PULM: tachypneic, no significant sputum production    CVS: Regular rate and rhythm, no murmurs, rubs, or gallops    ABD: Soft, nondistended, nontender, normoactive bowel sounds, no masses    EXT: No edema, nontender    SKIN: Warm and well perfused, no rashes noted.

## 2021-02-05 NOTE — CONSULT NOTE ADULT - ASSESSMENT
36yo M w/ PMHx of HTN, ESRD (on HD M/W/F), AOCD, IDDM-2, ARVIND (not on cpap), morbid obesity, bipolar disorder, MDD presents to the ER due to difficulty breathing. Pt per, he has not been feeling well for the past few days. Reports shortness of breath at rest and w/ ambulation w/ associated dry cough. On Saturday he was supposed to have HD, however, he was able to get a ride. Admitted with acute pulmonary edema 2/2 missed HD session, leukocytosis, fever and concern for aspiration.    In the ED: /122, RR 24, O2 on RA 87% - improved w/ 6L O2 via NC. S/p Lasix 40mg IVP X1, Doxy 100mg IV X1, Rocephin 1g X1, Nitroglycerin 0.4mg X1    - + fever  - covid pcr (-) x 2, rvp (-)  - f/u BCX and sputum cX  - Continue empiric zosyn for possible aspiration  - Trend Fever  - Trend Leukocytosis-has been chronically elevated in the past    d/w Dr Jay, Dr Oleary, RN, Dr August ICU  Will Follow 36yo M w/ PMHx of HTN, ESRD (on HD M/W/F), AOCD, IDDM-2, ARVIND (not on cpap), morbid obesity, bipolar disorder, MDD presents to the ER due to difficulty breathing. Pt per, he has not been feeling well for the past few days. Reports shortness of breath at rest and w/ ambulation w/ associated dry cough. On Saturday he was supposed to have HD, however, he was able to get a ride. Admitted with acute pulmonary edema 2/2 missed HD session, leukocytosis, fever and concern for aspiration.    In the ED: /122, RR 24, O2 on RA 87% - improved w/ 6L O2 via NC. S/p Lasix 40mg IVP X1, Doxy 100mg IV X1, Rocephin 1g X1, Nitroglycerin 0.4mg X1    - + fever, currently on bipap  - covid pcr (-) x 2, rvp (-)  - f/u BCX and sputum cX  - Continue empiric zosyn for possible aspiration  - Trend Fever  - Trend Leukocytosis-has been chronically elevated in the past  - HD as per renal    d/w Dr Jay, Dr Oleary, RN, Dr August ICU  Will Follow

## 2021-02-06 LAB
ANION GAP SERPL CALC-SCNC: 21 MMOL/L — HIGH (ref 5–17)
BASOPHILS # BLD AUTO: 0.04 K/UL — SIGNIFICANT CHANGE UP (ref 0–0.2)
BASOPHILS NFR BLD AUTO: 0.2 % — SIGNIFICANT CHANGE UP (ref 0–2)
BUN SERPL-MCNC: 79 MG/DL — HIGH (ref 8–20)
CALCIUM SERPL-MCNC: 9.7 MG/DL — SIGNIFICANT CHANGE UP (ref 8.6–10.2)
CHLORIDE SERPL-SCNC: 95 MMOL/L — LOW (ref 98–107)
CO2 SERPL-SCNC: 21 MMOL/L — LOW (ref 22–29)
CREAT SERPL-MCNC: 8.77 MG/DL — HIGH (ref 0.5–1.3)
EOSINOPHIL # BLD AUTO: 0.01 K/UL — SIGNIFICANT CHANGE UP (ref 0–0.5)
EOSINOPHIL NFR BLD AUTO: 0 % — SIGNIFICANT CHANGE UP (ref 0–6)
FERRITIN SERPL-MCNC: 237 NG/ML — SIGNIFICANT CHANGE UP (ref 30–400)
FOLATE SERPL-MCNC: >20 NG/ML — SIGNIFICANT CHANGE UP
GLUCOSE BLDC GLUCOMTR-MCNC: 238 MG/DL — HIGH (ref 70–99)
GLUCOSE BLDC GLUCOMTR-MCNC: 271 MG/DL — HIGH (ref 70–99)
GLUCOSE BLDC GLUCOMTR-MCNC: 283 MG/DL — HIGH (ref 70–99)
GLUCOSE BLDC GLUCOMTR-MCNC: 316 MG/DL — HIGH (ref 70–99)
GLUCOSE SERPL-MCNC: 330 MG/DL — HIGH (ref 70–99)
GRAM STN FLD: SIGNIFICANT CHANGE UP
HAPTOGLOB SERPL-MCNC: 517 MG/DL — HIGH (ref 34–200)
HCT VFR BLD CALC: 25 % — LOW (ref 39–50)
HGB BLD-MCNC: 8 G/DL — LOW (ref 13–17)
IMM GRANULOCYTES NFR BLD AUTO: 0.8 % — SIGNIFICANT CHANGE UP (ref 0–1.5)
LDH SERPL L TO P-CCNC: 244 U/L — HIGH (ref 98–192)
LYMPHOCYTES # BLD AUTO: 1.05 K/UL — SIGNIFICANT CHANGE UP (ref 1–3.3)
LYMPHOCYTES # BLD AUTO: 4.7 % — LOW (ref 13–44)
MAGNESIUM SERPL-MCNC: 2.4 MG/DL — SIGNIFICANT CHANGE UP (ref 1.8–2.6)
MCHC RBC-ENTMCNC: 23.7 PG — LOW (ref 27–34)
MCHC RBC-ENTMCNC: 32 GM/DL — SIGNIFICANT CHANGE UP (ref 32–36)
MCV RBC AUTO: 74 FL — LOW (ref 80–100)
MONOCYTES # BLD AUTO: 0.81 K/UL — SIGNIFICANT CHANGE UP (ref 0–0.9)
MONOCYTES NFR BLD AUTO: 3.6 % — SIGNIFICANT CHANGE UP (ref 2–14)
NEUTROPHILS # BLD AUTO: 20.12 K/UL — HIGH (ref 1.8–7.4)
NEUTROPHILS NFR BLD AUTO: 90.7 % — HIGH (ref 43–77)
PHOSPHATE SERPL-MCNC: 7 MG/DL — HIGH (ref 2.4–4.7)
PLATELET # BLD AUTO: 364 K/UL — SIGNIFICANT CHANGE UP (ref 150–400)
POTASSIUM SERPL-MCNC: 3.9 MMOL/L — SIGNIFICANT CHANGE UP (ref 3.5–5.3)
POTASSIUM SERPL-SCNC: 3.9 MMOL/L — SIGNIFICANT CHANGE UP (ref 3.5–5.3)
RBC # BLD: 3.38 M/UL — LOW (ref 4.2–5.8)
RBC # BLD: 3.38 M/UL — LOW (ref 4.2–5.8)
RBC # FLD: 17.1 % — HIGH (ref 10.3–14.5)
RETICS #: 50.7 K/UL — SIGNIFICANT CHANGE UP (ref 25–125)
RETICS/RBC NFR: 1.5 % — SIGNIFICANT CHANGE UP (ref 0.5–2.5)
SODIUM SERPL-SCNC: 136 MMOL/L — SIGNIFICANT CHANGE UP (ref 135–145)
SPECIMEN SOURCE: SIGNIFICANT CHANGE UP
VANCOMYCIN FLD-MCNC: 12.6 UG/ML — SIGNIFICANT CHANGE UP
VIT B12 SERPL-MCNC: 1458 PG/ML — HIGH (ref 232–1245)
WBC # BLD: 22.2 K/UL — HIGH (ref 3.8–10.5)
WBC # FLD AUTO: 22.2 K/UL — HIGH (ref 3.8–10.5)

## 2021-02-06 PROCEDURE — 99232 SBSQ HOSP IP/OBS MODERATE 35: CPT

## 2021-02-06 PROCEDURE — 71275 CT ANGIOGRAPHY CHEST: CPT | Mod: 26

## 2021-02-06 PROCEDURE — 99233 SBSQ HOSP IP/OBS HIGH 50: CPT

## 2021-02-06 RX ORDER — ENOXAPARIN SODIUM 100 MG/ML
125 INJECTION SUBCUTANEOUS DAILY
Refills: 0 | Status: DISCONTINUED | OUTPATIENT
Start: 2021-02-06 | End: 2021-02-06

## 2021-02-06 RX ORDER — VANCOMYCIN HCL 1 G
1000 VIAL (EA) INTRAVENOUS ONCE
Refills: 0 | Status: COMPLETED | OUTPATIENT
Start: 2021-02-06 | End: 2021-02-06

## 2021-02-06 RX ADMIN — SEVELAMER CARBONATE 800 MILLIGRAM(S): 2400 POWDER, FOR SUSPENSION ORAL at 18:07

## 2021-02-06 RX ADMIN — RISPERIDONE 3 MILLIGRAM(S): 4 TABLET ORAL at 18:07

## 2021-02-06 RX ADMIN — Medication 40 MILLIGRAM(S): at 16:58

## 2021-02-06 RX ADMIN — Medication 2 UNIT(S): at 12:12

## 2021-02-06 RX ADMIN — Medication 2000 UNIT(S): at 18:08

## 2021-02-06 RX ADMIN — CHLORHEXIDINE GLUCONATE 1 APPLICATION(S): 213 SOLUTION TOPICAL at 04:27

## 2021-02-06 RX ADMIN — Medication 2 UNIT(S): at 12:08

## 2021-02-06 RX ADMIN — Medication 4: at 08:18

## 2021-02-06 RX ADMIN — Medication 100 MILLIGRAM(S): at 23:18

## 2021-02-06 RX ADMIN — INSULIN GLARGINE 20 UNIT(S): 100 INJECTION, SOLUTION SUBCUTANEOUS at 08:47

## 2021-02-06 RX ADMIN — INSULIN GLARGINE 20 UNIT(S): 100 INJECTION, SOLUTION SUBCUTANEOUS at 23:35

## 2021-02-06 RX ADMIN — PIPERACILLIN AND TAZOBACTAM 25 GRAM(S): 4; .5 INJECTION, POWDER, LYOPHILIZED, FOR SOLUTION INTRAVENOUS at 04:34

## 2021-02-06 RX ADMIN — Medication 40 MILLIGRAM(S): at 04:35

## 2021-02-06 RX ADMIN — Medication 50 MILLIGRAM(S): at 23:18

## 2021-02-06 RX ADMIN — Medication 250 MILLIGRAM(S): at 18:07

## 2021-02-06 RX ADMIN — SENNA PLUS 2 TABLET(S): 8.6 TABLET ORAL at 23:18

## 2021-02-06 RX ADMIN — Medication 2 UNIT(S): at 17:01

## 2021-02-06 RX ADMIN — Medication 3: at 12:07

## 2021-02-06 RX ADMIN — Medication 1 MILLIGRAM(S): at 12:11

## 2021-02-06 RX ADMIN — ATORVASTATIN CALCIUM 20 MILLIGRAM(S): 80 TABLET, FILM COATED ORAL at 23:18

## 2021-02-06 RX ADMIN — IRON SUCROSE 110 MILLIGRAM(S): 20 INJECTION, SOLUTION INTRAVENOUS at 18:18

## 2021-02-06 RX ADMIN — POLYETHYLENE GLYCOL 3350 17 GRAM(S): 17 POWDER, FOR SOLUTION ORAL at 17:06

## 2021-02-06 RX ADMIN — Medication 50 MILLIGRAM(S): at 16:53

## 2021-02-06 RX ADMIN — Medication 250 MILLIGRAM(S): at 23:10

## 2021-02-06 RX ADMIN — Medication 81 MILLIGRAM(S): at 12:11

## 2021-02-06 RX ADMIN — ENOXAPARIN SODIUM 125 MILLIGRAM(S): 100 INJECTION SUBCUTANEOUS at 12:09

## 2021-02-06 RX ADMIN — Medication 40 MILLIGRAM(S): at 23:10

## 2021-02-06 RX ADMIN — CARVEDILOL PHOSPHATE 12.5 MILLIGRAM(S): 80 CAPSULE, EXTENDED RELEASE ORAL at 18:07

## 2021-02-06 RX ADMIN — Medication 2: at 17:00

## 2021-02-06 NOTE — PROGRESS NOTE ADULT - ASSESSMENT
34yo M w/ PMHx of HTN, ESRD (on HD M/W/F), AOCD, IDDM-2, ARVIND (not on cpap), morbid obesity, bipolar disorder, MDD presents to the ER due to difficulty breathing. Pt per, he has not been feeling well for the past few days. Reports shortness of breath at rest and w/ ambulation w/ associated dry cough. On Saturday he was supposed to have HD, however, he was able to get a ride. Admitted with acute pulmonary edema 2/2 missed HD session, leukocytosis, fever and concern for aspiration.    In the ED: /122, RR 24, O2 on RA 87% - improved w/ 6L O2 via NC. S/p Lasix 40mg IVP X1, Doxy 100mg IV X1, Rocephin 1g X1, Nitroglycerin 0.4mg X1    - fever resolved now on 6l o2 nasal canula  - covid pcr (-) x 2, rvp (-)  - f/u BCX and sputum cX  - Continue empiric zosyn for possible aspiration  - vanco re-dosed, will hold further doses for now  - Trend Fever  - Trend Leukocytosis-has been chronically elevated in the past  - HD as per renal      Will Follow

## 2021-02-06 NOTE — PROGRESS NOTE ADULT - SUBJECTIVE AND OBJECTIVE BOX
PULMONARY PROGRESS NOTE      MILENA PUGA  MRN-[id number]    Patient is a 36y old  Male who presents with a chief complaint of difficulty breathing (2021 08:09)        INTERVAL HPI/OVERNIGHT EVENTS:  Pt seen and examined at the bedside. Denies acute SOB. No fevers, no chills.    MEDICATIONS  (STANDING):  ALBUTerol    0.083%. 2.5 milliGRAM(s) Nebulizer once  aspirin  chewable 81 milliGRAM(s) Oral daily  atorvastatin 20 milliGRAM(s) Oral at bedtime  carvedilol 12.5 milliGRAM(s) Oral every 12 hours  chlorhexidine 4% Liquid 1 Application(s) Topical <User Schedule>  cholecalciferol 2000 Unit(s) Oral two times a day  dextrose 40% Gel 15 Gram(s) Oral once  dextrose 5%. 1000 milliLiter(s) (50 mL/Hr) IV Continuous <Continuous>  dextrose 5%. 1000 milliLiter(s) (100 mL/Hr) IV Continuous <Continuous>  dextrose 50% Injectable 25 Gram(s) IV Push once  dextrose 50% Injectable 12.5 Gram(s) IV Push once  dextrose 50% Injectable 25 Gram(s) IV Push once  enoxaparin Injectable 125 milliGRAM(s) SubCutaneous daily  epoetin alma-epbx (RETACRIT) Injectable 90391 Unit(s) IV Push once  folic acid 1 milliGRAM(s) Oral daily  glucagon  Injectable 1 milliGRAM(s) IntraMuscular once  hydrALAZINE 50 milliGRAM(s) Oral three times a day  insulin glargine Injectable (LANTUS) 20 Unit(s) SubCutaneous two times a day  insulin lispro (ADMELOG) corrective regimen sliding scale   SubCutaneous three times a day before meals  insulin lispro Injectable (ADMELOG) 2 Unit(s) SubCutaneous three times a day before meals  iron sucrose IVPB 200 milliGRAM(s) IV Intermittent every 24 hours  methylPREDNISolone sodium succinate Injectable 40 milliGRAM(s) IV Push every 8 hours  NIFEdipine XL 60 milliGRAM(s) Oral daily  piperacillin/tazobactam IVPB.. 3.375 Gram(s) IV Intermittent every 12 hours  polyethylene glycol 3350 17 Gram(s) Oral daily  potassium chloride   Powder 40 milliEquivalent(s) Oral once  QUEtiapine 100 milliGRAM(s) Oral at bedtime  risperiDONE   Tablet 3 milliGRAM(s) Oral two times a day  saccharomyces boulardii 250 milliGRAM(s) Oral two times a day  senna 2 Tablet(s) Oral at bedtime  sevelamer carbonate 800 milliGRAM(s) Oral three times a day with meals  traZODone 100 milliGRAM(s) Oral at bedtime  vancomycin  IVPB 1000 milliGRAM(s) IV Intermittent once    MEDICATIONS  (PRN):  acetaminophen   Tablet .. 650 milliGRAM(s) Oral every 6 hours PRN Temp greater or equal to 38C (100.4F), Mild Pain (1 - 3)  guaiFENesin   Syrup  (Sugar-Free) 100 milliGRAM(s) Oral every 6 hours PRN Cough  hydrALAZINE Injectable 10 milliGRAM(s) IV Push every 6 hours PRN for SBP > 150    Allergies    No Known Allergies    Intolerances      PAST MEDICAL & SURGICAL HISTORY:  Insulin dependent type 2 diabetes mellitus    CKD (chronic kidney disease) requiring chronic dialysis    HTN (hypertension)    Sleep apnea    Bipolar 1 disorder    No significant past surgical history          REVIEW OF SYSTEMS:  Negative except as noted in HPI      Vital Signs Last 24 Hrs  T(C): 36.7 (2021 10:30), Max: 36.7 (2021 20:46)  T(F): 98.1 (2021 10:30), Max: 98.1 (2021 10:30)  HR: 78 (2021 12:16) (76 - 87)  BP: 143/78 (2021 12:16) (120/49 - 145/78)  BP(mean): 96 (2021 08:21) (65 - 96)  RR: 25 (2021 12:16) (22 - 37)  SpO2: 100% (2021 12:16) (97% - 100%)      PHYSICAL EXAMINATION:  GEN: In no apparent distress  HEENT: NC/AT  NECK: Supple  CV: +S1, S2  RESPIRATORY: Diminished breath sounds, clear anteriorly. Not using accessory muscles  ABDOMEN: Soft, non-tender  EXTREMITIES: No rashes, lesions, or pitting edema noted  NEURO: Grossly non-focal  PSYCH: Normal affect      LABS:                        8.0    22.20 )-----------( 364      ( 2021 06:06 )             25.0     02-06    136  |  95<L>  |  79.0<H>  ----------------------------<  330<H>  3.9   |  21.0<L>  |  8.77<H>    Ca    9.7      2021 06:06  Phos  7.0     02-  Mg     2.4     -    TPro  7.0  /  Alb  3.3  /  TBili  0.4  /  DBili  x   /  AST  15  /  ALT  37  /  AlkPhos  100  02-      Urinalysis Basic - ( 2021 22:04 )    Color: Yellow / Appearance: Clear / S.015 / pH: x  Gluc: x / Ketone: Negative  / Bili: Negative / Urobili: Negative mg/dL   Blood: x / Protein: 500 mg/dL / Nitrite: Negative   Leuk Esterase: Negative / RBC: 0-2 /HPF / WBC 0-2   Sq Epi: x / Non Sq Epi: Occasional / Bacteria: Few      ABG - ( 2021 12:38 )  pH, Arterial: 7.32  pH, Blood: x     /  pCO2: 51    /  pO2: 73    / HCO3: 24    / Base Excess: 0.1   /  SaO2: 95                      Serum Pro-Brain Natriuretic Peptide: 3031 pg/mL (21 @ 17:10)      Procalcitonin, Serum: 0.75 ng/mL (21 @ 11:17)      MICROBIOLOGY:  Respiratory Viral Panel with COVID-19 by REESE (21 @ 16:22)    Rapid RVP Result: [facility]    SARS-CoV-2: NotDete: This Respiratory Panel uses polymerase chain reaction (PCR) to detect for  adenovirus; coronavirus (HKU1, NL63, 229E, OC43); human metapneumovirus  (hMPV); human enterovirus/rhinovirus (Entero/RV); influenza A; influenza  A/H1; influenza A/H3; influenza A/H1-2009; influenza B; parainfluenza  viruses 1, 2, 3, 4; respiratory syncytial virus; Mycoplasma pneumoniae;  Chlamydophila pneumoniae; and SARS-CoV-2.        RADIOLOGY & ADDITIONAL STUDIES:  < from: Xray Chest 1 View AP/PA. (02.04.21 @ 16:36) >   EXAM:  XR CHEST AP OR PA 1V                          PROCEDURE DATE:  2021          INTERPRETATION:  Portable chest radiograph    CLINICAL INFORMATION: Fever    TECHNIQUE:  Portable  AP view of the chest was obtained.    COMPARISON: 2/3/2021chest available for review.    FINDINGS:  The lungs show unchanged bilateral  multifocal and diffuse ill-defined airspace consolidations. No pneumothorax.    The heart and mediastinum are within normal limits.    Visualized osseous structures are intact.        IMPRESSION:   Unchanged bilateral  multifocal and diffuse ill-defined airspace consolidations    < end of copied text >      ECHO:

## 2021-02-06 NOTE — PROGRESS NOTE ADULT - ASSESSMENT
36M PMH ESRD (MWF), IDDM2, ARVIND (previously on CPAP), morbid obesity, bipolar disorder, depression who presents with SOB, missed HD session, f/w sepsis 2/2 presumed PNA    Impression:  - Morbid obesity  - Volume overload 2/2 missed HD session  - Possible multifocal PNA  - Sepsis  - ARVIND previously on CPAP  - Component of metabolic acidosis    Plan:  - C/W AVAPS QHS + PRN  - Improvement of ABG noted with AVAPs - failed on BPAP (7.27/60/95)  - ABx per ID  - F/u BCx, check sputum cultures  - HD per nephrology, as BP tolerates  - Monitor mental status closely - improved today  - Truncal elevation  - Rest of care per primary team    The patient's PCO2 was severely elevated and responded to NIV. The patient requires advanced therapies for chronic respiratory failure secondary to ARVIND/OHS. At this time, other therapies such as BPAP-ST have been attempted and failed. AVAPS-AE or IVAPS are not available on BPAP and are therefore ruled out. Therefore the patient is an excellent candidate for Trilogy NIV. Without Trilogy NIV, the patient would be at an increased risk for readmission, worsening morbidity and mortality including from other medical complications and continued respiratory failure.    Abdulaziz Oleary M.D.  Pulmonary & Critical Care Medicine  [facility]  Pulmonary Medicine at [place]  [address]  [place], [place] [address]  T: [phone]  F: [phone]

## 2021-02-06 NOTE — PROGRESS NOTE ADULT - SUBJECTIVE AND OBJECTIVE BOX
NEPHROLOGY INTERVAL HPI/OVERNIGHT EVENTS:    Examined  Dyspnea on BiPaP      MEDICATIONS  (STANDING):  ALBUTerol    0.083%. 2.5 milliGRAM(s) Nebulizer once  aspirin  chewable 81 milliGRAM(s) Oral daily  atorvastatin 20 milliGRAM(s) Oral at bedtime  carvedilol 12.5 milliGRAM(s) Oral every 12 hours  chlorhexidine 4% Liquid 1 Application(s) Topical <User Schedule>  cholecalciferol 2000 Unit(s) Oral two times a day  dextrose 40% Gel 15 Gram(s) Oral once  dextrose 5%. 1000 milliLiter(s) (50 mL/Hr) IV Continuous <Continuous>  dextrose 5%. 1000 milliLiter(s) (100 mL/Hr) IV Continuous <Continuous>  dextrose 50% Injectable 25 Gram(s) IV Push once  dextrose 50% Injectable 12.5 Gram(s) IV Push once  dextrose 50% Injectable 25 Gram(s) IV Push once  epoetin alma-epbx (RETACRIT) Injectable 32157 Unit(s) IV Push once  folic acid 1 milliGRAM(s) Oral daily  glucagon  Injectable 1 milliGRAM(s) IntraMuscular once  hydrALAZINE 50 milliGRAM(s) Oral three times a day  insulin glargine Injectable (LANTUS) 20 Unit(s) SubCutaneous two times a day  insulin lispro (ADMELOG) corrective regimen sliding scale   SubCutaneous three times a day before meals  insulin lispro Injectable (ADMELOG) 2 Unit(s) SubCutaneous three times a day before meals  iron sucrose IVPB 200 milliGRAM(s) IV Intermittent every 24 hours  methylPREDNISolone sodium succinate Injectable 40 milliGRAM(s) IV Push every 8 hours  NIFEdipine XL 60 milliGRAM(s) Oral daily  piperacillin/tazobactam IVPB.. 3.375 Gram(s) IV Intermittent every 12 hours  polyethylene glycol 3350 17 Gram(s) Oral daily  potassium chloride   Powder 40 milliEquivalent(s) Oral once  QUEtiapine 100 milliGRAM(s) Oral at bedtime  risperiDONE   Tablet 3 milliGRAM(s) Oral two times a day  saccharomyces boulardii 250 milliGRAM(s) Oral two times a day  senna 2 Tablet(s) Oral at bedtime  sevelamer carbonate 800 milliGRAM(s) Oral three times a day with meals  traZODone 100 milliGRAM(s) Oral at bedtime    MEDICATIONS  (PRN):  acetaminophen   Tablet .. 650 milliGRAM(s) Oral every 6 hours PRN Temp greater or equal to 38C (100.4F), Mild Pain (1 - 3)  guaiFENesin   Syrup  (Sugar-Free) 100 milliGRAM(s) Oral every 6 hours PRN Cough  hydrALAZINE Injectable 10 milliGRAM(s) IV Push every 6 hours PRN for SBP > 150      Allergies    No Known Allergies    Intolerances        Vital Signs Last 24 Hrs  T(C): 36.7 (2021 20:46), Max: 37.6 (2021 10:09)  T(F): 98 (2021 20:46), Max: 99.6 (2021 10:09)  HR: 87 (2021 20:00) (68 - 87)  BP: 120/49 (2021 20:00) (104/57 - 162/73)  BP(mean): 65 (2021 20:00) (65 - 96)  RR: 22 (2021 20:00) (22 - 35)  SpO2: 97% (2021 20:00) (94% - 100%)  Daily     Daily Weight in k.1 (2021 05:36)    PHYSICAL EXAM:  Appears dyspneic is on BiPaP  NECK: +JVD  NERVOUS SYSTEM: AAOx3  CHEST/LUNG: Diminished BS with +crackles  HEART: No rub  ABDOMEN: Soft, Nontender, Nondistended; BS+  EXTREMITIES: Mild LE edema; AVF patent    LABS:                        8.0    22.20 )-----------( 364      ( 2021 06:06 )             25.0     02-06    136  |  95<L>  |  79.0<H>  ----------------------------<  330<H>  3.9   |  21.0<L>  |  8.77<H>    Ca    9.7      2021 06:06  Phos  7.0     02-06  Mg     2.4     02-06    TPro  7.0  /  Alb  3.3  /  TBili  0.4  /  DBili  x   /  AST  15  /  ALT  37  /  AlkPhos  100  02-      Urinalysis Basic - ( 2021 22:04 )    Color: Yellow / Appearance: Clear / S.015 / pH: x  Gluc: x / Ketone: Negative  / Bili: Negative / Urobili: Negative mg/dL   Blood: x / Protein: 500 mg/dL / Nitrite: Negative   Leuk Esterase: Negative / RBC: 0-2 /HPF / WBC 0-2   Sq Epi: x / Non Sq Epi: Occasional / Bacteria: Few      Magnesium, Serum: 2.4 mg/dL ( @ 06:06)  Phosphorus Level, Serum: 7.0 mg/dL ( @ 06:06)    ABG - ( 2021 12:38 )  pH, Arterial: 7.32  pH, Blood: x     /  pCO2: 51    /  pO2: 73    / HCO3: 24    / Base Excess: 0.1   /  SaO2: 95                    RADIOLOGY & ADDITIONAL TESTS:

## 2021-02-06 NOTE — PROGRESS NOTE ADULT - ASSESSMENT
35 years old male with PMH of ESRD on Dialysis (MWF), Morbid Obesity, ARVIND (not on CPAP), DM 2, HTN, HLD and Bipolar Disorder presented with difficulty breathing after missing dialysis. Seen by Nephrology and s/p emergent HD 2/3 and 2/4. s/p a dose of Lasix 80 mg with improvement. Noted to have low grade temperatures and fever workup done. CXR shows B/L pna, UA negative. Blood cultures and sputum culture obtained. Started on Zosyn for possible pneumonia. Pt was doing well with 5L NC with BIPAP PRN. On 2/5, noted Tmax overnight to 102F and noted to be more somnolent. ABG shows acute hypercapnic respiratory failure. ICU, ID and Pulmonology were consulted.     1) Acute Respiratory Failure with Hypoxia with Hypercapnea likely from infectious etiology in the setting of OHA/ ARVIND  - one missed dialysis session not the cause for current hypoxia  - s/p HD 2/3 and 2/4   ABG with resp acidosis liekly from ARVIND & OHS; no hx of COPD  Pt's Tmax 102  CXR with GGO b/l  COVID -ve x 2  RVP -ve  Check Blood cx, sputum cx, Urine Legionella ag  C/w zosyn  ID consult appreciated  D-dimer 570  CTA: negative for PE but with b/l GGO appearence like COVID  HD to follow CTA today  Will repeat covid testing  Inflamm markers elevated  -c/w IV steroids  - Continue BiPAP prn, currently on 5L NC   - Renal following   ICU consult noted   Hypercarbia likely from OHS/ ARVIND. Failed BiPAP, with response to AVAPS, will need Trilogy on d/c as per pulm    2) R/O sepsis 2/2 Pneumonia, seen on CXR  - Blood and sputum cultures pending  - Started on Zosyn, s/p 2 doses Vanco. Hold further vanco  - ID appreciated  - repeat bcx, legionella urine ag  COVID x 2 -ve  RVP -ve  CTA as above  Will repeat COVID PCR    3) Leukocytosis with left shift  No bandemia  Chronic since Dec 2020  Treat PNA  If no resolution there after then hemonc as outpatient       4) Microcytic Anemia due to CM-  - Low Iron Stores  Baseline Hg 9-10, now slowly trending down to 7.2  No overt bleeding  - Started Venofer 200 mg x 5  - Retacrit with dialysis   - FOBT pending   Will need colonoscopy with GI as outpatient of no etio for CM found      5) Uncontrolled HTN  - Likely due to missed dialysis   - Continue Coreg, Hydralazine   - Procardia was increased to 60 mg 2/5   - continue to trend closely       6) DM 2 - Uncontrolled   - HbA1c 12.3 on 1/1/21  - Accu checks and ISS  - Continue Lantus 20 units BID  - c./w Admelog 2 units before meals, will titrate up as needed   - continue to monitor while on solumedrol  - DM education    7) HLD  - Continue Lipitor     8) Bipolar Disorder    - Continue Risperidone Seroquel and Trazodone   - Also takes Risperdal Consta q2w, last dose scheduled on 1/27/21    Morbid obesity-  Diet & weight loss    DVT Prophylaxis -- SCDs given anemia   Dispo: Not MICU candidate at this time. Transfer to stepdown for closer monitoring, monitor and . Eventual poss home when medically stable    35 years old male with PMH of ESRD on Dialysis (MWF), Morbid Obesity, ARVIND (not on CPAP), DM 2, HTN, HLD and Bipolar Disorder presented with difficulty breathing after missing dialysis. Seen by Nephrology and s/p emergent HD 2/3 and 2/4. s/p a dose of Lasix 80 mg with improvement. Noted to have low grade temperatures and fever workup done. CXR shows B/L pna, UA negative. Blood cultures and sputum culture obtained. Started on Zosyn for possible pneumonia. Pt was doing well with 5L NC with BIPAP PRN. On 2/5, noted Tmax overnight to 102F and noted to be more somnolent. ABG shows acute hypercapnic respiratory failure. ICU, ID and Pulmonology were consulted.     1) Acute Respiratory Failure with Hypoxia with Hypercapnea likely from infectious etiology in the setting of OHA/ ARVIND  - one missed dialysis session not the cause for current hypoxia  - s/p HD 2/3 and 2/4   ABG with resp acidosis liekly from ARVIND & OHS; no hx of COPD  Pt's Tmax 102  CXR with GGO b/l  COVID -ve x 2  RVP -ve  Check Blood cx, sputum cx, Urine Legionella ag  C/w zosyn  ID consult appreciated  D-dimer 570  CTA: negative for PE but with b/l GGO appearence like COVID  HD to follow CTA today  Will repeat covid testing  Inflamm markers elevated  -c/w IV steroids  - Continue BiPAP prn, currently on 5L NC   - Renal following   ICU consult noted   Hypercarbia likely from OHS/ ARVIND. Failed BiPAP, with response to AVAPS, will need Trilogy on d/c as per pulm    2) R/O sepsis 2/2 Pneumonia, seen on CXR  - Blood and sputum cultures pending  - Started on Zosyn, s/p 2 doses Vanco. Hold further vanco  - ID appreciated  - repeat bcx, legionella urine ag  COVID x 2 -ve  RVP -ve  CTA as above  Will repeat COVID PCR    3) Leukocytosis with left shift  No bandemia  Chronic since Dec 2020  Treat PNA  If no resolution there after then hemonc as outpatient       4) Microcytic Anemia due to CM-  - Low Iron Stores  Baseline Hg 9-10, now slowly trending up to 7.2-->8-->8.3  No overt bleeding  - Started Venofer 200 mg x 5  - Retacrit with dialysis   - FOBT pending   Will need colonoscopy with GI as outpatient of no etio for CM found      5) Uncontrolled HTN  - Likely due to missed dialysis   - Continue Coreg, Hydralazine   - Procardia was increased to 60 mg 2/5   - continue to trend closely       6) DM 2 - Uncontrolled   - HbA1c 12.3 on 1/1/21  - Accu checks and ISS  - Continue Lantus 20 units BID  - c./w Admelog 2 units before meals, will titrate up as needed   - continue to monitor while on solumedrol  - DM education    7) HLD  - Continue Lipitor     8) Bipolar Disorder    - Continue Risperidone Seroquel and Trazodone   - Also takes Risperdal Consta q2w, last dose scheduled on 1/27/21    Morbid obesity-  Diet & weight loss    DVT Prophylaxis -Lovenox ppx  Dispo: Not MICU candidate at this time. Transfer to stepdown for closer monitoring, monitor and . Eventual poss home when medically stable

## 2021-02-06 NOTE — PHARMACY COMMUNICATION NOTE - COMMENTS
confirmed with dr larson wants lovenox 125mg daily. recommendation for heprain rejected.  will double check with nephro and if needed change the order

## 2021-02-06 NOTE — PROGRESS NOTE ADULT - SUBJECTIVE AND OBJECTIVE BOX
CC: F/u fluid overload ESRD on HD, hypercapnic resp failure with hypoxia currently on BIPAP, r/o sepsis 2/2 pna, acute on chronic anemia, DM2  INTERVAL HPI/OVERNIGHT EVENTS: Pt seen and examined at bedside. Weaned off BiPAP to 5L NC. Afebrile. SOB & cough about the same. No chest pain, palpitations, light headedness/dizziness,  fevers/chills, abdominal pain, n/v, diarrhea/constipation, dysuria or increased urinary frequency.   REVIEW OF SYSTEMS:  CONSTITUTIONAL: No fever, weight loss, or fatigue  RESPIRATORY: No cough, wheezing, chills or hemoptysis; No shortness of breath  CARDIOVASCULAR: No chest pain, palpitations, dizziness, or leg swelling  GASTROINTESTINAL: No abdominal or epigastric pain. No nausea, vomiting or hematemesis; No diarrhea or constipation  NEUROLOGICAL: No headaches, memory loss, loss of strength, numbness, or tremors  SKIN: No itching, burning, rashes, or lesions   MUSCULOSKELETAL: No joint pain or swelling; No muscle, back, or extremity pain    Allergies  No Known Allergies    PAST MEDICAL & SURGICAL HISTORY:  Insulin dependent type 2 diabetes mellitus  CKD (chronic kidney disease) requiring chronic dialysis  HTN (hypertension)  Sleep apnea  Bipolar 1 disorder  No significant past surgical history    VITAL SIGNS:  T(C): 37.6 (21 @ 10:09), Max: 38.9 (21 @ 05:20)  HR: 75 (21 @ 10:09) (68 - 808)  BP: 104/57 (21 @ 10:09) (104/57 - 166/77)  RR: 33 (21 @ 10:09) (18 - 42)  SpO2: 94% (21 @ 10:09) (90% - 99%)  Wt(kg): --Vital Signs Last 24 Hrs  T(C): 37.6 (2021 10:09), Max: 38.9 (2021 05:20)  T(F): 99.6 (2021 10:09), Max: 102.1 (2021 05:20)  HR: 75 (2021 10:09) (68 - 808)  BP: 104/57 (2021 10:09) (104/57 - 166/77)  BP(mean): --  RR: 33 (2021 10:09) (18 - 42)  SpO2: 94% (2021 10:09) (90% - 99%)    PHYSICAL EXAM:  GENERAL: Pt sleeping in bed in NAD  HEAD:  Atraumatic  EYES: EOMI, PERRL, conjunctiva and sclera clear  ENT: Dry mucous membranes  NECK: Obese. No JVD  CHEST/LUNG: Coarse upper resp sounds, otherwise clear. poor inspiratory effort, equal sounds B/L. No rhonchi or wheezing. Shallow respirations   HEART: S1, S2, Regular rate and rhythm   ABDOMEN: Obese, Bowel sounds present; Soft, Nontender. No guarding or rigidity   EXTREMITIES:  2+ Peripheral Pulses, brisk capillary refill. No clubbing, cyanosis, or edema  NERVOUS SYSTEM:  AAO x 3, [pfollows commands. No deficits   SKIN: No rashes or lesions    LABS:                      7.5    21.42 )-----------( 327      ( 2021 07:59 )             24.2     02-05  141  |  100  |  56.0<H>  ----------------------------<  117<H>  3.3<L>   |  22.0  |  7.37<H>    Ca    9.0      2021 07:59  Phos  3.5     02-04  Mg     1.9     02-04    TPro  7.0  /  Alb  3.3  /  TBili  0.4  /  DBili  x   /  AST  15  /  ALT  37  /  AlkPhos  100  02-05    CAPILLARY BLOOD GLUCOSE  POCT Blood Glucose.: 169 mg/dL (2021 11:16)  POCT Blood Glucose.: 136 mg/dL (2021 08:31)  POCT Blood Glucose.: 178 mg/dL (2021 20:40)  POCT Blood Glucose.: 180 mg/dL (2021 16:06)    ABG - ( 2021 09:01 )  pH, Arterial: 7.27  pH, Blood: x     /  pCO2: 60    /  pO2: 95    / HCO3: 25    / Base Excess: 0.5   /  SaO2: 98        Urinalysis Basic - ( 2021 22:04 )  Color: Yellow / Appearance: Clear / S.015 / pH: x  Gluc: x / Ketone: Negative  / Bili: Negative / Urobili: Negative mg/dL   Blood: x / Protein: 500 mg/dL / Nitrite: Negative   Leuk Esterase: Negative / RBC: 0-2 /HPF / WBC 0-2   Sq Epi: x / Non Sq Epi: Occasional / Bacteria: Few    MEDICATIONS  (STANDING):  ALBUTerol    0.083%. 2.5 milliGRAM(s) Nebulizer once  aspirin  chewable 81 milliGRAM(s) Oral daily  atorvastatin 20 milliGRAM(s) Oral at bedtime  carvedilol 12.5 milliGRAM(s) Oral every 12 hours  chlorhexidine 4% Liquid 1 Application(s) Topical <User Schedule>  cholecalciferol 2000 Unit(s) Oral two times a day  dextrose 40% Gel 15 Gram(s) Oral once  dextrose 5%. 1000 milliLiter(s) (50 mL/Hr) IV Continuous <Continuous>  dextrose 5%. 1000 milliLiter(s) (100 mL/Hr) IV Continuous <Continuous>  dextrose 50% Injectable 25 Gram(s) IV Push once  dextrose 50% Injectable 12.5 Gram(s) IV Push once  dextrose 50% Injectable 25 Gram(s) IV Push once  epoetin alma-epbx (RETACRIT) Injectable 74194 Unit(s) IV Push once  folic acid 1 milliGRAM(s) Oral daily  glucagon  Injectable 1 milliGRAM(s) IntraMuscular once  hydrALAZINE 50 milliGRAM(s) Oral three times a day  insulin glargine Injectable (LANTUS) 20 Unit(s) SubCutaneous two times a day  insulin lispro (ADMELOG) corrective regimen sliding scale   SubCutaneous three times a day before meals  insulin lispro Injectable (ADMELOG) 2 Unit(s) SubCutaneous three times a day before meals  iron sucrose IVPB 200 milliGRAM(s) IV Intermittent every 24 hours  methylPREDNISolone sodium succinate Injectable 40 milliGRAM(s) IV Push every 8 hours  NIFEdipine XL 60 milliGRAM(s) Oral daily  piperacillin/tazobactam IVPB.. 3.375 Gram(s) IV Intermittent every 12 hours  polyethylene glycol 3350 17 Gram(s) Oral daily  potassium chloride   Powder 40 milliEquivalent(s) Oral once  QUEtiapine 100 milliGRAM(s) Oral at bedtime  risperiDONE   Tablet 3 milliGRAM(s) Oral two times a day  saccharomyces boulardii 250 milliGRAM(s) Oral two times a day  senna 2 Tablet(s) Oral at bedtime  sevelamer carbonate 800 milliGRAM(s) Oral three times a day with meals  traZODone 100 milliGRAM(s) Oral at bedtime    MEDICATIONS  (PRN):  acetaminophen   Tablet .. 650 milliGRAM(s) Oral every 6 hours PRN Temp greater or equal to 38C (100.4F), Mild Pain (1 - 3)  guaiFENesin   Syrup  (Sugar-Free) 100 milliGRAM(s) Oral every 6 hours PRN Cough  hydrALAZINE Injectable 10 milliGRAM(s) IV Push every 6 hours PRN If SBP > 160

## 2021-02-06 NOTE — PROGRESS NOTE ADULT - SUBJECTIVE AND OBJECTIVE BOX
[facility]  INFECTIOUS DISEASES AND INTERNAL MEDICINE at [place]  =======================================================  Sandeep Bustos MD  Diplomates American Board of Internal Medicine and Infectious Diseases  Tel: [phone]      Fax: [phone]  =======================================================    MILENA PUGA [id number]    Follow up: pulmonary edema possible aspiration  feels much better today now on nasal canula      Allergies:  No Known Allergies           REVIEW OF SYSTEMS:  CONSTITUTIONAL:  No Fever or chills  HEENT:   No diplopia or blurred vision.  No earache, sore throat or runny nose.  CARDIOVASCULAR:  No pressure, squeezing, strangling, tightness, heaviness or aching about the chest, neck, axilla or epigastrium.  RESPIRATORY:  No cough, shortness of breath  GASTROINTESTINAL:  No nausea, vomiting or diarrhea.  GENITOURINARY:  No dysuria, frequency or urgency. No Blood in urine  MUSCULOSKELETAL:  no joint aches, no muscle pain  SKIN:  No change in skin, hair or nails.  NEUROLOGIC:  No Headaches, seizures or weakness.  PSYCHIATRIC:  No disorder of thought or mood.  ENDOCRINE:  No heat or cold intolerance  HEMATOLOGICAL:  No easy bruising or bleeding.       Physical Exam:  GEN: NAD, pleasant  HEENT: normocephalic and atraumatic. EOMI. PERRL.  Anicteric   NECK: Supple.   LUNGS: Clear to auscultation.  HEART: Regular rate and rhythm without murmur.  ABDOMEN: Soft, nontender, and nondistended.  Positive bowel sounds.    : No CVA tenderness  EXTREMITIES: Without any edema.  MSK: no joint swelling  NEUROLOGIC: Cranial nerves II through XII are grossly intact. No focal deficits  PSYCHIATRIC: Appropriate affect .  SKIN: No Rash      Vitals:    T(F): 98.1 (06 Feb 2021 10:30), Max: 98.1 (06 Feb 2021 10:30)  HR: 78 (06 Feb 2021 12:16)  BP: 143/78 (06 Feb 2021 12:16)  RR: 25 (06 Feb 2021 12:16)  SpO2: 100% (06 Feb 2021 12:16) (97% - 100%)  temp max in last 48H T(F): , Max: 102.1 (02-05-21 @ 05:20)    Current Antibiotics:  piperacillin/tazobactam IVPB.. 3.375 Gram(s) IV Intermittent every 12 hours  vancomycin  IVPB 1000 milliGRAM(s) IV Intermittent once    Other medications:  ALBUTerol    0.083%. 2.5 milliGRAM(s) Nebulizer once  aspirin  chewable 81 milliGRAM(s) Oral daily  atorvastatin 20 milliGRAM(s) Oral at bedtime  carvedilol 12.5 milliGRAM(s) Oral every 12 hours  chlorhexidine 4% Liquid 1 Application(s) Topical <User Schedule>  cholecalciferol 2000 Unit(s) Oral two times a day  dextrose 40% Gel 15 Gram(s) Oral once  dextrose 5%. 1000 milliLiter(s) IV Continuous <Continuous>  dextrose 5%. 1000 milliLiter(s) IV Continuous <Continuous>  dextrose 50% Injectable 25 Gram(s) IV Push once  dextrose 50% Injectable 12.5 Gram(s) IV Push once  dextrose 50% Injectable 25 Gram(s) IV Push once  enoxaparin Injectable 125 milliGRAM(s) SubCutaneous daily  epoetin alma-epbx (RETACRIT) Injectable 42629 Unit(s) IV Push once  folic acid 1 milliGRAM(s) Oral daily  glucagon  Injectable 1 milliGRAM(s) IntraMuscular once  hydrALAZINE 50 milliGRAM(s) Oral three times a day  insulin glargine Injectable (LANTUS) 20 Unit(s) SubCutaneous two times a day  insulin lispro (ADMELOG) corrective regimen sliding scale   SubCutaneous three times a day before meals  insulin lispro Injectable (ADMELOG) 2 Unit(s) SubCutaneous three times a day before meals  iron sucrose IVPB 200 milliGRAM(s) IV Intermittent every 24 hours  methylPREDNISolone sodium succinate Injectable 40 milliGRAM(s) IV Push every 8 hours  NIFEdipine XL 60 milliGRAM(s) Oral daily  polyethylene glycol 3350 17 Gram(s) Oral daily  potassium chloride   Powder 40 milliEquivalent(s) Oral once  QUEtiapine 100 milliGRAM(s) Oral at bedtime  risperiDONE   Tablet 3 milliGRAM(s) Oral two times a day  saccharomyces boulardii 250 milliGRAM(s) Oral two times a day  senna 2 Tablet(s) Oral at bedtime  sevelamer carbonate 800 milliGRAM(s) Oral three times a day with meals  traZODone 100 milliGRAM(s) Oral at bedtime                            8.0    22.20 )-----------( 364      ( 06 Feb 2021 06:06 )             25.0     02-06    136  |  95<L>  |  79.0<H>  ----------------------------<  330<H>  3.9   |  21.0<L>  |  8.77<H>    Ca    9.7      06 Feb 2021 06:06  Phos  7.0     02-06  Mg     2.4     02-06    TPro  7.0  /  Alb  3.3  /  TBili  0.4  /  DBili  x   /  AST  15  /  ALT  37  /  AlkPhos  100  02-05    RECENT CULTURES:  02-05 @ 16:22          NotDetec      WBC Count: 22.20 K/uL (02-06-21 @ 06:06)  WBC Count: 21.42 K/uL (02-05-21 @ 07:59)  WBC Count: 25.54 K/uL (02-04-21 @ 10:15)  WBC Count: 21.32 K/uL (02-03-21 @ 17:10)    Creatinine, Serum: 8.77 mg/dL (02-06-21 @ 06:06)  Creatinine, Serum: 7.37 mg/dL (02-05-21 @ 07:59)  Creatinine, Serum: 5.69 mg/dL (02-04-21 @ 10:16)  Creatinine, Serum: 6.12 mg/dL (02-03-21 @ 17:10)    C-Reactive Protein, Serum: 15.50 mg/dL (02-05-21 @ 07:59)    Ferritin, Serum: 237 ng/mL (02-06-21 @ 06:06)  Ferritin, Serum: 158 ng/mL (02-05-21 @ 11:17)  Ferritin, Serum: 148 ng/mL (02-03-21 @ 17:11)      Procalcitonin, Serum: 0.75 ng/mL (02-05-21 @ 11:17)     SARS-CoV-2: NotDetec (02-05-21 @ 16:22)  Rapid RVP Result: NotDetec (02-05-21 @ 16:22)    COVID-19 IgG Antibody Interpretation: Negative (02-04-21 @ 04:15)  COVID-19 IgG Antibody Index: 0.06 Index (02-04-21 @ 04:15)  COVID-19 PCR: NotDetec (02-03-21 @ 17:12)

## 2021-02-06 NOTE — PROGRESS NOTE ADULT - ASSESSMENT
ESRD: +PVC (COVID negative)  Clinically improved post HD but still symptomatic + dyspnea/ on BiPaP  - will arrange for additional UF removal today on HD- CTA first then HD  - Would recheck COVID    Anemia: +CKD  - cont KEV  - Low iron stores, will check ferritin before giving iV iron  - follow H/H  - consider PRBCs    RO:  - low phos diet  - cont Renvela    Will follow

## 2021-02-07 LAB
ANION GAP SERPL CALC-SCNC: 17 MMOL/L — SIGNIFICANT CHANGE UP (ref 5–17)
ANISOCYTOSIS BLD QL: SLIGHT — SIGNIFICANT CHANGE UP
BASOPHILS # BLD AUTO: 0 K/UL — SIGNIFICANT CHANGE UP (ref 0–0.2)
BASOPHILS NFR BLD AUTO: 0 % — SIGNIFICANT CHANGE UP (ref 0–2)
BUN SERPL-MCNC: 56 MG/DL — HIGH (ref 8–20)
CALCIUM SERPL-MCNC: 9.3 MG/DL — SIGNIFICANT CHANGE UP (ref 8.6–10.2)
CHLORIDE SERPL-SCNC: 91 MMOL/L — LOW (ref 98–107)
CO2 SERPL-SCNC: 27 MMOL/L — SIGNIFICANT CHANGE UP (ref 22–29)
CREAT SERPL-MCNC: 6.66 MG/DL — HIGH (ref 0.5–1.3)
CRP SERPL-MCNC: 6.06 MG/DL — HIGH (ref 0–0.4)
ELLIPTOCYTES BLD QL SMEAR: SLIGHT — SIGNIFICANT CHANGE UP
EOSINOPHIL # BLD AUTO: 0 K/UL — SIGNIFICANT CHANGE UP (ref 0–0.5)
EOSINOPHIL NFR BLD AUTO: 0 % — SIGNIFICANT CHANGE UP (ref 0–6)
FERRITIN SERPL-MCNC: 390 NG/ML — SIGNIFICANT CHANGE UP (ref 30–400)
GLUCOSE BLDC GLUCOMTR-MCNC: 243 MG/DL — HIGH (ref 70–99)
GLUCOSE BLDC GLUCOMTR-MCNC: 246 MG/DL — HIGH (ref 70–99)
GLUCOSE BLDC GLUCOMTR-MCNC: 272 MG/DL — HIGH (ref 70–99)
GLUCOSE BLDC GLUCOMTR-MCNC: 292 MG/DL — HIGH (ref 70–99)
GLUCOSE SERPL-MCNC: 264 MG/DL — HIGH (ref 70–99)
HCT VFR BLD CALC: 26.1 % — LOW (ref 39–50)
HGB BLD-MCNC: 8.3 G/DL — LOW (ref 13–17)
LDH SERPL L TO P-CCNC: 239 U/L — HIGH (ref 98–192)
LYMPHOCYTES # BLD AUTO: 1.42 K/UL — SIGNIFICANT CHANGE UP (ref 1–3.3)
LYMPHOCYTES # BLD AUTO: 5.3 % — LOW (ref 13–44)
MAGNESIUM SERPL-MCNC: 2.2 MG/DL — SIGNIFICANT CHANGE UP (ref 1.6–2.6)
MANUAL SMEAR VERIFICATION: SIGNIFICANT CHANGE UP
MCHC RBC-ENTMCNC: 23 PG — LOW (ref 27–34)
MCHC RBC-ENTMCNC: 31.8 GM/DL — LOW (ref 32–36)
MCV RBC AUTO: 72.3 FL — LOW (ref 80–100)
MONOCYTES # BLD AUTO: 1.18 K/UL — HIGH (ref 0–0.9)
MONOCYTES NFR BLD AUTO: 4.4 % — SIGNIFICANT CHANGE UP (ref 2–14)
NEUTROPHILS # BLD AUTO: 24.16 K/UL — HIGH (ref 1.8–7.4)
NEUTROPHILS NFR BLD AUTO: 90.3 % — HIGH (ref 43–77)
NRBC # BLD: 1 /100 — HIGH (ref 0–0)
OVALOCYTES BLD QL SMEAR: SLIGHT — SIGNIFICANT CHANGE UP
PHOSPHATE SERPL-MCNC: 6.7 MG/DL — HIGH (ref 2.4–4.7)
PLAT MORPH BLD: NORMAL — SIGNIFICANT CHANGE UP
PLATELET # BLD AUTO: 457 K/UL — HIGH (ref 150–400)
POIKILOCYTOSIS BLD QL AUTO: SLIGHT — SIGNIFICANT CHANGE UP
POLYCHROMASIA BLD QL SMEAR: SLIGHT — SIGNIFICANT CHANGE UP
POTASSIUM SERPL-MCNC: 4 MMOL/L — SIGNIFICANT CHANGE UP (ref 3.5–5.3)
POTASSIUM SERPL-SCNC: 4 MMOL/L — SIGNIFICANT CHANGE UP (ref 3.5–5.3)
PROCALCITONIN SERPL-MCNC: 0.51 NG/ML — HIGH (ref 0.02–0.1)
RBC # BLD: 3.61 M/UL — LOW (ref 4.2–5.8)
RBC # FLD: 17 % — HIGH (ref 10.3–14.5)
RBC BLD AUTO: ABNORMAL
SARS-COV-2 RNA SPEC QL NAA+PROBE: SIGNIFICANT CHANGE UP
SODIUM SERPL-SCNC: 135 MMOL/L — SIGNIFICANT CHANGE UP (ref 135–145)
TARGETS BLD QL SMEAR: SLIGHT — SIGNIFICANT CHANGE UP
VANCOMYCIN FLD-MCNC: 27 UG/ML
WBC # BLD: 26.76 K/UL — HIGH (ref 3.8–10.5)
WBC # FLD AUTO: 26.76 K/UL — HIGH (ref 3.8–10.5)

## 2021-02-07 PROCEDURE — 99232 SBSQ HOSP IP/OBS MODERATE 35: CPT

## 2021-02-07 RX ORDER — INSULIN LISPRO 100/ML
5 VIAL (ML) SUBCUTANEOUS
Refills: 0 | Status: DISCONTINUED | OUTPATIENT
Start: 2021-02-07 | End: 2021-02-09

## 2021-02-07 RX ORDER — ENOXAPARIN SODIUM 100 MG/ML
40 INJECTION SUBCUTANEOUS DAILY
Refills: 0 | Status: DISCONTINUED | OUTPATIENT
Start: 2021-02-07 | End: 2021-02-07

## 2021-02-07 RX ORDER — HEPARIN SODIUM 5000 [USP'U]/ML
5000 INJECTION INTRAVENOUS; SUBCUTANEOUS EVERY 8 HOURS
Refills: 0 | Status: DISCONTINUED | OUTPATIENT
Start: 2021-02-07 | End: 2021-02-10

## 2021-02-07 RX ADMIN — Medication 250 MILLIGRAM(S): at 18:01

## 2021-02-07 RX ADMIN — PIPERACILLIN AND TAZOBACTAM 25 GRAM(S): 4; .5 INJECTION, POWDER, LYOPHILIZED, FOR SOLUTION INTRAVENOUS at 18:27

## 2021-02-07 RX ADMIN — CHLORHEXIDINE GLUCONATE 1 APPLICATION(S): 213 SOLUTION TOPICAL at 04:30

## 2021-02-07 RX ADMIN — CARVEDILOL PHOSPHATE 12.5 MILLIGRAM(S): 80 CAPSULE, EXTENDED RELEASE ORAL at 18:01

## 2021-02-07 RX ADMIN — Medication 50 MILLIGRAM(S): at 20:28

## 2021-02-07 RX ADMIN — IRON SUCROSE 110 MILLIGRAM(S): 20 INJECTION, SOLUTION INTRAVENOUS at 17:06

## 2021-02-07 RX ADMIN — Medication 40 MILLIGRAM(S): at 20:28

## 2021-02-07 RX ADMIN — SEVELAMER CARBONATE 800 MILLIGRAM(S): 2400 POWDER, FOR SUSPENSION ORAL at 08:43

## 2021-02-07 RX ADMIN — Medication 100 MILLIGRAM(S): at 22:35

## 2021-02-07 RX ADMIN — Medication 60 MILLIGRAM(S): at 06:11

## 2021-02-07 RX ADMIN — PIPERACILLIN AND TAZOBACTAM 25 GRAM(S): 4; .5 INJECTION, POWDER, LYOPHILIZED, FOR SOLUTION INTRAVENOUS at 00:10

## 2021-02-07 RX ADMIN — SEVELAMER CARBONATE 800 MILLIGRAM(S): 2400 POWDER, FOR SUSPENSION ORAL at 18:01

## 2021-02-07 RX ADMIN — Medication 1 MILLIGRAM(S): at 12:21

## 2021-02-07 RX ADMIN — ATORVASTATIN CALCIUM 20 MILLIGRAM(S): 80 TABLET, FILM COATED ORAL at 20:28

## 2021-02-07 RX ADMIN — Medication 40 MILLIGRAM(S): at 14:55

## 2021-02-07 RX ADMIN — Medication 5 UNIT(S): at 17:18

## 2021-02-07 RX ADMIN — HEPARIN SODIUM 5000 UNIT(S): 5000 INJECTION INTRAVENOUS; SUBCUTANEOUS at 20:28

## 2021-02-07 RX ADMIN — INSULIN GLARGINE 20 UNIT(S): 100 INJECTION, SOLUTION SUBCUTANEOUS at 09:04

## 2021-02-07 RX ADMIN — Medication 250 MILLIGRAM(S): at 06:13

## 2021-02-07 RX ADMIN — Medication 2 UNIT(S): at 08:41

## 2021-02-07 RX ADMIN — Medication 3: at 12:22

## 2021-02-07 RX ADMIN — RISPERIDONE 3 MILLIGRAM(S): 4 TABLET ORAL at 18:03

## 2021-02-07 RX ADMIN — PIPERACILLIN AND TAZOBACTAM 25 GRAM(S): 4; .5 INJECTION, POWDER, LYOPHILIZED, FOR SOLUTION INTRAVENOUS at 14:55

## 2021-02-07 RX ADMIN — Medication 40 MILLIGRAM(S): at 06:11

## 2021-02-07 RX ADMIN — CARVEDILOL PHOSPHATE 12.5 MILLIGRAM(S): 80 CAPSULE, EXTENDED RELEASE ORAL at 06:11

## 2021-02-07 RX ADMIN — RISPERIDONE 3 MILLIGRAM(S): 4 TABLET ORAL at 06:11

## 2021-02-07 RX ADMIN — INSULIN GLARGINE 20 UNIT(S): 100 INJECTION, SOLUTION SUBCUTANEOUS at 21:33

## 2021-02-07 RX ADMIN — Medication 2000 UNIT(S): at 18:01

## 2021-02-07 RX ADMIN — Medication 81 MILLIGRAM(S): at 12:20

## 2021-02-07 RX ADMIN — SEVELAMER CARBONATE 800 MILLIGRAM(S): 2400 POWDER, FOR SUSPENSION ORAL at 12:21

## 2021-02-07 RX ADMIN — Medication 5 UNIT(S): at 12:23

## 2021-02-07 RX ADMIN — Medication 2000 UNIT(S): at 06:11

## 2021-02-07 RX ADMIN — SENNA PLUS 2 TABLET(S): 8.6 TABLET ORAL at 20:28

## 2021-02-07 RX ADMIN — Medication 2: at 08:41

## 2021-02-07 RX ADMIN — Medication 50 MILLIGRAM(S): at 15:14

## 2021-02-07 RX ADMIN — Medication 50 MILLIGRAM(S): at 06:11

## 2021-02-07 RX ADMIN — Medication 2: at 17:17

## 2021-02-07 NOTE — PROGRESS NOTE ADULT - SUBJECTIVE AND OBJECTIVE BOX
PULMONARY PROGRESS NOTE      MILENA PUGA  MRN-[id number]    Patient is a 36y old  Male who presents with a chief complaint of difficulty breathing (07 Feb 2021 13:08)        INTERVAL HPI/OVERNIGHT EVENTS:  Pt seen and examined at the bedside. Denying acute SOB. Denying chest pain. No N/V    MEDICATIONS  (STANDING):  ALBUTerol    0.083%. 2.5 milliGRAM(s) Nebulizer once  aspirin  chewable 81 milliGRAM(s) Oral daily  atorvastatin 20 milliGRAM(s) Oral at bedtime  carvedilol 12.5 milliGRAM(s) Oral every 12 hours  chlorhexidine 4% Liquid 1 Application(s) Topical <User Schedule>  cholecalciferol 2000 Unit(s) Oral two times a day  dextrose 40% Gel 15 Gram(s) Oral once  dextrose 5%. 1000 milliLiter(s) (50 mL/Hr) IV Continuous <Continuous>  dextrose 5%. 1000 milliLiter(s) (100 mL/Hr) IV Continuous <Continuous>  dextrose 50% Injectable 25 Gram(s) IV Push once  dextrose 50% Injectable 12.5 Gram(s) IV Push once  dextrose 50% Injectable 25 Gram(s) IV Push once  enoxaparin Injectable 40 milliGRAM(s) SubCutaneous daily  epoetin alma-epbx (RETACRIT) Injectable 07715 Unit(s) IV Push once  folic acid 1 milliGRAM(s) Oral daily  glucagon  Injectable 1 milliGRAM(s) IntraMuscular once  hydrALAZINE 50 milliGRAM(s) Oral three times a day  insulin glargine Injectable (LANTUS) 20 Unit(s) SubCutaneous two times a day  insulin lispro (ADMELOG) corrective regimen sliding scale   SubCutaneous three times a day before meals  insulin lispro Injectable (ADMELOG) 5 Unit(s) SubCutaneous three times a day with meals  iron sucrose IVPB 200 milliGRAM(s) IV Intermittent every 24 hours  methylPREDNISolone sodium succinate Injectable 40 milliGRAM(s) IV Push every 8 hours  NIFEdipine XL 60 milliGRAM(s) Oral daily  piperacillin/tazobactam IVPB.. 3.375 Gram(s) IV Intermittent every 12 hours  polyethylene glycol 3350 17 Gram(s) Oral daily  potassium chloride   Powder 40 milliEquivalent(s) Oral once  QUEtiapine 100 milliGRAM(s) Oral at bedtime  risperiDONE   Tablet 3 milliGRAM(s) Oral two times a day  saccharomyces boulardii 250 milliGRAM(s) Oral two times a day  senna 2 Tablet(s) Oral at bedtime  sevelamer carbonate 800 milliGRAM(s) Oral three times a day with meals  traZODone 100 milliGRAM(s) Oral at bedtime    MEDICATIONS  (PRN):  acetaminophen   Tablet .. 650 milliGRAM(s) Oral every 6 hours PRN Temp greater or equal to 38C (100.4F), Mild Pain (1 - 3)  guaiFENesin   Syrup  (Sugar-Free) 100 milliGRAM(s) Oral every 6 hours PRN Cough  hydrALAZINE Injectable 10 milliGRAM(s) IV Push every 6 hours PRN for SBP > 150    Allergies    No Known Allergies    Intolerances      PAST MEDICAL & SURGICAL HISTORY:  Insulin dependent type 2 diabetes mellitus    CKD (chronic kidney disease) requiring chronic dialysis    HTN (hypertension)    Sleep apnea    Bipolar 1 disorder    No significant past surgical history          REVIEW OF SYSTEMS:  Negative except as noted in HPI      Vital Signs Last 24 Hrs  T(C): 36.3 (06 Feb 2021 22:40), Max: 37.1 (06 Feb 2021 22:20)  T(F): 97.4 (06 Feb 2021 22:40), Max: 98.8 (06 Feb 2021 22:20)  HR: 71 (07 Feb 2021 08:00) (71 - 87)  BP: 98/83 (07 Feb 2021 08:00) (98/83 - 160/103)  BP(mean): 87 (07 Feb 2021 08:00) (85 - 94)  RR: 18 (07 Feb 2021 08:00) (18 - 22)  SpO2: 100% (07 Feb 2021 08:00) (97% - 100%)      PHYSICAL EXAMINATION:  GEN: In no apparent distress  HEENT: NC/AT  NECK: Supple  CV: +S1, S2  RESPIRATORY: CTA B/L, good inspiratory effort, non-labored breathing  ABDOMEN: Soft, obese, nontender  EXTREMITIES: No rashes, lesions, or pitting edema noted  NEURO: Grossly non-focal  PSYCH: Normal affect      LABS:                        8.3    26.76 )-----------( 457      ( 07 Feb 2021 06:14 )             26.1     02-07    135  |  91<L>  |  56.0<H>  ----------------------------<  264<H>  4.0   |  27.0  |  6.66<H>    Ca    9.3      07 Feb 2021 06:10  Phos  6.7     02-07  Mg     2.2     02-07                      Procalcitonin, Serum: 0.51 ng/mL (02-07-21 @ 09:45)  Procalcitonin, Serum: 0.75 ng/mL (02-05-21 @ 11:17)      MICROBIOLOGY:  Culture - Sputum . (02.06.21 @ 13:41)    Gram Stain:   Few Squamous epithelial cells per low power field  Few polymorphonuclear leukocytes per low power field  Few Gram Positive Rods per oil power field  Moderate Yeast per oil power field    Specimen Source: .Sputum Sputum        RADIOLOGY & ADDITIONAL STUDIES:  Reviewed        Respiratory Viral Panel with COVID-19 by REESE (02.05.21 @ 16:22)    Rapid RVP Result: [facility]    SARS-CoV-2: NotDete: This Respiratory Panel uses polymerase chain reaction (PCR) to detect for  adenovirus; coronavirus (HKU1, NL63, 229E, OC43); human metapneumovirus  (hMPV); human enterovirus/rhinovirus (Entero/RV); influenza A; influenza  A/H1; influenza A/H3; influenza A/H1-2009; influenza B; parainfluenza  viruses 1, 2, 3, 4; respiratory syncytial virus; Mycoplasma pneumoniae;  Chlamydophila pneumoniae; and SARS-CoV-2.      ECHO:

## 2021-02-07 NOTE — PROGRESS NOTE ADULT - SUBJECTIVE AND OBJECTIVE BOX
NEPHROLOGY INTERVAL HPI/OVERNIGHT EVENTS:    Examined  Feeling better  On O2    MEDICATIONS  (STANDING):  ALBUTerol    0.083%. 2.5 milliGRAM(s) Nebulizer once  aspirin  chewable 81 milliGRAM(s) Oral daily  atorvastatin 20 milliGRAM(s) Oral at bedtime  carvedilol 12.5 milliGRAM(s) Oral every 12 hours  chlorhexidine 4% Liquid 1 Application(s) Topical <User Schedule>  cholecalciferol 2000 Unit(s) Oral two times a day  dextrose 40% Gel 15 Gram(s) Oral once  dextrose 5%. 1000 milliLiter(s) (50 mL/Hr) IV Continuous <Continuous>  dextrose 5%. 1000 milliLiter(s) (100 mL/Hr) IV Continuous <Continuous>  dextrose 50% Injectable 25 Gram(s) IV Push once  dextrose 50% Injectable 12.5 Gram(s) IV Push once  dextrose 50% Injectable 25 Gram(s) IV Push once  epoetin alma-epbx (RETACRIT) Injectable 40709 Unit(s) IV Push once  folic acid 1 milliGRAM(s) Oral daily  glucagon  Injectable 1 milliGRAM(s) IntraMuscular once  hydrALAZINE 50 milliGRAM(s) Oral three times a day  insulin glargine Injectable (LANTUS) 20 Unit(s) SubCutaneous two times a day  insulin lispro (ADMELOG) corrective regimen sliding scale   SubCutaneous three times a day before meals  insulin lispro Injectable (ADMELOG) 2 Unit(s) SubCutaneous three times a day before meals  iron sucrose IVPB 200 milliGRAM(s) IV Intermittent every 24 hours  methylPREDNISolone sodium succinate Injectable 40 milliGRAM(s) IV Push every 8 hours  NIFEdipine XL 60 milliGRAM(s) Oral daily  piperacillin/tazobactam IVPB.. 3.375 Gram(s) IV Intermittent every 12 hours  polyethylene glycol 3350 17 Gram(s) Oral daily  potassium chloride   Powder 40 milliEquivalent(s) Oral once  QUEtiapine 100 milliGRAM(s) Oral at bedtime  risperiDONE   Tablet 3 milliGRAM(s) Oral two times a day  saccharomyces boulardii 250 milliGRAM(s) Oral two times a day  senna 2 Tablet(s) Oral at bedtime  sevelamer carbonate 800 milliGRAM(s) Oral three times a day with meals  traZODone 100 milliGRAM(s) Oral at bedtime    MEDICATIONS  (PRN):  acetaminophen   Tablet .. 650 milliGRAM(s) Oral every 6 hours PRN Temp greater or equal to 38C (100.4F), Mild Pain (1 - 3)  guaiFENesin   Syrup  (Sugar-Free) 100 milliGRAM(s) Oral every 6 hours PRN Cough  hydrALAZINE Injectable 10 milliGRAM(s) IV Push every 6 hours PRN for SBP > 150      Allergies    No Known Allergies    Intolerances        Vital Signs Last 24 Hrs  T(C): 36.3 (2021 22:40), Max: 37.1 (2021 22:20)  T(F): 97.4 (2021 22:40), Max: 98.8 (2021 22:20)  HR: 80 (2021 05:44) (74 - 87)  BP: 128/70 (2021 04:48) (128/70 - 160/103)  BP(mean): 85 (2021 04:48) (85 - 94)  RR: 18 (2021 04:48) (18 - 25)  SpO2: 98% (2021 05:44) (97% - 100%)  Daily     Daily Weight in k (2021 18:45)    PHYSICAL EXAM:  No distress  NECK: +JVD  NERVOUS SYSTEM: AAOx3  CHEST/LUNG: Diminished BS with +crackles  HEART: No rub  ABDOMEN: Soft, Nontender, Nondistended; BS+  EXTREMITIES: Mild LE edema; AVF patent    LABS:                        8.3    26.76 )-----------( 457      ( 2021 06:14 )             26.1     -    135  |  91<L>  |  56.0<H>  ----------------------------<  264<H>  4.0   |  27.0  |  6.66<H>    Ca    9.3      2021 06:10  Phos  6.7       Mg     2.2               Magnesium, Serum: 2.2 mg/dL ( @ 06:10)  Phosphorus Level, Serum: 6.7 mg/dL (02-07 @ 06:10)    ABG - ( 2021 12:38 )  pH, Arterial: 7.32  pH, Blood: x     /  pCO2: 51    /  pO2: 73    / HCO3: 24    / Base Excess: 0.1   /  SaO2: 95                    RADIOLOGY & ADDITIONAL TESTS:

## 2021-02-07 NOTE — PROGRESS NOTE ADULT - ATTENDING COMMENTS
A total of 25 minutes were spent on the entire encounter. Greater than 50% of the time was spent reviewing labs, notes, orders, radiographic studies, as well as counseling and coordinating care with the relevant multidisciplinary team, including with the primary and consulting providers.
A total of 35 minutes were spent on the entire encounter. Greater than 50% of the time was spent reviewing labs, notes, orders, radiographic studies, as well as counseling and coordinating care with the relevant multidisciplinary team, including with the primary and consulting providers.
I have personally seen and examined patient.  Above note reviewed and discussed with PA, modified where appropriate. Pt currently on BiPAP. Acute Respiratory Failure with Hypoxia with Hypercapnea. one missed dialysis session not the cause for current hypoxia  - s/p HD 2/3 and 2/4   ABG with resp acidosis liekly from ARVIND & OHAS; no hx of COPD  Currently on BiPAP with tachypnea  Pt's Tmax 102  CXR with GGO b/l  COVID -ve  Will repeat RVP  Check Blood cx, sputum cx, Urine Legionella ag  C/w zosyn, add vanco x 1 dose  ID consult called  D-dimer 570  CTA to be done in am after HD as discussed with renal  Will hold off on AC due to drop in Hg  -will add Solumedrol IV  - Continue BiPAP    - Renal following   ICU consult noted         PHYSICAL EXAM-  GENERAL: tachypnea, on BiPAP, not using accessories  HEAD:  Atraumatic, Normocephalic  EYES: EOMI, PERRLA, conjunctiva and sclera clear  NECK: Supple, No JVD  NERVOUS SYSTEM:  Alert & Oriented X3, Motor Strength 5/5 B/L upper and lower extremities; DTRs 2+ intact and symmetric  CHEST/LUNG: Clear to auscultation bilaterally; No rales, rhonchi, wheezing, or rubs  HEART: Regular rate and rhythm; No murmurs, rubs, or gallops  ABDOMEN: Soft, Nontender, Nondistended; Bowel sounds present  EXTREMITIES:  2+ Peripheral Pulses, No clubbing, cyanosis, or edema  SKIN: No rashes or lesions

## 2021-02-07 NOTE — PROGRESS NOTE ADULT - SUBJECTIVE AND OBJECTIVE BOX
CC: F/u fluid overload ESRD on HD, hypercapnic resp failure with hypoxia currently on BIPAP, r/o sepsis 2/2 pna, acute on chronic anemia, DM2      INTERVAL HPI/OVERNIGHT EVENTS: Pt seen and examined at bedside. Weaned off BiPAP to 5L NC. Afebrile. SOB & cough improved. No chest pain, palpitations, light headedness/dizziness,  fevers/chills, abdominal pain, n/v, diarrhea/constipation, dysuria or increased urinary frequency.     REVIEW OF SYSTEMS:  CONSTITUTIONAL: No fever, weight loss, or fatigue  RESPIRATORY: No cough, wheezing, chills or hemoptysis; No shortness of breath  CARDIOVASCULAR: No chest pain, palpitations, dizziness, or leg swelling  GASTROINTESTINAL: No abdominal or epigastric pain. No nausea, vomiting or hematemesis; No diarrhea or constipation  NEUROLOGICAL: No headaches, memory loss, loss of strength, numbness, or tremors  SKIN: No itching, burning, rashes, or lesions   MUSCULOSKELETAL: No joint pain or swelling; No muscle, back, or extremity pain    Allergies  No Known Allergies    PAST MEDICAL & SURGICAL HISTORY:  Insulin dependent type 2 diabetes mellitus  CKD (chronic kidney disease) requiring chronic dialysis  HTN (hypertension)  Sleep apnea  Bipolar 1 disorder  No significant past surgical history    VITAL SIGNS:  T(C): 37.6 (21 @ 10:09), Max: 38.9 (21 @ 05:20)  HR: 75 (21 @ 10:09) (68 - 808)  BP: 104/57 (21 @ 10:09) (104/57 - 166/77)  RR: 33 (21 @ 10:09) (18 - 42)  SpO2: 94% (21 @ 10:09) (90% - 99%)  Wt(kg): --Vital Signs Last 24 Hrs  T(C): 37.6 (2021 10:09), Max: 38.9 (2021 05:20)  T(F): 99.6 (2021 10:09), Max: 102.1 (2021 05:20)  HR: 75 (2021 10:09) (68 - 808)  BP: 104/57 (2021 10:09) (104/57 - 166/77)  BP(mean): --  RR: 33 (2021 10:09) (18 - 42)  SpO2: 94% (2021 10:09) (90% - 99%)    PHYSICAL EXAM:  GENERAL: Pt sleeping in bed in NAD  HEAD:  Atraumatic  EYES: EOMI, PERRL, conjunctiva and sclera clear  ENT: Dry mucous membranes  NECK: Obese. No JVD  CHEST/LUNG: Coarse upper resp sounds, otherwise clear. poor inspiratory effort, equal sounds B/L. No rhonchi or wheezing. Shallow respirations   HEART: S1, S2, Regular rate and rhythm   ABDOMEN: Obese, Bowel sounds present; Soft, Nontender. No guarding or rigidity   EXTREMITIES:  2+ Peripheral Pulses, brisk capillary refill. No clubbing, cyanosis, or edema  NERVOUS SYSTEM:  AAO x 3, [pfollows commands. No deficits   SKIN: No rashes or lesions    LABS:                      7.5    21.42 )-----------( 327      ( 2021 07:59 )             24.2     02-05  141  |  100  |  56.0<H>  ----------------------------<  117<H>  3.3<L>   |  22.0  |  7.37<H>    Ca    9.0      2021 07:59  Phos  3.5     02-04  Mg     1.9     02-04    TPro  7.0  /  Alb  3.3  /  TBili  0.4  /  DBili  x   /  AST  15  /  ALT  37  /  AlkPhos  100  02-05    CAPILLARY BLOOD GLUCOSE  POCT Blood Glucose.: 169 mg/dL (2021 11:16)  POCT Blood Glucose.: 136 mg/dL (2021 08:31)  POCT Blood Glucose.: 178 mg/dL (2021 20:40)  POCT Blood Glucose.: 180 mg/dL (2021 16:06)    ABG - ( 2021 09:01 )  pH, Arterial: 7.27  pH, Blood: x     /  pCO2: 60    /  pO2: 95    / HCO3: 25    / Base Excess: 0.5   /  SaO2: 98        Urinalysis Basic - ( 2021 22:04 )  Color: Yellow / Appearance: Clear / S.015 / pH: x  Gluc: x / Ketone: Negative  / Bili: Negative / Urobili: Negative mg/dL   Blood: x / Protein: 500 mg/dL / Nitrite: Negative   Leuk Esterase: Negative / RBC: 0-2 /HPF / WBC 0-2   Sq Epi: x / Non Sq Epi: Occasional / Bacteria: Few    MEDICATIONS  (STANDING):  ALBUTerol    0.083%. 2.5 milliGRAM(s) Nebulizer once  aspirin  chewable 81 milliGRAM(s) Oral daily  atorvastatin 20 milliGRAM(s) Oral at bedtime  carvedilol 12.5 milliGRAM(s) Oral every 12 hours  chlorhexidine 4% Liquid 1 Application(s) Topical <User Schedule>  cholecalciferol 2000 Unit(s) Oral two times a day  dextrose 40% Gel 15 Gram(s) Oral once  dextrose 5%. 1000 milliLiter(s) (50 mL/Hr) IV Continuous <Continuous>  dextrose 5%. 1000 milliLiter(s) (100 mL/Hr) IV Continuous <Continuous>  dextrose 50% Injectable 25 Gram(s) IV Push once  dextrose 50% Injectable 12.5 Gram(s) IV Push once  dextrose 50% Injectable 25 Gram(s) IV Push once  epoetin alma-epbx (RETACRIT) Injectable 16691 Unit(s) IV Push once  folic acid 1 milliGRAM(s) Oral daily  glucagon  Injectable 1 milliGRAM(s) IntraMuscular once  hydrALAZINE 50 milliGRAM(s) Oral three times a day  insulin glargine Injectable (LANTUS) 20 Unit(s) SubCutaneous two times a day  insulin lispro (ADMELOG) corrective regimen sliding scale   SubCutaneous three times a day before meals  insulin lispro Injectable (ADMELOG) 2 Unit(s) SubCutaneous three times a day before meals  iron sucrose IVPB 200 milliGRAM(s) IV Intermittent every 24 hours  methylPREDNISolone sodium succinate Injectable 40 milliGRAM(s) IV Push every 8 hours  NIFEdipine XL 60 milliGRAM(s) Oral daily  piperacillin/tazobactam IVPB.. 3.375 Gram(s) IV Intermittent every 12 hours  polyethylene glycol 3350 17 Gram(s) Oral daily  potassium chloride   Powder 40 milliEquivalent(s) Oral once  QUEtiapine 100 milliGRAM(s) Oral at bedtime  risperiDONE   Tablet 3 milliGRAM(s) Oral two times a day  saccharomyces boulardii 250 milliGRAM(s) Oral two times a day  senna 2 Tablet(s) Oral at bedtime  sevelamer carbonate 800 milliGRAM(s) Oral three times a day with meals  traZODone 100 milliGRAM(s) Oral at bedtime    MEDICATIONS  (PRN):  acetaminophen   Tablet .. 650 milliGRAM(s) Oral every 6 hours PRN Temp greater or equal to 38C (100.4F), Mild Pain (1 - 3)  guaiFENesin   Syrup  (Sugar-Free) 100 milliGRAM(s) Oral every 6 hours PRN Cough  hydrALAZINE Injectable 10 milliGRAM(s) IV Push every 6 hours PRN If SBP > 160

## 2021-02-07 NOTE — PROGRESS NOTE ADULT - ASSESSMENT
35 years old male with PMH of ESRD on Dialysis (MWF), Morbid Obesity, ARVIND (not on CPAP), DM 2, HTN, HLD and Bipolar Disorder presented with difficulty breathing after missing dialysis. Seen by Nephrology and s/p emergent HD 2/3 and 2/4. s/p a dose of Lasix 80 mg with improvement. Noted to have low grade temperatures and fever workup done. CXR shows B/L pna, UA negative. Blood cultures and sputum culture obtained. Started on Zosyn for possible pneumonia. Pt was doing well with 5L NC with BIPAP PRN. On 2/5, noted Tmax overnight to 102F and noted to be more somnolent. ABG shows acute hypercapnic respiratory failure. ICU, ID and Pulmonology were consulted.     1) Acute Respiratory Failure with Hypoxia with Hypercapnea likely from infectious etiology in the setting of OHA/ ARVIND  - one missed dialysis session not the cause for current hypoxia  - s/p HD 2/3 and 2/4   ABG with resp acidosis liekly from ARVIND & OHS; no hx of COPD  Pt's Tmax 102  CXR with GGO b/l  COVID -ve x 2  RVP -ve  Blood cx NGTD  f/u sputum cx, Urine Legionella ag  C/w zosyn  ID consult appreciated  D-dimer 570  CTA: negative for PE but with b/l GGO appearence like COVID  HD to follow CTA today  Repeat covid testing ordered yesterday but not done, will repeat one today  Inflamm markers elevated  -c/w IV steroids  - Continue BiPAP prn, currently on 5L NC   - Renal following   ICU consult noted   Hypercarbia likely from OHS/ ARVIND. Failed BiPAP, with response to AVAPS, will need Trilogy on d/c as per pulm    2) R/O sepsis 2/2 Pneumonia, seen on CXR  - Blood and sputum cultures pending  - Started on Zosyn, s/p 2 doses Vanco. Hold further vanco  - ID appreciated  COVID -ve x 2  RVP -ve  Blood cx NGTD  f/u sputum cx, Urine Legionella ag  CTA as above  Will repeat COVID PCR    3) Leukocytosis with left shift  No bandemia  Chronic since Dec 2020  Treat PNA  If no resolution there after then hemonc as outpatient       4) Microcytic Anemia due to CM-  - Low Iron Stores  Baseline Hg 9-10, now slowly trending up to 7.2-->8-->8.3  No overt bleeding  - Started Venofer 200 mg x 5  - Retacrit with dialysis   - FOBT pending   Will need colonoscopy with GI as outpatient of no etio for CM found      5) Uncontrolled HTN  - Likely due to missed dialysis   - Continue Coreg, Hydralazine   - Procardia was increased to 60 mg 2/5   - continue to trend closely       6) DM 2 - Uncontrolled   - HbA1c 12.3 on 1/1/21  - Accu checks and ISS  - Continue Lantus 20 units BID  - c./w Admelog 2 units before meals, will titrate up as needed   - continue to monitor while on solumedrol  - DM education    7) HLD  - Continue Lipitor     8) Bipolar Disorder    - Continue Risperidone Seroquel and Trazodone   - Also takes Risperdal Consta q2w, last dose scheduled on 1/27/21    Morbid obesity-  Diet & weight loss    DVT Prophylaxis -Lovenox ppx  Dispo: Not MICU candidate at this time. Transfer to [facility] for closer monitoring, monitor and . Eventual poss home when medically stable

## 2021-02-07 NOTE — PROGRESS NOTE ADULT - ASSESSMENT
ESRD: +PVC (COVID negative)  Clinically improved post HD but still symptomatic   - Clinically volume overloaded  - s/p additional HD w UF removal yest- will arrange HD tomorrow  - Would recheck COVID- pending    Anemia: +CKD  - cont KEV  - Low iron stores, will check ferritin before giving iV iron  - follow H/H  - consider PRBCs    RO:  - low phos diet  - cont Renvela    Will follow

## 2021-02-07 NOTE — PROGRESS NOTE ADULT - ASSESSMENT
36M PMH ESRD (MWF), IDDM2, ARVIND (previously on CPAP), morbid obesity, bipolar disorder, depression who presents with SOB, missed HD session, f/w sepsis 2/2 presumed PNA    Impression:  - Morbid obesity  - Volume overload 2/2 missed dialysis  - Possible multifocal PNA  - Sepsis  - ARVIND previously on CPAP  - Component of metabolic acidosis    Plan:  - C/w AVAPS QHS + PRN  - Improvement of ABG noted with AVAPs - failed on BPAP (7.27/60/95)  - ABx per ID - is on Zosyn  - Sputum culture negative  - HD per nephrology, as BP tolerates  - Monitor mental status - appears to be at baseline  - Truncal elevation  - Rest of care per primary team    The patient's PCO2 was severely elevated and responded to NIV. The patient requires advanced therapies for chronic respiratory failure secondary to ARVIND/OHS. At this time, other therapies such as BPAP-ST have been attempted and failed. AVAPS-AE or IVAPS are not available on BPAP and are therefore ruled out. Therefore the patient is an excellent candidate for Trilogy NIV. Without Trilogy NIV, the patient would be at an increased risk for readmission, worsening morbidity and mortality including from other medical complications and continued respiratory failure.    Pulmonary to sign off - reconsult PRN    Abdulaziz Oleary M.D.  Pulmonary & Critical Care Medicine  [facility]  Pulmonary Medicine at [place]  [address]  [place] [address]  T: [phone]  F: [phone]

## 2021-02-08 LAB
ANION GAP SERPL CALC-SCNC: 18 MMOL/L — HIGH (ref 5–17)
BASOPHILS # BLD AUTO: 0.02 K/UL — SIGNIFICANT CHANGE UP (ref 0–0.2)
BASOPHILS NFR BLD AUTO: 0.1 % — SIGNIFICANT CHANGE UP (ref 0–2)
BUN SERPL-MCNC: 81 MG/DL — HIGH (ref 8–20)
CALCIUM SERPL-MCNC: 8.9 MG/DL — SIGNIFICANT CHANGE UP (ref 8.6–10.2)
CHLORIDE SERPL-SCNC: 87 MMOL/L — LOW (ref 98–107)
CO2 SERPL-SCNC: 25 MMOL/L — SIGNIFICANT CHANGE UP (ref 22–29)
CREAT SERPL-MCNC: 8.69 MG/DL — HIGH (ref 0.5–1.3)
CULTURE RESULTS: SIGNIFICANT CHANGE UP
EOSINOPHIL # BLD AUTO: 0.01 K/UL — SIGNIFICANT CHANGE UP (ref 0–0.5)
EOSINOPHIL NFR BLD AUTO: 0 % — SIGNIFICANT CHANGE UP (ref 0–6)
GLUCOSE BLDC GLUCOMTR-MCNC: 164 MG/DL — HIGH (ref 70–99)
GLUCOSE BLDC GLUCOMTR-MCNC: 230 MG/DL — HIGH (ref 70–99)
GLUCOSE BLDC GLUCOMTR-MCNC: 273 MG/DL — HIGH (ref 70–99)
GLUCOSE BLDC GLUCOMTR-MCNC: 286 MG/DL — HIGH (ref 70–99)
GLUCOSE SERPL-MCNC: 264 MG/DL — HIGH (ref 70–99)
HCT VFR BLD CALC: 24.9 % — LOW (ref 39–50)
HGB BLD-MCNC: 8.2 G/DL — LOW (ref 13–17)
IMM GRANULOCYTES NFR BLD AUTO: 1.7 % — HIGH (ref 0–1.5)
LYMPHOCYTES # BLD AUTO: 1.44 K/UL — SIGNIFICANT CHANGE UP (ref 1–3.3)
LYMPHOCYTES # BLD AUTO: 6.6 % — LOW (ref 13–44)
M PNEUMO IGG SER IA-ACNC: 3.96 INDEX — HIGH (ref 0–0.9)
M PNEUMO IGG SER IA-ACNC: POSITIVE
MAGNESIUM SERPL-MCNC: 2.2 MG/DL — SIGNIFICANT CHANGE UP (ref 1.6–2.6)
MCHC RBC-ENTMCNC: 23.4 PG — LOW (ref 27–34)
MCHC RBC-ENTMCNC: 32.9 GM/DL — SIGNIFICANT CHANGE UP (ref 32–36)
MCV RBC AUTO: 71.1 FL — LOW (ref 80–100)
MONOCYTES # BLD AUTO: 1.09 K/UL — HIGH (ref 0–0.9)
MONOCYTES NFR BLD AUTO: 5 % — SIGNIFICANT CHANGE UP (ref 2–14)
NEUTROPHILS # BLD AUTO: 18.89 K/UL — HIGH (ref 1.8–7.4)
NEUTROPHILS NFR BLD AUTO: 86.6 % — HIGH (ref 43–77)
PHOSPHATE SERPL-MCNC: 8.2 MG/DL — HIGH (ref 2.4–4.7)
PLATELET # BLD AUTO: 423 K/UL — HIGH (ref 150–400)
POTASSIUM SERPL-MCNC: 3.8 MMOL/L — SIGNIFICANT CHANGE UP (ref 3.5–5.3)
POTASSIUM SERPL-SCNC: 3.8 MMOL/L — SIGNIFICANT CHANGE UP (ref 3.5–5.3)
RBC # BLD: 3.5 M/UL — LOW (ref 4.2–5.8)
RBC # FLD: 16.8 % — HIGH (ref 10.3–14.5)
SODIUM SERPL-SCNC: 130 MMOL/L — LOW (ref 135–145)
SPECIMEN SOURCE: SIGNIFICANT CHANGE UP
WBC # BLD: 21.82 K/UL — HIGH (ref 3.8–10.5)
WBC # FLD AUTO: 21.82 K/UL — HIGH (ref 3.8–10.5)

## 2021-02-08 PROCEDURE — 99232 SBSQ HOSP IP/OBS MODERATE 35: CPT

## 2021-02-08 PROCEDURE — 99222 1ST HOSP IP/OBS MODERATE 55: CPT

## 2021-02-08 RX ORDER — ERYTHROPOIETIN 10000 [IU]/ML
10000 INJECTION, SOLUTION INTRAVENOUS; SUBCUTANEOUS
Refills: 0 | Status: DISCONTINUED | OUTPATIENT
Start: 2021-02-08 | End: 2021-02-10

## 2021-02-08 RX ADMIN — ATORVASTATIN CALCIUM 20 MILLIGRAM(S): 80 TABLET, FILM COATED ORAL at 22:37

## 2021-02-08 RX ADMIN — Medication 5 UNIT(S): at 08:28

## 2021-02-08 RX ADMIN — CARVEDILOL PHOSPHATE 12.5 MILLIGRAM(S): 80 CAPSULE, EXTENDED RELEASE ORAL at 04:50

## 2021-02-08 RX ADMIN — HEPARIN SODIUM 5000 UNIT(S): 5000 INJECTION INTRAVENOUS; SUBCUTANEOUS at 22:38

## 2021-02-08 RX ADMIN — Medication 40 MILLIGRAM(S): at 04:49

## 2021-02-08 RX ADMIN — CARVEDILOL PHOSPHATE 12.5 MILLIGRAM(S): 80 CAPSULE, EXTENDED RELEASE ORAL at 16:11

## 2021-02-08 RX ADMIN — Medication 50 MILLIGRAM(S): at 04:49

## 2021-02-08 RX ADMIN — ERYTHROPOIETIN 10000 UNIT(S): 10000 INJECTION, SOLUTION INTRAVENOUS; SUBCUTANEOUS at 12:03

## 2021-02-08 RX ADMIN — HEPARIN SODIUM 5000 UNIT(S): 5000 INJECTION INTRAVENOUS; SUBCUTANEOUS at 16:09

## 2021-02-08 RX ADMIN — Medication 2000 UNIT(S): at 16:10

## 2021-02-08 RX ADMIN — Medication 100 MILLIGRAM(S): at 22:37

## 2021-02-08 RX ADMIN — Medication 50 MILLIGRAM(S): at 16:15

## 2021-02-08 RX ADMIN — Medication 2: at 17:34

## 2021-02-08 RX ADMIN — Medication 40 MILLIGRAM(S): at 16:11

## 2021-02-08 RX ADMIN — IRON SUCROSE 110 MILLIGRAM(S): 20 INJECTION, SOLUTION INTRAVENOUS at 12:03

## 2021-02-08 RX ADMIN — Medication 10 MILLIGRAM(S): at 00:24

## 2021-02-08 RX ADMIN — PIPERACILLIN AND TAZOBACTAM 25 GRAM(S): 4; .5 INJECTION, POWDER, LYOPHILIZED, FOR SOLUTION INTRAVENOUS at 16:09

## 2021-02-08 RX ADMIN — RISPERIDONE 3 MILLIGRAM(S): 4 TABLET ORAL at 16:10

## 2021-02-08 RX ADMIN — PIPERACILLIN AND TAZOBACTAM 25 GRAM(S): 4; .5 INJECTION, POWDER, LYOPHILIZED, FOR SOLUTION INTRAVENOUS at 04:50

## 2021-02-08 RX ADMIN — RISPERIDONE 3 MILLIGRAM(S): 4 TABLET ORAL at 04:49

## 2021-02-08 RX ADMIN — HEPARIN SODIUM 5000 UNIT(S): 5000 INJECTION INTRAVENOUS; SUBCUTANEOUS at 04:50

## 2021-02-08 RX ADMIN — INSULIN GLARGINE 20 UNIT(S): 100 INJECTION, SOLUTION SUBCUTANEOUS at 08:29

## 2021-02-08 RX ADMIN — Medication 81 MILLIGRAM(S): at 16:10

## 2021-02-08 RX ADMIN — Medication 40 MILLIEQUIVALENT(S): at 16:10

## 2021-02-08 RX ADMIN — CHLORHEXIDINE GLUCONATE 1 APPLICATION(S): 213 SOLUTION TOPICAL at 04:51

## 2021-02-08 RX ADMIN — Medication 5 UNIT(S): at 17:33

## 2021-02-08 RX ADMIN — Medication 3: at 08:29

## 2021-02-08 RX ADMIN — INSULIN GLARGINE 20 UNIT(S): 100 INJECTION, SOLUTION SUBCUTANEOUS at 22:35

## 2021-02-08 RX ADMIN — Medication 250 MILLIGRAM(S): at 04:50

## 2021-02-08 RX ADMIN — Medication 1 MILLIGRAM(S): at 16:11

## 2021-02-08 RX ADMIN — Medication 40 MILLIGRAM(S): at 22:38

## 2021-02-08 RX ADMIN — Medication 60 MILLIGRAM(S): at 04:49

## 2021-02-08 RX ADMIN — SEVELAMER CARBONATE 800 MILLIGRAM(S): 2400 POWDER, FOR SUSPENSION ORAL at 18:09

## 2021-02-08 RX ADMIN — Medication 2000 UNIT(S): at 04:50

## 2021-02-08 RX ADMIN — Medication 50 MILLIGRAM(S): at 22:37

## 2021-02-08 RX ADMIN — Medication 250 MILLIGRAM(S): at 16:10

## 2021-02-08 NOTE — PROGRESS NOTE ADULT - ASSESSMENT
34yo M w/ PMHx of HTN, ESRD (on HD M/W/F), AOCD, IDDM-2, ARVIND (not on cpap), morbid obesity, bipolar disorder, MDD presents to the ER due to difficulty breathing. Pt per, he has not been feeling well for the past few days. Reports shortness of breath at rest and w/ ambulation w/ associated dry cough. On Saturday he was supposed to have HD, however, he was able to get a ride. Admitted with acute pulmonary edema 2/2 missed HD session, leukocytosis, fever and concern for aspiration.    In the ED: /122, RR 24, O2 on RA 87% - improved w/ 6L O2 via NC. S/p Lasix 40mg IVP X1, Doxy 100mg IV X1, Rocephin 1g X1, Nitroglycerin 0.4mg X1    - fever resolved now on 6l o2 nasal canula  - covid pcr (-) x 2, rvp (-)  - f/u BCX ngtd  - sputum cx NF  - Continue empiric zosyn for possible aspiration x 5 days and then stop  - Trend Leukocytosis-has been chronically elevated in the past- currently on steroids which should be tapered  - HD as per renal  - hemonc eval outpatient if leukocytosis persists      signing off

## 2021-02-08 NOTE — PROGRESS NOTE ADULT - SUBJECTIVE AND OBJECTIVE BOX
CC: F/u fluid overload ESRD on HD, hypercapnic resp failure with hypoxia currently on BIPAP, r/o sepsis 2/2 pna, acute on chronic anemia, DM2      INTERVAL HPI/OVERNIGHT EVENTS: Pt seen and examined at bedside. Doing well on 5L NC. Afebrile. SOB & cough improved. No chest pain, palpitations, light headedness/dizziness,  fevers/chills, abdominal pain, n/v, diarrhea/constipation, dysuria or increased urinary frequency. Pt wants to sign out AMA. He is intermittently agitated    REVIEW OF SYSTEMS:  CONSTITUTIONAL: No fever, weight loss, or fatigue  RESPIRATORY: No cough, wheezing, chills or hemoptysis; No shortness of breath  CARDIOVASCULAR: No chest pain, palpitations, dizziness, or leg swelling  GASTROINTESTINAL: No abdominal or epigastric pain. No nausea, vomiting or hematemesis; No diarrhea or constipation  NEUROLOGICAL: No headaches, memory loss, loss of strength, numbness, or tremors  SKIN: No itching, burning, rashes, or lesions   MUSCULOSKELETAL: No joint pain or swelling; No muscle, back, or extremity pain    Allergies  No Known Allergies    PAST MEDICAL & SURGICAL HISTORY:  Insulin dependent type 2 diabetes mellitus  CKD (chronic kidney disease) requiring chronic dialysis  HTN (hypertension)  Sleep apnea  Bipolar 1 disorder  No significant past surgical history    VITAL SIGNS:  T(C): 37.6 (21 @ 10:09), Max: 38.9 (21 @ 05:20)  HR: 75 (21 @ 10:09) (68 - 808)  BP: 104/57 (21 @ 10:09) (104/57 - 166/77)  RR: 33 (21 @ 10:09) (18 - 42)  SpO2: 94% (21 @ 10:09) (90% - 99%)  Wt(kg): --Vital Signs Last 24 Hrs  T(C): 37.6 (2021 10:09), Max: 38.9 (2021 05:20)  T(F): 99.6 (2021 10:09), Max: 102.1 (2021 05:20)  HR: 75 (2021 10:09) (68 - 808)  BP: 104/57 (2021 10:09) (104/57 - 166/77)  BP(mean): --  RR: 33 (2021 10:09) (18 - 42)  SpO2: 94% (2021 10:09) (90% - 99%)    PHYSICAL EXAM:  GENERAL: Pt sleeping in bed in NAD  HEAD:  Atraumatic  EYES: EOMI, PERRL, conjunctiva and sclera clear  ENT: Dry mucous membranes  NECK: Obese. No JVD  CHEST/LUNG: Coarse upper resp sounds, otherwise clear. poor inspiratory effort, equal sounds B/L. No rhonchi or wheezing. Shallow respirations   HEART: S1, S2, Regular rate and rhythm   ABDOMEN: Obese, Bowel sounds present; Soft, Nontender. No guarding or rigidity   EXTREMITIES:  2+ Peripheral Pulses, brisk capillary refill. No clubbing, cyanosis, or edema  NERVOUS SYSTEM:  AAO x 3, [pfollows commands. No deficits   SKIN: No rashes or lesions    LABS:                      7.5    21.42 )-----------( 327      ( 2021 07:59 )             24.2     02-05  141  |  100  |  56.0<H>  ----------------------------<  117<H>  3.3<L>   |  22.0  |  7.37<H>    Ca    9.0      2021 07:59  Phos  3.5     02-04  Mg     1.9     02-04    TPro  7.0  /  Alb  3.3  /  TBili  0.4  /  DBili  x   /  AST  15  /  ALT  37  /  AlkPhos  100  02-05    CAPILLARY BLOOD GLUCOSE  POCT Blood Glucose.: 169 mg/dL (2021 11:16)  POCT Blood Glucose.: 136 mg/dL (2021 08:31)  POCT Blood Glucose.: 178 mg/dL (2021 20:40)  POCT Blood Glucose.: 180 mg/dL (2021 16:06)    ABG - ( 2021 09:01 )  pH, Arterial: 7.27  pH, Blood: x     /  pCO2: 60    /  pO2: 95    / HCO3: 25    / Base Excess: 0.5   /  SaO2: 98        Urinalysis Basic - ( 2021 22:04 )  Color: Yellow / Appearance: Clear / S.015 / pH: x  Gluc: x / Ketone: Negative  / Bili: Negative / Urobili: Negative mg/dL   Blood: x / Protein: 500 mg/dL / Nitrite: Negative   Leuk Esterase: Negative / RBC: 0-2 /HPF / WBC 0-2   Sq Epi: x / Non Sq Epi: Occasional / Bacteria: Few    MEDICATIONS  (STANDING):  ALBUTerol    0.083%. 2.5 milliGRAM(s) Nebulizer once  aspirin  chewable 81 milliGRAM(s) Oral daily  atorvastatin 20 milliGRAM(s) Oral at bedtime  carvedilol 12.5 milliGRAM(s) Oral every 12 hours  chlorhexidine 4% Liquid 1 Application(s) Topical <User Schedule>  cholecalciferol 2000 Unit(s) Oral two times a day  dextrose 40% Gel 15 Gram(s) Oral once  dextrose 5%. 1000 milliLiter(s) (50 mL/Hr) IV Continuous <Continuous>  dextrose 5%. 1000 milliLiter(s) (100 mL/Hr) IV Continuous <Continuous>  dextrose 50% Injectable 25 Gram(s) IV Push once  dextrose 50% Injectable 12.5 Gram(s) IV Push once  dextrose 50% Injectable 25 Gram(s) IV Push once  epoetin alma-epbx (RETACRIT) Injectable 06765 Unit(s) IV Push once  folic acid 1 milliGRAM(s) Oral daily  glucagon  Injectable 1 milliGRAM(s) IntraMuscular once  hydrALAZINE 50 milliGRAM(s) Oral three times a day  insulin glargine Injectable (LANTUS) 20 Unit(s) SubCutaneous two times a day  insulin lispro (ADMELOG) corrective regimen sliding scale   SubCutaneous three times a day before meals  insulin lispro Injectable (ADMELOG) 2 Unit(s) SubCutaneous three times a day before meals  iron sucrose IVPB 200 milliGRAM(s) IV Intermittent every 24 hours  methylPREDNISolone sodium succinate Injectable 40 milliGRAM(s) IV Push every 8 hours  NIFEdipine XL 60 milliGRAM(s) Oral daily  piperacillin/tazobactam IVPB.. 3.375 Gram(s) IV Intermittent every 12 hours  polyethylene glycol 3350 17 Gram(s) Oral daily  potassium chloride   Powder 40 milliEquivalent(s) Oral once  QUEtiapine 100 milliGRAM(s) Oral at bedtime  risperiDONE   Tablet 3 milliGRAM(s) Oral two times a day  saccharomyces boulardii 250 milliGRAM(s) Oral two times a day  senna 2 Tablet(s) Oral at bedtime  sevelamer carbonate 800 milliGRAM(s) Oral three times a day with meals  traZODone 100 milliGRAM(s) Oral at bedtime    MEDICATIONS  (PRN):  acetaminophen   Tablet .. 650 milliGRAM(s) Oral every 6 hours PRN Temp greater or equal to 38C (100.4F), Mild Pain (1 - 3)  guaiFENesin   Syrup  (Sugar-Free) 100 milliGRAM(s) Oral every 6 hours PRN Cough  hydrALAZINE Injectable 10 milliGRAM(s) IV Push every 6 hours PRN If SBP > 160

## 2021-02-08 NOTE — BEHAVIORAL HEALTH ASSESSMENT NOTE - SUMMARY
Patient is a 35 year old,  male; single; non-caregiver; unemployed; past psychiatric history of Bipolar 1 disorder; no known psychiatric hospitalizations; no known suicide attempts; no known history of violence or arrests; PMH of morbid obesity, HTN, ESRD not on HD with AVF, DM, HLD. Pt missed HD appointment, BIBA for SOB, pulmonary edema

## 2021-02-08 NOTE — PROGRESS NOTE ADULT - ASSESSMENT
35 years old male with PMH of ESRD on Dialysis (MWF), Morbid Obesity, ARVIND (not on CPAP), DM 2, HTN, HLD and Bipolar Disorder presented with difficulty breathing after missing dialysis. Seen by Nephrology and s/p emergent HD 2/3 and 2/4. s/p a dose of Lasix 80 mg with improvement. Noted to have low grade temperatures and fever workup done. CXR shows B/L pna, UA negative. Blood cultures and sputum culture obtained. Started on Zosyn for possible pneumonia. Pt was doing well with 5L NC with BIPAP PRN. On 2/5, noted Tmax overnight to 102F and noted to be more somnolent. ABG shows acute hypercapnic respiratory failure. ICU, ID and Pulmonology were consulted.     1) Acute Respiratory Failure with Hypoxia with Hypercapnea likely from infectious etiology in the setting of OHA/ ARVIND  - one missed dialysis session not the cause for current hypoxia  - s/p HD 2/3 and 2/4   ABG with resp acidosis liekly from ARVIND & OHS; no hx of COPD  Pt's Tmax 102 on 2/5/21  CXR with GGO b/l  Inflamm markers elevated  COVID -ve x 3  RVP -ve  Blood cx NGTD  f/u sputum cx, Urine Legionella ag  C/w zosyn  ID consult appreciated  D-dimer 570  CTA: negative for PE but with b/l GGO appearence like COVID  -c/w IV steroids, taper off as per pulm  - Continue BiPAP prn, currently on 5L NC, taper off as tolerated  - Renal following   ICU consult noted   Hypercarbia likely from OHS/ ARVIND. Failed BiPAP, with response to AVAPS, will need Trilogy on d/c as per pulm. CM notified    2) R/O sepsis 2/2 Pneumonia, seen on CXR  - Blood NGTD and sputum cultures pending  - Started on Zosyn, s/p 2 doses Vanco. Hold further vanco  - ID appreciated  COVID -ve x 3  RVP -ve  f/u sputum cx, Urine Legionella ag  CTA as above      3) Leukocytosis with left shift  No bandemia  Chronic since Dec 2020  Treat PNA  If no resolution there after then hemonc as outpatient       4) Microcytic Anemia due to CM-  - Low Iron Stores  Baseline Hg 9-10, now slowly trending up to 7.2-->8-->8.3-->8.2  No overt bleeding  - s/p Venofer 200 mg x 5  - Retacrit with dialysis   - FOBT pending   Will need colonoscopy with GI as outpatient       5) Uncontrolled HTN  - Likely due to missed dialysis   - Continue Coreg, Hydralazine   - Procardia was increased to 60 mg 2/5   - continue to trend closely       6) DM 2 - Uncontrolled   - HbA1c 12.3 on 1/1/21  - Accu checks and ISS  - Continue Lantus 20 units BID  - c./w Admelog 2 units before meals, will titrate up as needed   - continue to monitor while on solumedrol  - DM education    7) HLD  - Continue Lipitor     8) Bipolar Disorder-  - Continue Risperidone, Seroquel and Trazodone   - Also takes Risperdal Consta q2w, last dose scheduled on 1/27/21, next dose 2/10/21.   Pt appears intermittently agitated. Psych consult called    Morbid obesity-  Diet & weight loss    DVT Prophylaxis -Lovenox ppx  Dispo:  Eventual poss home when medically stable & requiring less O2

## 2021-02-08 NOTE — DIETITIAN INITIAL EVALUATION ADULT. - PERTINENT LABORATORY DATA
02-08 Na130 mmol/L<L> Glu 264 mg/dL<H> K+ 3.8 mmol/L Cr  8.69 mg/dL<H> BUN 81.0 mg/dL<H> Phos 8.2 mg/dL<H> Alb n/a   PAB n/a  CRP 6.06

## 2021-02-08 NOTE — BEHAVIORAL HEALTH ASSESSMENT NOTE - NSBHCHARTREVIEWVS_PSY_A_CORE FT
Vital Signs Last 24 Hrs  T(C): 36.8 (13 Jan 2021 08:43), Max: 37.4 (12 Jan 2021 17:18)  T(F): 98.3 (13 Jan 2021 08:43), Max: 99.4 (12 Jan 2021 17:18)  HR: 84 (13 Jan 2021 08:43) (81 - 94)  BP: 169/109 (13 Jan 2021 08:43) (160/99 - 169/109)  BP(mean): --  RR: 20 (13 Jan 2021 08:43) (18 - 20)  SpO2: 93% (13 Jan 2021 08:43) (93% - 96%)

## 2021-02-08 NOTE — PROGRESS NOTE ADULT - SUBJECTIVE AND OBJECTIVE BOX
NEPHROLOGY INTERVAL HPI/OVERNIGHT EVENTS:    Seen on HD tolerating ok    MEDICATIONS  (STANDING):  ALBUTerol    0.083%. 2.5 milliGRAM(s) Nebulizer once  aspirin  chewable 81 milliGRAM(s) Oral daily  atorvastatin 20 milliGRAM(s) Oral at bedtime  carvedilol 12.5 milliGRAM(s) Oral every 12 hours  chlorhexidine 4% Liquid 1 Application(s) Topical <User Schedule>  cholecalciferol 2000 Unit(s) Oral two times a day  dextrose 40% Gel 15 Gram(s) Oral once  dextrose 5%. 1000 milliLiter(s) (50 mL/Hr) IV Continuous <Continuous>  dextrose 5%. 1000 milliLiter(s) (100 mL/Hr) IV Continuous <Continuous>  dextrose 50% Injectable 25 Gram(s) IV Push once  dextrose 50% Injectable 12.5 Gram(s) IV Push once  dextrose 50% Injectable 25 Gram(s) IV Push once  epoetin alma-epbx (RETACRIT) Injectable 75857 Unit(s) IV Push <User Schedule>  folic acid 1 milliGRAM(s) Oral daily  glucagon  Injectable 1 milliGRAM(s) IntraMuscular once  heparin   Injectable 5000 Unit(s) SubCutaneous every 8 hours  hydrALAZINE 50 milliGRAM(s) Oral three times a day  insulin glargine Injectable (LANTUS) 20 Unit(s) SubCutaneous two times a day  insulin lispro (ADMELOG) corrective regimen sliding scale   SubCutaneous three times a day before meals  insulin lispro Injectable (ADMELOG) 5 Unit(s) SubCutaneous three times a day with meals  iron sucrose IVPB 200 milliGRAM(s) IV Intermittent every 24 hours  methylPREDNISolone sodium succinate Injectable 40 milliGRAM(s) IV Push every 8 hours  NIFEdipine XL 60 milliGRAM(s) Oral daily  piperacillin/tazobactam IVPB.. 3.375 Gram(s) IV Intermittent every 12 hours  polyethylene glycol 3350 17 Gram(s) Oral daily  potassium chloride   Powder 40 milliEquivalent(s) Oral once  QUEtiapine 100 milliGRAM(s) Oral at bedtime  risperiDONE   Tablet 3 milliGRAM(s) Oral two times a day  saccharomyces boulardii 250 milliGRAM(s) Oral two times a day  senna 2 Tablet(s) Oral at bedtime  sevelamer carbonate 800 milliGRAM(s) Oral three times a day with meals  traZODone 100 milliGRAM(s) Oral at bedtime    MEDICATIONS  (PRN):  acetaminophen   Tablet .. 650 milliGRAM(s) Oral every 6 hours PRN Temp greater or equal to 38C (100.4F), Mild Pain (1 - 3)  guaiFENesin   Syrup  (Sugar-Free) 100 milliGRAM(s) Oral every 6 hours PRN Cough  hydrALAZINE Injectable 10 milliGRAM(s) IV Push every 6 hours PRN for SBP > 150      Allergies    No Known Allergies    Intolerances        Vital Signs Last 24 Hrs  T(C): 36.4 (2021 09:00), Max: 36.9 (2021 00:00)  T(F): 97.6 (2021 09:00), Max: 98.4 (2021 00:00)  HR: 65 (2021 09:00) (64 - 71)  BP: 162/93 (2021 09:00) (142/84 - 165/94)  BP(mean): 98 (2021 04:07) (88 - 117)  RR: 18 (2021 09:00) (18 - 20)  SpO2: 100% (2021 09:00) (97% - 100%)  Daily     Daily Weight in k.9 (2021 09:00)    PHYSICAL EXAM:  No distress  NECK: +JVD  NERVOUS SYSTEM: AAOx3  CHEST/LUNG: Diminished BS with +crackles  HEART: No rub  ABDOMEN: Soft, Nontender, Nondistended; BS+  EXTREMITIES: Mild LE edema; AVF patent      LABS:                        8.2    21.82 )-----------( 423      ( 2021 05:50 )             24.9     02-08    130<L>  |  87<L>  |  81.0<H>  ----------------------------<  264<H>  3.8   |  25.0  |  8.69<H>    Ca    8.9      2021 05:50  Phos  8.2     02-08  Mg     2.2     02-08          Magnesium, Serum: 2.2 mg/dL (02-08 @ 05:50)  Phosphorus Level, Serum: 8.2 mg/dL ( @ 05:50)          RADIOLOGY & ADDITIONAL TESTS:

## 2021-02-08 NOTE — PROGRESS NOTE ADULT - SUBJECTIVE AND OBJECTIVE BOX
[facility]  INFECTIOUS DISEASES AND INTERNAL MEDICINE at [place]  =======================================================  Sandeep Bustos MD  Diplomates American Board of Internal Medicine and Infectious Diseases  Tel: [phone]      Fax: [phone]  =======================================================    MILENA PUGA [id number]    Follow up: aspiration, fever  afebrile  feels well  remains on O2  asking about PT and trilogy      Allergies:  No Known Allergies           REVIEW OF SYSTEMS:  CONSTITUTIONAL:  No Fever or chills  HEENT:   No diplopia or blurred vision.  No earache, sore throat or runny nose.  CARDIOVASCULAR:  No pressure, squeezing, strangling, tightness, heaviness or aching about the chest, neck, axilla or epigastrium.  RESPIRATORY:  No cough, shortness of breath  GASTROINTESTINAL:  No nausea, vomiting or diarrhea.  GENITOURINARY:  No dysuria, frequency or urgency. No Blood in urine  MUSCULOSKELETAL:  no joint aches, no muscle pain  SKIN:  No change in skin, hair or nails.  NEUROLOGIC:  No Headaches, seizures or weakness.  PSYCHIATRIC:  No disorder of thought or mood.  ENDOCRINE:  No heat or cold intolerance  HEMATOLOGICAL:  No easy bruising or bleeding.       Physical Exam:  GEN: NAD, pleasant  HEENT: normocephalic and atraumatic. EOMI. PERRL.  Anicteric   NECK: Supple.   LUNGS: Clear to auscultation.  HEART: Regular rate and rhythm without murmur.  ABDOMEN: Soft, nontender, and nondistended.  Positive bowel sounds.    : No CVA tenderness  EXTREMITIES: Without any edema.  MSK: no joint swelling  NEUROLOGIC: Cranial nerves II through XII are grossly intact. No focal deficits  PSYCHIATRIC: Appropriate affect .  SKIN: No Rash      Vitals:    T(F): 98.4 (08 Feb 2021 15:45), Max: 98.6 (08 Feb 2021 13:41)  HR: 84 (08 Feb 2021 15:45)  BP: 147/79 (08 Feb 2021 15:45)  RR: 18 (08 Feb 2021 15:45)  SpO2: 98% (08 Feb 2021 15:45) (97% - 100%)  temp max in last 48H T(F): , Max: 98.8 (02-06-21 @ 22:20)    Current Antibiotics:  piperacillin/tazobactam IVPB.. 3.375 Gram(s) IV Intermittent every 12 hours    Other medications:  ALBUTerol    0.083%. 2.5 milliGRAM(s) Nebulizer once  aspirin  chewable 81 milliGRAM(s) Oral daily  atorvastatin 20 milliGRAM(s) Oral at bedtime  carvedilol 12.5 milliGRAM(s) Oral every 12 hours  chlorhexidine 4% Liquid 1 Application(s) Topical <User Schedule>  cholecalciferol 2000 Unit(s) Oral two times a day  dextrose 40% Gel 15 Gram(s) Oral once  dextrose 5%. 1000 milliLiter(s) IV Continuous <Continuous>  dextrose 5%. 1000 milliLiter(s) IV Continuous <Continuous>  dextrose 50% Injectable 25 Gram(s) IV Push once  dextrose 50% Injectable 12.5 Gram(s) IV Push once  dextrose 50% Injectable 25 Gram(s) IV Push once  epoetin alma-epbx (RETACRIT) Injectable 89150 Unit(s) IV Push <User Schedule>  folic acid 1 milliGRAM(s) Oral daily  glucagon  Injectable 1 milliGRAM(s) IntraMuscular once  heparin   Injectable 5000 Unit(s) SubCutaneous every 8 hours  hydrALAZINE 50 milliGRAM(s) Oral three times a day  insulin glargine Injectable (LANTUS) 20 Unit(s) SubCutaneous two times a day  insulin lispro (ADMELOG) corrective regimen sliding scale   SubCutaneous three times a day before meals  insulin lispro Injectable (ADMELOG) 5 Unit(s) SubCutaneous three times a day with meals  methylPREDNISolone sodium succinate Injectable 40 milliGRAM(s) IV Push every 8 hours  NIFEdipine XL 60 milliGRAM(s) Oral daily  polyethylene glycol 3350 17 Gram(s) Oral daily  QUEtiapine 100 milliGRAM(s) Oral at bedtime  risperiDONE   Tablet 3 milliGRAM(s) Oral two times a day  saccharomyces boulardii 250 milliGRAM(s) Oral two times a day  senna 2 Tablet(s) Oral at bedtime  sevelamer carbonate 800 milliGRAM(s) Oral three times a day with meals  traZODone 100 milliGRAM(s) Oral at bedtime                            8.2    21.82 )-----------( 423      ( 08 Feb 2021 05:50 )             24.9     02-08    130<L>  |  87<L>  |  81.0<H>  ----------------------------<  264<H>  3.8   |  25.0  |  8.69<H>    Ca    8.9      08 Feb 2021 05:50  Phos  8.2     02-08  Mg     2.2     02-08      RECENT CULTURES:  02-06 @ 13:41 .Sputum Sputum     Normal Respiratory Chiquita present    Few Squamous epithelial cells per low power field  Few polymorphonuclear leukocytes per low power field  Few Gram Positive Rods per oil power field  Moderate Yeast per oil power field      02-05 @ 16:22          NotDetec  02-05 @ 11:50 .Blood Blood-Peripheral     No growth at 48 hours        02-04 @ 17:24 .Blood Blood-Peripheral     No growth at 48 hours            WBC Count: 21.82 K/uL (02-08-21 @ 05:50)  WBC Count: 26.76 K/uL (02-07-21 @ 06:14)  WBC Count: 22.20 K/uL (02-06-21 @ 06:06)  WBC Count: 21.42 K/uL (02-05-21 @ 07:59)  WBC Count: 25.54 K/uL (02-04-21 @ 10:15)    Creatinine, Serum: 8.69 mg/dL (02-08-21 @ 05:50)  Creatinine, Serum: 6.66 mg/dL (02-07-21 @ 06:10)  Creatinine, Serum: 8.77 mg/dL (02-06-21 @ 06:06)  Creatinine, Serum: 7.37 mg/dL (02-05-21 @ 07:59)  Creatinine, Serum: 5.69 mg/dL (02-04-21 @ 10:16)    C-Reactive Protein, Serum: 6.06 mg/dL (02-07-21 @ 09:45)  C-Reactive Protein, Serum: 15.50 mg/dL (02-05-21 @ 07:59)    Ferritin, Serum: 390 ng/mL (02-07-21 @ 09:45)  Ferritin, Serum: 237 ng/mL (02-06-21 @ 06:06)  Ferritin, Serum: 158 ng/mL (02-05-21 @ 11:17)  Ferritin, Serum: 148 ng/mL (02-03-21 @ 17:11)      Procalcitonin, Serum: 0.51 ng/mL (02-07-21 @ 09:45)  Procalcitonin, Serum: 0.75 ng/mL (02-05-21 @ 11:17)     SARS-CoV-2: NotDetec (02-05-21 @ 16:22)  Rapid RVP Result: NotDetec (02-05-21 @ 16:22)    COVID-19 PCR: NotDetec (02-07-21 @ 15:12)  COVID-19 IgG Antibody Interpretation: Negative (02-04-21 @ 04:15)  COVID-19 IgG Antibody Index: 0.06 Index (02-04-21 @ 04:15)  COVID-19 PCR: NotDetec (02-03-21 @ 17:12)

## 2021-02-08 NOTE — BEHAVIORAL HEALTH ASSESSMENT NOTE - NSBHCONSULTRECOMMENDOTHER_PSY_A_CORE FT
He agrees to stay in hospital to comply with medical care His intellect is limited and as such his understanding of medical issues is limited and basic  plan order risperidone IM

## 2021-02-08 NOTE — PROGRESS NOTE ADULT - ASSESSMENT
ESRD: +PVC (COVID negative)  Clinically improved post HD but still symptomatic   - Clinically volume overloaded  - s/p additional HD   - Will arrange HD today cont on MWF schedule  - Recheck COVID- Negative  - High Vanco level yest    Anemia: +CKD  - cont KEV  - Low iron stores s/p coarse of IV iron  - follow H/H    RO: high phos  - low phos diet  - cont Renvela    Will follow

## 2021-02-08 NOTE — BEHAVIORAL HEALTH ASSESSMENT NOTE - HPI (INCLUDE ILLNESS QUALITY, SEVERITY, DURATION, TIMING, CONTEXT, MODIFYING FACTORS, ASSOCIATED SIGNS AND SYMPTOMS)
Patient is a 35 year old,  male; single; non-caregiver; unemployed; past psychiatric history of Bipolar 1 disorder; no known psychiatric hospitalizations; no known suicide attempts; no known history of violence or arrests; PMH of morbid obesity, HTN, ESRD not on HD with AVF, DM, HLD; presenting with AMS.  Patient was seen this morning in the dialysis suite. Patient is a 35 year old,  male; single; non-caregiver; unemployed; past psychiatric history of Bipolar 1 disorder; no known psychiatric hospitalizations; no known suicide attempts; no known history of violence or arrests; PMH of morbid obesity, HTN, ESRD not on HD with AVF, DM, HLD; Now on high flow O2 due to respiratory failure Asking to sign out AMA.  He reports compliance with PO meds but has not received IM Risperdal Consta He is follow by [facility] ACT team Presently does not endorse any acute symptoms

## 2021-02-08 NOTE — DIETITIAN INITIAL EVALUATION ADULT. - OTHER INFO
Pt with h/o ESRD on Dialysis (MWF), Morbid Obesity, ARVIND (not on CPAP), DM 2, HTN, HLD and Bipolar Disorder presented with difficulty breathing after missing dialysis.  CXR shows B/L pna, Pt was doing well with 5L NC with BIPAP PRN. On 2/5, noted Tmax overnight to 102F and noted to be more somnolent. ABG shows acute hypercapnic respiratory failure.

## 2021-02-08 NOTE — BEHAVIORAL HEALTH ASSESSMENT NOTE - NSBHCHARTREVIEWLAB_PSY_A_CORE FT
COVID-19 PCR: NotDete (07 Jan 2021 13:53)  SARS-CoV-2: NotDete (30 Dec 2020 22:58)  COVID-19 PCR: NotDete (22 Jul 2020 19:45)

## 2021-02-09 LAB
ALBUMIN SERPL ELPH-MCNC: 3.4 G/DL — SIGNIFICANT CHANGE UP (ref 3.3–5.2)
ALP SERPL-CCNC: 85 U/L — SIGNIFICANT CHANGE UP (ref 40–120)
ALT FLD-CCNC: 21 U/L — SIGNIFICANT CHANGE UP
ANION GAP SERPL CALC-SCNC: 16 MMOL/L — SIGNIFICANT CHANGE UP (ref 5–17)
ANION GAP SERPL CALC-SCNC: 20 MMOL/L — HIGH (ref 5–17)
AST SERPL-CCNC: 10 U/L — SIGNIFICANT CHANGE UP
BASOPHILS # BLD AUTO: 0.04 K/UL — SIGNIFICANT CHANGE UP (ref 0–0.2)
BASOPHILS NFR BLD AUTO: 0.2 % — SIGNIFICANT CHANGE UP (ref 0–2)
BILIRUB SERPL-MCNC: 0.3 MG/DL — LOW (ref 0.4–2)
BUN SERPL-MCNC: 60 MG/DL — HIGH (ref 8–20)
BUN SERPL-MCNC: 66 MG/DL — HIGH (ref 8–20)
CALCIUM SERPL-MCNC: 9 MG/DL — SIGNIFICANT CHANGE UP (ref 8.6–10.2)
CALCIUM SERPL-MCNC: 9.3 MG/DL — SIGNIFICANT CHANGE UP (ref 8.6–10.2)
CHLORIDE SERPL-SCNC: 89 MMOL/L — LOW (ref 98–107)
CHLORIDE SERPL-SCNC: 89 MMOL/L — LOW (ref 98–107)
CO2 SERPL-SCNC: 23 MMOL/L — SIGNIFICANT CHANGE UP (ref 22–29)
CO2 SERPL-SCNC: 26 MMOL/L — SIGNIFICANT CHANGE UP (ref 22–29)
CREAT SERPL-MCNC: 6.82 MG/DL — HIGH (ref 0.5–1.3)
CREAT SERPL-MCNC: 6.85 MG/DL — HIGH (ref 0.5–1.3)
CULTURE RESULTS: SIGNIFICANT CHANGE UP
CULTURE RESULTS: SIGNIFICANT CHANGE UP
EOSINOPHIL # BLD AUTO: 0.01 K/UL — SIGNIFICANT CHANGE UP (ref 0–0.5)
EOSINOPHIL NFR BLD AUTO: 0 % — SIGNIFICANT CHANGE UP (ref 0–6)
GLUCOSE BLDC GLUCOMTR-MCNC: 218 MG/DL — HIGH (ref 70–99)
GLUCOSE BLDC GLUCOMTR-MCNC: 271 MG/DL — HIGH (ref 70–99)
GLUCOSE BLDC GLUCOMTR-MCNC: 272 MG/DL — HIGH (ref 70–99)
GLUCOSE BLDC GLUCOMTR-MCNC: 294 MG/DL — HIGH (ref 70–99)
GLUCOSE SERPL-MCNC: 261 MG/DL — HIGH (ref 70–99)
GLUCOSE SERPL-MCNC: 287 MG/DL — HIGH (ref 70–99)
HCT VFR BLD CALC: 26.6 % — LOW (ref 39–50)
HCT VFR BLD CALC: 28.3 % — LOW (ref 39–50)
HGB BLD-MCNC: 8.4 G/DL — LOW (ref 13–17)
HGB BLD-MCNC: 9.2 G/DL — LOW (ref 13–17)
IMM GRANULOCYTES NFR BLD AUTO: 1.8 % — HIGH (ref 0–1.5)
LYMPHOCYTES # BLD AUTO: 2.07 K/UL — SIGNIFICANT CHANGE UP (ref 1–3.3)
LYMPHOCYTES # BLD AUTO: 9.2 % — LOW (ref 13–44)
M PNEUMO IGM SER-ACNC: 1.25 INDEX — HIGH (ref 0–0.9)
MAGNESIUM SERPL-MCNC: 2.2 MG/DL — SIGNIFICANT CHANGE UP (ref 1.6–2.6)
MAGNESIUM SERPL-MCNC: 2.3 MG/DL — SIGNIFICANT CHANGE UP (ref 1.6–2.6)
MCHC RBC-ENTMCNC: 22.9 PG — LOW (ref 27–34)
MCHC RBC-ENTMCNC: 23.4 PG — LOW (ref 27–34)
MCHC RBC-ENTMCNC: 31.6 GM/DL — LOW (ref 32–36)
MCHC RBC-ENTMCNC: 32.5 GM/DL — SIGNIFICANT CHANGE UP (ref 32–36)
MCV RBC AUTO: 71.8 FL — LOW (ref 80–100)
MCV RBC AUTO: 72.5 FL — LOW (ref 80–100)
MONOCYTES # BLD AUTO: 1.06 K/UL — HIGH (ref 0–0.9)
MONOCYTES NFR BLD AUTO: 4.7 % — SIGNIFICANT CHANGE UP (ref 2–14)
MYCOPLASMA AG SPEC QL: POSITIVE
NEUTROPHILS # BLD AUTO: 18.91 K/UL — HIGH (ref 1.8–7.4)
NEUTROPHILS NFR BLD AUTO: 84.1 % — HIGH (ref 43–77)
PHOSPHATE SERPL-MCNC: 6.1 MG/DL — HIGH (ref 2.4–4.7)
PHOSPHATE SERPL-MCNC: 7.3 MG/DL — HIGH (ref 2.4–4.7)
PLATELET # BLD AUTO: 421 K/UL — HIGH (ref 150–400)
PLATELET # BLD AUTO: 452 K/UL — HIGH (ref 150–400)
POTASSIUM SERPL-MCNC: 3.8 MMOL/L — SIGNIFICANT CHANGE UP (ref 3.5–5.3)
POTASSIUM SERPL-MCNC: 3.9 MMOL/L — SIGNIFICANT CHANGE UP (ref 3.5–5.3)
POTASSIUM SERPL-SCNC: 3.8 MMOL/L — SIGNIFICANT CHANGE UP (ref 3.5–5.3)
POTASSIUM SERPL-SCNC: 3.9 MMOL/L — SIGNIFICANT CHANGE UP (ref 3.5–5.3)
PROT SERPL-MCNC: 7.3 G/DL — SIGNIFICANT CHANGE UP (ref 6.6–8.7)
RBC # BLD: 3.67 M/UL — LOW (ref 4.2–5.8)
RBC # BLD: 3.94 M/UL — LOW (ref 4.2–5.8)
RBC # FLD: 16.6 % — HIGH (ref 10.3–14.5)
RBC # FLD: 16.9 % — HIGH (ref 10.3–14.5)
SODIUM SERPL-SCNC: 131 MMOL/L — LOW (ref 135–145)
SODIUM SERPL-SCNC: 132 MMOL/L — LOW (ref 135–145)
SPECIMEN SOURCE: SIGNIFICANT CHANGE UP
SPECIMEN SOURCE: SIGNIFICANT CHANGE UP
WBC # BLD: 21.98 K/UL — HIGH (ref 3.8–10.5)
WBC # BLD: 22.5 K/UL — HIGH (ref 3.8–10.5)
WBC # FLD AUTO: 21.98 K/UL — HIGH (ref 3.8–10.5)
WBC # FLD AUTO: 22.5 K/UL — HIGH (ref 3.8–10.5)

## 2021-02-09 PROCEDURE — 99232 SBSQ HOSP IP/OBS MODERATE 35: CPT

## 2021-02-09 RX ORDER — INSULIN GLARGINE 100 [IU]/ML
22 INJECTION, SOLUTION SUBCUTANEOUS AT BEDTIME
Refills: 0 | Status: DISCONTINUED | OUTPATIENT
Start: 2021-02-09 | End: 2021-02-10

## 2021-02-09 RX ORDER — POTASSIUM CHLORIDE 20 MEQ
20 PACKET (EA) ORAL ONCE
Refills: 0 | Status: COMPLETED | OUTPATIENT
Start: 2021-02-09 | End: 2021-02-09

## 2021-02-09 RX ORDER — INSULIN LISPRO 100/ML
7 VIAL (ML) SUBCUTANEOUS
Refills: 0 | Status: DISCONTINUED | OUTPATIENT
Start: 2021-02-09 | End: 2021-02-10

## 2021-02-09 RX ADMIN — Medication 60 MILLIGRAM(S): at 05:01

## 2021-02-09 RX ADMIN — Medication 2000 UNIT(S): at 05:01

## 2021-02-09 RX ADMIN — Medication 5 UNIT(S): at 09:23

## 2021-02-09 RX ADMIN — CARVEDILOL PHOSPHATE 12.5 MILLIGRAM(S): 80 CAPSULE, EXTENDED RELEASE ORAL at 05:01

## 2021-02-09 RX ADMIN — Medication 7 UNIT(S): at 17:53

## 2021-02-09 RX ADMIN — Medication 3: at 09:22

## 2021-02-09 RX ADMIN — Medication 3: at 12:08

## 2021-02-09 RX ADMIN — Medication 100 MILLIGRAM(S): at 22:32

## 2021-02-09 RX ADMIN — CHLORHEXIDINE GLUCONATE 1 APPLICATION(S): 213 SOLUTION TOPICAL at 05:05

## 2021-02-09 RX ADMIN — Medication 40 MILLIGRAM(S): at 17:57

## 2021-02-09 RX ADMIN — Medication 50 MILLIGRAM(S): at 05:01

## 2021-02-09 RX ADMIN — SEVELAMER CARBONATE 800 MILLIGRAM(S): 2400 POWDER, FOR SUSPENSION ORAL at 17:54

## 2021-02-09 RX ADMIN — Medication 2000 UNIT(S): at 17:50

## 2021-02-09 RX ADMIN — SEVELAMER CARBONATE 800 MILLIGRAM(S): 2400 POWDER, FOR SUSPENSION ORAL at 12:16

## 2021-02-09 RX ADMIN — HEPARIN SODIUM 5000 UNIT(S): 5000 INJECTION INTRAVENOUS; SUBCUTANEOUS at 22:33

## 2021-02-09 RX ADMIN — Medication 20 MILLIEQUIVALENT(S): at 06:31

## 2021-02-09 RX ADMIN — Medication 250 MILLIGRAM(S): at 17:54

## 2021-02-09 RX ADMIN — SEVELAMER CARBONATE 800 MILLIGRAM(S): 2400 POWDER, FOR SUSPENSION ORAL at 09:20

## 2021-02-09 RX ADMIN — Medication 5 UNIT(S): at 12:08

## 2021-02-09 RX ADMIN — RISPERIDONE 3 MILLIGRAM(S): 4 TABLET ORAL at 17:54

## 2021-02-09 RX ADMIN — HEPARIN SODIUM 5000 UNIT(S): 5000 INJECTION INTRAVENOUS; SUBCUTANEOUS at 13:28

## 2021-02-09 RX ADMIN — Medication 250 MILLIGRAM(S): at 05:01

## 2021-02-09 RX ADMIN — HEPARIN SODIUM 5000 UNIT(S): 5000 INJECTION INTRAVENOUS; SUBCUTANEOUS at 05:00

## 2021-02-09 RX ADMIN — Medication 50 MILLIGRAM(S): at 22:33

## 2021-02-09 RX ADMIN — Medication 81 MILLIGRAM(S): at 17:57

## 2021-02-09 RX ADMIN — INSULIN GLARGINE 22 UNIT(S): 100 INJECTION, SOLUTION SUBCUTANEOUS at 22:48

## 2021-02-09 RX ADMIN — INSULIN GLARGINE 20 UNIT(S): 100 INJECTION, SOLUTION SUBCUTANEOUS at 09:21

## 2021-02-09 RX ADMIN — ATORVASTATIN CALCIUM 20 MILLIGRAM(S): 80 TABLET, FILM COATED ORAL at 22:33

## 2021-02-09 RX ADMIN — Medication 50 MILLIGRAM(S): at 12:16

## 2021-02-09 RX ADMIN — CARVEDILOL PHOSPHATE 12.5 MILLIGRAM(S): 80 CAPSULE, EXTENDED RELEASE ORAL at 17:54

## 2021-02-09 RX ADMIN — PIPERACILLIN AND TAZOBACTAM 25 GRAM(S): 4; .5 INJECTION, POWDER, LYOPHILIZED, FOR SOLUTION INTRAVENOUS at 05:01

## 2021-02-09 RX ADMIN — RISPERIDONE 3 MILLIGRAM(S): 4 TABLET ORAL at 05:01

## 2021-02-09 RX ADMIN — Medication 1 MILLIGRAM(S): at 09:20

## 2021-02-09 RX ADMIN — Medication 2: at 17:53

## 2021-02-09 RX ADMIN — Medication 40 MILLIGRAM(S): at 05:01

## 2021-02-09 NOTE — PROGRESS NOTE ADULT - ASSESSMENT
ESRD: +PVC (COVID negative)  Clinically improved post HD   - Clinically volume overloaded- improving  - s/p additional HD   - Will arrange HD tomorrow on MW schedule  - Recheck COVID- Negative    Anemia: +CKD  - cont KEV  - Low iron stores s/p coarse of IV iron  - follow H/H    RO: high phos  - low phos diet  - cont Renvela    Will follow

## 2021-02-09 NOTE — PROGRESS NOTE ADULT - ASSESSMENT
35 years old male with PMH of ESRD on Dialysis (MWF), Morbid Obesity, ARVIND (not on CPAP), DM 2, HTN, HLD and Bipolar Disorder presented with difficulty breathing after missing dialysis. Seen by Nephrology and s/p emergent HD 2/3 and 2/4. s/p a dose of Lasix 80 mg with improvement. Noted to have low grade temperatures and fever workup done. CXR shows B/L pna, UA negative. Blood cultures and sputum culture obtained. Started on Zosyn for possible pneumonia. Pt was doing well with 5L NC with BIPAP PRN. On 2/5, noted Tmax overnight to 102F and noted to be more somnolent. ABG shows acute hypercapnic respiratory failure. ICU, ID and Pulmonology were consulted.     1) Acute on Chronic Respiratory Failure with Hypoxia with Hypercapnea likely from infectious etiology in the setting of OHA/ ARVIND  - one missed dialysis session not the cause for current hypoxia  - s/p HD 2/3 and 2/4   ABG with resp acidosis liekly from ARVIND & OHS; no hx of COPD  Pt's Tmax 102 on 2/5/21. Afebrile since then  CXR with GGO b/l  Inflamm markers elevated  COVID -ve x 3  RVP -ve  Blood cx NGTD  f/u sputum cx, Urine Legionella ag  s/p empiric zosyn for possible aspiration x 5 days  ID consult appreciated  D-dimer 570  CTA: negative for PE but with b/l GGO appearence like COVID  -c/w IV steroids, taper off as per pulm  - Continue BiPAP prn, currently on 5L NC, taper off as tolerated  - Renal following   ICU consult noted   Hypercarbia likely from OHS/ ARVIND. Failed BiPAP, with response to AVAPS, will need Trilogy on d/c as per pulm. CM notified. Working on trilogy & O2 as outpatient  Check Pox on RA at rest & ambulation.  Possible d/c in am  PT eval called    2) R/O sepsis 2/2 Pneumonia, seen on CXR  - Blood NGTD and sputum cultures pending  - Started on Zosyn, s/p 2 doses Vanco. Hold further vanco  - ID appreciated  COVID -ve x 3  RVP -ve  f/u sputum cx, Urine Legionella ag  CTA as above      3) Leukocytosis with left shift  No bandemia  Chronic since Dec 2020  Treat PNA  If no resolution there after then hemonc as outpatient       4) Microcytic Anemia due to CM-  - Low Iron Stores  Baseline Hg 9-10, now slowly trending up to 7.2-->8-->8.3-->8.2  No overt bleeding  - s/p Venofer 200 mg x 5  - Retacrit with dialysis   - FOBT pending   Will need colonoscopy with GI as outpatient       5) Uncontrolled HTN  - Likely due to missed dialysis   - Continue Coreg, Hydralazine   - Procardia was increased to 60 mg 2/5   - continue to trend closely       6) DM 2 - Uncontrolled   - HbA1c 12.3 on 1/1/21  - Accu checks and ISS  FS in 200's  - Increase Lantus 22 units BID  - Increase Admelog 7 units before meals  - continue to monitor while on solumedrol, taper off insulin while tapering steroids  - DM education    7) HLD  - Continue Lipitor     8) Bipolar Disorder-  - Continue Risperidone, Seroquel and Trazodone   - Also takes Risperdal Consta q2w, last dose scheduled per psych  Pt appears intermittently agitated. Psych consult appreciated    Morbid obesity-  Diet & weight loss recommended    DVT Prophylaxis -Lovenox ppx  Dispo:  D/c in am if trilogy & O2 arranged

## 2021-02-09 NOTE — ADVANCED PRACTICE NURSE CONSULT - RECOMMEDATIONS
after chart review pls consider increase lantus to 25 units qhs and   admelog to 8 units before meals +low ss ( last steroid dose was today)

## 2021-02-09 NOTE — PROGRESS NOTE ADULT - PROVIDER SPECIALTY LIST ADULT
Hospitalist
Hospitalist
Infectious Disease
Nephrology
Pulmonology
Hospitalist
Infectious Disease
Nephrology
Hospitalist
Hospitalist
Pulmonology
Hospitalist

## 2021-02-09 NOTE — PROGRESS NOTE ADULT - SUBJECTIVE AND OBJECTIVE BOX
NEPHROLOGY INTERVAL HPI/OVERNIGHT EVENTS:    Examined   Feeling better  Dyspnea resolved  No distress    MEDICATIONS  (STANDING):  ALBUTerol    0.083%. 2.5 milliGRAM(s) Nebulizer once  aspirin  chewable 81 milliGRAM(s) Oral daily  atorvastatin 20 milliGRAM(s) Oral at bedtime  carvedilol 12.5 milliGRAM(s) Oral every 12 hours  chlorhexidine 4% Liquid 1 Application(s) Topical <User Schedule>  cholecalciferol 2000 Unit(s) Oral two times a day  dextrose 40% Gel 15 Gram(s) Oral once  dextrose 5%. 1000 milliLiter(s) (50 mL/Hr) IV Continuous <Continuous>  dextrose 5%. 1000 milliLiter(s) (100 mL/Hr) IV Continuous <Continuous>  dextrose 50% Injectable 25 Gram(s) IV Push once  dextrose 50% Injectable 12.5 Gram(s) IV Push once  dextrose 50% Injectable 25 Gram(s) IV Push once  epoetin alma-epbx (RETACRIT) Injectable 69102 Unit(s) IV Push <User Schedule>  folic acid 1 milliGRAM(s) Oral daily  glucagon  Injectable 1 milliGRAM(s) IntraMuscular once  heparin   Injectable 5000 Unit(s) SubCutaneous every 8 hours  hydrALAZINE 50 milliGRAM(s) Oral three times a day  insulin glargine Injectable (LANTUS) 20 Unit(s) SubCutaneous two times a day  insulin lispro (ADMELOG) corrective regimen sliding scale   SubCutaneous three times a day before meals  insulin lispro Injectable (ADMELOG) 5 Unit(s) SubCutaneous three times a day with meals  methylPREDNISolone sodium succinate Injectable 40 milliGRAM(s) IV Push every 12 hours  NIFEdipine XL 60 milliGRAM(s) Oral daily  polyethylene glycol 3350 17 Gram(s) Oral daily  QUEtiapine 100 milliGRAM(s) Oral at bedtime  risperiDONE   Tablet 3 milliGRAM(s) Oral two times a day  saccharomyces boulardii 250 milliGRAM(s) Oral two times a day  senna 2 Tablet(s) Oral at bedtime  sevelamer carbonate 800 milliGRAM(s) Oral three times a day with meals  traZODone 100 milliGRAM(s) Oral at bedtime    MEDICATIONS  (PRN):  acetaminophen   Tablet .. 650 milliGRAM(s) Oral every 6 hours PRN Temp greater or equal to 38C (100.4F), Mild Pain (1 - 3)  guaiFENesin   Syrup  (Sugar-Free) 100 milliGRAM(s) Oral every 6 hours PRN Cough  hydrALAZINE Injectable 10 milliGRAM(s) IV Push every 6 hours PRN for SBP > 150      Allergies    No Known Allergies    Intolerances        Vital Signs Last 24 Hrs  T(C): 36.7 (09 Feb 2021 09:48), Max: 37 (09 Feb 2021 04:55)  T(F): 98.1 (09 Feb 2021 09:48), Max: 98.6 (09 Feb 2021 04:55)  HR: 72 (09 Feb 2021 12:13) (60 - 84)  BP: 163/92 (09 Feb 2021 12:13) (134/83 - 163/98)  BP(mean): --  RR: 23 (09 Feb 2021 10:08) (18 - 23)  SpO2: 97% (09 Feb 2021 10:08) (97% - 100%)  Daily     Daily     PHYSICAL EXAM:  No distress  NECK: +JVD  NERVOUS SYSTEM: AAOx3  CHEST/LUNG: Diminished BS with +crackles  HEART: No rub  ABDOMEN: Soft, Nontender, Nondistended; BS+  EXTREMITIES: Mild LE edema; AVF patent      LABS:                        8.4    22.50 )-----------( 452      ( 09 Feb 2021 07:23 )             26.6     02-09    132<L>  |  89<L>  |  66.0<H>  ----------------------------<  287<H>  3.9   |  23.0  |  6.82<H>    Ca    9.0      09 Feb 2021 07:23  Phos  7.3     02-09  Mg     2.2     02-09    TPro  7.3  /  Alb  3.4  /  TBili  0.3<L>  /  DBili  x   /  AST  10  /  ALT  21  /  AlkPhos  85  02-09        Magnesium, Serum: 2.2 mg/dL (02-09 @ 07:23)  Phosphorus Level, Serum: 7.3 mg/dL (02-09 @ 07:23)  Phosphorus Level, Serum: 6.1 mg/dL (02-09 @ 01:04)  Magnesium, Serum: 2.3 mg/dL (02-09 @ 01:04)          RADIOLOGY & ADDITIONAL TESTS:

## 2021-02-09 NOTE — PROGRESS NOTE ADULT - SUBJECTIVE AND OBJECTIVE BOX
CC: F/u fluid overload ESRD on HD, hypercapnic resp failure with hypoxia currently on BIPAP, r/o sepsis 2/2 pna, acute on chronic anemia, DM2      INTERVAL HPI/OVERNIGHT EVENTS: Pt seen and examined at bedside. Doing well on 2.5L NC from 5L NC. Afebrile. SOB & cough improved. No chest pain, palpitations, light headedness/dizziness,  fevers/chills, abdominal pain, n/v, diarrhea/constipation, dysuria or increased urinary frequency.    REVIEW OF SYSTEMS:  CONSTITUTIONAL: No fever, weight loss, or fatigue  RESPIRATORY: No cough, wheezing, chills or hemoptysis; No shortness of breath  CARDIOVASCULAR: No chest pain, palpitations, dizziness, or leg swelling  GASTROINTESTINAL: No abdominal or epigastric pain. No nausea, vomiting or hematemesis; No diarrhea or constipation  NEUROLOGICAL: No headaches, memory loss, loss of strength, numbness, or tremors  SKIN: No itching, burning, rashes, or lesions   MUSCULOSKELETAL: No joint pain or swelling; No muscle, back, or extremity pain    Allergies  No Known Allergies    PAST MEDICAL & SURGICAL HISTORY:  Insulin dependent type 2 diabetes mellitus  CKD (chronic kidney disease) requiring chronic dialysis  HTN (hypertension)  Sleep apnea  Bipolar 1 disorder  No significant past surgical history    VITAL SIGNS:  T(C): 37.6 (21 @ 10:09), Max: 38.9 (21 @ 05:20)  HR: 75 (21 @ 10:09) (68 - 808)  BP: 104/57 (21 @ 10:09) (104/57 - 166/77)  RR: 33 (21 @ 10:09) (18 - 42)  SpO2: 94% (21 @ 10:09) (90% - 99%)  Wt(kg): --Vital Signs Last 24 Hrs  T(C): 37.6 (2021 10:09), Max: 38.9 (2021 05:20)  T(F): 99.6 (2021 10:09), Max: 102.1 (2021 05:20)  HR: 75 (2021 10:09) (68 - 808)  BP: 104/57 (2021 10:09) (104/57 - 166/77)  BP(mean): --  RR: 33 (2021 10:09) (18 - 42)  SpO2: 94% (2021 10:09) (90% - 99%)    PHYSICAL EXAM:  GENERAL: Pt sleeping in bed in NAD, morbidly obese  HEAD:  Atraumatic  EYES: EOMI, PERRL, conjunctiva and sclera clear  ENT: Dry mucous membranes  NECK: Obese. No JVD  CHEST/LUNG: Coarse upper resp sounds, otherwise clear. poor inspiratory effort, equal sounds B/L. No rhonchi or wheezing. Shallow respirations   HEART: S1, S2, Regular rate and rhythm   ABDOMEN: Obese, Bowel sounds present; Soft, Nontender. No guarding or rigidity   EXTREMITIES:  2+ Peripheral Pulses, brisk capillary refill. No clubbing, cyanosis, or edema  NERVOUS SYSTEM:  AAO x 3, non focal, No deficits   SKIN: No rashes or lesions    LABS:                      7.5    21.42 )-----------( 327      ( 2021 07:59 )             24.2     02-05  141  |  100  |  56.0<H>  ----------------------------<  117<H>  3.3<L>   |  22.0  |  7.37<H>    Ca    9.0      2021 07:59  Phos  3.5     02-04  Mg     1.9     02-04    TPro  7.0  /  Alb  3.3  /  TBili  0.4  /  DBili  x   /  AST  15  /  ALT  37  /  AlkPhos  100  02-05    CAPILLARY BLOOD GLUCOSE  POCT Blood Glucose.: 169 mg/dL (2021 11:16)  POCT Blood Glucose.: 136 mg/dL (2021 08:31)  POCT Blood Glucose.: 178 mg/dL (2021 20:40)  POCT Blood Glucose.: 180 mg/dL (2021 16:06)    ABG - ( 2021 09:01 )  pH, Arterial: 7.27  pH, Blood: x     /  pCO2: 60    /  pO2: 95    / HCO3: 25    / Base Excess: 0.5   /  SaO2: 98        Urinalysis Basic - ( 2021 22:04 )  Color: Yellow / Appearance: Clear / S.015 / pH: x  Gluc: x / Ketone: Negative  / Bili: Negative / Urobili: Negative mg/dL   Blood: x / Protein: 500 mg/dL / Nitrite: Negative   Leuk Esterase: Negative / RBC: 0-2 /HPF / WBC 0-2   Sq Epi: x / Non Sq Epi: Occasional / Bacteria: Few    MEDICATIONS  (STANDING):  ALBUTerol    0.083%. 2.5 milliGRAM(s) Nebulizer once  aspirin  chewable 81 milliGRAM(s) Oral daily  atorvastatin 20 milliGRAM(s) Oral at bedtime  carvedilol 12.5 milliGRAM(s) Oral every 12 hours  chlorhexidine 4% Liquid 1 Application(s) Topical <User Schedule>  cholecalciferol 2000 Unit(s) Oral two times a day  dextrose 40% Gel 15 Gram(s) Oral once  dextrose 5%. 1000 milliLiter(s) (50 mL/Hr) IV Continuous <Continuous>  dextrose 5%. 1000 milliLiter(s) (100 mL/Hr) IV Continuous <Continuous>  dextrose 50% Injectable 25 Gram(s) IV Push once  dextrose 50% Injectable 12.5 Gram(s) IV Push once  dextrose 50% Injectable 25 Gram(s) IV Push once  epoetin alma-epbx (RETACRIT) Injectable 72386 Unit(s) IV Push once  folic acid 1 milliGRAM(s) Oral daily  glucagon  Injectable 1 milliGRAM(s) IntraMuscular once  hydrALAZINE 50 milliGRAM(s) Oral three times a day  insulin glargine Injectable (LANTUS) 20 Unit(s) SubCutaneous two times a day  insulin lispro (ADMELOG) corrective regimen sliding scale   SubCutaneous three times a day before meals  insulin lispro Injectable (ADMELOG) 2 Unit(s) SubCutaneous three times a day before meals  iron sucrose IVPB 200 milliGRAM(s) IV Intermittent every 24 hours  methylPREDNISolone sodium succinate Injectable 40 milliGRAM(s) IV Push every 8 hours  NIFEdipine XL 60 milliGRAM(s) Oral daily  piperacillin/tazobactam IVPB.. 3.375 Gram(s) IV Intermittent every 12 hours  polyethylene glycol 3350 17 Gram(s) Oral daily  potassium chloride   Powder 40 milliEquivalent(s) Oral once  QUEtiapine 100 milliGRAM(s) Oral at bedtime  risperiDONE   Tablet 3 milliGRAM(s) Oral two times a day  saccharomyces boulardii 250 milliGRAM(s) Oral two times a day  senna 2 Tablet(s) Oral at bedtime  sevelamer carbonate 800 milliGRAM(s) Oral three times a day with meals  traZODone 100 milliGRAM(s) Oral at bedtime    MEDICATIONS  (PRN):  acetaminophen   Tablet .. 650 milliGRAM(s) Oral every 6 hours PRN Temp greater or equal to 38C (100.4F), Mild Pain (1 - 3)  guaiFENesin   Syrup  (Sugar-Free) 100 milliGRAM(s) Oral every 6 hours PRN Cough  hydrALAZINE Injectable 10 milliGRAM(s) IV Push every 6 hours PRN If SBP > 160

## 2021-02-09 NOTE — PROGRESS NOTE ADULT - REASON FOR ADMISSION
difficulty breathing

## 2021-02-10 ENCOUNTER — TRANSCRIPTION ENCOUNTER (OUTPATIENT)
Age: 36
End: 2021-02-10

## 2021-02-10 VITALS — SYSTOLIC BLOOD PRESSURE: 145 MMHG | DIASTOLIC BLOOD PRESSURE: 95 MMHG

## 2021-02-10 DIAGNOSIS — E11.9 TYPE 2 DIABETES MELLITUS WITHOUT COMPLICATIONS: ICD-10-CM

## 2021-02-10 DIAGNOSIS — N18.6 END STAGE RENAL DISEASE: ICD-10-CM

## 2021-02-10 LAB
ANION GAP SERPL CALC-SCNC: 22 MMOL/L — HIGH (ref 5–17)
ANISOCYTOSIS BLD QL: SLIGHT — SIGNIFICANT CHANGE UP
BASOPHILS # BLD AUTO: 0.23 K/UL — HIGH (ref 0–0.2)
BASOPHILS NFR BLD AUTO: 0.9 % — SIGNIFICANT CHANGE UP (ref 0–2)
BUN SERPL-MCNC: 95 MG/DL — HIGH (ref 8–20)
CALCIUM SERPL-MCNC: 9 MG/DL — SIGNIFICANT CHANGE UP (ref 8.6–10.2)
CHLORIDE SERPL-SCNC: 90 MMOL/L — LOW (ref 98–107)
CO2 SERPL-SCNC: 21 MMOL/L — LOW (ref 22–29)
CREAT SERPL-MCNC: 8.89 MG/DL — HIGH (ref 0.5–1.3)
CULTURE RESULTS: SIGNIFICANT CHANGE UP
CULTURE RESULTS: SIGNIFICANT CHANGE UP
EOSINOPHIL # BLD AUTO: 0.88 K/UL — HIGH (ref 0–0.5)
EOSINOPHIL NFR BLD AUTO: 3.5 % — SIGNIFICANT CHANGE UP (ref 0–6)
GLUCOSE BLDC GLUCOMTR-MCNC: 135 MG/DL — HIGH (ref 70–99)
GLUCOSE BLDC GLUCOMTR-MCNC: 180 MG/DL — HIGH (ref 70–99)
GLUCOSE SERPL-MCNC: 198 MG/DL — HIGH (ref 70–99)
HCT VFR BLD CALC: 25.8 % — LOW (ref 39–50)
HGB BLD-MCNC: 8.4 G/DL — LOW (ref 13–17)
HYPOCHROMIA BLD QL: SLIGHT — SIGNIFICANT CHANGE UP
LYMPHOCYTES # BLD AUTO: 1.97 K/UL — SIGNIFICANT CHANGE UP (ref 1–3.3)
LYMPHOCYTES # BLD AUTO: 7.8 % — LOW (ref 13–44)
MAGNESIUM SERPL-MCNC: 2.4 MG/DL — SIGNIFICANT CHANGE UP (ref 1.6–2.6)
MANUAL SMEAR VERIFICATION: SIGNIFICANT CHANGE UP
MCHC RBC-ENTMCNC: 23.5 PG — LOW (ref 27–34)
MCHC RBC-ENTMCNC: 32.6 GM/DL — SIGNIFICANT CHANGE UP (ref 32–36)
MCV RBC AUTO: 72.1 FL — LOW (ref 80–100)
METAMYELOCYTES # FLD: 0.9 % — HIGH (ref 0–0)
MONOCYTES # BLD AUTO: 1.09 K/UL — HIGH (ref 0–0.9)
MONOCYTES NFR BLD AUTO: 4.3 % — SIGNIFICANT CHANGE UP (ref 2–14)
NEUTROPHILS # BLD AUTO: 20.86 K/UL — HIGH (ref 1.8–7.4)
NEUTROPHILS NFR BLD AUTO: 82.6 % — HIGH (ref 43–77)
NRBC # BLD: 1 /100 — HIGH (ref 0–0)
PHOSPHATE SERPL-MCNC: 7.7 MG/DL — HIGH (ref 2.4–4.7)
PLAT MORPH BLD: NORMAL — SIGNIFICANT CHANGE UP
PLATELET # BLD AUTO: 469 K/UL — HIGH (ref 150–400)
POLYCHROMASIA BLD QL SMEAR: SIGNIFICANT CHANGE UP
POTASSIUM SERPL-MCNC: 3.6 MMOL/L — SIGNIFICANT CHANGE UP (ref 3.5–5.3)
POTASSIUM SERPL-SCNC: 3.6 MMOL/L — SIGNIFICANT CHANGE UP (ref 3.5–5.3)
RBC # BLD: 3.58 M/UL — LOW (ref 4.2–5.8)
RBC # FLD: 17.1 % — HIGH (ref 10.3–14.5)
RBC BLD AUTO: ABNORMAL
SODIUM SERPL-SCNC: 133 MMOL/L — LOW (ref 135–145)
SPECIMEN SOURCE: SIGNIFICANT CHANGE UP
SPECIMEN SOURCE: SIGNIFICANT CHANGE UP
TARGETS BLD QL SMEAR: SLIGHT — SIGNIFICANT CHANGE UP
WBC # BLD: 25.25 K/UL — HIGH (ref 3.8–10.5)
WBC # FLD AUTO: 25.25 K/UL — HIGH (ref 3.8–10.5)

## 2021-02-10 PROCEDURE — 86900 BLOOD TYPING SEROLOGIC ABO: CPT

## 2021-02-10 PROCEDURE — 83010 ASSAY OF HAPTOGLOBIN QUANT: CPT

## 2021-02-10 PROCEDURE — 82962 GLUCOSE BLOOD TEST: CPT

## 2021-02-10 PROCEDURE — 84145 PROCALCITONIN (PCT): CPT

## 2021-02-10 PROCEDURE — 82607 VITAMIN B-12: CPT

## 2021-02-10 PROCEDURE — 84295 ASSAY OF SERUM SODIUM: CPT

## 2021-02-10 PROCEDURE — 0225U NFCT DS DNA&RNA 21 SARSCOV2: CPT

## 2021-02-10 PROCEDURE — 96375 TX/PRO/DX INJ NEW DRUG ADDON: CPT

## 2021-02-10 PROCEDURE — 83540 ASSAY OF IRON: CPT

## 2021-02-10 PROCEDURE — 83615 LACTATE (LD) (LDH) ENZYME: CPT

## 2021-02-10 PROCEDURE — 85045 AUTOMATED RETICULOCYTE COUNT: CPT

## 2021-02-10 PROCEDURE — 83550 IRON BINDING TEST: CPT

## 2021-02-10 PROCEDURE — 81001 URINALYSIS AUTO W/SCOPE: CPT

## 2021-02-10 PROCEDURE — 99261: CPT

## 2021-02-10 PROCEDURE — 82803 BLOOD GASES ANY COMBINATION: CPT

## 2021-02-10 PROCEDURE — 87070 CULTURE OTHR SPECIMN AEROBIC: CPT

## 2021-02-10 PROCEDURE — 94760 N-INVAS EAR/PLS OXIMETRY 1: CPT

## 2021-02-10 PROCEDURE — 85014 HEMATOCRIT: CPT

## 2021-02-10 PROCEDURE — 85025 COMPLETE CBC W/AUTO DIFF WBC: CPT

## 2021-02-10 PROCEDURE — 85027 COMPLETE CBC AUTOMATED: CPT

## 2021-02-10 PROCEDURE — 84484 ASSAY OF TROPONIN QUANT: CPT

## 2021-02-10 PROCEDURE — P9047: CPT

## 2021-02-10 PROCEDURE — 86850 RBC ANTIBODY SCREEN: CPT

## 2021-02-10 PROCEDURE — 84100 ASSAY OF PHOSPHORUS: CPT

## 2021-02-10 PROCEDURE — 85379 FIBRIN DEGRADATION QUANT: CPT

## 2021-02-10 PROCEDURE — 85018 HEMOGLOBIN: CPT

## 2021-02-10 PROCEDURE — 96374 THER/PROPH/DIAG INJ IV PUSH: CPT

## 2021-02-10 PROCEDURE — 82746 ASSAY OF FOLIC ACID SERUM: CPT

## 2021-02-10 PROCEDURE — 82330 ASSAY OF CALCIUM: CPT

## 2021-02-10 PROCEDURE — 99285 EMERGENCY DEPT VISIT HI MDM: CPT | Mod: 25

## 2021-02-10 PROCEDURE — 87077 CULTURE AEROBIC IDENTIFY: CPT

## 2021-02-10 PROCEDURE — 93005 ELECTROCARDIOGRAM TRACING: CPT

## 2021-02-10 PROCEDURE — 83880 ASSAY OF NATRIURETIC PEPTIDE: CPT

## 2021-02-10 PROCEDURE — 86738 MYCOPLASMA ANTIBODY: CPT

## 2021-02-10 PROCEDURE — 84466 ASSAY OF TRANSFERRIN: CPT

## 2021-02-10 PROCEDURE — U0003: CPT

## 2021-02-10 PROCEDURE — U0005: CPT

## 2021-02-10 PROCEDURE — 87040 BLOOD CULTURE FOR BACTERIA: CPT

## 2021-02-10 PROCEDURE — 80053 COMPREHEN METABOLIC PANEL: CPT

## 2021-02-10 PROCEDURE — 71045 X-RAY EXAM CHEST 1 VIEW: CPT

## 2021-02-10 PROCEDURE — 82435 ASSAY OF BLOOD CHLORIDE: CPT

## 2021-02-10 PROCEDURE — 80202 ASSAY OF VANCOMYCIN: CPT

## 2021-02-10 PROCEDURE — 83735 ASSAY OF MAGNESIUM: CPT

## 2021-02-10 PROCEDURE — 36415 COLL VENOUS BLD VENIPUNCTURE: CPT

## 2021-02-10 PROCEDURE — 86140 C-REACTIVE PROTEIN: CPT

## 2021-02-10 PROCEDURE — 86901 BLOOD TYPING SEROLOGIC RH(D): CPT

## 2021-02-10 PROCEDURE — 99239 HOSP IP/OBS DSCHRG MGMT >30: CPT

## 2021-02-10 PROCEDURE — 86769 SARS-COV-2 COVID-19 ANTIBODY: CPT

## 2021-02-10 PROCEDURE — 80048 BASIC METABOLIC PNL TOTAL CA: CPT

## 2021-02-10 PROCEDURE — 84132 ASSAY OF SERUM POTASSIUM: CPT

## 2021-02-10 PROCEDURE — 71275 CT ANGIOGRAPHY CHEST: CPT

## 2021-02-10 PROCEDURE — 94660 CPAP INITIATION&MGMT: CPT

## 2021-02-10 PROCEDURE — 97163 PT EVAL HIGH COMPLEX 45 MIN: CPT

## 2021-02-10 PROCEDURE — 82947 ASSAY GLUCOSE BLOOD QUANT: CPT

## 2021-02-10 PROCEDURE — 82728 ASSAY OF FERRITIN: CPT

## 2021-02-10 PROCEDURE — 83605 ASSAY OF LACTIC ACID: CPT

## 2021-02-10 RX ORDER — INSULIN LISPRO 100/ML
8 VIAL (ML) SUBCUTANEOUS
Qty: 720 | Refills: 0
Start: 2021-02-10 | End: 2021-03-11

## 2021-02-10 RX ORDER — INSULIN GLARGINE 100 [IU]/ML
25 INJECTION, SOLUTION SUBCUTANEOUS AT BEDTIME
Refills: 0 | Status: DISCONTINUED | OUTPATIENT
Start: 2021-02-10 | End: 2021-02-10

## 2021-02-10 RX ORDER — NIFEDIPINE 30 MG
1 TABLET, EXTENDED RELEASE 24 HR ORAL
Qty: 30 | Refills: 0
Start: 2021-02-10 | End: 2021-03-11

## 2021-02-10 RX ORDER — SENNA PLUS 8.6 MG/1
2 TABLET ORAL
Qty: 0 | Refills: 0 | DISCHARGE
Start: 2021-02-10

## 2021-02-10 RX ORDER — INSULIN GLARGINE 100 [IU]/ML
25 INJECTION, SOLUTION SUBCUTANEOUS
Qty: 1 | Refills: 0
Start: 2021-02-10 | End: 2021-03-11

## 2021-02-10 RX ORDER — ACETAMINOPHEN 500 MG
2 TABLET ORAL
Qty: 0 | Refills: 0 | DISCHARGE
Start: 2021-02-10

## 2021-02-10 RX ORDER — IRON SUCROSE 20 MG/ML
200 INJECTION, SOLUTION INTRAVENOUS ONCE
Refills: 0 | Status: COMPLETED | OUTPATIENT
Start: 2021-02-10 | End: 2021-02-10

## 2021-02-10 RX ORDER — INSULIN LISPRO 100/ML
8 VIAL (ML) SUBCUTANEOUS
Refills: 0 | Status: DISCONTINUED | OUTPATIENT
Start: 2021-02-10 | End: 2021-02-10

## 2021-02-10 RX ORDER — POLYETHYLENE GLYCOL 3350 17 G/17G
17 POWDER, FOR SOLUTION ORAL
Qty: 0 | Refills: 0 | DISCHARGE
Start: 2021-02-10

## 2021-02-10 RX ADMIN — IRON SUCROSE 110 MILLIGRAM(S): 20 INJECTION, SOLUTION INTRAVENOUS at 12:12

## 2021-02-10 RX ADMIN — RISPERIDONE 3 MILLIGRAM(S): 4 TABLET ORAL at 06:14

## 2021-02-10 RX ADMIN — CHLORHEXIDINE GLUCONATE 1 APPLICATION(S): 213 SOLUTION TOPICAL at 05:23

## 2021-02-10 RX ADMIN — Medication 8 UNIT(S): at 08:01

## 2021-02-10 RX ADMIN — Medication 81 MILLIGRAM(S): at 12:04

## 2021-02-10 RX ADMIN — SEVELAMER CARBONATE 800 MILLIGRAM(S): 2400 POWDER, FOR SUSPENSION ORAL at 08:01

## 2021-02-10 RX ADMIN — Medication 60 MILLIGRAM(S): at 06:14

## 2021-02-10 RX ADMIN — Medication 2000 UNIT(S): at 06:13

## 2021-02-10 RX ADMIN — Medication 8 UNIT(S): at 11:54

## 2021-02-10 RX ADMIN — POLYETHYLENE GLYCOL 3350 17 GRAM(S): 17 POWDER, FOR SOLUTION ORAL at 14:03

## 2021-02-10 RX ADMIN — Medication 40 MILLIGRAM(S): at 06:14

## 2021-02-10 RX ADMIN — CARVEDILOL PHOSPHATE 12.5 MILLIGRAM(S): 80 CAPSULE, EXTENDED RELEASE ORAL at 06:13

## 2021-02-10 RX ADMIN — Medication 50 MILLIGRAM(S): at 14:02

## 2021-02-10 RX ADMIN — SEVELAMER CARBONATE 800 MILLIGRAM(S): 2400 POWDER, FOR SUSPENSION ORAL at 12:03

## 2021-02-10 RX ADMIN — Medication 250 MILLIGRAM(S): at 06:14

## 2021-02-10 RX ADMIN — HEPARIN SODIUM 5000 UNIT(S): 5000 INJECTION INTRAVENOUS; SUBCUTANEOUS at 06:14

## 2021-02-10 RX ADMIN — Medication 1: at 08:01

## 2021-02-10 RX ADMIN — HEPARIN SODIUM 5000 UNIT(S): 5000 INJECTION INTRAVENOUS; SUBCUTANEOUS at 14:02

## 2021-02-10 RX ADMIN — Medication 1 MILLIGRAM(S): at 12:03

## 2021-02-10 RX ADMIN — ERYTHROPOIETIN 10000 UNIT(S): 10000 INJECTION, SOLUTION INTRAVENOUS; SUBCUTANEOUS at 10:27

## 2021-02-10 RX ADMIN — Medication 50 MILLIGRAM(S): at 06:14

## 2021-02-10 NOTE — DISCHARGE NOTE PROVIDER - NSDCMRMEDTOKEN_GEN_ALL_CORE_FT
acetaminophen 325 mg oral tablet: 2 tab(s) orally every 6 hours, As needed, Temp greater or equal to 38C (100.4F), Mild Pain (1 - 3)  Admelog SoloStar 100 units/mL injectable solution: 8 unit(s) subcutaneous 3 times a day before meals  aspirin 81 mg oral tablet: 1 tab(s) orally once a day  carvedilol 12.5 mg oral tablet: 1 tab(s) orally every 12 hours  folic acid 1 mg oral tablet: 1 tab(s) orally once a day  hydrALAZINE 50 mg oral tablet: 1 tab(s) orally 3 times a day  Lantus Solostar Pen 100 units/mL subcutaneous solution: 25  subcutaneous once a day (at bedtime)   NIFEdipine 60 mg oral tablet, extended release: 1 tab(s) orally once a day  polyethylene glycol 3350 oral powder for reconstitution: 17 gram(s) orally once a day  predniSONE 10 mg oral tablet: take 4 tabs oral x 3 days, then 3 tabs x 3 days, then 2 tabs x 3 days, then 1 tab daily  RisperDAL Consta 25 mg/2 weeks intramuscular injection, extended release: 1 dose(s) intramuscular every 2 weeks, per pt med list, last dose was scheduled on 1/27/2021  risperiDONE 3 mg oral tablet: 1 tab(s) orally 2 times a day  rosuvastatin 5 mg oral tablet: 1 tab(s) orally once a day  senna oral tablet: 2 tab(s) orally once a day (at bedtime)  SEROquel 100 mg oral tablet: 1 tab(s) orally once a day (at bedtime)  sevelamer carbonate 800 mg oral tablet: 1 tab(s) orally 3 times a day (with meals)  traZODone 100 mg oral tablet: 1 tab(s) orally once a day (at bedtime)  Vitamin D3 2000 intl units (50 mcg) oral tablet: 2 cap(s) orally once a day

## 2021-02-10 NOTE — DISCHARGE NOTE PROVIDER - CARE PROVIDERS DIRECT ADDRESSES
,DirectAddress_Unknown,DirectAddress_Unknown,DirectAddress_Unknown ,DirectAddress_Unknown,DirectAddress_Unknown,DirectAddress_Unknown,[email or web address]

## 2021-02-10 NOTE — DISCHARGE NOTE PROVIDER - CARE PROVIDER_API CALL
Abdulaziz Oleary)  Internal Medicine; Pulmonary Disease  [address] [place] [address]  Phone: [phone]  Fax: [phone]  Follow Up Time:     Camron Reid (DO)  Internal Medicine  [address] [place] [address]  Phone: [phone]  Fax: [phone]  Follow Up Time:     PMD,   Phone: (   )    -  Fax: (   )    -  Follow Up Time:    Abdulaziz Oleary (MD)  Internal Medicine; Pulmonary Disease  [address] [place] [address]  Phone: [phone]  Fax: [phone]  Follow Up Time:     Camron Reid (DO)  Internal Medicine  [address] [place] [address]  Phone: [phone]  Fax: [phone]  Follow Up Time:     PMD,   Phone: (   )    -  Fax: (   )    -  Follow Up Time:     Bhavani Mccain (DO)  EndocrinologyMetabDiabetes; Internal Medicine  [address] [place] [address]  Phone: [phone]  Fax: [phone]  Follow Up Time:

## 2021-02-10 NOTE — DISCHARGE NOTE PROVIDER - HOSPITAL COURSE
35 years old male with PMH of ESRD on Dialysis (MWF), Morbid Obesity, ARVIND (not on CPAP), DM 2, HTN, HLD and Bipolar Disorder presented with difficulty breathing after missing dialysis. Seen by Nephrology and s/p emergent HD 2/3 and 2/4. s/p a dose of Lasix 80 mg with improvement. Noted to have low grade temperatures and fever workup done. CXR shows B/L pna, UA negative. Blood cultures and sputum culture negative. Started on Zosyn for possible pneumonia, completed 5 days.   Pt was doing well with 5L NC with BIPAP PRN. On 2/5, noted Tmax overnight to 102F and noted to be more somnolent. ABG shows acute hypercapnic respiratory failure. ICU, ID and Pulmonology were consulted. BC negative. The patient's PCO2 was severely elevated and responded to NIV. The patient requires advanced therapies for chronic respiratory failure secondary to ARVIND/OHS. At this time, other therapies such as BPAP-ST have been attempted and failed. AVAPS-AE or IVAPS are not available on BPAP and are therefore ruled out. Therefore the patient is an excellent candidate for Trilogy NIV. Without Trilogy NIV, the patient would be at an increased risk for readmission, worsening morbidity and mortality including from other medical complications and continued respiratory failure. BG elevated due to steroids, HGA1c 12.3. Insulin dose adjusted. The patient no longer requires supplemental oxygen . The patient was seen by PT and recommend home.   Vital Signs Last 24 Hrs  T(C): 36.2 (10 Feb 2021 12:35), Max: 36.9 (10 Feb 2021 04:29)  T(F): 97.2 (10 Feb 2021 12:35), Max: 98.4 (10 Feb 2021 04:29)  HR: 80 (10 Feb 2021 12:35) (62 - 85)  BP: 164/101 (10 Feb 2021 12:35) (147/94 - 164/101)  BP(mean): --  RR: 18 (10 Feb 2021 12:35) (18 - 20)  SpO2: 93% (10 Feb 2021 09:00) (93% - 100%)                            8.4    25.25 )-----------( 469      ( 10 Feb 2021 09:33 )             25.8     10 Feb 2021 09:33    133    |  90     |  95.0   ----------------------------<  198    3.6     |  21.0   |  8.89     Ca    9.0        10 Feb 2021 09:33  Phos  7.7       10 Feb 2021 09:33  Mg     2.4       10 Feb 2021 09:33    TPro  7.3    /  Alb  3.4    /  TBili  0.3    /  DBili  x      /  AST  10     /  ALT  21     /  AlkPhos  85     09 Feb 2021 01:04      CAPILLARY BLOOD GLUCOSE      POCT Blood Glucose.: 135 mg/dL (10 Feb 2021 11:52)  POCT Blood Glucose.: 180 mg/dL (10 Feb 2021 08:00)  POCT Blood Glucose.: 272 mg/dL (09 Feb 2021 22:37)  POCT Blood Glucose.: 218 mg/dL (09 Feb 2021 16:59)    LIVER FUNCTIONS - ( 09 Feb 2021 01:04 )  Alb: 3.4 g/dL / Pro: 7.3 g/dL / ALK PHOS: 85 U/L / ALT: 21 U/L / AST: 10 U/L / GGT: x           PHYSICAL EXAM:  GENERAL: Pt sleeping in bed in NAD, morbidly obese  HEAD:  Atraumatic  EYES: EOMI, PERRL, conjunctiva and sclera clear  ENT: Dry mucous membranes  NECK: Obese. No JVD  CHEST/LUNG: Coarse upper resp sounds, otherwise clear. poor inspiratory effort, equal sounds B/L. No rhonchi or wheezing. Shallow respirations   HEART: S1, S2, Regular rate and rhythm   ABDOMEN: Obese, Bowel sounds present; Soft, Nontender. No guarding or rigidity   EXTREMITIES:  2+ Peripheral Pulses, brisk capillary refill. No clubbing, cyanosis, or edema  NERVOUS SYSTEM:  AAO x 3, non focal, No deficits   SKIN: No rashes or lesions                   35 years old male with PMH of ESRD on Dialysis (MWF), Morbid Obesity, ARVIND (not on CPAP), DM 2, HTN, HLD and Bipolar Disorder presented with difficulty breathing after missing dialysis. Seen by Nephrology and s/p emergent HD 2/3 and 2/4. s/p a dose of Lasix 80 mg with improvement. Noted to have low grade temperatures and fever workup done. CXR shows B/L pna, UA negative. Blood cultures and sputum culture negative. Started on Zosyn for possible pneumonia, completed 5 days.   Pt was doing well with 5L NC with BIPAP PRN. On 2/5, noted Tmax overnight to 102F and noted to be more somnolent. ABG shows acute hypercapnic respiratory failure. ICU, ID and Pulmonology were consulted. BC negative. The patient's PCO2 was severely elevated and responded to NIV. The patient requires advanced therapies for chronic respiratory failure secondary to ARVIND/OHS. At this time, other therapies such as BPAP-ST have been attempted and failed. AVAPS-AE or IVAPS are not available on BPAP and are therefore ruled out. Therefore the patient is an excellent candidate for Trilogy NIV. Without Trilogy NIV, the patient would be at an increased risk for readmission, worsening morbidity and mortality including from other medical complications and continued respiratory failure. BG elevated due to steroids, HGA1c 12.3. Insulin dose adjusted. The patient no longer requires supplemental oxygen . The patient was seen by PT and recommend home. Pt was 99% on RA at erst & 96% on ambulation. Does not need home O2 on d/c. Sister notified. Agreeable with plan  Vital Signs Last 24 Hrs  T(C): 36.2 (10 Feb 2021 12:35), Max: 36.9 (10 Feb 2021 04:29)  T(F): 97.2 (10 Feb 2021 12:35), Max: 98.4 (10 Feb 2021 04:29)  HR: 80 (10 Feb 2021 12:35) (62 - 85)  BP: 164/101 (10 Feb 2021 12:35) (147/94 - 164/101)  BP(mean): --  RR: 18 (10 Feb 2021 12:35) (18 - 20)  SpO2: 93% (10 Feb 2021 09:00) (93% - 100%)                            8.4    25.25 )-----------( 469      ( 10 Feb 2021 09:33 )             25.8     10 Feb 2021 09:33    133    |  90     |  95.0   ----------------------------<  198    3.6     |  21.0   |  8.89     Ca    9.0        10 Feb 2021 09:33  Phos  7.7       10 Feb 2021 09:33  Mg     2.4       10 Feb 2021 09:33    TPro  7.3    /  Alb  3.4    /  TBili  0.3    /  DBili  x      /  AST  10     /  ALT  21     /  AlkPhos  85     09 Feb 2021 01:04      CAPILLARY BLOOD GLUCOSE      POCT Blood Glucose.: 135 mg/dL (10 Feb 2021 11:52)  POCT Blood Glucose.: 180 mg/dL (10 Feb 2021 08:00)  POCT Blood Glucose.: 272 mg/dL (09 Feb 2021 22:37)  POCT Blood Glucose.: 218 mg/dL (09 Feb 2021 16:59)    LIVER FUNCTIONS - ( 09 Feb 2021 01:04 )  Alb: 3.4 g/dL / Pro: 7.3 g/dL / ALK PHOS: 85 U/L / ALT: 21 U/L / AST: 10 U/L / GGT: x           PHYSICAL EXAM:  GENERAL: Pt sleeping in bed in NAD, morbidly obese  HEAD:  Atraumatic  EYES: EOMI, PERRL, conjunctiva and sclera clear  ENT: Dry mucous membranes  NECK: Obese. No JVD  CHEST/LUNG: Coarse upper resp sounds, otherwise clear. poor inspiratory effort, equal sounds B/L. No rhonchi or wheezing. Shallow respirations   HEART: S1, S2, Regular rate and rhythm   ABDOMEN: Obese, Bowel sounds present; Soft, Nontender. No guarding or rigidity   EXTREMITIES:  2+ Peripheral Pulses, brisk capillary refill. No clubbing, cyanosis, or edema  NERVOUS SYSTEM:  AAO x 3, non focal, No deficits   SKIN: No rashes or lesions

## 2021-02-10 NOTE — DISCHARGE NOTE PROVIDER - NSDCCPCAREPLAN_GEN_ALL_CORE_FT
PRINCIPAL DISCHARGE DIAGNOSIS  Diagnosis: Acute on chronic respiratory failure with hypoxia and hypercapnia  Assessment and Plan of Treatment: Resolved  No longer requires supplemental oxygen  Continue steroid taper as prescribed  Continue Trilogy at bedtime  Follow up with Pulmonary 5-7 days, sooner if needed      SECONDARY DISCHARGE DIAGNOSES  Diagnosis: ESRD on dialysis  Assessment and Plan of Treatment: maintain HD schedule as per Nephrology  continue supplements       PRINCIPAL DISCHARGE DIAGNOSIS  Diagnosis: Acute on chronic respiratory failure with hypoxia and hypercapnia  Assessment and Plan of Treatment: Resolved  OHS/ ARVIND  No longer requires supplemental oxygen  Continue steroid taper as prescribed  Continue Trilogy at bedtime  Follow up with Pulmonary 5-7 days, sooner if needed      SECONDARY DISCHARGE DIAGNOSES  Diagnosis: ESRD on dialysis  Assessment and Plan of Treatment: maintain HD schedule as per Nephrology  continue supplements

## 2021-02-10 NOTE — DISCHARGE NOTE NURSING/CASE MANAGEMENT/SOCIAL WORK - PATIENT PORTAL LINK FT
You can access the FollowMyHealth Patient Portal offered by [facility] by registering at the following website: [email or web address]. By joining [facility]’s FollowMyHealth portal, you will also be able to view your health information using other applications (apps) compatible with our system.

## 2021-02-10 NOTE — DISCHARGE NOTE PROVIDER - PROVIDER TOKENS
PROVIDER:[TOKEN:[[id number]]],PROVIDER:[TOKEN:[[id number]]],FREE:[LAST:[PMD],PHONE:[(   )    -],FAX:[(   )    -]] PROVIDER:[TOKEN:[[id number]]],PROVIDER:[TOKEN:[[id number]]],FREE:[LAST:[PMD],PHONE:[(   )    -],FAX:[(   )    -]],PROVIDER:[TOKEN:[[id number]]]

## 2021-02-10 NOTE — PHYSICAL THERAPY INITIAL EVALUATION ADULT - PERTINENT HX OF CURRENT PROBLEM, REHAB EVAL
pt presents to [facility] due to acute on chronic respiratory failure c hypoxia c presumed PNA

## 2021-02-10 NOTE — DISCHARGE NOTE PROVIDER - INSTRUCTIONS
Renal restrictions  With a diagnosis of diabetes comes a strict diet regimen to help control blood sugars by watching carbohydrate consumption. Carbohydrates, such as starches, fruits, dairy and starchy vegetables, is the food group that affects glucose levels in the body. Carefully monitoring carbohydrate intake is a main component of the diabetic diet; eating three meals each day that are roughly equivalent in size can help control blood sugars. A registered dietitian can help you plan a diet customized to your individual needs.

## 2021-02-12 PROBLEM — E11.9 TYPE 2 DIABETES MELLITUS WITHOUT COMPLICATIONS: Chronic | Status: ACTIVE | Noted: 2021-02-03

## 2021-02-12 PROBLEM — N18.6 END STAGE RENAL DISEASE: Chronic | Status: ACTIVE | Noted: 2021-02-03

## 2021-02-16 ENCOUNTER — NON-APPOINTMENT (OUTPATIENT)
Age: 36
End: 2021-02-16

## 2021-02-24 ENCOUNTER — APPOINTMENT (OUTPATIENT)
Dept: FAMILY MEDICINE | Facility: CLINIC | Age: 36
End: 2021-02-24
Payer: MEDICARE

## 2021-02-24 VITALS
HEART RATE: 96 BPM | SYSTOLIC BLOOD PRESSURE: 122 MMHG | DIASTOLIC BLOOD PRESSURE: 82 MMHG | TEMPERATURE: 97.5 F | BODY MASS INDEX: 43.99 KG/M2 | WEIGHT: 264 LBS | OXYGEN SATURATION: 97 % | HEIGHT: 65 IN

## 2021-02-24 DIAGNOSIS — E11.22 TYPE 2 DIABETES MELLITUS WITH DIABETIC CHRONIC KIDNEY DISEASE: ICD-10-CM

## 2021-02-24 DIAGNOSIS — J18.9 PNEUMONIA, UNSPECIFIED ORGANISM: ICD-10-CM

## 2021-02-24 DIAGNOSIS — G25.1 DRUG-INDUCED TREMOR: ICD-10-CM

## 2021-02-24 DIAGNOSIS — E11.65 TYPE 2 DIABETES MELLITUS WITH HYPERGLYCEMIA: ICD-10-CM

## 2021-02-24 DIAGNOSIS — J90 PLEURAL EFFUSION, NOT ELSEWHERE CLASSIFIED: ICD-10-CM

## 2021-02-24 DIAGNOSIS — N18.4 TYPE 2 DIABETES MELLITUS WITH DIABETIC CHRONIC KIDNEY DISEASE: ICD-10-CM

## 2021-02-24 PROCEDURE — 99072 ADDL SUPL MATRL&STAF TM PHE: CPT

## 2021-02-24 PROCEDURE — 99495 TRANSJ CARE MGMT MOD F2F 14D: CPT

## 2021-02-24 RX ORDER — FERROUS SULFATE 325(65) MG
325 (65 FE) TABLET ORAL
Qty: 30 | Refills: 2 | Status: COMPLETED | COMMUNITY
Start: 2020-05-15 | End: 2021-02-24

## 2021-02-24 RX ORDER — INSULIN LISPRO 100 [IU]/ML
100 INJECTION, SOLUTION INTRAVENOUS; SUBCUTANEOUS
Qty: 2 | Refills: 3 | Status: COMPLETED | COMMUNITY
Start: 2020-05-15 | End: 2021-02-24

## 2021-02-24 RX ORDER — POTASSIUM CHLORIDE 750 MG/1
10 TABLET, FILM COATED, EXTENDED RELEASE ORAL DAILY
Qty: 90 | Refills: 0 | Status: COMPLETED | COMMUNITY
Start: 2019-11-22 | End: 2021-02-24

## 2021-02-24 RX ORDER — BENZTROPINE MESYLATE 0.5 MG
0.5 TABLET ORAL TWICE DAILY
Refills: 0 | Status: COMPLETED | COMMUNITY
End: 2021-02-24

## 2021-02-24 RX ORDER — PALIPERIDONE 6 MG/1
6 TABLET, EXTENDED RELEASE ORAL DAILY
Refills: 0 | Status: COMPLETED | COMMUNITY
Start: 2020-08-07 | End: 2021-02-24

## 2021-02-24 RX ORDER — LISINOPRIL 20 MG/1
20 TABLET ORAL TWICE DAILY
Qty: 60 | Refills: 0 | Status: COMPLETED | COMMUNITY
Start: 2020-08-26 | End: 2021-02-24

## 2021-02-24 RX ORDER — CALCITRIOL 0.25 UG/1
0.25 CAPSULE, LIQUID FILLED ORAL
Qty: 180 | Refills: 0 | Status: COMPLETED | COMMUNITY
Start: 2019-01-16 | End: 2021-02-24

## 2021-02-24 RX ORDER — PANTOPRAZOLE 40 MG/1
40 TABLET, DELAYED RELEASE ORAL DAILY
Qty: 90 | Refills: 3 | Status: COMPLETED | COMMUNITY
Start: 2020-08-07 | End: 2021-02-24

## 2021-02-24 RX ORDER — LAMOTRIGINE 100 MG/1
100 TABLET ORAL
Qty: 180 | Refills: 0 | Status: COMPLETED | COMMUNITY
End: 2021-02-24

## 2021-02-24 NOTE — ASSESSMENT
[FreeTextEntry1] : \par Patient MILENA PUGA Invision MRN [id number] Hospital Visit [id number] [facility] - Attending Physician Azul Jay \par Status Complete \par  \par \par Hospital Course \par Discharge Date	10-Feb-2021 \par Admission Date	03-Feb-2021 19:14 \par Reason for Admission	difficulty breathing \par Hospital Course	 \par 35 years old male with PMH of ESRD on Dialysis (MWF), Morbid Obesity, ARVIND (not \par on CPAP), DM 2, HTN, HLD and Bipolar Disorder presented with difficulty \par breathing after missing dialysis. Seen by Nephrology and s/p emergent HD 2/3 \par and 2/4. s/p a dose of Lasix 80 mg with improvement. Noted to have low grade \par temperatures and fever workup done. CXR shows B/L pna, UA negative. Blood \par cultures and sputum culture negative. Started on Zosyn for possible pneumonia, \par completed 5 days. Pt was doing well with 5L NC with BIPAP PRN. On 2/5, noted \par Tmax overnight to 102F and noted to be more somnolent. ABG shows acute \par hypercapnic respiratory failure. ICU, ID and Pulmonology were consulted. BC \par negative. The patient's PCO2 was severely elevated and responded to NIV. The \par patient requires advanced therapies for chronic respiratory failure secondary \par to ARVIND/OHS. At this time, other therapies such as BPAP-ST have been attempted \par and failed. AVAPS-AE or IVAPS are not available on BPAP and are therefore ruled \par out. Therefore the patient is an excellent candidate for Trilogy NIV. Without \par Trilogy NIV, the patient would be at an increased risk for readmission, \par worsening morbidity and mortality including from other medical complications \par and continued respiratory failure. BG elevated due to steroids, HGA1c 12.3. \par Insulin dose adjusted. The patient no longer requires supplemental oxygen . The \par patient was seen by PT and recommend home. Pt was 99% on RA at erst & 96% on \par ambulation. Does not need home O2 on d/c. Sister notified. Agreeable with plan \par Vital Signs Last 24 Hrs \par T(C): 36.2 (10 Feb 2021 12:35), Max: 36.9 (10 Feb 2021 04:29) \par T(F): 97.2 (10 Feb 2021 12:35), Max: 98.4 (10 Feb 2021 04:29) \par HR: 80 (10 Feb 2021 12:35) (62 - 85) \par BP: 164/101 (10 Feb 2021 12:35) (147/94 - 164/101) \par BP(mean): -- \par RR: 18 (10 Feb 2021 12:35) (18 - 20) \par SpO2: 93% (10 Feb 2021 09:00) (93% - 100%) \par \par \par \par    8.4 \par 25.25 )-----------( 469  ( 10 Feb 2021 09:33 ) \par    25.8 \par \par 10 Feb 2021 09:33 \par \par 133   90    95.0 \par ----------------------------<  198 \par 3.6    21.0  8.89 \par \par Ca  9.0    10 Feb 2021 09:33 \par Phos  7.7   10 Feb 2021 09:33 \par Mg   2.4   10 Feb 2021 09:33 \par \par TPro  7.3  /  Alb  3.4  /  TBili  0.3  /  DBili  x  /  AST  10   / \par ALT  21   /  AlkPhos  85   09 Feb 2021 01:04 \par \par \par CAPILLARY BLOOD GLUCOSE \par \par \par POCT Blood Glucose.: 135 mg/dL (10 Feb 2021 11:52) \par POCT Blood Glucose.: 180 mg/dL (10 Feb 2021 08:00) \par POCT Blood Glucose.: 272 mg/dL (09 Feb 2021 22:37) \par POCT Blood Glucose.: 218 mg/dL (09 Feb 2021 16:59) \par \par LIVER FUNCTIONS - ( 09 Feb 2021 01:04 ) \par Alb: 3.4 g/dL / Pro: 7.3 g/dL / ALK PHOS: 85 U/L / ALT: 21 U/L / AST: 10 U/L / \par GGT: x \par \par PHYSICAL EXAM: \par GENERAL: Pt sleeping in bed in NAD, morbidly obese \par HEAD:  Atraumatic \par EYES: EOMI, PERRL, conjunctiva and sclera clear \par ENT: Dry mucous membranes \par NECK: Obese. No JVD \par CHEST/LUNG: Coarse upper resp sounds, otherwise clear. poor inspiratory effort, \par equal sounds B/L. No rhonchi or wheezing. Shallow respirations \par HEART: S1, S2, Regular rate and rhythm \par ABDOMEN: Obese, Bowel sounds present; Soft, Nontender. No guarding or rigidity \par EXTREMITIES:  2+ Peripheral Pulses, brisk capillary refill. No clubbing, \par cyanosis, or edema \par NERVOUS SYSTEM:  AAO x 3, non focal, No deficits \par SKIN: No rashes or lesions \par \par \par \par \par \par \par \par \par \par Med Reconciliation \par Medication Reconciliation Status	Admission Reconciliation is Completed \par Discharge Reconciliation is Completed \par Discharge Medications	acetaminophen 325 mg oral tablet: 2 tab(s) orally every 6 \par hours, As needed, Temp greater or equal to 38C (100.4F), Mild Pain (1 - 3) \par Admelog SoloStar 100 units/mL injectable solution: 8 unit(s) subcutaneous 3 \par times a day before meals \par aspirin 81 mg oral tablet: 1 tab(s) orally once a day \par carvedilol 12.5 mg oral tablet: 1 tab(s) orally every 12 hours \par folic acid 1 mg oral tablet: 1 tab(s) orally once a day \par hydrALAZINE 50 mg oral tablet: 1 tab(s) orally 3 times a day \par Lantus Solostar Pen 100 units/mL subcutaneous solution: 25  subcutaneous once a \par day (at bedtime) \par NIFEdipine 60 mg oral tablet, extended release: 1 tab(s) orally once a day \par polyethylene glycol 3350 oral powder for reconstitution: 17 gram(s) orally once \par a day \par predniSONE 10 mg oral tablet: take 4 tabs oral x 3 days, then 3 tabs x 3 days, \par then 2 tabs x 3 days, then 1 tab daily \par RisperDAL Consta 25 mg/2 weeks intramuscular injection, extended release: 1 \par dose(s) intramuscular every 2 weeks, per pt med list, last dose was scheduled \par on 1/27/2021 \par risperiDONE 3 mg oral tablet: 1 tab(s) orally 2 times a day \par rosuvastatin 5 mg oral tablet: 1 tab(s) orally once a day \par senna oral tablet: 2 tab(s) orally once a day (at bedtime) \par SEROquel 100 mg oral tablet: 1 tab(s) orally once a day (at bedtime) \par sevelamer carbonate 800 mg oral tablet: 1 tab(s) orally 3 times a day (with \par meals) \par traZODone 100 mg oral tablet: 1 tab(s) orally once a day (at bedtime) \par Vitamin D3 2000 intl units (50 mcg) oral tablet: 2 cap(s) orally once a day \par , \par , \par \par Care Plan/Procedures \par Goal(s)	To get better and follow your care plan as instructed. \par Discharge Diagnoses, Assessment and Plan of Treatment	PRINCIPAL DISCHARGE \par DIAGNOSIS \par Diagnosis: Acute on chronic respiratory failure with hypoxia and hypercapnia \par Assessment and Plan of Treatment: Resolved \par OHS/ ARVIND \par No longer requires supplemental oxygen \par Continue steroid taper as prescribed \par Continue Trilogy at bedtime \par Follow up with Pulmonary 5-7 days, sooner if needed \par \par \par SECONDARY DISCHARGE DIAGNOSES \par Diagnosis: ESRD on dialysis \par Assessment and Plan of Treatment: maintain HD schedule as per Nephrology \par continue supplements \par \par Follow Up \par Care Providers for Follow up (PCP/Outpatient Provider)	Abdulaziz Oleary) \par Internal Medicine; Pulmonary Disease \par [address] [facility] \[place] [address] \par Phone: [phone] \par Fax: [phone] \par Follow Up Time: \par \par Camron Reid (DO) \par Internal Medicine \par [address] [facility] [facility] [address] \[place] [address] \par Phone: [phone] \par Fax: [phone] \par Follow Up Time: \par \par PMD, \par Phone: ( )  - \par Fax: ( )  - \par Follow Up Time: \par \par Bhavani Mccain (DO) \par EndocrinologyMetabDiabetes; Internal Medicine \par [address] [facility] \[place] [address] \par Phone: [phone] \par Fax: [phone] \par Follow Up Time: \par Diet Instructions	Renal restrictions \par With a diagnosis of diabetes comes a strict diet regimen to help control blood \par sugars by watching carbohydrate consumption. Carbohydrates, such as starches, \par fruits, dairy and starchy vegetables, is the food group that affects glucose \par levels in the body. Carefully monitoring carbohydrate intake is a main \par component of the diabetic diet; eating three meals each day that are roughly \par equivalent in size can help control blood sugars. A registered dietitian can \par help you plan a diet customized to your individual needs. \par \par Quality Measures \par Does the patient have difficulty running errands alone like visiting a doctors \par office or shopping?	Yes \par Does the patient have difficulty climbing stairs?	Yes \par Patient Condition	Stable \par Hospice Patient	No \par Cognition: The patient has	No difficulties \par Does the patient have a principal diagnosis of ischemic stroke, hemorrhagic \par stroke, or TIA?	No \par Does the patient have a principal diagnosis of Acute Myocardial Infarction?	No \par Has the patient had a Percutaneous Coronary Intervention?	No \par \par Home Health \par Discharged with Home Health Care Services?	Yes \par Face-To-Face Contact	As certified below, I, or a nurse practitioner or \par physician assistant working with me, had a face-to-face encounter that meets \par the physician face-to-face encounter requirements. \par Need for Skilled Services	Medication teaching and assessment  Observation and \par assessment \par Based on the above findings, the following intermittent skilled services are \par medically necessary home health services:	Nursing  Physical therapy \par Home Bound Status	Shortness of breath with minimal ambulation \par Patient Needs Assistance to Leave Residence... \par Requires assistance of another person to leave home due to:	SOB \par Attending Certification	My signature below certifies that the above stated \par patient is homebound and upon completion of the Face-To-Face encounter, has the \par need for intermittent skilled nursing, physical therapy and/or speech or \par occupational therapy services in their home for their current diagnosis as \par outlined in their initial plan of care. These services will continue to be \par monitored by myself or another physician. \par Encounter Date	10-Feb-2021 \par \par Document Complete \par Care Provider Seen in Hospital	Azul Jay \par Physician Section Complete	This document is complete and the patient is ready \par for discharge. \par For questions about your prescriptions, please call:	[phone] \par Is this contact telephone number correct?	Yes \par \par \par Electronic Signatures for Addendum Section: \par Azul Jay) (Signed Addendum 22-Feb-2021 10:34) \par 	Sepsis ruled in, present on admission 2/2 pneumonia \par \par Electronic Signatures: \par Azul Jay)  (Signed 10-Feb-2021 15:44) \par 	Authored: Hospital Course, Care Plan/Procedures, Follow Up, Home Health \par 	Co-Signer: Hospital Course, Med Reconciliation, Care Plan/Procedures, Follow \par Up, Quality Measures, Home Health, Document Complete \par Yancy Huggins (NP)  (Signed 10-Feb-2021 14:22) \par 	Authored: Hospital Course, Med Reconciliation, Care Plan/Procedures, Follow \par Up, Quality Measures, Home Health, Document Complete \par \par \par Last Updated: 22-Feb-2021 10:34 by Azul Jay) \par \par

## 2021-02-24 NOTE — PHYSICAL EXAM
[No Acute Distress] : no acute distress [Well Nourished] : well nourished [Well Developed] : well developed [Well-Appearing] : well-appearing [EOMI] : extraocular movements intact [Normal Outer Ear/Nose] : the outer ears and nose were normal in appearance [No Respiratory Distress] : no respiratory distress  [No Accessory Muscle Use] : no accessory muscle use [Clear to Auscultation] : lungs were clear to auscultation bilaterally [Normal Rate] : normal rate  [Regular Rhythm] : with a regular rhythm [Normal S1, S2] : normal S1 and S2 [No Carotid Bruits] : no carotid bruits [No CVA Tenderness] : no CVA  tenderness [Grossly Normal Strength/Tone] : grossly normal strength/tone [No Rash] : no rash [Coordination Grossly Intact] : coordination grossly intact [No Focal Deficits] : no focal deficits [Normal Gait] : normal gait [Normal Affect] : the affect was normal [Normal Mood] : the mood was normal [de-identified] : +ve murmur  [de-identified] : obese abdomen

## 2021-02-24 NOTE — HISTORY OF PRESENT ILLNESS
[Post-hospitalization from ___ Hospital] : Post-hospitalization from [unfilled] Hospital [Admitted on: ___] : The patient was admitted on [unfilled] [Discharged on ___] : discharged on [unfilled] [FreeTextEntry2] : 37 yo male presents to office s/p  [facility] admission 2/3/21-2/10/21 secondary to "fluid overload,"  pt unable to have usual HD performed on scheduled Monday 2/1/21 2 days prior to hospital admission.  \par \par no f/c/s no cough  sense of smell/taste intact    \par \par no CP/SOB c activity no dizziness no palpitations  no N/V/D pt states urinating approx 3x/day c normal urinary output \par \par pt engaged in HD 3d/week  (M W F)  at [facility] in . [place]  [phone]

## 2021-03-02 ENCOUNTER — APPOINTMENT (OUTPATIENT)
Dept: PULMONOLOGY | Facility: CLINIC | Age: 36
End: 2021-03-02
Payer: MEDICARE

## 2021-03-02 VITALS
OXYGEN SATURATION: 97 % | HEIGHT: 65 IN | WEIGHT: 265 LBS | DIASTOLIC BLOOD PRESSURE: 70 MMHG | HEART RATE: 93 BPM | RESPIRATION RATE: 15 BRPM | SYSTOLIC BLOOD PRESSURE: 122 MMHG | BODY MASS INDEX: 44.15 KG/M2

## 2021-03-02 PROCEDURE — 99214 OFFICE O/P EST MOD 30 MIN: CPT

## 2021-03-02 PROCEDURE — 99072 ADDL SUPL MATRL&STAF TM PHE: CPT

## 2021-03-02 RX ORDER — PREDNISONE 10 MG/1
10 TABLET ORAL
Qty: 30 | Refills: 0 | Status: DISCONTINUED | COMMUNITY
Start: 2021-02-24 | End: 2021-03-02

## 2021-03-02 NOTE — HISTORY OF PRESENT ILLNESS
[Snoring] : snoring [Witnessed Apneas] : witnessed sleep apnea [Frequent Nocturnal Awakening] : frequent nocturnal awakening [Daytime Somnolence] : daytime somnolence [ESS: ___] : ESS score [unfilled] [Unintentional Sleep While Inactive] : unintentional sleep while inactive [Awakes Unrefreshed] : awakening unrefreshed [FreeTextEntry1] : Severe ARVIND in 2017\par Bipap at 17/13\par Bipap broken (fell)\par Gained 80 lbs\par In for management of ARVIND\par \par 3/2/21\par \par I SPENT 20 MINUTES REVIEWING NOTES, ORDERS, LABS AND IMAGES FROM HOSPITALIZATION AND NIV COMPLIANCE DATA PRIOR TO THIS VISIT\par \par Hospital Course \par Discharge Date	10-Feb-2021 \par Admission Date	03-Feb-2021 19:14 \par Reason for Admission	difficulty breathing \par Hospital Course	 \par 35 years old male with PMH of ESRD on Dialysis (MWF), Morbid Obesity, ARVIND (not \par on CPAP), DM 2, HTN, HLD and Bipolar Disorder presented with difficulty \par breathing after missing dialysis. Seen by Nephrology and s/p emergent HD 2/3 \par and 2/4. s/p a dose of Lasix 80 mg with improvement. Noted to have low grade \par temperatures and fever workup done. CXR shows B/L pna, UA negative. Blood \par cultures and sputum culture negative. Started on Zosyn for possible pneumonia, \par completed 5 days. Pt was doing well with 5L NC with BIPAP PRN. On 2/5, noted \par Tmax overnight to 102F and noted to be more somnolent. ABG shows acute \par hypercapnic respiratory failure. ICU, ID and Pulmonology were consulted. BC \par negative. The patient's PCO2 was severely elevated and responded to NIV. The \par patient requires advanced therapies for chronic respiratory failure secondary \par to ARVIND/OHS. At this time, other therapies such as BPAP-ST have been attempted \par and failed. AVAPS-AE or IVAPS are not available on BPAP and are therefore ruled \par out. Therefore the patient is an excellent candidate for Trilogy NIV. Without \par Trilogy NIV, the patient would be at an increased risk for readmission, \par worsening morbidity and mortality including from other medical complications \par and continued respiratory failure. BG elevated due to steroids, HGA1c 12.3. \par Insulin dose adjusted. The patient no longer requires supplemental oxygen . The \par patient was seen by PT and recommend home. Pt was 99% on RA at erst & 96% on \par ambulation. DID not need home O2 on d/c. \par \par 3/2/21\par Doing well\par Episodic bronchospasm - request nebulizer for prn use\par HD on Mon, Wed, and Fri\par No cough or edema\par Sleeping well on AVAPS-AE

## 2021-03-02 NOTE — ASSESSMENT
[FreeTextEntry1] : Severe ARVIND\par OHS\par Chronic hypoxic and hypercarbic respiratory failure - well compensated on AVAPS-AE at night\par Compliant with and benefiting from nocturnal AVAPS-AE\par Obesity\par GERD - controlled - Occasional GERD related bronchospasm responsive to albuterol neb

## 2021-03-02 NOTE — REASON FOR VISIT
[Follow-Up - From Hospitalization] : a follow-up visit after a recent hospitalization [TextBox_44] : Chronic hypoxic and hypercarbic respiratory failure

## 2021-03-08 RX ORDER — ALBUTEROL SULFATE 2.5 MG/3ML
(2.5 MG/3ML) SOLUTION RESPIRATORY (INHALATION)
Qty: 1 | Refills: 3 | Status: DISCONTINUED | COMMUNITY
Start: 2021-03-02 | End: 2021-03-08

## 2021-03-16 ENCOUNTER — INPATIENT (INPATIENT)
Facility: HOSPITAL | Age: 36
LOS: 1 days | Discharge: HOME | DRG: 377 | End: 2021-03-18
Attending: HOSPITALIST | Admitting: HOSPITALIST
Payer: COMMERCIAL

## 2021-03-16 VITALS
DIASTOLIC BLOOD PRESSURE: 76 MMHG | HEIGHT: 65 IN | WEIGHT: 257.94 LBS | OXYGEN SATURATION: 98 % | TEMPERATURE: 98 F | RESPIRATION RATE: 18 BRPM | SYSTOLIC BLOOD PRESSURE: 129 MMHG | HEART RATE: 83 BPM

## 2021-03-16 DIAGNOSIS — I77.0 ARTERIOVENOUS FISTULA, ACQUIRED: Chronic | ICD-10-CM

## 2021-03-16 DIAGNOSIS — D64.9 ANEMIA, UNSPECIFIED: ICD-10-CM

## 2021-03-16 LAB
ALBUMIN SERPL ELPH-MCNC: 3.5 G/DL — SIGNIFICANT CHANGE UP (ref 3.3–5.2)
ALP SERPL-CCNC: 94 U/L — SIGNIFICANT CHANGE UP (ref 40–120)
ALT FLD-CCNC: 14 U/L — SIGNIFICANT CHANGE UP
ANION GAP SERPL CALC-SCNC: 10 MMOL/L — SIGNIFICANT CHANGE UP (ref 5–17)
ANION GAP SERPL CALC-SCNC: 10 MMOL/L — SIGNIFICANT CHANGE UP (ref 5–17)
AST SERPL-CCNC: 15 U/L — SIGNIFICANT CHANGE UP
BASOPHILS # BLD AUTO: 0.07 K/UL — SIGNIFICANT CHANGE UP (ref 0–0.2)
BASOPHILS NFR BLD AUTO: 0.4 % — SIGNIFICANT CHANGE UP (ref 0–2)
BILIRUB SERPL-MCNC: 0.2 MG/DL — LOW (ref 0.4–2)
BLD GP AB SCN SERPL QL: SIGNIFICANT CHANGE UP
BUN SERPL-MCNC: 26 MG/DL — HIGH (ref 8–20)
BUN SERPL-MCNC: 27 MG/DL — HIGH (ref 8–20)
CALCIUM SERPL-MCNC: 9 MG/DL — SIGNIFICANT CHANGE UP (ref 8.6–10.2)
CALCIUM SERPL-MCNC: 9.3 MG/DL — SIGNIFICANT CHANGE UP (ref 8.6–10.2)
CHLORIDE SERPL-SCNC: 95 MMOL/L — LOW (ref 98–107)
CHLORIDE SERPL-SCNC: 95 MMOL/L — LOW (ref 98–107)
CO2 SERPL-SCNC: 32 MMOL/L — HIGH (ref 22–29)
CO2 SERPL-SCNC: 33 MMOL/L — HIGH (ref 22–29)
CREAT SERPL-MCNC: 6.08 MG/DL — HIGH (ref 0.5–1.3)
CREAT SERPL-MCNC: 6.11 MG/DL — HIGH (ref 0.5–1.3)
EOSINOPHIL # BLD AUTO: 0.17 K/UL — SIGNIFICANT CHANGE UP (ref 0–0.5)
EOSINOPHIL NFR BLD AUTO: 1.1 % — SIGNIFICANT CHANGE UP (ref 0–6)
GLUCOSE BLDC GLUCOMTR-MCNC: 106 MG/DL — HIGH (ref 70–99)
GLUCOSE SERPL-MCNC: 151 MG/DL — HIGH (ref 70–99)
GLUCOSE SERPL-MCNC: 161 MG/DL — HIGH (ref 70–99)
HCT VFR BLD CALC: 18.9 % — CRITICAL LOW (ref 39–50)
HCT VFR BLD CALC: 21.3 % — LOW (ref 39–50)
HGB BLD-MCNC: 6 G/DL — CRITICAL LOW (ref 13–17)
HGB BLD-MCNC: 7 G/DL — CRITICAL LOW (ref 13–17)
IMM GRANULOCYTES NFR BLD AUTO: 0.6 % — SIGNIFICANT CHANGE UP (ref 0–1.5)
LYMPHOCYTES # BLD AUTO: 17.4 % — SIGNIFICANT CHANGE UP (ref 13–44)
LYMPHOCYTES # BLD AUTO: 2.73 K/UL — SIGNIFICANT CHANGE UP (ref 1–3.3)
MAGNESIUM SERPL-MCNC: 2.3 MG/DL — SIGNIFICANT CHANGE UP (ref 1.6–2.6)
MAGNESIUM SERPL-MCNC: 2.3 MG/DL — SIGNIFICANT CHANGE UP (ref 1.8–2.6)
MCHC RBC-ENTMCNC: 24.4 PG — LOW (ref 27–34)
MCHC RBC-ENTMCNC: 25.7 PG — LOW (ref 27–34)
MCHC RBC-ENTMCNC: 31.7 GM/DL — LOW (ref 32–36)
MCHC RBC-ENTMCNC: 32.9 GM/DL — SIGNIFICANT CHANGE UP (ref 32–36)
MCV RBC AUTO: 76.8 FL — LOW (ref 80–100)
MCV RBC AUTO: 78.3 FL — LOW (ref 80–100)
MONOCYTES # BLD AUTO: 0.86 K/UL — SIGNIFICANT CHANGE UP (ref 0–0.9)
MONOCYTES NFR BLD AUTO: 5.5 % — SIGNIFICANT CHANGE UP (ref 2–14)
NEUTROPHILS # BLD AUTO: 11.77 K/UL — HIGH (ref 1.8–7.4)
NEUTROPHILS NFR BLD AUTO: 75 % — SIGNIFICANT CHANGE UP (ref 43–77)
OB PNL STL: POSITIVE
PHOSPHATE SERPL-MCNC: 3.5 MG/DL — SIGNIFICANT CHANGE UP (ref 2.4–4.7)
PHOSPHATE SERPL-MCNC: 3.8 MG/DL — SIGNIFICANT CHANGE UP (ref 2.4–4.7)
PLATELET # BLD AUTO: 442 K/UL — HIGH (ref 150–400)
PLATELET # BLD AUTO: 453 K/UL — HIGH (ref 150–400)
POTASSIUM SERPL-MCNC: 3 MMOL/L — LOW (ref 3.5–5.3)
POTASSIUM SERPL-MCNC: 3.2 MMOL/L — LOW (ref 3.5–5.3)
POTASSIUM SERPL-SCNC: 3 MMOL/L — LOW (ref 3.5–5.3)
POTASSIUM SERPL-SCNC: 3.2 MMOL/L — LOW (ref 3.5–5.3)
PROT SERPL-MCNC: 6.6 G/DL — SIGNIFICANT CHANGE UP (ref 6.6–8.7)
RAPID RVP RESULT: SIGNIFICANT CHANGE UP
RBC # BLD: 2.46 M/UL — LOW (ref 4.2–5.8)
RBC # BLD: 2.72 M/UL — LOW (ref 4.2–5.8)
RBC # FLD: 18.8 % — HIGH (ref 10.3–14.5)
RBC # FLD: 19 % — HIGH (ref 10.3–14.5)
SARS-COV-2 RNA SPEC QL NAA+PROBE: SIGNIFICANT CHANGE UP
SODIUM SERPL-SCNC: 137 MMOL/L — SIGNIFICANT CHANGE UP (ref 135–145)
SODIUM SERPL-SCNC: 138 MMOL/L — SIGNIFICANT CHANGE UP (ref 135–145)
WBC # BLD: 15.55 K/UL — HIGH (ref 3.8–10.5)
WBC # BLD: 15.7 K/UL — HIGH (ref 3.8–10.5)
WBC # FLD AUTO: 15.55 K/UL — HIGH (ref 3.8–10.5)
WBC # FLD AUTO: 15.7 K/UL — HIGH (ref 3.8–10.5)

## 2021-03-16 PROCEDURE — 99285 EMERGENCY DEPT VISIT HI MDM: CPT

## 2021-03-16 PROCEDURE — 99223 1ST HOSP IP/OBS HIGH 75: CPT

## 2021-03-16 PROCEDURE — 93010 ELECTROCARDIOGRAM REPORT: CPT

## 2021-03-16 PROCEDURE — 99222 1ST HOSP IP/OBS MODERATE 55: CPT

## 2021-03-16 PROCEDURE — 71045 X-RAY EXAM CHEST 1 VIEW: CPT | Mod: 26

## 2021-03-16 RX ORDER — PANTOPRAZOLE SODIUM 20 MG/1
40 TABLET, DELAYED RELEASE ORAL
Refills: 0 | Status: DISCONTINUED | OUTPATIENT
Start: 2021-03-16 | End: 2021-03-18

## 2021-03-16 RX ORDER — QUETIAPINE FUMARATE 200 MG/1
100 TABLET, FILM COATED ORAL AT BEDTIME
Refills: 0 | Status: DISCONTINUED | OUTPATIENT
Start: 2021-03-16 | End: 2021-03-18

## 2021-03-16 RX ORDER — RISPERIDONE 4 MG/1
3 TABLET ORAL
Refills: 0 | Status: DISCONTINUED | OUTPATIENT
Start: 2021-03-16 | End: 2021-03-18

## 2021-03-16 RX ORDER — DEXTROSE 50 % IN WATER 50 %
12.5 SYRINGE (ML) INTRAVENOUS ONCE
Refills: 0 | Status: DISCONTINUED | OUTPATIENT
Start: 2021-03-16 | End: 2021-03-18

## 2021-03-16 RX ORDER — NIFEDIPINE 30 MG
60 TABLET, EXTENDED RELEASE 24 HR ORAL DAILY
Refills: 0 | Status: DISCONTINUED | OUTPATIENT
Start: 2021-03-16 | End: 2021-03-18

## 2021-03-16 RX ORDER — SODIUM CHLORIDE 9 MG/ML
1000 INJECTION, SOLUTION INTRAVENOUS
Refills: 0 | Status: DISCONTINUED | OUTPATIENT
Start: 2021-03-16 | End: 2021-03-18

## 2021-03-16 RX ORDER — POTASSIUM CHLORIDE 20 MEQ
40 PACKET (EA) ORAL ONCE
Refills: 0 | Status: COMPLETED | OUTPATIENT
Start: 2021-03-16 | End: 2021-03-16

## 2021-03-16 RX ORDER — DEXTROSE 50 % IN WATER 50 %
25 SYRINGE (ML) INTRAVENOUS ONCE
Refills: 0 | Status: DISCONTINUED | OUTPATIENT
Start: 2021-03-16 | End: 2021-03-18

## 2021-03-16 RX ORDER — GLUCAGON INJECTION, SOLUTION 0.5 MG/.1ML
1 INJECTION, SOLUTION SUBCUTANEOUS ONCE
Refills: 0 | Status: DISCONTINUED | OUTPATIENT
Start: 2021-03-16 | End: 2021-03-18

## 2021-03-16 RX ORDER — DEXTROSE 50 % IN WATER 50 %
15 SYRINGE (ML) INTRAVENOUS ONCE
Refills: 0 | Status: DISCONTINUED | OUTPATIENT
Start: 2021-03-16 | End: 2021-03-18

## 2021-03-16 RX ORDER — PANTOPRAZOLE SODIUM 20 MG/1
40 TABLET, DELAYED RELEASE ORAL ONCE
Refills: 0 | Status: COMPLETED | OUTPATIENT
Start: 2021-03-16 | End: 2021-03-16

## 2021-03-16 RX ORDER — CARVEDILOL PHOSPHATE 80 MG/1
12.5 CAPSULE, EXTENDED RELEASE ORAL EVERY 12 HOURS
Refills: 0 | Status: DISCONTINUED | OUTPATIENT
Start: 2021-03-16 | End: 2021-03-18

## 2021-03-16 RX ORDER — TRAZODONE HCL 50 MG
100 TABLET ORAL AT BEDTIME
Refills: 0 | Status: DISCONTINUED | OUTPATIENT
Start: 2021-03-16 | End: 2021-03-18

## 2021-03-16 RX ORDER — INSULIN LISPRO 100/ML
VIAL (ML) SUBCUTANEOUS
Refills: 0 | Status: DISCONTINUED | OUTPATIENT
Start: 2021-03-16 | End: 2021-03-18

## 2021-03-16 RX ORDER — ALBUTEROL 90 UG/1
2 AEROSOL, METERED ORAL ONCE
Refills: 0 | Status: COMPLETED | OUTPATIENT
Start: 2021-03-16 | End: 2021-03-16

## 2021-03-16 RX ADMIN — Medication 40 MILLIEQUIVALENT(S): at 23:55

## 2021-03-16 RX ADMIN — PANTOPRAZOLE SODIUM 40 MILLIGRAM(S): 20 TABLET, DELAYED RELEASE ORAL at 15:34

## 2021-03-16 RX ADMIN — ALBUTEROL 2 PUFF(S): 90 AEROSOL, METERED ORAL at 15:37

## 2021-03-16 RX ADMIN — CARVEDILOL PHOSPHATE 12.5 MILLIGRAM(S): 80 CAPSULE, EXTENDED RELEASE ORAL at 18:19

## 2021-03-16 RX ADMIN — Medication 40 MILLIEQUIVALENT(S): at 15:32

## 2021-03-16 RX ADMIN — Medication 100 MILLIGRAM(S): at 22:31

## 2021-03-16 RX ADMIN — PANTOPRAZOLE SODIUM 40 MILLIGRAM(S): 20 TABLET, DELAYED RELEASE ORAL at 22:31

## 2021-03-16 NOTE — CHART NOTE - NSCHARTNOTEFT_GEN_A_CORE
Called by RN after pt had 9 beats of V tach on monitor.  Pt admitted with UGIB, anemic, transfused 1U PRBC/ hypokalemic, supplemented.  Asymptomatic; VSS.  Will order Stat CBC, BMP, Mg, Phos. Called by RN after pt had 9 beats of V tach on monitor.  Pt admitted with UGIB, anemic, transfused 1U PRBC/ hypokalemic, supplemented.  Asymptomatic; VSS.  Will order Stat CBC, BMP, Mg, Phos.    23:30                        7.0    15.55 )-----------( 453      ( 16 Mar 2021 22:34 )             21.3   03-16    137  |  95<L>  |  27.0<H>  ----------------------------<  161<H>  3.2<L>   |  32.0<H>  |  6.08<H>    Ca    9.0      16 Mar 2021 22:37  Phos  3.8     03-16  Mg     2.3     03-16    TPro  6.6  /  Alb  3.5  /  TBili  0.2<L>  /  DBili  x   /  AST  15  /  ALT  14  /  AlkPhos  94  03-16    KCL 40mEq pox1 stat  Will hold off more PRBC transfusion for now due to pt hemodynamically stable Called by RN after pt had 9 beats of V tach on monitor.  Pt admitted with UGIB, anemic, transfused 1U PRBC/ hypokalemic, supplemented.  Asymptomatic; VSS.  Will order Stat CBC, BMP, Mg, Phos.    23:30                        7.0    15.55 )-----------( 453      ( 16 Mar 2021 22:34 )             21.3   03-16    137  |  95<L>  |  27.0<H>  ----------------------------<  161<H>  3.2<L>   |  32.0<H>  |  6.08<H>    Ca    9.0      16 Mar 2021 22:37  Phos  3.8     03-16  Mg     2.3     03-16    TPro  6.6  /  Alb  3.5  /  TBili  0.2<L>  /  DBili  x   /  AST  15  /  ALT  14  /  AlkPhos  94  03-16    KCL 40mEq pox1 stat  Will hold off giving more blood due to pt currently hemodynamically stable  CBC, BMP ordered for 4am  RN to monitor and escalate if change in pt's status Called by RN after pt had 9 beats of V tach on monitor.  Pt admitted with UGIB, anemic, transfused 1U PRBC/ hypokalemic, supplemented.  Asymptomatic; VSS.  Will order Stat CBC, BMP, Mg, Phos.    23:30                        7.0    15.55 )-----------( 453      ( 16 Mar 2021 22:34 )             21.3   03-16    137  |  95<L>  |  27.0<H>  ----------------------------<  161<H>  3.2<L>   |  32.0<H>  |  6.08<H>    Ca    9.0      16 Mar 2021 22:37  Phos  3.8     03-16  Mg     2.3     03-16    TPro  6.6  /  Alb  3.5  /  TBili  0.2<L>  /  DBili  x   /  AST  15  /  ALT  14  /  AlkPhos  94  03-16    KCL 40mEq pox1 stat  Will hold off giving more blood due to pt currently hemodynamically stable & on dialysis with ESRD, as per Dr. Pizarro  CBC, BMP ordered for 4am  RN to monitor and escalate if change in pt's status

## 2021-03-16 NOTE — ED ADULT NURSE REASSESSMENT NOTE - NS ED NURSE REASSESS COMMENT FT1
late entry for 15:00 note. pt resting comfortably. VSS. respirations even and unlabored. no pain noted. no apparent distress noted.
report given to HERBIE Delatorre. pt transferred on monitor. HERBIE Delatorre made aware.

## 2021-03-16 NOTE — H&P ADULT - HISTORY OF PRESENT ILLNESS
36M presented with fatigue over the past few days and was noted to have anemia with hemodialysis yesterday. The patient also reported a two week history of dark stools with mild abdominal discomfort but no diarrhea or nausea. He denied any history of peptic ulcer disease or gastroesophageal reflux and denied any prior blood transfusions in the past. He is unaware of any aggravating or relieving factors and was without dyspnea, chest pain, or palpitations. He denied any recent changes to his diet or bowel habits and has not had any unintentional weight loss. He has been without fever, chills, cough, or recent sick contacts. In the emergency department, the patient was noted to have significant anemia and positive fecal occult blood test.

## 2021-03-16 NOTE — ED PROVIDER NOTE - PROGRESS NOTE DETAILS
Marlee Wang, Resident: Pt feels well at rest, no complaints. Agrees with transfusion, GI work up and admission. GI consulted. Hospitalist service agrees with admission.

## 2021-03-16 NOTE — ED PROVIDER NOTE - CARE PLAN
Principal Discharge DX:	Symptomatic anemia  Secondary Diagnosis:	GIB (gastrointestinal bleeding)  Secondary Diagnosis:	Hypokalemia

## 2021-03-16 NOTE — ED PROVIDER NOTE - CLINICAL SUMMARY MEDICAL DECISION MAKING FREE TEXT BOX
Pt is a 36 y.o. M multiple comorbidities presenting with exertional shortness of breath, fatigue and chest pain. Labs, meds, imaging, ekg.

## 2021-03-16 NOTE — CONSULT NOTE ADULT - ASSESSMENT
Patient's history concerning for melena and an UGIB.  Continue PPI, will plan for EGD tomorrow.  NPO after midnight.  If EGD negative, likely will need colonoscopy (Friday, HD Thursday).

## 2021-03-16 NOTE — ED PROVIDER NOTE - PHYSICAL EXAMINATION
General: well appearing, NAD  Head:  NC, AT  Eyes: EOMI, PERRLA, no scleral icterus  Ears: no erythema/drainage  Nose: midline, no bleeding/drainage  Throat: MMM  Cardiac: RRR, no m/r/g, no lower extremity edema  Respiratory: +mild wheeze in left lower lobe, no rales/rhonchi, equal chest wall expansions, no use of accessory muscles, no retractions  Abdomen: soft, nondistended, nontender, no rebound tenderness, no guarding, nonperitonitic  MSK/Vascular: full ROM, soft compartments, warm extremities  Neuro: Alert and oriented, motor/sensory intact  Psych: calm, cooperative, normal affect

## 2021-03-16 NOTE — CONSULT NOTE ADULT - SUBJECTIVE AND OBJECTIVE BOX
HISTORY OF PRESENT ILLNESS: This is a 36y old man with a past medical history significant for ESRD on HD (TTS) who prented with fatigue over the past few days and was noted to have anemia with hemodialysis yesterday. The patient also reported a two week history of black tarry stools with mild abdominal discomfort but no diarrhea or nausea. He denied any history of peptic ulcer disease or gastroesophageal reflux and denied any prior blood transfusions in the past. He is unaware of any aggravating or relieving factors and was without dyspnea, chest pain, or palpitations. He denied any recent changes to his diet or bowel habits and has not had any unintentional weight loss. He has been without fever, chills, cough, or recent sick contacts. In the emergency department, the patient was noted to have significant anemia and positive fecal occult blood test.    ROS: A 14-point review of systems was completed and was otherwise negative save what was reported in the HPI.    PAST MEDICAL/SURGICAL HISTORY:  Insulin dependent type 2 diabetes mellitus  CKD (chronic kidney disease) requiring chronic dialysis  HTN (hypertension)  Sleep apnea  Bipolar 1 disorder  AVF (arteriovenous fistula)    SOCIAL HISTORY:  - TOBACCO: Denies  - ALCOHOL: Denies  - ILLICIT DRUG USE: Denies    FAMILY HISTORY:  No known history of gastrointestinal or liver disease;  FH: HTN (hypertension)  Father, siblings    Family history of CKD (chronic kidney disease)  mother    Family history of diabetes mellitus (Grandparent)  Father, siblings        HOME MEDICATIONS:  acetaminophen 325 mg oral tablet: 2 tab(s) orally every 6 hours, As needed, Temp greater or equal to 38C (100.4F), Mild Pain (1 - 3) (10 Feb 2021 13:52)  aspirin 81 mg oral tablet: 1 tab(s) orally once a day (03 Feb 2021 19:59)  polyethylene glycol 3350 oral powder for reconstitution: 17 gram(s) orally once a day (10 Feb 2021 13:52)  RisperDAL Consta 25 mg/2 weeks intramuscular injection, extended release: 1 dose(s) intramuscular every 2 weeks, per pt med list, last dose was scheduled on 1/27/2021 (03 Feb 2021 19:59)  senna oral tablet: 2 tab(s) orally once a day (at bedtime) (10 Feb 2021 13:52)  SEROquel 100 mg oral tablet: 1 tab(s) orally once a day (at bedtime) (03 Feb 2021 19:59)  traZODone 100 mg oral tablet: 1 tab(s) orally once a day (at bedtime) (03 Feb 2021 19:59)  Vitamin D3 2000 intl units (50 mcg) oral tablet: 2 cap(s) orally once a day (03 Feb 2021 19:59)    INPATIENT MEDICATIONS:  MEDICATIONS  (STANDING):  carvedilol 12.5 milliGRAM(s) Oral every 12 hours  dextrose 40% Gel 15 Gram(s) Oral once  dextrose 5%. 1000 milliLiter(s) (50 mL/Hr) IV Continuous <Continuous>  dextrose 5%. 1000 milliLiter(s) (100 mL/Hr) IV Continuous <Continuous>  dextrose 50% Injectable 25 Gram(s) IV Push once  dextrose 50% Injectable 12.5 Gram(s) IV Push once  dextrose 50% Injectable 25 Gram(s) IV Push once  glucagon  Injectable 1 milliGRAM(s) IntraMuscular once  insulin lispro (ADMELOG) corrective regimen sliding scale   SubCutaneous three times a day before meals  NIFEdipine XL 60 milliGRAM(s) Oral daily  pantoprazole  Injectable 40 milliGRAM(s) IV Push two times a day  QUEtiapine 100 milliGRAM(s) Oral at bedtime  risperiDONE   Tablet 3 milliGRAM(s) Oral two times a day  traZODone 100 milliGRAM(s) Oral at bedtime    MEDICATIONS  (PRN):    ALLERGIES:  No Known Drug Allergies  shellfish (Unknown)    T(C): 36.6 (03-16-21 @ 16:27), Max: 36.7 (03-16-21 @ 11:53)  HR: 76 (03-16-21 @ 16:27) (76 - 83)  BP: 115/75 (03-16-21 @ 16:27) (99/61 - 129/76)  RR: 18 (03-16-21 @ 16:27) (18 - 18)  SpO2: 98% (03-16-21 @ 16:27) (98% - 98%)      PHYSICAL EXAM:  Constitutional: Well-developed, well-nourished, in no apparent distress  Eyes: Sclerae anicteric, conjunctivae normal  ENMT: Mucus membranes moist, no oropharyngeal thrush noted  Neck: No thyroid nodules appreciated, no significant cervical or supraclavicular lymphadenopathy  Respiratory: Breathing nonlabored; clear to auscultation  Cardiovascular: Regular rate and rhythm  Gastrointestinal: Soft, nontender, nondistended, normoactive bowel sounds; no hepatosplenomegaly appreciated; no rebound tenderness or involuntary guarding  Rectal: Unable to perform, as patient is in hallway of CDU  Extremities: No clubbing, cyanosis or edema  Neurological: Alert and oriented to person, place and time; no asterixis  Skin: No jaundice  Lymph Nodes: No significant lymphadenopathy  Musculoskeletal: No significant peripheral atrophy  Psychiatric: Odd affect     LABS:             6.0    15.70 )-----------( 442      ( 03-16 @ 13:33 )             18.9         03-16    138  |  95<L>  |  26.0<H>  ----------------------------<  151<H>  3.0<L>   |  33.0<H>  |  6.11<H>    Ca    9.3      16 Mar 2021 13:33  Phos  3.5     03-16  Mg     2.3     03-16    TPro  6.6  /  Alb  3.5  /  TBili  0.2<L>  /  DBili  x   /  AST  15  /  ALT  14  /  AlkPhos  94  03-16    LIVER FUNCTIONS - ( 16 Mar 2021 13:33 )  Alb: 3.5 g/dL / Pro: 6.6 g/dL / ALK PHOS: 94 U/L / ALT: 14 U/L / AST: 15 U/L / GGT: x

## 2021-03-16 NOTE — ED PROVIDER NOTE - ATTENDING CONTRIBUTION TO CARE
I, Glen Chery, performed a face to face bedside interview with this patient regarding history of present illness, review of symptoms and relevant past medical, social and family history.  I completed an independent physical examination. I have communicated the patient’s plan of care and disposition with the ACP.  36 year old male with PMh Dm, HTN, ESRD on HD presents with fatigue, COOPER and exertional chest pain x 3 days. No chest pain at rest. pt had labs done at dialysis, found to be anemic. He endorses black,. tarry stools x 2 weeks.  Gen: NAD, well appearing  CV: RRR  Pul: CTA b/l  Abd: Soft, non-distended, non-tender  Neuro: no focal deficits  Pt with gi bleed and symptomatic anemia, admitted to medicine I, Glen Chery, performed a face to face bedside interview with this patient regarding history of present illness, review of symptoms and relevant past medical, social and family history.  I completed an independent physical examination. I have communicated the patient’s plan of care and disposition with the resident.  36 year old male with PMh Dm, HTN, ESRD on HD presents with fatigue, COOPER and exertional chest pain x 3 days. No chest pain at rest. pt had labs done at dialysis, found to be anemic. He endorses black,. tarry stools x 2 weeks.  Gen: NAD, well appearing  CV: RRR  Pul: CTA b/l  Abd: Soft, non-distended, non-tender  Neuro: no focal deficits  Pt with gi bleed and symptomatic anemia, admitted to medicine

## 2021-03-16 NOTE — ED STATDOCS - PROGRESS NOTE DETAILS
At dialysis yesterday he had low HBG 6.4. Has never need a transfusion before. Started dialysis January 13th [facility] in [place]. Feeling week since last night. Had chest palpation at dialysis yesterday. Gets Dialysis Monday, Wednesday, Friday. Has had melena for 3 weeks. He is on aspirin.     Dialysis: Dr. Fink Sent by dialysis center for low hgb.  Reports feeling weak last night.  denies chest pain, palps, sob. denies prior transfusion.  Started dialysis January 13th [facility] in [place]; every Mon/ Wed/ Fri.  Dialysis session was terminated early yesterday due to chest palpations at dialysis yesterday. Reports melena for 3 weeks. He is on aspirin; denies NSAID use.    Dialysis: Dr. Fink

## 2021-03-16 NOTE — ED PROVIDER NOTE - OBJECTIVE STATEMENT
Pt is a 36 y.o. M hx ESRD on HD, DM, HTN, asthma, obesity presenting with abnormal outpatient lab results. The pt attended hemodialysis yesterday, notes Pt is a 36 y.o. M hx ESRD on HD, DM, HTN, asthma, obesity presenting with abnormal outpatient lab results. The pt attended hemodialysis yesterday, notes exertional fatigue, shortness of breath, and chest pain. Pt complaining of dark stools for two weeks. Denies abdominal pain. Pt asymptomatic at rest.

## 2021-03-16 NOTE — ED ADULT NURSE NOTE - OBJECTIVE STATEMENT
assumed care of pt at 13:05. pt was sent in by dialysis for Hg of 6.4. pt denies chest pain, or sob. pt also complaining of generalized weakness and dizziness  which started after dialysis yesterday. pt receives dialysis M,W,F. pt states that he has black/bloody stools for last 2 weeks. no acute distress noted. RR even and unlabored.

## 2021-03-16 NOTE — H&P ADULT - ASSESSMENT
36M with a history of ESRD on hemodialysis, diabetes, hypertension, bipolar disorder, and obstructive sleep apnea who presented with fatigue and was found to have anemia with a two week history of dark stool.    Anemia -     ESRD -     Diabetes - Insulin coverage, close monitoring of blood glucose levels.    Hypertension - Close blood pressure monitoring.    Bipolar disorder -  36M with a history of ESRD on hemodialysis, diabetes, hypertension, bipolar disorder, and obstructive sleep apnea who presented with fatigue and was found to have anemia with a two week history of dark stool.    Acute blood loss anemia - Suspect upper gastrointestinal hemorrhage. On pantoprazole. Aspirin to be held. The patient is noted to have a history of chronic anemia, most likely secondary to chronic kidney disease, now presenting with significant decreased in hemoglobin. Gastroenterology consulted by the emergency department, recommendations to be reviewed when available.    ESRD - To continue with hemodialysis as scheduled. Nephrology consulted. Potassium supplementation for hypokalemia.    Diabetes - Insulin coverage, close monitoring of blood glucose levels.    Hypertension - Close blood pressure monitoring. On carvedilol and nifedipine.    Bipolar disorder - On risperidone, quetiapine, and trazodone.    Discussed with the patient with his Sister Vera ([phone]) on speakerphone. 36M with a history of ESRD on hemodialysis, diabetes, hypertension, bipolar disorder, and obstructive sleep apnea who presented with fatigue and was found to have anemia with a two week history of dark stool.    Acute blood loss anemia - Suspect upper gastrointestinal hemorrhage. On pantoprazole. Aspirin to be held. The patient is noted to have a history of chronic anemia, most likely secondary to chronic kidney disease, now presenting with significant decreased in hemoglobin. Gastroenterology consulted by the emergency department, recommendations to be reviewed when available.    ESRD - To continue with hemodialysis as scheduled. Nephrology consulted. Potassium supplementation for hypokalemia.    Diabetes - Insulin coverage, close monitoring of blood glucose levels.    Hypertension - Close blood pressure monitoring. On carvedilol and nifedipine.    Bipolar disorder - On risperidone, quetiapine, and trazodone.    Obstructive sleep apnea - Nocturnal Bipap while in the hospital.    Discussed with the patient with his Sister Vera ([phone]) on speakerphone.

## 2021-03-16 NOTE — ED ADULT TRIAGE NOTE - CHIEF COMPLAINT QUOTE
pt sent by doctor for HBG 6.4, pt c/o generalized weakness and dyspnea with exertion that started yesterday after dialysis, pt denies fever/chills, chest pain, dizziness

## 2021-03-16 NOTE — ED PROVIDER NOTE - FAMILY HISTORY
Family history of CKD (chronic kidney disease), mother     FH: HTN (hypertension), Father, siblings     Grandparent  Still living? No  Family history of diabetes mellitus, Age at diagnosis: Age Unknown

## 2021-03-16 NOTE — ED PROVIDER NOTE - NS ED ROS FT
Constitutional: no fever, no sweats, and no chills.  Eyes: no pain, no redness, and no visual changes.  ENMT: no ear pain and no hearing problems, no nasal congestion/drainage, no dysphagia, and no throat pain, no neck pain, no stiffness  CV: no edema.  Resp: no cough  GI: no abdominal pain, no bloating, no constipation, no diarrhea, no nausea and no vomiting.  : no dysuria, no hematuria  MSK: no msk pain, no weakness  Skin: no lesions, and no rashes.  Neuro: no LOC, no headache, no sensory deficits, and no weakness.

## 2021-03-16 NOTE — H&P ADULT - NSHPPHYSICALEXAM_GEN_ALL_CORE
Vital Signs Last 24 Hrs  T(C): 36.7 (16 Mar 2021 16:00), Max: 36.7 (16 Mar 2021 11:53)  T(F): 98.1 (16 Mar 2021 16:00), Max: 98.1 (16 Mar 2021 11:53)  HR: 80 (16 Mar 2021 16:00) (80 - 83)  BP: 99/61 (16 Mar 2021 16:00) (99/61 - 129/76)  BP(mean): --  RR: 18 (16 Mar 2021 16:00) (18 - 18)  SpO2: 98% (16 Mar 2021 16:00) (98% - 98%)    General appearance: No acute distress, Awake, Alert  HEENT: Normocephalic, Atraumatic, Conjunctiva clear, EOMI  Neck: Supple, No JVD, No tenderness  Lungs: Breath sound equal bilaterally, No wheezes, No rales  Cardiovascular: S1S2, Regular rhythm  Abdomen: Soft, Nontender, Nondistended, No guarding/rebound, Positive bowel sounds  Extremities: No clubbing, No cyanosis, No edema, No calf tenderness, Left upper extremity AV fistula  Neuro: Strength equal bilaterally, No tremors  Psychiatric: Appropriate mood, Normal affect

## 2021-03-16 NOTE — ED ADULT NURSE NOTE - PMH
Bipolar 1 disorder    CKD (chronic kidney disease) requiring chronic dialysis    HTN (hypertension)    Insulin dependent type 2 diabetes mellitus    Sleep apnea

## 2021-03-17 ENCOUNTER — TRANSCRIPTION ENCOUNTER (OUTPATIENT)
Age: 36
End: 2021-03-17

## 2021-03-17 DIAGNOSIS — R19.5 OTHER FECAL ABNORMALITIES: ICD-10-CM

## 2021-03-17 DIAGNOSIS — D64.9 ANEMIA, UNSPECIFIED: ICD-10-CM

## 2021-03-17 LAB
A1C WITH ESTIMATED AVERAGE GLUCOSE RESULT: 6.4 % — HIGH (ref 4–5.6)
ANION GAP SERPL CALC-SCNC: 11 MMOL/L — SIGNIFICANT CHANGE UP (ref 5–17)
BUN SERPL-MCNC: 28 MG/DL — HIGH (ref 8–20)
CALCIUM SERPL-MCNC: 8.7 MG/DL — SIGNIFICANT CHANGE UP (ref 8.6–10.2)
CHLORIDE SERPL-SCNC: 96 MMOL/L — LOW (ref 98–107)
CO2 SERPL-SCNC: 30 MMOL/L — HIGH (ref 22–29)
CREAT SERPL-MCNC: 7.06 MG/DL — HIGH (ref 0.5–1.3)
ESTIMATED AVERAGE GLUCOSE: 137 MG/DL — HIGH (ref 68–114)
GLUCOSE BLDC GLUCOMTR-MCNC: 113 MG/DL — HIGH (ref 70–99)
GLUCOSE BLDC GLUCOMTR-MCNC: 114 MG/DL — HIGH (ref 70–99)
GLUCOSE BLDC GLUCOMTR-MCNC: 116 MG/DL — HIGH (ref 70–99)
GLUCOSE SERPL-MCNC: 116 MG/DL — HIGH (ref 70–99)
HCT VFR BLD CALC: 20.9 % — CRITICAL LOW (ref 39–50)
HGB BLD-MCNC: 6.7 G/DL — CRITICAL LOW (ref 13–17)
MCHC RBC-ENTMCNC: 25 PG — LOW (ref 27–34)
MCHC RBC-ENTMCNC: 32.1 GM/DL — SIGNIFICANT CHANGE UP (ref 32–36)
MCV RBC AUTO: 78 FL — LOW (ref 80–100)
PLATELET # BLD AUTO: 467 K/UL — HIGH (ref 150–400)
POTASSIUM SERPL-MCNC: 3.5 MMOL/L — SIGNIFICANT CHANGE UP (ref 3.5–5.3)
POTASSIUM SERPL-SCNC: 3.5 MMOL/L — SIGNIFICANT CHANGE UP (ref 3.5–5.3)
RBC # BLD: 2.68 M/UL — LOW (ref 4.2–5.8)
RBC # FLD: 18.6 % — HIGH (ref 10.3–14.5)
SARS-COV-2 IGG SERPL QL IA: NEGATIVE — SIGNIFICANT CHANGE UP
SARS-COV-2 IGM SERPL IA-ACNC: 0.07 INDEX — SIGNIFICANT CHANGE UP
SODIUM SERPL-SCNC: 137 MMOL/L — SIGNIFICANT CHANGE UP (ref 135–145)
WBC # BLD: 15.1 K/UL — HIGH (ref 3.8–10.5)
WBC # FLD AUTO: 15.1 K/UL — HIGH (ref 3.8–10.5)

## 2021-03-17 PROCEDURE — 99233 SBSQ HOSP IP/OBS HIGH 50: CPT

## 2021-03-17 RX ADMIN — PANTOPRAZOLE SODIUM 40 MILLIGRAM(S): 20 TABLET, DELAYED RELEASE ORAL at 15:26

## 2021-03-17 RX ADMIN — Medication 60 MILLIGRAM(S): at 05:46

## 2021-03-17 RX ADMIN — PANTOPRAZOLE SODIUM 40 MILLIGRAM(S): 20 TABLET, DELAYED RELEASE ORAL at 21:59

## 2021-03-17 RX ADMIN — Medication 100 MILLIGRAM(S): at 21:59

## 2021-03-17 RX ADMIN — CARVEDILOL PHOSPHATE 12.5 MILLIGRAM(S): 80 CAPSULE, EXTENDED RELEASE ORAL at 18:32

## 2021-03-17 RX ADMIN — CARVEDILOL PHOSPHATE 12.5 MILLIGRAM(S): 80 CAPSULE, EXTENDED RELEASE ORAL at 05:46

## 2021-03-17 NOTE — CONSULT NOTE ADULT - ASSESSMENT
ESRD on HD MWF schedule  Has h/o HTN, Sleep apnea, IDDM, Bipolar 1 d/o  Anemia requiring transfusion + FOBT  GI eval noted plans for EGD today  May need additional transfusion will send CBC at start of HD    Full consult to follow     ESRD on HD MWF schedule  Has h/o HTN, Sleep apnea, IDDM, Bipolar 1 d/o  Anemia requiring transfusion + FOBT  GI eval noted plans for EGD today  May need additional transfusion will send CBC at start of HD- if not done this AM was ordered STAT  Consult obtained    Will follow

## 2021-03-17 NOTE — PROGRESS NOTE ADULT - SUBJECTIVE AND OBJECTIVE BOX
Pt seen and examined f/u for anemia, occult blood in stool in patient on HD    Unable to do EGD as patient still in HD. he has no complaints but is hungry and wants to eat. Hgb down to 6.7 late this AM and just given one unit blood. Denies abdomianl pain, nausea or vomiting. No rectal bleeding or melena.    REVIEW OF SYSTEMS:    CONSTITUTIONAL: No fever, weight loss, or fatigue  EYES: No eye pain, visual disturbances, or discharge  ENMT:  No difficulty hearing, tinnitus, vertigo; No sinus or throat pain  RESPIRATORY: No cough, wheezing, chills or hemoptysis; No shortness of breath  CARDIOVASCULAR: No chest pain, palpitations, dizziness, or leg swelling  GASTROINTESTINAL: No abdominal or epigastric pain. No nausea, vomiting, or hematemesis; No diarrhea or constipation. No melena or hematochezia.    MEDICATIONS:  MEDICATIONS  (STANDING):  carvedilol 12.5 milliGRAM(s) Oral every 12 hours  dextrose 40% Gel 15 Gram(s) Oral once  dextrose 5%. 1000 milliLiter(s) (50 mL/Hr) IV Continuous <Continuous>  dextrose 5%. 1000 milliLiter(s) (100 mL/Hr) IV Continuous <Continuous>  dextrose 50% Injectable 25 Gram(s) IV Push once  dextrose 50% Injectable 12.5 Gram(s) IV Push once  dextrose 50% Injectable 25 Gram(s) IV Push once  glucagon  Injectable 1 milliGRAM(s) IntraMuscular once  insulin lispro (ADMELOG) corrective regimen sliding scale   SubCutaneous three times a day before meals  NIFEdipine XL 60 milliGRAM(s) Oral daily  pantoprazole  Injectable 40 milliGRAM(s) IV Push two times a day  QUEtiapine 100 milliGRAM(s) Oral at bedtime  risperiDONE   Tablet 3 milliGRAM(s) Oral two times a day  traZODone 100 milliGRAM(s) Oral at bedtime    MEDICATIONS  (PRN):      Allergies    No Known Drug Allergies  shellfish (Unknown)    Intolerances        Vital Signs Last 24 Hrs  T(C): 37.1 (17 Mar 2021 12:26), Max: 37.3 (17 Mar 2021 12:09)  T(F): 98.7 (17 Mar 2021 12:26), Max: 99.2 (17 Mar 2021 12:09)  HR: 74 (17 Mar 2021 12:26) (66 - 86)  BP: 103/63 (17 Mar 2021 12:26) (99/61 - 130/74)  BP(mean): --  RR: 18 (17 Mar 2021 12:09) (18 - 20)  SpO2: 98% (17 Mar 2021 12:26) (97% - 99%)      PHYSICAL EXAM:    General: Well developed; well nourished; in no acute distress  HEENT: MMM, conjunctiva and sclera clear  Gastrointestinal:Abdomen: Soft non-tender non-distended; Normal bowel sounds; No hepatosplenomegaly  Extremities: no cyanosis, clubbing or edema.  Skin: Warm and dry. No obvious rash    LABS:      CBC Full  -  ( 17 Mar 2021 11:37 )  WBC Count : 15.10 K/uL  RBC Count : 2.68 M/uL  Hemoglobin : 6.7 g/dL  Hematocrit : 20.9 %  Platelet Count - Automated : 467 K/uL  Mean Cell Volume : 78.0 fl  Mean Cell Hemoglobin : 25.0 pg  Mean Cell Hemoglobin Concentration : 32.1 gm/dL  Auto Neutrophil # : x  Auto Lymphocyte # : x  Auto Monocyte # : x  Auto Eosinophil # : x  Auto Basophil # : x  Auto Neutrophil % : x  Auto Lymphocyte % : x  Auto Monocyte % : x  Auto Eosinophil % : x  Auto Basophil % : x    03-17    137  |  96<L>  |  28.0<H>  ----------------------------<  116<H>  3.5   |  30.0<H>  |  7.06<H>    Ca    8.7      17 Mar 2021 11:37  Phos  3.8     03-16  Mg     2.3     03-16    TPro  6.6  /  Alb  3.5  /  TBili  0.2<L>  /  DBili  x   /  AST  15  /  ALT  14  /  AlkPhos  94  03-16                      RADIOLOGY & ADDITIONAL STUDIES (The following images were personally reviewed): Pt seen and examined f/u for anemia, occult blood in stool in patient on HD, black stools PTA    Unable to do EGD as patient still in HD. he has no complaints but is hungry and wants to eat. Hgb down to 6.7 late this AM and just given one unit blood. Denies abdomianl pain, nausea or vomiting. No rectal bleeding or melena.    REVIEW OF SYSTEMS:    CONSTITUTIONAL: No fever, weight loss, or fatigue  EYES: No eye pain, visual disturbances, or discharge  ENMT:  No difficulty hearing, tinnitus, vertigo; No sinus or throat pain  RESPIRATORY: No cough, wheezing, chills or hemoptysis; No shortness of breath  CARDIOVASCULAR: No chest pain, palpitations, dizziness, or leg swelling  GASTROINTESTINAL: No abdominal or epigastric pain. No nausea, vomiting, or hematemesis; No diarrhea or constipation. No melena or hematochezia.    MEDICATIONS:  MEDICATIONS  (STANDING):  carvedilol 12.5 milliGRAM(s) Oral every 12 hours  dextrose 40% Gel 15 Gram(s) Oral once  dextrose 5%. 1000 milliLiter(s) (50 mL/Hr) IV Continuous <Continuous>  dextrose 5%. 1000 milliLiter(s) (100 mL/Hr) IV Continuous <Continuous>  dextrose 50% Injectable 25 Gram(s) IV Push once  dextrose 50% Injectable 12.5 Gram(s) IV Push once  dextrose 50% Injectable 25 Gram(s) IV Push once  glucagon  Injectable 1 milliGRAM(s) IntraMuscular once  insulin lispro (ADMELOG) corrective regimen sliding scale   SubCutaneous three times a day before meals  NIFEdipine XL 60 milliGRAM(s) Oral daily  pantoprazole  Injectable 40 milliGRAM(s) IV Push two times a day  QUEtiapine 100 milliGRAM(s) Oral at bedtime  risperiDONE   Tablet 3 milliGRAM(s) Oral two times a day  traZODone 100 milliGRAM(s) Oral at bedtime    MEDICATIONS  (PRN):      Allergies    No Known Drug Allergies  shellfish (Unknown)    Intolerances        Vital Signs Last 24 Hrs  T(C): 37.1 (17 Mar 2021 12:26), Max: 37.3 (17 Mar 2021 12:09)  T(F): 98.7 (17 Mar 2021 12:26), Max: 99.2 (17 Mar 2021 12:09)  HR: 74 (17 Mar 2021 12:26) (66 - 86)  BP: 103/63 (17 Mar 2021 12:26) (99/61 - 130/74)  BP(mean): --  RR: 18 (17 Mar 2021 12:09) (18 - 20)  SpO2: 98% (17 Mar 2021 12:26) (97% - 99%)      PHYSICAL EXAM:    General: Well developed; well nourished; in no acute distress  HEENT: MMM, conjunctiva and sclera clear  Gastrointestinal:Abdomen: Soft non-tender non-distended; Normal bowel sounds; No hepatosplenomegaly  Extremities: no cyanosis, clubbing or edema.  Skin: Warm and dry. No obvious rash    LABS:      CBC Full  -  ( 17 Mar 2021 11:37 )  WBC Count : 15.10 K/uL  RBC Count : 2.68 M/uL  Hemoglobin : 6.7 g/dL  Hematocrit : 20.9 %  Platelet Count - Automated : 467 K/uL  Mean Cell Volume : 78.0 fl  Mean Cell Hemoglobin : 25.0 pg  Mean Cell Hemoglobin Concentration : 32.1 gm/dL  Auto Neutrophil # : x  Auto Lymphocyte # : x  Auto Monocyte # : x  Auto Eosinophil # : x  Auto Basophil # : x  Auto Neutrophil % : x  Auto Lymphocyte % : x  Auto Monocyte % : x  Auto Eosinophil % : x  Auto Basophil % : x    03-17    137  |  96<L>  |  28.0<H>  ----------------------------<  116<H>  3.5   |  30.0<H>  |  7.06<H>    Ca    8.7      17 Mar 2021 11:37  Phos  3.8     03-16  Mg     2.3     03-16    TPro  6.6  /  Alb  3.5  /  TBili  0.2<L>  /  DBili  x   /  AST  15  /  ALT  14  /  AlkPhos  94  03-16                      RADIOLOGY & ADDITIONAL STUDIES (The following images were personally reviewed):

## 2021-03-17 NOTE — CONSULT NOTE ADULT - SUBJECTIVE AND OBJECTIVE BOX
HPI:  36M presented with fatigue over the past few days and was noted to have anemia with hemodialysis yesterday. The patient also reported a two week history of dark stools with mild abdominal discomfort but no diarrhea or nausea. He denied any history of peptic ulcer disease or gastroesophageal reflux and denied any prior blood transfusions in the past. He is unaware of any aggravating or relieving factors and was without dyspnea, chest pain, or palpitations. He denied any recent changes to his diet or bowel habits and has not had any unintentional weight loss. He has been without fever, chills, cough, or recent sick contacts. In the emergency department, the patient was noted to have significant anemia and positive fecal occult blood test.    PAST MEDICAL & SURGICAL HISTORY:  Insulin dependent type 2 diabetes mellitus    CKD (chronic kidney disease) requiring chronic dialysis    HTN (hypertension)    Sleep apnea    Bipolar 1 disorder    AVF (arteriovenous fistula)  Left. AVF    FAMILY HISTORY:  FH: HTN (hypertension)  Father, siblings    Family history of CKD (chronic kidney disease)  mother    Family history of diabetes mellitus (Grandparent)  Father, siblings    [place]    Social History:Non smoker    MEDICATIONS  (STANDING):  carvedilol 12.5 milliGRAM(s) Oral every 12 hours  dextrose 40% Gel 15 Gram(s) Oral once  dextrose 5%. 1000 milliLiter(s) (50 mL/Hr) IV Continuous <Continuous>  dextrose 5%. 1000 milliLiter(s) (100 mL/Hr) IV Continuous <Continuous>  dextrose 50% Injectable 25 Gram(s) IV Push once  dextrose 50% Injectable 12.5 Gram(s) IV Push once  dextrose 50% Injectable 25 Gram(s) IV Push once  glucagon  Injectable 1 milliGRAM(s) IntraMuscular once  insulin lispro (ADMELOG) corrective regimen sliding scale   SubCutaneous three times a day before meals  NIFEdipine XL 60 milliGRAM(s) Oral daily  pantoprazole  Injectable 40 milliGRAM(s) IV Push two times a day  QUEtiapine 100 milliGRAM(s) Oral at bedtime  risperiDONE   Tablet 3 milliGRAM(s) Oral two times a day  traZODone 100 milliGRAM(s) Oral at bedtime    MEDICATIONS  (PRN):   Meds reviewed    Allergies    No Known Drug Allergies  shellfish (Unknown)    Vital Signs Last 24 Hrs  T(C): 37.2 (17 Mar 2021 08:16), Max: 37.2 (17 Mar 2021 08:16)  T(F): 98.9 (17 Mar 2021 08:16), Max: 98.9 (17 Mar 2021 08:16)  HR: 79 (17 Mar 2021 08:16) (66 - 86)  BP: 122/79 (17 Mar 2021 08:16) (99/61 - 129/76)  BP(mean): --  RR: 18 (17 Mar 2021 08:16) (18 - 20)  SpO2: 97% (17 Mar 2021 08:16) (97% - 99%)  Daily Height in cm: 165.1 (16 Mar 2021 11:53)    Daily     PHYSICAL EXAM:    GENERAL: appears chronically ill  HEAD:  NCAT  EYES: EOMI  NECK: Supple, neck  veins full  NERVOUS SYSTEM:  Alert & Oriented X3  CHEST/LUNG: Clear to percussion bilaterally  HEART: Regular rate and rhythm  ABDOMEN: Soft, Nontender, Nondistended; +BS  EXTREMITIES: edema      LABS:                        7.0    15.55 )-----------( 453      ( 16 Mar 2021 22:34 )             21.3     03-16    137  |  95<L>  |  27.0<H>  ----------------------------<  161<H>  3.2<L>   |  32.0<H>  |  6.08<H>    Ca    9.0      16 Mar 2021 22:37  Phos  3.8     03-16  Mg     2.3     03-16    TPro  6.6  /  Alb  3.5  /  TBili  0.2<L>  /  DBili  x   /  AST  15  /  ALT  14  /  AlkPhos  94  03-16        Magnesium, Serum: 2.3 mg/dL (03-16 @ 22:37)  Phosphorus Level, Serum: 3.8 mg/dL (03-16 @ 22:37)  Magnesium, Serum: 2.3 mg/dL (03-16 @ 13:33)  Phosphorus Level, Serum: 3.5 mg/dL (03-16 @ 13:33)          RADIOLOGY & ADDITIONAL TESTS:     HPI:  36M presented with fatigue over the past few days and was noted to have anemia with hemodialysis yesterday. The patient also reported a two week history of dark stools with mild abdominal discomfort but no diarrhea or nausea. He denied any history of peptic ulcer disease or gastroesophageal reflux and denied any prior blood transfusions in the past. He is unaware of any aggravating or relieving factors and was without dyspnea, chest pain, or palpitations. He denied any recent changes to his diet or bowel habits and has not had any unintentional weight loss. He has been without fever, chills, cough, or recent sick contacts. In the emergency department, the patient was noted to have significant anemia and positive fecal occult blood test. denies HA CP N/V/D no SOB    PAST MEDICAL & SURGICAL HISTORY:  Insulin dependent type 2 diabetes mellitus    CKD (chronic kidney disease) requiring chronic dialysis    HTN (hypertension)    Sleep apnea    Bipolar 1 disorder    AVF (arteriovenous fistula)  Left. AVF    FAMILY HISTORY:  FH: HTN (hypertension)  Father, siblings    Family history of CKD (chronic kidney disease)  mother    Family history of diabetes mellitus (Grandparent)  Father, siblings    [place]    Social History:Non smoker    MEDICATIONS  (STANDING):  carvedilol 12.5 milliGRAM(s) Oral every 12 hours  dextrose 40% Gel 15 Gram(s) Oral once  dextrose 5%. 1000 milliLiter(s) (50 mL/Hr) IV Continuous <Continuous>  dextrose 5%. 1000 milliLiter(s) (100 mL/Hr) IV Continuous <Continuous>  dextrose 50% Injectable 25 Gram(s) IV Push once  dextrose 50% Injectable 12.5 Gram(s) IV Push once  dextrose 50% Injectable 25 Gram(s) IV Push once  glucagon  Injectable 1 milliGRAM(s) IntraMuscular once  insulin lispro (ADMELOG) corrective regimen sliding scale   SubCutaneous three times a day before meals  NIFEdipine XL 60 milliGRAM(s) Oral daily  pantoprazole  Injectable 40 milliGRAM(s) IV Push two times a day  QUEtiapine 100 milliGRAM(s) Oral at bedtime  risperiDONE   Tablet 3 milliGRAM(s) Oral two times a day  traZODone 100 milliGRAM(s) Oral at bedtime    MEDICATIONS  (PRN):   Meds reviewed    Allergies    No Known Drug Allergies  shellfish (Unknown)    Vital Signs Last 24 Hrs  T(C): 37.2 (17 Mar 2021 08:16), Max: 37.2 (17 Mar 2021 08:16)  T(F): 98.9 (17 Mar 2021 08:16), Max: 98.9 (17 Mar 2021 08:16)  HR: 79 (17 Mar 2021 08:16) (66 - 86)  BP: 122/79 (17 Mar 2021 08:16) (99/61 - 129/76)  BP(mean): --  RR: 18 (17 Mar 2021 08:16) (18 - 20)  SpO2: 97% (17 Mar 2021 08:16) (97% - 99%)  Daily Height in cm: 165.1 (16 Mar 2021 11:53)    Daily     PHYSICAL EXAM:    GENERAL: appears chronically ill  HEAD:  NCAT  EYES: EOMI  NECK: Supple, neck  veins full  NERVOUS SYSTEM:  Alert & Oriented X3  CHEST/LUNG: Clear to percussion bilaterally  HEART: Regular rate and rhythm  ABDOMEN: Soft, Nontender, Nondistended; +BS  EXTREMITIES: Trace edema      LABS:                        7.0    15.55 )-----------( 453      ( 16 Mar 2021 22:34 )             21.3     03-16    137  |  95<L>  |  27.0<H>  ----------------------------<  161<H>  3.2<L>   |  32.0<H>  |  6.08<H>    Ca    9.0      16 Mar 2021 22:37  Phos  3.8     03-16  Mg     2.3     03-16    TPro  6.6  /  Alb  3.5  /  TBili  0.2<L>  /  DBili  x   /  AST  15  /  ALT  14  /  AlkPhos  94  03-16        Magnesium, Serum: 2.3 mg/dL (03-16 @ 22:37)  Phosphorus Level, Serum: 3.8 mg/dL (03-16 @ 22:37)  Magnesium, Serum: 2.3 mg/dL (03-16 @ 13:33)  Phosphorus Level, Serum: 3.5 mg/dL (03-16 @ 13:33)          RADIOLOGY & ADDITIONAL TESTS:

## 2021-03-17 NOTE — PROGRESS NOTE ADULT - SUBJECTIVE AND OBJECTIVE BOX
MILENA PUGA    [id number]    36y      Male    INTERVAL HPI/OVERNIGHT EVENTS: patient being seen for anemia and esrd    patient seen at bedside and states feeling well. patient is for hd today     REVIEW OF SYSTEMS:    CONSTITUTIONAL: No fever, weight loss, or fatigue  RESPIRATORY: No cough, wheezing, hemoptysis; No shortness of breath  CARDIOVASCULAR: No chest pain, palpitations  GASTROINTESTINAL: No abdominal or epigastric pain. No nausea, vomiting  NEUROLOGICAL: No headaches, memory loss, loss of strength.  MISCELLANEOUS:      Vital Signs Last 24 Hrs  T(C): 37.1 (17 Mar 2021 12:26), Max: 37.3 (17 Mar 2021 12:09)  T(F): 98.7 (17 Mar 2021 12:26), Max: 99.2 (17 Mar 2021 12:09)  HR: 74 (17 Mar 2021 12:26) (66 - 86)  BP: 103/63 (17 Mar 2021 12:26) (103/63 - 130/74)  BP(mean): --  RR: 18 (17 Mar 2021 12:09) (18 - 20)  SpO2: 98% (17 Mar 2021 12:26) (97% - 99%)    PHYSICAL EXAM:    General appearance: No acute distress, Awake, Alert  HEENT: Normocephalic, Atraumatic, Conjunctiva clear, EOMI  Neck: Supple, No JVD, No tenderness  Lungs: Breath sound equal bilaterally, No wheezes, No rales  Cardiovascular: S1S2, Regular rhythm  Abdomen: Soft, Nontender, Nondistended, No guarding/rebound, Positive bowel sounds  Extremities: No clubbing, No cyanosis, No edema, No calf tenderness, Left upper extremity AV fistula  Neuro: Strength equal bilaterally, No tremors  Psychiatric: Appropriate mood, Normal affect      LABS:                        6.7    15.10 )-----------( 467      ( 17 Mar 2021 11:37 )             20.9     03-17    137  |  96<L>  |  28.0<H>  ----------------------------<  116<H>  3.5   |  30.0<H>  |  7.06<H>    Ca    8.7      17 Mar 2021 11:37  Phos  3.8     03-16  Mg     2.3     03-16    TPro  6.6  /  Alb  3.5  /  TBili  0.2<L>  /  DBili  x   /  AST  15  /  ALT  14  /  AlkPhos  94  03-16            MEDICATIONS  (STANDING):  carvedilol 12.5 milliGRAM(s) Oral every 12 hours  dextrose 40% Gel 15 Gram(s) Oral once  dextrose 5%. 1000 milliLiter(s) (50 mL/Hr) IV Continuous <Continuous>  dextrose 5%. 1000 milliLiter(s) (100 mL/Hr) IV Continuous <Continuous>  dextrose 50% Injectable 25 Gram(s) IV Push once  dextrose 50% Injectable 12.5 Gram(s) IV Push once  dextrose 50% Injectable 25 Gram(s) IV Push once  glucagon  Injectable 1 milliGRAM(s) IntraMuscular once  insulin lispro (ADMELOG) corrective regimen sliding scale   SubCutaneous three times a day before meals  NIFEdipine XL 60 milliGRAM(s) Oral daily  pantoprazole  Injectable 40 milliGRAM(s) IV Push two times a day  QUEtiapine 100 milliGRAM(s) Oral at bedtime  risperiDONE   Tablet 3 milliGRAM(s) Oral two times a day  traZODone 100 milliGRAM(s) Oral at bedtime    MEDICATIONS  (PRN):      RADIOLOGY & ADDITIONAL TESTS:

## 2021-03-17 NOTE — PROGRESS NOTE ADULT - ASSESSMENT
36M with a history of ESRD on hemodialysis, diabetes, hypertension, bipolar disorder, and obstructive sleep apnea who presented with fatigue and was found to have anemia with a two week history of dark stool.    Acute blood loss anemia - Suspect upper gastrointestinal hemorrhage. On pantoprazole. Aspirin to be held.   ordered 1 unit prbc today with HD  --> GI following  --> npo pmn for egd     ESRD - To continue with hemodialysis today  renal following     Diabetes - Insulin coverage, close monitoring of blood glucose levels.    Hypertension - Close blood pressure monitoring. On carvedilol and nifedipine.    Bipolar disorder - On risperidone, quetiapine, and trazodone.    Obstructive sleep apnea - Nocturnal Bipap while in the hospital.     Sister Vera ([phone])  36M with a history of ESRD on hemodialysis, diabetes, hypertension, bipolar disorder, and obstructive sleep apnea who presented with fatigue and was found to have anemia with a two week history of dark stool.    Acute blood loss anemia - Suspect upper gastrointestinal hemorrhage. On pantoprazole. Aspirin to be held.   ordered 1 unit prbc today with HD  had 1 unit on 3/.16  --> GI following  --> npo pmn for egd     ESRD - To continue with hemodialysis today  renal following     Diabetes - Insulin coverage, close monitoring of blood glucose levels.    Hypertension - Close blood pressure monitoring. On carvedilol and nifedipine.    Bipolar disorder - On risperidone, quetiapine, and trazodone.    Obstructive sleep apnea - Nocturnal Bipap while in the hospital.     Sister Vera ([phone])

## 2021-03-18 ENCOUNTER — RESULT REVIEW (OUTPATIENT)
Age: 36
End: 2021-03-18

## 2021-03-18 ENCOUNTER — TRANSCRIPTION ENCOUNTER (OUTPATIENT)
Age: 36
End: 2021-03-18

## 2021-03-18 VITALS
DIASTOLIC BLOOD PRESSURE: 86 MMHG | RESPIRATION RATE: 18 BRPM | SYSTOLIC BLOOD PRESSURE: 138 MMHG | OXYGEN SATURATION: 98 % | HEART RATE: 75 BPM | TEMPERATURE: 98 F

## 2021-03-18 LAB
ALBUMIN SERPL ELPH-MCNC: 3.6 G/DL — SIGNIFICANT CHANGE UP (ref 3.3–5.2)
ALP SERPL-CCNC: 103 U/L — SIGNIFICANT CHANGE UP (ref 40–120)
ALT FLD-CCNC: 14 U/L — SIGNIFICANT CHANGE UP
ANION GAP SERPL CALC-SCNC: 12 MMOL/L — SIGNIFICANT CHANGE UP (ref 5–17)
AST SERPL-CCNC: 11 U/L — SIGNIFICANT CHANGE UP
BASOPHILS # BLD AUTO: 0.07 K/UL — SIGNIFICANT CHANGE UP (ref 0–0.2)
BASOPHILS NFR BLD AUTO: 0.4 % — SIGNIFICANT CHANGE UP (ref 0–2)
BILIRUB SERPL-MCNC: 0.5 MG/DL — SIGNIFICANT CHANGE UP (ref 0.4–2)
BUN SERPL-MCNC: 24 MG/DL — HIGH (ref 8–20)
CALCIUM SERPL-MCNC: 9.4 MG/DL — SIGNIFICANT CHANGE UP (ref 8.6–10.2)
CHLORIDE SERPL-SCNC: 94 MMOL/L — LOW (ref 98–107)
CO2 SERPL-SCNC: 30 MMOL/L — HIGH (ref 22–29)
CREAT SERPL-MCNC: 6 MG/DL — HIGH (ref 0.5–1.3)
EOSINOPHIL # BLD AUTO: 0.14 K/UL — SIGNIFICANT CHANGE UP (ref 0–0.5)
EOSINOPHIL NFR BLD AUTO: 0.9 % — SIGNIFICANT CHANGE UP (ref 0–6)
GLUCOSE BLDC GLUCOMTR-MCNC: 117 MG/DL — HIGH (ref 70–99)
GLUCOSE SERPL-MCNC: 113 MG/DL — HIGH (ref 70–99)
HCT VFR BLD CALC: 26.3 % — LOW (ref 39–50)
HGB BLD-MCNC: 8.6 G/DL — LOW (ref 13–17)
IMM GRANULOCYTES NFR BLD AUTO: 0.6 % — SIGNIFICANT CHANGE UP (ref 0–1.5)
INR BLD: 1.25 RATIO — HIGH (ref 0.88–1.16)
LYMPHOCYTES # BLD AUTO: 17.4 % — SIGNIFICANT CHANGE UP (ref 13–44)
LYMPHOCYTES # BLD AUTO: 2.84 K/UL — SIGNIFICANT CHANGE UP (ref 1–3.3)
MAGNESIUM SERPL-MCNC: 2 MG/DL — SIGNIFICANT CHANGE UP (ref 1.8–2.6)
MCHC RBC-ENTMCNC: 25.5 PG — LOW (ref 27–34)
MCHC RBC-ENTMCNC: 32.7 GM/DL — SIGNIFICANT CHANGE UP (ref 32–36)
MCV RBC AUTO: 78 FL — LOW (ref 80–100)
MONOCYTES # BLD AUTO: 1 K/UL — HIGH (ref 0–0.9)
MONOCYTES NFR BLD AUTO: 6.1 % — SIGNIFICANT CHANGE UP (ref 2–14)
NEUTROPHILS # BLD AUTO: 12.19 K/UL — HIGH (ref 1.8–7.4)
NEUTROPHILS NFR BLD AUTO: 74.6 % — SIGNIFICANT CHANGE UP (ref 43–77)
PLATELET # BLD AUTO: 476 K/UL — HIGH (ref 150–400)
POTASSIUM SERPL-MCNC: 3.7 MMOL/L — SIGNIFICANT CHANGE UP (ref 3.5–5.3)
POTASSIUM SERPL-SCNC: 3.7 MMOL/L — SIGNIFICANT CHANGE UP (ref 3.5–5.3)
PROT SERPL-MCNC: 6.9 G/DL — SIGNIFICANT CHANGE UP (ref 6.6–8.7)
PROTHROM AB SERPL-ACNC: 14.3 SEC — HIGH (ref 10.6–13.6)
RBC # BLD: 3.37 M/UL — LOW (ref 4.2–5.8)
RBC # FLD: 17.7 % — HIGH (ref 10.3–14.5)
SODIUM SERPL-SCNC: 136 MMOL/L — SIGNIFICANT CHANGE UP (ref 135–145)
WBC # BLD: 16.33 K/UL — HIGH (ref 3.8–10.5)
WBC # FLD AUTO: 16.33 K/UL — HIGH (ref 3.8–10.5)

## 2021-03-18 PROCEDURE — 88305 TISSUE EXAM BY PATHOLOGIST: CPT | Mod: 26

## 2021-03-18 PROCEDURE — 99239 HOSP IP/OBS DSCHRG MGMT >30: CPT

## 2021-03-18 PROCEDURE — 99261: CPT

## 2021-03-18 PROCEDURE — 85025 COMPLETE CBC W/AUTO DIFF WBC: CPT

## 2021-03-18 PROCEDURE — 88342 IMHCHEM/IMCYTCHM 1ST ANTB: CPT

## 2021-03-18 PROCEDURE — 43239 EGD BIOPSY SINGLE/MULTIPLE: CPT

## 2021-03-18 PROCEDURE — 0225U NFCT DS DNA&RNA 21 SARSCOV2: CPT

## 2021-03-18 PROCEDURE — 99285 EMERGENCY DEPT VISIT HI MDM: CPT | Mod: 25

## 2021-03-18 PROCEDURE — 84484 ASSAY OF TROPONIN QUANT: CPT

## 2021-03-18 PROCEDURE — 83735 ASSAY OF MAGNESIUM: CPT

## 2021-03-18 PROCEDURE — 80053 COMPREHEN METABOLIC PANEL: CPT

## 2021-03-18 PROCEDURE — 82272 OCCULT BLD FECES 1-3 TESTS: CPT

## 2021-03-18 PROCEDURE — 83036 HEMOGLOBIN GLYCOSYLATED A1C: CPT

## 2021-03-18 PROCEDURE — 36430 TRANSFUSION BLD/BLD COMPNT: CPT

## 2021-03-18 PROCEDURE — 86900 BLOOD TYPING SEROLOGIC ABO: CPT

## 2021-03-18 PROCEDURE — 96374 THER/PROPH/DIAG INJ IV PUSH: CPT

## 2021-03-18 PROCEDURE — 82962 GLUCOSE BLOOD TEST: CPT

## 2021-03-18 PROCEDURE — 36415 COLL VENOUS BLD VENIPUNCTURE: CPT

## 2021-03-18 PROCEDURE — 88305 TISSUE EXAM BY PATHOLOGIST: CPT

## 2021-03-18 PROCEDURE — 86901 BLOOD TYPING SEROLOGIC RH(D): CPT

## 2021-03-18 PROCEDURE — 93005 ELECTROCARDIOGRAM TRACING: CPT

## 2021-03-18 PROCEDURE — 88342 IMHCHEM/IMCYTCHM 1ST ANTB: CPT | Mod: 26

## 2021-03-18 PROCEDURE — 80048 BASIC METABOLIC PNL TOTAL CA: CPT

## 2021-03-18 PROCEDURE — 71045 X-RAY EXAM CHEST 1 VIEW: CPT

## 2021-03-18 PROCEDURE — 94660 CPAP INITIATION&MGMT: CPT

## 2021-03-18 PROCEDURE — 86850 RBC ANTIBODY SCREEN: CPT

## 2021-03-18 PROCEDURE — 86769 SARS-COV-2 COVID-19 ANTIBODY: CPT

## 2021-03-18 PROCEDURE — 94640 AIRWAY INHALATION TREATMENT: CPT

## 2021-03-18 PROCEDURE — 85610 PROTHROMBIN TIME: CPT

## 2021-03-18 PROCEDURE — 86923 COMPATIBILITY TEST ELECTRIC: CPT

## 2021-03-18 PROCEDURE — P9016: CPT

## 2021-03-18 PROCEDURE — 82550 ASSAY OF CK (CPK): CPT

## 2021-03-18 PROCEDURE — 85027 COMPLETE CBC AUTOMATED: CPT

## 2021-03-18 PROCEDURE — 84100 ASSAY OF PHOSPHORUS: CPT

## 2021-03-18 RX ORDER — PANTOPRAZOLE SODIUM 20 MG/1
1 TABLET, DELAYED RELEASE ORAL
Qty: 30 | Refills: 0
Start: 2021-03-18 | End: 2021-04-16

## 2021-03-18 RX ADMIN — PANTOPRAZOLE SODIUM 40 MILLIGRAM(S): 20 TABLET, DELAYED RELEASE ORAL at 09:38

## 2021-03-18 RX ADMIN — Medication 60 MILLIGRAM(S): at 04:44

## 2021-03-18 RX ADMIN — CARVEDILOL PHOSPHATE 12.5 MILLIGRAM(S): 80 CAPSULE, EXTENDED RELEASE ORAL at 04:44

## 2021-03-18 NOTE — DISCHARGE NOTE PROVIDER - NSDCCPCAREPLAN_GEN_ALL_CORE_FT
PRINCIPAL DISCHARGE DIAGNOSIS  Diagnosis: Symptomatic anemia  Assessment and Plan of Treatment:       SECONDARY DISCHARGE DIAGNOSES  Diagnosis: Hypokalemia  Assessment and Plan of Treatment:     Diagnosis: GIB (gastrointestinal bleeding)  Assessment and Plan of Treatment:

## 2021-03-18 NOTE — PROGRESS NOTE ADULT - ASSESSMENT
ESRD HD [facility] on MWF schedule- dialysis tomorrow  Has h/o HTN, Sleep apnea, IDDM, Bipolar 1 d/o  Anemia requiring transfusion + FOBT  GI eval noted plans for EGD today  Noted GI eval EGD today  Consult obtained    Will follow

## 2021-03-18 NOTE — BRIEF OPERATIVE NOTE - OPERATION/FINDINGS
1cm white based and depp ulcer in the proximal bulb with friable edges but no active bleeding with surrounding marked duodenitis. Otherwise normal exam

## 2021-03-18 NOTE — PROGRESS NOTE ADULT - SUBJECTIVE AND OBJECTIVE BOX
NEPHROLOGY INTERVAL HPI/OVERNIGHT EVENTS:    Examined earlier  Lethargic / no distress  Plans for EGD today    MEDICATIONS  (STANDING):  carvedilol 12.5 milliGRAM(s) Oral every 12 hours  dextrose 40% Gel 15 Gram(s) Oral once  dextrose 5%. 1000 milliLiter(s) (50 mL/Hr) IV Continuous <Continuous>  dextrose 5%. 1000 milliLiter(s) (100 mL/Hr) IV Continuous <Continuous>  dextrose 50% Injectable 25 Gram(s) IV Push once  dextrose 50% Injectable 12.5 Gram(s) IV Push once  dextrose 50% Injectable 25 Gram(s) IV Push once  glucagon  Injectable 1 milliGRAM(s) IntraMuscular once  insulin lispro (ADMELOG) corrective regimen sliding scale   SubCutaneous three times a day before meals  NIFEdipine XL 60 milliGRAM(s) Oral daily  pantoprazole  Injectable 40 milliGRAM(s) IV Push two times a day  QUEtiapine 100 milliGRAM(s) Oral at bedtime  risperiDONE   Tablet 3 milliGRAM(s) Oral two times a day  traZODone 100 milliGRAM(s) Oral at bedtime    MEDICATIONS  (PRN):      Allergies    No Known Drug Allergies  shellfish (Unknown)    Intolerances        Vital Signs Last 24 Hrs  T(C): 36.8 (18 Mar 2021 04:33), Max: 37.2 (17 Mar 2021 18:13)  T(F): 98.2 (18 Mar 2021 04:33), Max: 98.9 (17 Mar 2021 18:13)  HR: 73 (18 Mar 2021 04:33) (66 - 80)  BP: 133/89 (18 Mar 2021 04:33) (133/73 - 143/84)  BP(mean): --  RR: 18 (18 Mar 2021 04:33) (18 - 18)  SpO2: 97% (18 Mar 2021 04:33) (97% - 98%)  Daily     Daily     PHYSICAL EXAM:  GENERAL: appears chronically ill  HEAD:  NCAT  EYES: EOMI  NECK: Supple, neck  veins full  NERVOUS SYSTEM:  Alert & Oriented X3  CHEST/LUNG: Clear to percussion bilaterally  HEART: Regular rate and rhythm  ABDOMEN: Soft, Nontender, Nondistended; +BS  EXTREMITIES: Trace edema    LABS:                        8.6    16.33 )-----------( 476      ( 18 Mar 2021 09:01 )             26.3     03-18    136  |  94<L>  |  24.0<H>  ----------------------------<  113<H>  3.7   |  30.0<H>  |  6.00<H>    Ca    9.4      18 Mar 2021 09:01  Phos  3.8     03-16  Mg     2.0     03-18    TPro  6.9  /  Alb  3.6  /  TBili  0.5  /  DBili  x   /  AST  11  /  ALT  14  /  AlkPhos  103  03-18    PT/INR - ( 18 Mar 2021 09:01 )   PT: 14.3 sec;   INR: 1.25 ratio             Magnesium, Serum: 2.0 mg/dL (03-18 @ 09:01)          RADIOLOGY & ADDITIONAL TESTS:

## 2021-03-18 NOTE — DISCHARGE NOTE PROVIDER - NSDCFUSCHEDAPPT_GEN_ALL_CORE_FT
MILENA PUGA ; 03/25/2021 ; [facility] [address] MILENA Scales ; 03/25/2021 ; [facility] [address]

## 2021-03-18 NOTE — DISCHARGE NOTE NURSING/CASE MANAGEMENT/SOCIAL WORK - PATIENT PORTAL LINK FT
You can access the FollowMyHealth Patient Portal offered by [facility] by registering at the following website: [email or web address]. By joining [employer]’s FollowMyHealth portal, you will also be able to view your health information using other applications (apps) compatible with our system.

## 2021-03-18 NOTE — DISCHARGE NOTE PROVIDER - CARE PROVIDER_API CALL
Mani Cohen)  Gastroenterology; Internal Medicine  [address] [place] [address], [place] [address]  Phone: [phone]  Fax: [phone]  Follow Up Time:

## 2021-03-18 NOTE — DISCHARGE NOTE PROVIDER - CARE PROVIDERS DIRECT ADDRESSES
[email or web address]

## 2021-03-18 NOTE — DISCHARGE NOTE PROVIDER - HOSPITAL COURSE
35 y/o M with a history of ESRD on HD, diabetes, hypertension, bipolar disorder, and ARVIND presented to [facility] ED with fatigue, dark stools x2 weeks and was found to be anemic with HD on 03/15. nephrology consulted to continue HD while hospital. GI consulted for concerns of GI bleed. patient with hgb of 6.0 with improvement to 8.6 after 2U PRBC. EGD noted to 1 cm white based and deep ulcer in the proximal bulf with friable edges but no active bleeding with surrounding marked duodenitis. Otherwise normal exam. multiple bx taken to r/o H. Pylori. continue with PPI BID. patient tolerating diet advancement. Patient requesting discharge at this time, discussed with CM and able to resume outpatient HD tomorrow at patient's HD center. Discussed with patient to follow up with Dr. Cohen in 1-2 weeks after discharge. Discussed with patient to return to ED if symptoms were to return. At this time patient is stable for discharge.     length of discharge 30mins          35 y/o M with a history of ESRD on HD, diabetes, hypertension, bipolar disorder, and ARVIND presented to [facility] ED with fatigue, dark stools x2 weeks and was found to be anemic with HD on 03/15. nephrology consulted to continue HD while hospital. GI consulted for concerns of GI bleed. patient with hgb of 6.0 with improvement to 8.6 after 2U PRBC. EGD noted to 1 cm white based and deep ulcer in the proximal bulf with friable edges but no active bleeding with surrounding marked duodenitis. Otherwise normal exam. multiple bx taken to r/o H. Pylori. continue with PPI BID. patient tolerating diet advancement. Patient requesting discharge at this time, discussed with CM and able to resume outpatient HD tomorrow at patient's HD center. Discussed with patient to follow up with Dr. Cohen in 1-2 weeks after discharge. Discussed with patient to return to ED if symptoms were to return. At this time patient is stable for discharge.     Vital Signs Last 24 Hrs  T(C): 36.8 (18 Mar 2021 04:33), Max: 37.2 (17 Mar 2021 18:13)  T(F): 98.2 (18 Mar 2021 04:33), Max: 98.9 (17 Mar 2021 18:13)  HR: 73 (18 Mar 2021 04:33) (73 - 80)  BP: 133/89 (18 Mar 2021 04:33) (133/73 - 143/84)  BP(mean): --  RR: 18 (18 Mar 2021 04:33) (18 - 18)  SpO2: 97% (18 Mar 2021 04:33) (97% - 98%)    CONSTITUTIONAL: NAD  EYES: +EOMI  CARDIAC: Regular rate, regular rhythm.  normal +S1, S2.  No murmurs, rubs or gallops.  RESPIRATORY: Clear to auscultation bilaterally, no wheezes noted  GASTROINTESTINAL: Abdomen soft, non-tender, no guarding.  NEUROLOGICAL: Alert and oriented, no focal deficits, no motor or sensory deficits.  SKIN: warm, dry, LUE AV fistula     length of discharge 30mins

## 2021-03-18 NOTE — DISCHARGE NOTE PROVIDER - NSDCMRMEDTOKEN_GEN_ALL_CORE_FT
Admelog SoloStar 100 units/mL injectable solution: 8 unit(s) subcutaneous 3 times a day before meals  aspirin 81 mg oral tablet: 1 tab(s) orally once a day  carvedilol 12.5 mg oral tablet: 1 tab(s) orally every 12 hours  folic acid 1 mg oral tablet: 1 tab(s) orally once a day  hydrALAZINE 50 mg oral tablet: 1 tab(s) orally 3 times a day  Lantus Solostar Pen 100 units/mL subcutaneous solution: 25  subcutaneous once a day (at bedtime)   NIFEdipine 60 mg oral tablet, extended release: 1 tab(s) orally once a day  Protonix 40 mg oral delayed release tablet: 1 tab(s) orally once a day  RisperDAL Consta 25 mg/2 weeks intramuscular injection, extended release: 1 dose(s) intramuscular every 2 weeks, per pt med list, last dose was scheduled on 1/27/2021  risperiDONE 3 mg oral tablet: 1 tab(s) orally 2 times a day  rosuvastatin 5 mg oral tablet: 1 tab(s) orally once a day  SEROquel 100 mg oral tablet: 1 tab(s) orally once a day (at bedtime)  sevelamer carbonate 800 mg oral tablet: 1 tab(s) orally 3 times a day (with meals)  traZODone 100 mg oral tablet: 1 tab(s) orally once a day (at bedtime)  Vitamin D3 2000 intl units (50 mcg) oral tablet: 2 cap(s) orally once a day

## 2021-03-18 NOTE — BRIEF OPERATIVE NOTE - NSICDXBRIEFPREOP_GEN_ALL_CORE_FT
PRE-OP DIAGNOSIS:  History of melena 18-Mar-2021 12:04:54  Mani Cohen  Anemia 18-Mar-2021 12:04:40  Mani Cohen  Occult blood in stools 18-Mar-2021 12:04:23  Mani Cohen

## 2021-03-22 ENCOUNTER — NON-APPOINTMENT (OUTPATIENT)
Age: 36
End: 2021-03-22

## 2021-03-22 LAB — SURGICAL PATHOLOGY STUDY: SIGNIFICANT CHANGE UP

## 2021-03-23 ENCOUNTER — NON-APPOINTMENT (OUTPATIENT)
Age: 36
End: 2021-03-23

## 2021-03-25 ENCOUNTER — APPOINTMENT (OUTPATIENT)
Dept: FAMILY MEDICINE | Facility: CLINIC | Age: 36
End: 2021-03-25
Payer: MEDICARE

## 2021-03-25 ENCOUNTER — APPOINTMENT (OUTPATIENT)
Dept: ENDOCRINOLOGY | Facility: CLINIC | Age: 36
End: 2021-03-25
Payer: MEDICARE

## 2021-03-25 ENCOUNTER — RESULT CHARGE (OUTPATIENT)
Age: 36
End: 2021-03-25

## 2021-03-25 VITALS
TEMPERATURE: 98.3 F | WEIGHT: 265 LBS | HEART RATE: 83 BPM | OXYGEN SATURATION: 96 % | SYSTOLIC BLOOD PRESSURE: 138 MMHG | DIASTOLIC BLOOD PRESSURE: 84 MMHG | BODY MASS INDEX: 42.59 KG/M2 | HEIGHT: 66 IN

## 2021-03-25 VITALS
OXYGEN SATURATION: 98 % | SYSTOLIC BLOOD PRESSURE: 122 MMHG | HEIGHT: 66 IN | HEART RATE: 85 BPM | BODY MASS INDEX: 42.59 KG/M2 | WEIGHT: 265 LBS | DIASTOLIC BLOOD PRESSURE: 76 MMHG

## 2021-03-25 DIAGNOSIS — E87.6 HYPOKALEMIA: ICD-10-CM

## 2021-03-25 DIAGNOSIS — K92.2 GASTROINTESTINAL HEMORRHAGE, UNSPECIFIED: ICD-10-CM

## 2021-03-25 LAB — GLUCOSE BLDC GLUCOMTR-MCNC: 181

## 2021-03-25 PROCEDURE — 99072 ADDL SUPL MATRL&STAF TM PHE: CPT

## 2021-03-25 PROCEDURE — 82962 GLUCOSE BLOOD TEST: CPT

## 2021-03-25 PROCEDURE — 99496 TRANSJ CARE MGMT HIGH F2F 7D: CPT | Mod: 25

## 2021-03-25 PROCEDURE — 99214 OFFICE O/P EST MOD 30 MIN: CPT | Mod: 25

## 2021-03-25 PROCEDURE — 36415 COLL VENOUS BLD VENIPUNCTURE: CPT

## 2021-03-25 PROCEDURE — 99214 OFFICE O/P EST MOD 30 MIN: CPT

## 2021-03-25 NOTE — HISTORY OF PRESENT ILLNESS
[FreeTextEntry1] : Pt. f/u with DM2 / HLD  [de-identified] : 37 yo male for 1M f/u.  pt is s/p [facility] admission on 3/16/21-3/18/21 secondary to GI bleed and Hyokalemia.   EGD performed by Dr. Cohen,   bleed isolated and repaired.   Pt given 2U of PRBC's secondary to drop in Hgb=6.7 and 8.6 upon d/c.  Pt feels \par well today \par \par Denies CP/SOB denies dizziness and/or palpitations no N/V/D +BM qd NL no bloody/black stools no change in urinary complaints  \par \par pt  started on Protonix 40mg qd post d/c x 2M

## 2021-03-25 NOTE — PHYSICAL EXAM
[Alert] : alert [Obese] : obese [No LAD] : no lymphadenopathy [No Thyroid Nodules] : no palpable thyroid nodules [No Accessory Muscle Use] : no accessory muscle use [Clear to Auscultation] : lungs were clear to auscultation bilaterally [Normal S1, S2] : normal S1 and S2 [Normal Rate] : heart rate was normal [No Edema] : no peripheral edema [Normal Bowel Sounds] : normal bowel sounds [Soft] : abdomen soft [Oriented x3] : oriented to person, place, and time [Normal Affect] : the affect was normal [Normal Insight/Judgement] : insight and judgment were intact [Normal Mood] : the mood was normal

## 2021-03-25 NOTE — PHYSICAL EXAM
[No Acute Distress] : no acute distress [Well Nourished] : well nourished [Well Developed] : well developed [Well-Appearing] : well-appearing [EOMI] : extraocular movements intact [Normal Outer Ear/Nose] : the outer ears and nose were normal in appearance [No Respiratory Distress] : no respiratory distress  [No Accessory Muscle Use] : no accessory muscle use [Clear to Auscultation] : lungs were clear to auscultation bilaterally [Normal Rate] : normal rate  [Regular Rhythm] : with a regular rhythm [Normal S1, S2] : normal S1 and S2 [No Carotid Bruits] : no carotid bruits [No CVA Tenderness] : no CVA  tenderness [Grossly Normal Strength/Tone] : grossly normal strength/tone [No Rash] : no rash [Coordination Grossly Intact] : coordination grossly intact [No Focal Deficits] : no focal deficits [Normal Gait] : normal gait [Normal Affect] : the affect was normal [Normal Mood] : the mood was normal [de-identified] : +ve murmur  [de-identified] : obese abdomen

## 2021-03-26 NOTE — ASSESSMENT
[FreeTextEntry1] : uncontrolled T2DM complicated by ESRD on HD - glucoses improved with adherence to insulin and diet\par - discussed risks of hypoglycemia in setting of ESRD and decreased insulin clearance and importance of adherence with 4x daily glucose checks.  not candidate for CGM due to HD\par - change meter to OneTouch Verio, Rx meter and supplies sent\par - repeat A1c needed but not at this time, will be falsely low b/c he received blood transfusion\par - try and get labs from [facility] hospital\par - decrease Lantus to 22 units BID, cont Admelog 8 units w meals\par - record numbers, RTO 2 weeks w CDE and 4 weeks w NP\par - eye exam with ophthalmology\par - f/u renal\par \par HLD -  cont statin\par \par Morbid obesity -counseled pt on lifestyle changes with diet and exercise

## 2021-03-26 NOTE — REVIEW OF SYSTEMS
[Cough] : cough [Depression] : depression [Anxiety] : anxiety [Recent Weight Gain (___ Lbs)] : no recent weight gain [Recent Weight Loss (___ Lbs)] : no recent weight loss [Blurred Vision] : no blurred vision [Chest Pain] : no chest pain [Palpitations] : no palpitations [Shortness Of Breath] : no shortness of breath [SOB on Exertion] : no shortness of breath on exertion [Nausea] : no nausea [Abdominal Pain] : no abdominal pain [Diarrhea] : no diarrhea [Polyuria] : no polyuria [Nocturia] : no nocturia [Pain/Numbness of Digits] : no pain/numbness of digits [de-identified] : dry mouth

## 2021-03-26 NOTE — ASSESSMENT
[FreeTextEntry1] : uncontrolled T2DM complicated by ESRD on HD - glucoses improved with adherence to insulin and diet\par - discussed risks of hypoglycemia in setting of ESRD and decreased insulin clearance and importance of adherence with 4x daily glucose checks.  not candidate for CGM due to HD\par - change meter to OneTouch Verio, Rx meter and supplies sent\par - repeat A1c needed but not at this time, will be falsely low b/c he received blood transfusion\par - try and get labs from [facility] hospital\par - decrease Lantus to 22 units BID, cont Admelog 8 units w meals\par - record numbers, RTO 2 weeks w CDE and 4 weeks w NP\par - eye exam with ophthalmology\par - f/u renal\par \par HLD -  cont statin\par \par Morbid obesity -counseled pt on lifestyle changes with diet and exercise

## 2021-03-26 NOTE — HISTORY OF PRESENT ILLNESS
[FreeTextEntry1] : Interval Hx - hospitalized [facility] for hypercarbid resp failure and pneumonia 2/2021 at then hospitalized for GI bleed from bleeding ulcer w severe anemia 3/22/21. s/p AVF and started HD 2 months ago\par \par Quality: Type 2\par Severity: severe, uncontrolled\par Duration: 2004\par Onset: hospitalized for psychiatric illness and diagnosed with diabetes at that time\par \par ASSOCIATED SYMPTOMS/COMPLICATIONS: \par no recent eye exam\par denies neuropathy. \par (+) proteinuria, ESRD on HD \par he denies history of heart disease.\par \par MODIFYING FACTORS: living w brother and sister\par Diet: trying to eat healthier\par Exercise: not that much\par Current DM meds: adherent w insulin\par Lantus (pens) 25 units BID\par Admelog (pens) 8 units TID\par \par SMBG - meter (metene) from [employer], Some numbers reviewed 120-200's - but difficult to review numbers\par 7 days 161\par 14 day avg 190\par 21 day avg 175\par

## 2021-03-26 NOTE — REVIEW OF SYSTEMS
[Cough] : cough [Depression] : depression [Anxiety] : anxiety [Recent Weight Gain (___ Lbs)] : no recent weight gain [Recent Weight Loss (___ Lbs)] : no recent weight loss [Blurred Vision] : no blurred vision [Chest Pain] : no chest pain [Palpitations] : no palpitations [Shortness Of Breath] : no shortness of breath [SOB on Exertion] : no shortness of breath on exertion [Nausea] : no nausea [Abdominal Pain] : no abdominal pain [Diarrhea] : no diarrhea [Polyuria] : no polyuria [Nocturia] : no nocturia [Pain/Numbness of Digits] : no pain/numbness of digits [de-identified] : dry mouth

## 2021-03-26 NOTE — HISTORY OF PRESENT ILLNESS
[FreeTextEntry1] : Interval Hx - hospitalized [facility] for hypercarbid resp failure and pneumonia 2/2021 at then hospitalized for GI bleed from bleeding ulcer w severe anemia 3/22/21. s/p AVF and started HD 2 months ago\par \par Quality: Type 2\par Severity: severe, uncontrolled\par Duration: 2004\par Onset: hospitalized for psychiatric illness and diagnosed with diabetes at that time\par \par ASSOCIATED SYMPTOMS/COMPLICATIONS: \par no recent eye exam\par denies neuropathy. \par (+) proteinuria, ESRD on HD \par he denies history of heart disease.\par \par MODIFYING FACTORS: living w brother and sister\par Diet: trying to eat healthier\par Exercise: not that much\par Current DM meds: adherent w insulin\par Lantus (pens) 25 units BID\par Admelog (pens) 8 units TID\par \par SMBG - meter (metene) from [employer], Some numbers reviewed 120-200's - but difficult to review numbers\par 7 days 161\par 14 day avg 190\par 21 day avg 175\par

## 2021-03-29 LAB
ALBUMIN SERPL ELPH-MCNC: 3.8 G/DL
ALP BLD-CCNC: 103 U/L
ALT SERPL-CCNC: 13 U/L
ANION GAP SERPL CALC-SCNC: 16 MMOL/L
AST SERPL-CCNC: 11 U/L
BASOPHILS # BLD AUTO: 0.1 K/UL
BASOPHILS NFR BLD AUTO: 0.6 %
BILIRUB SERPL-MCNC: <0.2 MG/DL
BUN SERPL-MCNC: 25 MG/DL
CALCIUM SERPL-MCNC: 9.4 MG/DL
CHLORIDE SERPL-SCNC: 97 MMOL/L
CO2 SERPL-SCNC: 27 MMOL/L
CREAT SERPL-MCNC: 5.18 MG/DL
EOSINOPHIL # BLD AUTO: 0.48 K/UL
EOSINOPHIL NFR BLD AUTO: 2.8 %
GLUCOSE SERPL-MCNC: 150 MG/DL
HCT VFR BLD CALC: 28.5 %
HGB BLD-MCNC: 8.7 G/DL
IMM GRANULOCYTES NFR BLD AUTO: 0.5 %
LYMPHOCYTES # BLD AUTO: 3.53 K/UL
LYMPHOCYTES NFR BLD AUTO: 20.5 %
MAN DIFF?: NORMAL
MCHC RBC-ENTMCNC: 24.8 PG
MCHC RBC-ENTMCNC: 30.5 GM/DL
MCV RBC AUTO: 81.2 FL
MONOCYTES # BLD AUTO: 0.85 K/UL
MONOCYTES NFR BLD AUTO: 4.9 %
NEUTROPHILS # BLD AUTO: 12.16 K/UL
NEUTROPHILS NFR BLD AUTO: 70.7 %
PLATELET # BLD AUTO: 409 K/UL
POTASSIUM SERPL-SCNC: 3.5 MMOL/L
PROT SERPL-MCNC: 6.8 G/DL
RBC # BLD: 3.51 M/UL
RBC # FLD: 18.8 %
SODIUM SERPL-SCNC: 140 MMOL/L
WBC # FLD AUTO: 17.2 K/UL

## 2021-04-08 ENCOUNTER — RX RENEWAL (OUTPATIENT)
Age: 36
End: 2021-04-08

## 2021-04-08 ENCOUNTER — APPOINTMENT (OUTPATIENT)
Dept: FAMILY MEDICINE | Facility: CLINIC | Age: 36
End: 2021-04-08

## 2021-04-09 ENCOUNTER — LABORATORY RESULT (OUTPATIENT)
Age: 36
End: 2021-04-09

## 2021-04-16 ENCOUNTER — RX RENEWAL (OUTPATIENT)
Age: 36
End: 2021-04-16

## 2021-04-26 ENCOUNTER — RX RENEWAL (OUTPATIENT)
Age: 36
End: 2021-04-26

## 2021-04-27 ENCOUNTER — APPOINTMENT (OUTPATIENT)
Dept: CARDIOLOGY | Facility: CLINIC | Age: 36
End: 2021-04-27
Payer: MEDICARE

## 2021-04-27 ENCOUNTER — NON-APPOINTMENT (OUTPATIENT)
Age: 36
End: 2021-04-27

## 2021-04-27 VITALS
TEMPERATURE: 97.4 F | DIASTOLIC BLOOD PRESSURE: 70 MMHG | HEART RATE: 82 BPM | BODY MASS INDEX: 41.78 KG/M2 | HEIGHT: 66 IN | OXYGEN SATURATION: 98 % | SYSTOLIC BLOOD PRESSURE: 128 MMHG | WEIGHT: 260 LBS

## 2021-04-27 PROCEDURE — 93000 ELECTROCARDIOGRAM COMPLETE: CPT

## 2021-04-27 PROCEDURE — 99214 OFFICE O/P EST MOD 30 MIN: CPT | Mod: 25

## 2021-04-27 PROCEDURE — 99072 ADDL SUPL MATRL&STAF TM PHE: CPT

## 2021-04-27 RX ORDER — PANTOPRAZOLE 40 MG/1
40 TABLET, DELAYED RELEASE ORAL DAILY
Qty: 30 | Refills: 1 | Status: DISCONTINUED | COMMUNITY
Start: 2021-03-25 | End: 2021-04-27

## 2021-04-27 RX ORDER — DOCUSATE SODIUM 100 MG/1
100 CAPSULE ORAL TWICE DAILY
Refills: 0 | Status: ACTIVE | COMMUNITY

## 2021-04-27 RX ORDER — SENNOSIDES 8.6 MG/1
8.6 TABLET ORAL AT BEDTIME
Refills: 0 | Status: ACTIVE | COMMUNITY

## 2021-04-27 RX ORDER — FLASH GLUCOSE SENSOR
KIT MISCELLANEOUS
Qty: 6 | Refills: 2 | Status: DISCONTINUED | COMMUNITY
Start: 2020-08-19 | End: 2021-04-27

## 2021-04-27 RX ORDER — FLASH GLUCOSE SCANNING READER
EACH MISCELLANEOUS
Qty: 1 | Refills: 3 | Status: ACTIVE | COMMUNITY
Start: 2020-08-19

## 2021-04-27 RX ORDER — BLOOD-GLUCOSE METER
KIT MISCELLANEOUS
Qty: 1 | Refills: 0 | Status: DISCONTINUED | COMMUNITY
Start: 2019-07-11 | End: 2021-04-27

## 2021-04-27 RX ORDER — AMLODIPINE BESYLATE 10 MG/1
10 TABLET ORAL DAILY
Qty: 90 | Refills: 1 | Status: DISCONTINUED | COMMUNITY
End: 2021-04-27

## 2021-04-27 RX ORDER — RISPERIDONE 3 MG/1
3 TABLET, FILM COATED ORAL TWICE DAILY
Refills: 0 | Status: DISCONTINUED | COMMUNITY
Start: 2021-02-24 | End: 2021-04-27

## 2021-04-27 RX ORDER — BLOOD SUGAR DIAGNOSTIC
STRIP MISCELLANEOUS 4 TIMES DAILY
Qty: 400 | Refills: 2 | Status: ACTIVE | COMMUNITY
Start: 2019-07-11

## 2021-04-27 RX ORDER — BLOOD-GLUCOSE METER
W/DEVICE EACH MISCELLANEOUS
Qty: 1 | Refills: 0 | Status: DISCONTINUED | COMMUNITY
Start: 2021-03-26 | End: 2021-04-27

## 2021-04-27 RX ORDER — LANCETS 28 GAUGE
EACH MISCELLANEOUS
Qty: 4 | Refills: 0 | Status: ACTIVE | COMMUNITY
Start: 2020-02-28

## 2021-04-27 RX ORDER — QUETIAPINE FUMARATE 100 MG/1
100 TABLET ORAL
Qty: 30 | Refills: 0 | Status: DISCONTINUED | COMMUNITY
Start: 2020-08-07 | End: 2021-04-27

## 2021-04-27 RX ORDER — LANCETS 28 GAUGE
EACH MISCELLANEOUS
Qty: 4 | Refills: 0 | Status: ACTIVE | COMMUNITY
Start: 2019-07-11

## 2021-04-29 ENCOUNTER — APPOINTMENT (OUTPATIENT)
Dept: FAMILY MEDICINE | Facility: CLINIC | Age: 36
End: 2021-04-29
Payer: MEDICARE

## 2021-04-29 VITALS
HEIGHT: 66 IN | DIASTOLIC BLOOD PRESSURE: 72 MMHG | OXYGEN SATURATION: 98 % | BODY MASS INDEX: 41.46 KG/M2 | SYSTOLIC BLOOD PRESSURE: 132 MMHG | WEIGHT: 258 LBS | HEART RATE: 81 BPM | TEMPERATURE: 98.1 F

## 2021-04-29 VITALS — DIASTOLIC BLOOD PRESSURE: 60 MMHG | SYSTOLIC BLOOD PRESSURE: 110 MMHG

## 2021-04-29 PROCEDURE — 99214 OFFICE O/P EST MOD 30 MIN: CPT | Mod: 25

## 2021-04-29 PROCEDURE — 36415 COLL VENOUS BLD VENIPUNCTURE: CPT

## 2021-04-29 PROCEDURE — 99072 ADDL SUPL MATRL&STAF TM PHE: CPT

## 2021-04-29 NOTE — HISTORY OF PRESENT ILLNESS
[FreeTextEntry1] : Follow up DM2/HLD [de-identified] : 35 yo male c hx of ESRD currently on HD (M/WF), DM2, and HTN presents for f/u.  States feels well.  s/p Cardio consult on 4/27/21, scheduled for Echo and NST on 6/8/21.    Pt c recent labs performed/ordered by Endo Dr. Mccain, A1C=5.8 on 4/9/21 \par \par pt reports improved energy level,  improved sleep  "6-8hrs"/night \par \par

## 2021-04-29 NOTE — PLAN
[FreeTextEntry1] : see lab orders \par \par cont current meds\par \par \par pt doing very well!!! f/u 3M

## 2021-04-29 NOTE — PHYSICAL EXAM
[No Acute Distress] : no acute distress [Well Nourished] : well nourished [Well Developed] : well developed [Well-Appearing] : well-appearing [EOMI] : extraocular movements intact [Normal Outer Ear/Nose] : the outer ears and nose were normal in appearance [No Respiratory Distress] : no respiratory distress  [No Accessory Muscle Use] : no accessory muscle use [Clear to Auscultation] : lungs were clear to auscultation bilaterally [Normal Rate] : normal rate  [Regular Rhythm] : with a regular rhythm [Normal S1, S2] : normal S1 and S2 [No Carotid Bruits] : no carotid bruits [No CVA Tenderness] : no CVA  tenderness [Grossly Normal Strength/Tone] : grossly normal strength/tone [No Rash] : no rash [Coordination Grossly Intact] : coordination grossly intact [No Focal Deficits] : no focal deficits [Normal Gait] : normal gait [Normal Affect] : the affect was normal [Normal Mood] : the mood was normal [de-identified] : +ve murmur  [de-identified] : obese abdomen

## 2021-04-30 LAB
BASOPHILS # BLD AUTO: 0.12 K/UL
BASOPHILS NFR BLD AUTO: 0.7 %
EOSINOPHIL # BLD AUTO: 0.04 K/UL
EOSINOPHIL NFR BLD AUTO: 0.2 %
HCT VFR BLD CALC: 39.5 %
HGB BLD-MCNC: 12.3 G/DL
IMM GRANULOCYTES NFR BLD AUTO: 0.5 %
LYMPHOCYTES # BLD AUTO: 4.32 K/UL
LYMPHOCYTES NFR BLD AUTO: 24.9 %
MAN DIFF?: NORMAL
MCHC RBC-ENTMCNC: 24.6 PG
MCHC RBC-ENTMCNC: 31.1 GM/DL
MCV RBC AUTO: 78.8 FL
MONOCYTES # BLD AUTO: 0.77 K/UL
MONOCYTES NFR BLD AUTO: 4.4 %
NEUTROPHILS # BLD AUTO: 12.04 K/UL
NEUTROPHILS NFR BLD AUTO: 69.3 %
PLATELET # BLD AUTO: 259 K/UL
RBC # BLD: 5.01 M/UL
RBC # FLD: 18.6 %
WBC # FLD AUTO: 17.38 K/UL

## 2021-05-06 ENCOUNTER — APPOINTMENT (OUTPATIENT)
Dept: ENDOCRINOLOGY | Facility: CLINIC | Age: 36
End: 2021-05-06
Payer: MEDICARE

## 2021-05-06 VITALS
SYSTOLIC BLOOD PRESSURE: 122 MMHG | HEART RATE: 80 BPM | BODY MASS INDEX: 41.78 KG/M2 | WEIGHT: 260 LBS | DIASTOLIC BLOOD PRESSURE: 80 MMHG | HEIGHT: 66 IN

## 2021-05-06 DIAGNOSIS — R73.9 HYPERGLYCEMIA, UNSPECIFIED: ICD-10-CM

## 2021-05-06 LAB — GLUCOSE BLDC GLUCOMTR-MCNC: 223

## 2021-05-06 PROCEDURE — 82962 GLUCOSE BLOOD TEST: CPT

## 2021-05-06 PROCEDURE — 99072 ADDL SUPL MATRL&STAF TM PHE: CPT

## 2021-05-06 PROCEDURE — 99214 OFFICE O/P EST MOD 30 MIN: CPT | Mod: 25

## 2021-05-06 NOTE — PHYSICAL EXAM
[Alert] : alert [Well Nourished] : well nourished [No Acute Distress] : no acute distress [Normal Sclera/Conjunctiva] : normal sclera/conjunctiva [Normal Hearing] : hearing was normal [Thyroid Not Enlarged] : the thyroid was not enlarged [No Accessory Muscle Use] : no accessory muscle use [Clear to Auscultation] : lungs were clear to auscultation bilaterally [Normal S1, S2] : normal S1 and S2 [Normal Rate] : heart rate was normal [No Edema] : no peripheral edema [Not Tender] : non-tender [Soft] : abdomen soft [No Rash] : no rash [No Tremors] : no tremors [Oriented x3] : oriented to person, place, and time [de-identified] : left forearm AV fistula

## 2021-05-06 NOTE — ASSESSMENT
[FreeTextEntry1] : T2DM\par -Pt appears to be doing well but did not bring logs or meter to apt. Explained to patient that HGA1C is no longer accurate measure of his control of DM and he must begin to transcribe his blood sugars onto logs and send biweekly.\par -Continue current insulin dosing for now with potential for change if BS do not meet goal\par -Watch diet and increase exercise as tolerated, when cleared from cardiology\par -Continue to follow up with all specialists including ophtho, nephrology, cardiology\par \par \par HTN\par -BP stable in office, continue regimen as per PCP,Nephrology\par \par HLD\par -Continue statin, cholesterol at goal\par \par Vit D Def\par -Conitnue daily supplement\par \par RTO 6 weeks

## 2021-05-06 NOTE — REVIEW OF SYSTEMS
[Nocturia] : nocturia [Fatigue] : no fatigue [Recent Weight Gain (___ Lbs)] : no recent weight gain [Recent Weight Loss (___ Lbs)] : no recent weight loss [Chest Pain] : no chest pain [Shortness Of Breath] : no shortness of breath [Constipation] : no constipation [Diarrhea] : no diarrhea [Polyuria] : no polyuria [Polydipsia] : no polydipsia [FreeTextEntry8] : still makes urine [de-identified] : fluid restriction

## 2021-05-06 NOTE — HISTORY OF PRESENT ILLNESS
[FreeTextEntry1] : Here with nurse Joan. Lives in facility.\par \par Quality: Type 2\par Severity: severe, uncontrolled\par Duration: 2004\par Onset: hospitalized for psychiatric illness and diagnosed with diabetes at that time\par Associated symptoms: ESRD on HD \par \par SMBG\par Checks BS 3x a day\par On average 100s (no logs or meter present today)\par \par FS in office 223- had late breakfast- eggs, toast\par \par A1C 4/2021- 5.8 (dialysis patient, accurate?)\par \par Current drug regimen\par Lantus 22 units BID\par Ademlog 8 units TID with meals \par \par Eye exam: 4/2021- denies DR- three month follow up \par Foot exam: does not see podiatry- denies numbness/tingling in b/l feet\par Kidney disease: follows with nephrology- ESRD \par Heart disease: follows with cardiology- scheduled for echo and treadmil stress test 6/2021\par \par Weight: stable\par Diet: eats 2-3 meals a day, pt cooks for himself ,denies eating out a lot \par Exercise: not cleared for exercise\par Smoking : denies\par \par HLD\par Triglyceride 129, Total cholesterol 137, HDL 45, LDL 67\par Rosuvastatin 5 mg daily\par \par HTN\par BP in office 122/80\par Carvedilol 12.5 mg BID\par Hydralazine 50 mg TID\par Nifedpine ER 60 mg daily\par \par VIt D Def\par No updated levels\par On daily supplement (unsure of dose)

## 2021-05-07 ENCOUNTER — RX RENEWAL (OUTPATIENT)
Age: 36
End: 2021-05-07

## 2021-05-10 ENCOUNTER — RX RENEWAL (OUTPATIENT)
Age: 36
End: 2021-05-10

## 2021-06-08 ENCOUNTER — APPOINTMENT (OUTPATIENT)
Dept: CARDIOLOGY | Facility: CLINIC | Age: 36
End: 2021-06-08
Payer: MEDICARE

## 2021-06-08 PROCEDURE — 93306 TTE W/DOPPLER COMPLETE: CPT

## 2021-06-08 PROCEDURE — 93015 CV STRESS TEST SUPVJ I&R: CPT

## 2021-06-08 PROCEDURE — 78452 HT MUSCLE IMAGE SPECT MULT: CPT

## 2021-06-08 PROCEDURE — A9500: CPT

## 2021-06-29 ENCOUNTER — APPOINTMENT (OUTPATIENT)
Dept: FAMILY MEDICINE | Facility: CLINIC | Age: 36
End: 2021-06-29
Payer: MEDICARE

## 2021-06-29 VITALS
DIASTOLIC BLOOD PRESSURE: 82 MMHG | BODY MASS INDEX: 40.5 KG/M2 | SYSTOLIC BLOOD PRESSURE: 110 MMHG | OXYGEN SATURATION: 98 % | WEIGHT: 252 LBS | TEMPERATURE: 97.7 F | HEIGHT: 66 IN | HEART RATE: 81 BPM

## 2021-06-29 VITALS — DIASTOLIC BLOOD PRESSURE: 82 MMHG | SYSTOLIC BLOOD PRESSURE: 130 MMHG

## 2021-06-29 PROCEDURE — G0442 ANNUAL ALCOHOL SCREEN 15 MIN: CPT

## 2021-06-29 PROCEDURE — G0444 DEPRESSION SCREEN ANNUAL: CPT | Mod: 59

## 2021-06-29 PROCEDURE — 99214 OFFICE O/P EST MOD 30 MIN: CPT | Mod: 25

## 2021-06-29 NOTE — HISTORY OF PRESENT ILLNESS
[FreeTextEntry1] : Follow up DM2/ ESRD [de-identified] : 35 yo male for f/u on DM2/ESRD/Anemia \par \par pt feels well.  +ve HD M W F  pt denies transportation issues

## 2021-07-06 ENCOUNTER — TRANSCRIPTION ENCOUNTER (OUTPATIENT)
Age: 36
End: 2021-07-06

## 2021-07-06 NOTE — PATIENT PROFILE ADULT - FUNCTIONAL SCREEN CURRENT LEVEL: COMMUNICATION, MLM
Rendering Text In Billing: The biopsy specimens were grossed and processed into slides. 0 = understands/communicates without difficulty

## 2021-07-15 ENCOUNTER — RX RENEWAL (OUTPATIENT)
Age: 36
End: 2021-07-15

## 2021-07-22 ENCOUNTER — APPOINTMENT (OUTPATIENT)
Dept: ENDOCRINOLOGY | Facility: CLINIC | Age: 36
End: 2021-07-22

## 2021-08-03 ENCOUNTER — APPOINTMENT (OUTPATIENT)
Dept: FAMILY MEDICINE | Facility: CLINIC | Age: 36
End: 2021-08-03
Payer: MEDICARE

## 2021-08-03 VITALS
BODY MASS INDEX: 40.18 KG/M2 | TEMPERATURE: 98 F | WEIGHT: 250 LBS | OXYGEN SATURATION: 96 % | HEART RATE: 82 BPM | HEIGHT: 66 IN | SYSTOLIC BLOOD PRESSURE: 116 MMHG | DIASTOLIC BLOOD PRESSURE: 70 MMHG

## 2021-08-03 VITALS — SYSTOLIC BLOOD PRESSURE: 118 MMHG | DIASTOLIC BLOOD PRESSURE: 78 MMHG

## 2021-08-03 PROCEDURE — 36415 COLL VENOUS BLD VENIPUNCTURE: CPT

## 2021-08-03 PROCEDURE — 99214 OFFICE O/P EST MOD 30 MIN: CPT | Mod: 25

## 2021-08-03 RX ORDER — FLUPHENAZINE HYDROCHLORIDE 10 MG/1
10 TABLET, FILM COATED ORAL
Refills: 0 | Status: ACTIVE | COMMUNITY

## 2021-08-03 RX ORDER — CHOLECALCIFEROL (VITAMIN D3) 125 MCG
50 MCG TABLET ORAL
Qty: 180 | Refills: 3 | Status: DISCONTINUED | COMMUNITY
Start: 2020-08-07 | End: 2021-08-03

## 2021-08-03 NOTE — HISTORY OF PRESENT ILLNESS
[FreeTextEntry1] : follow up on DM2/HLD [de-identified] : 35 yo male for f/u on ESRD/HTN/HLD/DM2 and Vit D def.  pt feels well  presents c Nurse "Melissa."  \par NO current complaints at this time \par tolerant and compliant c all meds as Rx'ed

## 2021-08-05 LAB
25(OH)D3 SERPL-MCNC: 51.5 NG/ML
ALBUMIN SERPL ELPH-MCNC: 4 G/DL
ALP BLD-CCNC: 148 U/L
ALT SERPL-CCNC: 15 U/L
ANION GAP SERPL CALC-SCNC: 22 MMOL/L
AST SERPL-CCNC: 15 U/L
BASOPHILS # BLD AUTO: 0.1 K/UL
BASOPHILS NFR BLD AUTO: 0.7 %
BILIRUB SERPL-MCNC: 0.4 MG/DL
BUN SERPL-MCNC: 36 MG/DL
CALCIUM SERPL-MCNC: 9.7 MG/DL
CHLORIDE SERPL-SCNC: 93 MMOL/L
CHOLEST SERPL-MCNC: 151 MG/DL
CK SERPL-CCNC: 142 U/L
CO2 SERPL-SCNC: 24 MMOL/L
CREAT SERPL-MCNC: 5.76 MG/DL
EOSINOPHIL # BLD AUTO: 0.67 K/UL
EOSINOPHIL NFR BLD AUTO: 4.5 %
ESTIMATED AVERAGE GLUCOSE: 160 MG/DL
GGT SERPL-CCNC: 76 U/L
GLUCOSE SERPL-MCNC: 164 MG/DL
HBA1C MFR BLD HPLC: 7.2 %
HCT VFR BLD CALC: 38.3 %
HDLC SERPL-MCNC: 39 MG/DL
HGB BLD-MCNC: 12.5 G/DL
IMM GRANULOCYTES NFR BLD AUTO: 0.3 %
LDLC SERPL CALC-MCNC: 73 MG/DL
LYMPHOCYTES # BLD AUTO: 3.46 K/UL
LYMPHOCYTES NFR BLD AUTO: 23.1 %
MAN DIFF?: NORMAL
MCHC RBC-ENTMCNC: 26.2 PG
MCHC RBC-ENTMCNC: 32.6 GM/DL
MCV RBC AUTO: 80.3 FL
MONOCYTES # BLD AUTO: 0.98 K/UL
MONOCYTES NFR BLD AUTO: 6.6 %
NEUTROPHILS # BLD AUTO: 9.71 K/UL
NEUTROPHILS NFR BLD AUTO: 64.8 %
NONHDLC SERPL-MCNC: 112 MG/DL
PLATELET # BLD AUTO: 321 K/UL
POTASSIUM SERPL-SCNC: 3.3 MMOL/L
PROT SERPL-MCNC: 7.1 G/DL
RBC # BLD: 4.77 M/UL
RBC # FLD: 19.9 %
SODIUM SERPL-SCNC: 139 MMOL/L
T3FREE SERPL-MCNC: 3.25 PG/ML
T4 FREE SERPL-MCNC: 1.4 NG/DL
TRIGL SERPL-MCNC: 193 MG/DL
TSH SERPL-ACNC: 0.86 UIU/ML
URATE SERPL-MCNC: 5.4 MG/DL
WBC # FLD AUTO: 14.96 K/UL

## 2021-08-10 ENCOUNTER — NON-APPOINTMENT (OUTPATIENT)
Age: 36
End: 2021-08-10

## 2021-08-24 ENCOUNTER — RX RENEWAL (OUTPATIENT)
Age: 36
End: 2021-08-24

## 2021-08-27 ENCOUNTER — RX RENEWAL (OUTPATIENT)
Age: 36
End: 2021-08-27

## 2021-08-31 ENCOUNTER — NON-APPOINTMENT (OUTPATIENT)
Age: 36
End: 2021-08-31

## 2021-08-31 ENCOUNTER — APPOINTMENT (OUTPATIENT)
Dept: CARDIOLOGY | Facility: CLINIC | Age: 36
End: 2021-08-31
Payer: MEDICARE

## 2021-08-31 VITALS
HEART RATE: 81 BPM | TEMPERATURE: 98.3 F | SYSTOLIC BLOOD PRESSURE: 138 MMHG | WEIGHT: 255 LBS | RESPIRATION RATE: 16 BRPM | OXYGEN SATURATION: 97 % | DIASTOLIC BLOOD PRESSURE: 90 MMHG | HEIGHT: 66 IN | BODY MASS INDEX: 40.98 KG/M2

## 2021-08-31 PROCEDURE — 93000 ELECTROCARDIOGRAM COMPLETE: CPT

## 2021-08-31 PROCEDURE — 99213 OFFICE O/P EST LOW 20 MIN: CPT

## 2021-08-31 NOTE — CARDIOLOGY SUMMARY
[de-identified] : 8/31/21: NSR, LAVB, poor RWP, unchanged from prior  [de-identified] : 6/8/21 pharm NST: no symptoms, no EKG changes, no infarct or ischemia on MPI [de-identified] : 6/8/21: EF 73%, mod LVH

## 2021-08-31 NOTE — REASON FOR VISIT
[Symptom and Test Evaluation] : symptom and test evaluation [Hypertension] : hypertension [Formal Caregiver] : formal caregiver

## 2021-08-31 NOTE — HISTORY OF PRESENT ILLNESS
[FreeTextEntry1] : 36M hx Htn, DM2, ESRD on HD, ARVIND, schizoaffective disorder presents for routine Cardiology follow up.  Previously seen by Dr Yoder for OCOPER but is switching providers today due to availability.  At last Cardiology office visit he was referred for cardiac testing and presents today to review results.\par \par No new issues in the interval since last Cardiology visit.  Continues to have mild exertional dyspnea.  Specifically He denies chest pain, orthopnea, lower extremity edema, palpitations, or syncope. He is compliant with his medications and HD sessions.

## 2021-08-31 NOTE — PHYSICAL EXAM
[Well Developed] : well developed [Well Nourished] : well nourished [No Acute Distress] : no acute distress [Obese] : obese [Normal Conjunctiva] : normal conjunctiva [Normal Venous Pressure] : normal venous pressure [No Carotid Bruit] : no carotid bruit [Normal S1, S2] : normal S1, S2 [No Murmur] : no murmur [No Rub] : no rub [No Gallop] : no gallop [Clear Lung Fields] : clear lung fields [Good Air Entry] : good air entry [No Respiratory Distress] : no respiratory distress  [Soft] : abdomen soft [Non Tender] : non-tender [No Masses/organomegaly] : no masses/organomegaly [Normal Bowel Sounds] : normal bowel sounds [Normal Gait] : normal gait [No Edema] : no edema [No Cyanosis] : no cyanosis [No Clubbing] : no clubbing [No Varicosities] : no varicosities [No Rash] : no rash [No Skin Lesions] : no skin lesions [Moves all extremities] : moves all extremities [No Focal Deficits] : no focal deficits [Normal Speech] : normal speech [Alert and Oriented] : alert and oriented

## 2021-09-07 ENCOUNTER — APPOINTMENT (OUTPATIENT)
Dept: PULMONOLOGY | Facility: CLINIC | Age: 36
End: 2021-09-07

## 2021-10-05 ENCOUNTER — APPOINTMENT (OUTPATIENT)
Dept: PULMONOLOGY | Facility: CLINIC | Age: 36
End: 2021-10-05
Payer: MEDICARE

## 2021-10-05 VITALS
OXYGEN SATURATION: 98 % | DIASTOLIC BLOOD PRESSURE: 82 MMHG | SYSTOLIC BLOOD PRESSURE: 122 MMHG | RESPIRATION RATE: 16 BRPM | HEART RATE: 78 BPM

## 2021-10-05 VITALS — HEIGHT: 66 IN | WEIGHT: 256 LBS | BODY MASS INDEX: 41.14 KG/M2

## 2021-10-05 DIAGNOSIS — R06.00 DYSPNEA, UNSPECIFIED: ICD-10-CM

## 2021-10-05 PROCEDURE — 99213 OFFICE O/P EST LOW 20 MIN: CPT

## 2021-10-05 RX ORDER — BLOOD SUGAR DIAGNOSTIC
STRIP MISCELLANEOUS
Qty: 4 | Refills: 1 | Status: ACTIVE | COMMUNITY
Start: 2021-03-26

## 2021-10-05 RX ORDER — LANCETS 33 GAUGE
EACH MISCELLANEOUS
Qty: 4 | Refills: 1 | Status: ACTIVE | COMMUNITY
Start: 2021-03-26

## 2021-10-05 NOTE — ASSESSMENT
[FreeTextEntry1] : Severe ARVIND\par OHS\par Chronic hypoxic and hypercarbic respiratory failure - well compensated on AVAPS-AE at night\par Compliant with and benefiting from nocturnal AVAPS-AE (CT=874; EPAP=7; PS=7-15: Max IPAP=25: Auto Rate\par Obesity\par GERD - controlled - Occasional GERD related bronchospasm responsive to albuterol neb

## 2021-10-05 NOTE — HISTORY OF PRESENT ILLNESS
[Snoring] : snoring [Witnessed Apneas] : witnessed sleep apnea [Frequent Nocturnal Awakening] : frequent nocturnal awakening [Daytime Somnolence] : daytime somnolence [ESS: ___] : ESS score [unfilled] [Unintentional Sleep While Inactive] : unintentional sleep while inactive [Awakes Unrefreshed] : awakening unrefreshed [FreeTextEntry1] : Severe ARVIND in 2017\par Bipap at 17/13\par Bipap broken (fell)\par Gained 80 lbs\par In for management of ARVIND\par \par 3/2/21\par \par I SPENT 20 MINUTES REVIEWING NOTES, ORDERS, LABS AND IMAGES FROM HOSPITALIZATION AND NIV COMPLIANCE DATA PRIOR TO THIS VISIT\par \par Hospital Course \par Discharge Date	10-Feb-2021 \par Admission Date	03-Feb-2021 19:14 \par Reason for Admission	difficulty breathing \par Hospital Course	 \par 35 years old male with PMH of ESRD on Dialysis (MWF), Morbid Obesity, ARVIND (not \par on CPAP), DM 2, HTN, HLD and Bipolar Disorder presented with difficulty \par breathing after missing dialysis. Seen by Nephrology and s/p emergent HD 2/3 \par and 2/4. s/p a dose of Lasix 80 mg with improvement. Noted to have low grade \par temperatures and fever workup done. CXR shows B/L pna, UA negative. Blood \par cultures and sputum culture negative. Started on Zosyn for possible pneumonia, \par completed 5 days. Pt was doing well with 5L NC with BIPAP PRN. On 2/5, noted \par Tmax overnight to 102F and noted to be more somnolent. ABG shows acute \par hypercapnic respiratory failure. ICU, ID and Pulmonology were consulted. BC \par negative. The patient's PCO2 was severely elevated and responded to NIV. The \par patient requires advanced therapies for chronic respiratory failure secondary \par to ARVIND/OHS. At this time, other therapies such as BPAP-ST have been attempted \par and failed. AVAPS-AE or IVAPS are not available on BPAP and are therefore ruled \par out. Therefore the patient is an excellent candidate for Trilogy NIV. Without \par Trilogy NIV, the patient would be at an increased risk for readmission, \par worsening morbidity and mortality including from other medical complications \par and continued respiratory failure. BG elevated due to steroids, HGA1c 12.3. \par Insulin dose adjusted. The patient no longer requires supplemental oxygen . The \par patient was seen by PT and recommend home. Pt was 99% on RA at erst & 96% on \par ambulation. DID not need home O2 on d/c. \par \par 3/2/21\par Doing well\par Episodic bronchospasm - request nebulizer for prn use\par HD on Mon, Wed, and Fri\par No cough or edema\par Sleeping well on AVAPS-AE\par \par 10/5/21\par Doing well\par Infrequent episodic bronchospasm - request nebulizer for prn use\par HD on Mon, Wed, and Fri\par No cough or edema\par Sleeping well on AVAPS-AE

## 2021-12-09 ENCOUNTER — APPOINTMENT (OUTPATIENT)
Dept: ENDOCRINOLOGY | Facility: CLINIC | Age: 36
End: 2021-12-09

## 2021-12-15 ENCOUNTER — RX RENEWAL (OUTPATIENT)
Age: 36
End: 2021-12-15

## 2022-01-05 NOTE — ED ADULT NURSE NOTE - PRIMARY CARE PROVIDER
No new care gaps identified.  Powered by [employer]. Reference number: [id number].   1/05/2022 7:42:30 AM CST   n/a

## 2022-01-12 ENCOUNTER — INPATIENT (INPATIENT)
Facility: HOSPITAL | Age: 37
LOS: 1 days | Discharge: HOME | DRG: 177 | End: 2022-01-14
Attending: HOSPITALIST | Admitting: FAMILY MEDICINE
Payer: COMMERCIAL

## 2022-01-12 VITALS
TEMPERATURE: 98 F | RESPIRATION RATE: 18 BRPM | WEIGHT: 250 LBS | HEART RATE: 80 BPM | HEIGHT: 65 IN | OXYGEN SATURATION: 98 % | DIASTOLIC BLOOD PRESSURE: 107 MMHG | SYSTOLIC BLOOD PRESSURE: 161 MMHG

## 2022-01-12 DIAGNOSIS — N18.6 END STAGE RENAL DISEASE: ICD-10-CM

## 2022-01-12 DIAGNOSIS — I77.0 ARTERIOVENOUS FISTULA, ACQUIRED: Chronic | ICD-10-CM

## 2022-01-12 LAB
ALBUMIN SERPL ELPH-MCNC: 4 G/DL — SIGNIFICANT CHANGE UP (ref 3.3–5.2)
ALP SERPL-CCNC: 141 U/L — HIGH (ref 40–120)
ALT FLD-CCNC: 19 U/L — SIGNIFICANT CHANGE UP
ANION GAP SERPL CALC-SCNC: 17 MMOL/L — SIGNIFICANT CHANGE UP (ref 5–17)
APTT BLD: 30.9 SEC — SIGNIFICANT CHANGE UP (ref 27.5–35.5)
AST SERPL-CCNC: 15 U/L — SIGNIFICANT CHANGE UP
BASOPHILS # BLD AUTO: 0.06 K/UL — SIGNIFICANT CHANGE UP (ref 0–0.2)
BASOPHILS NFR BLD AUTO: 0.5 % — SIGNIFICANT CHANGE UP (ref 0–2)
BILIRUB SERPL-MCNC: 0.3 MG/DL — LOW (ref 0.4–2)
BLD GP AB SCN SERPL QL: SIGNIFICANT CHANGE UP
BUN SERPL-MCNC: 69.7 MG/DL — HIGH (ref 8–20)
CALCIUM SERPL-MCNC: 9.3 MG/DL — SIGNIFICANT CHANGE UP (ref 8.6–10.2)
CHLORIDE SERPL-SCNC: 99 MMOL/L — SIGNIFICANT CHANGE UP (ref 98–107)
CO2 SERPL-SCNC: 24 MMOL/L — SIGNIFICANT CHANGE UP (ref 22–29)
CREAT SERPL-MCNC: 8.08 MG/DL — HIGH (ref 0.5–1.3)
EOSINOPHIL # BLD AUTO: 0.81 K/UL — HIGH (ref 0–0.5)
EOSINOPHIL NFR BLD AUTO: 6.6 % — HIGH (ref 0–6)
FLUAV AG NPH QL: SIGNIFICANT CHANGE UP
FLUBV AG NPH QL: SIGNIFICANT CHANGE UP
GLUCOSE BLDC GLUCOMTR-MCNC: 168 MG/DL — HIGH (ref 70–99)
GLUCOSE SERPL-MCNC: 117 MG/DL — HIGH (ref 70–99)
HCT VFR BLD CALC: 32.3 % — LOW (ref 39–50)
HGB BLD-MCNC: 11.2 G/DL — LOW (ref 13–17)
HIV 1 & 2 AB SERPL IA.RAPID: SIGNIFICANT CHANGE UP
IMM GRANULOCYTES NFR BLD AUTO: 0.3 % — SIGNIFICANT CHANGE UP (ref 0–1.5)
INR BLD: 1.06 RATIO — SIGNIFICANT CHANGE UP (ref 0.88–1.16)
LYMPHOCYTES # BLD AUTO: 26 % — SIGNIFICANT CHANGE UP (ref 13–44)
LYMPHOCYTES # BLD AUTO: 3.21 K/UL — SIGNIFICANT CHANGE UP (ref 1–3.3)
MAGNESIUM SERPL-MCNC: 2 MG/DL — SIGNIFICANT CHANGE UP (ref 1.6–2.6)
MCHC RBC-ENTMCNC: 29 PG — SIGNIFICANT CHANGE UP (ref 27–34)
MCHC RBC-ENTMCNC: 34.7 GM/DL — SIGNIFICANT CHANGE UP (ref 32–36)
MCV RBC AUTO: 83.7 FL — SIGNIFICANT CHANGE UP (ref 80–100)
MONOCYTES # BLD AUTO: 0.86 K/UL — SIGNIFICANT CHANGE UP (ref 0–0.9)
MONOCYTES NFR BLD AUTO: 7 % — SIGNIFICANT CHANGE UP (ref 2–14)
NEUTROPHILS # BLD AUTO: 7.36 K/UL — SIGNIFICANT CHANGE UP (ref 1.8–7.4)
NEUTROPHILS NFR BLD AUTO: 59.6 % — SIGNIFICANT CHANGE UP (ref 43–77)
PLATELET # BLD AUTO: 284 K/UL — SIGNIFICANT CHANGE UP (ref 150–400)
POTASSIUM SERPL-MCNC: 3.1 MMOL/L — LOW (ref 3.5–5.3)
POTASSIUM SERPL-SCNC: 3.1 MMOL/L — LOW (ref 3.5–5.3)
PROT SERPL-MCNC: 7.4 G/DL — SIGNIFICANT CHANGE UP (ref 6.6–8.7)
PROTHROM AB SERPL-ACNC: 12.3 SEC — SIGNIFICANT CHANGE UP (ref 10.6–13.6)
RBC # BLD: 3.86 M/UL — LOW (ref 4.2–5.8)
RBC # FLD: 13.8 % — SIGNIFICANT CHANGE UP (ref 10.3–14.5)
RSV RNA NPH QL NAA+NON-PROBE: SIGNIFICANT CHANGE UP
SARS-COV-2 RNA SPEC QL NAA+PROBE: DETECTED
SODIUM SERPL-SCNC: 140 MMOL/L — SIGNIFICANT CHANGE UP (ref 135–145)
WBC # BLD: 12.34 K/UL — HIGH (ref 3.8–10.5)
WBC # FLD AUTO: 12.34 K/UL — HIGH (ref 3.8–10.5)

## 2022-01-12 PROCEDURE — 76937 US GUIDE VASCULAR ACCESS: CPT | Mod: 26

## 2022-01-12 PROCEDURE — 99285 EMERGENCY DEPT VISIT HI MDM: CPT | Mod: 25

## 2022-01-12 PROCEDURE — 36000 PLACE NEEDLE IN VEIN: CPT

## 2022-01-12 PROCEDURE — 93010 ELECTROCARDIOGRAM REPORT: CPT

## 2022-01-12 PROCEDURE — 99223 1ST HOSP IP/OBS HIGH 75: CPT

## 2022-01-12 PROCEDURE — 71045 X-RAY EXAM CHEST 1 VIEW: CPT | Mod: 26

## 2022-01-12 RX ORDER — PANTOPRAZOLE SODIUM 20 MG/1
40 TABLET, DELAYED RELEASE ORAL
Refills: 0 | Status: DISCONTINUED | OUTPATIENT
Start: 2022-01-12 | End: 2022-01-14

## 2022-01-12 RX ORDER — FLUPHENAZINE HYDROCHLORIDE 1 MG/1
2.5 TABLET, FILM COATED ORAL
Refills: 0 | Status: DISCONTINUED | OUTPATIENT
Start: 2022-01-12 | End: 2022-01-14

## 2022-01-12 RX ORDER — DEXTROSE 50 % IN WATER 50 %
25 SYRINGE (ML) INTRAVENOUS ONCE
Refills: 0 | Status: DISCONTINUED | OUTPATIENT
Start: 2022-01-12 | End: 2022-01-14

## 2022-01-12 RX ORDER — POTASSIUM CHLORIDE 20 MEQ
10 PACKET (EA) ORAL
Refills: 0 | Status: COMPLETED | OUTPATIENT
Start: 2022-01-12 | End: 2022-01-13

## 2022-01-12 RX ORDER — INSULIN LISPRO 100/ML
VIAL (ML) SUBCUTANEOUS
Refills: 0 | Status: DISCONTINUED | OUTPATIENT
Start: 2022-01-12 | End: 2022-01-14

## 2022-01-12 RX ORDER — QUETIAPINE FUMARATE 200 MG/1
100 TABLET, FILM COATED ORAL AT BEDTIME
Refills: 0 | Status: DISCONTINUED | OUTPATIENT
Start: 2022-01-12 | End: 2022-01-14

## 2022-01-12 RX ORDER — CARVEDILOL PHOSPHATE 80 MG/1
12.5 CAPSULE, EXTENDED RELEASE ORAL EVERY 12 HOURS
Refills: 0 | Status: DISCONTINUED | OUTPATIENT
Start: 2022-01-12 | End: 2022-01-14

## 2022-01-12 RX ORDER — ALBUTEROL 90 UG/1
2 AEROSOL, METERED ORAL EVERY 6 HOURS
Refills: 0 | Status: DISCONTINUED | OUTPATIENT
Start: 2022-01-12 | End: 2022-01-14

## 2022-01-12 RX ORDER — CHOLECALCIFEROL (VITAMIN D3) 125 MCG
2000 CAPSULE ORAL DAILY
Refills: 0 | Status: DISCONTINUED | OUTPATIENT
Start: 2022-01-12 | End: 2022-01-14

## 2022-01-12 RX ORDER — SODIUM CHLORIDE 9 MG/ML
1000 INJECTION, SOLUTION INTRAVENOUS
Refills: 0 | Status: DISCONTINUED | OUTPATIENT
Start: 2022-01-12 | End: 2022-01-14

## 2022-01-12 RX ORDER — FOLIC ACID 0.8 MG
1 TABLET ORAL DAILY
Refills: 0 | Status: DISCONTINUED | OUTPATIENT
Start: 2022-01-12 | End: 2022-01-14

## 2022-01-12 RX ORDER — SEVELAMER CARBONATE 2400 MG/1
800 POWDER, FOR SUSPENSION ORAL
Refills: 0 | Status: DISCONTINUED | OUTPATIENT
Start: 2022-01-12 | End: 2022-01-14

## 2022-01-12 RX ORDER — DEXTROSE 50 % IN WATER 50 %
15 SYRINGE (ML) INTRAVENOUS ONCE
Refills: 0 | Status: DISCONTINUED | OUTPATIENT
Start: 2022-01-12 | End: 2022-01-14

## 2022-01-12 RX ORDER — INSULIN LISPRO 100/ML
VIAL (ML) SUBCUTANEOUS AT BEDTIME
Refills: 0 | Status: DISCONTINUED | OUTPATIENT
Start: 2022-01-12 | End: 2022-01-14

## 2022-01-12 RX ORDER — INSULIN GLARGINE 100 [IU]/ML
15 INJECTION, SOLUTION SUBCUTANEOUS AT BEDTIME
Refills: 0 | Status: DISCONTINUED | OUTPATIENT
Start: 2022-01-12 | End: 2022-01-14

## 2022-01-12 RX ORDER — HYDRALAZINE HCL 50 MG
50 TABLET ORAL THREE TIMES A DAY
Refills: 0 | Status: DISCONTINUED | OUTPATIENT
Start: 2022-01-12 | End: 2022-01-14

## 2022-01-12 RX ORDER — DEXTROSE 50 % IN WATER 50 %
12.5 SYRINGE (ML) INTRAVENOUS ONCE
Refills: 0 | Status: DISCONTINUED | OUTPATIENT
Start: 2022-01-12 | End: 2022-01-14

## 2022-01-12 RX ORDER — SENNA PLUS 8.6 MG/1
1 TABLET ORAL AT BEDTIME
Refills: 0 | Status: DISCONTINUED | OUTPATIENT
Start: 2022-01-12 | End: 2022-01-14

## 2022-01-12 RX ORDER — RISPERIDONE 4 MG/1
1 TABLET ORAL
Qty: 0 | Refills: 0 | DISCHARGE

## 2022-01-12 RX ORDER — HEPARIN SODIUM 5000 [USP'U]/ML
5000 INJECTION INTRAVENOUS; SUBCUTANEOUS EVERY 12 HOURS
Refills: 0 | Status: DISCONTINUED | OUTPATIENT
Start: 2022-01-12 | End: 2022-01-14

## 2022-01-12 RX ORDER — GLUCAGON INJECTION, SOLUTION 0.5 MG/.1ML
1 INJECTION, SOLUTION SUBCUTANEOUS ONCE
Refills: 0 | Status: DISCONTINUED | OUTPATIENT
Start: 2022-01-12 | End: 2022-01-14

## 2022-01-12 RX ORDER — NIFEDIPINE 30 MG
60 TABLET, EXTENDED RELEASE 24 HR ORAL DAILY
Refills: 0 | Status: DISCONTINUED | OUTPATIENT
Start: 2022-01-12 | End: 2022-01-14

## 2022-01-12 RX ORDER — MAGNESIUM SULFATE 500 MG/ML
1 VIAL (ML) INJECTION ONCE
Refills: 0 | Status: COMPLETED | OUTPATIENT
Start: 2022-01-12 | End: 2022-01-12

## 2022-01-12 RX ORDER — ASPIRIN/CALCIUM CARB/MAGNESIUM 324 MG
81 TABLET ORAL DAILY
Refills: 0 | Status: DISCONTINUED | OUTPATIENT
Start: 2022-01-12 | End: 2022-01-14

## 2022-01-12 RX ORDER — TRAZODONE HCL 50 MG
100 TABLET ORAL AT BEDTIME
Refills: 0 | Status: DISCONTINUED | OUTPATIENT
Start: 2022-01-12 | End: 2022-01-14

## 2022-01-12 RX ORDER — ATORVASTATIN CALCIUM 80 MG/1
20 TABLET, FILM COATED ORAL AT BEDTIME
Refills: 0 | Status: DISCONTINUED | OUTPATIENT
Start: 2022-01-12 | End: 2022-01-14

## 2022-01-12 RX ADMIN — Medication 100 MILLIEQUIVALENT(S): at 17:53

## 2022-01-12 RX ADMIN — Medication 100 GRAM(S): at 16:29

## 2022-01-12 NOTE — ED PROVIDER NOTE - ATTENDING CONTRIBUTION TO CARE
I, Glen Chery, performed a face to face bedside interview with this patient regarding history of present illness, review of symptoms and relevant past medical, social and family history.  I completed an independent physical examination. I have communicated the patient’s plan of care and disposition with the resident.  36 year old male with PMH ESRD on HD presents with missed dialysis. pt reports 4 days of congestion, body aches, fatigue, and due to flu-like Sx they would not perform dialysis. He denies chets pain, SOB, headache, abd pain  Gen: NAD, well appearing  CV: RRR  Pul: CTA b/l  Abd: Soft, non-distended, non-tender  Neuro: no focal deficits  Pt to be admitted for dialysis

## 2022-01-12 NOTE — CONSULT NOTE ADULT - SUBJECTIVE AND OBJECTIVE BOX
Patient is a 36y old  Male who presents with a chief complaint of ESRD (12 Jan 2022 17:05)      HPI:  37 y/o male with h/o ESRD on dialysis, htn, DM on insulin , bipolar disorder came to the ER as he could not have his dialysis today and 2 days ago due to URI symptoms, body aches. no fever. Pt. came to the ER for covid testing and his dialysis. no cp, no sob, no abd. pain. no n/v/d. pt. afebrile in the ER and o2 sat 98 % RA. pt. has been covid -19 vaccinated but did not get booster.   pt. give list of his meds and told that he is not on po risperidone and also not on injectable Risperdal anymore. use lantus only at night. list had him on twice daily. list scanned in alpha. (12 Jan 2022 17:05)  Missed HD   Covid positive   Feels OK   Sats 98 % on RA     PAST MEDICAL & SURGICAL HISTORY:  Bipolar 1 disorder    Sleep apnea    HTN (hypertension)    CKD (chronic kidney disease) requiring chronic dialysis    Insulin dependent type 2 diabetes mellitus    AVF (arteriovenous fistula)  Left. AVF        FAMILY HISTORY:  Family history of diabetes mellitus (Grandparent)  Father, siblings    Family history of CKD (chronic kidney disease)  mother    FH: HTN (hypertension)  Father, siblings        Social History:    MEDICATIONS  (STANDING):  aspirin enteric coated 81 milliGRAM(s) Oral daily  atorvastatin 20 milliGRAM(s) Oral at bedtime  carvedilol 12.5 milliGRAM(s) Oral every 12 hours  cholecalciferol 2000 Unit(s) Oral daily  dextrose 40% Gel 15 Gram(s) Oral once  dextrose 5%. 1000 milliLiter(s) (50 mL/Hr) IV Continuous <Continuous>  dextrose 5%. 1000 milliLiter(s) (100 mL/Hr) IV Continuous <Continuous>  dextrose 50% Injectable 25 Gram(s) IV Push once  dextrose 50% Injectable 12.5 Gram(s) IV Push once  dextrose 50% Injectable 25 Gram(s) IV Push once  fluPHENAZine 2.5 milliGRAM(s) Oral two times a day  folic acid 1 milliGRAM(s) Oral daily  glucagon  Injectable 1 milliGRAM(s) IntraMuscular once  heparin   Injectable 5000 Unit(s) SubCutaneous every 12 hours  hydrALAZINE 50 milliGRAM(s) Oral three times a day  insulin glargine Injectable (LANTUS) 15 Unit(s) SubCutaneous at bedtime  insulin lispro (ADMELOG) corrective regimen sliding scale   SubCutaneous three times a day before meals  insulin lispro (ADMELOG) corrective regimen sliding scale   SubCutaneous at bedtime  NIFEdipine XL 60 milliGRAM(s) Oral daily  pantoprazole    Tablet 40 milliGRAM(s) Oral before breakfast  potassium chloride  10 mEq/100 mL IVPB 10 milliEquivalent(s) IV Intermittent every 1 hour  QUEtiapine 100 milliGRAM(s) Oral at bedtime  senna 1 Tablet(s) Oral at bedtime  sevelamer carbonate 800 milliGRAM(s) Oral three times a day with meals  traZODone 100 milliGRAM(s) Oral at bedtime    MEDICATIONS  (PRN):      Allergies    No Known Drug Allergies  shellfish (Unknown)    Intolerances          Vital Signs Last 24 Hrs  T(C): 36.3 (12 Jan 2022 15:12), Max: 36.7 (12 Jan 2022 13:45)  T(F): 97.4 (12 Jan 2022 15:12), Max: 98.1 (12 Jan 2022 13:45)  HR: 72 (12 Jan 2022 15:12) (72 - 80)  BP: 125/85 (12 Jan 2022 15:12) (125/85 - 161/107)  BP(mean): --  RR: 18 (12 Jan 2022 15:12) (18 - 18)  SpO2: 98% (12 Jan 2022 15:12) (98% - 98%)  Daily Height in cm: 165.1 (12 Jan 2022 13:45)    Daily   I&O's Detail    I&O's Summary      PHYSICAL EXAM:    GENERAL: NAD,  HEAD:  no edema   NECK: Supple, No JVD,  CHEST/LUNG: EAE , coarse BS ,   HEART: Regular rate and rhythm; No murmurs, rubs, or gallops  ABDOMEN: Soft, Nontender, Nondistended; Bowel sounds present  EXTREMITIES: access with thrill      LABS:                        11.2   12.34 )-----------( 284      ( 12 Jan 2022 15:10 )             32.3     01-12    140  |  99  |  69.7<H>  ----------------------------<  117<H>  3.1<L>   |  24.0  |  8.08<H>    Ca    9.3      12 Jan 2022 15:10  Mg     2.0     01-12    TPro  7.4  /  Alb  4.0  /  TBili  0.3<L>  /  DBili  x   /  AST  15  /  ALT  19  /  AlkPhos  141<H>  01-12    PT/INR - ( 12 Jan 2022 17:28 )   PT: 12.3 sec;   INR: 1.06 ratio         PTT - ( 12 Jan 2022 17:28 )  PTT:30.9 sec    Magnesium, Serum: 2.0 mg/dL (01-12 @ 15:10)          RADIOLOGY & ADDITIONAL TESTS:

## 2022-01-12 NOTE — ED PROVIDER NOTE - OBJECTIVE STATEMENT
36 year old male with history of HTN, ESRD (MWF), IDDM, duodenal ulcer, and bipolar 1 disorder who presents for medical evaluation. On 1/7 (5 days ago) the patient began to feel 36 year old male with history of HTN, ESRD (MWF), IDDM, duodenal ulcer, and bipolar 1 disorder who presents for medical evaluation. On 1/7 (5 days ago) the patient began to have subjective fevers, dry cough, myalgias, and decreased appetite. He went to dialysis (goes to [facility] on [address]) and had a full session however was told that before his session on 1/10 he needs to have a negative COVID test. At bedside he reports that he has been experiencing some shortness of breath but "nothing severe." No chest pain, abdominal pain, nausea, vomiting, hematochezia. Quit smoking several months ago. Smokes marijuana occasionaly. Has not had etoh in years. Has had 2 COVID vaccines  but not his Booster. Of note states he has an ECHO in the summer which was "not bad."

## 2022-01-12 NOTE — H&P ADULT - NSHPSOCIALHISTORY_GEN_ALL_CORE
stopped smoking several months ago, now uses marijuana every now and then. no etoh, stopped 2 years ago.

## 2022-01-12 NOTE — H&P ADULT - HISTORY OF PRESENT ILLNESS
35 y/o male with h/o ESRD on dialysis, htn, DM on insulin , bipolar disorder came to the ER as he could not have his dialysis today and 2 days ago due to URI symptoms, body aches. no fever. Pt. came to the ER for covid testing and his dialysis. no cp, no sob, no abd. pain. no n/v/d. pt. afebrile in the ER and o2 sat 98 % RA. pt. has been covid -19 vaccinated but did not get booster.  37 y/o male with h/o ESRD on dialysis, htn, DM on insulin , bipolar disorder came to the ER as he could not have his dialysis today and 2 days ago due to URI symptoms, body aches. no fever. Pt. came to the ER for covid testing and his dialysis. no cp, no sob, no abd. pain. no n/v/d. pt. afebrile in the ER and o2 sat 98 % RA. pt. has been covid -19 vaccinated but did not get booster.   pt. give list of his meds and told that he is not on po risperidone and also not on injectable Risperdal anymore. use lantus only at night. list had him on twice daily. list scanned in alpha.

## 2022-01-12 NOTE — ED ADULT NURSE NOTE - OBJECTIVE STATEMENT
Patient with need for dialysis. experiencing some shortness of breath which patient states is normal for him when he misses dialysis.  Patient is alert and oriented x4, denies chest pain.  Educated patient on plan of care, patient expressed understanding.  Pt with left arm fistula. Dr. Locke at bedside for admission.

## 2022-01-12 NOTE — ED PROVIDER NOTE - PHYSICAL EXAMINATION
General: NAD, appears older than stated age   Head: NC, AT  EENT: EOMI, no scleral icterus  Cardiac: RRR, no apparent murmurs, no lower extremity edema  Respiratory: CTABL, no respiratory distress   Abdomen: soft, obese, ND, NT, nonperitonitic  MSK/Vascular: full ROM, soft compartments, warm extremities  Neuro: AAOx3, sensation to light touch intact  Psych: calm, cooperative

## 2022-01-12 NOTE — CONSULT NOTE ADULT - ASSESSMENT
ESRD - Patient is Covid (+) ( sats 98 % RA )   Comfortable on RA   Potassium OK     Anemia - Hgb stable     Will arrange HD in am    Will follow

## 2022-01-12 NOTE — ED PROVIDER NOTE - CLINICAL SUMMARY MEDICAL DECISION MAKING FREE TEXT BOX
36 year old male with history of HTN, ESRD (MWF), IDDM, duodenal ulcer, and bipolar 1 disorder who presents for medical evaluation. Patient presenting with COVID19 like symptoms and has missed dialysis. Labs, EKG, CXR, RVP panel ordered.

## 2022-01-12 NOTE — H&P ADULT - ASSESSMENT
35 y/o male with h/o ESRD on dialysis, htn, DM on insulin , bipolar disorder came to the ER as he could not have his dialysis today and 2 days ago due to URI symptoms, body aches. no fever. Pt. came to the ER for covid testing and his dialysis. no cp, no sob, no abd. pain. no n/v/d. pt. afebrile in the ER and o2 sat 98 % RA. pt. has been covid -19 vaccinated but did not get booster.     - ESRD on dialysis, pt. clinically not in respiratory distress, no sig. fluid over load, spoke to nephrologist dr. gonzalez who plans to dod dialysis in am.     - hypokalemia, k is replaced follow repeat.     - covid-19 virus detected. pt is not hypoxic, no cough, will closely observe for now . isolation, prn albuterol.     - Primary htn, will continue home meds.     - dm, will keep on lantus and admelog, close f/u on DM.     - katie    - morbid obesity, pt. will benefit from loosing wt. 35 y/o male with h/o ESRD on dialysis, htn, DM on insulin , bipolar disorder came to the ER as he could not have his dialysis today and 2 days ago due to URI symptoms, body aches. no fever. Pt. came to the ER for covid testing and his dialysis. no cp, no sob, no abd. pain. no n/v/d. pt. afebrile in the ER and o2 sat 98 % RA. pt. has been covid -19 vaccinated but did not get booster.     - ESRD on dialysis, pt. clinically not in respiratory distress, no sig. fluid over load, spoke to nephrologist dr. gonzalez who plans to dod dialysis in am.     - hypokalemia, k is replaced follow repeat.     - covid-19 virus detected. pt is not hypoxic, no cough, will closely observe for now . isolation, prn albuterol.     - Primary htn, will continue home meds.     - dm, will keep on lantus and admelog, close f/u on DM.     - katie, will keep on bipap.     - morbid obesity, pt. will benefit from loosing wt.

## 2022-01-12 NOTE — ED PROVIDER NOTE - NS ED ROS FT
Constitutional: +fever, no chills, +decreased appetite   Head: NC, AT   Eyes: no redness   ENMT: no nasal congestion/drainage, no sore throat   CV: no chest pain, no edema  Resp: no cough, +dyspnea  GI: no abdominal pain, no nausea, no vomiting, no diarrhea, no hematochezia   : no dysuria, no hematuria   Skin: no lesions, no rashes   MSK: +myalgias   Neuro: no LOC, no headache, no sensory deficits, no weakness

## 2022-01-12 NOTE — H&P ADULT - NSHPPHYSICALEXAM_GEN_ALL_CORE
Vital Signs Last 24 Hrs  T(C): 36.3 (12 Jan 2022 15:12), Max: 36.7 (12 Jan 2022 13:45)  T(F): 97.4 (12 Jan 2022 15:12), Max: 98.1 (12 Jan 2022 13:45)  HR: 72 (12 Jan 2022 15:12) (72 - 80)  BP: 125/85 (12 Jan 2022 15:12) (125/85 - 161/107)  BP(mean): --  RR: 18 (12 Jan 2022 15:12) (18 - 18)  SpO2: 98% (12 Jan 2022 15:12) (98% - 98%)    General: pt. in bed not in distress.  HEENT: AT, NC. PERRL. intact EOM. no throat erythema or exudate.   Neck: supple. no JVD.  Chest: CTA bilaterally.  Heart: S1,S2. RRR. no heart murmur. no edema.  Abdomen: soft. non-tender.  obese, + BS.   Ext: no calf tenderness. LUE av graft.   Neuro: AAO x3. no focal weakness. no speech disorder, cns ii to xii intact.  Skin: warm and dry. no obvious rash.   lymphatic system : no lymphadenopathy  psych : mood ok, no si/hi.

## 2022-01-12 NOTE — ED ADULT TRIAGE NOTE - CHIEF COMPLAINT QUOTE
Pt states feeling flu like symptoms and flank pain and states missed his dialysis and last had it 1 week ago.

## 2022-01-13 ENCOUNTER — TRANSCRIPTION ENCOUNTER (OUTPATIENT)
Age: 37
End: 2022-01-13

## 2022-01-13 LAB
A1C WITH ESTIMATED AVERAGE GLUCOSE RESULT: 6.1 % — HIGH (ref 4–5.6)
ANION GAP SERPL CALC-SCNC: 13 MMOL/L — SIGNIFICANT CHANGE UP (ref 5–17)
BUN SERPL-MCNC: 71.8 MG/DL — HIGH (ref 8–20)
CALCIUM SERPL-MCNC: 8.9 MG/DL — SIGNIFICANT CHANGE UP (ref 8.6–10.2)
CHLORIDE SERPL-SCNC: 103 MMOL/L — SIGNIFICANT CHANGE UP (ref 98–107)
CO2 SERPL-SCNC: 25 MMOL/L — SIGNIFICANT CHANGE UP (ref 22–29)
CREAT SERPL-MCNC: 8.38 MG/DL — HIGH (ref 0.5–1.3)
ESTIMATED AVERAGE GLUCOSE: 128 MG/DL — HIGH (ref 68–114)
GLUCOSE BLDC GLUCOMTR-MCNC: 117 MG/DL — HIGH (ref 70–99)
GLUCOSE BLDC GLUCOMTR-MCNC: 145 MG/DL — HIGH (ref 70–99)
GLUCOSE BLDC GLUCOMTR-MCNC: 91 MG/DL — SIGNIFICANT CHANGE UP (ref 70–99)
GLUCOSE BLDC GLUCOMTR-MCNC: 98 MG/DL — SIGNIFICANT CHANGE UP (ref 70–99)
GLUCOSE SERPL-MCNC: 73 MG/DL — SIGNIFICANT CHANGE UP (ref 70–99)
HCT VFR BLD CALC: 30 % — LOW (ref 39–50)
HGB BLD-MCNC: 10.3 G/DL — LOW (ref 13–17)
MCHC RBC-ENTMCNC: 28.7 PG — SIGNIFICANT CHANGE UP (ref 27–34)
MCHC RBC-ENTMCNC: 34.3 GM/DL — SIGNIFICANT CHANGE UP (ref 32–36)
MCV RBC AUTO: 83.6 FL — SIGNIFICANT CHANGE UP (ref 80–100)
PHOSPHATE SERPL-MCNC: 5.2 MG/DL — HIGH (ref 2.4–4.7)
PLATELET # BLD AUTO: 273 K/UL — SIGNIFICANT CHANGE UP (ref 150–400)
POTASSIUM SERPL-MCNC: 3.2 MMOL/L — LOW (ref 3.5–5.3)
POTASSIUM SERPL-SCNC: 3.2 MMOL/L — LOW (ref 3.5–5.3)
RBC # BLD: 3.59 M/UL — LOW (ref 4.2–5.8)
RBC # FLD: 13.2 % — SIGNIFICANT CHANGE UP (ref 10.3–14.5)
SODIUM SERPL-SCNC: 141 MMOL/L — SIGNIFICANT CHANGE UP (ref 135–145)
WBC # BLD: 11.94 K/UL — HIGH (ref 3.8–10.5)
WBC # FLD AUTO: 11.94 K/UL — HIGH (ref 3.8–10.5)

## 2022-01-13 PROCEDURE — 99232 SBSQ HOSP IP/OBS MODERATE 35: CPT

## 2022-01-13 RX ADMIN — Medication 100 MILLIEQUIVALENT(S): at 07:44

## 2022-01-13 RX ADMIN — Medication 100 MILLIGRAM(S): at 00:32

## 2022-01-13 RX ADMIN — FLUPHENAZINE HYDROCHLORIDE 2.5 MILLIGRAM(S): 1 TABLET, FILM COATED ORAL at 06:32

## 2022-01-13 RX ADMIN — Medication 100 MILLIGRAM(S): at 22:24

## 2022-01-13 RX ADMIN — Medication 60 MILLIGRAM(S): at 06:32

## 2022-01-13 RX ADMIN — Medication 81 MILLIGRAM(S): at 11:40

## 2022-01-13 RX ADMIN — SEVELAMER CARBONATE 800 MILLIGRAM(S): 2400 POWDER, FOR SUSPENSION ORAL at 00:32

## 2022-01-13 RX ADMIN — Medication 100 MILLIEQUIVALENT(S): at 00:03

## 2022-01-13 RX ADMIN — HEPARIN SODIUM 5000 UNIT(S): 5000 INJECTION INTRAVENOUS; SUBCUTANEOUS at 00:06

## 2022-01-13 RX ADMIN — ATORVASTATIN CALCIUM 20 MILLIGRAM(S): 80 TABLET, FILM COATED ORAL at 00:05

## 2022-01-13 RX ADMIN — CARVEDILOL PHOSPHATE 12.5 MILLIGRAM(S): 80 CAPSULE, EXTENDED RELEASE ORAL at 00:05

## 2022-01-13 RX ADMIN — Medication 50 MILLIGRAM(S): at 22:25

## 2022-01-13 RX ADMIN — QUETIAPINE FUMARATE 100 MILLIGRAM(S): 200 TABLET, FILM COATED ORAL at 22:25

## 2022-01-13 RX ADMIN — ATORVASTATIN CALCIUM 20 MILLIGRAM(S): 80 TABLET, FILM COATED ORAL at 22:24

## 2022-01-13 RX ADMIN — SENNA PLUS 1 TABLET(S): 8.6 TABLET ORAL at 22:24

## 2022-01-13 RX ADMIN — FLUPHENAZINE HYDROCHLORIDE 2.5 MILLIGRAM(S): 1 TABLET, FILM COATED ORAL at 00:05

## 2022-01-13 RX ADMIN — INSULIN GLARGINE 15 UNIT(S): 100 INJECTION, SOLUTION SUBCUTANEOUS at 00:04

## 2022-01-13 RX ADMIN — CARVEDILOL PHOSPHATE 12.5 MILLIGRAM(S): 80 CAPSULE, EXTENDED RELEASE ORAL at 06:32

## 2022-01-13 RX ADMIN — Medication 50 MILLIGRAM(S): at 06:32

## 2022-01-13 RX ADMIN — INSULIN GLARGINE 15 UNIT(S): 100 INJECTION, SOLUTION SUBCUTANEOUS at 22:57

## 2022-01-13 RX ADMIN — Medication 2000 UNIT(S): at 11:40

## 2022-01-13 RX ADMIN — QUETIAPINE FUMARATE 100 MILLIGRAM(S): 200 TABLET, FILM COATED ORAL at 00:06

## 2022-01-13 RX ADMIN — Medication 1 MILLIGRAM(S): at 11:40

## 2022-01-13 RX ADMIN — SENNA PLUS 1 TABLET(S): 8.6 TABLET ORAL at 00:05

## 2022-01-13 RX ADMIN — Medication 50 MILLIGRAM(S): at 00:05

## 2022-01-13 RX ADMIN — HEPARIN SODIUM 5000 UNIT(S): 5000 INJECTION INTRAVENOUS; SUBCUTANEOUS at 06:32

## 2022-01-13 RX ADMIN — PANTOPRAZOLE SODIUM 40 MILLIGRAM(S): 20 TABLET, DELAYED RELEASE ORAL at 06:32

## 2022-01-13 RX ADMIN — SEVELAMER CARBONATE 800 MILLIGRAM(S): 2400 POWDER, FOR SUSPENSION ORAL at 12:27

## 2022-01-13 NOTE — DISCHARGE NOTE PROVIDER - NSDCCPCAREPLAN_GEN_ALL_CORE_FT
PRINCIPAL DISCHARGE DIAGNOSIS  Diagnosis: ESRD on dialysis  Assessment and Plan of Treatment: - Completed HD on 1/13/22.   - Follow up with nephrology outpatient and continue outpatient dialysis as scheduled.      SECONDARY DISCHARGE DIAGNOSES  Diagnosis: 2019 novel coronavirus disease (COVID-19)  Assessment and Plan of Treatment: - You tested positive for COVID. Did not require supplemental oxygen.  - Symptomatic management.   - Please quarantine per [facility] guidelines. Avoid others, wear a mask, wash your hands frequently.    Diagnosis: Myalgia  Assessment and Plan of Treatment:

## 2022-01-13 NOTE — CHART NOTE - NSCHARTNOTEFT_GEN_A_CORE
patient can return to outpatient HD on monday. patient will stay for one more session tomorrow and then will be discharged tomorrow after HD

## 2022-01-13 NOTE — DISCHARGE NOTE PROVIDER - HOSPITAL COURSE
35 y/o male with h/o ESRD on dialysis, htn, DM on insulin , bipolar disorder presented to [facility] ED for HD, as he was not able to complete scheduled outpatient HD secondary to URI symptoms. In the ED, patient found to be COVID positive, afebrile, O2 98% on RA.  Patient clinically not in respiratory distress, without fluid overload. Nephrology consulted, patient admitted for HD scheduled for 1/13/22 AM. Patient continued on home BP & DM medications during hospitalization.      35 y/o male with h/o ESRD on dialysis, htn, DM on insulin , bipolar disorder came to the ER as he could not have his dialysis and 2 days ago due to URI symptoms, body aches. no fever. Pt. came to the ER for covid testing and his dialysis. no cp, no sob, no abd. pain. no n/v/d. pt. afebrile in the ER and o2 sat 98 % RA. pt. has been covid -19 vaccinated but did not get booster.   pt. give list of his meds and told that he is not on po risperidone and also not on injectable     patient admitted to medicine and seen by nephro. Patient is for HD and then observed overnight and is now cleared for dc home when cleared by npehro      PE  General: pt. in bed not in distress.  HEENT: AT, NC. PERRL. intact EOM. no throat erythema or exudate.   Neck: supple. no JVD.  Chest: CTA bilaterally.  Heart: S1,S2. RRR. no heart murmur. no edema.  Abdomen: soft. non-tender.  obese, + BS.   Ext: no calf tenderness. LUE av graft.   Neuro: AAO x3. no focal weakness. no speech disorder, cns ii to xii intact.  Skin: warm and dry. no obvious rash.   lymphatic system : no lymphadenopathy  psych : mood ok, no si/hi.    time spent on dc 34 minutes   35 y/o male with h/o ESRD on dialysis, htn, DM on insulin , bipolar disorder came to the ER as he could not have his dialysis and 2 days prior due to URI symptoms, body aches. no fever. Pt. came to the ER for covid testing and his dialysis. no cp, no sob, no abd. pain. no n/v/d. pt. afebrile in the ER and o2 sat 98 % RA. pt. has been covid -19 vaccinated but did not get booster.   pt. give list of his meds and told that he is not on po risperidone and also not on injectable     patient admitted to medicine and seen by nephro. Patient is for HD and had it on friday 1/14. Patients hd center will accept patient back on monday.       PE  General: pt. in bed not in distress.  HEENT: AT, NC. PERRL. intact EOM. no throat erythema or exudate.   Neck: supple. no JVD.  Chest: CTA bilaterally.  Heart: S1,S2. RRR. no heart murmur. no edema.  Abdomen: soft. non-tender.  obese, + BS.   Ext: no calf tenderness. LUE av graft.   Neuro: AAO x3. no focal weakness. no speech disorder, cns ii to xii intact.  Skin: warm and dry. no obvious rash.   lymphatic system : no lymphadenopathy  psych : mood ok, no si/hi.    time spent on dc 34 minutes

## 2022-01-13 NOTE — DISCHARGE NOTE PROVIDER - NSDCMRMEDTOKEN_GEN_ALL_CORE_FT
Admelog SoloStar 100 units/mL injectable solution: 8 unit(s) subcutaneous 3 times a day before meals  aspirin 81 mg oral tablet: 1 tab(s) orally once a day  carvedilol 12.5 mg oral tablet: 1 tab(s) orally every 12 hours  folic acid 1 mg oral tablet: 1 tab(s) orally once a day  hydrALAZINE 50 mg oral tablet: 1 tab(s) orally 3 times a day  Lantus Solostar Pen 100 units/mL subcutaneous solution: 25  subcutaneous once a day (at bedtime)   NIFEdipine 60 mg oral tablet, extended release: 1 tab(s) orally once a day  Prolixin 2.5 mg oral tablet: 1 tab(s) orally 2 times a day  Protonix 40 mg oral delayed release tablet: 1 tab(s) orally once a day  rosuvastatin 5 mg oral tablet: 1 tab(s) orally once a day  Senna 8.6 mg oral tablet: 1 tab(s) orally once a day (at bedtime)  SEROquel 100 mg oral tablet: 1 tab(s) orally once a day (at bedtime)  sevelamer carbonate 800 mg oral tablet: 1 tab(s) orally 3 times a day (with meals)  traZODone 100 mg oral tablet: 1 tab(s) orally once a day (at bedtime)  Vitamin D3 2000 intl units (50 mcg) oral tablet: 2 cap(s) orally once a day

## 2022-01-13 NOTE — DISCHARGE NOTE PROVIDER - CARE PROVIDERS DIRECT ADDRESSES
[email or web address]_Unknown

## 2022-01-13 NOTE — CHART NOTE - NSCHARTNOTEFT_GEN_A_CORE
SWNote: worker met with pt A&)x4, pt +covid ,HD pt . [facility] ,transported by [employer] ([facility]  Worker called  L[employer] no one available at this moment, left message at their SW office to inform them about pt's covid status and dispo plan. SW to follow . SWNote: worker met with pt A&)x4, pt +covid ,HD pt . [facility] ,transported by [employer] ( [place]).  Worker called Roman Padgett no one available at this moment, left message at their SW office to inform them about pt's covid status and dispo plan. SW to follow .  17:15 p[facility] to discuss pt's [facility] situation. Per Gray they can accommodate pt on MWF at 7am however, requesting if pt can can dialysis here tomorrow so they can start his new seat time on Monday . Worker informed Dr Coreas ,pt will be d/c after HD tomorrow. Worker will call [employer] to arrange taxi with new seat time . All paperwork will be faxed at [phone] asap.

## 2022-01-13 NOTE — DISCHARGE NOTE PROVIDER - PROVIDER TOKENS
PROVIDER:[TOKEN:[[id number]],FOLLOWUP:[1 week]],PROVIDER:[TOKEN:[[id number]],FOLLOWUP:[1 week]]

## 2022-01-13 NOTE — DISCHARGE NOTE PROVIDER - CARE PROVIDER_API CALL
Claudette Mcnally)  Family Medicine  [address] [place] [address], [address] [place] [address]  Phone: [phone]  Fax: [phone]  Follow Up Time: 1 week    Jeramy Johnson (DO)  Medicine  [address] [place] [address], [facility] [place] [address]  Phone: [phone]  Fax: [phone]  Follow Up Time: 1 week

## 2022-01-14 ENCOUNTER — TRANSCRIPTION ENCOUNTER (OUTPATIENT)
Age: 37
End: 2022-01-14

## 2022-01-14 VITALS
HEART RATE: 65 BPM | TEMPERATURE: 98 F | DIASTOLIC BLOOD PRESSURE: 94 MMHG | OXYGEN SATURATION: 100 % | RESPIRATION RATE: 18 BRPM | SYSTOLIC BLOOD PRESSURE: 147 MMHG

## 2022-01-14 LAB
ALBUMIN SERPL ELPH-MCNC: 3.6 G/DL — SIGNIFICANT CHANGE UP (ref 3.3–5.2)
ALP SERPL-CCNC: 125 U/L — HIGH (ref 40–120)
ALT FLD-CCNC: 17 U/L — SIGNIFICANT CHANGE UP
ANION GAP SERPL CALC-SCNC: 15 MMOL/L — SIGNIFICANT CHANGE UP (ref 5–17)
AST SERPL-CCNC: 11 U/L — SIGNIFICANT CHANGE UP
BILIRUB SERPL-MCNC: 0.2 MG/DL — LOW (ref 0.4–2)
BUN SERPL-MCNC: 50.1 MG/DL — HIGH (ref 8–20)
CALCIUM SERPL-MCNC: 9.3 MG/DL — SIGNIFICANT CHANGE UP (ref 8.6–10.2)
CHLORIDE SERPL-SCNC: 102 MMOL/L — SIGNIFICANT CHANGE UP (ref 98–107)
CO2 SERPL-SCNC: 25 MMOL/L — SIGNIFICANT CHANGE UP (ref 22–29)
CREAT SERPL-MCNC: 6.72 MG/DL — HIGH (ref 0.5–1.3)
GLUCOSE BLDC GLUCOMTR-MCNC: 102 MG/DL — HIGH (ref 70–99)
GLUCOSE BLDC GLUCOMTR-MCNC: 149 MG/DL — HIGH (ref 70–99)
GLUCOSE BLDC GLUCOMTR-MCNC: 180 MG/DL — HIGH (ref 70–99)
GLUCOSE BLDC GLUCOMTR-MCNC: 85 MG/DL — SIGNIFICANT CHANGE UP (ref 70–99)
GLUCOSE BLDC GLUCOMTR-MCNC: 91 MG/DL — SIGNIFICANT CHANGE UP (ref 70–99)
GLUCOSE SERPL-MCNC: 95 MG/DL — SIGNIFICANT CHANGE UP (ref 70–99)
MAGNESIUM SERPL-MCNC: 2.1 MG/DL — SIGNIFICANT CHANGE UP (ref 1.8–2.6)
POTASSIUM SERPL-MCNC: 3.7 MMOL/L — SIGNIFICANT CHANGE UP (ref 3.5–5.3)
POTASSIUM SERPL-SCNC: 3.7 MMOL/L — SIGNIFICANT CHANGE UP (ref 3.5–5.3)
PROT SERPL-MCNC: 6.8 G/DL — SIGNIFICANT CHANGE UP (ref 6.6–8.7)
SODIUM SERPL-SCNC: 142 MMOL/L — SIGNIFICANT CHANGE UP (ref 135–145)

## 2022-01-14 PROCEDURE — 83036 HEMOGLOBIN GLYCOSYLATED A1C: CPT

## 2022-01-14 PROCEDURE — 84484 ASSAY OF TROPONIN QUANT: CPT

## 2022-01-14 PROCEDURE — 85025 COMPLETE CBC W/AUTO DIFF WBC: CPT

## 2022-01-14 PROCEDURE — 71045 X-RAY EXAM CHEST 1 VIEW: CPT

## 2022-01-14 PROCEDURE — 80053 COMPREHEN METABOLIC PANEL: CPT

## 2022-01-14 PROCEDURE — 84100 ASSAY OF PHOSPHORUS: CPT

## 2022-01-14 PROCEDURE — 87637 SARSCOV2&INF A&B&RSV AMP PRB: CPT

## 2022-01-14 PROCEDURE — 83735 ASSAY OF MAGNESIUM: CPT

## 2022-01-14 PROCEDURE — 85730 THROMBOPLASTIN TIME PARTIAL: CPT

## 2022-01-14 PROCEDURE — 99261: CPT

## 2022-01-14 PROCEDURE — 96374 THER/PROPH/DIAG INJ IV PUSH: CPT

## 2022-01-14 PROCEDURE — 86901 BLOOD TYPING SEROLOGIC RH(D): CPT

## 2022-01-14 PROCEDURE — 86900 BLOOD TYPING SEROLOGIC ABO: CPT

## 2022-01-14 PROCEDURE — 36415 COLL VENOUS BLD VENIPUNCTURE: CPT

## 2022-01-14 PROCEDURE — 99285 EMERGENCY DEPT VISIT HI MDM: CPT | Mod: 25

## 2022-01-14 PROCEDURE — 36410 VNPNXR 3YR/> PHY/QHP DX/THER: CPT

## 2022-01-14 PROCEDURE — 99239 HOSP IP/OBS DSCHRG MGMT >30: CPT

## 2022-01-14 PROCEDURE — 86703 HIV-1/HIV-2 1 RESULT ANTBDY: CPT

## 2022-01-14 PROCEDURE — 80048 BASIC METABOLIC PNL TOTAL CA: CPT

## 2022-01-14 PROCEDURE — 93005 ELECTROCARDIOGRAM TRACING: CPT

## 2022-01-14 PROCEDURE — 85610 PROTHROMBIN TIME: CPT

## 2022-01-14 PROCEDURE — 86850 RBC ANTIBODY SCREEN: CPT

## 2022-01-14 PROCEDURE — 82962 GLUCOSE BLOOD TEST: CPT

## 2022-01-14 PROCEDURE — 85027 COMPLETE CBC AUTOMATED: CPT

## 2022-01-14 RX ADMIN — SEVELAMER CARBONATE 800 MILLIGRAM(S): 2400 POWDER, FOR SUSPENSION ORAL at 11:37

## 2022-01-14 RX ADMIN — Medication 50 MILLIGRAM(S): at 05:24

## 2022-01-14 RX ADMIN — FLUPHENAZINE HYDROCHLORIDE 2.5 MILLIGRAM(S): 1 TABLET, FILM COATED ORAL at 17:26

## 2022-01-14 RX ADMIN — SEVELAMER CARBONATE 800 MILLIGRAM(S): 2400 POWDER, FOR SUSPENSION ORAL at 09:57

## 2022-01-14 RX ADMIN — Medication 1 MILLIGRAM(S): at 11:37

## 2022-01-14 RX ADMIN — Medication 60 MILLIGRAM(S): at 05:25

## 2022-01-14 RX ADMIN — HEPARIN SODIUM 5000 UNIT(S): 5000 INJECTION INTRAVENOUS; SUBCUTANEOUS at 05:24

## 2022-01-14 RX ADMIN — CARVEDILOL PHOSPHATE 12.5 MILLIGRAM(S): 80 CAPSULE, EXTENDED RELEASE ORAL at 05:24

## 2022-01-14 RX ADMIN — PANTOPRAZOLE SODIUM 40 MILLIGRAM(S): 20 TABLET, DELAYED RELEASE ORAL at 05:24

## 2022-01-14 RX ADMIN — FLUPHENAZINE HYDROCHLORIDE 2.5 MILLIGRAM(S): 1 TABLET, FILM COATED ORAL at 05:23

## 2022-01-14 RX ADMIN — CARVEDILOL PHOSPHATE 12.5 MILLIGRAM(S): 80 CAPSULE, EXTENDED RELEASE ORAL at 17:26

## 2022-01-14 RX ADMIN — Medication 50 MILLIGRAM(S): at 22:05

## 2022-01-14 RX ADMIN — HEPARIN SODIUM 5000 UNIT(S): 5000 INJECTION INTRAVENOUS; SUBCUTANEOUS at 17:26

## 2022-01-14 RX ADMIN — ATORVASTATIN CALCIUM 20 MILLIGRAM(S): 80 TABLET, FILM COATED ORAL at 22:05

## 2022-01-14 RX ADMIN — SENNA PLUS 1 TABLET(S): 8.6 TABLET ORAL at 22:06

## 2022-01-14 RX ADMIN — Medication 50 MILLIGRAM(S): at 14:53

## 2022-01-14 RX ADMIN — SEVELAMER CARBONATE 800 MILLIGRAM(S): 2400 POWDER, FOR SUSPENSION ORAL at 17:26

## 2022-01-14 RX ADMIN — Medication 81 MILLIGRAM(S): at 11:37

## 2022-01-14 RX ADMIN — Medication 2: at 11:37

## 2022-01-14 RX ADMIN — Medication 2000 UNIT(S): at 11:36

## 2022-01-14 RX ADMIN — INSULIN GLARGINE 15 UNIT(S): 100 INJECTION, SOLUTION SUBCUTANEOUS at 22:15

## 2022-01-14 NOTE — DISCHARGE NOTE NURSING/CASE MANAGEMENT/SOCIAL WORK - PATIENT PORTAL LINK FT
You can access the FollowMyHealth Patient Portal offered by [facility] by registering at the following website: [email or web address]. By joining [employer]’s FollowMyHealth portal, you will also be able to view your health information using other applications (apps) compatible with our system.

## 2022-01-14 NOTE — DISCHARGE NOTE NURSING/CASE MANAGEMENT/SOCIAL WORK - NSTRANSFERBELONGINGSRESP_GEN_A_NUR
[facility].      CARDIOLOGY PROGRESS NOTE  RECS:  1. Cardiac arrest with PEA, which was short, witnessed, and no evidence of hypoxia. Return of spontaneous circulation in 5 minutes or less after 3 doses of epinephrine. Likely secondary to underlying severe cardiomyopathy, which is likely nonischemic in this patient as he had a normal stress test in 2013. Supportive care to continue. PE should be ruled out also off pressors now. 2.  Cardiomyopathy, likely nonischemic. The patient had normal stress test in 08/2013, has never had a cardiac cath. 3.  Congestive heart failure, chronic, systolic. He seems to be in NYHA class 2 chronically. . CXR with CHF- 1 dose of lasix today. F/u closely. Eventually, he may need to be evaluated by advanced heart failure team for LVAD or possible heart transplant in case his brain function returns to normal.  4.  Morbid obesity. Losing weight will be beneficial to reduce his future cardiac problems, especially congestive heart failure. Family understands it. 5.  Hypertension: Patient off pressors and blood pressure rising. Discontinue dobutamine. Start IV Lopressor, IV Vasotec and IV Lasix per orders. 6.  Tachycardia: Patient has sinus tachycardia which will hopefully improve as dobutamine wears off. IV Lopressor will also help it. 7.  Respiratory failure: Still intubated. Being followed by pulmonary and critical care.       ASSESSMENT:  Hospital Problems  Date Reviewed: 12/20/2017          Codes Class Noted POA    Ankle instability, left ICD-10-CM: M25.372  ICD-9-CM: 718.87  12/20/2017 Unknown        Bradycardia following surgery ICD-10-CM: I97.89  ICD-9-CM: 997.1  12/20/2017 Unknown                SUBJECTIVE:  Intubated, sedated      OBJECTIVE:    VS:   Visit Vitals    BP (!) 169/112    Pulse (!) 120    Temp 99.4 °F (37.4 °C)    Resp 12    Ht 5' 9\" (1.753 m)    Wt (!) 353 lb 2.8 oz (160.2 kg)    SpO2 100%    BMI 52.16 kg/m2 Intake/Output Summary (Last 24 hours) at 12/22/17 2133  Last data filed at 12/22/17 1830   Gross per 24 hour   Intake          2401.42 ml   Output             1040 ml   Net          1361.42 ml     TELE: STach    General: Intubated, sedated    HENT: Normocephalic, atraumatic. Normal external eye. Neck :  no bruit, JVD difficult to assess due to obesity  Cardiac:  regular rate and rhythm  Chest/Lungs:chest clear, no wheezing, rales, normal symmetric air entry on vent  Abdomen: Soft, nontender, no masses  Extremities:  No c/c/edema, peripheral pulses present      Labs: Results:       Chemistry Recent Labs      12/22/17   0400  12/21/17   0220  12/20/17   1150   GLU  98  87  209*   NA  137  139  138   K  4.0  3.7  5.6*   CL  101  105  105   CO2  30  27  29   BUN  12  14  20*   CREA  1.23  0.97  1.53*   CA  8.0*  7.9*  8.0*   MG  2.1   --   2.0   AGAP  6  7  4   BUCR  10*  14  13      CBC w/Diff Recent Labs      12/22/17   0400  12/21/17   0220  12/20/17   1150   WBC  7.4  7.0  9.1   RBC  4.83  4.74  5.16   HGB  13.2  12.7*  14.2   HCT  42.4  40.7  44.9   PLT  222  220  276   GRANS  50  66  65   LYMPH  32  21  23   EOS  6*  2  2      Cardiac Enzymes Recent Labs      12/20/17   1150   CPK  239   CKND1  0.7      Coagulation No results for input(s): PTP, INR, APTT in the last 72 hours. No lab exists for component: INREXT, INREXT    Lipid Panel No results found for: CHOL, CHOLPOCT, CHOLX, CHLST, CHOLV, 338183, HDL, LDL, LDLC, DLDLP, 376539, VLDLC, VLDL, TGLX, TRIGL, TRIGP, TGLPOCT, CHHD, CHHDX   BNP No results for input(s): BNPP in the last 72 hours. Liver Enzymes No results for input(s): TP, ALB, TBIL, AP, SGOT, GPT in the last 72 hours.     No lab exists for component: DBIL   Digoxin    Thyroid Studies No results found for: T4, T3U, TSH, TSHEXT, TSHEXT           Orjordan Rios MD   Pager # [phone] yes

## 2022-01-14 NOTE — PROGRESS NOTE ADULT - SUBJECTIVE AND OBJECTIVE BOX
MILENA PUGA    [id number]    36y      Male    INTERVAL HPI/OVERNIGHT EVENTS: patient being seen for hd. patient seen at bedside and deneis any complaints    patient is for hd today     REVIEW OF SYSTEMS:    CONSTITUTIONAL: No fever, weight loss, or fatigue  RESPIRATORY: No cough, wheezing, hemoptysis; No shortness of breath  CARDIOVASCULAR: No chest pain, palpitations  GASTROINTESTINAL: No abdominal or epigastric pain. No nausea, vomiting  NEUROLOGICAL: No headaches, memory loss, loss of strength.  MISCELLANEOUS:      Vital Signs Last 24 Hrs  T(C): 36.3 (14 Jan 2022 05:24), Max: 37.2 (13 Jan 2022 20:45)  T(F): 97.4 (14 Jan 2022 05:24), Max: 99 (13 Jan 2022 20:45)  HR: 79 (14 Jan 2022 05:24) (69 - 80)  BP: 129/88 (14 Jan 2022 05:24) (116/73 - 164/89)  BP(mean): --  RR: 18 (14 Jan 2022 05:24) (18 - 18)  SpO2: 92% (14 Jan 2022 05:24) (92% - 100%)    PHYSICAL EXAM:    General: pt. in bed not in distress.  HEENT: AT, NC. PERRL. intact EOM. no throat erythema or exudate.   Neck: supple. no JVD.  Chest: CTA bilaterally.  Heart: S1,S2. RRR. no heart murmur. no edema.  Abdomen: soft. non-tender.  obese, + BS.   Ext: no calf tenderness. LUE av graft.   Neuro: AAO x3. no focal weakness. no speech disorder, cns ii to xii intact.  Skin: warm and dry. no obvious rash.   lymphatic system : no lymphadenopathy  psych : mood ok, no si/hi.      LABS:                        10.3   11.94 )-----------( 273      ( 13 Jan 2022 04:26 )             30.0     01-13    141  |  103  |  71.8<H>  ----------------------------<  73  3.2<L>   |  25.0  |  8.38<H>    Ca    8.9      13 Jan 2022 04:26  Phos  5.2     01-13  Mg     2.0     01-12    TPro  7.4  /  Alb  4.0  /  TBili  0.3<L>  /  DBili  x   /  AST  15  /  ALT  19  /  AlkPhos  141<H>  01-12    PT/INR - ( 12 Jan 2022 17:28 )   PT: 12.3 sec;   INR: 1.06 ratio         PTT - ( 12 Jan 2022 17:28 )  PTT:30.9 sec        MEDICATIONS  (STANDING):  aspirin enteric coated 81 milliGRAM(s) Oral daily  atorvastatin 20 milliGRAM(s) Oral at bedtime  carvedilol 12.5 milliGRAM(s) Oral every 12 hours  cholecalciferol 2000 Unit(s) Oral daily  dextrose 40% Gel 15 Gram(s) Oral once  dextrose 5%. 1000 milliLiter(s) (50 mL/Hr) IV Continuous <Continuous>  dextrose 5%. 1000 milliLiter(s) (100 mL/Hr) IV Continuous <Continuous>  dextrose 50% Injectable 25 Gram(s) IV Push once  dextrose 50% Injectable 12.5 Gram(s) IV Push once  dextrose 50% Injectable 25 Gram(s) IV Push once  fluPHENAZine 2.5 milliGRAM(s) Oral two times a day  folic acid 1 milliGRAM(s) Oral daily  glucagon  Injectable 1 milliGRAM(s) IntraMuscular once  heparin   Injectable 5000 Unit(s) SubCutaneous every 12 hours  hydrALAZINE 50 milliGRAM(s) Oral three times a day  insulin glargine Injectable (LANTUS) 15 Unit(s) SubCutaneous at bedtime  insulin lispro (ADMELOG) corrective regimen sliding scale   SubCutaneous three times a day before meals  insulin lispro (ADMELOG) corrective regimen sliding scale   SubCutaneous at bedtime  NIFEdipine XL 60 milliGRAM(s) Oral daily  pantoprazole    Tablet 40 milliGRAM(s) Oral before breakfast  QUEtiapine 100 milliGRAM(s) Oral at bedtime  senna 1 Tablet(s) Oral at bedtime  sevelamer carbonate 800 milliGRAM(s) Oral three times a day with meals  traZODone 100 milliGRAM(s) Oral at bedtime    MEDICATIONS  (PRN):  ALBUTerol    90 MICROgram(s) HFA Inhaler 2 Puff(s) Inhalation every 6 hours PRN Shortness of Breath and/or Wheezing      RADIOLOGY & ADDITIONAL TESTS:  
NEPHROLOGY INTERVAL HPI/OVERNIGHT EVENTS:    feels well  No events   No access issues     MEDICATIONS  (STANDING):  aspirin enteric coated 81 milliGRAM(s) Oral daily  atorvastatin 20 milliGRAM(s) Oral at bedtime  carvedilol 12.5 milliGRAM(s) Oral every 12 hours  cholecalciferol 2000 Unit(s) Oral daily  dextrose 40% Gel 15 Gram(s) Oral once  dextrose 5%. 1000 milliLiter(s) (50 mL/Hr) IV Continuous <Continuous>  dextrose 5%. 1000 milliLiter(s) (100 mL/Hr) IV Continuous <Continuous>  dextrose 50% Injectable 25 Gram(s) IV Push once  dextrose 50% Injectable 12.5 Gram(s) IV Push once  dextrose 50% Injectable 25 Gram(s) IV Push once  fluPHENAZine 2.5 milliGRAM(s) Oral two times a day  folic acid 1 milliGRAM(s) Oral daily  glucagon  Injectable 1 milliGRAM(s) IntraMuscular once  heparin   Injectable 5000 Unit(s) SubCutaneous every 12 hours  hydrALAZINE 50 milliGRAM(s) Oral three times a day  insulin glargine Injectable (LANTUS) 15 Unit(s) SubCutaneous at bedtime  insulin lispro (ADMELOG) corrective regimen sliding scale   SubCutaneous three times a day before meals  insulin lispro (ADMELOG) corrective regimen sliding scale   SubCutaneous at bedtime  NIFEdipine XL 60 milliGRAM(s) Oral daily  pantoprazole    Tablet 40 milliGRAM(s) Oral before breakfast  QUEtiapine 100 milliGRAM(s) Oral at bedtime  senna 1 Tablet(s) Oral at bedtime  sevelamer carbonate 800 milliGRAM(s) Oral three times a day with meals  traZODone 100 milliGRAM(s) Oral at bedtime    MEDICATIONS  (PRN):  ALBUTerol    90 MICROgram(s) HFA Inhaler 2 Puff(s) Inhalation every 6 hours PRN Shortness of Breath and/or Wheezing      Allergies    No Known Drug Allergies  shellfish (Unknown)    Intolerances          Vital Signs Last 24 Hrs  T(C): 36.4 (14 Jan 2022 11:04), Max: 37.2 (13 Jan 2022 20:45)  T(F): 97.6 (14 Jan 2022 11:04), Max: 99 (13 Jan 2022 20:45)  HR: 76 (14 Jan 2022 11:04) (69 - 80)  BP: 127/73 (14 Jan 2022 11:04) (127/73 - 164/89)  BP(mean): --  RR: 18 (14 Jan 2022 11:04) (18 - 18)  SpO2: 99% (14 Jan 2022 11:04) (92% - 100%)  Daily     Daily   I&O's Detail    13 Jan 2022 07:01  -  14 Jan 2022 07:00  --------------------------------------------------------  IN:  Total IN: 0 mL    OUT:    Other (mL): 2000 mL  Total OUT: 2000 mL    Total NET: -2000 mL        I&O's Summary    13 Jan 2022 07:01  -  14 Jan 2022 07:00  --------------------------------------------------------  IN: 0 mL / OUT: 2000 mL / NET: -2000 mL      HEAD:  no edema   NECK: Supple, No JVD,  CHEST/LUNG: EAE , coarse BS ,   HEART: Regular rate and rhythm; No murmurs, rubs, or gallops  ABDOMEN: Soft, Nontender, Nondistended; Bowel sounds present  EXTREMITIES: access with thrill    LABS:                        10.3   11.94 )-----------( 273      ( 13 Jan 2022 04:26 )             30.0     01-14    142  |  102  |  50.1<H>  ----------------------------<  95  3.7   |  25.0  |  6.72<H>    Ca    9.3      14 Jan 2022 08:00  Phos  5.2     01-13  Mg     2.1     01-14    TPro  6.8  /  Alb  3.6  /  TBili  0.2<L>  /  DBili  x   /  AST  11  /  ALT  17  /  AlkPhos  125<H>  01-14    PT/INR - ( 12 Jan 2022 17:28 )   PT: 12.3 sec;   INR: 1.06 ratio         PTT - ( 12 Jan 2022 17:28 )  PTT:30.9 sec    Magnesium, Serum: 2.1 mg/dL (01-14 @ 08:00)          RADIOLOGY & ADDITIONAL TESTS:  
NEPHROLOGY INTERVAL HPI/OVERNIGHT EVENTS:    No new events.    MEDICATIONS  (STANDING):  aspirin enteric coated 81 milliGRAM(s) Oral daily  atorvastatin 20 milliGRAM(s) Oral at bedtime  carvedilol 12.5 milliGRAM(s) Oral every 12 hours  cholecalciferol 2000 Unit(s) Oral daily  dextrose 40% Gel 15 Gram(s) Oral once  dextrose 5%. 1000 milliLiter(s) (50 mL/Hr) IV Continuous <Continuous>  dextrose 5%. 1000 milliLiter(s) (100 mL/Hr) IV Continuous <Continuous>  dextrose 50% Injectable 25 Gram(s) IV Push once  dextrose 50% Injectable 12.5 Gram(s) IV Push once  dextrose 50% Injectable 25 Gram(s) IV Push once  fluPHENAZine 2.5 milliGRAM(s) Oral two times a day  folic acid 1 milliGRAM(s) Oral daily  glucagon  Injectable 1 milliGRAM(s) IntraMuscular once  heparin   Injectable 5000 Unit(s) SubCutaneous every 12 hours  hydrALAZINE 50 milliGRAM(s) Oral three times a day  insulin glargine Injectable (LANTUS) 15 Unit(s) SubCutaneous at bedtime  insulin lispro (ADMELOG) corrective regimen sliding scale   SubCutaneous three times a day before meals  insulin lispro (ADMELOG) corrective regimen sliding scale   SubCutaneous at bedtime  NIFEdipine XL 60 milliGRAM(s) Oral daily  pantoprazole    Tablet 40 milliGRAM(s) Oral before breakfast  QUEtiapine 100 milliGRAM(s) Oral at bedtime  senna 1 Tablet(s) Oral at bedtime  sevelamer carbonate 800 milliGRAM(s) Oral three times a day with meals  traZODone 100 milliGRAM(s) Oral at bedtime    MEDICATIONS  (PRN):  ALBUTerol    90 MICROgram(s) HFA Inhaler 2 Puff(s) Inhalation every 6 hours PRN Shortness of Breath and/or Wheezing      Allergies    No Known Drug Allergies  shellfish (Unknown)            Vital Signs Last 24 Hrs  T(C): 36.9 (12 Jan 2022 19:30), Max: 36.9 (12 Jan 2022 19:30)  T(F): 98.4 (12 Jan 2022 19:30), Max: 98.4 (12 Jan 2022 19:30)  HR: 74 (12 Jan 2022 19:30) (72 - 80)  BP: 147/87 (12 Jan 2022 19:30) (125/85 - 161/107)  BP(mean): --  RR: 16 (12 Jan 2022 19:30) (16 - 18)  SpO2: 97% (12 Jan 2022 19:30) (97% - 98%)  Daily Height in cm: 165.1 (12 Jan 2022 13:45)      PHYSICAL EXAM:    GENERAL: Supine in bed, comfortable  HEAD:  wnl  EYES:   ENMT:   NECK: veins  full  NERVOUS SYSTEM:  alert, oriented  CHEST/LUNG: no wheezes  HEART: RR, no rub  ABDOMEN:   EXTREMITIES:  Access site clean  LYMPH:   SKIN:     LABS:                        10.3   11.94 )-----------( 273      ( 13 Jan 2022 04:26 )             30.0     01-13    141  |  103  |  71.8<H>  ----------------------------<  73  3.2<L>   |  25.0  |  8.38<H>    Ca    8.9      13 Jan 2022 04:26  Phos  5.2     01-13  Mg     2.0     01-12    TPro  7.4  /  Alb  4.0  /  TBili  0.3<L>  /  DBili  x   /  AST  15  /  ALT  19  /  AlkPhos  141<H>  01-12    PT/INR - ( 12 Jan 2022 17:28 )   PT: 12.3 sec;   INR: 1.06 ratio         PTT - ( 12 Jan 2022 17:28 )  PTT:30.9 sec    Phosphorus Level, Serum: 5.2 mg/dL (01-13 @ 04:26)  Magnesium, Serum: 2.0 mg/dL (01-12 @ 15:10)          RADIOLOGY & ADDITIONAL TESTS:

## 2022-01-14 NOTE — DISCHARGE NOTE NURSING/CASE MANAGEMENT/SOCIAL WORK - NSDCPEFALRISK_GEN_ALL_CORE
For information on Fall & Injury Prevention, visit: [email or web address] OR  [email or web address] OR  https://www.cdc.gov/steadi/patient.html

## 2022-01-14 NOTE — PROGRESS NOTE ADULT - ASSESSMENT
37 y/o male with h/o ESRD on dialysis, htn, DM on insulin , bipolar disorder came to the ER as he could not have his dialysis today and 2 days ago due to URI symptoms, body aches. no fever. Pt. came to the ER for covid testing and his dialysis. no cp, no sob, no abd. pain. no n/v/d. pt. afebrile in the ER and o2 sat 98 % RA. pt. has been covid -19 vaccinated but did not get booster.     - ESRD on dialysis, renal following  for hd    - covid-19 virus detected. pt is not hypoxic, no cough, will closely observe for now . isolation, prn albuterol.     - Primary htn, will continue home meds.     - dm, insulin     - katie, will keep on bipap.     dispo - dc once social work clears patient to return to outpatient hd  [facility] tomorrow   
ESRD - Covid (+)   Clinically stable     Needs short session of HD today to get back on MWF schedule   Then can be discharged to follow up w/ [place] [facility] Mon 1/17  
A) DM 2 wit5h CRF 5, + Covid 19.    P) HD  today.

## 2022-01-17 ENCOUNTER — NON-APPOINTMENT (OUTPATIENT)
Age: 37
End: 2022-01-17

## 2022-03-11 ENCOUNTER — RX RENEWAL (OUTPATIENT)
Age: 37
End: 2022-03-11

## 2022-03-17 ENCOUNTER — APPOINTMENT (OUTPATIENT)
Dept: FAMILY MEDICINE | Facility: CLINIC | Age: 37
End: 2022-03-17
Payer: MEDICARE

## 2022-03-17 VITALS
WEIGHT: 245 LBS | SYSTOLIC BLOOD PRESSURE: 112 MMHG | HEART RATE: 88 BPM | TEMPERATURE: 97.3 F | DIASTOLIC BLOOD PRESSURE: 80 MMHG | HEIGHT: 66 IN | OXYGEN SATURATION: 97 % | BODY MASS INDEX: 39.37 KG/M2

## 2022-03-17 VITALS — SYSTOLIC BLOOD PRESSURE: 108 MMHG | DIASTOLIC BLOOD PRESSURE: 80 MMHG

## 2022-03-17 DIAGNOSIS — J96.12 CHRONIC RESPIRATORY FAILURE WITH HYPERCAPNIA: ICD-10-CM

## 2022-03-17 DIAGNOSIS — J96.11 CHRONIC RESPIRATORY FAILURE WITH HYPOXIA: ICD-10-CM

## 2022-03-17 PROCEDURE — G0439: CPT

## 2022-03-17 RX ORDER — ASPIRIN 81 MG/1
81 TABLET ORAL
Qty: 90 | Refills: 0 | Status: DISCONTINUED | COMMUNITY
Start: 2020-06-26 | End: 2022-03-17

## 2022-03-17 NOTE — HEALTH RISK ASSESSMENT
[Never] : Never [No] : No [0] : 2) Feeling down, depressed, or hopeless: Not at all (0) [No falls in past year] : Patient reported no falls in the past year [Language] : difficulty with language [Behavior] : difficulty with behavior [Learning/Retaining New Information] : difficulty learning/retaining new information [Handling Complex Tasks] : difficulty handling complex tasks [Reasoning] : difficulty with reasoning [Spatial Ability and Orientation] : difficulty with spatial ability and orientation [Access to Medications] : access to medications [Transportation] : transportation [Financial] : financial [# of Members in Household ___] :  household currently consist of [unfilled] member(s) [On disability] : on disability [High School] : high school [# Of Children ___] : has [unfilled] children [Feels Safe at Home] : Feels safe at home [Fully functional (bathing, dressing, toileting, transferring, walking, feeding)] : Fully functional (bathing, dressing, toileting, transferring, walking, feeding) [Fully functional (using the telephone, shopping, preparing meals, housekeeping, doing laundry, using] : Fully functional and needs no help or supervision to perform IADLs (using the telephone, shopping, preparing meals, housekeeping, doing laundry, using transportation, managing medications and managing finances) [Reports normal functional visual acuity (ie: able to read med bottle)] : Reports normal functional visual acuity [Reports changes in dental health] : Reports changes in dental health [Smoke Detector] : smoke detector [Carbon Monoxide Detector] : carbon monoxide detector [Seat Belt] :  uses seat belt [[id number]] : 0 [Change in mental status noted] : No change in mental status noted [Reports changes in hearing] : Reports no changes in hearing [Reports changes in vision] : Reports no changes in vision [Guns at Home] : no guns at home

## 2022-03-17 NOTE — HISTORY OF PRESENT ILLNESS
[Formal Caregiver] : formal caregiver [Other: _____] : [unfilled] [FreeTextEntry1] : Pt is here for CPE.  [de-identified] : [facility] Annual Wellness Exam \par as above,  light breakfast  no CP/SOB c activity  no dizziness no palpitations  no N/V/D +BM qd NL no bloody/black stools.   Despite ESRD on HD pt is still urinating "3x/day" without complication \par \par Hx of COVID-19 1/2022\par \par Denies f/c/s no cough sense of smell/taste intact \par \par on HD  M/W/F    [facility]  EConnie Islip

## 2022-03-17 NOTE — PLAN
[FreeTextEntry1] : see med orders\par \par see lab orders\par \par cont HD 3d/week \par \par f/u 3M

## 2022-04-12 ENCOUNTER — APPOINTMENT (OUTPATIENT)
Dept: PULMONOLOGY | Facility: CLINIC | Age: 37
End: 2022-04-12
Payer: MEDICARE

## 2022-04-12 VITALS
BODY MASS INDEX: 39.53 KG/M2 | SYSTOLIC BLOOD PRESSURE: 132 MMHG | DIASTOLIC BLOOD PRESSURE: 92 MMHG | HEART RATE: 85 BPM | RESPIRATION RATE: 16 BRPM | OXYGEN SATURATION: 98 % | WEIGHT: 246 LBS | HEIGHT: 66 IN

## 2022-04-12 DIAGNOSIS — K21.9 GASTRO-ESOPHAGEAL REFLUX DISEASE W/OUT ESOPHAGITIS: ICD-10-CM

## 2022-04-12 DIAGNOSIS — G47.33 OBSTRUCTIVE SLEEP APNEA (ADULT) (PEDIATRIC): ICD-10-CM

## 2022-04-12 PROCEDURE — 99213 OFFICE O/P EST LOW 20 MIN: CPT

## 2022-04-12 RX ORDER — PEN NEEDLE, DIABETIC 31 G X1/4"
32G X 4 MM NEEDLE, DISPOSABLE MISCELLANEOUS
Qty: 400 | Refills: 0 | Status: ACTIVE | COMMUNITY
Start: 2020-02-28

## 2022-04-19 NOTE — ASSESSMENT
[FreeTextEntry1] : Severe ARVIND\par OHS\par Chronic hypoxic and hypercarbic respiratory failure - well compensated on AVAPS-AE at night\par Compliant with and benefiting from nocturnal AVAPS-AE (VY=332; EPAP=7; PS=7-15: Max IPAP=25: Auto Rate\par Obesity\par GERD - controlled - Occasional GERD related bronchospasm responsive to albuterol neb

## 2022-04-19 NOTE — HISTORY OF PRESENT ILLNESS
[Snoring] : snoring [Witnessed Apneas] : witnessed sleep apnea [Frequent Nocturnal Awakening] : frequent nocturnal awakening [Daytime Somnolence] : daytime somnolence [ESS: ___] : ESS score [unfilled] [Unintentional Sleep While Inactive] : unintentional sleep while inactive [Awakes Unrefreshed] : awakening unrefreshed [FreeTextEntry1] : Severe ARVIND in 2017\par Bipap at 17/13\par Bipap broken (fell)\par Gained 80 lbs\par In for management of ARVIND\par \par 3/2/21\par \par I SPENT 20 MINUTES REVIEWING NOTES, ORDERS, LABS AND IMAGES FROM HOSPITALIZATION AND NIV COMPLIANCE DATA PRIOR TO THIS VISIT\par \par Hospital Course \par Discharge Date	10-Feb-2021 \par Admission Date	03-Feb-2021 19:14 \par Reason for Admission	difficulty breathing \par Hospital Course	 \par 35 years old male with PMH of ESRD on Dialysis (MWF), Morbid Obesity, ARVIND (not \par on CPAP), DM 2, HTN, HLD and Bipolar Disorder presented with difficulty \par breathing after missing dialysis. Seen by Nephrology and s/p emergent HD 2/3 \par and 2/4. s/p a dose of Lasix 80 mg with improvement. Noted to have low grade \par temperatures and fever workup done. CXR shows B/L pna, UA negative. Blood \par cultures and sputum culture negative. Started on Zosyn for possible pneumonia, \par completed 5 days. Pt was doing well with 5L NC with BIPAP PRN. On 2/5, noted \par Tmax overnight to 102F and noted to be more somnolent. ABG shows acute \par hypercapnic respiratory failure. ICU, ID and Pulmonology were consulted. BC \par negative. The patient's PCO2 was severely elevated and responded to NIV. The \par patient requires advanced therapies for chronic respiratory failure secondary \par to ARVIND/OHS. At this time, other therapies such as BPAP-ST have been attempted \par and failed. AVAPS-AE or IVAPS are not available on BPAP and are therefore ruled \par out. Therefore the patient is an excellent candidate for Trilogy NIV. Without \par Trilogy NIV, the patient would be at an increased risk for readmission, \par worsening morbidity and mortality including from other medical complications \par and continued respiratory failure. BG elevated due to steroids, HGA1c 12.3. \par Insulin dose adjusted. The patient no longer requires supplemental oxygen . The \par patient was seen by PT and recommend home. Pt was 99% on RA at erst & 96% on \par ambulation. DID not need home O2 on d/c. \par \par 3/2/21\par Doing well\par Episodic bronchospasm - request nebulizer for prn use\par HD on Mon, Wed, and Fri\par No cough or edema\par Sleeping well on AVAPS-AE\par \par 10/5/21\par Doing well\par Infrequent episodic bronchospasm - request nebulizer for prn use\par HD on Mon, Wed, and Fri\par No cough or edema\par Sleeping well on AVAPS-AE\par \par 4/12/22\par AVAPS-AE taken back from patient \par Patient says it was a compliance data issue that made it look like he was non-compliant\par The patient says he was told he could get the machine back if he had a sleep test \par \par 4/19/22\par Getting AVAPS-AE back from DME\par Test cancelled

## 2022-04-25 NOTE — ED ADULT NURSE NOTE - NSIMPLEMENTINTERV_GEN_ALL_ED
CC:  67-year-old female follows up with a right distal tibia fracture.  He is currently being treated with a brace.  The patient is essentially nonweightbearing.  She presents today in a wheelchair.  She states that she really just transfers from her chair to the wheelchair and has not really been bearing any weight.  She does continue to smoke a pack a day.    RLE:  Skin is intact throughout  Grossly intact motor sensory function distally  Plus 2 distal pulses    X-ray images were examined and personally interpreted by me.  Three views the right ankle dated 04/25/2022 show bridging callus around a distal 3rd tibia fracture.  Fracture line is still visible.    Dx:  Healing fracture of the right distal tibia    Plan:  I had a long discussion with the patient about smoking cessation.  We discussed that she will have delayed healing because of the continued smoking.  She verbalized understanding.  We are going to continue the brace and have her follow up in 6 weeks with an x-ray.     Implemented All Fall with Harm Risk Interventions:  [place] to call system. Call bell, personal items and telephone within reach. Instruct patient to call for assistance. Room bathroom lighting operational. Non-slip footwear when patient is off stretcher. Physically safe environment: no spills, clutter or unnecessary equipment. Stretcher in lowest position, wheels locked, appropriate side rails in place. Provide visual cue, wrist band, yellow gown, etc. Monitor gait and stability. Monitor for mental status changes and reorient to person, place, and time. Review medications for side effects contributing to fall risk. Reinforce activity limits and safety measures with patient and family. Provide visual clues: red socks.

## 2022-05-19 ENCOUNTER — APPOINTMENT (OUTPATIENT)
Dept: PULMONOLOGY | Facility: CLINIC | Age: 37
End: 2022-05-19

## 2022-06-23 ENCOUNTER — APPOINTMENT (OUTPATIENT)
Dept: FAMILY MEDICINE | Facility: CLINIC | Age: 37
End: 2022-06-23

## 2022-06-24 ENCOUNTER — RX RENEWAL (OUTPATIENT)
Age: 37
End: 2022-06-24

## 2022-07-04 ENCOUNTER — INPATIENT (INPATIENT)
Facility: HOSPITAL | Age: 37
LOS: 0 days | Discharge: HOME | DRG: 682 | End: 2022-07-05
Attending: HOSPITALIST | Admitting: HOSPITALIST
Payer: COMMERCIAL

## 2022-07-04 VITALS
SYSTOLIC BLOOD PRESSURE: 148 MMHG | WEIGHT: 220.02 LBS | RESPIRATION RATE: 20 BRPM | TEMPERATURE: 98 F | HEIGHT: 65 IN | OXYGEN SATURATION: 97 % | DIASTOLIC BLOOD PRESSURE: 92 MMHG | HEART RATE: 73 BPM

## 2022-07-04 DIAGNOSIS — E87.6 HYPOKALEMIA: ICD-10-CM

## 2022-07-04 DIAGNOSIS — I77.0 ARTERIOVENOUS FISTULA, ACQUIRED: Chronic | ICD-10-CM

## 2022-07-04 LAB
ALBUMIN SERPL ELPH-MCNC: 3.9 G/DL — SIGNIFICANT CHANGE UP (ref 3.3–5.2)
ALP SERPL-CCNC: 125 U/L — HIGH (ref 40–120)
ALT FLD-CCNC: 17 U/L — SIGNIFICANT CHANGE UP
ANION GAP SERPL CALC-SCNC: 16 MMOL/L — SIGNIFICANT CHANGE UP (ref 5–17)
ANION GAP SERPL CALC-SCNC: 18 MMOL/L — HIGH (ref 5–17)
AST SERPL-CCNC: 13 U/L — SIGNIFICANT CHANGE UP
BASE EXCESS BLDV CALC-SCNC: 3.6 MMOL/L — HIGH (ref -2–3)
BASOPHILS # BLD AUTO: 0.08 K/UL — SIGNIFICANT CHANGE UP (ref 0–0.2)
BASOPHILS NFR BLD AUTO: 0.6 % — SIGNIFICANT CHANGE UP (ref 0–2)
BILIRUB SERPL-MCNC: 0.2 MG/DL — LOW (ref 0.4–2)
BUN SERPL-MCNC: 76.3 MG/DL — HIGH (ref 8–20)
BUN SERPL-MCNC: 76.7 MG/DL — HIGH (ref 8–20)
CA-I SERPL-SCNC: 1.12 MMOL/L — LOW (ref 1.15–1.33)
CALCIUM SERPL-MCNC: 9.1 MG/DL — SIGNIFICANT CHANGE UP (ref 8.6–10.2)
CALCIUM SERPL-MCNC: 9.2 MG/DL — SIGNIFICANT CHANGE UP (ref 8.6–10.2)
CHLORIDE BLDV-SCNC: 100 MMOL/L — SIGNIFICANT CHANGE UP (ref 98–107)
CHLORIDE SERPL-SCNC: 97 MMOL/L — LOW (ref 98–107)
CHLORIDE SERPL-SCNC: 97 MMOL/L — LOW (ref 98–107)
CO2 SERPL-SCNC: 25 MMOL/L — SIGNIFICANT CHANGE UP (ref 22–29)
CO2 SERPL-SCNC: 27 MMOL/L — SIGNIFICANT CHANGE UP (ref 22–29)
CREAT SERPL-MCNC: 10.09 MG/DL — HIGH (ref 0.5–1.3)
CREAT SERPL-MCNC: 9.88 MG/DL — HIGH (ref 0.5–1.3)
EGFR: 6 ML/MIN/1.73M2 — LOW
EGFR: 6 ML/MIN/1.73M2 — LOW
EOSINOPHIL # BLD AUTO: 0.48 K/UL — SIGNIFICANT CHANGE UP (ref 0–0.5)
EOSINOPHIL NFR BLD AUTO: 3.5 % — SIGNIFICANT CHANGE UP (ref 0–6)
GAS PNL BLDV: 136 MMOL/L — SIGNIFICANT CHANGE UP (ref 136–145)
GAS PNL BLDV: SIGNIFICANT CHANGE UP
GAS PNL BLDV: SIGNIFICANT CHANGE UP
GLUCOSE BLDC GLUCOMTR-MCNC: 109 MG/DL — HIGH (ref 70–99)
GLUCOSE BLDV-MCNC: 155 MG/DL — HIGH (ref 70–99)
GLUCOSE SERPL-MCNC: 152 MG/DL — HIGH (ref 70–99)
GLUCOSE SERPL-MCNC: 156 MG/DL — HIGH (ref 70–99)
HCO3 BLDV-SCNC: 28 MMOL/L — SIGNIFICANT CHANGE UP (ref 22–29)
HCT VFR BLD CALC: 33.2 % — LOW (ref 39–50)
HCT VFR BLDA CALC: 37 % — SIGNIFICANT CHANGE UP
HGB BLD CALC-MCNC: 12.3 G/DL — LOW (ref 12.6–17.4)
HGB BLD-MCNC: 11.5 G/DL — LOW (ref 13–17)
IMM GRANULOCYTES NFR BLD AUTO: 0.2 % — SIGNIFICANT CHANGE UP (ref 0–1.5)
LACTATE BLDV-MCNC: 2.6 MMOL/L — HIGH (ref 0.5–2)
LYMPHOCYTES # BLD AUTO: 22.6 % — SIGNIFICANT CHANGE UP (ref 13–44)
LYMPHOCYTES # BLD AUTO: 3.11 K/UL — SIGNIFICANT CHANGE UP (ref 1–3.3)
MCHC RBC-ENTMCNC: 29 PG — SIGNIFICANT CHANGE UP (ref 27–34)
MCHC RBC-ENTMCNC: 34.6 GM/DL — SIGNIFICANT CHANGE UP (ref 32–36)
MCV RBC AUTO: 83.8 FL — SIGNIFICANT CHANGE UP (ref 80–100)
MONOCYTES # BLD AUTO: 0.86 K/UL — SIGNIFICANT CHANGE UP (ref 0–0.9)
MONOCYTES NFR BLD AUTO: 6.2 % — SIGNIFICANT CHANGE UP (ref 2–14)
NEUTROPHILS # BLD AUTO: 9.21 K/UL — HIGH (ref 1.8–7.4)
NEUTROPHILS NFR BLD AUTO: 66.9 % — SIGNIFICANT CHANGE UP (ref 43–77)
PCO2 BLDV: 42 MMHG — SIGNIFICANT CHANGE UP (ref 42–55)
PH BLDV: 7.43 — SIGNIFICANT CHANGE UP (ref 7.32–7.43)
PLATELET # BLD AUTO: 303 K/UL — SIGNIFICANT CHANGE UP (ref 150–400)
PO2 BLDV: 184 MMHG — HIGH (ref 25–45)
POTASSIUM BLDV-SCNC: 2.8 MMOL/L — CRITICAL LOW (ref 3.5–5.1)
POTASSIUM SERPL-MCNC: 2.7 MMOL/L — CRITICAL LOW (ref 3.5–5.3)
POTASSIUM SERPL-MCNC: 2.8 MMOL/L — CRITICAL LOW (ref 3.5–5.3)
POTASSIUM SERPL-SCNC: 2.7 MMOL/L — CRITICAL LOW (ref 3.5–5.3)
POTASSIUM SERPL-SCNC: 2.8 MMOL/L — CRITICAL LOW (ref 3.5–5.3)
PROT SERPL-MCNC: 7 G/DL — SIGNIFICANT CHANGE UP (ref 6.6–8.7)
RBC # BLD: 3.96 M/UL — LOW (ref 4.2–5.8)
RBC # FLD: 13.6 % — SIGNIFICANT CHANGE UP (ref 10.3–14.5)
SAO2 % BLDV: 99.3 % — SIGNIFICANT CHANGE UP
SARS-COV-2 RNA SPEC QL NAA+PROBE: SIGNIFICANT CHANGE UP
SODIUM SERPL-SCNC: 140 MMOL/L — SIGNIFICANT CHANGE UP (ref 135–145)
SODIUM SERPL-SCNC: 140 MMOL/L — SIGNIFICANT CHANGE UP (ref 135–145)
WBC # BLD: 13.77 K/UL — HIGH (ref 3.8–10.5)
WBC # FLD AUTO: 13.77 K/UL — HIGH (ref 3.8–10.5)

## 2022-07-04 PROCEDURE — 71045 X-RAY EXAM CHEST 1 VIEW: CPT | Mod: 26

## 2022-07-04 PROCEDURE — 99223 1ST HOSP IP/OBS HIGH 75: CPT

## 2022-07-04 PROCEDURE — 93010 ELECTROCARDIOGRAM REPORT: CPT

## 2022-07-04 PROCEDURE — 99285 EMERGENCY DEPT VISIT HI MDM: CPT

## 2022-07-04 RX ORDER — TRAZODONE HCL 50 MG
100 TABLET ORAL AT BEDTIME
Refills: 0 | Status: DISCONTINUED | OUTPATIENT
Start: 2022-07-04 | End: 2022-07-05

## 2022-07-04 RX ORDER — SODIUM CHLORIDE 9 MG/ML
1000 INJECTION, SOLUTION INTRAVENOUS
Refills: 0 | Status: DISCONTINUED | OUTPATIENT
Start: 2022-07-04 | End: 2022-07-05

## 2022-07-04 RX ORDER — DEXTROSE 50 % IN WATER 50 %
25 SYRINGE (ML) INTRAVENOUS ONCE
Refills: 0 | Status: DISCONTINUED | OUTPATIENT
Start: 2022-07-04 | End: 2022-07-05

## 2022-07-04 RX ORDER — QUETIAPINE FUMARATE 200 MG/1
100 TABLET, FILM COATED ORAL AT BEDTIME
Refills: 0 | Status: DISCONTINUED | OUTPATIENT
Start: 2022-07-04 | End: 2022-07-05

## 2022-07-04 RX ORDER — DEXTROSE 50 % IN WATER 50 %
15 SYRINGE (ML) INTRAVENOUS ONCE
Refills: 0 | Status: DISCONTINUED | OUTPATIENT
Start: 2022-07-04 | End: 2022-07-05

## 2022-07-04 RX ORDER — INSULIN LISPRO 100/ML
VIAL (ML) SUBCUTANEOUS
Refills: 0 | Status: DISCONTINUED | OUTPATIENT
Start: 2022-07-04 | End: 2022-07-05

## 2022-07-04 RX ORDER — GLUCAGON INJECTION, SOLUTION 0.5 MG/.1ML
1 INJECTION, SOLUTION SUBCUTANEOUS ONCE
Refills: 0 | Status: DISCONTINUED | OUTPATIENT
Start: 2022-07-04 | End: 2022-07-05

## 2022-07-04 RX ORDER — ENOXAPARIN SODIUM 100 MG/ML
30 INJECTION SUBCUTANEOUS EVERY 24 HOURS
Refills: 0 | Status: DISCONTINUED | OUTPATIENT
Start: 2022-07-04 | End: 2022-07-05

## 2022-07-04 RX ORDER — HYDRALAZINE HCL 50 MG
50 TABLET ORAL THREE TIMES A DAY
Refills: 0 | Status: DISCONTINUED | OUTPATIENT
Start: 2022-07-04 | End: 2022-07-05

## 2022-07-04 RX ORDER — CARVEDILOL PHOSPHATE 80 MG/1
12.5 CAPSULE, EXTENDED RELEASE ORAL EVERY 12 HOURS
Refills: 0 | Status: DISCONTINUED | OUTPATIENT
Start: 2022-07-04 | End: 2022-07-05

## 2022-07-04 RX ORDER — PANTOPRAZOLE SODIUM 20 MG/1
40 TABLET, DELAYED RELEASE ORAL
Refills: 0 | Status: DISCONTINUED | OUTPATIENT
Start: 2022-07-04 | End: 2022-07-05

## 2022-07-04 RX ORDER — INSULIN GLARGINE 100 [IU]/ML
20 INJECTION, SOLUTION SUBCUTANEOUS AT BEDTIME
Refills: 0 | Status: DISCONTINUED | OUTPATIENT
Start: 2022-07-04 | End: 2022-07-05

## 2022-07-04 RX ORDER — POTASSIUM CHLORIDE 20 MEQ
40 PACKET (EA) ORAL ONCE
Refills: 0 | Status: DISCONTINUED | OUTPATIENT
Start: 2022-07-04 | End: 2022-07-04

## 2022-07-04 RX ORDER — DEXTROSE 50 % IN WATER 50 %
12.5 SYRINGE (ML) INTRAVENOUS ONCE
Refills: 0 | Status: DISCONTINUED | OUTPATIENT
Start: 2022-07-04 | End: 2022-07-05

## 2022-07-04 RX ADMIN — Medication 50 MILLIGRAM(S): at 21:39

## 2022-07-04 RX ADMIN — QUETIAPINE FUMARATE 100 MILLIGRAM(S): 200 TABLET, FILM COATED ORAL at 21:40

## 2022-07-04 RX ADMIN — Medication 100 MILLIGRAM(S): at 21:39

## 2022-07-04 RX ADMIN — CARVEDILOL PHOSPHATE 12.5 MILLIGRAM(S): 80 CAPSULE, EXTENDED RELEASE ORAL at 19:50

## 2022-07-04 RX ADMIN — INSULIN GLARGINE 20 UNIT(S): 100 INJECTION, SOLUTION SUBCUTANEOUS at 23:07

## 2022-07-04 RX ADMIN — ENOXAPARIN SODIUM 30 MILLIGRAM(S): 100 INJECTION SUBCUTANEOUS at 22:09

## 2022-07-04 NOTE — ED PROVIDER NOTE - ATTENDING CONTRIBUTION TO CARE
HPI: 38yo male with PMH DM, HTN, ESRD on HD presenting requesting HD. Patient states he missed HD over last week because he was unable to get ride. +Nausea, vomited this morning. No shortness of breath or chest pain.     PE:  Gen: NAD  Head: NCAT  HEENT: Oral mucosa moist, normal conjunctiva  CV: RRR no murmurs, normal perfusion  Resp: CTA b/l, no wheezing, rales, rhonchi, no respiratory distress  GI: Abd Soft NTND  Neuro: No focal neuro deficits  MSK: FROM all 4 extremities, no deformity  Skin: No rash, no bruising  Psych: Normal affect    MDM: Patient with h/o ESRD on HD presenting after missed dialysis. Check EKG, labs, CXR and likely admission for dialysis. Martina Marc DO         I performed a history and physical exam of the patient and discussed their management with the resident. I reviewed the resident's note and agree with the documented findings and plan of care. My medical decision making and observations are found above.

## 2022-07-04 NOTE — H&P ADULT - NSHPPHYSICALEXAM_GEN_ALL_CORE
Vital Signs Last 24 Hrs  T(C): 36.8 (04 Jul 2022 16:55), Max: 36.8 (04 Jul 2022 14:45)  T(F): 98.2 (04 Jul 2022 16:55), Max: 98.2 (04 Jul 2022 14:45)  HR: 72 (04 Jul 2022 16:55) (72 - 73)  BP: 137/88 (04 Jul 2022 16:55) (137/88 - 148/92)  BP(mean): --  RR: 18 (04 Jul 2022 16:55) (18 - 20)  SpO2: 98% (04 Jul 2022 16:55) (97% - 98%)    General appearance: No acute distress, Awake, Alert  HEENT: Normocephalic, Atraumatic, Conjunctiva clear, EOMI  Neck: Supple, No JVD, No tenderness  Lungs: Breath sound equal bilaterally, No wheezes, Occasional rales  Cardiovascular: S1S2, Regular rhythm  Abdomen: Soft, Nontender, Nondistended, No guarding/rebound, Positive bowel sounds  Extremities: No clubbing, No cyanosis, No edema, No calf tenderness, Left upper extremity AV fistula  Neuro: Strength equal bilaterally, No tremors  Psychiatric: Appropriate mood, Normal affect

## 2022-07-04 NOTE — H&P ADULT - HISTORY OF PRESENT ILLNESS
37M with a history of end stage renal disease who presented with some abdominal discomfort and noted that he had not attended his usual hemodialysis sessions for the last week. The patient reported that he had been visiting friends and was unable to obtain transportation to the dialysis center. He denied any dyspnea or lower extremity edema. He reported that he still produces some urine. He denied any associated chest pain or palpitations. In the emergency department, he was noted to have hypokalemia. He denied any recent fevers, chills, or sick contacts.

## 2022-07-04 NOTE — ED PROVIDER NOTE - PROGRESS NOTE DETAILS
Patient with hypokalemia- discussed with renal (Dr. Workman)- requesting rpt BMP, will see patient. Per SW- patient unsafe discharge home, possibly homeless and will require SW for safe disposition planning. Will admit for further management. Martina Marc DO

## 2022-07-04 NOTE — ED ADULT NURSE REASSESSMENT NOTE - NS ED NURSE REASSESS COMMENT FT1
Gave report to Jordy Bedoya, pt VSS, no distress noted, respirations even and unlabored, pt denies SOB, chest pain, or dizziness.

## 2022-07-04 NOTE — ED PROVIDER NOTE - OBJECTIVE STATEMENT
37 y m with pmh of DM, HTN, and ESRD on dialysis. Presenting from dialysis for assessment since hasn't had dialysis since 11 days ago. Pt had been with family who hadn't been able to drive him to dialysis. Still makes some urine. Denying cp, sob, palpitations, f/v, n/v, ab pain, dysuria.

## 2022-07-04 NOTE — ED ADULT NURSE REASSESSMENT NOTE - NS ED NURSE REASSESS COMMENT FT1
Assumed care at 1930, received report from lisa Bullard at dialysis awaiting transport back to Emergency Department.

## 2022-07-04 NOTE — ED ADULT TRIAGE NOTE - CHIEF COMPLAINT QUOTE
I have not had dialysis since last Friday 6/24, went to dialysis today and they sent me hear. denies any CP, SOB, dizziness. left AV fistula

## 2022-07-04 NOTE — H&P ADULT - VTE RISK ASSESSMENT
LMTCB on  h#... RN ext [phone] till 2 pm...  pls verify why pt need referral to see ob/gyne, any issue or yearly exam?   Thanks.
Please notify pt.   I signed the referral.
Pt calling regarding needing a referral to see Dr. Giovanna Alvarez.   Please advise when ready
Ref generated and tasked to LV for signoff
VTE Assessment already completed for this visit

## 2022-07-04 NOTE — CHART NOTE - NSCHARTNOTEFT_GEN_A_CORE
Social Work Note: SW met with patient at bedside AOx4 calm and cooperative on approach. Patient reports he was at his families house in [place] and states he was unable to go to dialysis since 6/24. Patient confirmed his HD schedule [facility] MWF 130pm chair. Patient expressed understanding of how important it is to go to his HD treatments. This writer placed call to [facility] and spoke with Rosalba who states patient has not been to HD since 6/24, attempted to go to his regular scheduled treatment today and was sent to [facility] for missing multiple treatments. Per Rosalba, patient is homeless and living in a homeless shelter and utilizes medicaid taxi's for ambulation. This writer explored patients living situation and patient reports he lives at [address] [place] [address] in [place] [id number] with his brother Giorgio and his cousin Blair. Patient denied being homeless and living in a shelter. Patient reports he is followed by the [facility] CM ed Robbins [phone] whom patient meets with once a week. This writer attempted to call, offices are closed for holiday. Per Rosalba at [facility], no issues with patient returning however they will need to be notified and updated clinicals faxed to them to review. JORDYN will continue to follow.

## 2022-07-04 NOTE — H&P ADULT - ASSESSMENT
37M with a history of end stage renal disease, diabetes, hypertension, and bipolar disorder who presented after missing multiple sessions of hemodialysis.    ESRD - Discussed with Nephrology. For urgent hemodialysis tonight. No hypoxia on examination.    Hypokalemia - For supplementation of potassium.    Bipolar disorder - On quetiapine and trazodone.    Hypertension - On nifedipine and hydralazine. Close blood pressure monitoring.    Diabetes - Insulin coverage, close monitoring of blood glucose levels.

## 2022-07-05 ENCOUNTER — TRANSCRIPTION ENCOUNTER (OUTPATIENT)
Age: 37
End: 2022-07-05

## 2022-07-05 VITALS
WEIGHT: 242.73 LBS | DIASTOLIC BLOOD PRESSURE: 103 MMHG | SYSTOLIC BLOOD PRESSURE: 160 MMHG | OXYGEN SATURATION: 98 % | TEMPERATURE: 98 F | RESPIRATION RATE: 18 BRPM | HEART RATE: 74 BPM

## 2022-07-05 LAB
A1C WITH ESTIMATED AVERAGE GLUCOSE RESULT: 5.7 % — HIGH (ref 4–5.6)
ANION GAP SERPL CALC-SCNC: 12 MMOL/L — SIGNIFICANT CHANGE UP (ref 5–17)
BUN SERPL-MCNC: 54.2 MG/DL — HIGH (ref 8–20)
CALCIUM SERPL-MCNC: 9.2 MG/DL — SIGNIFICANT CHANGE UP (ref 8.6–10.2)
CHLORIDE SERPL-SCNC: 100 MMOL/L — SIGNIFICANT CHANGE UP (ref 98–107)
CO2 SERPL-SCNC: 27 MMOL/L — SIGNIFICANT CHANGE UP (ref 22–29)
CREAT SERPL-MCNC: 7.46 MG/DL — HIGH (ref 0.5–1.3)
EGFR: 9 ML/MIN/1.73M2 — LOW
ESTIMATED AVERAGE GLUCOSE: 117 MG/DL — HIGH (ref 68–114)
GLUCOSE BLDC GLUCOMTR-MCNC: 114 MG/DL — HIGH (ref 70–99)
GLUCOSE BLDC GLUCOMTR-MCNC: 120 MG/DL — HIGH (ref 70–99)
GLUCOSE BLDC GLUCOMTR-MCNC: 136 MG/DL — HIGH (ref 70–99)
GLUCOSE SERPL-MCNC: 85 MG/DL — SIGNIFICANT CHANGE UP (ref 70–99)
HBV CORE AB SER-ACNC: SIGNIFICANT CHANGE UP
HBV SURFACE AB SER-ACNC: 505.1 MIU/ML — SIGNIFICANT CHANGE UP
HBV SURFACE AB SER-ACNC: REACTIVE
HBV SURFACE AG SER-ACNC: SIGNIFICANT CHANGE UP
HCT VFR BLD CALC: 34.2 % — LOW (ref 39–50)
HCV AB S/CO SERPL IA: 0.18 S/CO — SIGNIFICANT CHANGE UP (ref 0–0.99)
HCV AB SERPL-IMP: SIGNIFICANT CHANGE UP
HGB BLD-MCNC: 11.8 G/DL — LOW (ref 13–17)
MCHC RBC-ENTMCNC: 29.2 PG — SIGNIFICANT CHANGE UP (ref 27–34)
MCHC RBC-ENTMCNC: 34.5 GM/DL — SIGNIFICANT CHANGE UP (ref 32–36)
MCV RBC AUTO: 84.7 FL — SIGNIFICANT CHANGE UP (ref 80–100)
PLATELET # BLD AUTO: 282 K/UL — SIGNIFICANT CHANGE UP (ref 150–400)
POTASSIUM SERPL-MCNC: 2.9 MMOL/L — CRITICAL LOW (ref 3.5–5.3)
POTASSIUM SERPL-SCNC: 2.9 MMOL/L — CRITICAL LOW (ref 3.5–5.3)
RBC # BLD: 4.04 M/UL — LOW (ref 4.2–5.8)
RBC # FLD: 13.6 % — SIGNIFICANT CHANGE UP (ref 10.3–14.5)
SODIUM SERPL-SCNC: 139 MMOL/L — SIGNIFICANT CHANGE UP (ref 135–145)
WBC # BLD: 12.07 K/UL — HIGH (ref 3.8–10.5)
WBC # FLD AUTO: 12.07 K/UL — HIGH (ref 3.8–10.5)

## 2022-07-05 PROCEDURE — 71045 X-RAY EXAM CHEST 1 VIEW: CPT

## 2022-07-05 PROCEDURE — 87340 HEPATITIS B SURFACE AG IA: CPT

## 2022-07-05 PROCEDURE — 93990 DOPPLER FLOW TESTING: CPT

## 2022-07-05 PROCEDURE — 36415 COLL VENOUS BLD VENIPUNCTURE: CPT

## 2022-07-05 PROCEDURE — 83735 ASSAY OF MAGNESIUM: CPT

## 2022-07-05 PROCEDURE — 83036 HEMOGLOBIN GLYCOSYLATED A1C: CPT

## 2022-07-05 PROCEDURE — 93005 ELECTROCARDIOGRAM TRACING: CPT

## 2022-07-05 PROCEDURE — 82803 BLOOD GASES ANY COMBINATION: CPT

## 2022-07-05 PROCEDURE — 93990 DOPPLER FLOW TESTING: CPT | Mod: 26

## 2022-07-05 PROCEDURE — 82435 ASSAY OF BLOOD CHLORIDE: CPT

## 2022-07-05 PROCEDURE — 82947 ASSAY GLUCOSE BLOOD QUANT: CPT

## 2022-07-05 PROCEDURE — 83605 ASSAY OF LACTIC ACID: CPT

## 2022-07-05 PROCEDURE — 80048 BASIC METABOLIC PNL TOTAL CA: CPT

## 2022-07-05 PROCEDURE — 82962 GLUCOSE BLOOD TEST: CPT

## 2022-07-05 PROCEDURE — 84295 ASSAY OF SERUM SODIUM: CPT

## 2022-07-05 PROCEDURE — 82330 ASSAY OF CALCIUM: CPT

## 2022-07-05 PROCEDURE — U0005: CPT

## 2022-07-05 PROCEDURE — 80053 COMPREHEN METABOLIC PANEL: CPT

## 2022-07-05 PROCEDURE — 85027 COMPLETE CBC AUTOMATED: CPT

## 2022-07-05 PROCEDURE — 84100 ASSAY OF PHOSPHORUS: CPT

## 2022-07-05 PROCEDURE — U0003: CPT

## 2022-07-05 PROCEDURE — 85025 COMPLETE CBC W/AUTO DIFF WBC: CPT

## 2022-07-05 PROCEDURE — 99239 HOSP IP/OBS DSCHRG MGMT >30: CPT

## 2022-07-05 PROCEDURE — 84132 ASSAY OF SERUM POTASSIUM: CPT

## 2022-07-05 PROCEDURE — 86704 HEP B CORE ANTIBODY TOTAL: CPT

## 2022-07-05 PROCEDURE — 85018 HEMOGLOBIN: CPT

## 2022-07-05 PROCEDURE — 99285 EMERGENCY DEPT VISIT HI MDM: CPT | Mod: 25

## 2022-07-05 PROCEDURE — 86803 HEPATITIS C AB TEST: CPT

## 2022-07-05 PROCEDURE — 85014 HEMATOCRIT: CPT

## 2022-07-05 PROCEDURE — 86706 HEP B SURFACE ANTIBODY: CPT

## 2022-07-05 PROCEDURE — 99261: CPT

## 2022-07-05 RX ORDER — POTASSIUM CHLORIDE 20 MEQ
40 PACKET (EA) ORAL ONCE
Refills: 0 | Status: COMPLETED | OUTPATIENT
Start: 2022-07-05 | End: 2022-07-05

## 2022-07-05 RX ORDER — FOLIC ACID 0.8 MG
1 TABLET ORAL
Qty: 30 | Refills: 0
Start: 2022-07-05 | End: 2022-08-03

## 2022-07-05 RX ADMIN — PANTOPRAZOLE SODIUM 40 MILLIGRAM(S): 20 TABLET, DELAYED RELEASE ORAL at 05:46

## 2022-07-05 RX ADMIN — Medication 40 MILLIEQUIVALENT(S): at 12:11

## 2022-07-05 RX ADMIN — Medication 50 MILLIGRAM(S): at 05:45

## 2022-07-05 RX ADMIN — CARVEDILOL PHOSPHATE 12.5 MILLIGRAM(S): 80 CAPSULE, EXTENDED RELEASE ORAL at 05:45

## 2022-07-05 RX ADMIN — Medication 50 MILLIGRAM(S): at 12:11

## 2022-07-05 RX ADMIN — CARVEDILOL PHOSPHATE 12.5 MILLIGRAM(S): 80 CAPSULE, EXTENDED RELEASE ORAL at 19:25

## 2022-07-05 NOTE — CHART NOTE - NSCHARTNOTEFT_GEN_A_CORE
SW Note: Worker received information via email to call Radha from [facility] ([phone]) to discuss information in relation to pts HD. Pt has not been to HD since 06/24. Pts scheduled chairtime is M/W/F 1:30 PM. Worker contacted Radha who inquired about pts status and if he is medically ready to resume services tomorrow. According to latest nephro consult note, pt is having issues with Left AVF and is currently being evaluated by vascular. Pt received HD yesterday. SW to follow.

## 2022-07-05 NOTE — DISCHARGE NOTE PROVIDER - HOSPITAL COURSE
37M presented after missing hemodialysis for more than ten days. On presentation, K(2.7), BUN/Cr(76.7/9.88). Potassium was supplemented for hypokalemia. The patient was seen by Nephrology in consultation and emergency hemodialysis was arranged. The venous pressure during hemodialysis was noted to be elevated and the patient was seen by Vascular Surgery in consultation. Ultrasound of the AV fistula noted a patent left upper extremity radiocephalic fistula. Hemodialysis was completed without issue. No intervention was recommended by Vascular Surgery. The patient was discharged home with instructions to continue on hemodialysis as scheduled.      38 minutes total time

## 2022-07-05 NOTE — DISCHARGE NOTE PROVIDER - NSDCCPCAREPLAN_GEN_ALL_CORE_FT
PRINCIPAL DISCHARGE DIAGNOSIS  Diagnosis: ESRD (end stage renal disease)  Assessment and Plan of Treatment: Continue with hemodilaysis as scheduled. Follow up with your nephrologist.      SECONDARY DISCHARGE DIAGNOSES  Diagnosis: Bipolar disorder  Assessment and Plan of Treatment: Continue on your home psychiatric medications.    Diagnosis: Diabetes  Assessment and Plan of Treatment: Continue on your home diabetes medications. Monitor glucose levels.

## 2022-07-05 NOTE — PROGRESS NOTE ADULT - SUBJECTIVE AND OBJECTIVE BOX
MILENA PUGA  ----------------------------------------  The patient was seen at bedside. Patient with end stage renal failure and missed hemodialysis sessions. Offered no complaints.    Vital Signs Last 24 Hrs  T(C): 36.3 (05 Jul 2022 11:58), Max: 37.3 (05 Jul 2022 05:42)  T(F): 97.3 (05 Jul 2022 11:58), Max: 99.2 (05 Jul 2022 05:42)  HR: 71 (05 Jul 2022 11:58) (54 - 73)  BP: 142/89 (05 Jul 2022 11:58) (115/73 - 150/89)  BP(mean): --  RR: 18 (05 Jul 2022 11:58) (18 - 20)  SpO2: 97% (05 Jul 2022 11:58) (96% - 99%)    CAPILLARY BLOOD GLUCOSE  POCT Blood Glucose.: 120 mg/dL (05 Jul 2022 12:10)  POCT Blood Glucose.: 114 mg/dL (05 Jul 2022 08:20)  POCT Blood Glucose.: 109 mg/dL (04 Jul 2022 20:55)    PHYSICAL EXAMINATION:  ----------------------------------------  General appearance: No acute distress, Awake, Alert  HEENT: Normocephalic, Atraumatic, Conjunctiva clear, EOMI  Neck: Supple, No JVD, No tenderness  Lungs: Breath sound equal bilaterally, No wheezes, Occasional rales  Cardiovascular: S1S2, Regular rhythm  Abdomen: Soft, Nontender, Nondistended, No guarding/rebound, Positive bowel sounds  Extremities: No clubbing, No cyanosis, No edema, No calf tenderness, Left upper extremity AV fistula  Neuro: Strength equal bilaterally, No tremors  Psychiatric: Appropriate mood, Normal affect    LABORATORY STUDIES:  ----------------------------------------             11.8   12.07 )-----------( 282      ( 05 Jul 2022 07:46 )             34.2     07-05    139  |  100  |  54.2<H>  ----------------------------<  85  2.9<LL>   |  27.0  |  7.46<H>    Ca    9.2      05 Jul 2022 07:46  Phos  4.5     07-04  Mg     2.2     07-04    TPro  7.0  /  Alb  3.9  /  TBili  0.2<L>  /  DBili  x   /  AST  13  /  ALT  17  /  AlkPhos  125<H>  07-04    LIVER FUNCTIONS - ( 04 Jul 2022 15:29 )  Alb: 3.9 g/dL / Pro: 7.0 g/dL / ALK PHOS: 125 U/L / ALT: 17 U/L / AST: 13 U/L / GGT: x           MEDICATIONS  (STANDING):  carvedilol 12.5 milliGRAM(s) Oral every 12 hours  dextrose 5%. 1000 milliLiter(s) (50 mL/Hr) IV Continuous <Continuous>  dextrose 5%. 1000 milliLiter(s) (100 mL/Hr) IV Continuous <Continuous>  dextrose 50% Injectable 25 Gram(s) IV Push once  dextrose 50% Injectable 12.5 Gram(s) IV Push once  dextrose 50% Injectable 25 Gram(s) IV Push once  enoxaparin Injectable 30 milliGRAM(s) SubCutaneous every 24 hours  glucagon  Injectable 1 milliGRAM(s) IntraMuscular once  hydrALAZINE 50 milliGRAM(s) Oral three times a day  insulin glargine Injectable (LANTUS) 20 Unit(s) SubCutaneous at bedtime  insulin lispro (ADMELOG) corrective regimen sliding scale   SubCutaneous three times a day before meals  pantoprazole    Tablet 40 milliGRAM(s) Oral before breakfast  QUEtiapine 100 milliGRAM(s) Oral at bedtime  traZODone 100 milliGRAM(s) Oral at bedtime    MEDICATIONS  (PRN):  dextrose Oral Gel 15 Gram(s) Oral once PRN Blood Glucose LESS THAN 70 milliGRAM(s)/deciliter      ASSESSMENT / PLAN:  ----------------------------------------  37M with a history of end stage renal disease, diabetes, hypertension, and bipolar disorder who presented after missing multiple sessions of hemodialysis.    ESRD - Underwent urgent hemodialysis tonight. High venous pressures noted with hemodialysis. Vascular Surgery consultation noted. For ultrasound to evaluate.    Hypokalemia - For further supplementation of potassium.    Bipolar disorder - On quetiapine and trazodone.    Hypertension - On nifedipine and hydralazine. Close blood pressure monitoring.    Diabetes - Insulin coverage, close monitoring of blood glucose levels.    The patient was unable to identify his home medications or his pharmacy. He reported that he was still on the same medications since his hospitalization in January. MILENA PUGA  ----------------------------------------  The patient was seen at bedside. Patient with end stage renal failure and missed hemodialysis sessions. Offered no complaints.    Vital Signs Last 24 Hrs  T(C): 36.3 (05 Jul 2022 11:58), Max: 37.3 (05 Jul 2022 05:42)  T(F): 97.3 (05 Jul 2022 11:58), Max: 99.2 (05 Jul 2022 05:42)  HR: 71 (05 Jul 2022 11:58) (54 - 73)  BP: 142/89 (05 Jul 2022 11:58) (115/73 - 150/89)  BP(mean): --  RR: 18 (05 Jul 2022 11:58) (18 - 20)  SpO2: 97% (05 Jul 2022 11:58) (96% - 99%)    CAPILLARY BLOOD GLUCOSE  POCT Blood Glucose.: 120 mg/dL (05 Jul 2022 12:10)  POCT Blood Glucose.: 114 mg/dL (05 Jul 2022 08:20)  POCT Blood Glucose.: 109 mg/dL (04 Jul 2022 20:55)    PHYSICAL EXAMINATION:  ----------------------------------------  General appearance: No acute distress, Awake, Alert  HEENT: Normocephalic, Atraumatic, Conjunctiva clear, EOMI  Neck: Supple, No JVD, No tenderness  Lungs: Breath sound equal bilaterally, No wheezes, Occasional rales  Cardiovascular: S1S2, Regular rhythm  Abdomen: Soft, Nontender, Nondistended, No guarding/rebound, Positive bowel sounds  Extremities: No clubbing, No cyanosis, No edema, No calf tenderness, Left upper extremity AV fistula  Neuro: Strength equal bilaterally, No tremors  Psychiatric: Appropriate mood, Normal affect    LABORATORY STUDIES:  ----------------------------------------             11.8   12.07 )-----------( 282      ( 05 Jul 2022 07:46 )             34.2     07-05    139  |  100  |  54.2<H>  ----------------------------<  85  2.9<LL>   |  27.0  |  7.46<H>    Ca    9.2      05 Jul 2022 07:46  Phos  4.5     07-04  Mg     2.2     07-04    TPro  7.0  /  Alb  3.9  /  TBili  0.2<L>  /  DBili  x   /  AST  13  /  ALT  17  /  AlkPhos  125<H>  07-04    LIVER FUNCTIONS - ( 04 Jul 2022 15:29 )  Alb: 3.9 g/dL / Pro: 7.0 g/dL / ALK PHOS: 125 U/L / ALT: 17 U/L / AST: 13 U/L / GGT: x           MEDICATIONS  (STANDING):  carvedilol 12.5 milliGRAM(s) Oral every 12 hours  dextrose 5%. 1000 milliLiter(s) (50 mL/Hr) IV Continuous <Continuous>  dextrose 5%. 1000 milliLiter(s) (100 mL/Hr) IV Continuous <Continuous>  dextrose 50% Injectable 25 Gram(s) IV Push once  dextrose 50% Injectable 12.5 Gram(s) IV Push once  dextrose 50% Injectable 25 Gram(s) IV Push once  enoxaparin Injectable 30 milliGRAM(s) SubCutaneous every 24 hours  glucagon  Injectable 1 milliGRAM(s) IntraMuscular once  hydrALAZINE 50 milliGRAM(s) Oral three times a day  insulin glargine Injectable (LANTUS) 20 Unit(s) SubCutaneous at bedtime  insulin lispro (ADMELOG) corrective regimen sliding scale   SubCutaneous three times a day before meals  pantoprazole    Tablet 40 milliGRAM(s) Oral before breakfast  QUEtiapine 100 milliGRAM(s) Oral at bedtime  traZODone 100 milliGRAM(s) Oral at bedtime    MEDICATIONS  (PRN):  dextrose Oral Gel 15 Gram(s) Oral once PRN Blood Glucose LESS THAN 70 milliGRAM(s)/deciliter      ASSESSMENT / PLAN:  ----------------------------------------  37M with a history of end stage renal disease, diabetes, hypertension, and bipolar disorder who presented after missing multiple sessions of hemodialysis.    ESRD - Underwent urgent hemodialysis tonight. High venous pressures noted with hemodialysis. Vascular Surgery consultation noted. For ultrasound to evaluate.    Hypokalemia - For further supplementation of potassium.    Leukocytosis - Afebrile. No obvious source of infection. Noted some improvement.    Bipolar disorder - On quetiapine and trazodone.    Hypertension - On nifedipine and hydralazine. Close blood pressure monitoring.    Diabetes - Insulin coverage, close monitoring of blood glucose levels.    The patient was unable to identify his home medications or his pharmacy. He reported that he was still on the same medications since his hospitalization in January.

## 2022-07-05 NOTE — PROGRESS NOTE ADULT - ASSESSMENT
ESRD: recent noncompliance with treatments  AVF poorly functional  - will have vascular surgery evaluate  - monitor Hgb, phos

## 2022-07-05 NOTE — CONSULT NOTE ADULT - SUBJECTIVE AND OBJECTIVE BOX
Patient is a 37y old  Male who presents with a chief complaint of  Acute  renal failure.    HPI:  pt sent to ER by his  center after not having dialysis  for 11 days. No fever . Hx CRF 5 with DM 2.Pt normally has  dialysis 3 x/wk.    PAST MEDICAL & SURGICAL HISTORY:  Bipolar 1 disorder      Sleep apnea      HTN (hypertension)      CKD (chronic kidney disease) requiring chronic dialysis      Insulin dependent type 2 diabetes mellitus      AVF (arteriovenous fistula)  Left. AVF      y    FAMILY HISTORY:  Family history of diabetes mellitus (Grandparent)  Father, siblings    Family history of CKD (chronic kidney disease)  mother    FH: HTN (hypertension)  Father, siblings        Social History:Non smoker    MEDICATIONS  (STANDING):    MEDICATIONS  (PRN):   Meds reviewed    Allergies    No Known Drug Allergies  shellfish (Unknown)      REVIEW OF SYSTEMS:    CONSTITUTIONAL:  Feels tired, no other specific complaints.    ALLERY AND IMMUNOLOGIC: No hives or eczema      Vital Signs Last 24 Hrs  T(C): 36.8 (04 Jul 2022 16:55), Max: 36.8 (04 Jul 2022 14:45)  T(F): 98.2 (04 Jul 2022 16:55), Max: 98.2 (04 Jul 2022 14:45)  HR: 72 (04 Jul 2022 16:55) (72 - 73)  BP: 137/88 (04 Jul 2022 16:55) (137/88 - 148/92)  BP(mean): --  RR: 18 (04 Jul 2022 16:55) (18 - 20)  SpO2: 98% (04 Jul 2022 16:55) (97% - 98%)  Daily Height in cm: 165.1 (04 Jul 2022 14:45)         PHYSICAL EXAM:    GENERAL: appears chronically ill, oriented.  HEAD:  Atraumatic, Normocephalic  EYES: EOMI, PERRLA, conjunctiva and sclera clear  ENMT: No tonsillar erythema, exudates, or enlargement; Moist mucous membranes, Good dentition, No lesions  NECK: Supple, neck  veins full  NERVOUS SYSTEM:  Alert & Oriented X3, Good concentration; Motor Strength wnl upper and lower extremities;  \CHEST/LUNG: Clear to percussion bilaterally; No rales, rhonchi, wheezing, or rubs  HEART: Regular rate and rhythm; No murmurs, rubs, or gallops  ABDOMEN: Soft, Nontender, Nondistended; Bowel sounds present, body wall and flank edema  EXTREMITIES:  left arm access with bruit  LYMPH: No lymphadenopathy noted  SKIN: No rashes or lesions, pale      LABS:                        11.5   13.77 )-----------( 303      ( 04 Jul 2022 15:29 )             33.2     07-04    140  |  97<L>  |  76.7<H>  ----------------------------<  156<H>  2.7<LL>   |  25.0  |  9.88<H>    Ca    9.2      04 Jul 2022 15:29  Phos  4.5     07-04  Mg     2.2     07-04    TPro  7.0  /  Alb  3.9  /  TBili  0.2<L>  /  DBili  x   /  AST  13  /  ALT  17  /  AlkPhos  125<H>  07-04        Magnesium, Serum: 2.2 mg/dL (07-04 @ 15:29)  Phosphorus Level, Serum: 4.5 mg/dL (07-04 @ 15:29)          RADIOLOGY & ADDITIONAL TESTS:  
Vascular Attending:  Patrice PARRA    Vascular Surgery HPI:  Patient seen and examined.  ERSD, recent compliance issues with his HD sessions  Patient with left Pietro AVF created 2020 ([facility])  HD team reporting high venous pressures during last session  No complaints from the patient at this time.  No Chest/abd pain, no fever or chills.        HPI:  37M with a history of end stage renal disease who presented with some abdominal discomfort and noted that he had not attended his usual hemodialysis sessions for the last week. The patient reported that he had been visiting friends and was unable to obtain transportation to the dialysis center. He denied any dyspnea or lower extremity edema. He reported that he still produces some urine. He denied any associated chest pain or palpitations. In the emergency department, he was noted to have hypokalemia. He denied any recent fevers, chills, or sick contacts. (04 Jul 2022 18:22)      PAST MEDICAL & SURGICAL HISTORY:  Bipolar 1 disorder      Sleep apnea      HTN (hypertension)      CKD (chronic kidney disease) requiring chronic dialysis      Insulin dependent type 2 diabetes mellitus      AVF (arteriovenous fistula)  Left. AVF          REVIEW OF SYSTEMS:  see HPI       General:	    Skin/Breast:  	  Ophthalmologic:  	  ENMT:	    Respiratory and Thorax:  	  Cardiovascular:	    Gastrointestinal:	    Genitourinary:	    Musculoskeletal:	    Neurological:	    Psychiatric:	    Hematology/Lymphatics:	    Endocrine:	    Allergic/Immunologic:	    MEDICATIONS  (STANDING):  carvedilol 12.5 milliGRAM(s) Oral every 12 hours  dextrose 5%. 1000 milliLiter(s) (50 mL/Hr) IV Continuous <Continuous>  dextrose 5%. 1000 milliLiter(s) (100 mL/Hr) IV Continuous <Continuous>  dextrose 50% Injectable 25 Gram(s) IV Push once  dextrose 50% Injectable 12.5 Gram(s) IV Push once  dextrose 50% Injectable 25 Gram(s) IV Push once  enoxaparin Injectable 30 milliGRAM(s) SubCutaneous every 24 hours  glucagon  Injectable 1 milliGRAM(s) IntraMuscular once  hydrALAZINE 50 milliGRAM(s) Oral three times a day  insulin glargine Injectable (LANTUS) 20 Unit(s) SubCutaneous at bedtime  insulin lispro (ADMELOG) corrective regimen sliding scale   SubCutaneous three times a day before meals  pantoprazole    Tablet 40 milliGRAM(s) Oral before breakfast  potassium chloride    Tablet ER 40 milliEquivalent(s) Oral once  QUEtiapine 100 milliGRAM(s) Oral at bedtime  traZODone 100 milliGRAM(s) Oral at bedtime    MEDICATIONS  (PRN):  dextrose Oral Gel 15 Gram(s) Oral once PRN Blood Glucose LESS THAN 70 milliGRAM(s)/deciliter      Allergies    No Known Drug Allergies  shellfish (Unknown)    Intolerances        SOCIAL HISTORY: non contributory       Vital Signs Last 24 Hrs  T(C): 36.8 (05 Jul 2022 09:07), Max: 37.3 (05 Jul 2022 05:42)  T(F): 98.3 (05 Jul 2022 09:07), Max: 99.2 (05 Jul 2022 05:42)  HR: 54 (05 Jul 2022 09:07) (54 - 73)  BP: 115/73 (05 Jul 2022 09:07) (115/73 - 150/89)  BP(mean): --  RR: 19 (05 Jul 2022 09:07) (18 - 20)  SpO2: 96% (05 Jul 2022 09:07) (96% - 99%)    PHYSICAL EXAM:      Constitutional:  stagnant urine smell, unkempt, no distress, alert and oriented     Eyes: no scleral icterus     ENMT: atraumatic     Neck: no access catheters       Respiratory: non labored     Cardiovascular: RRR via radial palpation     Gastrointestinal: distended, obese, soft    Genitourinary: not examined     Rectal: not examined     Extremities: warm LE with no significant edema.  Left AVF with decent thrill from the anastomosis, thrill decreases proximally     Vascular: patient left AVF, thrill decreases proximally     Neurological: no gross motor or sensory deficits     Skin: no noted ulcers       Psychiatric: good affect         LABS:                        11.8   12.07 )-----------( 282      ( 05 Jul 2022 07:46 )             34.2     07-05    139  |  100  |  54.2<H>  ----------------------------<  85  2.9<LL>   |  27.0  |  7.46<H>    Ca    9.2      05 Jul 2022 07:46  Phos  4.5     07-04  Mg     2.2     07-04    TPro  7.0  /  Alb  3.9  /  TBili  0.2<L>  /  DBili  x   /  AST  13  /  ALT  17  /  AlkPhos  125<H>  07-04          RADIOLOGY & ADDITIONAL STUDIES    Impression and Plan:    Left Pietro AVF reported to problematic with HD  possible outflow stenosis     - will evaluate with Duplex and attempt to arrange a fistulogram if needed

## 2022-07-05 NOTE — DISCHARGE NOTE PROVIDER - CARE PROVIDER_API CALL
Deonte Workman)  Internal Medicine; Nephrology  [address] [place] [id number]  Phone: [phone]  Fax: [phone]  Follow Up Time:

## 2022-07-05 NOTE — PROGRESS NOTE ADULT - SUBJECTIVE AND OBJECTIVE BOX
NEPHROLOGY INTERVAL HPI/OVERNIGHT EVENTS:  pt clinically stable  no adverse overnight events note  HD had to be stopped prematuely yesterday due to poorly functioning access    MEDICATIONS  (STANDING):  carvedilol 12.5 milliGRAM(s) Oral every 12 hours  dextrose 5%. 1000 milliLiter(s) (100 mL/Hr) IV Continuous <Continuous>  dextrose 5%. 1000 milliLiter(s) (50 mL/Hr) IV Continuous <Continuous>  dextrose 50% Injectable 25 Gram(s) IV Push once  dextrose 50% Injectable 12.5 Gram(s) IV Push once  dextrose 50% Injectable 25 Gram(s) IV Push once  enoxaparin Injectable 30 milliGRAM(s) SubCutaneous every 24 hours  glucagon  Injectable 1 milliGRAM(s) IntraMuscular once  hydrALAZINE 50 milliGRAM(s) Oral three times a day  insulin glargine Injectable (LANTUS) 20 Unit(s) SubCutaneous at bedtime  insulin lispro (ADMELOG) corrective regimen sliding scale   SubCutaneous three times a day before meals  pantoprazole    Tablet 40 milliGRAM(s) Oral before breakfast  QUEtiapine 100 milliGRAM(s) Oral at bedtime  traZODone 100 milliGRAM(s) Oral at bedtime    MEDICATIONS  (PRN):  dextrose Oral Gel 15 Gram(s) Oral once PRN Blood Glucose LESS THAN 70 milliGRAM(s)/deciliter      Allergies    No Known Drug Allergies  shellfish (Unknown)          Vital Signs Last 24 Hrs  T(C): 37.3 (05 Jul 2022 05:42), Max: 37.3 (05 Jul 2022 05:42)  T(F): 99.2 (05 Jul 2022 05:42), Max: 99.2 (05 Jul 2022 05:42)  HR: 69 (05 Jul 2022 05:42) (67 - 73)  BP: 143/96 (05 Jul 2022 05:42) (124/78 - 150/89)  BP(mean): --  RR: 18 (05 Jul 2022 05:42) (18 - 20)  SpO2: 98% (05 Jul 2022 05:42) (97% - 99%)    PHYSICAL EXAM:  GENERAL: Chronically debilitated  HEAD:  Atraumatic, Normocephalic  EYES: Conjunctiva and sclera clear  ENMT:  Moist mucous membranes, Good dentition, No lesions  NECK: Supple, no JVD  NERVOUS SYSTEM:  Alert & Oriented X3, non focal  /LUNG: Clear bilaterally  HEART: Regular rate and rhythm; No rub  ABDOMEN: Soft, Nontender, Nondistended; BS+                       Flank and body wall edema+  EXTREMITIES: + LE edema  LYMPH: No lymphadenopathy noted  SKIN: No rashes     LABS:                        11.5   13.77 )-----------( 303      ( 04 Jul 2022 15:29 )             33.2     07-04    140  |  97<L>  |  76.3<H>  ----------------------------<  152<H>  2.8<LL>   |  27.0  |  10.09<H>    Ca    9.1      04 Jul 2022 16:42  Phos  4.5     07-04  Mg     2.2     07-04    TPro  7.0  /  Alb  3.9  /  TBili  0.2<L>  /  DBili  x   /  AST  13  /  ALT  17  /  AlkPhos  125<H>  07-04        Magnesium, Serum: 2.2 mg/dL (07-04 @ 15:29)  Phosphorus Level, Serum: 4.5 mg/dL (07-04 @ 15:29)      RADIOLOGY & ADDITIONAL TESTS:  < from: Xray Chest 1 View- PORTABLE-Urgent (Xray Chest 1 View- PORTABLE-Urgent .) (07.04.22 @ 16:19) >  ACC: [id number] EXAM:  XR CHEST PORTABLE URGENT 1V                          PROCEDURE DATE:  07/04/2022          INTERPRETATION:  TECHNIQUE: Single portable view of the chest.    COMPARISON:  1/12/2022    CLINICAL HISTORY: missed HD    FINDINGS:    Single frontal view of the chest demonstrates the lungs to be clear. The   cardiomediastinal silhouette is normal. No acute osseous abnormalities.    IMPRESSION: No acute cardiopulmonary disease process.    < end of copied text >

## 2022-07-05 NOTE — DISCHARGE NOTE NURSING/CASE MANAGEMENT/SOCIAL WORK - NSDCPEFALRISK_GEN_ALL_CORE
For information on Fall & Injury Prevention, visit: [email or web address] OR  [email or web address] OR  https://www.cdc.gov/steadi/patient.html

## 2022-07-05 NOTE — CHART NOTE - NSCHARTNOTEFT_GEN_A_CORE
Vascular Surgery follow UP:    HD session performed with no issues per HD nurses  - will cancel the Fistulogram for tomorrow  - continue to access the left AVF for HD

## 2022-07-05 NOTE — DISCHARGE NOTE NURSING/CASE MANAGEMENT/SOCIAL WORK - PATIENT PORTAL LINK FT
You can access the FollowMyHealth Patient Portal offered by [facility] by registering at the following website: [email or web address]. By joining [facility]’s FollowMyHealth portal, you will also be able to view your health information using other applications (apps) compatible with our system.

## 2022-07-06 ENCOUNTER — NON-APPOINTMENT (OUTPATIENT)
Age: 37
End: 2022-07-06

## 2022-09-06 ENCOUNTER — APPOINTMENT (OUTPATIENT)
Dept: CARDIOLOGY | Facility: CLINIC | Age: 37
End: 2022-09-06

## 2022-10-07 ENCOUNTER — INPATIENT (INPATIENT)
Facility: HOSPITAL | Age: 37
LOS: 0 days | Discharge: HOME | DRG: 682 | End: 2022-10-08
Attending: STUDENT IN AN ORGANIZED HEALTH CARE EDUCATION/TRAINING PROGRAM | Admitting: INTERNAL MEDICINE
Payer: COMMERCIAL

## 2022-10-07 VITALS
RESPIRATION RATE: 20 BRPM | HEART RATE: 88 BPM | DIASTOLIC BLOOD PRESSURE: 97 MMHG | TEMPERATURE: 98 F | OXYGEN SATURATION: 98 % | HEIGHT: 65 IN | SYSTOLIC BLOOD PRESSURE: 150 MMHG

## 2022-10-07 DIAGNOSIS — N18.6 END STAGE RENAL DISEASE: ICD-10-CM

## 2022-10-07 DIAGNOSIS — I77.0 ARTERIOVENOUS FISTULA, ACQUIRED: Chronic | ICD-10-CM

## 2022-10-07 LAB
ALBUMIN SERPL ELPH-MCNC: 4.1 G/DL — SIGNIFICANT CHANGE UP (ref 3.3–5.2)
ALP SERPL-CCNC: 106 U/L — SIGNIFICANT CHANGE UP (ref 40–120)
ALT FLD-CCNC: 27 U/L — SIGNIFICANT CHANGE UP
ANION GAP SERPL CALC-SCNC: 17 MMOL/L — SIGNIFICANT CHANGE UP (ref 5–17)
AST SERPL-CCNC: 20 U/L — SIGNIFICANT CHANGE UP
BASOPHILS # BLD AUTO: 0.11 K/UL — SIGNIFICANT CHANGE UP (ref 0–0.2)
BASOPHILS NFR BLD AUTO: 0.8 % — SIGNIFICANT CHANGE UP (ref 0–2)
BILIRUB SERPL-MCNC: 0.2 MG/DL — LOW (ref 0.4–2)
BUN SERPL-MCNC: 67.7 MG/DL — HIGH (ref 8–20)
CALCIUM SERPL-MCNC: 9.5 MG/DL — SIGNIFICANT CHANGE UP (ref 8.4–10.5)
CHLORIDE SERPL-SCNC: 98 MMOL/L — SIGNIFICANT CHANGE UP (ref 98–107)
CO2 SERPL-SCNC: 24 MMOL/L — SIGNIFICANT CHANGE UP (ref 22–29)
CREAT SERPL-MCNC: 9.87 MG/DL — HIGH (ref 0.5–1.3)
EGFR: 6 ML/MIN/1.73M2 — LOW
EOSINOPHIL # BLD AUTO: 0.76 K/UL — HIGH (ref 0–0.5)
EOSINOPHIL NFR BLD AUTO: 5.8 % — SIGNIFICANT CHANGE UP (ref 0–6)
GLUCOSE SERPL-MCNC: 144 MG/DL — HIGH (ref 70–99)
HCT VFR BLD CALC: 32.1 % — LOW (ref 39–50)
HGB BLD-MCNC: 11.1 G/DL — LOW (ref 13–17)
IMM GRANULOCYTES NFR BLD AUTO: 0.5 % — SIGNIFICANT CHANGE UP (ref 0–0.9)
LYMPHOCYTES # BLD AUTO: 3.91 K/UL — HIGH (ref 1–3.3)
LYMPHOCYTES # BLD AUTO: 30 % — SIGNIFICANT CHANGE UP (ref 13–44)
MCHC RBC-ENTMCNC: 29.4 PG — SIGNIFICANT CHANGE UP (ref 27–34)
MCHC RBC-ENTMCNC: 34.6 GM/DL — SIGNIFICANT CHANGE UP (ref 32–36)
MCV RBC AUTO: 84.9 FL — SIGNIFICANT CHANGE UP (ref 80–100)
MONOCYTES # BLD AUTO: 1.02 K/UL — HIGH (ref 0–0.9)
MONOCYTES NFR BLD AUTO: 7.8 % — SIGNIFICANT CHANGE UP (ref 2–14)
NEUTROPHILS # BLD AUTO: 7.18 K/UL — SIGNIFICANT CHANGE UP (ref 1.8–7.4)
NEUTROPHILS NFR BLD AUTO: 55.1 % — SIGNIFICANT CHANGE UP (ref 43–77)
PLATELET # BLD AUTO: 256 K/UL — SIGNIFICANT CHANGE UP (ref 150–400)
POTASSIUM SERPL-MCNC: 3.7 MMOL/L — SIGNIFICANT CHANGE UP (ref 3.5–5.3)
POTASSIUM SERPL-SCNC: 3.7 MMOL/L — SIGNIFICANT CHANGE UP (ref 3.5–5.3)
PROT SERPL-MCNC: 7.3 G/DL — SIGNIFICANT CHANGE UP (ref 6.6–8.7)
RBC # BLD: 3.78 M/UL — LOW (ref 4.2–5.8)
RBC # FLD: 14.4 % — SIGNIFICANT CHANGE UP (ref 10.3–14.5)
SODIUM SERPL-SCNC: 139 MMOL/L — SIGNIFICANT CHANGE UP (ref 135–145)
WBC # BLD: 13.04 K/UL — HIGH (ref 3.8–10.5)
WBC # FLD AUTO: 13.04 K/UL — HIGH (ref 3.8–10.5)

## 2022-10-07 PROCEDURE — 93010 ELECTROCARDIOGRAM REPORT: CPT

## 2022-10-07 PROCEDURE — 99222 1ST HOSP IP/OBS MODERATE 55: CPT

## 2022-10-07 PROCEDURE — 71045 X-RAY EXAM CHEST 1 VIEW: CPT | Mod: 26

## 2022-10-07 PROCEDURE — 99285 EMERGENCY DEPT VISIT HI MDM: CPT

## 2022-10-07 NOTE — ED ADULT NURSE NOTE - OBJECTIVE STATEMENT
Assumed care of pt at 2150. Pt A&Ox4 c/o missing 2 days of dialysis, the pt also states that he has the "sniffles" and wants a Covid swab, the pt is on cardiac monitor in NSR, pt denies N/V/D/CP/SOB, pt is resting comfortably showing no signs of respiratory distress or pain, the pt is calm and cooperative, EKG being done at bedside

## 2022-10-07 NOTE — ED ADULT NURSE REASSESSMENT NOTE - NS ED NURSE REASSESS COMMENT FT1
pt on cardiac monitor in NSR, pt resting comfortably showing no signs of respiratory distress or pain, the pt is calm and cooperative

## 2022-10-07 NOTE — ED ADULT NURSE NOTE - NSIMPLEMENTINTERV_GEN_ALL_ED
Implemented All Universal Safety Interventions:  [place] to call system. Call bell, personal items and telephone within reach. Instruct patient to call for assistance. Room bathroom lighting operational. Non-slip footwear when patient is off stretcher. Physically safe environment: no spills, clutter or unnecessary equipment. Stretcher in lowest position, wheels locked, appropriate side rails in place.

## 2022-10-07 NOTE — ED PROVIDER NOTE - ATTENDING CONTRIBUTION TO CARE
Farhat PARRA: I performed a face to face evaluation of this patient and performed a full history and physical examination on the patient.  I agree with the resident's history, physical examination, and plan of the patient unless otherwise noted. My brief assessment is as follows:    37y M w/ hx HTN, DM, ESRD on HD, presents after missing last 2 dialysis sessions. Pt well appearing with stable VS. Check EKG, CXR, labs. Admit with plan for dialysis.

## 2022-10-07 NOTE — ED ADULT TRIAGE NOTE - CHIEF COMPLAINT QUOTE
pt BIBEMS as he missed 2 days of dialysis. pt also reports having the "sniffles" for a few days and wants a covid swab.

## 2022-10-07 NOTE — H&P ADULT - NSHPPHYSICALEXAM_GEN_ALL_CORE
Vital Signs Last 24 Hrs  T(C): 36.8 (07 Oct 2022 20:53), Max: 36.8 (07 Oct 2022 20:53)  T(F): 98.2 (07 Oct 2022 20:53), Max: 98.2 (07 Oct 2022 20:53)  HR: 79 (07 Oct 2022 23:00) (79 - 88)  BP: 144/88 (07 Oct 2022 23:00) (144/88 - 150/97)  BP(mean): --  RR: 12 (07 Oct 2022 23:00) (12 - 20)  SpO2: 100% (07 Oct 2022 23:00) (98% - 100%)    Parameters below as of 07 Oct 2022 23:00  Patient On (Oxygen Delivery Method): room air

## 2022-10-07 NOTE — H&P ADULT - ASSESSMENT
General Cardiology Clinic Note  Reason for Visit: Pre-op   Last Clinic Visit: 4/27/2021  General Cardiologist: Dr. Brandy Mckinney    HPI:   Marcio Engel is a 70 y.o. male who presents for pre-op.    Problems:  CAD s/p inferolateral STEMI with cardiac arrest, s/p ARSEN x3 (LCXx2; PDA) in 9/29/2018 ([place]), 60-70% LAD stenosis   Ischemic cardiomyopathy (EF 40% at time of STEMI), recovered to normal  Hyperlipidemia  Sleep apnea, not on CPAP      Interval History  Patient presents for clearance for nasal reconstruction surgery. He continues to do well. He jogs 2.5 miles 5 days a week and then does weights 3 days a week. He has been increasing the incline while jogging. No decrease in exercise capacity. Continues on vegan diet. BP at home 110s/60s. Denies symptoms of CAD, CHF, arrhythmia, hypotension, TIA/CVA.     4/27/2021 HPI  He has been feeling well since his last visit.  He continues to follow a vegan diet and exercises daily.  He is running about soon a half to 3 miles a day as well as lifting light weights 3 times a week.  He denies any chest pain, shortness of breath, PND, orthopnea, or palpitations.  He would like to minimize his medications.    ROS:      Review of Systems   Constitutional: Negative for diaphoresis, malaise/fatigue, weight gain and weight loss.   HENT: Negative for nosebleeds.    Eyes: Negative for vision loss in left eye, vision loss in right eye and visual disturbance.   Cardiovascular: Negative for chest pain, claudication, dyspnea on exertion, irregular heartbeat, leg swelling, near-syncope, orthopnea, palpitations, paroxysmal nocturnal dyspnea and syncope.   Respiratory: Negative for cough, shortness of breath, sleep disturbances due to breathing, snoring and wheezing.    Hematologic/Lymphatic: Negative for bleeding problem. Does not bruise/bleed easily.   Skin: Negative for poor wound healing and rash.   Musculoskeletal: Negative for muscle cramps and myalgias.   Gastrointestinal:  Negative for bloating, abdominal pain, diarrhea, heartburn, melena, nausea and vomiting.   Genitourinary: Negative for hematuria and nocturia.   Neurological: Negative for brief paralysis, dizziness, headaches, light-headedness, numbness and weakness.   Psychiatric/Behavioral: Negative for depression.   Allergic/Immunologic: Negative for hives.       PMH:     Past Medical History:   Diagnosis Date    Cardiac arrest 09/2018    Coronary artery disease 2018    STEMI, VFib arrest in [place]    Environmental and seasonal allergies     Essential hypertension 10/10/2018    HTN (hypertension)     Myocardial infarction 09/2018    Sleep apnea     no CPAP - has issues with settings     Past Surgical History:   Procedure Laterality Date    CORONARY ANGIOPLASTY WITH STENT PLACEMENT  09/2018    ARSEN x 3: LCx x 2, L PDA x 1; 60-70% stenosis of mid to distal LAD    ESOPHAGOGASTRODUODENOSCOPY N/A 1/7/2022    Procedure: EGD (ESOPHAGOGASTRODUODENOSCOPY);  Surgeon: Scott Crowe MD;  Location: [facility];  Service: Endoscopy;  Laterality: N/A;    ESOPHAGOGASTRODUODENOSCOPY N/A 3/23/2022    Procedure: EGD (ESOPHAGOGASTRODUODENOSCOPY)rapid covid test;  Surgeon: Scott Crowe MD;  Location: [facility];  Service: Endoscopy;  Laterality: N/A;  6 week repeat dilation     Allergies:     Review of patient's allergies indicates:   Allergen Reactions    Hay fever and allergy relief      Medications:     Current Outpatient Medications on File Prior to Visit   Medication Sig Dispense Refill    aspirin (ASPIR-81 ORAL) Take by mouth.      fexofenadine HCl (ALLEGRA ALLERGY ORAL) Take by mouth daily as needed.      metoprolol succinate (TOPROL-XL) 25 MG 24 hr tablet Take 1 tablet (25 mg total) by mouth once daily. 90 tablet 3    pantoprazole (PROTONIX) 40 MG tablet Take 1 tablet (40 mg total) by mouth 2 (two) times daily. 180 tablet 3     Current Facility-Administered Medications on File Prior to Visit   Medication Dose Route  "Frequency Provider Last Rate Last Admin    lidocaine HCL 2% jelly   Topical (Top) Once Enoch Marie MD         Social History:     Social History     Tobacco Use    Smoking status: Never Smoker    Smokeless tobacco: Never Used   Substance Use Topics    Alcohol use: Yes     Alcohol/week: 5.0 standard drinks     Types: 5 Shots of liquor per week     Comment: daily scotch drink     Family History:     Family History   Problem Relation Age of Onset    No Known Problems Mother     No Known Problems Sister     No Known Problems Brother     No Known Problems Maternal Aunt     No Known Problems Maternal Uncle     No Known Problems Paternal Aunt     Macular degeneration Paternal Uncle     No Known Problems Maternal Grandmother     Cancer Maternal Grandfather     No Known Problems Paternal Grandmother     No Known Problems Daughter     No Known Problems Son     No Known Problems Daughter     Amblyopia Neg Hx     Blindness Neg Hx     Cataracts Neg Hx     Diabetes Neg Hx     Glaucoma Neg Hx     Hypertension Neg Hx     Retinal detachment Neg Hx     Strabismus Neg Hx     Stroke Neg Hx     Thyroid disease Neg Hx     Melanoma Neg Hx      Physical Exam:   /62 (BP Location: Left arm, Patient Position: Sitting, BP Method: Medium (Automatic))   Pulse 60   Ht 5' 8" (1.727 m)   Wt 73.6 kg (162 lb 4.1 oz)   SpO2 96%   BMI 24.67 kg/m²        Physical Exam  Vitals and nursing note reviewed.   Constitutional:       General: He is not in acute distress.     Appearance: Normal appearance.   HENT:      Head: Normocephalic and atraumatic.      Nose: Nose normal.   Eyes:      General: No scleral icterus.     Extraocular Movements: Extraocular movements intact.      Conjunctiva/sclera: Conjunctivae normal.   Neck:      Thyroid: No thyromegaly.      Vascular: No carotid bruit or JVD.   Cardiovascular:      Rate and Rhythm: Normal rate and regular rhythm.      Pulses: Normal pulses.      Heart sounds: Normal " heart sounds. No murmur heard.    No friction rub. No gallop.   Pulmonary:      Effort: Pulmonary effort is normal.      Breath sounds: Normal breath sounds. No wheezing, rhonchi or rales.   Chest:      Chest wall: No tenderness.   Abdominal:      General: Bowel sounds are normal. There is no distension.      Palpations: Abdomen is soft.      Tenderness: There is no abdominal tenderness.   Musculoskeletal:      Cervical back: Neck supple.      Right lower leg: No edema.      Left lower leg: No edema.   Skin:     General: Skin is warm and dry.      Coloration: Skin is not pale.      Findings: No erythema or rash.      Nails: There is no clubbing.   Neurological:      Mental Status: He is alert and oriented to person, place, and time.   Psychiatric:         Mood and Affect: Mood and affect normal.         Behavior: Behavior normal.          Labs:     Lab Results   Component Value Date     04/04/2022    K 4.4 04/04/2022     04/04/2022    CO2 28 04/04/2022    BUN 14 04/04/2022    CREATININE 0.8 04/04/2022    ANIONGAP 11 04/04/2022     Lab Results   Component Value Date    HGBA1C 5.2 03/22/2018     No results found for: BNP, BNPTRIAGEBLO Lab Results   Component Value Date    WBC 4.88 04/04/2022    HGB 14.2 04/04/2022    HCT 45.7 04/04/2022     04/04/2022    GRAN 2.7 04/04/2022    GRAN 56.3 04/04/2022     Lab Results   Component Value Date    CHOL 194 04/04/2022    HDL 65 04/04/2022    LDLCALC 110.2 04/04/2022    TRIG 94 04/04/2022          Imaging:   Echocardiograms:   TTE 10/4/2021  · The left ventricle is normal in size with normal systolic function.  · The estimated ejection fraction is 60-65%.  · Normal left ventricular diastolic function.  · Normal right ventricular size with normal right ventricular systolic function.  · The estimated PA systolic pressure is 19 mmHg.  · Normal central venous pressure (3 mmHg).    TTE 1/4/2019  · Normal left ventricular systolic function. The estimated ejection  fraction is 60%  · No wall motion abnormalities.  · Normal LV diastolic function.  · Normal right ventricular systolic function.  · The estimated PA systolic pressure is 20 mm Hg  · Normal central venous pressure (3 mm Hg).    Stress Tests:   PET Stress Test 3/25/2020     The relative PET images are normal showing no clinically significant regional resting or stress induced perfusion defects.    Whole heart absolute myocardial perfusion (cc/min/g) averaged 0.93 cc/min/g at rest (which is normal), 2.76 cc/min/g at stress (which is normal), and 3.00 CFR (which is normal).    Gated perfusion images showed an ejection fraction of 57% at rest and 74% during stress. Normal ejection fraction is greater than 51%.    Wall motion was normal at rest and during stress.    LV cavity size is normal at rest and stress.    The EKG portion of this study is negative for ischemia.    There were no arrhythmias during stress.    The patient reported no chest pain during the stress test.    There are no prior studies for comparison.    Caths:   None    Other:  None    EKG 3/25/2020: normal sinus rhythm, no blocks or conduction defects, no ischemic changes    Assessment:     1. Pre-operative cardiovascular examination    2. Coronary artery disease involving native coronary artery of native heart without angina pectoris    3. History of ST elevation myocardial infarction (STEMI)    4. S/P primary angioplasty with coronary stent    5. Hyperlipidemia, mixed    6. Obstructive sleep apnea      Plan:     Pre-op nasal reconstruction surgery  Pt has no active cardiac condition (ACS/USA, decompenstated CHF, significant arrhythmias or severe valvular disease) and can easily achieve 4 METS.  As such, pt does not require further cardiac evaluation prior to undergoing surgery.  Pt should remain on beta-blockers throughout the entire yeyo-procedure time period.  Given his history of multiple coronary stents, it is preferable that he continue  Aspirin 81 mg yeyo-operatively, but it can be held for 5-7 days if at prohibitive bleeding risk. The remaining cardiac meds can be held as needed but should also be restarted after the procedure.     His estimated risk with the proposed surgery/procedure for an adverse cardiac outcome is 6.0% (95% CI 4.9%-7.4%) (Revised Cardiac Risk Index [RCRI] Score = 1) based on the following risk factors: History of ischemic heart disease.    CAD s/p inferolateral STEMI with cardiac arrest, s/p ARSEN x3 (LCXx2; PDA) in 9/29/2018 ([place]), 60-70% LAD stenosis   Asymptomatic  Continue ASA and high intensity statin  Continue aerobic exercise     Ischemic cardiomyopathy (EF 40% at time of STEMI), recovered to normal  EF remains 60-65%, even with discontinuation of ARB  Continue Metoprolol    Hyperlipidemia  Continue Crestor       Follow up with Dr. Mckinney as scheduled.     Signed:  Soraya German PA-C  Cardiology   [phone] - General  5/25/2022 9:37 AM         36 y/o male with PMH of ESRD on HD (M/W/F), HTN, HLD, DM-2, bipolar disorder came to the ED because he missed 2 sessions of dialysis. Patient said the HD center sent  to his house because he missed 2 session and was told to come to the ED. Patient reported having sniffles for 2 days.     ESRD missed dialysis   Admit to medical floor   ESRD (M/W/F)   Nephrology consulted     HTN/HLD   Hydralazine 50mg tid   Nifedipine 60mg with holding parameters   Coreg 12.5mg bid with holding parameters   Rosuvastatin 5mg     DM-2  Lantus 25 units   Lispro 8 units tid   Insulin sliding scale     Bipolar disorder   Prolixin 2.5mg bid   Trazodone 100mg HS   Seroquel 100mg HS     Supportive   DVT prophylaxis: ambulate as tolerated   Diet: renal     Plan of care discussed with patient

## 2022-10-07 NOTE — ED PROVIDER NOTE - PHYSICAL EXAMINATION
General: non toxic appearing in no acute distress. Alert and cooperative.   Head: Normocephalic, atraumatic.  Eyes: PERRLA. No conjunctival injection. No scleral icterus. EOMI  ENMT: Atraumatic external nose and ears. Moist mucous membranes. Oropharynx clear.  Neck: Soft and supple. Full ROM without pain.   Cardiac: Regular rate and regular rhythm. No murmurs. Peripheral pulses 2+ and symmetric in all extremities. No LE edema. RUE fistula with palpable thrill.   Resp: Unlabored respiratory effort. Lungs CTAB. No wheezes.  Abd: Soft, non-tender, non-distended.   MSK: Spine midline and non-tender.   Skin: Warm and dry.   Neuro: AO x 3. Moves all extremities symmetrically. Motor strength and sensation grossly intact.

## 2022-10-07 NOTE — ED PROVIDER NOTE - CLINICAL SUMMARY MEDICAL DECISION MAKING FREE TEXT BOX
37y Male who missed HD x 2 with congestion. Hemodynamically stable. ECG, labs, nephrology, reassess.

## 2022-10-07 NOTE — H&P ADULT - HISTORY OF PRESENT ILLNESS
38 y/o male with PMH of ESRD on HD (M/W/F), HTN, HLD, DM-2, bipolar disorder came to the ED because he missed 2 sessions of dialysis. Patient said the HD center sent  to his house because he missed 2 session and was told to come to the ED. Patient reported having sniffles for 2 days otherwise no shortness of breath, chest pain, palpitation, nausea, vomiting, abdominal pain, diarrhea, HA, fever, chills.

## 2022-10-07 NOTE — ED PROVIDER NOTE - OBJECTIVE STATEMENT
37y Male with history of HTN, DM, ESRD on HD (MWF) presenting with congestion x 3 days. Also reports missing the last 2 HD sessions because he could not get a ride. Denies fevers, chills, headache, chest pain, palpitations, shortness of breath, cough, nausea, vomiting, diarrhea, hematuria, dysuria, dark stools, focal neurologic symptoms.

## 2022-10-07 NOTE — ED ADULT TRIAGE NOTE - HEIGHT IN CM
165.1 Bedside and Verbal shift change report given to VALENTIN RN (oncoming nurse) by Raquel Mendoza LPN  (offgoing nurse). Report included the following information SBAR, Kardex, Intake/Output, and MAR.

## 2022-10-07 NOTE — ED PROVIDER NOTE - NSFOLLOWUPINSTRUCTIONS_ED_ALL_ED_FT
please go to dialysis center tomorrow.     - Please follow-up with your primary care doctor.  Please call for an appointment in the next 1-3 days but if you cannot follow-up with your primary care doctor please return to the ED for any urgent issues.  - You were given a copy of the tests performed today.  Please bring the results with you and review them with your primary care doctor.  - If you have any worsening of symptoms or any other concerns please return to the ED immediately.  - Please continue taking your home medications as directed.

## 2022-10-07 NOTE — ED PROVIDER NOTE - CARE PLAN
1 Principal Discharge DX:	Encounter for general medical examination   Principal Discharge DX:	ESRD on dialysis

## 2022-10-08 ENCOUNTER — TRANSCRIPTION ENCOUNTER (OUTPATIENT)
Age: 37
End: 2022-10-08

## 2022-10-08 VITALS
SYSTOLIC BLOOD PRESSURE: 142 MMHG | DIASTOLIC BLOOD PRESSURE: 84 MMHG | HEART RATE: 70 BPM | TEMPERATURE: 98 F | RESPIRATION RATE: 18 BRPM | OXYGEN SATURATION: 98 %

## 2022-10-08 LAB
GLUCOSE BLDC GLUCOMTR-MCNC: 106 MG/DL — HIGH (ref 70–99)
GLUCOSE BLDC GLUCOMTR-MCNC: 113 MG/DL — HIGH (ref 70–99)
GLUCOSE BLDC GLUCOMTR-MCNC: 120 MG/DL — HIGH (ref 70–99)
GLUCOSE BLDC GLUCOMTR-MCNC: 84 MG/DL — SIGNIFICANT CHANGE UP (ref 70–99)
RAPID RVP RESULT: SIGNIFICANT CHANGE UP
SARS-COV-2 RNA SPEC QL NAA+PROBE: SIGNIFICANT CHANGE UP

## 2022-10-08 PROCEDURE — 99239 HOSP IP/OBS DSCHRG MGMT >30: CPT

## 2022-10-08 RX ORDER — HYDRALAZINE HCL 50 MG
50 TABLET ORAL THREE TIMES A DAY
Refills: 0 | Status: DISCONTINUED | OUTPATIENT
Start: 2022-10-08 | End: 2022-10-08

## 2022-10-08 RX ORDER — SEVELAMER CARBONATE 2400 MG/1
800 POWDER, FOR SUSPENSION ORAL
Refills: 0 | Status: DISCONTINUED | OUTPATIENT
Start: 2022-10-08 | End: 2022-10-08

## 2022-10-08 RX ORDER — INSULIN GLARGINE 100 [IU]/ML
25 INJECTION, SOLUTION SUBCUTANEOUS AT BEDTIME
Refills: 0 | Status: DISCONTINUED | OUTPATIENT
Start: 2022-10-08 | End: 2022-10-08

## 2022-10-08 RX ORDER — ATORVASTATIN CALCIUM 80 MG/1
20 TABLET, FILM COATED ORAL AT BEDTIME
Refills: 0 | Status: DISCONTINUED | OUTPATIENT
Start: 2022-10-08 | End: 2022-10-08

## 2022-10-08 RX ORDER — QUETIAPINE FUMARATE 200 MG/1
100 TABLET, FILM COATED ORAL AT BEDTIME
Refills: 0 | Status: DISCONTINUED | OUTPATIENT
Start: 2022-10-08 | End: 2022-10-08

## 2022-10-08 RX ORDER — TRAZODONE HCL 50 MG
100 TABLET ORAL AT BEDTIME
Refills: 0 | Status: DISCONTINUED | OUTPATIENT
Start: 2022-10-08 | End: 2022-10-08

## 2022-10-08 RX ORDER — FOLIC ACID 0.8 MG
1 TABLET ORAL DAILY
Refills: 0 | Status: DISCONTINUED | OUTPATIENT
Start: 2022-10-08 | End: 2022-10-08

## 2022-10-08 RX ORDER — SODIUM CHLORIDE 9 MG/ML
1000 INJECTION, SOLUTION INTRAVENOUS
Refills: 0 | Status: DISCONTINUED | OUTPATIENT
Start: 2022-10-08 | End: 2022-10-08

## 2022-10-08 RX ORDER — INFLUENZA VIRUS VACCINE 15; 15; 15; 15 UG/.5ML; UG/.5ML; UG/.5ML; UG/.5ML
0.5 SUSPENSION INTRAMUSCULAR ONCE
Refills: 0 | Status: COMPLETED | OUTPATIENT
Start: 2022-10-08 | End: 2022-10-08

## 2022-10-08 RX ORDER — ASPIRIN/CALCIUM CARB/MAGNESIUM 324 MG
81 TABLET ORAL DAILY
Refills: 0 | Status: DISCONTINUED | OUTPATIENT
Start: 2022-10-08 | End: 2022-10-08

## 2022-10-08 RX ORDER — ACETAMINOPHEN 500 MG
650 TABLET ORAL EVERY 6 HOURS
Refills: 0 | Status: DISCONTINUED | OUTPATIENT
Start: 2022-10-08 | End: 2022-10-08

## 2022-10-08 RX ORDER — PANTOPRAZOLE SODIUM 20 MG/1
40 TABLET, DELAYED RELEASE ORAL
Refills: 0 | Status: DISCONTINUED | OUTPATIENT
Start: 2022-10-08 | End: 2022-10-08

## 2022-10-08 RX ORDER — GLUCAGON INJECTION, SOLUTION 0.5 MG/.1ML
1 INJECTION, SOLUTION SUBCUTANEOUS ONCE
Refills: 0 | Status: DISCONTINUED | OUTPATIENT
Start: 2022-10-08 | End: 2022-10-08

## 2022-10-08 RX ORDER — NIFEDIPINE 30 MG
60 TABLET, EXTENDED RELEASE 24 HR ORAL DAILY
Refills: 0 | Status: DISCONTINUED | OUTPATIENT
Start: 2022-10-08 | End: 2022-10-08

## 2022-10-08 RX ORDER — INSULIN LISPRO 100/ML
VIAL (ML) SUBCUTANEOUS AT BEDTIME
Refills: 0 | Status: DISCONTINUED | OUTPATIENT
Start: 2022-10-08 | End: 2022-10-08

## 2022-10-08 RX ORDER — SENNA PLUS 8.6 MG/1
1 TABLET ORAL
Qty: 0 | Refills: 0 | DISCHARGE

## 2022-10-08 RX ORDER — LANOLIN ALCOHOL/MO/W.PET/CERES
3 CREAM (GRAM) TOPICAL AT BEDTIME
Refills: 0 | Status: DISCONTINUED | OUTPATIENT
Start: 2022-10-08 | End: 2022-10-08

## 2022-10-08 RX ORDER — SENNA PLUS 8.6 MG/1
2 TABLET ORAL AT BEDTIME
Refills: 0 | Status: DISCONTINUED | OUTPATIENT
Start: 2022-10-08 | End: 2022-10-08

## 2022-10-08 RX ORDER — FLUPHENAZINE HYDROCHLORIDE 1 MG/1
2.5 TABLET, FILM COATED ORAL
Refills: 0 | Status: DISCONTINUED | OUTPATIENT
Start: 2022-10-08 | End: 2022-10-08

## 2022-10-08 RX ORDER — INSULIN LISPRO 100/ML
VIAL (ML) SUBCUTANEOUS
Refills: 0 | Status: DISCONTINUED | OUTPATIENT
Start: 2022-10-08 | End: 2022-10-08

## 2022-10-08 RX ORDER — INSULIN LISPRO 100/ML
8 VIAL (ML) SUBCUTANEOUS
Refills: 0 | Status: DISCONTINUED | OUTPATIENT
Start: 2022-10-08 | End: 2022-10-08

## 2022-10-08 RX ORDER — ONDANSETRON 8 MG/1
4 TABLET, FILM COATED ORAL EVERY 8 HOURS
Refills: 0 | Status: DISCONTINUED | OUTPATIENT
Start: 2022-10-08 | End: 2022-10-08

## 2022-10-08 RX ORDER — DEXTROSE 50 % IN WATER 50 %
12.5 SYRINGE (ML) INTRAVENOUS ONCE
Refills: 0 | Status: DISCONTINUED | OUTPATIENT
Start: 2022-10-08 | End: 2022-10-08

## 2022-10-08 RX ORDER — DEXTROSE 50 % IN WATER 50 %
25 SYRINGE (ML) INTRAVENOUS ONCE
Refills: 0 | Status: DISCONTINUED | OUTPATIENT
Start: 2022-10-08 | End: 2022-10-08

## 2022-10-08 RX ORDER — CHOLECALCIFEROL (VITAMIN D3) 125 MCG
4000 CAPSULE ORAL DAILY
Refills: 0 | Status: DISCONTINUED | OUTPATIENT
Start: 2022-10-08 | End: 2022-10-08

## 2022-10-08 RX ORDER — CARVEDILOL PHOSPHATE 80 MG/1
12.5 CAPSULE, EXTENDED RELEASE ORAL EVERY 12 HOURS
Refills: 0 | Status: DISCONTINUED | OUTPATIENT
Start: 2022-10-08 | End: 2022-10-08

## 2022-10-08 RX ORDER — DEXTROSE 50 % IN WATER 50 %
15 SYRINGE (ML) INTRAVENOUS ONCE
Refills: 0 | Status: DISCONTINUED | OUTPATIENT
Start: 2022-10-08 | End: 2022-10-08

## 2022-10-08 RX ADMIN — PANTOPRAZOLE SODIUM 40 MILLIGRAM(S): 20 TABLET, DELAYED RELEASE ORAL at 06:19

## 2022-10-08 RX ADMIN — Medication 4000 UNIT(S): at 15:19

## 2022-10-08 RX ADMIN — Medication 60 MILLIGRAM(S): at 06:19

## 2022-10-08 RX ADMIN — Medication 1 MILLIGRAM(S): at 15:19

## 2022-10-08 RX ADMIN — SEVELAMER CARBONATE 800 MILLIGRAM(S): 2400 POWDER, FOR SUSPENSION ORAL at 15:19

## 2022-10-08 RX ADMIN — FLUPHENAZINE HYDROCHLORIDE 2.5 MILLIGRAM(S): 1 TABLET, FILM COATED ORAL at 06:19

## 2022-10-08 RX ADMIN — Medication 8 UNIT(S): at 15:19

## 2022-10-08 RX ADMIN — Medication 50 MILLIGRAM(S): at 15:19

## 2022-10-08 RX ADMIN — CARVEDILOL PHOSPHATE 12.5 MILLIGRAM(S): 80 CAPSULE, EXTENDED RELEASE ORAL at 06:18

## 2022-10-08 RX ADMIN — SEVELAMER CARBONATE 800 MILLIGRAM(S): 2400 POWDER, FOR SUSPENSION ORAL at 08:18

## 2022-10-08 RX ADMIN — Medication 81 MILLIGRAM(S): at 15:18

## 2022-10-08 RX ADMIN — Medication 8 UNIT(S): at 07:44

## 2022-10-08 RX ADMIN — Medication 50 MILLIGRAM(S): at 06:18

## 2022-10-08 NOTE — DISCHARGE NOTE PROVIDER - HOSPITAL COURSE
37 year old male with PMH of ESRD on HD (M/W/F), HTN, HLD, DM-2, bipolar disorder came to the ED because he missed 2 sessions of dialysis. Patient said the HD center sent  to his house because he missed 2 session and was told to come to the ED. Patient reported having sniffles for 2 days otherwise no shortness of breath, chest pain, palpitation, nausea, vomiting, abdominal pain, diarrhea, HA, fever, chills. Pt admitted for HD. He received inpatient HD without complications. Pt is medically stable for discharge.

## 2022-10-08 NOTE — DISCHARGE NOTE PROVIDER - NSDCCPCAREPLAN_GEN_ALL_CORE_FT
PRINCIPAL DISCHARGE DIAGNOSIS  Diagnosis: ESRD on dialysis  Assessment and Plan of Treatment: Continue hemodialysis (M/W/F)   Follow up with Nephrology outpatient      SECONDARY DISCHARGE DIAGNOSES  Diagnosis: HTN (hypertension)  Assessment and Plan of Treatment: Continue Hydralazine 50mg three times a day  Continue Nifedipine 60mg daily  Continue Coreg 12.5mg twice a day    Diagnosis: HLD (hyperlipidemia)  Assessment and Plan of Treatment: Continue Rosuvastatin 5mg daily    Diagnosis: DM2 (diabetes mellitus, type 2)  Assessment and Plan of Treatment: Continue Lantus 25 units   Continue Lispro 8 units three times a day  Insulin sliding scale       Diagnosis: Bipolar disorder  Assessment and Plan of Treatment: Continue Prolixin 2.5mg twice a day  Continue Trazodone 100mg at bedtime  Continue Seroquel 100mg at bedtime

## 2022-10-08 NOTE — DISCHARGE NOTE PROVIDER - DID THE PATIENT PRESENT WITH OR WAS TREATED FOR MALNUTRITION DURING THIS ADMISSION
Utilization Review           Paraplegia, Acute - Care Day 34 (5/23/2017) by Funmilayo Francisco RN        Review Status Review Entered       Completed 5/23/2017       Details              Care Day: 34 Care Date: 5/23/2017 Level of Care:        Guideline Day 3        Clinical Status       ( ) * Neurologic deficits improved or stable       (X) * Glucose and electrolyte status stable              Routes       (X) * Commence tube feedings or parenteral nutrition       (X) IV fluids, medications              Interventions       (X) Physical therapy              5/23/2017 2:20 PM EDT by Cynda Canavan       Subject: Additional Clinical Information       vs: 97.8, 76, 137/83, 20, 95% 2L NC         orders: pulse ox, diabetic diet, st/ot/pt, vs continuous, perez, neuros, cardiac monitor, bedrestmeds: duoneb 3ml q 6hrs, rocephin 2g iv daily, pepcid 20mg, diflucan 200mg daily, zofran 4mg iv, labs: wbc 13.5, hgb 8.5, hct 26.2, alk phos 157Patient          is feeling better.   Dry cough present.   No chest or abdominal pain.   No foot pain.   Sat up on bed today.   No headaches or dizziness.          Cont antibiotic per ID  Cont current management. Pulmonology signed off.   Asked patient to do IS and cont nebs  Cont perez until becomes functional  Podiatrist consulted  Talked to CM to initiate ARU discharge process      ID:  Recommendations:      1. cont fluconazole (d7/14) to cover for possible candida cystitis. Continue ceftriaxone till 6/14/17   to complete treatment for infected psoas abscess  2. D/c picc line once antibiotic therapy completed  3.  D/c planning per primary team                                                  * Milestone                  Paraplegia, Acute - Care Day 31 (5/20/2017) by Funmilayo Francisco RN        Review Status Review Entered       Completed 5/22/2017       Details              Care Day: 31 Care Date: 5/20/2017 Level of Care:        Guideline Day 3        Clinical Status       ( ) * Neurologic deficits improved or stable       (X) * Glucose and electrolyte status stable              Routes       (X) * Commence tube feedings or parenteral nutrition       (X) IV fluids, medications              Interventions       (X) Physical therapy              5/22/2017 11:21 AM EDT by Nuria Guzman       Subject: Additional Clinical Information       vs: 97.9, 77, 131/68, 18, 95%orders:diabetic diet, pulse ox, st/ot/pt, perez, vs continuous, neuros, bedrest, cardiac monitor, meds: duoneb q 4hrs, diflucan 200mg iv daily, meropenem 1g iv q 12hrs, labs: hgb 8.4, hct 25.9, k 3.4, glucose 126, bun 21, crt 1.45, ast 46, alk phos 191Seen with daughter @ bedside. No F/C, N/V, CP, SOB. Still can only slightly wiggle R toes. 1. Sepsis due to infected R psoas hematoma/epidural abscess - with neurologic compromise, paraplegia. Is s/p CT-guided drainage catheter, old drain removal on 4/20, growing grp B beta-hemo Strep. Continue abx per ID: Merrem through 5/22 for PNA as well as ceftriaxone on completion of Merrem course. IR to remove drain next week possibly. 2. 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 PT/OT efforts to continue. 3. LISBETH with ATN on CKDz stage 3 - due to neurogenic bladder. Intermittent cath. BUN/Cr continue to improve. Resolving. 4. HypoK+ - replete orally again. 5. DM - SSI. BSugars controlled. 6. HTN - BPs normalized, previously hypotensive earlier in hospital course. Have discontinued midodrine. No new issues. 7. Dyslipidemia - statin. 8. Elevated trop I - due to demand ischemia, #1.  9. BLE DVT - s/p IVCFilter placement 5/11. No OAC due to #1/hematoma. 10. Severe prt-shelton malnutrition - supplement. 11. Grp B beta-hemo Strep bacteremia - due to #1. Cultures reviewed. Most recent 5/15 blood cx NGTD, 5/14 with 3/4 bottles Staph epi. 12. Aspiration PNA vs HCAPNA - continue Merrem per ID, through 5/22.    13. Possible candida cystitis - fluconazole. 14. Constipation - enema prn, MiraLax. 15. Infected pacemaker pocket - s/p tx. 12. Xfusing PRBC for anemia - chronically low, Hgb @ 6.6 on 5/17. Given 1u PRBC.  H/H remains stable.      No

## 2022-10-08 NOTE — CONSULT NOTE ADULT - SUBJECTIVE AND OBJECTIVE BOX
HPI: The pt is a 38 y/o male with PMH +ESRD on HD (M/W/F), HTN, HLD, DM-2, bipolar disorder who was referred to the ED because he missed his last several dialysis treatments.  Pt clinically asymptomatic and feels ok.      PAST MEDICAL & SURGICAL HISTORY:  Bipolar 1 disorder      Sleep apnea      HTN (hypertension)      CKD (chronic kidney disease) requiring chronic dialysis      Insulin dependent type 2 diabetes mellitus      AVF (arteriovenous fistula)  Left. AVF          FAMILY HISTORY:  Family history of diabetes mellitus (Grandparent)  Father, siblings    Family history of CKD (chronic kidney disease)  mother    FH: HTN (hypertension)  Father, siblings        Social History:  No tobacco; no etoh nor drugs      MEDICATIONS  (STANDING):  aspirin  chewable 81 milliGRAM(s) Oral daily  atorvastatin 20 milliGRAM(s) Oral at bedtime  carvedilol 12.5 milliGRAM(s) Oral every 12 hours  cholecalciferol 4000 Unit(s) Oral daily  dextrose 5%. 1000 milliLiter(s) (50 mL/Hr) IV Continuous <Continuous>  dextrose 5%. 1000 milliLiter(s) (100 mL/Hr) IV Continuous <Continuous>  dextrose 50% Injectable 25 Gram(s) IV Push once  dextrose 50% Injectable 12.5 Gram(s) IV Push once  dextrose 50% Injectable 25 Gram(s) IV Push once  fluPHENAZine 2.5 milliGRAM(s) Oral two times a day  folic acid 1 milliGRAM(s) Oral daily  glucagon  Injectable 1 milliGRAM(s) IntraMuscular once  hydrALAZINE 50 milliGRAM(s) Oral three times a day  influenza   Vaccine 0.5 milliLiter(s) IntraMuscular once  insulin glargine Injectable (LANTUS) 25 Unit(s) SubCutaneous at bedtime  insulin lispro (ADMELOG) corrective regimen sliding scale   SubCutaneous three times a day before meals  insulin lispro (ADMELOG) corrective regimen sliding scale   SubCutaneous at bedtime  insulin lispro Injectable (ADMELOG) 8 Unit(s) SubCutaneous three times a day before meals  NIFEdipine XL 60 milliGRAM(s) Oral daily  pantoprazole    Tablet 40 milliGRAM(s) Oral before breakfast  QUEtiapine 100 milliGRAM(s) Oral at bedtime  senna 2 Tablet(s) Oral at bedtime  sevelamer carbonate 800 milliGRAM(s) Oral three times a day with meals  traZODone 100 milliGRAM(s) Oral at bedtime    MEDICATIONS  (PRN):  acetaminophen     Tablet .. 650 milliGRAM(s) Oral every 6 hours PRN Temp greater or equal to 38C (100.4F), Mild Pain (1 - 3)  aluminum hydroxide/magnesium hydroxide/simethicone Suspension 30 milliLiter(s) Oral every 4 hours PRN Dyspepsia  dextrose Oral Gel 15 Gram(s) Oral once PRN Blood Glucose LESS THAN 70 milliGRAM(s)/deciliter  melatonin 3 milliGRAM(s) Oral at bedtime PRN Insomnia  ondansetron Injectable 4 milliGRAM(s) IV Push every 8 hours PRN Nausea and/or Vomiting      Allergies    No Known Drug Allergies  shellfish (Unknown)    Intolerances        REVIEW OF SYSTEMS:    CONSTITUTIONAL: No fever, weight loss, + fatigue  EYES: No eye pain, visual disturbances, or discharge  ENMT:  No difficulty hearing, tinnitus, vertigo; No sinus or throat pain  NECK: No pain or stiffness  BREASTS: No pain, masses, or nipple discharge  RESPIRATORY: No cough, wheezing, chills or hemoptysis; No shortness of breath  CARDIOVASCULAR: No chest pain, palpitations, dizziness, + leg swelling  GASTROINTESTINAL: No abdominal or epigastric pain. No nausea, vomiting, or hematemesis; No diarrhea or constipation. No melena or hematochezia.  GENITOURINARY: No dysuria, frequency, hematuria, or incontinence  NEUROLOGICAL: No headaches, memory loss, loss of strength, numbness, or tremors  SKIN: No itching, burning, rashes, or lesions   LYMPH NODES: No enlarged glands  ENDOCRINE: No heat or cold intolerance; No hair loss  MUSCULOSKELETAL: No joint pain or swelling; No muscle, back, or extremity pain        Vital Signs Last 24 Hrs  T(C): 36.7 (08 Oct 2022 10:57), Max: 37 (08 Oct 2022 00:22)  T(F): 98 (08 Oct 2022 10:57), Max: 98.6 (08 Oct 2022 00:22)  HR: 75 (08 Oct 2022 10:57) (61 - 88)  BP: 129/73 (08 Oct 2022 10:57) (118/75 - 163/114)  BP(mean): --  RR: 18 (08 Oct 2022 10:57) (12 - 20)  SpO2: 98% (08 Oct 2022 10:57) (98% - 100%)    Parameters below as of 08 Oct 2022 06:14  Patient On (Oxygen Delivery Method): room air        PHYSICAL EXAM:  GENERAL: Appears chronically ill  HEAD:  Atraumatic, Normocephalic  EYES: Conjunctiva and sclera clear  ENMT: Moist mucous membranes, Good dentition, No lesions  NECK: Supple, No JVD, Normal thyroid  NERVOUS SYSTEM:  Alert & Oriented X3, intact and symmetric  CHEST/LUNG: Clear bilaterally  HEART: Regular rate and rhythm; No rub  ABDOMEN: Soft, Nontender, Nondistended; +BS  EXTREMITIES:  2+ Peripheral Pulses, + LE edma  SKIN: No rashes or lesions      LABS:                        11.1   13.04 )-----------( 256      ( 07 Oct 2022 21:40 )             32.1     10-07    139  |  98  |  67.7<H>  ----------------------------<  144<H>  3.7   |  24.0  |  9.87<H>    Ca    9.5      07 Oct 2022 21:40    TPro  7.3  /  Alb  4.1  /  TBili  0.2<L>  /  DBili  x   /  AST  20  /  ALT  27  /  AlkPhos  106  10-07            RADIOLOGY & ADDITIONAL TESTS:

## 2022-10-08 NOTE — PATIENT PROFILE ADULT - FALL HARM RISK - UNIVERSAL INTERVENTIONS
Bed in lowest position, wheels locked, appropriate side rails in place/Call bell, personal items and telephone in reach/Instruct patient to call for assistance before getting out of bed or chair/Non-slip footwear when patient is out of bed/[place] to call system/Physically safe environment - no spills, clutter or unnecessary equipment/Purposeful Proactive Rounding/Room/bathroom lighting operational, light cord in reach

## 2022-10-08 NOTE — DISCHARGE NOTE NURSING/CASE MANAGEMENT/SOCIAL WORK - PATIENT PORTAL LINK FT
You can access the FollowMyHealth Patient Portal offered by [facility] by registering at the following website: [email or web address]. By joining [facility]’s FollowMyHealth portal, you will also be able to view your health information using other applications (apps) compatible with our system.

## 2022-10-08 NOTE — DISCHARGE NOTE PROVIDER - NSDCFUSCHEDAPPT_GEN_ALL_CORE_FT
[facility] Physician [facility] [address]  Scheduled Appointment: 11/08/2022

## 2022-10-08 NOTE — CONSULT NOTE ADULT - ASSESSMENT
ESRD: noncompliant with outpatient HD  Clinically stable  - will arrange for HD today  - reinforce need for better adherence with his dialysis treatments

## 2022-10-08 NOTE — DISCHARGE NOTE PROVIDER - NSDCMRMEDTOKEN_GEN_ALL_CORE_FT
Admelog SoloStar 100 units/mL injectable solution: 8 unit(s) subcutaneous 3 times a day before meals  aspirin 81 mg oral tablet: 1 tab(s) orally once a day  carvedilol 12.5 mg oral tablet: 1 tab(s) orally every 12 hours  folic acid 1 mg oral tablet: 1 tab(s) orally once a day  hydrALAZINE 50 mg oral tablet: 1 tab(s) orally 3 times a day  Lantus Solostar Pen 100 units/mL subcutaneous solution: 25  subcutaneous once a day (at bedtime)   NIFEdipine 60 mg oral tablet, extended release: 1 tab(s) orally once a day  Prolixin 2.5 mg oral tablet: 1 tab(s) orally 2 times a day  Protonix 40 mg oral delayed release tablet: 1 tab(s) orally once a day  rosuvastatin 5 mg oral tablet: 1 tab(s) orally once a day  SEROquel 100 mg oral tablet: 1 tab(s) orally once a day (at bedtime)  sevelamer carbonate 800 mg oral tablet: 1 tab(s) orally 3 times a day (with meals)  traZODone 100 mg oral tablet: 1 tab(s) orally once a day (at bedtime)  Vitamin D3 2000 intl units (50 mcg) oral tablet: 2 cap(s) orally once a day

## 2022-10-08 NOTE — DISCHARGE NOTE NURSING/CASE MANAGEMENT/SOCIAL WORK - NSDCPEFALRISK_GEN_ALL_CORE
For information on Fall & Injury Prevention, visit: [email or web address] OR  [email or web address] OR  https://www.cdc.gov/steadi/patient.html

## 2022-10-08 NOTE — DISCHARGE NOTE PROVIDER - ATTENDING DISCHARGE PHYSICAL EXAMINATION:
General: obese AA male, unkept, laying in bed NAD   Head and neck: normocephalic, no JVD   Cardio: regular rate and rhythm, no murmur   Pulm: b/l crackles at bases, no wheezing   GI: obese abdomen, soft, BS (+)   Extr: no edema  Neuro: AOx3, no focal weakness   ENT: normal

## 2022-10-10 ENCOUNTER — NON-APPOINTMENT (OUTPATIENT)
Age: 37
End: 2022-10-10

## 2022-10-17 NOTE — ED ADULT TRIAGE NOTE - SPO2 (%)
Bedside shift change report given to Halle Bo (oncoming nurse) by Joe Kapoor (offgoing nurse). Report included the following information SBAR. 100

## 2022-10-18 ENCOUNTER — APPOINTMENT (OUTPATIENT)
Dept: FAMILY MEDICINE | Facility: CLINIC | Age: 37
End: 2022-10-18

## 2022-10-20 PROCEDURE — 36415 COLL VENOUS BLD VENIPUNCTURE: CPT

## 2022-10-20 PROCEDURE — 71045 X-RAY EXAM CHEST 1 VIEW: CPT

## 2022-10-20 PROCEDURE — 82962 GLUCOSE BLOOD TEST: CPT

## 2022-10-20 PROCEDURE — 93005 ELECTROCARDIOGRAM TRACING: CPT

## 2022-10-20 PROCEDURE — 99261: CPT

## 2022-10-20 PROCEDURE — 99285 EMERGENCY DEPT VISIT HI MDM: CPT | Mod: 25

## 2022-10-20 PROCEDURE — 85025 COMPLETE CBC W/AUTO DIFF WBC: CPT

## 2022-10-20 PROCEDURE — 80053 COMPREHEN METABOLIC PANEL: CPT

## 2022-10-20 PROCEDURE — 0225U NFCT DS DNA&RNA 21 SARSCOV2: CPT

## 2022-11-08 ENCOUNTER — APPOINTMENT (OUTPATIENT)
Dept: FAMILY MEDICINE | Facility: CLINIC | Age: 37
End: 2022-11-08

## 2022-11-08 VITALS
WEIGHT: 245 LBS | TEMPERATURE: 97.7 F | SYSTOLIC BLOOD PRESSURE: 134 MMHG | HEART RATE: 69 BPM | HEIGHT: 66 IN | OXYGEN SATURATION: 99 % | BODY MASS INDEX: 39.37 KG/M2 | DIASTOLIC BLOOD PRESSURE: 90 MMHG

## 2022-11-08 DIAGNOSIS — Z23 ENCOUNTER FOR IMMUNIZATION: ICD-10-CM

## 2022-11-08 DIAGNOSIS — Z92.29 PERSONAL HISTORY OF OTHER DRUG THERAPY: ICD-10-CM

## 2022-11-08 DIAGNOSIS — D64.9 ANEMIA, UNSPECIFIED: ICD-10-CM

## 2022-11-08 PROCEDURE — 99214 OFFICE O/P EST MOD 30 MIN: CPT | Mod: 25

## 2022-11-08 PROCEDURE — 36415 COLL VENOUS BLD VENIPUNCTURE: CPT

## 2022-11-08 PROCEDURE — 90686 IIV4 VACC NO PRSV 0.5 ML IM: CPT

## 2022-11-08 PROCEDURE — G0008: CPT

## 2022-11-08 NOTE — HISTORY OF PRESENT ILLNESS
[FreeTextEntry1] : MIELNA is a 37 year male here for follow up HTN,HLD, DM2\par \par  [de-identified] : 36 yo male  for f/u on HTN/HLD/DM2/ESRD  \par \par as above, +ve FAST\par no CP/SOB c activity no dizziness no palpitations \par no N/V/D +BM qd NL no bloody /black stools \par pt states still actively urinating despite HD \par \par Last home glucose stick   last night= 146

## 2022-11-11 LAB
25(OH)D3 SERPL-MCNC: 74.3 NG/ML
ALBUMIN SERPL ELPH-MCNC: 4.6 G/DL
ALP BLD-CCNC: 102 U/L
ALT SERPL-CCNC: 15 U/L
ANION GAP SERPL CALC-SCNC: 15 MMOL/L
AST SERPL-CCNC: 12 U/L
BASOPHILS # BLD AUTO: 0.11 K/UL
BASOPHILS NFR BLD AUTO: 0.8 %
BILIRUB SERPL-MCNC: 0.4 MG/DL
BUN SERPL-MCNC: 38 MG/DL
CALCIUM SERPL-MCNC: 10.2 MG/DL
CHLORIDE SERPL-SCNC: 100 MMOL/L
CHOLEST SERPL-MCNC: 141 MG/DL
CK SERPL-CCNC: 138 U/L
CO2 SERPL-SCNC: 25 MMOL/L
COVID-19 SPIKE DOMAIN ANTIBODY INTERPRETATION: POSITIVE
CREAT SERPL-MCNC: 7.22 MG/DL
EGFR: 9 ML/MIN/1.73M2
EOSINOPHIL # BLD AUTO: 0.75 K/UL
EOSINOPHIL NFR BLD AUTO: 5.4 %
ESTIMATED AVERAGE GLUCOSE: 117 MG/DL
GGT SERPL-CCNC: 80 U/L
GLUCOSE SERPL-MCNC: 111 MG/DL
HBA1C MFR BLD HPLC: 5.7 %
HCT VFR BLD CALC: 35.8 %
HDLC SERPL-MCNC: 45 MG/DL
HGB BLD-MCNC: 12.2 G/DL
IMM GRANULOCYTES NFR BLD AUTO: 0.3 %
LDLC SERPL CALC-MCNC: 76 MG/DL
LYMPHOCYTES # BLD AUTO: 3.44 K/UL
LYMPHOCYTES NFR BLD AUTO: 24.7 %
MAN DIFF?: NORMAL
MCHC RBC-ENTMCNC: 29.3 PG
MCHC RBC-ENTMCNC: 34.1 GM/DL
MCV RBC AUTO: 85.9 FL
MONOCYTES # BLD AUTO: 0.79 K/UL
MONOCYTES NFR BLD AUTO: 5.7 %
NEUTROPHILS # BLD AUTO: 8.8 K/UL
NEUTROPHILS NFR BLD AUTO: 63.1 %
NONHDLC SERPL-MCNC: 97 MG/DL
PLATELET # BLD AUTO: 286 K/UL
POTASSIUM SERPL-SCNC: 3.8 MMOL/L
PROT SERPL-MCNC: 7.5 G/DL
RBC # BLD: 4.17 M/UL
RBC # FLD: 14.6 %
SARS-COV-2 AB SERPL IA-ACNC: >250 U/ML
SODIUM SERPL-SCNC: 140 MMOL/L
T3FREE SERPL-MCNC: 3.09 PG/ML
T4 FREE SERPL-MCNC: 1.3 NG/DL
TRIGL SERPL-MCNC: 104 MG/DL
TSH SERPL-ACNC: 1.2 UIU/ML
URATE SERPL-MCNC: 5.4 MG/DL
WBC # FLD AUTO: 13.93 K/UL

## 2022-12-15 NOTE — PROGRESS NOTE ADULT - ASSESSMENT
SW consulted to arrange Hospice services. JARRETT spoke with Opal to discuss the above. She stated no preference of Hospice Co at this time. JARRETT also discussed pt's outburst of anger, and asked if they were still present per SW review of MD notes. Opal stated that pt only gets upset when caregiver tries to make him eat or get him up when he just wants to rest, but he's not angry all of the time. SW informed Opal that Hospice will take of pt's needs, and SW will make sure that Hospice agency has a  to make the process easier. No other needs expressed. Opal will call SW if additional needs arise. SW will send Hospice referral and remain available. Lucie Yoo LMSW   The patient is a 34 y/o male was seen by his visiting nurse and was found sitting in his non AC room and it was hot and humid. In the ED, noted to have hypertensive urgency with confusion. CT head and MRI of the brain was negative. Patient left AMA  af ew days prior after presenting with AMS. Work up was negative and neurology recommended SAHIL at the time but he left prior to the test. Blood pressure improved after medications were re-started. Evaluated by psychiatry and neurology. Mild NMS with elevated CK on admission resolved. EEG was negative. Spoke with psychiatry, no further recommendations at this time. Patient has not been taking his medications at home.   I spoke with patient's physician and worker at [facility]- patient was previously on Neurontin and lithium. He was well controlled and able to care for himself and appropriately conversive. His medications were discontinued due to worsening renal failure a few months ago. Since then patient was started on Invega but they have been unable to appropriately control his mood. Suspect patient is not caring for himself at home and that he has not been taking his medications. He lives at home with his father who is an alcoholic, elderly grandmother and 2 sisters who are mentally disabled as well. No further psych recommendations. To be followed by the ACT team as an outaptient. Noted to have MELISSA with CKD stage 3 and hypokalemia improved with iv fluids.     Assesment/Plan:    1. Hypertensive urgency: secondary to non complianced. Improved now. Continue clonidine, labetalol and norvasc. Add hydralazine. Monitor bp     2. AMS likley secondary to hypertensive urgency vs underlying psychiatric disorder: likely secondary to non compliance with medications.  Now at baseline  Mild neuroleptic malignant syndrome? with elevated ck on admission. CK now normalized. Resume seroquel and invega as outpatient.   CT head and MRI of the brain were negative.   EEG negative.     3. MELISSA with underlying CKD: Renal ultrasound reviewed.   Improved with iv fluids.   Creatinine in 12/17- 2.55     4. Hypokalemia: repleted. continue daily supplementation. monitor bmp       VTE_ heparin subcut  PT- Home  Spoke with Social work to arrange discharge home likely in AM.

## 2023-01-13 ENCOUNTER — RX RENEWAL (OUTPATIENT)
Age: 38
End: 2023-01-13

## 2023-02-14 ENCOUNTER — APPOINTMENT (OUTPATIENT)
Dept: FAMILY MEDICINE | Facility: CLINIC | Age: 38
End: 2023-02-14

## 2023-02-14 VITALS
OXYGEN SATURATION: 97 % | HEIGHT: 66 IN | WEIGHT: 235 LBS | TEMPERATURE: 98 F | SYSTOLIC BLOOD PRESSURE: 150 MMHG | DIASTOLIC BLOOD PRESSURE: 90 MMHG | HEART RATE: 64 BPM | BODY MASS INDEX: 37.77 KG/M2

## 2023-06-06 ENCOUNTER — APPOINTMENT (OUTPATIENT)
Dept: VASCULAR SURGERY | Facility: CLINIC | Age: 38
End: 2023-06-06
Payer: MEDICARE

## 2023-06-06 VITALS
OXYGEN SATURATION: 96 % | SYSTOLIC BLOOD PRESSURE: 120 MMHG | RESPIRATION RATE: 16 BRPM | HEIGHT: 63.5 IN | DIASTOLIC BLOOD PRESSURE: 74 MMHG | HEART RATE: 77 BPM | TEMPERATURE: 98.9 F | BODY MASS INDEX: 39.55 KG/M2 | WEIGHT: 226 LBS

## 2023-06-06 PROCEDURE — 93990 DOPPLER FLOW TESTING: CPT

## 2023-06-06 PROCEDURE — 99204 OFFICE O/P NEW MOD 45 MIN: CPT

## 2023-06-06 NOTE — ASSESSMENT
[FreeTextEntry1] : 38-year-old male with functional radiocephalic arteriovenous fistula for multiple years without any percutaneous intervention required.  There appears to be a significant stenosis at the proximal segment of the vein which would explain why there is a diminished thrill on exam as you extend at the fistula.  I do believe he would benefit from a left upper extremity fistulogram with intervention.  We will schedule soon as possible.  Hopefully can schedule prior to his hemodialysis.

## 2023-06-06 NOTE — HISTORY OF PRESENT ILLNESS
[FreeTextEntry1] : 38-year-old male end-stage renal disease on hemodialysis.  He receives hemodialysis Monday Wednesday Friday.  This is via a left radiocephalic arteriovenous fistula placed in 2020 by Dr. Zamarripa for the past month or so his been experiencing issues on hemodialysis with increased venous pressures and alarming of the machines.  It is unclear if these getting recirculation or there is issues with flow.  He was instructed to see a vascular surgeon for evaluation of his AV access.

## 2023-06-06 NOTE — PHYSICAL EXAM
[Respiratory Effort] : normal respiratory effort [de-identified] : Appears well in no acute distress [de-identified] : Left upper extremity AV fistula radiocephalic there is a strong thrill within the proximal and mid segments as you extend towards the upper arm there is a drop-off in thrill.

## 2023-06-19 ENCOUNTER — APPOINTMENT (OUTPATIENT)
Dept: VASCULAR SURGERY | Facility: HOSPITAL | Age: 38
End: 2023-06-19

## 2023-06-19 ENCOUNTER — TRANSCRIPTION ENCOUNTER (OUTPATIENT)
Age: 38
End: 2023-06-19

## 2023-06-19 ENCOUNTER — INPATIENT (INPATIENT)
Facility: HOSPITAL | Age: 38
LOS: 0 days | Discharge: HOME | DRG: 252 | End: 2023-06-19
Attending: SURGERY | Admitting: SURGERY
Payer: MEDICARE

## 2023-06-19 VITALS
RESPIRATION RATE: 16 BRPM | OXYGEN SATURATION: 98 % | DIASTOLIC BLOOD PRESSURE: 76 MMHG | SYSTOLIC BLOOD PRESSURE: 133 MMHG | HEART RATE: 71 BPM | TEMPERATURE: 98 F

## 2023-06-19 VITALS
SYSTOLIC BLOOD PRESSURE: 148 MMHG | OXYGEN SATURATION: 98 % | DIASTOLIC BLOOD PRESSURE: 87 MMHG | HEART RATE: 68 BPM | RESPIRATION RATE: 16 BRPM

## 2023-06-19 DIAGNOSIS — I10 ESSENTIAL (PRIMARY) HYPERTENSION: ICD-10-CM

## 2023-06-19 DIAGNOSIS — K21.9 GASTRO-ESOPHAGEAL REFLUX DISEASE WITHOUT ESOPHAGITIS: ICD-10-CM

## 2023-06-19 DIAGNOSIS — E78.5 HYPERLIPIDEMIA, UNSPECIFIED: ICD-10-CM

## 2023-06-19 DIAGNOSIS — T82.858: ICD-10-CM

## 2023-06-19 DIAGNOSIS — I77.0 ARTERIOVENOUS FISTULA, ACQUIRED: Chronic | ICD-10-CM

## 2023-06-19 DIAGNOSIS — F31.9 BIPOLAR DISORDER, UNSPECIFIED: ICD-10-CM

## 2023-06-19 LAB
ANION GAP SERPL CALC-SCNC: 13 MMOL/L — SIGNIFICANT CHANGE UP (ref 5–17)
BASOPHILS # BLD AUTO: 0.09 K/UL — SIGNIFICANT CHANGE UP (ref 0–0.2)
BASOPHILS NFR BLD AUTO: 0.6 % — SIGNIFICANT CHANGE UP (ref 0–2)
BUN SERPL-MCNC: 48.9 MG/DL — HIGH (ref 8–20)
CALCIUM SERPL-MCNC: 9.9 MG/DL — SIGNIFICANT CHANGE UP (ref 8.4–10.5)
CHLORIDE SERPL-SCNC: 100 MMOL/L — SIGNIFICANT CHANGE UP (ref 96–108)
CO2 SERPL-SCNC: 25 MMOL/L — SIGNIFICANT CHANGE UP (ref 22–29)
CREAT SERPL-MCNC: 9.69 MG/DL — HIGH (ref 0.5–1.3)
EGFR: 7 ML/MIN/1.73M2 — LOW
EOSINOPHIL # BLD AUTO: 0.93 K/UL — HIGH (ref 0–0.5)
EOSINOPHIL NFR BLD AUTO: 6.7 % — HIGH (ref 0–6)
GLUCOSE SERPL-MCNC: 123 MG/DL — HIGH (ref 70–99)
HCT VFR BLD CALC: 31 % — LOW (ref 39–50)
HGB BLD-MCNC: 10.6 G/DL — LOW (ref 13–17)
IMM GRANULOCYTES NFR BLD AUTO: 0.2 % — SIGNIFICANT CHANGE UP (ref 0–0.9)
LYMPHOCYTES # BLD AUTO: 30 % — SIGNIFICANT CHANGE UP (ref 13–44)
LYMPHOCYTES # BLD AUTO: 4.18 K/UL — HIGH (ref 1–3.3)
MAGNESIUM SERPL-MCNC: 2.5 MG/DL — SIGNIFICANT CHANGE UP (ref 1.6–2.6)
MCHC RBC-ENTMCNC: 29.7 PG — SIGNIFICANT CHANGE UP (ref 27–34)
MCHC RBC-ENTMCNC: 34.2 GM/DL — SIGNIFICANT CHANGE UP (ref 32–36)
MCV RBC AUTO: 86.8 FL — SIGNIFICANT CHANGE UP (ref 80–100)
MONOCYTES # BLD AUTO: 1.1 K/UL — HIGH (ref 0–0.9)
MONOCYTES NFR BLD AUTO: 7.9 % — SIGNIFICANT CHANGE UP (ref 2–14)
NEUTROPHILS # BLD AUTO: 7.6 K/UL — HIGH (ref 1.8–7.4)
NEUTROPHILS NFR BLD AUTO: 54.6 % — SIGNIFICANT CHANGE UP (ref 43–77)
PLATELET # BLD AUTO: 221 K/UL — SIGNIFICANT CHANGE UP (ref 150–400)
POTASSIUM SERPL-MCNC: 3.6 MMOL/L — SIGNIFICANT CHANGE UP (ref 3.5–5.3)
POTASSIUM SERPL-SCNC: 3.6 MMOL/L — SIGNIFICANT CHANGE UP (ref 3.5–5.3)
RBC # BLD: 3.57 M/UL — LOW (ref 4.2–5.8)
RBC # FLD: 14.4 % — SIGNIFICANT CHANGE UP (ref 10.3–14.5)
SODIUM SERPL-SCNC: 138 MMOL/L — SIGNIFICANT CHANGE UP (ref 135–145)
WBC # BLD: 13.93 K/UL — HIGH (ref 3.8–10.5)
WBC # FLD AUTO: 13.93 K/UL — HIGH (ref 3.8–10.5)

## 2023-06-19 PROCEDURE — 36901 INTRO CATH DIALYSIS CIRCUIT: CPT

## 2023-06-19 PROCEDURE — 85025 COMPLETE CBC W/AUTO DIFF WBC: CPT

## 2023-06-19 PROCEDURE — 83735 ASSAY OF MAGNESIUM: CPT

## 2023-06-19 PROCEDURE — C1769: CPT

## 2023-06-19 PROCEDURE — 99261: CPT

## 2023-06-19 PROCEDURE — 93010 ELECTROCARDIOGRAM REPORT: CPT

## 2023-06-19 PROCEDURE — 93005 ELECTROCARDIOGRAM TRACING: CPT

## 2023-06-19 PROCEDURE — 36415 COLL VENOUS BLD VENIPUNCTURE: CPT

## 2023-06-19 PROCEDURE — 36907 BALO ANGIOP CTR DIALYSIS SEG: CPT

## 2023-06-19 PROCEDURE — 76937 US GUIDE VASCULAR ACCESS: CPT | Mod: 26

## 2023-06-19 PROCEDURE — C1894: CPT

## 2023-06-19 PROCEDURE — C1725: CPT

## 2023-06-19 PROCEDURE — C1887: CPT

## 2023-06-19 PROCEDURE — 80048 BASIC METABOLIC PNL TOTAL CA: CPT

## 2023-06-19 RX ORDER — HYDRALAZINE HCL 50 MG
50 TABLET ORAL THREE TIMES A DAY
Refills: 0 | Status: DISCONTINUED | OUTPATIENT
Start: 2023-06-19 | End: 2023-06-19

## 2023-06-19 RX ORDER — ASPIRIN/CALCIUM CARB/MAGNESIUM 324 MG
81 TABLET ORAL DAILY
Refills: 0 | Status: DISCONTINUED | OUTPATIENT
Start: 2023-06-19 | End: 2023-06-19

## 2023-06-19 RX ORDER — QUETIAPINE FUMARATE 200 MG/1
100 TABLET, FILM COATED ORAL AT BEDTIME
Refills: 0 | Status: DISCONTINUED | OUTPATIENT
Start: 2023-06-19 | End: 2023-06-19

## 2023-06-19 RX ORDER — INSULIN LISPRO 100/ML
VIAL (ML) SUBCUTANEOUS
Refills: 0 | Status: DISCONTINUED | OUTPATIENT
Start: 2023-06-19 | End: 2023-06-19

## 2023-06-19 RX ORDER — QUETIAPINE FUMARATE 200 MG/1
1 TABLET, FILM COATED ORAL
Qty: 0 | Refills: 0 | DISCHARGE

## 2023-06-19 RX ORDER — FOLIC ACID 0.8 MG
1 TABLET ORAL DAILY
Refills: 0 | Status: DISCONTINUED | OUTPATIENT
Start: 2023-06-19 | End: 2023-06-19

## 2023-06-19 RX ORDER — FLUPHENAZINE HYDROCHLORIDE 1 MG/1
2.5 TABLET, FILM COATED ORAL
Refills: 0 | Status: DISCONTINUED | OUTPATIENT
Start: 2023-06-19 | End: 2023-06-19

## 2023-06-19 RX ORDER — SODIUM CHLORIDE 9 MG/ML
1000 INJECTION, SOLUTION INTRAVENOUS
Refills: 0 | Status: DISCONTINUED | OUTPATIENT
Start: 2023-06-19 | End: 2023-06-19

## 2023-06-19 RX ORDER — DEXTROSE 50 % IN WATER 50 %
25 SYRINGE (ML) INTRAVENOUS ONCE
Refills: 0 | Status: DISCONTINUED | OUTPATIENT
Start: 2023-06-19 | End: 2023-06-19

## 2023-06-19 RX ORDER — ATORVASTATIN CALCIUM 80 MG/1
20 TABLET, FILM COATED ORAL AT BEDTIME
Refills: 0 | Status: DISCONTINUED | OUTPATIENT
Start: 2023-06-19 | End: 2023-06-19

## 2023-06-19 RX ORDER — DEXTROSE 50 % IN WATER 50 %
15 SYRINGE (ML) INTRAVENOUS ONCE
Refills: 0 | Status: DISCONTINUED | OUTPATIENT
Start: 2023-06-19 | End: 2023-06-19

## 2023-06-19 RX ORDER — TRAZODONE HCL 50 MG
1 TABLET ORAL
Qty: 0 | Refills: 0 | DISCHARGE

## 2023-06-19 RX ORDER — CHOLECALCIFEROL (VITAMIN D3) 125 MCG
2 CAPSULE ORAL
Qty: 0 | Refills: 0 | DISCHARGE

## 2023-06-19 RX ORDER — NIFEDIPINE 30 MG
60 TABLET, EXTENDED RELEASE 24 HR ORAL DAILY
Refills: 0 | Status: DISCONTINUED | OUTPATIENT
Start: 2023-06-19 | End: 2023-06-19

## 2023-06-19 RX ORDER — ASPIRIN/CALCIUM CARB/MAGNESIUM 324 MG
1 TABLET ORAL
Qty: 0 | Refills: 0 | DISCHARGE

## 2023-06-19 RX ORDER — SEVELAMER CARBONATE 2400 MG/1
800 POWDER, FOR SUSPENSION ORAL
Refills: 0 | Status: DISCONTINUED | OUTPATIENT
Start: 2023-06-19 | End: 2023-06-19

## 2023-06-19 RX ORDER — TRAZODONE HCL 50 MG
100 TABLET ORAL AT BEDTIME
Refills: 0 | Status: DISCONTINUED | OUTPATIENT
Start: 2023-06-19 | End: 2023-06-19

## 2023-06-19 RX ORDER — DEXTROSE 50 % IN WATER 50 %
12.5 SYRINGE (ML) INTRAVENOUS ONCE
Refills: 0 | Status: DISCONTINUED | OUTPATIENT
Start: 2023-06-19 | End: 2023-06-19

## 2023-06-19 RX ORDER — PANTOPRAZOLE SODIUM 20 MG/1
40 TABLET, DELAYED RELEASE ORAL
Refills: 0 | Status: DISCONTINUED | OUTPATIENT
Start: 2023-06-19 | End: 2023-06-19

## 2023-06-19 RX ORDER — FLUPHENAZINE HYDROCHLORIDE 1 MG/1
1 TABLET, FILM COATED ORAL
Qty: 0 | Refills: 0 | DISCHARGE

## 2023-06-19 RX ORDER — CARVEDILOL PHOSPHATE 80 MG/1
12.5 CAPSULE, EXTENDED RELEASE ORAL EVERY 12 HOURS
Refills: 0 | Status: DISCONTINUED | OUTPATIENT
Start: 2023-06-19 | End: 2023-06-19

## 2023-06-19 RX ORDER — ASPIRIN/CALCIUM CARB/MAGNESIUM 324 MG
81 TABLET ORAL ONCE
Refills: 0 | Status: DISCONTINUED | OUTPATIENT
Start: 2023-06-19 | End: 2023-06-19

## 2023-06-19 RX ORDER — CHLORHEXIDINE GLUCONATE 213 G/1000ML
1 SOLUTION TOPICAL ONCE
Refills: 0 | Status: DISCONTINUED | OUTPATIENT
Start: 2023-06-19 | End: 2023-06-19

## 2023-06-19 RX ORDER — GLUCAGON INJECTION, SOLUTION 0.5 MG/.1ML
1 INJECTION, SOLUTION SUBCUTANEOUS ONCE
Refills: 0 | Status: DISCONTINUED | OUTPATIENT
Start: 2023-06-19 | End: 2023-06-19

## 2023-06-19 RX ADMIN — CARVEDILOL PHOSPHATE 12.5 MILLIGRAM(S): 80 CAPSULE, EXTENDED RELEASE ORAL at 19:47

## 2023-06-19 RX ADMIN — Medication 81 MILLIGRAM(S): at 12:47

## 2023-06-19 RX ADMIN — Medication 50 MILLIGRAM(S): at 19:47

## 2023-06-19 NOTE — CONSULT NOTE ADULT - SUBJECTIVE AND OBJECTIVE BOX
38 year old male with PMH of DM, ARVIND, seizures, GERD, HLD,  ESRD on HD M/W/F via left radiocephalic AVF originally placed by Dr. Zamarripa 2020 which has been experiencing complications associated with increased venous pressures and alarming of the machines during HD over the past month. He now presents for LUE AVFistulogram and further evalautation   Now s/p revision, needs HD today      PAST MEDICAL & SURGICAL HISTORY:  Bipolar 1 disorder      Sleep apnea      HTN (hypertension)      CKD (chronic kidney disease) requiring chronic dialysis      Insulin dependent type 2 diabetes mellitus      AVF (arteriovenous fistula)  Left. AVF          FAMILY HISTORY:  Family history of diabetes mellitus (Grandparent)  Father, siblings    Family history of CKD (chronic kidney disease)  mother    FH: HTN (hypertension)  Father, siblings      REVIEW OF SYSTEMS:  CONSTITUTIONAL: No fever, weight loss, or fatigue  EYES: No eye pain, No visual disturbances,   RESPIRATORY: No cough, hemoptysis; - SOB  CARDIOVASCULAR: No chest pain, No palpitations,   -leg swelling  GASTROINTESTINAL: No abdominal , NVD or GIB  GENITOURINARY: No UTI sx/hematuria  NEUROLOGICAL: No HA/wk/numbness  MUSCULOSKELETAL: No joint pain, No swelling      Vital Signs Last 24 Hrs  T(C): 36.5 (19 Jun 2023 08:28), Max: 36.5 (19 Jun 2023 08:28)  T(F): 97.7 (19 Jun 2023 08:28), Max: 97.7 (19 Jun 2023 08:28)  HR: 93 (19 Jun 2023 13:00) (61 - 93)  BP: 142/87 (19 Jun 2023 13:00) (110/76 - 142/87)  BP(mean): --  RR: 16 (19 Jun 2023 13:00) (15 - 17)  SpO2: 100% (19 Jun 2023 13:00) (95% - 100%)    Parameters below as of 19 Jun 2023 13:00  Patient On (Oxygen Delivery Method): room air        PHYSICAL EXAM:    GENERAL: NAD, ObesePleasant  EYES:  conjunctiva and sclera clear  NECK: Supple, No JVD/Carotid Bruit -ve   NERVOUS SYSTEM:  A/O x3,   Lungs : No rales, No rhonchi,   HEART:  No murmur,  No rub, No gallops  ABDOMEN: Soft, NT/ND BS+  EXTREMITIES:  + Peripheral Pulses, - edema  SKIN: No rashes or lesions      LABS:                        10.6   13.93 )-----------( 221      ( 19 Jun 2023 07:36 )             31.0     06-19    138  |  100  |  48.9<H>  ----------------------------<  123<H>  3.6   |  25.0  |  9.69<H>    Ca    9.9      19 Jun 2023 07:36  Mg     2.5     06-19          Magnesium, Serum: 2.5 mg/dL (06-19 @ 08:00)      RADIOLOGY & ADDITIONAL TESTS:    MEDICATIONS  (STANDING):  aspirin  chewable  aspirin  chewable  atorvastatin  carvedilol  chlorhexidine 4% Liquid  dextrose 5%.  dextrose 5%.  dextrose 50% Injectable  dextrose 50% Injectable  dextrose 50% Injectable  dextrose Oral Gel PRN  fluPHENAZine  folic acid  glucagon  Injectable  hydrALAZINE  insulin lispro (ADMELOG) corrective regimen sliding scale  NIFEdipine XL  pantoprazole    Tablet  QUEtiapine  sevelamer carbonate  traZODone

## 2023-06-19 NOTE — DISCHARGE NOTE NURSING/CASE MANAGEMENT/SOCIAL WORK - NSDCPEFALRISK_GEN_ALL_CORE
For information on Fall & Injury Prevention, visit: [email or web address] OR  [email or web address] OR  https://www.cdc.gov/steadi/patient.html

## 2023-06-19 NOTE — H&P PST ADULT - PRO ARRIVE FROM
Patient: Nisreen Mail MRN: [id number]  SSN: [id number]   YOB: 1980  Age: 40 y.o. Sex: female     Patient is status post Procedure(s):  CIRCUMFERENTIAL ABDOMINOPLASTY. Surgeon(s) and Role:     * Senait Lassiter MD - Primary  Local/Dose/Irrigation: see med rec                  Peripheral IV 07/03/18 Right Hand (Active)   Site Assessment Clean, dry, & intact 7/3/2018 11:02 AM   Phlebitis Assessment 0 7/3/2018 11:02 AM   Infiltration Assessment 0 7/3/2018 11:02 AM   Dressing Status Occlusive 7/3/2018 11:02 AM          Angel-Berg Drain 07/03/18 Right Hip (Active)       Angel-Berg Drain 07/03/18 Left Hip (Active)       Angel-Berg Drain 07/03/18 Left Hip (Active)       Angel-Berg Drain 07/03/18 Right Hip (Active)                     Dressing/Packing:  Wound Back-DRESSING TYPE: 4 x 4;Adhesive wound dressing (Mastisol); Xeroform (07/03/18 1300)  Splint/Cast:  ]    Other:
home

## 2023-06-19 NOTE — CONSULT NOTE ADULT - ASSESSMENT
38 year old male with PMH of DM, ARVIND, seizures, GERD, HLD,  ESRD on HD M/W/F via left radiocephalic AVF originally placed by Dr. Zamarripa 2020 which has been experiencing complications associated with increased venous pressures and alarming of the machines during HD over the past month  Now s/p Revision, being kept overnight    HD MWF - Consent obtd and HD arranged

## 2023-06-19 NOTE — DISCHARGE NOTE PROVIDER - NSDCADMDATE_GEN_ALL_CORE_FT
patient complains of diarrhea x 2 days with throat pain and generalized weakness. denies cough, chest pain, sob. she states that she also has been having low grade fever
oral
19-Jun-2023 12:32

## 2023-06-19 NOTE — DISCHARGE NOTE PROVIDER - NSDCCPCAREPLAN_GEN_ALL_CORE_FT
PRINCIPAL DISCHARGE DIAGNOSIS  Diagnosis: ESRD on hemodialysis  Assessment and Plan of Treatment: you had a left upper extremity angiogram with AV fistulogram and POBA of AVF. HD was performed by Dr. Ware and now patient is stable for discharge home. Please follow up with Dr. Freedman, continue HD schedule as prior

## 2023-06-19 NOTE — H&P PST ADULT - HISTORY OF PRESENT ILLNESS
Department of Cardiology                                                                  [facility]/[place] [address] [address]                                                            Telephone: [phone]. Fax:[phone]                                                                             LUE fistulogram pre-Note        Narrative:  38 year old male with PMH of DM, ARVIND, seizures, GERD, HLD,  ESRD on HD M/W/F via left radiocephalic AVF originally placed by Dr. Marilou Klein which has been experiencing complications associated with increased venous pressures and alarming of the machines during HD over the past month. He now presents for LUE AVFistulogram and further evalautation       	  PHYSICAL EXAM:    T(C): --  HR: --  BP: --  RR: --  SpO2: --  Wt(kg): --    I&O's Summary      Daily     Daily     Constitutional: A & O x 3  HEENT:   Normal oral mucosa, PERRL, EOMI	  Cardiovascular: Normal S1 S2, No JVD, No murmurs, No edema  Respiratory: Lungs clear to auscultation	  Gastrointestinal:  Soft, Non-tender, + BS	  Skin: No rashes, No ecchymoses, No cyanosis  Neurologic: Non-focal  Extremities: Normal range of motion, No clubbing, cyanosis or edema  Vascular: Peripheral pulses palpable 2+ bilaterally    TELEMETRY: 	    ECG:  	  RADIOLOGY:     DIAGNOSTIC TESTING:  [ ] Echocardiogram:  [ ]  Catheterization:  [ ] Stress Test:      LABS:	 	    CARDIAC MARKERS:                                                                                           Department of Cardiology                                                                  [facility]/[place] [address] [address]                                                            Telephone: [phone]. Fax:[phone]                                                                             LUE fistulogram pre-Note        Narrative:  38 year old male with PMH of DM, ARVIND, seizures, GERD, HLD,  ESRD on HD M/W/F via left radiocephalic AVF originally placed by Dr. Marilou Klein which has been experiencing complications associated with increased venous pressures and alarming of the machines during HD over the past month. He now presents for LUE AVFistulogram and further evalautation       Physical Exam   	  T(C): 36.5 (23 @ 08:28), Max: 36.5 (23 @ 08:28)  T(F): 97.7 (23 @ 08:28), Max: 97.7 (23 @ 08:28)  HR: 71 (23 @ 08:28) (71 - 71)  BP: 133/76 (- @ 08:28) (133/76 - 133/76)  RR: 16 (23 @ 08:28) (16 - 16)  SpO2: 98% (23 @ 08:28) (98% - 98%)    Constitutional: A & O x 3  HEENT:   Normal oral mucosa, PERRL, EOMI	  Cardiovascular: Normal S1 S2, No JVD, No murmurs, No edema  Respiratory: Lungs clear to auscultation	  Gastrointestinal:  Soft, Non-tender, + BS	  Skin: No rashes, No ecchymoses, No cyanosis  Neurologic: Non-focal  Extremities: Normal range of motion, No clubbing, cyanosis or edema  Vascular: Peripheral pulses palpable 2+ bilaterally  -LUE AVF +bruit/thrill     TELEMETRY: SR     EC NSR with q waves inferiorly unchanged 2018  RADIOLOGY:     DIAGNOSTIC TESTING:  [x ] Echocardiogram:  < from: TTE Echo Complete w/Doppler (19 @ 10:54) >       PHYSICIAN INTERPRETATION:  Left Ventricle: The left ventricular internal cavity size is normal.  Global LV systolic function was normal. Left ventricular ejection   fraction, by visual estimation, is 60 to 65%. Spectral Doppler shows   pseudonormal pattern of left ventricular myocardial filling (Grade II   diastolic dysfunction).  Right Ventricle: Normal right ventricular size and function. The right   ventricle was not well visualized.  Left Atrium: Mildly enlarged left atrium.  Right Atrium: Normal right atrial size.  Pericardium: There is no evidence of pericardial effusion. There is a   significant pericardial fat pad present.  Mitral Valve: There is mild mitral annular calcification. Trace mitral   valve regurgitation is seen.  Tricuspid Valve: The tricuspid valve is normal. Trivial tricuspid   regurgitation is visualized.  Aortic Valve: The aortic valve is trileaflet. Sclerotic aortic valve with   normal opening. No evidence of aortic valve regurgitation is seen.  Pulmonic Valve: The pulmonic valve was not well visualized.  Aorta: The aortic root and ascending aorta are structurally normal, with   no evidence of dilitation.  Pulmonary Artery: The pulmonary artery is not well seen.  Venous: The inferior vena cava was dilated, with respiratory size   variation greater than 50%.       Summary:   1. Technically difficult study.   2. Mildly enlarged left atrium.   3. There is moderate concentric left ventricular hypertrophy.   4. Normal wall motion. Left ventricular ejection fraction, by visual   estimation, is 60 to 65%. Grade II diastolic dysfunction.   5. Normal right atrial size.   6. Normal right ventricular size and function.   7. No significant valvular abnormality.   8. There is no evidence of pericardial effusion.                 10.6   13.93  )----------(  221       ( 2023 07:36 )               31.0      138    |  100    |  48.9   ----------------------------<  123        ( 2023 07:36 )  3.6     |  25.0   |  9.69     Ca    9.9        ( 2023 07:36 )  Mg     2.5       ( 2023 08:00 )

## 2023-06-19 NOTE — PROGRESS NOTE ADULT - SUBJECTIVE AND OBJECTIVE BOX
Now s/p LUE AV fistulogram and ballooning  with Dr. Freedman,  tolerated procedure well. Pt arrived to recovery in NAD and HDS,  access site stable, no bleed/hematoma, distal pulse +   Intraprocedurally: Pt rec'd IV sedation, 20ml visipaque and 10ml omnipaque   Findings:   Post Cath EKG:     Plan:  -Formal cath report pending  -Post procedure management/monitoring per protocol  -Access site precautions  -may raise HOB now, OOB 1300 if remains HDS and no bleeding complications   -Labs and EKG in am  -Repeat ECG if any clinical indication or change on tele  -Continue current medical therapy  -Educated regarding post procedure management and care  -Discussed the importance of RF modification  -plan for HD this afternoon, discussed with Dr. Ware  -home in am, called social work to assist with arrangements  -follow up with Dr. Freedman in 1month   -DISPO: Plan for D/C in am if remains HDS, ECG and labs in am stable and without complications

## 2023-06-19 NOTE — DISCHARGE NOTE NURSING/CASE MANAGEMENT/SOCIAL WORK - PATIENT PORTAL LINK FT
You can access the FollowMyHealth Patient Portal offered by [facility] by registering at the following website: [email or web address]. By joining [employer]’s FollowMyHealth portal, you will also be able to view your health information using other applications (apps) compatible with our system.

## 2023-06-19 NOTE — DISCHARGE NOTE PROVIDER - NSDCCPTREATMENT_GEN_ALL_CORE_FT
PRINCIPAL PROCEDURE  Procedure: Fistulogram, AV shunt  Findings and Treatment: Restricted use with no heavy lifting of affected arm for 48 hours.  No submerging the arm in water for 48 hours.  You may start showering today.  Call your doctor for any bleeding, swelling, loss of sensation in the hand or fingers, or fingers turning blue.  If heavy bleeding or large lumps form, hold pressure at the spot and come to the Emergency Room.

## 2023-06-19 NOTE — DISCHARGE NOTE PROVIDER - HOSPITAL COURSE
Brief Hospital Course: 38 year old male with PMH of DM, ARVIND, seizures, GERD, HLD,  ESRD on HD M/W/F via left radiocephalic AVF originally placed by Dr. Marilou Klein which has been experiencing complications associated with increased venous pressures and alarming of the machines during HD over the past month. He now presents for LUE AVFistulogram and further evaluation. Now s/p LUE AV fistulogram and ballooning  with Dr. Freedman,  tolerated procedure well. Pt arrived to recovery in NAD and HDS,  access site stable, no bleed/hematoma, distal pulse +   Intraprocedurally: Pt rec'd IV sedation, 20ml visipaque and 10ml omnipaque   Findings:   Post Cath EKG:     Plan:  -Formal cath report pending  -Post procedure management/monitoring per protocol  -Access site precautions  -may raise HOB now, OOB 1300 if remains HDS and no bleeding complications   -Labs and EKG in am  -Repeat ECG if any clinical indication or change on tele  -Continue current medical therapy  -Educated regarding post procedure management and care  -Discussed the importance of RF modification  -plan for HD this afternoon, discussed with Dr. Ware                  At the time of discharge patient was hemodynamically stable and amenable to all terms of discharge. The patient has received verbal instructions from myself regarding discharge plans.     Length of Discharge: 45MIN    Patient is medically stable and cleared for discharge to home with outpatient follow up.

## 2023-06-19 NOTE — H&P PST ADULT - ASSESSMENT
ASSESSMENT:    -SAHIL as ordered  -Procedure discussed with patient; risks and benefits explained; questions answered  -Labs and ECG reviewed ASSESSMENT:    -AVF as ordered  -Procedure discussed with patient; risks and benefits explained; questions answered  -Labs and ECG reviewed  -plan for HD after procedure, discussed with Dr. Chaz mason

## 2023-06-19 NOTE — DISCHARGE NOTE PROVIDER - CARE PROVIDER_API CALL
Francis Freedman  Vascular Surgery  [address]  [place] [address]  Phone: [phone]  Fax: [phone]  Follow Up Time: 2 weeks

## 2023-06-19 NOTE — DISCHARGE NOTE PROVIDER - NSDCACTIVITY_GEN_ALL_CORE
Do not drive or operate machinery/Do not make important decisions/Stairs allowed/No heavy lifting/straining

## 2023-06-19 NOTE — ASU PATIENT PROFILE, ADULT - WAS YOUR LAST COVID-19 VACCINE GREATER THAN OR EQUAL TO TWO MONTHS AGO?
CAD s/p CABG  - ASA on hold  - c/w Lipitor 80mg po qbedtime. CAD s/p CABG  - ASA on hold  - c/w Lipitor 80mg po qbedtime. CAD s/p CABG  - ASA on hold  - c/w Lipitor 80mg po qbedtime. No

## 2023-06-28 NOTE — PATIENT PROFILE ADULT - LIFE CHALLENGES - DETAILS
Admission Reconciliation is Completed  Discharge Reconciliation is Not Complete Admission Reconciliation is Completed  Discharge Reconciliation is Completed GALDINO at this time due to patient status

## 2023-07-11 ENCOUNTER — APPOINTMENT (OUTPATIENT)
Dept: VASCULAR SURGERY | Facility: CLINIC | Age: 38
End: 2023-07-11

## 2023-07-28 NOTE — DISCHARGE NOTE PROVIDER - ATTENDING DISCHARGE PHYSICAL EXAM:
Needs to check his sugars 3 times a day and send me readings in a week so can decide on insulin.     If wants insulin pump refer to endocrinology Attending Discharge Physical Examination:

## 2023-08-15 ENCOUNTER — RX RENEWAL (OUTPATIENT)
Age: 38
End: 2023-08-15

## 2023-09-20 NOTE — PHYSICAL THERAPY INITIAL EVALUATION ADULT - WORK/LEISURE ACTIVITY, REHAB EVAL
MILY completed 09/20/2023. next SN visit 09/22/2023. surgeon appointment 09/25 visit not required. recert due 33/45/9527    Nonfasting BMP due 09/22/2023  abdominal drain- daily flush daughter was able to demonstrate back today and will complete daily  wound vac abdomen M. W.F  2 stage 2 PU left buttock- barrier cream 2x daily   declining PT independent

## 2023-09-28 ENCOUNTER — NON-APPOINTMENT (OUTPATIENT)
Age: 38
End: 2023-09-28

## 2023-09-28 ENCOUNTER — APPOINTMENT (OUTPATIENT)
Dept: FAMILY MEDICINE | Facility: CLINIC | Age: 38
End: 2023-09-28
Payer: MEDICARE

## 2023-09-28 VITALS
HEART RATE: 77 BPM | OXYGEN SATURATION: 97 % | HEIGHT: 63.5 IN | WEIGHT: 235 LBS | DIASTOLIC BLOOD PRESSURE: 70 MMHG | BODY MASS INDEX: 41.12 KG/M2 | SYSTOLIC BLOOD PRESSURE: 120 MMHG

## 2023-09-28 VITALS — DIASTOLIC BLOOD PRESSURE: 65 MMHG | SYSTOLIC BLOOD PRESSURE: 104 MMHG

## 2023-09-28 DIAGNOSIS — E66.01 MORBID (SEVERE) OBESITY DUE TO EXCESS CALORIES: ICD-10-CM

## 2023-09-28 DIAGNOSIS — J98.01 ACUTE BRONCHOSPASM: ICD-10-CM

## 2023-09-28 PROCEDURE — G0444 DEPRESSION SCREEN ANNUAL: CPT

## 2023-09-28 PROCEDURE — G0439: CPT

## 2023-09-28 PROCEDURE — 93000 ELECTROCARDIOGRAM COMPLETE: CPT | Mod: 59

## 2023-09-28 PROCEDURE — 36415 COLL VENOUS BLD VENIPUNCTURE: CPT

## 2023-09-30 NOTE — ED PROVIDER NOTE - PROGRESS NOTE DETAILS
labs, cxr reviewed. K is normal. spoke with nephrologist who will arrange for patient to have HD tomorrow. stable on reassessment. -DO Marisela labs, cxr reviewed. K is normal. spoke with nephrologist who will order HD for AM. stable on reassessment. -DO Marisela No

## 2023-10-03 LAB
25(OH)D3 SERPL-MCNC: 69.3 NG/ML
ALBUMIN SERPL ELPH-MCNC: 4.4 G/DL
ALP BLD-CCNC: 93 U/L
ALT SERPL-CCNC: 14 U/L
ANION GAP SERPL CALC-SCNC: 21 MMOL/L
AST SERPL-CCNC: 14 U/L
BASOPHILS # BLD AUTO: 0.1 K/UL
BASOPHILS NFR BLD AUTO: 0.8 %
BILIRUB SERPL-MCNC: 0.3 MG/DL
BUN SERPL-MCNC: 34 MG/DL
CALCIUM SERPL-MCNC: 10.3 MG/DL
CHLORIDE SERPL-SCNC: 96 MMOL/L
CHOLEST SERPL-MCNC: 140 MG/DL
CK SERPL-CCNC: 151 U/L
CO2 SERPL-SCNC: 21 MMOL/L
CREAT SERPL-MCNC: 7.49 MG/DL
EGFR: 9 ML/MIN/1.73M2
EOSINOPHIL # BLD AUTO: 0.78 K/UL
EOSINOPHIL NFR BLD AUTO: 6.1 %
ESTIMATED AVERAGE GLUCOSE: 105 MG/DL
GGT SERPL-CCNC: 50 U/L
GLUCOSE SERPL-MCNC: 106 MG/DL
HBA1C MFR BLD HPLC: 5.3 %
HCT VFR BLD CALC: 35.8 %
HDLC SERPL-MCNC: 51 MG/DL
HGB BLD-MCNC: 12.6 G/DL
IMM GRANULOCYTES NFR BLD AUTO: 0.4 %
LDLC SERPL CALC-MCNC: 70 MG/DL
LYMPHOCYTES # BLD AUTO: 3.42 K/UL
LYMPHOCYTES NFR BLD AUTO: 26.5 %
MAN DIFF?: NORMAL
MCHC RBC-ENTMCNC: 31.3 PG
MCHC RBC-ENTMCNC: 35.2 GM/DL
MCV RBC AUTO: 89.1 FL
MONOCYTES # BLD AUTO: 0.78 K/UL
MONOCYTES NFR BLD AUTO: 6.1 %
NEUTROPHILS # BLD AUTO: 7.76 K/UL
NEUTROPHILS NFR BLD AUTO: 60.1 %
NONHDLC SERPL-MCNC: 89 MG/DL
PLATELET # BLD AUTO: 242 K/UL
POTASSIUM SERPL-SCNC: 4.3 MMOL/L
PROT SERPL-MCNC: 7.3 G/DL
RBC # BLD: 4.02 M/UL
RBC # FLD: 14.2 %
SODIUM SERPL-SCNC: 139 MMOL/L
T3FREE SERPL-MCNC: 2.77 PG/ML
T4 FREE SERPL-MCNC: 1.1 NG/DL
TRIGL SERPL-MCNC: 103 MG/DL
TSH SERPL-ACNC: 0.71 UIU/ML
URATE SERPL-MCNC: 4.7 MG/DL
WBC # FLD AUTO: 12.89 K/UL

## 2023-10-09 ENCOUNTER — NON-APPOINTMENT (OUTPATIENT)
Age: 38
End: 2023-10-09

## 2023-10-13 ENCOUNTER — NON-APPOINTMENT (OUTPATIENT)
Age: 38
End: 2023-10-13

## 2023-11-07 NOTE — PATIENT PROFILE ADULT. - FUNCTIONAL SCREEN CURRENT LEVEL: BATHING, MLM
dinitrate (ISORDIL) 20 MG tablet Take 1 tablet by mouth 3 times daily    levETIRAcetam (KEPPRA) 100 MG/ML solution Give 5 ml by mouth two times daily    pantoprazole sodium (PROTONIX) 40 MG PACK packet Take 1 packet by mouth daily     No current facility-administered medications for this visit. PLAN OF CARE    The Plan of Care is initiated within 15 days of the initial provider visit and updated at least every 60 days or sooner, based on patient's changing condition. Plan of Care Orders / Action Items:  Refer to most recent provider visit note for specifics re: plan of care.   No recent changes to plan of care    Estimated Visit Frequency:  Every 2 months  SW visits PRN
(0) independent

## 2023-11-29 NOTE — ED PROVIDER NOTE - PROGRESS NOTE ADDITIONAL2
PRINCIPAL DISCHARGE DIAGNOSIS  Diagnosis: Aortic stenosis  Assessment and Plan of Treatment: Aortic stenosis: The valve between the lower left heart chamber and the body's main artery (aorta) is narrowed and doesn't open fully. This reduces or blocks blood flow from the heart to the aorta and to the rest of the body.  You have known aortic stenosis, you should monitor for symptoms of heart failure: increasing shortness of breath, inability to lie flat, swelling in your legs, increased abdominal girth or bloating, any significant increase in weight, increasing dizziness or fatigue. You should cont to take your medications as prescribed and manage risk factors such as high cholesterol, high blood pressure, diabetes, and follow a heart healthy diet.    
Additional Progress Note...

## 2023-12-28 ENCOUNTER — APPOINTMENT (OUTPATIENT)
Dept: FAMILY MEDICINE | Facility: CLINIC | Age: 38
End: 2023-12-28

## 2024-01-10 NOTE — PHYSICAL THERAPY INITIAL EVALUATION ADULT - NEUROVASCULAR ASSESSMENT LLE
Seroma  01/03/2017    Active  Irma Tejeda
no tingling/no numbness
anxiety, depression and passive SI (no intent/ no plans)

## 2024-01-16 NOTE — PROGRESS NOTE BEHAVIORAL HEALTH - EYE CONTACT
Return OB Visit    S: Patient is feeling well. No ctx, VB or LOF. +FM. Started pepcid for GERD    O: /82 (BP Location: Right arm, Patient Position: Sitting, Cuff Size: Adult Large)   Pulse 96   Wt 112.5 kg (248 lb)   LMP 2023   BMI 36.62 kg/m    Gen: Well-appearing, NAD  See OB Flowsheet    A/P:  Kizzy Chan is a 34 year old  at 28w1d by 9w1d US, here for return OB visit.  Hx of recurrent pregnancy loss: s/p progesterone suppositories     Hx of Gestational HTN vs chronic HTN:   - baseline HELLP labs, UPC normal.   - started ASA 81mg at 12 weeks     Subchorionic hemorrhage     Low lying placenta: plan repeat at 32 weeks     GERD: pepcid  Plans breastfeeding, epidural, Mirena    PNC: Rh positive, RI,   Genetics: normal aneuploidy screening  Imaging: dating US at 9w1d, anatomy survey normal  Immunizations: s/p COVID series, s/p flu shot at work. Tdap 2024   RTC 4 weeks    Mayi Benavidez MD FACOG  OB/GYN  2024 9:34 AM     Poor

## 2024-02-03 NOTE — PROGRESS NOTE ADULT - ASSESSMENT
[facility])    A-fib ([facility])    Encephalopathy with elevated CPK, mild increased motor tone. Differential includes hypertension related problems causing decompensation of underlying psychiatric disease. However with elevated CPK and such, Mild Neuroleptic Malignant Syndrome cannot be excluded.

## 2024-03-01 NOTE — DISCHARGE NOTE PROVIDER - NS AS DC PROVIDER CONTACT Y/N MULTI
[facility]     [facility], [address] [place] [address], [address], [place] [address]   P [phone]/F [phone]/ [email or web address]       No heavy lifting >20 pounds    Do not raise hands above head    No ice or heating pack to chest    Wear the surgical bra at all times except the shower. If the bra is tight and is leaving marks on the skin unclasp the bottom clasps of the bra    Ok to shower    No bathing    Change dressing daily    Do not lay on stomach    No smoking    No pushing or pulling    Call the office for an appointment in 5-14 days - [phone]    Yes

## 2024-03-05 NOTE — CONSULT LETTER
Tobacco cessation education  NRT   [Dear  ___] : Dear  [unfilled], [Consult Letter:] : I had the pleasure of evaluating your patient, [unfilled]. [Please see my note below.] : Please see my note below. [Consult Closing:] : Thank you very much for allowing me to participate in the care of this patient.  If you have any questions, please do not hesitate to contact me. [Sincerely,] : Sincerely, [DrConnie  ___] : Dr. FITZPATRICK

## 2024-03-25 NOTE — ED ADULT TRIAGE NOTE - WEIGHT METHOD
Ecchymosis left frontal area, family will monitor his symptoms and will call me if there is any change in his behavior then I can recommend CT head of the head and referral to neurologist to also further evaluate his history of syncope from earlier this year, patient will be turning 89 of age this June.   stated Home

## 2024-03-30 ENCOUNTER — EMERGENCY (EMERGENCY)
Facility: HOSPITAL | Age: 39
LOS: 1 days | Discharge: HOME | End: 2024-03-30
Attending: EMERGENCY MEDICINE
Payer: MEDICARE

## 2024-03-30 VITALS
SYSTOLIC BLOOD PRESSURE: 155 MMHG | DIASTOLIC BLOOD PRESSURE: 98 MMHG | HEIGHT: 65 IN | RESPIRATION RATE: 16 BRPM | HEART RATE: 99 BPM | TEMPERATURE: 98 F | OXYGEN SATURATION: 99 % | WEIGHT: 236.78 LBS

## 2024-03-30 DIAGNOSIS — I77.0 ARTERIOVENOUS FISTULA, ACQUIRED: Chronic | ICD-10-CM

## 2024-03-30 PROCEDURE — 99284 EMERGENCY DEPT VISIT MOD MDM: CPT | Mod: 25

## 2024-03-30 PROCEDURE — 99284 EMERGENCY DEPT VISIT MOD MDM: CPT

## 2024-03-30 PROCEDURE — 70480 CT ORBIT/EAR/FOSSA W/O DYE: CPT | Mod: MC

## 2024-03-30 PROCEDURE — 70480 CT ORBIT/EAR/FOSSA W/O DYE: CPT | Mod: 26,MC
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
## 2024-03-30 NOTE — ED PROVIDER NOTE - PATIENT PORTAL LINK FT
You can access the FollowMyHealth Patient Portal offered by [facility] by registering at the following website: [email or web address]. By joining [employer]’s FollowMyHealth portal, you will also be able to view your health information using other applications (apps) compatible with our system.

## 2024-03-30 NOTE — ED PROVIDER NOTE - SKIN, MLM
RE: Toney Lemos  [address] [facility] [address]     Dear Colleague,    Thank you for the opportunity to participate in the care of your patient, Toney Lemos, at the [facility] CARDIOTH[facility]ACIC at [facility]. Please see a copy of my visit note below.    CV Surgery    Cindy Matthews MD    Service Date: 10/28/2019      REFERRING CARDIOLOGIST:  Evin Mederos MD      REASON FOR CONSULTATION:  Evaluation for severe aortic valve insufficiency.      HISTORY OF PRESENT ILLNESS:  Mr. Lemos is a very pleasant 49-year-old gentleman with a known bicuspid valve and aortic insufficiency for almost 2 decades.  He is a patient of Dr. Gonzalez, who was following him for quite a while, but since he retired, his care was carried on by Dr. Mederos.        He has been relatively asymptomatic for over the years, but his most recent echocardiogram performed demonstrated progression of his aortic valve insufficiency to the severe range and also a new finding of left ventricular dilatation.  He also endorses having very mild symptoms of more fatigue this year.  For these reasons, he was referred to me for surgical evaluation for aortic valve replacement.      PAST MEDICAL HISTORY:  Mild hypertension, hyperlipidemia and a bicuspid aortic valve with aortic valve insufficiency.      ALLERGIES:  NO KNOWN DRUG ALLERGIES.      CURRENT MEDICATIONS:   1.  Amlodipine 5 mg p.o. daily.   2.  Lipitor 40 mg p.o. daily.   3.  Carvedilol 6.25 mg p.o. daily.      FAMILY HISTORY:  Significant for premature coronary artery disease as one of his brothers  from coronary disease in his late 30s.  He also had a grandfather who had a bicuspid aortic valve requiring aortic valve replacement at age 49.      SOCIAL HISTORY:  Denies any smoking.  He does not have any problem with alcohol use.      REVIEW OF SYSTEMS:  As per HPI.  All other 10-point review of systems is completed and were otherwise negative unless  stated above.      PHYSICAL EXAMINATION:   VITAL SIGNS:  Blood pressure is 150/58, pulse is 72.  He is 78 kg, 5 feet 8 inches.     GENERAL:  He appears well, in no acute distress.   HEENT:  Within normal limits.   NECK:  Supple, no lymphadenopathy.   CARDIOVASCULAR:  Regular rate and rhythm, normal S1 and S2.  Grade 2/6 diastolic murmur at the right upper sternal border.   LUNGS:  Clear bilaterally.   ABDOMEN:  Soft, nontender, nondistended.   EXTREMITIES:  Negative for edema, cyanosis or clubbing.   NEUROLOGIC:  A&O x3 with no focal deficits.      LABORATORY DATA:  His most recent transthoracic echo from 10/14/2019 demonstrated likely bicuspid valve with severe regurgitation, no stenosis.  Mildly dilated left ventricle with mildly decreased systolic LV function with EF of 52%.  These are all new changes from his last echocardiogram from 2017.  His RV function is normal.  His mitral valve is normal as is his tricuspid valve.  His aortic root appears to be within normal range at 3.6 cm on the echocardiogram.      IMPRESSION AND PLAN:  Mr. Lemos is a very pleasant, otherwise healthy 49-year-old gentleman with known bicuspid aortic valve, who now has severe aortic valve insufficiency with new findings of LV dilatation and a mildly diminished LV systolic function.  My recommendation is aortic valve replacement with a mechanical valve.  He will need a coronary angiogram and I would also like to get a CT angiogram of the chest to make sure he does not have any aortic root or ascending aortic dilatation given his bicuspid aortic valve status.        If these studies are both normal, my recommendation is aortic valve replacement via a standard sternotomy approach.  I explained to the patient the reason for recommending the proposed operation, the diagnosis in detail and the risks and benefits of the operation.  I also went over the potential complications associated with the operation, which include but are not strictly  limited to infection, bleeding, stroke, postop MI, postop arrhythmias, pulmonary or renal complications.  I think overall he is an excellent surgical candidate and I quoted an operative mortality risk of 1% or less.        The patient understands and agrees to proceed.  After the preoperative workup studies are completed, we will schedule him for a median sternotomy, aortic valve replacement with a mechanical valve within the next 1-2 months.      It was a pleasure to meet Mr. Lemos and thank you very much for this referral.         GEOVANNI SHULTZ MD           Skin normal color for race, warm, dry and intact. No evidence of rash.
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
## 2024-04-03 DIAGNOSIS — S05.92XA UNSPECIFIED INJURY OF LEFT EYE AND ORBIT, INITIAL ENCOUNTER: ICD-10-CM
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
## 2024-04-24 ENCOUNTER — RX RENEWAL (OUTPATIENT)
Age: 39
End: 2024-04-24

## 2024-05-28 ENCOUNTER — RX RENEWAL (OUTPATIENT)
Age: 39
End: 2024-05-28

## 2024-06-14 ENCOUNTER — RX RENEWAL (OUTPATIENT)
Age: 39
End: 2024-06-14

## 2024-06-14 RX ORDER — ASPIRIN 81 MG/1
81 TABLET, COATED ORAL
Qty: 28 | Refills: 0 | Status: ACTIVE | COMMUNITY
Start: 2021-01-20 | End: 1900-01-01

## 2024-06-27 ENCOUNTER — APPOINTMENT (OUTPATIENT)
Dept: FAMILY MEDICINE | Facility: CLINIC | Age: 39
End: 2024-06-27
Payer: MEDICARE

## 2024-06-27 VITALS
HEIGHT: 63.5 IN | HEART RATE: 70 BPM | OXYGEN SATURATION: 98 % | WEIGHT: 218 LBS | DIASTOLIC BLOOD PRESSURE: 70 MMHG | BODY MASS INDEX: 38.15 KG/M2 | SYSTOLIC BLOOD PRESSURE: 110 MMHG

## 2024-06-27 VITALS — SYSTOLIC BLOOD PRESSURE: 102 MMHG | DIASTOLIC BLOOD PRESSURE: 70 MMHG

## 2024-06-27 DIAGNOSIS — I95.9 HYPOTENSION, UNSPECIFIED: ICD-10-CM

## 2024-06-27 DIAGNOSIS — E55.9 VITAMIN D DEFICIENCY, UNSPECIFIED: ICD-10-CM

## 2024-06-27 DIAGNOSIS — T82.590A OTHER MECHANICAL COMPLICATION OF SURGICALLY CREATED ARTERIOVENOUS FISTULA, INITIAL ENCOUNTER: ICD-10-CM

## 2024-06-27 DIAGNOSIS — N18.6 END STAGE RENAL DISEASE: ICD-10-CM

## 2024-06-27 DIAGNOSIS — G40.909 EPILEPSY, UNSPECIFIED, NOT INTRACTABLE, W/OUT STATUS EPILEPTICUS: ICD-10-CM

## 2024-06-27 DIAGNOSIS — J96.12 CHRONIC RESPIRATORY FAILURE WITH HYPERCAPNIA: ICD-10-CM

## 2024-06-27 DIAGNOSIS — E78.5 HYPERLIPIDEMIA, UNSPECIFIED: ICD-10-CM

## 2024-06-27 DIAGNOSIS — Z99.2 END STAGE RENAL DISEASE: ICD-10-CM

## 2024-06-27 DIAGNOSIS — Z99.2 DEPENDENCE ON RENAL DIALYSIS: ICD-10-CM

## 2024-06-27 DIAGNOSIS — E11.22 TYPE 2 DIABETES MELLITUS WITH DIABETIC CHRONIC KIDNEY DISEASE: ICD-10-CM

## 2024-06-27 DIAGNOSIS — N18.5 TYPE 2 DIABETES MELLITUS WITH DIABETIC CHRONIC KIDNEY DISEASE: ICD-10-CM

## 2024-06-27 DIAGNOSIS — F31.9 BIPOLAR DISORDER, UNSPECIFIED: ICD-10-CM

## 2024-06-27 DIAGNOSIS — I10 ESSENTIAL (PRIMARY) HYPERTENSION: ICD-10-CM

## 2024-06-27 PROCEDURE — 99214 OFFICE O/P EST MOD 30 MIN: CPT

## 2024-06-27 PROCEDURE — 36415 COLL VENOUS BLD VENIPUNCTURE: CPT

## 2024-06-27 PROCEDURE — 82962 GLUCOSE BLOOD TEST: CPT

## 2024-07-02 LAB
25(OH)D3 SERPL-MCNC: 75.1 NG/ML
ALBUMIN SERPL ELPH-MCNC: 4.1 G/DL
ALP BLD-CCNC: 66 U/L
ALT SERPL-CCNC: 9 U/L
ANION GAP SERPL CALC-SCNC: 15 MMOL/L
AST SERPL-CCNC: 6 U/L
BASOPHILS # BLD AUTO: 0.1 K/UL
BASOPHILS NFR BLD AUTO: 0.7 %
BILIRUB SERPL-MCNC: 0.3 MG/DL
BUN SERPL-MCNC: 31 MG/DL
CALCIUM SERPL-MCNC: 10.2 MG/DL
CHLORIDE SERPL-SCNC: 97 MMOL/L
CHOLEST SERPL-MCNC: 124 MG/DL
CK SERPL-CCNC: 98 U/L
CO2 SERPL-SCNC: 24 MMOL/L
CREAT SERPL-MCNC: 8.15 MG/DL
EGFR: 8 ML/MIN/1.73M2
EOSINOPHIL # BLD AUTO: 0.58 K/UL
EOSINOPHIL NFR BLD AUTO: 4.3 %
ESTIMATED AVERAGE GLUCOSE: 100 MG/DL
GGT SERPL-CCNC: 27 U/L
GLUCOSE SERPL-MCNC: 88 MG/DL
HBA1C MFR BLD HPLC: 5.1 %
HCT VFR BLD CALC: 34.5 %
HDLC SERPL-MCNC: 42 MG/DL
HGB BLD-MCNC: 12 G/DL
IMM GRANULOCYTES NFR BLD AUTO: 0.2 %
LDLC SERPL CALC-MCNC: 62 MG/DL
LYMPHOCYTES # BLD AUTO: 3.4 K/UL
LYMPHOCYTES NFR BLD AUTO: 25.2 %
MAN DIFF?: NORMAL
MCHC RBC-ENTMCNC: 29.9 PG
MCHC RBC-ENTMCNC: 34.8 GM/DL
MCV RBC AUTO: 86 FL
MONOCYTES # BLD AUTO: 0.73 K/UL
MONOCYTES NFR BLD AUTO: 5.4 %
NEUTROPHILS # BLD AUTO: 8.65 K/UL
NEUTROPHILS NFR BLD AUTO: 64.2 %
NONHDLC SERPL-MCNC: 83 MG/DL
PLATELET # BLD AUTO: 285 K/UL
POTASSIUM SERPL-SCNC: 3.8 MMOL/L
PROT SERPL-MCNC: 7 G/DL
RBC # BLD: 4.01 M/UL
RBC # FLD: 14.7 %
SODIUM SERPL-SCNC: 136 MMOL/L
T3FREE SERPL-MCNC: 2.03 PG/ML
T4 FREE SERPL-MCNC: 1.3 NG/DL
TRIGL SERPL-MCNC: 110 MG/DL
TSH SERPL-ACNC: 0.61 UIU/ML
URATE SERPL-MCNC: 4 MG/DL
WBC # FLD AUTO: 13.49 K/UL

## 2024-07-12 NOTE — CONSULT NOTE ADULT - SUBJECTIVE AND OBJECTIVE BOX
PA sent 07/12/24 for Mounjaro 2.5MG/0.5ML pen-injectors  Key: [id number]  [id number]  Form  [employer] General Prior Authorization Request Form  Prior Authorization Request for [employer] Members  [phone] phone  [phone] fax         CHIEF COMPLAINT: confusion    HPI: 33yMale  34 y/o male was seen by his visiting nurse and as per recored pt. was sitting in his non AC room and it was hot and humid. Pt's bp was elevated and he appeared confused. Pt. left AMA few days ago from [facility] after he was admitted for TIA r/o cva. Neurologist recommended SAHIL if echo is negative. Pt. did not stay for full work up. Today pt's ct head and MRI is negative. pt. appears to be somewhat slow in answering questions but is AAOX3 and has no focal deficits. Pt. reports feeling ok and does not report to be feeling different. follows commands. As per record pt. has h/o bipolar disorder ? pt. stated that he has not used his psych meds for long time. Mild MR ? pt. does not appear to be a good historian.  Baseline mental status and speech pattern is not known. no family in house. pt. reports no cp. no HA. no sob. no abd. pain. no n/v/d. last BM yesterday as per pt. pt's compliance to medicines is questionable. pt. also reports not using any breathing machines.    Patient was seen last week for altered mentation as well. Probable hypertensive encephalopathy.  MR head, CTA head and neck are all normal    PAST MEDICAL & SURGICAL HISTORY:  HTN (hypertension)  Sleep apnea  Diabetes  Bipolar 1 disorder  Hypertension  No significant past surgical history    MEDICATIONS  (STANDING):  amLODIPine   Tablet 10 milliGRAM(s) Oral daily  aspirin enteric coated 81 milliGRAM(s) Oral daily  cloNIDine 0.2 milliGRAM(s) Oral every 12 hours  dextrose 5%. 1000 milliLiter(s) (50 mL/Hr) IV Continuous <Continuous>  dextrose 50% Injectable 12.5 Gram(s) IV Push once  dextrose 50% Injectable 25 Gram(s) IV Push once  dextrose 50% Injectable 25 Gram(s) IV Push once  heparin  Injectable 5000 Unit(s) SubCutaneous every 12 hours  insulin lispro (HumaLOG) corrective regimen sliding scale   SubCutaneous three times a day before meals  insulin lispro (HumaLOG) corrective regimen sliding scale   SubCutaneous at bedtime  labetalol 300 milliGRAM(s) Oral every 12 hours  potassium chloride  10 mEq/100 mL IVPB 10 milliEquivalent(s) IV Intermittent every 1 hour    MEDICATIONS  (PRN):  dextrose 40% Gel 15 Gram(s) Oral once PRN Blood Glucose LESS THAN 70 milliGRAM(s)/deciliter  glucagon  Injectable 1 milliGRAM(s) IntraMuscular once PRN Glucose LESS THAN 70 milligrams/deciliter    Allergies    No Known Allergies    Intolerances        FAMILY HISTORY:  Family history of diabetes mellitus (Grandparent)          SOCIAL HISTORY:    Tobacco:    Alcohol:    Drugs:          REVIEW OF SYSTEMS:    Relevant systems are negative except as noted in the chart, HPI, and PMH      VITAL SIGNS:  Vital Signs Last 24 Hrs  T(C): 36.8 (03 Aug 2018 07:52), Max: 37.1 (02 Aug 2018 19:26)  T(F): 98.2 (03 Aug 2018 07:52), Max: 98.8 (03 Aug 2018 00:24)  HR: 71 (03 Aug 2018 07:52) (63 - 80)  BP: 154/105 (03 Aug 2018 07:52) (154/105 - 210/99)  BP(mean): --  RR: 19 (03 Aug 2018 07:52) (16 - 22)  SpO2: 99% (03 Aug 2018 07:52) (96% - 100%)    PHYSICAL EXAMINATION:    General: Well-developed, well nourished, in no acute distress.  Cardiac:  Regular rate and rhythm. No carotid bruits appreciated.  Eyes: Fundoscopic examination was deferred.  Neurologic:  - Mental Status:  Alert, awake, oriented to person, place, and time; Speech is fluent. Language is normal. Follows commands well.  Insight and knowledge appear appropriate.  Cranial Nerves II-XII:    II:  Visual acuity is normal for age ; Visual fields are full to confrontation; Pupils are equal, round, and reactive to light.  III, IV, VI:  Extraocular movements are intact without nystagmus.  V:  Facial sensation is intact in the V1-V3 distribution bilaterally.  VII:  Face is symmetric with normal eye closure and smile  VIII:  Hearing is grossly intact  IX, X, XII:  speech is clear  XI:  Head turning and shoulder shrug are intact.  - Motor:  Strength is 5/5 x 4.   There is no pronator drift. .  - Reflexes:  2+ and symmetric at the knees.  Plantar responses flexor.  - Sensory:  Symmetric to light touch  - Coordination:  Finger-nose-finger is normal. Rapid alternating hand and foot  movements are intact. Dexterity appears normal      LABS:                          12.3   14.8  )-----------( 291      ( 02 Aug 2018 14:07 )             36.6     02 Aug 2018 14:07    138    |  96     |  26.0   ----------------------------<  90     2.9     |  29.0   |  2.54     Ca    8.8        02 Aug 2018 14:07    TPro  6.7    /  Alb  3.2    /  TBili  0.4    /  DBili  x      /  AST  25     /  ALT  20     /  AlkPhos  104    02 Aug 2018 14:07    LIVER FUNCTIONS - ( 02 Aug 2018 14:07 )  Alb: 3.2 g/dL / Pro: 6.7 g/dL / ALK PHOS: 104 U/L / ALT: 20 U/L / AST: 25 U/L / GGT: x                 RADIOLOGY & ADDITIONAL STUDIES:    MR and CT studies are normal    IMPRESSION:    Hypertensive Encephalopathy  Dehydration  Bipolar Disorder    No evidence of a primary neurologic process    PLAN:  1. Medical and Cardiac evaluation and treatment as indicated  2. BP manamgement  3. - home environment safety  4. Psych eval   5. CHIEF COMPLAINT: confusion    HPI: 33yMale  32 y/o male was seen by his visiting nurse and as per recored pt. was sitting in his non AC room and it was hot and humid. Pt's bp was elevated and he appeared confused. Pt. left AMA few days ago from [facility] after he was admitted for TIA r/o cva. Neurologist recommended SAHIL if echo is negative. Pt. did not stay for full work up. Today pt's ct head and MRI is negative. pt. appears to be somewhat slow in answering questions but is AAOX3 and has no focal deficits. Pt. reports feeling ok and does not report to be feeling different. follows commands. As per record pt. has h/o bipolar disorder ? pt. stated that he has not used his psych meds for long time. Mild MR ? pt. does not appear to be a good historian.  Baseline mental status and speech pattern is not known. no family in house. pt. reports no cp. no HA. no sob. no abd. pain. no n/v/d. last BM yesterday as per pt. pt's compliance to medicines is questionable. pt. also reports not using any breathing machines.    Patient was seen last week for altered mentation as well. Probable hypertensive encephalopathy.  MR head, CTA head and neck are all normal    His outside meds include quetiapine and paliperdone- antipsychotics    PAST MEDICAL & SURGICAL HISTORY:  HTN (hypertension)  Sleep apnea  Diabetes  Bipolar 1 disorder  Hypertension  No significant past surgical history    MEDICATIONS  (STANDING):  amLODIPine   Tablet 10 milliGRAM(s) Oral daily  aspirin enteric coated 81 milliGRAM(s) Oral daily  cloNIDine 0.2 milliGRAM(s) Oral every 12 hours  dextrose 5%. 1000 milliLiter(s) (50 mL/Hr) IV Continuous <Continuous>  dextrose 50% Injectable 12.5 Gram(s) IV Push once  dextrose 50% Injectable 25 Gram(s) IV Push once  dextrose 50% Injectable 25 Gram(s) IV Push once  heparin  Injectable 5000 Unit(s) SubCutaneous every 12 hours  insulin lispro (HumaLOG) corrective regimen sliding scale   SubCutaneous three times a day before meals  insulin lispro (HumaLOG) corrective regimen sliding scale   SubCutaneous at bedtime  labetalol 300 milliGRAM(s) Oral every 12 hours  potassium chloride  10 mEq/100 mL IVPB 10 milliEquivalent(s) IV Intermittent every 1 hour    MEDICATIONS  (PRN):  dextrose 40% Gel 15 Gram(s) Oral once PRN Blood Glucose LESS THAN 70 milliGRAM(s)/deciliter  glucagon  Injectable 1 milliGRAM(s) IntraMuscular once PRN Glucose LESS THAN 70 milligrams/deciliter    Allergies    No Known Allergies    Intolerances        FAMILY HISTORY:  Family history of diabetes mellitus (Grandparent)          SOCIAL HISTORY:    Tobacco:  no  Alcohol:  no  Drugs:  no        REVIEW OF SYSTEMS:    Relevant systems are negative except as noted in the chart, HPI, and PMH      VITAL SIGNS:  Vital Signs Last 24 Hrs  T(C): 36.8 (03 Aug 2018 07:52), Max: 37.1 (02 Aug 2018 19:26)  T(F): 98.2 (03 Aug 2018 07:52), Max: 98.8 (03 Aug 2018 00:24)  HR: 71 (03 Aug 2018 07:52) (63 - 80)  BP: 154/105 (03 Aug 2018 07:52) (154/105 - 210/99)  BP(mean): --  RR: 19 (03 Aug 2018 07:52) (16 - 22)  SpO2: 99% (03 Aug 2018 07:52) (96% - 100%)    PHYSICAL EXAMINATION:    General: Well-developed, well nourished, in no acute distress.  Cardiac:  Regular rate and rhythm. No carotid bruits appreciated.  Eyes: Fundoscopic examination was deferred.  Neurologic:  - Mental Status:  Alert, awake, oriented to person, place, and time; Speech is fluent. Language is normal. Follows commands well.  Insight and knowledge appear appropriate.  He is slow to respond although all his responses are accurate  Cranial Nerves II-XII:    II:  Visual acuity is normal for age ; Visual fields are full to confrontation; Pupils are equal, round, and reactive to light.  III, IV, VI:  Extraocular movements are intact without nystagmus.  V:  Facial sensation is intact in the V1-V3 distribution bilaterally.  VII:  Face is symmetric with normal eye closure and smile  VIII:  Hearing is grossly intact  IX, X, XII:  speech is clear  XI:  Head turning and shoulder shrug are intact.  - Motor:  Strength is 5/5 x 4.   There is no pronator drift. .  - Reflexes:  2+ and symmetric at the knees.  Plantar responses flexor.  - Sensory:  Symmetric to light touch  - Coordination:  Finger-nose-finger is normal. Rapid alternating hand and foot  movements are intact. Dexterity appears normal      LABS:                          12.3   14.8  )-----------( 291      ( 02 Aug 2018 14:07 )             36.6     02 Aug 2018 14:07    138    |  96     |  26.0   ----------------------------<  90     2.9     |  29.0   |  2.54     Ca    8.8        02 Aug 2018 14:07    TPro  6.7    /  Alb  3.2    /  TBili  0.4    /  DBili  x      /  AST  25     /  ALT  20     /  AlkPhos  104    02 Aug 2018 14:07    LIVER FUNCTIONS - ( 02 Aug 2018 14:07 )  Alb: 3.2 g/dL / Pro: 6.7 g/dL / ALK PHOS: 104 U/L / ALT: 20 U/L / AST: 25 U/L / GGT: x                 RADIOLOGY & ADDITIONAL STUDIES:    MR and CT studies are normal    IMPRESSION:    Hypertensive Encephalopathy, Consider adverse reaction to antipsychotics. Consider neuroleptic malignant syndrome- mild  Dehydration  Bipolar Disorder- r/o flare    No evidence of a primary neurologic process    PLAN:  1. Medical and Cardiac evaluation and treatment as indicated  2. BP management  3. - home environment safety  4. Psych eval  - use of antipsychotics  5. EEG

## 2024-07-17 ENCOUNTER — NON-APPOINTMENT (OUTPATIENT)
Age: 39
End: 2024-07-17

## 2024-07-26 ENCOUNTER — RX RENEWAL (OUTPATIENT)
Age: 39
End: 2024-07-26

## 2024-07-26 NOTE — ED ADULT NURSE NOTE - CHIEF COMPLAINT
S:  Don Cabral is a 58 year old male seen for concern a clogged MARGARITA drain. Pt reports noticing 20cc output yesterday, but none today. He can see a small clot in the drain. No fever or chills. Abd pain controlled.      O:  Ht 6' 2\" (1.88 m)   Wt 260 lb (117.9 kg)   BMI 33.38 kg/m²   GEN:  No acute distress  L: nonlabored respirations  H: reg rate  Abd:  Soft, NT,ND. R MARGARITA drain emptied and flushed with saline. No resistance upon flushing, drain appears to be functioning well  Skin: Incision C D I, no eryth  Extr: No edema, no calf tenderness    Assessment   1. Broken Rambo-Lange drain, initial encounter        MARGARITA drain flushed, appear to be functioning well. Continue monitoring output throughout the weekend.  Continue to keep incision clean and dry.  Maintain a healthy diet.  Maintain good hydration.  F/u Monday for postop appointment.         Stephen Nunez PA-C       The patient is a 35y Male complaining of chest pain.

## 2024-07-30 ENCOUNTER — APPOINTMENT (OUTPATIENT)
Dept: FAMILY MEDICINE | Facility: CLINIC | Age: 39
End: 2024-07-30
Payer: MEDICARE

## 2024-07-30 VITALS
WEIGHT: 211 LBS | OXYGEN SATURATION: 96 % | TEMPERATURE: 98 F | BODY MASS INDEX: 37.39 KG/M2 | HEART RATE: 67 BPM | HEIGHT: 63 IN | SYSTOLIC BLOOD PRESSURE: 120 MMHG | DIASTOLIC BLOOD PRESSURE: 80 MMHG

## 2024-07-30 VITALS — DIASTOLIC BLOOD PRESSURE: 80 MMHG | SYSTOLIC BLOOD PRESSURE: 130 MMHG

## 2024-07-30 DIAGNOSIS — I10 ESSENTIAL (PRIMARY) HYPERTENSION: ICD-10-CM

## 2024-07-30 DIAGNOSIS — B35.9 DERMATOPHYTOSIS, UNSPECIFIED: ICD-10-CM

## 2024-07-30 PROCEDURE — 99213 OFFICE O/P EST LOW 20 MIN: CPT

## 2024-07-30 RX ORDER — CLOTRIMAZOLE 10 MG/G
1 CREAM TOPICAL
Qty: 1 | Refills: 2 | Status: ACTIVE | COMMUNITY
Start: 2024-07-30 | End: 1900-01-01

## 2024-07-30 NOTE — PLAN
[FreeTextEntry1] : see med orders  Instructed pt to keep area dry, cool, and exposed to light   BP currently at goal  cont Carvedilol 12.5mg 1/2 tab bid    f/u 2M for routine 3M checkup

## 2024-07-30 NOTE — PHYSICAL EXAM
[No Acute Distress] : no acute distress [Well Nourished] : well nourished [Well Developed] : well developed [Well-Appearing] : well-appearing [EOMI] : extraocular movements intact [Normal Outer Ear/Nose] : the outer ears and nose were normal in appearance [No JVD] : no jugular venous distention [No Respiratory Distress] : no respiratory distress  [No Accessory Muscle Use] : no accessory muscle use [Clear to Auscultation] : lungs were clear to auscultation bilaterally [Normal Rate] : normal rate  [Regular Rhythm] : with a regular rhythm [Normal S1, S2] : normal S1 and S2 [No Carotid Bruits] : no carotid bruits [No Edema] : there was no peripheral edema [Non-distended] : non-distended [No CVA Tenderness] : no CVA  tenderness [Grossly Normal Strength/Tone] : grossly normal strength/tone [Coordination Grossly Intact] : coordination grossly intact [Speech Grossly Normal] : speech grossly normal [Normal Affect] : the affect was normal [Normal Mood] : the mood was normal [Normal Insight/Judgement] : insight and judgment were intact [de-identified] : +ve hyperpigmented macular rash ant/post trunk

## 2024-07-30 NOTE — HISTORY OF PRESENT ILLNESS
[FreeTextEntry1] : follow up [de-identified] : 40 yo male for f/u on LOW BP 1M post decrease of Carvedilol 12.5mg bid to 6.25mg bid.  pt reports both tolerance and compliance c meds as Rx'ed.  Reports increased energy and decreased dizziness since dose adjustment.        pt sole c/o hyperpigmented pruritic rash ant/post trunk  pt states rash is worse "when I am hot and sweating."

## 2024-08-15 NOTE — ED PROVIDER NOTE - DISPOSITION TYPE
ADMIT Bed/Stretcher in lowest position, wheels locked, appropriate side rails in place/Call bell, personal items and telephone in reach/Instruct patient to call for assistance before getting out of bed/chair/stretcher/Non-slip footwear applied when patient is off stretcher/[place] to call system/Physically safe environment - no spills, clutter or unnecessary equipment/Purposeful proactive rounding/Room/bathroom lighting operational, light cord in reach

## 2024-08-19 ENCOUNTER — RX RENEWAL (OUTPATIENT)
Age: 39
End: 2024-08-19

## 2024-08-20 RX ORDER — CHOLECALCIFEROL (VITAMIN D3) 50 MCG
50 MCG TABLET ORAL
Qty: 56 | Refills: 0 | Status: ACTIVE | COMMUNITY
Start: 2024-08-19 | End: 1900-01-01

## 2024-09-24 NOTE — PATIENT PROFILE ADULT - NSPROMEDSBROUGHTTOHOSP_GEN_A_NUR
Home regimen: Lasix 40 Mg daily, metoprolol succinate 25 Mg twice daily, lisinopril 5 Mg daily  Continue home medications   no

## 2024-09-30 ENCOUNTER — RX RENEWAL (OUTPATIENT)
Age: 39
End: 2024-09-30

## 2024-10-01 ENCOUNTER — APPOINTMENT (OUTPATIENT)
Dept: FAMILY MEDICINE | Facility: CLINIC | Age: 39
End: 2024-10-01

## 2024-10-18 NOTE — PATIENT PROFILE ADULT - PRO INTERPRETER NEED 2
[facility]   Acute Pain Rounds Note  10/18/2024    Patient name: Kailyn Vieyra 34 year old female  : 1989  MRN: [id number]    Diagnosis: [unfilled]    S/P: C section    Pain modality: Duramorph    Current hospital day: Hospital Day: 3    Pain Scores: 4    Current Medications:  Scheduled Meds:   acetaminophen  1,000 mg Oral Q6H    ibuprofen  600 mg Oral Q6H    docusate sodium  100 mg Oral BID@0600,1800    lactated ringers  1,000 mL Intravenous Once     Continuous Infusions:   lactated ringers       PRN Meds:.  nalbuphine    gabapentin    magnesium hydroxide    bisacodyl    ondansetron    witch hazel-glycerin    phenylephrine-min oil-armida    simethicone    ammonia aromatic    EPHEDrine (PF)    nalbuphine    Assessment:  Doing well    Plan:  SUKUMAR Best MD  Anesthesia Acute Pain Service [phone]  
English

## 2024-12-23 NOTE — ED PROVIDER NOTE - DATA REVIEWED, MDM
Diabetes Self-Management Education & Support    Presents for:      Type of Service: Telephone Visit    Audio only visit done, as patient does not have access to audio-visual technology.    Individual visit provided, given no group classes are available for 2 months.     Originating Location (Patient Location): Assisted Living  Distant Location (Provider Location): Offsite  Mode of Communication:  Telephone    Telephone Visit Start Time:  11:00 am  Telephone Visit End Time (telephone visit stop time): 11:10 am    How would patient like to obtain AVS? MyChart      Assessment  Lexy feels that things are going well with BG management right now, she has had less lows. Although yesterday shortly after dinner she did have a low blood sugar, which was strange to her because she had some candy before dinner and also had cake and ice cream with dinner. She reports that she has switched from whole milk to skim milk in an effort to lose some weight.       See CGM Reports in Monitoring section below.[id number]      Glucose Patterns & Trends:  Hyperglycemia, weekend- nocturnal and weekday- nocturnal    Care Plan and Education Provided:  Patient verbalized understanding of diabetes self-management education concepts discussed, opportunities for ongoing education and support, and recommendations provided today.    Plan    Increase Tresiba: 22 units --> 24 units   Aspart: 2-6-11-0 (no changes)      Follow-up:  Upcoming Diabetes Ed Appointments     Visit Type Date Time Department    DIABETES ED SHORT 12/23/2024 11:00 AM [facility] ED    DIABETES ED 12/31/2024  1:00 PM [facility] ED        See Care Plan for co-developed, patient-state behavior change goals.    Education Materials Provided:  No new materials provided today      Subjective/Objective  Lexy is an 64 year old year old, presenting for the following diabetes education related to:    Cultural Influences/Ethnic Background:  Not  or     Diabetes Symptoms &  "Complications:     Patient Problem List and Family Medical History reviewed for relevant medical history, current medical status, and diabetes risk factors.    Vitals:  LMP 03/28/2014   Estimated body mass index is 40.03 kg/m  as calculated from the following:    Height as of 12/4/24: 1.626 m (5' 4\").    Weight as of 12/4/24: 105.8 kg (233 lb 3.2 oz).   Last 3 BP:   BP Readings from Last 3 Encounters:   12/04/24 (!) 153/83   11/12/24 (!) 157/77   10/31/24 (!) 165/80       History   Smoking Status    Never   Smokeless Tobacco    Never       Labs:  Lab Results   Component Value Date    A1C 7.8 09/09/2024    A1C 9.7 02/01/2021     Lab Results   Component Value Date     11/06/2024     10/13/2021     02/01/2021     Lab Results   Component Value Date    LDL 58 01/25/2023     02/01/2021     HDL Cholesterol   Date Value Ref Range Status   02/01/2021 62 >49 mg/dL Final     Direct Measure HDL   Date Value Ref Range Status   01/25/2023 37 (L) >=50 mg/dL Final   ]  GFR Estimate   Date Value Ref Range Status   11/06/2024 54 (L) >60 mL/min/1.73m2 Final   02/01/2021 90 >60 mL/min/[1.73_m2] Final     Comment:     Non  GFR Calc  Starting 12/18/2018, serum creatinine based estimated GFR (eGFR) will be   calculated using the Chronic Kidney Disease Epidemiology Collaboration   (CKD-EPI) equation.       GFR Estimate If Black   Date Value Ref Range Status   02/01/2021 >90 >60 mL/min/[1.73_m2] Final     Comment:      GFR Calc  Starting 12/18/2018, serum creatinine based estimated GFR (eGFR) will be   calculated using the Chronic Kidney Disease Epidemiology Collaboration   (CKD-EPI) equation.       Lab Results   Component Value Date    CR 1.13 11/06/2024    CR 0.73 02/01/2021     No results found for: \"MICROALBUMIN\"    Tiffanie Mcfarland, KARLOS  Time Spent: 10 minutes  Encounter Type: Individual    Any diabetes medication dose changes were made via the CDCES Standing Orders under the " patient's referring provider.   vital signs

## 2024-12-24 PROBLEM — F10.90 ALCOHOL USE: Status: INACTIVE | Noted: 2017-11-03

## 2025-01-07 ENCOUNTER — RX RENEWAL (OUTPATIENT)
Age: 40
End: 2025-01-07

## 2025-01-17 NOTE — BEHAVIORAL HEALTH ASSESSMENT NOTE - VIOLENCE RISK FACTORS:
Date of surgery: 1/20/25    Date confirmed: 1/17/25    Spoke to parent No    Left a voicemail Yes    Procedure confirmed Yes    Prep Reviewed Yes     
None Known

## 2025-02-04 ENCOUNTER — APPOINTMENT (OUTPATIENT)
Dept: FAMILY MEDICINE | Facility: CLINIC | Age: 40
End: 2025-02-04
Payer: MEDICARE

## 2025-02-04 VITALS
HEIGHT: 63 IN | SYSTOLIC BLOOD PRESSURE: 132 MMHG | WEIGHT: 205 LBS | OXYGEN SATURATION: 98 % | DIASTOLIC BLOOD PRESSURE: 84 MMHG | BODY MASS INDEX: 36.32 KG/M2 | HEART RATE: 66 BPM

## 2025-02-04 DIAGNOSIS — N18.6 END STAGE RENAL DISEASE: ICD-10-CM

## 2025-02-04 DIAGNOSIS — G40.909 EPILEPSY, UNSPECIFIED, NOT INTRACTABLE, W/OUT STATUS EPILEPTICUS: ICD-10-CM

## 2025-02-04 DIAGNOSIS — N18.5 TYPE 2 DIABETES MELLITUS WITH DIABETIC CHRONIC KIDNEY DISEASE: ICD-10-CM

## 2025-02-04 DIAGNOSIS — Z99.2 END STAGE RENAL DISEASE: ICD-10-CM

## 2025-02-04 DIAGNOSIS — F31.9 BIPOLAR DISORDER, UNSPECIFIED: ICD-10-CM

## 2025-02-04 DIAGNOSIS — E78.5 HYPERLIPIDEMIA, UNSPECIFIED: ICD-10-CM

## 2025-02-04 DIAGNOSIS — Z86.39 PERSONAL HISTORY OF OTHER ENDOCRINE, NUTRITIONAL AND METABOLIC DISEASE: ICD-10-CM

## 2025-02-04 DIAGNOSIS — D64.9 ANEMIA, UNSPECIFIED: ICD-10-CM

## 2025-02-04 DIAGNOSIS — I10 ESSENTIAL (PRIMARY) HYPERTENSION: ICD-10-CM

## 2025-02-04 DIAGNOSIS — E11.22 TYPE 2 DIABETES MELLITUS WITH DIABETIC CHRONIC KIDNEY DISEASE: ICD-10-CM

## 2025-02-04 PROCEDURE — 99214 OFFICE O/P EST MOD 30 MIN: CPT

## 2025-02-04 PROCEDURE — 36415 COLL VENOUS BLD VENIPUNCTURE: CPT

## 2025-02-04 RX ORDER — CARVEDILOL 6.25 MG/1
6.25 TABLET, FILM COATED ORAL
Qty: 200 | Refills: 2 | Status: ACTIVE | COMMUNITY
Start: 2025-02-04 | End: 1900-01-01

## 2025-02-05 ENCOUNTER — RX RENEWAL (OUTPATIENT)
Age: 40
End: 2025-02-05

## 2025-02-05 LAB
ALBUMIN SERPL ELPH-MCNC: 4.4 G/DL
ALP BLD-CCNC: 76 U/L
ALT SERPL-CCNC: 12 U/L
ANION GAP SERPL CALC-SCNC: 14 MMOL/L
AST SERPL-CCNC: 10 U/L
BASOPHILS # BLD AUTO: 0.1 K/UL
BASOPHILS NFR BLD AUTO: 0.8 %
BILIRUB SERPL-MCNC: 0.4 MG/DL
BUN SERPL-MCNC: 37 MG/DL
CALCIUM SERPL-MCNC: 10.5 MG/DL
CHLORIDE SERPL-SCNC: 95 MMOL/L
CHOLEST SERPL-MCNC: 121 MG/DL
CK SERPL-CCNC: 148 U/L
CO2 SERPL-SCNC: 27 MMOL/L
CREAT SERPL-MCNC: 7.32 MG/DL
EGFR: 9 ML/MIN/1.73M2
EOSINOPHIL # BLD AUTO: 0.59 K/UL
EOSINOPHIL NFR BLD AUTO: 4.5 %
ESTIMATED AVERAGE GLUCOSE: 97 MG/DL
GGT SERPL-CCNC: 31 U/L
GLUCOSE SERPL-MCNC: 97 MG/DL
HBA1C MFR BLD HPLC: 5 %
HCT VFR BLD CALC: 36 %
HDLC SERPL-MCNC: 58 MG/DL
HGB BLD-MCNC: 12 G/DL
IMM GRANULOCYTES NFR BLD AUTO: 0.3 %
LDLC SERPL CALC-MCNC: 52 MG/DL
LYMPHOCYTES # BLD AUTO: 3.53 K/UL
LYMPHOCYTES NFR BLD AUTO: 27.1 %
MAN DIFF?: NORMAL
MCHC RBC-ENTMCNC: 29.7 PG
MCHC RBC-ENTMCNC: 33.3 G/DL
MCV RBC AUTO: 89.1 FL
MONOCYTES # BLD AUTO: 0.75 K/UL
MONOCYTES NFR BLD AUTO: 5.8 %
NEUTROPHILS # BLD AUTO: 8.01 K/UL
NEUTROPHILS NFR BLD AUTO: 61.5 %
NONHDLC SERPL-MCNC: 63 MG/DL
PLATELET # BLD AUTO: 279 K/UL
POTASSIUM SERPL-SCNC: 3.7 MMOL/L
PROT SERPL-MCNC: 6.9 G/DL
RBC # BLD: 4.04 M/UL
RBC # FLD: 14.8 %
SODIUM SERPL-SCNC: 136 MMOL/L
T3FREE SERPL-MCNC: 2.44 PG/ML
T4 FREE SERPL-MCNC: 1.1 NG/DL
TRIGL SERPL-MCNC: 45 MG/DL
TSH SERPL-ACNC: 0.96 UIU/ML
URATE SERPL-MCNC: 4 MG/DL
WBC # FLD AUTO: 13.02 K/UL

## 2025-02-07 RX ORDER — POTASSIUM CHLORIDE 1125 MG/1
15 TABLET, EXTENDED RELEASE ORAL DAILY
Qty: 90 | Refills: 0 | Status: ACTIVE | COMMUNITY
Start: 2025-02-06 | End: 1900-01-01

## 2025-02-11 NOTE — H&P ADULT - NSCORESITESY/N_GEN_A_CORE_RD
Patient called back.   Explained that we can move his procedure to the morning with Dr. Escalante.  Patient needs to check with his ride but will call us back soon.    No
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
## 2025-02-27 NOTE — H&P ADULT - REASON FOR ADMISSION
"Anticoagulation Summary  As of 2025      INR goal:  2.0-3.0   TTR:  71.7% (7 y)   INR used for dosin.20 (2025)   Warfarin maintenance plan:  2.5 mg (5 mg x 0.5) every Thu; 5 mg (5 mg x 1) all other days   Weekly warfarin total:  32.5 mg   Plan last modified:  Zay CottonD (2022)   Next INR check:  2025   Priority:  Maintenance   Target end date:  Indefinite    Indications    Presence of IVC filter [Z95.828]  Transient ischemic attack [G45.9] (Resolved) [G45.9]  Deep vein thrombosis (DVT) of both lower extremities (HCC) (Resolved) [I82.403]  DVT (deep venous thrombosis) (HCC) (Resolved) [I82.409]  Multiple pulmonary emboli (HCC) (Resolved) [I26.99]  Chronic anticoagulation (Resolved) [Z79.01]                 Anticoagulation Episode Summary       INR check location:  --    Preferred lab:  --    Send INR reminders to:  --    Comments:  --          Anticoagulation Care Providers       Provider Role Specialty Phone number    [facility]   [phone]    Edward Walters M.D.  Endocrinology [phone]                Refer to Patient Findings for HPI:  Patient Findings       Negatives:  Signs/symptoms of thrombosis, Signs/symptoms of bleeding, Laboratory test error suspected, Change in health, Change in alcohol use, Change in activity, Upcoming invasive procedure, Emergency department visit, Upcoming dental procedure, Missed doses, Extra doses, Change in medications, Change in diet/appetite, Hospital admission, Bruising, Other complaints            Date Referral Placed: 25      Vitals:  Vitals:    25 0917   BP: 108/62   Pulse: (!) 56   Weight: 73 kg (161 lb)   Height: 1.727 m (5' 8\")       Verified current warfarin dosing schedule.    Medications reconciled.  Pt is on antiplatelet therapy with aspirin 81mg for history of multiple thromboembolisms.      A/P   INR is therapeutic  Reason(s) for out of range INR today: N/A      Warfarin dosing " recommendation: Continue regimen as listed above.    Pt educated to contact our clinic with any changes in medications or s/s of bleeding or thrombosis. Pt is aware to seek immediate medical attention for falls, head injury or deep cuts.    Request pt to return in 1 month(s). Pt agrees.    Madhavi Elizabeth, ZayD       Severe depression and anxiety

## 2025-03-28 NOTE — H&P ADULT - MUSCULOSKELETAL
I spoke with the patient and son Kareem regarding next steps given recent positive hepatitis B surface antigen (AB  negative). Discussed that infliximab infusion and azathioprine will have to be on hold until hepatitis tests are repeated. He may need to be evaluated by ID pending repeat labs.     Briseyda Bennett D.O.  [facility]     details…

## 2025-04-16 ENCOUNTER — RX RENEWAL (OUTPATIENT)
Age: 40
End: 2025-04-16

## 2025-04-23 ENCOUNTER — INPATIENT (INPATIENT)
Facility: HOSPITAL | Age: 40
LOS: 1 days | Discharge: HOME | DRG: 684 | End: 2025-04-25
Attending: HOSPITALIST | Admitting: HOSPITALIST
Payer: MEDICARE

## 2025-04-23 VITALS
DIASTOLIC BLOOD PRESSURE: 85 MMHG | OXYGEN SATURATION: 98 % | HEART RATE: 66 BPM | SYSTOLIC BLOOD PRESSURE: 133 MMHG | TEMPERATURE: 98 F | RESPIRATION RATE: 16 BRPM | WEIGHT: 206.79 LBS

## 2025-04-23 DIAGNOSIS — I77.0 ARTERIOVENOUS FISTULA, ACQUIRED: Chronic | ICD-10-CM

## 2025-04-23 DIAGNOSIS — N18.6 END STAGE RENAL DISEASE: ICD-10-CM

## 2025-04-23 LAB
ALBUMIN SERPL ELPH-MCNC: 3.9 G/DL — SIGNIFICANT CHANGE UP (ref 3.3–5.2)
ALP SERPL-CCNC: 69 U/L — SIGNIFICANT CHANGE UP (ref 40–120)
ALT FLD-CCNC: 7 U/L — SIGNIFICANT CHANGE UP
ANION GAP SERPL CALC-SCNC: 20 MMOL/L — HIGH (ref 5–17)
AST SERPL-CCNC: 8 U/L — SIGNIFICANT CHANGE UP
BASOPHILS # BLD AUTO: 0.09 K/UL — SIGNIFICANT CHANGE UP (ref 0–0.2)
BASOPHILS NFR BLD AUTO: 0.8 % — SIGNIFICANT CHANGE UP (ref 0–2)
BILIRUB SERPL-MCNC: 0.5 MG/DL — SIGNIFICANT CHANGE UP (ref 0.4–2)
BUN SERPL-MCNC: 72.8 MG/DL — HIGH (ref 8–20)
CALCIUM SERPL-MCNC: 9.6 MG/DL — SIGNIFICANT CHANGE UP (ref 8.4–10.5)
CHLORIDE SERPL-SCNC: 99 MMOL/L — SIGNIFICANT CHANGE UP (ref 96–108)
CO2 SERPL-SCNC: 20 MMOL/L — LOW (ref 22–29)
CREAT SERPL-MCNC: 14.72 MG/DL — HIGH (ref 0.5–1.3)
EGFR: 4 ML/MIN/1.73M2 — LOW
EGFR: 4 ML/MIN/1.73M2 — LOW
EOSINOPHIL # BLD AUTO: 0.52 K/UL — HIGH (ref 0–0.5)
EOSINOPHIL NFR BLD AUTO: 4.4 % — SIGNIFICANT CHANGE UP (ref 0–6)
GLUCOSE SERPL-MCNC: 66 MG/DL — LOW (ref 70–99)
HCT VFR BLD CALC: 36.6 % — LOW (ref 39–50)
HGB BLD-MCNC: 12.5 G/DL — LOW (ref 13–17)
IMM GRANULOCYTES # BLD AUTO: 0.04 K/UL — SIGNIFICANT CHANGE UP (ref 0–0.07)
IMM GRANULOCYTES NFR BLD AUTO: 0.3 % — SIGNIFICANT CHANGE UP (ref 0–0.9)
LYMPHOCYTES # BLD AUTO: 3.39 K/UL — HIGH (ref 1–3.3)
LYMPHOCYTES NFR BLD AUTO: 28.9 % — SIGNIFICANT CHANGE UP (ref 13–44)
MCHC RBC-ENTMCNC: 29.7 PG — SIGNIFICANT CHANGE UP (ref 27–34)
MCHC RBC-ENTMCNC: 34.2 G/DL — SIGNIFICANT CHANGE UP (ref 32–36)
MCV RBC AUTO: 86.9 FL — SIGNIFICANT CHANGE UP (ref 80–100)
MONOCYTES # BLD AUTO: 0.73 K/UL — SIGNIFICANT CHANGE UP (ref 0–0.9)
MONOCYTES NFR BLD AUTO: 6.2 % — SIGNIFICANT CHANGE UP (ref 2–14)
NEUTROPHILS # BLD AUTO: 6.98 K/UL — SIGNIFICANT CHANGE UP (ref 1.8–7.4)
NEUTROPHILS NFR BLD AUTO: 59.4 % — SIGNIFICANT CHANGE UP (ref 43–77)
NRBC # BLD AUTO: 0 K/UL — SIGNIFICANT CHANGE UP (ref 0–0)
NRBC # FLD: 0 K/UL — SIGNIFICANT CHANGE UP (ref 0–0)
NRBC BLD AUTO-RTO: 0 /100 WBCS — SIGNIFICANT CHANGE UP (ref 0–0)
PLATELET # BLD AUTO: 200 K/UL — SIGNIFICANT CHANGE UP (ref 150–400)
PMV BLD: 9.9 FL — SIGNIFICANT CHANGE UP (ref 7–13)
POTASSIUM SERPL-MCNC: 4.3 MMOL/L — SIGNIFICANT CHANGE UP (ref 3.5–5.3)
POTASSIUM SERPL-SCNC: 4.3 MMOL/L — SIGNIFICANT CHANGE UP (ref 3.5–5.3)
PROT SERPL-MCNC: 6.8 G/DL — SIGNIFICANT CHANGE UP (ref 6.6–8.7)
RBC # BLD: 4.21 M/UL — SIGNIFICANT CHANGE UP (ref 4.2–5.8)
RBC # FLD: 15.4 % — HIGH (ref 10.3–14.5)
SODIUM SERPL-SCNC: 139 MMOL/L — SIGNIFICANT CHANGE UP (ref 135–145)
WBC # BLD: 11.75 K/UL — HIGH (ref 3.8–10.5)
WBC # FLD AUTO: 11.75 K/UL — HIGH (ref 3.8–10.5)

## 2025-04-23 PROCEDURE — 99285 EMERGENCY DEPT VISIT HI MDM: CPT

## 2025-04-23 PROCEDURE — 93010 ELECTROCARDIOGRAM REPORT: CPT

## 2025-04-23 PROCEDURE — 99223 1ST HOSP IP/OBS HIGH 75: CPT

## 2025-04-23 RX ORDER — MELATONIN 5 MG
3 TABLET ORAL AT BEDTIME
Refills: 0 | Status: DISCONTINUED | OUTPATIENT
Start: 2025-04-23 | End: 2025-04-25

## 2025-04-23 RX ORDER — ACETAMINOPHEN 500 MG/5ML
650 LIQUID (ML) ORAL EVERY 6 HOURS
Refills: 0 | Status: DISCONTINUED | OUTPATIENT
Start: 2025-04-23 | End: 2025-04-25

## 2025-04-23 RX ORDER — MAGNESIUM, ALUMINUM HYDROXIDE 200-200 MG
30 TABLET,CHEWABLE ORAL EVERY 4 HOURS
Refills: 0 | Status: DISCONTINUED | OUTPATIENT
Start: 2025-04-23 | End: 2025-04-25

## 2025-04-23 RX ORDER — ONDANSETRON HCL/PF 4 MG/2 ML
4 VIAL (ML) INJECTION EVERY 8 HOURS
Refills: 0 | Status: DISCONTINUED | OUTPATIENT
Start: 2025-04-23 | End: 2025-04-25

## 2025-04-23 NOTE — ED PROVIDER NOTE - CLINICAL SUMMARY MEDICAL DECISION MAKING FREE TEXT BOX
41yo M PMH of DM, ARVIND, seizures, GERD, HLD,  ESRD on HD M/W/F via left radiocephalic AVF present to the ED bc missed dialysis. notes that last dialyzed last Wednesday . missed 3 sessions bc of transportation issues.  Denies f/c/n/v/cp/sob/palpitations/ cough/rash/headache/dizziness/abd.pain/d/c/dysuria/hematuria.  Nephrology- Dr.Max Reid  still makes urine      missed dialysis - denies any other complaints at this time will check labs nephrology consult tele admit for dialysis

## 2025-04-23 NOTE — ED ADULT NURSE NOTE - CAS EDP DISCH DISPOSITION ADMI
"[facility] ([place])  Discharge Summary   Nursery    Patient Name: Mynor Greenberg  MRN: [id number]  Admission Date: 2024    Subjective:       Delivery Date: 2024   Delivery Time: 5:06 AM   Delivery Type: Vaginal, Spontaneous     Mynor Greenberg is a 2 days old born at 40w2d to a mother who is a 24 y.o.. Mother has no past medical history on file.    Prenatal Labs Review:  ABO/Rh:   Lab Results   Component Value Date/Time    GROUPTRH B POS 2024 04:46 AM    GROUPTRH B POS 2024 09:55 AM     Group B Beta Strep: No results found for: "STREPBCULT"  HIV: 2024: HIV 1/2 Ag/Ab Negative (Ref range: Negative)  Syphilis:   Lab Results   Component Value Date/Time    TREPABIGMIGG Nonreactive 2024 04:46 AM     Lab Results   Component Value Date/Time    RPR neg 2024 12:00 AM     Hepatitis B Surface Antigen:   Lab Results   Component Value Date/Time    HEPBSAG Negative 2024 12:00 AM     Rubella Immune Status:   Lab Results   Component Value Date/Time    RUBELLAIMMUN Equivocal 2024 12:00 AM       Pregnancy/Delivery Course:  The pregnancy was complicated by rubella non-immune, GBS+, alpha-thalassemia carrier status, h/o anxiety/depression . Prenatal ultrasound revealed normal anatomy. Prenatal care was good. Mother received penicillin G x ( 6 ) > 2 hours prior to delivery. Membrane rupture:  Membrane Rupture Date: 10/22/24  Membrane Rupture Time: 1042 ROM x 18.5 hours  The delivery was uncomplicated. Apgar scores:   Apgars      Apgar Component Scores:  1 min.:  5 min.:  10 min.:  15 min.:  20 min.:    Skin color:  0  0       Heart rate:  2  2       Reflex irritability:  2  2       Muscle tone:  2  2       Respiratory effort:  1  2       Total:  7  8       Apgars assigned by: MAGALYS MAY RN           Objective:     Admission GA: 40w2d  Admission Weight: 3660 g (8 lb 1.1 oz) (Filed from Delivery Summary)  Admission  Head Circumference: 37 cm (Filed from " "Delivery Summary)   Admission Length: Height: 53.3 cm (21") (Filed from Delivery Summary)    Delivery Method: Vaginal, Spontaneous     Feeding Method: Cow's milk formula    Labs:  Recent Results (from the past week)   Bilirubin, , Total    Collection Time: 10/24/24  5:43 AM   Result Value Ref Range    Bilirubin, Total -  6.6 (H) 0.1 - 6.0 mg/dL    Bilirubin, Direct    Collection Time: 10/24/24  5:43 AM   Result Value Ref Range    Bilirubin, Direct -  0.3 0.1 - 0.6 mg/dL       Immunization History   Administered Date(s) Administered    Hepatitis B, Pediatric/Adolescent 2024       Nursery Course (synopsis of major diagnoses, care, treatment, and services provided during the course of the hospital stay): No acute events. Routine  care provided.    [place] Screen sent greater than 24 hours?: yes  Hearing Screen Right Ear: passed, ABR (auditory brainstem response)    Left Ear: passed, ABR (auditory brainstem response)   Stooling: Yes  Voiding: Yes  SpO2: Pre-Ductal (Right Hand): 99 %  SpO2: Post-Ductal: 100 %  Car Seat Test?    Therapeutic Interventions: none  Surgical Procedures: circumcision    Discharge Exam:   Discharge Weight: Weight: 3495 g (7 lb 11.3 oz)  Weight Change Since Birth: -5%      Physical Exam   General Appearance: Healthy-appearing, vigorous infant, no dysmorphic features  Head: Normocephalic, atraumatic, anterior fontanelle open soft and flat  Eyes: PERRL, red reflex present bilaterally, anicteric sclera, no discharge  Ears: Well-positioned, well-formed pinnae    Nose:  nares patent, no rhinorrhea  Throat: oropharynx clear, non-erythematous, mucous membranes moist, palate intact  Neck: Supple, symmetrical, no torticollis  Chest: Lungs clear to auscultation, respirations unlabored    Heart: Regular rate & rhythm, normal S1/S2, no murmurs, rubs, or gallops  Abdomen: positive bowel sounds, soft, non-tender, non-distended, no masses, umbilical stump " clean  Pulses: Strong equal femoral and brachial pulses, brisk capillary refill  Hips: Negative Pino & Ortolani, gluteal creases equal  : Normal Scooby I male genitalia, circ site c/d/i, testes descended bilaterally, anus patent  Musculosketal: no buddy or dimples, no scoliosis or masses, clavicles intact  Extremities: Well-perfused, warm and dry, no cyanosis  Skin: no jaundice; milia on nose and several small milia noted on eyelids  Neuro: strong cry, good symmetric tone and strength; positive terese, root and suck     Assessment and Plan:     Discharge Date and Time: ,     Final Diagnoses:   ID   of maternal carrier of group B Streptococcus, mother treated prophylactically  Received PCN x 6 doses intrapartum.    Obstetric  Prolonged rupture of membranes  ROM x 18.5 hours, maternal tmax 99.7, GBS+ with adequate ppx. Routine care as indicated by EOS risk calculator. Remained clinically well-appearing with stable vital signs throughout hospital stay.        Single liveborn, born in hospital, delivered by vaginal delivery  Term, AGA, , formula feeding.  Prolonged ROM; remained well-appearing with stable vitals.  Weight down 5% from birth weight.  24-hour bili 6.6, LL13.3. TCB at 53 HOL is 11.2, LL 17.7.   Follow up with pediatrician, Dr. Parker, on 10/28 at [facility].         Goals of Care Treatment Preferences:  Code Status: Full Code      Discharged Condition: Good    Disposition: Discharge to Home    Follow Up:   Follow-up Information       Homer Parker MD Follow up in 3 day(s).    Specialty: Pediatrics  Why: Appt at 1:30 PM  Contact information:  [address] [employer] [address] [id number] [phone]                           Patient Instructions:      Ambulatory referral/consult to Pediatrics   Standing Status: Future   Referral Priority: Routine Referral Type: Consultation   Referral Reason: Specialty Services Required   Requested Specialty: Pediatrics   Number  of Visits Requested: 1     Special Instructions:   Anticipatory care: safety, feedings, immunizations, illness, car seat, limit visitors and and exposure to crowds.  Advised against co-sleeping with infant  Back to sleep in bassinet, crib, or pack and play.  Follow up for fever of 100.4 or greater, lethargy, or bilious emesis.     Bing Castro MD  Pediatrics  [facility] ([place])         FERMÍN [id number]

## 2025-04-23 NOTE — CONSULT NOTE ADULT - SUBJECTIVE AND OBJECTIVE BOX
Patient is a 40y old  Male who presents with a chief complaint of CRF.    HPI:   Hx CRF on Hd 3x/wk but has missed his last 3 treatments. Pt  states his  ride  never  showed up. Hx of  missing treatments in past as well. pt denies any  fevers, GI issues, or trauma. Pt has  DM 2, Obesity, Hypertension and  Bipolar  disease. pt was taken off transplant list  due  to compliance issues.    PAST MEDICAL & SURGICAL HISTORY:  Bipolar 1 disorder      Sleep apnea      HTN (hypertension)      CKD (chronic kidney disease) requiring chronic dialysis      Insulin dependent type 2 diabetes mellitus      AVF (arteriovenous fistula)  Left. AVF          FAMILY HISTORY:  Family history of diabetes mellitus (Grandparent)  Father, siblings    Family history of CKD (chronic kidney disease)  mother    FH: HTN (hypertension)  Father, siblings    NC    Social History:Non smoker    MEDICATIONS  (STANDING):    MEDICATIONS  (PRN):  acetaminophen     Tablet .. 650 milliGRAM(s) Oral every 6 hours PRN Temp greater or equal to 38C (100.4F), Mild Pain (1 - 3)  aluminum hydroxide/magnesium hydroxide/simethicone Suspension 30 milliLiter(s) Oral every 4 hours PRN Dyspepsia  melatonin 3 milliGRAM(s) Oral at bedtime PRN Insomnia  ondansetron Injectable 4 milliGRAM(s) IV Push every 8 hours PRN Nausea and/or Vomiting   Meds reviewed    Allergies    shellfish (Unknown)  Geodon (Anaphylaxis; Angioedema)      REVIEW OF SYSTEMS:    CONSTITUTIONAL:  No complaints  EYES: No eye pain, visual disturbances, or discharge  ENMT:  No difficulty hearing, tinnitus, vertigo; No sinus or throat pain  NECK: No pain or stiffness  BREASTS: No pain, masses, or nipple discharge  RESPIRATORY: neg  CARDIOVASCULAR: No chest pain, palpitations, dizziness,   GASTROINTESTINAL: No abdominal or epigastric pain. No nausea, vomiting, or hematemesis; No diarrhea or constipation. No melena or hematochezia.Trunckal obesity  GENITOURINARY: No dysuria, frequency, hematuria, or incontinence  NEURO: Neg  LYMPH NODES: No enlarged glands  ENDOCRINE: No heat or cold intolerance; No hair loss  MUSCULOSKELETAL: Neg  PSYCHIATRIC: No new issues  HEME/LYMPH: No easy bruising, or bleeding gums  ALLERY AND IMMUNOLOGIC: No hives or eczema      Vital Signs Last 24 Hrs  T(C): 36.9 (23 Apr 2025 15:25), Max: 36.9 (23 Apr 2025 15:25)  T(F): 98.5 (23 Apr 2025 15:25), Max: 98.5 (23 Apr 2025 15:25)  HR: 66 (23 Apr 2025 15:25) (66 - 66)  BP: 133/85 (23 Apr 2025 15:25) (133/85 - 133/85)  BP(mean): --  RR: 16 (23 Apr 2025 15:25) (16 - 16)  SpO2: 98% (23 Apr 2025 15:25) (98% - 98%)    Parameters below as of 23 Apr 2025 15:25  Patient On (Oxygen Delivery Method): room air        PHYSICAL EXAM:    GENERAL: appears chronically ill, oriented.  HEAD:  Atraumatic, Normocephalic  EYES: EOMI, PERRLA, conjunctiva and sclera clear  ENMT: No tonsillar erythema, exudates, or enlargement; Moist mucous membranes, Good dentition, No lesions  NECK: Supple, neck  veins wnl  NERVOUS SYSTEM:  Alert & Oriented X3, Good concentration; Motor Strength wnl upper and lower extremities;  CHEST/LUNG: Clear to percussion bilaterally; No rales, rhonchi, wheezing, or rubs, no 02  HEART: Regular rate and rhythm; No murmurs, rubs, or gallops  ABDOMEN: Soft, Nontender, Nondistended; Bowel sounds present, obese  EXTREMITIES:  left  arm access with good  bruit  LYMPH: No lymphadenopathy noted  SKIN: No rashes or lesions, pale      LABS:                        12.5   11.75 )-----------( 200      ( 23 Apr 2025 18:55 )             36.6     04-23    139  |  99  |  72.8[H]  ----------------------------<  66[L]  4.3   |  20.0[L]  |  14.72[H]    Ca    9.6      23 Apr 2025 18:55    TPro  6.8  /  Alb  3.9  /  TBili  0.5  /  DBili  x   /  AST  8   /  ALT  7   /  AlkPhos  69  04-23      Urinalysis Basic - ( 23 Apr 2025 18:55 )    Color: x / Appearance: x / SG: x / pH: x  Gluc: 66 mg/dL / Ketone: x  / Bili: x / Urobili: x   Blood: x / Protein: x / Nitrite: x   Leuk Esterase: x / RBC: x / WBC x   Sq Epi: x / Non Sq Epi: x / Bacteria: x              RADIOLOGY & ADDITIONAL TESTS:   113.9

## 2025-04-23 NOTE — ED ADULT NURSE REASSESSMENT NOTE - NS ED NURSE REASSESS COMMENT FT1
Assumed care of pt from EVA Aiken RN at 1915. Pt is resting comfortably in stretcher. NAD. Pt is A&Ox4. Respirations are even and unlabored. Pt NSR on cardiac monitor, 60bpm. Pt awaiting lab results and nephrology consult. Plan on going.

## 2025-04-23 NOTE — H&P ADULT - ASSESSMENT
39yo M with pmhx of DM, ARVIND, seizures, GERD, HLD,  ESRD on HD M/W/F via left radiocephalic AVF presented to ED c/o missed dialysis.    ESRD needing dialysis  Hypertensive urgency    -missed 3 session of HD  -no acute volume overload on exam   -eleyterolytes wnl   -no need for urgent dialysis   -HD in the am, Nephrology consult noted  -given Hydrazide 10mg IVP x 1 dose   -resume home BP meds       HTN/HLD  -Cont Carvedilol 6.25mg PO BID, Hydralazine 50mg TID, Nifedipine 60mg   -Rosuvastatin      Bipolar disorder   -Cont Fluphenazine, Seroquel  -Trazodone      DVT ppx  -Heparin subQ   Dispo: active, anticipated dc home after dialysis 1-2days      Time-based billing (NON-critical care).     77 minutes spent on total encounter. The necessity of the time spent during the encounter on this date of service was due to:   chart review, patient evaluation, independent history taking, documentation, coordination of care and discussion with patient, and nurse. 39yo M with pmhx of DM, ARVIND, seizures, GERD, HLD,  ESRD on HD M/W/F via left radiocephalic AVF presented to ED c/o missed dialysis.    ESRD needing dialysis  Hypertensive urgency    -missed 3 session of HD  -no acute volume overload on exam   -eleyterolytes wnl   -no need for urgent dialysis   -HD in the am, Nephrology consult noted  -given Hydrazide 10mg IVP x 1 dose   -resume home BP meds       IDDM   -episode of hypoglycemia   -hold home lantus and premeal, resume when able   -ISS, hypoglycemic protocol     HTN/HLD  -Cont Carvedilol 6.25mg PO BID, Hydralazine 50mg TID, Nifedipine 60mg   -Rosuvastatin      Bipolar disorder   -Cont Fluphenazine, Seroquel  -Trazodone      DVT ppx  -Heparin subQ   Dispo: active, anticipated dc home after dialysis 1-2days      Time-based billing (NON-critical care).     77 minutes spent on total encounter. The necessity of the time spent during the encounter on this date of service was due to:   chart review, patient evaluation, independent history taking, documentation, coordination of care and discussion with patient, and nurse.

## 2025-04-23 NOTE — H&P ADULT - HISTORY OF PRESENT ILLNESS
39yo M with pmhx of DM, ARVIND, seizures, GERD, HLD,  ESRD on HD M/W/F via left radiocephalic AVF presented to ED c/o missed dialysis. Pt states that he does not have transportation to get dialysis center and reason he missed his sensation. He went to the dialysis center today and sent to ED for evaluations. Pt has no acute complaints at this time.     the evaluation and management of this patient commenced prior to midnight.    41yo M with pmhx of DM, ARVIND, seizures, GERD, HLD,  ESRD on HD M/W/F via left radiocephalic AVF presented to ED c/o missed dialysis. Pt states that he does not have transportation to get dialysis center and reason he missed his sensation. He went to the dialysis center today and sent to ED for evaluations. Pt has no acute complaints at this time.

## 2025-04-23 NOTE — ED PROVIDER NOTE - OBJECTIVE STATEMENT
41yo M PMH of DM, ARVIND, seizures, GERD, HLD,  ESRD on HD M/W/F via left radiocephalic AVF present to the ED bc missed dialysis. notes that last dialyzed last Wednesday . missed 3 sessions bc of transportation issues.  Denies f/c/n/v/cp/sob/palpitations/ cough/rash/headache/dizziness/abd.pain/d/c/dysuria/hematuria.  Nephrology- Dr.Max Reid

## 2025-04-23 NOTE — H&P ADULT - NSHPPHYSICALEXAM_GEN_ALL_CORE
T(C): 37.2 (04-24-25 @ 04:52), Max: 37.2 (04-24-25 @ 04:52)  HR: 74 (04-24-25 @ 04:52) (55 - 74)  BP: 207/89 (04-24-25 @ 04:52) (133/85 - 207/89)  RR: 16 (04-24-25 @ 04:52) (16 - 16)  SpO2: 99% (04-24-25 @ 04:52) (98% - 100%)    GENERAL: patient appears well, no acute distress, appropriate, pleasant  EYES: sclera clear, no exudates  ENMT: oropharynx clear without erythema, no exudates, moist mucous membranes  NECK: supple, soft, no thyromegaly noted  LUNGS: good air entry bilaterally, clear to auscultation, symmetric breath sounds, no wheezing or rhonchi appreciated  HEART: soft S1/S2, regular rate and rhythm, no murmurs noted, no lower extremity edema  GASTROINTESTINAL: abdomen is soft, nontender, nondistended, normoactive bowel sounds, no palpable masses  INTEGUMENT: good skin turgor, warm skin, appears well perfused  MUSCULOSKELETAL: no clubbing or cyanosis, no obvious deformity  NEUROLOGIC: awake, alert, oriented x3, good muscle tone in 4 extremities, no obvious sensory deficits  PSYCHIATRIC: mood is good, affect is congruent, linear and logical thought process  HEME/LYMPH: no palpable supraclavicular nodules, no obvious ecchymosis or petechiae

## 2025-04-24 LAB
APPEARANCE UR: CLEAR — SIGNIFICANT CHANGE UP
BACTERIA # UR AUTO: NEGATIVE /HPF — SIGNIFICANT CHANGE UP
BILIRUB UR-MCNC: NEGATIVE — SIGNIFICANT CHANGE UP
CAST: 2 /LPF — SIGNIFICANT CHANGE UP (ref 0–4)
COLOR SPEC: YELLOW — SIGNIFICANT CHANGE UP
DIFF PNL FLD: NEGATIVE — SIGNIFICANT CHANGE UP
GLUCOSE BLDC GLUCOMTR-MCNC: 100 MG/DL — HIGH (ref 70–99)
GLUCOSE BLDC GLUCOMTR-MCNC: 83 MG/DL — SIGNIFICANT CHANGE UP (ref 70–99)
GLUCOSE BLDC GLUCOMTR-MCNC: 86 MG/DL — SIGNIFICANT CHANGE UP (ref 70–99)
GLUCOSE BLDC GLUCOMTR-MCNC: 97 MG/DL — SIGNIFICANT CHANGE UP (ref 70–99)
GLUCOSE UR QL: NEGATIVE MG/DL — SIGNIFICANT CHANGE UP
HBV SURFACE AB SER-ACNC: REACTIVE — SIGNIFICANT CHANGE UP
HBV SURFACE AG SER-ACNC: SIGNIFICANT CHANGE UP
KETONES UR-MCNC: NEGATIVE MG/DL — SIGNIFICANT CHANGE UP
LEUKOCYTE ESTERASE UR-ACNC: NEGATIVE — SIGNIFICANT CHANGE UP
NITRITE UR-MCNC: NEGATIVE — SIGNIFICANT CHANGE UP
PH UR: 7.5 — SIGNIFICANT CHANGE UP (ref 5–8)
PROT UR-MCNC: 100 MG/DL
RBC CASTS # UR COMP ASSIST: 1 /HPF — SIGNIFICANT CHANGE UP (ref 0–4)
SP GR SPEC: 1.01 — SIGNIFICANT CHANGE UP (ref 1–1.03)
SQUAMOUS # UR AUTO: 0 /HPF — SIGNIFICANT CHANGE UP (ref 0–5)
UROBILINOGEN FLD QL: 0.2 MG/DL — SIGNIFICANT CHANGE UP (ref 0.2–1)
WBC UR QL: 1 /HPF — SIGNIFICANT CHANGE UP (ref 0–5)

## 2025-04-24 PROCEDURE — 99233 SBSQ HOSP IP/OBS HIGH 50: CPT

## 2025-04-24 RX ORDER — DEXTROSE 50 % IN WATER 50 %
12.5 SYRINGE (ML) INTRAVENOUS ONCE
Refills: 0 | Status: DISCONTINUED | OUTPATIENT
Start: 2025-04-24 | End: 2025-04-25

## 2025-04-24 RX ORDER — SEVELAMER HYDROCHLORIDE 800 MG/1
800 TABLET ORAL
Refills: 0 | Status: DISCONTINUED | OUTPATIENT
Start: 2025-04-24 | End: 2025-04-25

## 2025-04-24 RX ORDER — QUETIAPINE FUMARATE 25 MG/1
100 TABLET ORAL AT BEDTIME
Refills: 0 | Status: DISCONTINUED | OUTPATIENT
Start: 2025-04-24 | End: 2025-04-25

## 2025-04-24 RX ORDER — INSULIN LISPRO 100 U/ML
INJECTION, SOLUTION INTRAVENOUS; SUBCUTANEOUS
Refills: 0 | Status: DISCONTINUED | OUTPATIENT
Start: 2025-04-24 | End: 2025-04-25

## 2025-04-24 RX ORDER — TRAZODONE HCL 100 MG
100 TABLET ORAL AT BEDTIME
Refills: 0 | Status: DISCONTINUED | OUTPATIENT
Start: 2025-04-24 | End: 2025-04-25

## 2025-04-24 RX ORDER — CARVEDILOL 3.12 MG/1
1 TABLET, FILM COATED ORAL
Refills: 0 | DISCHARGE

## 2025-04-24 RX ORDER — SODIUM CHLORIDE 9 G/1000ML
1000 INJECTION, SOLUTION INTRAVENOUS
Refills: 0 | Status: DISCONTINUED | OUTPATIENT
Start: 2025-04-24 | End: 2025-04-25

## 2025-04-24 RX ORDER — DEXTROSE 50 % IN WATER 50 %
15 SYRINGE (ML) INTRAVENOUS ONCE
Refills: 0 | Status: DISCONTINUED | OUTPATIENT
Start: 2025-04-24 | End: 2025-04-25

## 2025-04-24 RX ORDER — GLUCAGON 3 MG/1
1 POWDER NASAL ONCE
Refills: 0 | Status: DISCONTINUED | OUTPATIENT
Start: 2025-04-24 | End: 2025-04-25

## 2025-04-24 RX ORDER — CARVEDILOL 3.12 MG/1
6.25 TABLET, FILM COATED ORAL EVERY 12 HOURS
Refills: 0 | Status: DISCONTINUED | OUTPATIENT
Start: 2025-04-24 | End: 2025-04-25

## 2025-04-24 RX ORDER — FLUPHENAZINE HCL 10 MG
2.5 TABLET ORAL
Refills: 0 | Status: DISCONTINUED | OUTPATIENT
Start: 2025-04-24 | End: 2025-04-25

## 2025-04-24 RX ORDER — DEXTROSE 50 % IN WATER 50 %
25 SYRINGE (ML) INTRAVENOUS ONCE
Refills: 0 | Status: DISCONTINUED | OUTPATIENT
Start: 2025-04-24 | End: 2025-04-25

## 2025-04-24 RX ORDER — ASPIRIN 325 MG
81 TABLET ORAL DAILY
Refills: 0 | Status: DISCONTINUED | OUTPATIENT
Start: 2025-04-24 | End: 2025-04-25

## 2025-04-24 RX ORDER — ROSUVASTATIN CALCIUM 20 MG/1
5 TABLET, FILM COATED ORAL AT BEDTIME
Refills: 0 | Status: DISCONTINUED | OUTPATIENT
Start: 2025-04-24 | End: 2025-04-25

## 2025-04-24 RX ORDER — NIFEDIPINE 30 MG
60 TABLET, EXTENDED RELEASE 24 HR ORAL DAILY
Refills: 0 | Status: DISCONTINUED | OUTPATIENT
Start: 2025-04-24 | End: 2025-04-25

## 2025-04-24 RX ORDER — HEPARIN SODIUM 1000 [USP'U]/ML
5000 INJECTION INTRAVENOUS; SUBCUTANEOUS EVERY 12 HOURS
Refills: 0 | Status: DISCONTINUED | OUTPATIENT
Start: 2025-04-24 | End: 2025-04-25

## 2025-04-24 RX ADMIN — Medication 100 MILLIGRAM(S): at 22:08

## 2025-04-24 RX ADMIN — Medication 60 MILLIGRAM(S): at 06:46

## 2025-04-24 RX ADMIN — Medication 81 MILLIGRAM(S): at 09:54

## 2025-04-24 RX ADMIN — ROSUVASTATIN CALCIUM 5 MILLIGRAM(S): 20 TABLET, FILM COATED ORAL at 22:08

## 2025-04-24 RX ADMIN — CARVEDILOL 6.25 MILLIGRAM(S): 3.12 TABLET, FILM COATED ORAL at 17:02

## 2025-04-24 RX ADMIN — Medication 2.5 MILLIGRAM(S): at 17:01

## 2025-04-24 RX ADMIN — QUETIAPINE FUMARATE 100 MILLIGRAM(S): 25 TABLET ORAL at 22:08

## 2025-04-24 RX ADMIN — Medication 40 MILLIGRAM(S): at 06:43

## 2025-04-24 RX ADMIN — HEPARIN SODIUM 5000 UNIT(S): 1000 INJECTION INTRAVENOUS; SUBCUTANEOUS at 06:42

## 2025-04-24 RX ADMIN — SEVELAMER HYDROCHLORIDE 800 MILLIGRAM(S): 800 TABLET ORAL at 09:49

## 2025-04-24 RX ADMIN — SEVELAMER HYDROCHLORIDE 800 MILLIGRAM(S): 800 TABLET ORAL at 17:01

## 2025-04-24 RX ADMIN — HEPARIN SODIUM 5000 UNIT(S): 1000 INJECTION INTRAVENOUS; SUBCUTANEOUS at 17:02

## 2025-04-24 RX ADMIN — Medication 50 MILLIGRAM(S): at 22:08

## 2025-04-24 RX ADMIN — Medication 10 MILLIGRAM(S): at 06:42

## 2025-04-24 NOTE — PATIENT PROFILE ADULT - FALL HARM RISK - UNIVERSAL INTERVENTIONS
Bed in lowest position, wheels locked, appropriate side rails in place/Call bell, personal items and telephone in reach/Instruct patient to call for assistance before getting out of bed or chair/Non-slip footwear when patient is out of bed/[place] to call system/Physically safe environment - no spills, clutter or unnecessary equipment/Purposeful Proactive Rounding/Room/bathroom lighting operational, light cord in reach

## 2025-04-24 NOTE — PROGRESS NOTE ADULT - ASSESSMENT
39yo M with pmhx of DM, ARVIND, seizures, GERD, HLD,  ESRD on HD M/W/F via left radiocephalic AVF presented to ED c/o missed dialysis.    ESRD needing dialysis  Hypertensive urgency    -missed 3 session of HD  -no acute volume overload on exam   -eleyterolytes wnl   -no need for urgent dialysis   -HD in the am, Nephrology consult noted  -given Hydrazide 10mg IVP x 1 dose   -resume home BP meds       IDDM   -episode of hypoglycemia   -hold home lantus and premeal, resume when able   -ISS, hypoglycemic protocol     HTN/HLD  -Cont Carvedilol 6.25mg PO BID, Hydralazine 50mg TID, Nifedipine 60mg   -Rosuvastatin      Bipolar disorder   -Cont Fluphenazine, Seroquel  -Trazodone      DVT ppx  -Heparin subQ   Dispo: active, anticipated dc home after dialysis 1-2days      Time-based billing (NON-critical care).     77 minutes spent on total encounter. The necessity of the time spent during the encounter on this date of service was due to:   chart review, patient evaluation, independent history taking, documentation, coordination of care and discussion with patient, and nurse. 39yo M with pmhx of DM, ARVIND, seizures, GERD, HLD,  ESRD on HD M/W/F via left radiocephalic AVF presented to ED c/o missed dialysis.    1- Missed HD   ESRD on  dialysis  dialysis today and likely tomorrow per neohrology   d/w Dr Reid in am     2-Hypertensive urgency    -missed 3 session of HD  -no acute volume overload on exam   BP monitoring treat as needed   cont   home BP meds       3-IDDM   -episode of hypoglycemia   -cont to monitor BG in 90"s range   no lantus      4-Bipolar disorder   -Cont Fluphenazine, Seroquel  -Trazodone      DVT ppx  -Heparin subQ       anticipated dc home after dialysis in 24-48  hours

## 2025-04-24 NOTE — PROGRESS NOTE ADULT - ASSESSMENT
41yo M with pmhx of DM, ARVIND, seizures, GERD, HLD,  ESRD on HD M/W/F via left radiocephalic AVF presented to ED c/o missed dialysis. Pt states that he does not have transportation to get dialysis center and reason he missed his sensation.    ESRD on HD missed tx  usual schedule is MWF will arrange HD today and again tomorrow    HTN would renew his home BP meds will adjust after sufficient UF removal    Will follow

## 2025-04-25 ENCOUNTER — TRANSCRIPTION ENCOUNTER (OUTPATIENT)
Age: 40
End: 2025-04-25

## 2025-04-25 VITALS
DIASTOLIC BLOOD PRESSURE: 80 MMHG | OXYGEN SATURATION: 98 % | SYSTOLIC BLOOD PRESSURE: 141 MMHG | TEMPERATURE: 98 F | HEART RATE: 70 BPM | RESPIRATION RATE: 18 BRPM

## 2025-04-25 LAB
A1C WITH ESTIMATED AVERAGE GLUCOSE RESULT: 4.5 % — SIGNIFICANT CHANGE UP (ref 4–5.6)
ANION GAP SERPL CALC-SCNC: 17 MMOL/L — SIGNIFICANT CHANGE UP (ref 5–17)
BUN SERPL-MCNC: 46.7 MG/DL — HIGH (ref 8–20)
CALCIUM SERPL-MCNC: 10.1 MG/DL — SIGNIFICANT CHANGE UP (ref 8.4–10.5)
CHLORIDE SERPL-SCNC: 91 MMOL/L — LOW (ref 96–108)
CO2 SERPL-SCNC: 27 MMOL/L — SIGNIFICANT CHANGE UP (ref 22–29)
CREAT SERPL-MCNC: 9.76 MG/DL — HIGH (ref 0.5–1.3)
EGFR: 6 ML/MIN/1.73M2 — LOW
EGFR: 6 ML/MIN/1.73M2 — LOW
ESTIMATED AVERAGE GLUCOSE: 82 MG/DL — SIGNIFICANT CHANGE UP (ref 68–114)
GLUCOSE BLDC GLUCOMTR-MCNC: 101 MG/DL — HIGH (ref 70–99)
GLUCOSE BLDC GLUCOMTR-MCNC: 111 MG/DL — HIGH (ref 70–99)
GLUCOSE BLDC GLUCOMTR-MCNC: 87 MG/DL — SIGNIFICANT CHANGE UP (ref 70–99)
GLUCOSE SERPL-MCNC: 86 MG/DL — SIGNIFICANT CHANGE UP (ref 70–99)
HCT VFR BLD CALC: 39.3 % — SIGNIFICANT CHANGE UP (ref 39–50)
HGB BLD-MCNC: 14 G/DL — SIGNIFICANT CHANGE UP (ref 13–17)
MCHC RBC-ENTMCNC: 30 PG — SIGNIFICANT CHANGE UP (ref 27–34)
MCHC RBC-ENTMCNC: 35.6 G/DL — SIGNIFICANT CHANGE UP (ref 32–36)
MCV RBC AUTO: 84.3 FL — SIGNIFICANT CHANGE UP (ref 80–100)
NRBC # BLD AUTO: 0 K/UL — SIGNIFICANT CHANGE UP (ref 0–0)
NRBC # FLD: 0 K/UL — SIGNIFICANT CHANGE UP (ref 0–0)
NRBC BLD AUTO-RTO: 0 /100 WBCS — SIGNIFICANT CHANGE UP (ref 0–0)
PHOSPHATE SERPL-MCNC: 5.1 MG/DL — HIGH (ref 2.4–4.7)
PLATELET # BLD AUTO: 205 K/UL — SIGNIFICANT CHANGE UP (ref 150–400)
PMV BLD: 10 FL — SIGNIFICANT CHANGE UP (ref 7–13)
POTASSIUM SERPL-MCNC: 3.8 MMOL/L — SIGNIFICANT CHANGE UP (ref 3.5–5.3)
POTASSIUM SERPL-SCNC: 3.8 MMOL/L — SIGNIFICANT CHANGE UP (ref 3.5–5.3)
RBC # BLD: 4.66 M/UL — SIGNIFICANT CHANGE UP (ref 4.2–5.8)
RBC # FLD: 14.8 % — HIGH (ref 10.3–14.5)
SODIUM SERPL-SCNC: 135 MMOL/L — SIGNIFICANT CHANGE UP (ref 135–145)
WBC # BLD: 12.71 K/UL — HIGH (ref 3.8–10.5)
WBC # FLD AUTO: 12.71 K/UL — HIGH (ref 3.8–10.5)

## 2025-04-25 PROCEDURE — 96372 THER/PROPH/DIAG INJ SC/IM: CPT

## 2025-04-25 PROCEDURE — 86706 HEP B SURFACE ANTIBODY: CPT

## 2025-04-25 PROCEDURE — 99261: CPT

## 2025-04-25 PROCEDURE — 96374 THER/PROPH/DIAG INJ IV PUSH: CPT

## 2025-04-25 PROCEDURE — 82962 GLUCOSE BLOOD TEST: CPT

## 2025-04-25 PROCEDURE — 80048 BASIC METABOLIC PNL TOTAL CA: CPT

## 2025-04-25 PROCEDURE — 83036 HEMOGLOBIN GLYCOSYLATED A1C: CPT

## 2025-04-25 PROCEDURE — 99233 SBSQ HOSP IP/OBS HIGH 50: CPT

## 2025-04-25 PROCEDURE — 84100 ASSAY OF PHOSPHORUS: CPT

## 2025-04-25 PROCEDURE — 85025 COMPLETE CBC W/AUTO DIFF WBC: CPT

## 2025-04-25 PROCEDURE — 85027 COMPLETE CBC AUTOMATED: CPT

## 2025-04-25 PROCEDURE — 36415 COLL VENOUS BLD VENIPUNCTURE: CPT

## 2025-04-25 PROCEDURE — 99285 EMERGENCY DEPT VISIT HI MDM: CPT | Mod: 25

## 2025-04-25 PROCEDURE — 87340 HEPATITIS B SURFACE AG IA: CPT

## 2025-04-25 PROCEDURE — 81001 URINALYSIS AUTO W/SCOPE: CPT

## 2025-04-25 PROCEDURE — 80053 COMPREHEN METABOLIC PANEL: CPT

## 2025-04-25 PROCEDURE — 93005 ELECTROCARDIOGRAM TRACING: CPT

## 2025-04-25 RX ORDER — NIFEDIPINE 30 MG
1 TABLET, EXTENDED RELEASE 24 HR ORAL
Qty: 0 | Refills: 0 | DISCHARGE
Start: 2025-04-25

## 2025-04-25 RX ORDER — SEVELAMER HYDROCHLORIDE 800 MG/1
1600 TABLET ORAL
Refills: 0 | Status: DISCONTINUED | OUTPATIENT
Start: 2025-04-25 | End: 2025-04-25

## 2025-04-25 RX ADMIN — HEPARIN SODIUM 5000 UNIT(S): 1000 INJECTION INTRAVENOUS; SUBCUTANEOUS at 06:05

## 2025-04-25 RX ADMIN — Medication 2.5 MILLIGRAM(S): at 17:52

## 2025-04-25 RX ADMIN — Medication 50 MILLIGRAM(S): at 18:04

## 2025-04-25 RX ADMIN — CARVEDILOL 6.25 MILLIGRAM(S): 3.12 TABLET, FILM COATED ORAL at 06:05

## 2025-04-25 RX ADMIN — SEVELAMER HYDROCHLORIDE 1600 MILLIGRAM(S): 800 TABLET ORAL at 17:52

## 2025-04-25 RX ADMIN — Medication 2.5 MILLIGRAM(S): at 06:05

## 2025-04-25 RX ADMIN — CARVEDILOL 6.25 MILLIGRAM(S): 3.12 TABLET, FILM COATED ORAL at 17:52

## 2025-04-25 RX ADMIN — HEPARIN SODIUM 5000 UNIT(S): 1000 INJECTION INTRAVENOUS; SUBCUTANEOUS at 17:52

## 2025-04-25 RX ADMIN — SEVELAMER HYDROCHLORIDE 800 MILLIGRAM(S): 800 TABLET ORAL at 10:41

## 2025-04-25 RX ADMIN — Medication 40 MILLIGRAM(S): at 06:04

## 2025-04-25 RX ADMIN — Medication 50 MILLIGRAM(S): at 06:05

## 2025-04-25 RX ADMIN — Medication 60 MILLIGRAM(S): at 06:04

## 2025-04-25 RX ADMIN — SEVELAMER HYDROCHLORIDE 1600 MILLIGRAM(S): 800 TABLET ORAL at 12:30

## 2025-04-25 RX ADMIN — Medication 81 MILLIGRAM(S): at 12:30

## 2025-04-25 NOTE — DISCHARGE NOTE PROVIDER - ATTENDING DISCHARGE PHYSICAL EXAMINATION:
pt is seen in am , he is feeling well   reports vomiting after Hd yesyterday  no nausea   Vital Signs Last 24 Hrs  T(C): 37 (25 Apr 2025 13:10), Max: 37.5 (25 Apr 2025 05:08)  T(F): 98.6 (25 Apr 2025 13:10), Max: 99.5 (25 Apr 2025 05:08)  HR: 71 (25 Apr 2025 13:10) (63 - 85)  BP: 146/87 (25 Apr 2025 13:10) (128/76 - 171/89)  BP(mean): --  RR: 18 (25 Apr 2025 13:10) (18 - 18)  SpO2: 98% (25 Apr 2025 13:10) (95% - 100%)    Parameters below as of 25 Apr 2025 13:10  Patient On (Oxygen Delivery Method): room air      Lungs CTA   Abd soft no tenderness   ext no edema

## 2025-04-25 NOTE — DISCHARGE NOTE NURSING/CASE MANAGEMENT/SOCIAL WORK - PATIENT PORTAL LINK FT
You can access the FollowMyHealth Patient Portal offered by [facility] by registering at the following website: [email or web address]. By joining [employer]’s FollowMyHealth portal, you will also be able to view your health information using other applications (apps) compatible with our system.

## 2025-04-25 NOTE — DISCHARGE NOTE PROVIDER - HOSPITAL COURSE
39yo M with pmhx of DM, ARVIND, seizures, GERD, HLD,  ESRD on HD M/W/F via left radiocephalic AVF presented to ED c/o missed dialysis.BP is elevated   He had HD 4/24 and 4/25 , resume outpatient HD per schedule M-W-F on DC

## 2025-04-25 NOTE — DISCHARGE NOTE PROVIDER - NSDCFUSCHEDAPPT_GEN_ALL_CORE_FT
[facility] Physician [facility]  FAMILY[address]  Scheduled Appointment: 05/06/2025    Reji Corral  [facility] Physician [facility]  ENDOCRIN [address]  Scheduled Appointment: 07/08/2025

## 2025-04-25 NOTE — DISCHARGE NOTE PROVIDER - NSDCCPCAREPLAN_GEN_ALL_CORE_FT
PRINCIPAL DISCHARGE DIAGNOSIS  Diagnosis: ESRD needing dialysis  Assessment and Plan of Treatment: keep your schedule weekly      SECONDARY DISCHARGE DIAGNOSES  Diagnosis: Uncontrolled hypertension  Assessment and Plan of Treatment: continue medication and monitor your blood pressure

## 2025-04-25 NOTE — PROGRESS NOTE ADULT - SUBJECTIVE AND OBJECTIVE BOX
Patient is a 40y old  Male who presents with a chief complaint of missed HD (23 Apr 2025 22:58)    pt is seen this am , after breakfast had vomited while in bathroom  no pain ,currently no nausea   BP is high   missed HD last few session       ALLERGIES:  shellfish (Unknown)  Geodon (Anaphylaxis; Angioedema)    MEDICATIONS  (STANDING):  aspirin enteric coated 81 milliGRAM(s) Oral daily  carvedilol 6.25 milliGRAM(s) Oral every 12 hours  dextrose 5%. 1000 milliLiter(s) (50 mL/Hr) IV Continuous <Continuous>  dextrose 5%. 1000 milliLiter(s) (100 mL/Hr) IV Continuous <Continuous>  dextrose 50% Injectable 25 Gram(s) IV Push once  dextrose 50% Injectable 12.5 Gram(s) IV Push once  dextrose 50% Injectable 25 Gram(s) IV Push once  fluPHENAZine 2.5 milliGRAM(s) Oral two times a day  glucagon  Injectable 1 milliGRAM(s) IntraMuscular once  heparin   Injectable 5000 Unit(s) SubCutaneous every 12 hours  hydrALAZINE 50 milliGRAM(s) Oral three times a day  insulin lispro (ADMELOG) corrective regimen sliding scale   SubCutaneous three times a day before meals  NIFEdipine XL 60 milliGRAM(s) Oral daily  pantoprazole    Tablet 40 milliGRAM(s) Oral before breakfast  QUEtiapine 100 milliGRAM(s) Oral at bedtime  rosuvastatin 5 milliGRAM(s) Oral at bedtime  sevelamer carbonate 800 milliGRAM(s) Oral three times a day with meals  traZODone 100 milliGRAM(s) Oral at bedtime    MEDICATIONS  (PRN):  acetaminophen     Tablet .. 650 milliGRAM(s) Oral every 6 hours PRN Temp greater or equal to 38C (100.4F), Mild Pain (1 - 3)  aluminum hydroxide/magnesium hydroxide/simethicone Suspension 30 milliLiter(s) Oral every 4 hours PRN Dyspepsia  dextrose Oral Gel 15 Gram(s) Oral once PRN Blood Glucose LESS THAN 70 milliGRAM(s)/deciliter  melatonin 3 milliGRAM(s) Oral at bedtime PRN Insomnia  ondansetron Injectable 4 milliGRAM(s) IV Push every 8 hours PRN Nausea and/or Vomiting    Vital Signs Last 24 Hrs  T(F): 97.6 (24 Apr 2025 11:02), Max: 99 (24 Apr 2025 04:52)  HR: 68 (24 Apr 2025 11:02) (55 - 74)  BP: 158/92 (24 Apr 2025 11:02) (133/85 - 207/89)  RR: 19 (24 Apr 2025 11:02) (16 - 19)  SpO2: 97% (24 Apr 2025 11:02) (97% - 100%)  I&O's Summary      PHYSICAL EXAM:  General: awake   Neck: supple   Lungs: CTA   Cardio: RRR, S1/S2, No murmur  Abdomen: Soft, Nontender, Nondistended; Bowel sounds present  Extremities: No calf tenderness, + edema in legs     LABS:                        12.5   11.75 )-----------( 200      ( 23 Apr 2025 18:55 )             36.6     04-23    139  |  99  |  72.8  ----------------------------<  66  4.3   |  20.0  |  14.72    Ca    9.6      23 Apr 2025 18:55    TPro  6.8  /  Alb  3.9  /  TBili  0.5  /  DBili  x   /  AST  8   /  ALT  7   /  AlkPhos  69  04-23                                  POCT Blood Glucose.: 83 mg/dL (24 Apr 2025 08:27)  POCT Blood Glucose.: 130 mg/dL (23 Apr 2025 20:54)      Urinalysis Basic - ( 23 Apr 2025 18:55 )    Color: x / Appearance: x / SG: x / pH: x  Gluc: 66 mg/dL / Ketone: x  / Bili: x / Urobili: x   Blood: x / Protein: x / Nitrite: x   Leuk Esterase: x / RBC: x / WBC x   Sq Epi: x / Non Sq Epi: x / Bacteria: x          RADIOLOGY & ADDITIONAL TESTS:      
NEPHROLOGY INTERVAL HPI/OVERNIGHT EVENTS:    Examined earlier  + COOPER     MEDICATIONS  (STANDING):  aspirin enteric coated 81 milliGRAM(s) Oral daily  carvedilol 6.25 milliGRAM(s) Oral every 12 hours  dextrose 5%. 1000 milliLiter(s) (50 mL/Hr) IV Continuous <Continuous>  dextrose 5%. 1000 milliLiter(s) (100 mL/Hr) IV Continuous <Continuous>  dextrose 50% Injectable 25 Gram(s) IV Push once  dextrose 50% Injectable 12.5 Gram(s) IV Push once  dextrose 50% Injectable 25 Gram(s) IV Push once  fluPHENAZine 2.5 milliGRAM(s) Oral two times a day  glucagon  Injectable 1 milliGRAM(s) IntraMuscular once  heparin   Injectable 5000 Unit(s) SubCutaneous every 12 hours  hydrALAZINE 50 milliGRAM(s) Oral three times a day  insulin lispro (ADMELOG) corrective regimen sliding scale   SubCutaneous three times a day before meals  NIFEdipine XL 60 milliGRAM(s) Oral daily  pantoprazole    Tablet 40 milliGRAM(s) Oral before breakfast  QUEtiapine 100 milliGRAM(s) Oral at bedtime  rosuvastatin 5 milliGRAM(s) Oral at bedtime  sevelamer carbonate 800 milliGRAM(s) Oral three times a day with meals  traZODone 100 milliGRAM(s) Oral at bedtime    MEDICATIONS  (PRN):  acetaminophen     Tablet .. 650 milliGRAM(s) Oral every 6 hours PRN Temp greater or equal to 38C (100.4F), Mild Pain (1 - 3)  aluminum hydroxide/magnesium hydroxide/simethicone Suspension 30 milliLiter(s) Oral every 4 hours PRN Dyspepsia  dextrose Oral Gel 15 Gram(s) Oral once PRN Blood Glucose LESS THAN 70 milliGRAM(s)/deciliter  melatonin 3 milliGRAM(s) Oral at bedtime PRN Insomnia  ondansetron Injectable 4 milliGRAM(s) IV Push every 8 hours PRN Nausea and/or Vomiting      Allergies    shellfish (Unknown)  Geodon (Anaphylaxis; Angioedema)    Intolerances        Vital Signs Last 24 Hrs  T(C): 36.4 (2025 11:38), Max: 37.2 (2025 04:52)  T(F): 97.6 (2025 11:38), Max: 99 (2025 04:52)  HR: 69 (2025 11:38) (55 - 74)  BP: 152/83 (2025 11:38) (133/85 - 207/89)  BP(mean): --  RR: 18 (2025 11:38) (16 - 19)  SpO2: 99% (2025 11:38) (97% - 100%)    Parameters below as of 2025 11:38  Patient On (Oxygen Delivery Method): room air      Daily     Daily Weight in k.8 (2025 11:38)    PHYSICAL EXAM:    GENERAL: NAD  HEAD: NCAT  EYES: EOMI  NECK: Supple  NERVOUS SYSTEM:  Alert & Oriented X3  CHEST/LUNG: dec bs bilaterally  HEART: Regular rate and rhythm  ABDOMEN: Soft, Nontender, Nondistended; +BS  EXTREMITIES:  edema    LABS:                        12.5   11.75 )-----------( 200      ( 2025 18:55 )             36.6     04-    139  |  99  |  72.8[H]  ----------------------------<  66[L]  4.3   |  20.0[L]  |  14.72[H]    Ca    9.6      2025 18:55    TPro  6.8  /  Alb  3.9  /  TBili  0.5  /  DBili  x   /  AST  8   /  ALT  7   /  AlkPhos  69  04-23      Urinalysis Basic - ( 2025 18:55 )    Color: x / Appearance: x / SG: x / pH: x  Gluc: 66 mg/dL / Ketone: x  / Bili: x / Urobili: x   Blood: x / Protein: x / Nitrite: x   Leuk Esterase: x / RBC: x / WBC x   Sq Epi: x / Non Sq Epi: x / Bacteria: x              RADIOLOGY & ADDITIONAL TESTS:  
NEPHROLOGY INTERVAL HPI/OVERNIGHT EVENTS:    No new events.    MEDICATIONS  (STANDING):  aspirin enteric coated 81 milliGRAM(s) Oral daily  carvedilol 6.25 milliGRAM(s) Oral every 12 hours  dextrose 5%. 1000 milliLiter(s) (50 mL/Hr) IV Continuous <Continuous>  dextrose 5%. 1000 milliLiter(s) (100 mL/Hr) IV Continuous <Continuous>  dextrose 50% Injectable 25 Gram(s) IV Push once  dextrose 50% Injectable 12.5 Gram(s) IV Push once  dextrose 50% Injectable 25 Gram(s) IV Push once  fluPHENAZine 2.5 milliGRAM(s) Oral two times a day  glucagon  Injectable 1 milliGRAM(s) IntraMuscular once  heparin   Injectable 5000 Unit(s) SubCutaneous every 12 hours  hydrALAZINE 50 milliGRAM(s) Oral three times a day  insulin lispro (ADMELOG) corrective regimen sliding scale   SubCutaneous three times a day before meals  NIFEdipine XL 60 milliGRAM(s) Oral daily  pantoprazole    Tablet 40 milliGRAM(s) Oral before breakfast  QUEtiapine 100 milliGRAM(s) Oral at bedtime  rosuvastatin 5 milliGRAM(s) Oral at bedtime  sevelamer carbonate 800 milliGRAM(s) Oral three times a day with meals  traZODone 100 milliGRAM(s) Oral at bedtime    MEDICATIONS  (PRN):  acetaminophen     Tablet .. 650 milliGRAM(s) Oral every 6 hours PRN Temp greater or equal to 38C (100.4F), Mild Pain (1 - 3)  aluminum hydroxide/magnesium hydroxide/simethicone Suspension 30 milliLiter(s) Oral every 4 hours PRN Dyspepsia  dextrose Oral Gel 15 Gram(s) Oral once PRN Blood Glucose LESS THAN 70 milliGRAM(s)/deciliter  melatonin 3 milliGRAM(s) Oral at bedtime PRN Insomnia  ondansetron Injectable 4 milliGRAM(s) IV Push every 8 hours PRN Nausea and/or Vomiting      Allergies    shellfish (Unknown)  Geodon (Anaphylaxis; Angioedema)      Vital Signs Last 24 Hrs  T(C): 37.3 (2025 07:58), Max: 37.5 (2025 05:08)  T(F): 99.1 (2025 07:58), Max: 99.5 (2025 05:08)  HR: 66 (2025 07:58) (63 - 81)  BP: 133/77 (2025 07:58) (128/76 - 171/89)  BP(mean): --  RR: 18 (2025 07:58) (18 - 19)  SpO2: 97% (2025 07:58) (97% - 100%)    Parameters below as of 2025 07:58  Patient On (Oxygen Delivery Method): room air      T(C): 36.4 (2025 11:38), Max: 37.2 (2025 04:52)  T(F): 97.6 (2025 11:38), Max: 99 (2025 04:52)  HR: 69 (2025 11:38) (55 - 74)  BP: 152/83 (2025 11:38) (133/85 - 207/89)  BP(mean): --  RR: 18 (2025 11:38) (16 - 19)  SpO2: 99% (2025 11:38) (97% - 100%)    Parameters below as of 2025 11:38  Patient On (Oxygen Delivery Method): room air        Daily Weight in k.8 (2025 11:38)    PHYSICAL EXAM:    GENERAL: Comfortable in bed  HEAD: Wnl  EYES: open  NECK: Supple  NERVOUS SYSTEM:  Alert & Oriented   CHEST/LUNG: clear, no 02  HEART: Regular rate and rhythm  ABDOMEN: Soft, Nontender, Nondistended; +BS  EXTREMITIES:  No  edema    LABS:        135  |  91[L]  |  46.7[H]  ----------------------------<  86  3.8   |  27.0  |  9.76[H]    Ca    10.1      2025 03:50  Phos  5.1         TPro  6.8  /  Alb  3.9  /  TBili  0.5  /  DBili  x   /  AST  8   /  ALT  7   /  AlkPhos  69                            12.5   11.75 )-----------( 200      ( 2025 18:55 )             36.6         139  |  99  |  72.8[H]  ----------------------------<  66[L]  4.3   |  20.0[L]  |  14.72[H]    Ca    9.6      2025 18:55    TPro  6.8  /  Alb  3.9  /  TBili  0.5  /  DBili  x   /  AST  8   /  ALT  7   /  AlkPhos  69        Urinalysis Basic - ( 2025 18:55 )    Color: x / Appearance: x / SG: x / pH: x  Gluc: 66 mg/dL / Ketone: x  / Bili: x / Urobili: x   Blood: x / Protein: x / Nitrite: x   Leuk Esterase: x / RBC: x / WBC x   Sq Epi: x / Non Sq Epi: x / Bacteria: x              RADIOLOGY & ADDITIONAL TESTS:

## 2025-04-25 NOTE — DISCHARGE NOTE PROVIDER - PROVIDER RX CONTACT NUMBER
From: Art Caballero  To: Sal Loenard  Sent: 6/17/2022 4:06 PM CDT  Subject: Ozempic    Dr Leonard you stated you was going to up the Ozempic 1.0 but no prescription was sent to [facility]. [facility].    Thanks  Art Caballero  [phone]   [phone]

## 2025-04-25 NOTE — CHART NOTE - NSCHARTNOTEFT_GEN_A_CORE
SWNote: pr ready for d/c , flow sheet faxed to [facility] , HDresumption of services completed (Monday same seat time) . Pt requested transportation assistance [facility] called taxi arranged, Taxi [place] will p/u pt in the ED area ETA 19:30. Pt aware, will be waiting in the ED lobby/Conf#[id number]. Pt will contact [facility] for his HD taxi ride. No other SW needs at this time.

## 2025-04-25 NOTE — DISCHARGE NOTE PROVIDER - NSDCMRMEDTOKEN_GEN_ALL_CORE_FT
Admelog SoloStar 100 units/mL injectable solution: 8 unit(s) subcutaneous 3 times a day before meals  aspirin 81 mg oral tablet: 1 tab(s) orally once a day  carvedilol 6.25 mg oral tablet: 1 tab(s) orally 2 times a day  folic acid 1 mg oral tablet: 1 tab(s) orally once a day  hydrALAZINE 50 mg oral tablet: 1 tab(s) orally 3 times a day  Lantus Solostar Pen 100 units/mL subcutaneous solution: 25  subcutaneous once a day (at bedtime)   NIFEdipine 60 mg oral tablet, extended release: 1 tab(s) orally once a day  Prolixin 2.5 mg oral tablet: 1 tab(s) orally 2 times a day  Protonix 40 mg oral delayed release tablet: 1 tab(s) orally once a day  rosuvastatin 5 mg oral tablet: 1 tab(s) orally once a day  SEROquel 100 mg oral tablet: 1 tab(s) orally once a day (at bedtime)  sevelamer carbonate 800 mg oral tablet: 1 tab(s) orally 3 times a day (with meals)  traZODone 100 mg oral tablet: 1 tab(s) orally once a day (at bedtime)  Vitamin D3 2000 intl units (50 mcg) oral tablet: 2 cap(s) orally once a day   carvedilol 6.25 mg oral tablet: 1 tab(s) orally 2 times a day  folic acid 1 mg oral tablet: 1 tab(s) orally once a day  hydrALAZINE 50 mg oral tablet: 1 tab(s) orally 3 times a day  Lantus Solostar Pen 100 units/mL subcutaneous solution: 25  subcutaneous once a day (at bedtime)   NIFEdipine 60 mg oral tablet, extended release: 1 tab(s) orally once a day  Pepcid 20 mg oral tablet: 1 tab(s) orally once a day  Prolixin 2.5 mg oral tablet: 1 tab(s) orally 2 times a day  Protonix 40 mg oral delayed release tablet: 1 tab(s) orally once a day  rosuvastatin 5 mg oral tablet: 1 tab(s) orally once a day  SEROquel 100 mg oral tablet: 1 tab(s) orally once a day (at bedtime)  sevelamer carbonate 800 mg oral tablet: 1 tab(s) orally 3 times a day (with meals)  traZODone 100 mg oral tablet: 1 tab(s) orally once a day (at bedtime)  Vitamin D3 2000 intl units (50 mcg) oral tablet: 2 cap(s) orally once a day

## 2025-04-25 NOTE — DISCHARGE NOTE NURSING/CASE MANAGEMENT/SOCIAL WORK - FINANCIAL ASSISTANCE
[facility] provides services at a reduced cost to those who are determined to be eligible through [facility]’s financial assistance program. Information regarding [facility]’s financial assistance program can be found by going to [email or web address] or by calling [phone].

## 2025-04-28 LAB — TM INTERPRETATION: SIGNIFICANT CHANGE UP

## 2025-04-28 PROCEDURE — G0452: CPT | Mod: 26

## 2025-05-06 ENCOUNTER — APPOINTMENT (OUTPATIENT)
Dept: FAMILY MEDICINE | Facility: CLINIC | Age: 40
End: 2025-05-06
Payer: MEDICARE

## 2025-05-06 ENCOUNTER — NON-APPOINTMENT (OUTPATIENT)
Age: 40
End: 2025-05-06

## 2025-05-06 VITALS
BODY MASS INDEX: 35.44 KG/M2 | HEART RATE: 78 BPM | HEIGHT: 63 IN | OXYGEN SATURATION: 98 % | DIASTOLIC BLOOD PRESSURE: 84 MMHG | SYSTOLIC BLOOD PRESSURE: 142 MMHG | WEIGHT: 200 LBS | TEMPERATURE: 97.5 F

## 2025-05-06 VITALS — DIASTOLIC BLOOD PRESSURE: 82 MMHG | SYSTOLIC BLOOD PRESSURE: 138 MMHG

## 2025-05-06 DIAGNOSIS — N18.5 TYPE 2 DIABETES MELLITUS WITH DIABETIC CHRONIC KIDNEY DISEASE: ICD-10-CM

## 2025-05-06 DIAGNOSIS — Z99.2 DEPENDENCE ON RENAL DIALYSIS: ICD-10-CM

## 2025-05-06 DIAGNOSIS — Z99.2 END STAGE RENAL DISEASE: ICD-10-CM

## 2025-05-06 DIAGNOSIS — D50.9 IRON DEFICIENCY ANEMIA, UNSPECIFIED: ICD-10-CM

## 2025-05-06 DIAGNOSIS — F31.9 BIPOLAR DISORDER, UNSPECIFIED: ICD-10-CM

## 2025-05-06 DIAGNOSIS — E78.5 HYPERLIPIDEMIA, UNSPECIFIED: ICD-10-CM

## 2025-05-06 DIAGNOSIS — E11.22 TYPE 2 DIABETES MELLITUS WITH DIABETIC CHRONIC KIDNEY DISEASE: ICD-10-CM

## 2025-05-06 DIAGNOSIS — G40.909 EPILEPSY, UNSPECIFIED, NOT INTRACTABLE, W/OUT STATUS EPILEPTICUS: ICD-10-CM

## 2025-05-06 DIAGNOSIS — D64.9 ANEMIA, UNSPECIFIED: ICD-10-CM

## 2025-05-06 DIAGNOSIS — I10 ESSENTIAL (PRIMARY) HYPERTENSION: ICD-10-CM

## 2025-05-06 DIAGNOSIS — N18.6 END STAGE RENAL DISEASE: ICD-10-CM

## 2025-05-06 PROCEDURE — G0439: CPT

## 2025-05-06 PROCEDURE — 36415 COLL VENOUS BLD VENIPUNCTURE: CPT

## 2025-05-06 PROCEDURE — G0444 DEPRESSION SCREEN ANNUAL: CPT

## 2025-05-06 PROCEDURE — 93000 ELECTROCARDIOGRAM COMPLETE: CPT | Mod: 59

## 2025-05-06 RX ORDER — FAMOTIDINE 20 MG/1
20 TABLET, FILM COATED ORAL
Qty: 90 | Refills: 3 | Status: ACTIVE | COMMUNITY
Start: 2025-05-06

## 2025-05-06 RX ORDER — PANTOPRAZOLE 40 MG/1
40 TABLET, DELAYED RELEASE ORAL
Qty: 90 | Refills: 3 | Status: ACTIVE | COMMUNITY
Start: 2025-05-06 | End: 1900-01-01

## 2025-05-07 LAB
ALBUMIN SERPL ELPH-MCNC: 4.7 G/DL
ALP BLD-CCNC: 74 U/L
ALT SERPL-CCNC: 11 U/L
ANION GAP SERPL CALC-SCNC: 18 MMOL/L
APPEARANCE: CLEAR
AST SERPL-CCNC: 14 U/L
BACTERIA: NEGATIVE /HPF
BASOPHILS # BLD AUTO: 0.12 K/UL
BASOPHILS NFR BLD AUTO: 0.8 %
BILIRUB SERPL-MCNC: 0.4 MG/DL
BILIRUBIN URINE: NEGATIVE
BLOOD URINE: NEGATIVE
BUN SERPL-MCNC: 45 MG/DL
CALCIUM SERPL-MCNC: 10.9 MG/DL
CAST: 0 /LPF
CHLORIDE SERPL-SCNC: 97 MMOL/L
CHOLEST SERPL-MCNC: 128 MG/DL
CK SERPL-CCNC: 130 U/L
CO2 SERPL-SCNC: 25 MMOL/L
COLOR: YELLOW
CREAT SERPL-MCNC: 8.44 MG/DL
EGFRCR SERPLBLD CKD-EPI 2021: 8 ML/MIN/1.73M2
EOSINOPHIL # BLD AUTO: 0.56 K/UL
EOSINOPHIL NFR BLD AUTO: 3.6 %
EPITHELIAL CELLS: 3 /HPF
ESTIMATED AVERAGE GLUCOSE: 85 MG/DL
GGT SERPL-CCNC: 26 U/L
GLUCOSE QUALITATIVE U: NEGATIVE MG/DL
GLUCOSE SERPL-MCNC: 86 MG/DL
HBA1C MFR BLD HPLC: 4.6 %
HCT VFR BLD CALC: 41.1 %
HDLC SERPL-MCNC: 60 MG/DL
HGB BLD-MCNC: 13.5 G/DL
IMM GRANULOCYTES NFR BLD AUTO: 0.3 %
KETONES URINE: NEGATIVE MG/DL
LDLC SERPL-MCNC: 55 MG/DL
LEUKOCYTE ESTERASE URINE: NEGATIVE
LYMPHOCYTES # BLD AUTO: 3.62 K/UL
LYMPHOCYTES NFR BLD AUTO: 23.4 %
MAN DIFF?: NORMAL
MCHC RBC-ENTMCNC: 29.2 PG
MCHC RBC-ENTMCNC: 32.8 G/DL
MCV RBC AUTO: 89 FL
MICROSCOPIC-UA: NORMAL
MONOCYTES # BLD AUTO: 0.91 K/UL
MONOCYTES NFR BLD AUTO: 5.9 %
NEUTROPHILS # BLD AUTO: 10.18 K/UL
NEUTROPHILS NFR BLD AUTO: 66 %
NITRITE URINE: NEGATIVE
NONHDLC SERPL-MCNC: 69 MG/DL
PH URINE: 7
PLATELET # BLD AUTO: 265 K/UL
POTASSIUM SERPL-SCNC: 4.3 MMOL/L
PROT SERPL-MCNC: 7.5 G/DL
PROTEIN URINE: 100 MG/DL
RBC # BLD: 4.62 M/UL
RBC # FLD: 15.7 %
RED BLOOD CELLS URINE: 0 /HPF
SODIUM SERPL-SCNC: 139 MMOL/L
SPECIFIC GRAVITY URINE: 1.01
T3FREE SERPL-MCNC: 2.48 PG/ML
T4 FREE SERPL-MCNC: 1.3 NG/DL
TRIGL SERPL-MCNC: 64 MG/DL
TSH SERPL-ACNC: 1.11 UIU/ML
URATE SERPL-MCNC: 3.9 MG/DL
UROBILINOGEN URINE: 0.2 MG/DL
WBC # FLD AUTO: 15.44 K/UL
WHITE BLOOD CELLS URINE: 1 /HPF

## 2025-05-08 ENCOUNTER — NON-APPOINTMENT (OUTPATIENT)
Age: 40
End: 2025-05-08

## 2025-06-12 ENCOUNTER — RX RENEWAL (OUTPATIENT)
Age: 40
End: 2025-06-12

## 2025-06-12 RX ORDER — SEVELAMER CARBONATE 800 MG/1
800 TABLET, FILM COATED ORAL
Qty: 84 | Refills: 5 | Status: ACTIVE | COMMUNITY
Start: 2025-06-12 | End: 1900-01-01

## 2025-06-19 NOTE — H&P PST ADULT - REASON FOR ADMISSION
LUE angiogram Number Of Freeze-Thaw Cycles: 2 freeze-thaw cycles Detail Level: Detailed Application Tool (Optional): Cry-AC Post-Care Instructions: I reviewed with the patient in detail post-care instructions. Patient is to wear sunprotection, and avoid picking at any of the treated lesions. Pt may apply Vaseline to crusted or scabbing areas. Show Aperture Variable?: Yes Aperture Size (Optional): C Render Post-Care Instructions In Note?: no Duration Of Freeze Thaw-Cycle (Seconds): 20 Consent: The patient's consent was obtained including but not limited to risks of crusting, scabbing, blistering, scarring, darker or lighter pigmentary change, recurrence, incomplete removal and infection.

## 2025-07-08 ENCOUNTER — APPOINTMENT (OUTPATIENT)
Dept: ENDOCRINOLOGY | Facility: CLINIC | Age: 40
End: 2025-07-08

## 2025-07-11 ENCOUNTER — RX RENEWAL (OUTPATIENT)
Age: 40
End: 2025-07-11

## 2025-07-11 RX ORDER — MULTIVIT-MIN/FOLIC/VIT K/LYCOP 400-300MCG
50 MCG TABLET ORAL
Qty: 56 | Refills: 0 | Status: ACTIVE | COMMUNITY
Start: 2025-07-11 | End: 1900-01-01

## 2025-07-16 NOTE — PATIENT PROFILE ADULT - STATED REASON FOR ADMISSION
Problem: Patient Centered Care  Goal: Patient preferences are identified and integrated in the patient's plan of care  Description: Interventions:  - What would you like us to know as we care for you? From [facility]   - Provide timely, complete, and accurate information to patient/family  - Incorporate patient and family knowledge, values, beliefs, and cultural backgrounds into the planning and delivery of care  - Encourage patient/family to participate in care and decision-making at the level they choose  - Honor patient and family perspectives and choices  Outcome: Progressing     Problem: Patient/Family Goals  Goal: Patient/Family Long Term Goal  Description: Patient's Long Term Goal: discharge from hospital     Interventions:  - monitor vital signs   - monitor appropriate labs  - pain management   - administer medications per order  - follow MD orders  - diagnostics per order  - update and inform patient and family on plan of care  - discharge planning  - See additional Care Plan goals for specific interventions  Outcome: Progressing  Goal: Patient/Family Short Term Goal  Description: Patient's Short Term Goal: improve breathing     Interventions:   - monitor vital signs   - monitor appropriate labs  - pain management   - administer medications per order  - follow MD orders  - diagnostics per order  - update and inform patient and family on plan of care  - See additional Care Plan goals for specific interventions  Outcome: Progressing     Problem: PAIN - ADULT  Goal: Verbalizes/displays adequate comfort level or patient's stated pain goal  Description: INTERVENTIONS:  - Encourage pt to monitor pain and request assistance  - Assess pain using appropriate pain scale  - Administer analgesics based on type and severity of pain and evaluate response  - Implement non-pharmacological measures as appropriate and evaluate response  - Consider cultural and social influences on pain and pain management  -  Manage/alleviate anxiety  - Utilize distraction and/or relaxation techniques  - Monitor for opioid side effects  - Notify MD/LIP if interventions unsuccessful or patient reports new pain  - Anticipate increased pain with activity and pre-medicate as appropriate  Outcome: Progressing     Problem: SAFETY ADULT - FALL  Goal: Free from fall injury  Description: INTERVENTIONS:  - Assess pt frequently for physical needs  - Identify cognitive and physical deficits and behaviors that affect risk of falls.  - [place] fall precautions as indicated by assessment.  - Educate pt/family on patient safety including physical limitations  - Instruct pt to call for assistance with activity based on assessment  - Modify environment to reduce risk of injury  - Provide assistive devices as appropriate  - Consider OT/PT consult to assist with strengthening/mobility  - Encourage toileting schedule  Outcome: Progressing     Problem: DISCHARGE PLANNING  Goal: Discharge to home or other facility with appropriate resources  Description: INTERVENTIONS:  - Identify barriers to discharge w/pt and caregiver  - Include patient/family/discharge partner in discharge planning  - Arrange for needed discharge resources and transportation as appropriate  - Identify discharge learning needs (meds, wound care, etc)  - Arrange for interpreters to assist at discharge as needed  - Consider post-discharge preferences of patient/family/discharge partner  - Complete POLST form as appropriate  - Assess patient's ability to be responsible for managing their own health  - Refer to Case Management Department for coordinating discharge planning if the patient needs post-hospital services based on physician/LIP order or complex needs related to functional status, cognitive ability or social support system  Outcome: Progressing     Problem: CARDIOVASCULAR - ADULT  Goal: Maintains optimal cardiac output and hemodynamic stability  Description: INTERVENTIONS:  -  Monitor vital signs, rhythm, and trends  - Monitor for bleeding, hypotension and signs of decreased cardiac output  - Evaluate effectiveness of vasoactive medications to optimize hemodynamic stability  - Monitor arterial and/or venous puncture sites for bleeding and/or hematoma  - Assess quality of pulses, skin color and temperature  - Assess for signs of decreased coronary artery perfusion - ex. Angina  - Evaluate fluid balance, assess for edema, trend weights  Outcome: Progressing     Problem: RESPIRATORY - ADULT  Goal: Achieves optimal ventilation and oxygenation  Description: INTERVENTIONS:  - Assess for changes in respiratory status  - Assess for changes in mentation and behavior  - Position to facilitate oxygenation and minimize respiratory effort  - Oxygen supplementation based on oxygen saturation or ABGs  - Provide Smoking Cessation handout, if applicable  - Encourage broncho-pulmonary hygiene including cough, deep breathe, Incentive Spirometry  - Assess the need for suctioning and perform as needed  - Assess and instruct to report SOB or any respiratory difficulty  - Respiratory Therapy support as indicated  - Manage/alleviate anxiety  - Monitor for signs/symptoms of CO2 retention  Outcome: Progressing     Problem: METABOLIC/FLUID AND ELECTROLYTES - ADULT  Goal: Electrolytes maintained within normal limits  Description: INTERVENTIONS:  - Monitor labs and rhythm and assess patient for signs and symptoms of electrolyte imbalances  - Administer electrolyte replacement as ordered  - Monitor response to electrolyte replacements, including rhythm and repeat lab results as appropriate  - Fluid restriction as ordered  - Instruct patient on fluid and nutrition restrictions as appropriate  Outcome: Progressing     Problem: SKIN/TISSUE INTEGRITY - ADULT  Goal: Skin integrity remains intact  Description: INTERVENTIONS  - Assess and document risk factors for pressure ulcer development  - Assess and document skin  integrity  - Monitor for areas of redness and/or skin breakdown  - Initiate interventions, skin care algorithm/standards of care as needed  Outcome: Progressing     Problem: HEMATOLOGIC - ADULT  Goal: Free from bleeding injury  Description: (Example usage: patient with low platelets)  INTERVENTIONS:  - Avoid intramuscular injections, enemas and rectal medication administration  - Ensure safe mobilization of patient  - Hold pressure on venipuncture sites to achieve adequate hemostasis  - Assess for signs and symptoms of internal bleeding  - Monitor lab trends  - Patient is to report abnormal signs of bleeding to staff  - Avoid use of toothpicks and dental floss  - Use electric shaver for shaving  - Use soft bristle tooth brush  - Limit straining and forceful nose blowing  Outcome: Progressing    Patient admitted to floor. Tolerating 2L nasal cannula, 3L O2 at baseline. Ambulates with walker. Intravenous lasix. CT chest completed in ED. Plan for chest x-ray.      missed 3 HD treatments

## 2025-08-15 ENCOUNTER — RX RENEWAL (OUTPATIENT)
Age: 40
End: 2025-08-15

## 2025-08-15 RX ORDER — ASPIRIN 81 MG/1
81 TABLET, COATED ORAL
Qty: 28 | Refills: 3 | Status: ACTIVE | COMMUNITY
Start: 2025-08-15 | End: 1900-01-01

## 2025-08-15 RX ORDER — MULTIVIT-MIN/FOLIC/VIT K/LYCOP 400-300MCG
50 MCG TABLET ORAL
Qty: 56 | Refills: 3 | Status: ACTIVE | COMMUNITY
Start: 2025-08-15 | End: 1900-01-01

## 2025-08-19 ENCOUNTER — APPOINTMENT (OUTPATIENT)
Dept: FAMILY MEDICINE | Facility: CLINIC | Age: 40
End: 2025-08-19

## 2025-09-09 NOTE — ED PROVIDER NOTE - WR ORDER ID 1
Group Topic: Art Therapy   Group Date: 9/8/2025  Start Time: 1330  End Time: 1430  Facilitators: Vesna Hennessy   Department: [facility] EXPRESSIVE THER VIRTUAL    Number of Participants: 11   Group Focus: calming/relaxation, expressive outlet, and feeling awareness/expression  Treatment Modality: Art Therapy and Music Therapy  Interventions Utilized were: active art engagement, passive music engagement, and sharing/discussion    Pts were prompted to choose 3 paint colors to represent 3 emotions: happiness, peace, and down/sad. They were given a piece of a larger puzzle and directed to express those emotions through their paint while listening to live music designed to support this. At close of session, pts were invited to share why they chose the colors and designs they did and how the intervention felt.       Name: Ellis Pollack YOB: 1957   MR: [id number]      Level of Participation: moderate  Quality of Participation: appropriate/pleasant  Interactions with others: appropriate  Mood/Affect: brightens with interaction and flat  Cognition, Pre Treatment: attentive  Cognition, Post Treatment: attentive  Progress: Moderate  Plan: continue with services    Pt in and out due to podiatry consult. Upset he wasn't able to finish, but seems to like art and working intricately.       [id number]